# Patient Record
Sex: FEMALE | Race: WHITE | NOT HISPANIC OR LATINO | Employment: FULL TIME | URBAN - METROPOLITAN AREA
[De-identification: names, ages, dates, MRNs, and addresses within clinical notes are randomized per-mention and may not be internally consistent; named-entity substitution may affect disease eponyms.]

---

## 2017-01-09 ENCOUNTER — GENERIC CONVERSION - ENCOUNTER (OUTPATIENT)
Dept: OTHER | Facility: OTHER | Age: 67
End: 2017-01-09

## 2017-01-09 ENCOUNTER — ALLSCRIPTS OFFICE VISIT (OUTPATIENT)
Dept: WOUND CARE | Facility: HOSPITAL | Age: 67
End: 2017-01-09
Payer: COMMERCIAL

## 2017-01-09 PROCEDURE — 11042 DBRDMT SUBQ TIS 1ST 20SQCM/<: CPT | Performed by: REGISTERED NURSE

## 2017-01-16 ENCOUNTER — ALLSCRIPTS OFFICE VISIT (OUTPATIENT)
Dept: WOUND CARE | Facility: HOSPITAL | Age: 67
End: 2017-01-16
Payer: COMMERCIAL

## 2017-01-16 PROCEDURE — 11042 DBRDMT SUBQ TIS 1ST 20SQCM/<: CPT | Performed by: REGISTERED NURSE

## 2017-01-17 ENCOUNTER — HOSPITAL ENCOUNTER (EMERGENCY)
Facility: HOSPITAL | Age: 67
End: 2017-01-17
Attending: EMERGENCY MEDICINE | Admitting: EMERGENCY MEDICINE
Payer: COMMERCIAL

## 2017-01-17 ENCOUNTER — OFFICE VISIT (OUTPATIENT)
Dept: URGENT CARE | Facility: CLINIC | Age: 67
End: 2017-01-17
Payer: COMMERCIAL

## 2017-01-17 ENCOUNTER — HOSPITAL ENCOUNTER (OUTPATIENT)
Dept: RADIOLOGY | Facility: CLINIC | Age: 67
Discharge: HOME | End: 2017-01-17
Admitting: PHYSICIAN ASSISTANT
Payer: COMMERCIAL

## 2017-01-17 ENCOUNTER — APPOINTMENT (EMERGENCY)
Dept: CT IMAGING | Facility: HOSPITAL | Age: 67
End: 2017-01-17
Payer: COMMERCIAL

## 2017-01-17 ENCOUNTER — HOSPITAL ENCOUNTER (INPATIENT)
Facility: HOSPITAL | Age: 67
LOS: 7 days | Discharge: HOME | DRG: 175 | End: 2017-01-24
Attending: EMERGENCY MEDICINE | Admitting: INTERNAL MEDICINE
Payer: COMMERCIAL

## 2017-01-17 VITALS
RESPIRATION RATE: 18 BRPM | HEIGHT: 70 IN | SYSTOLIC BLOOD PRESSURE: 113 MMHG | HEART RATE: 111 BPM | OXYGEN SATURATION: 94 % | TEMPERATURE: 99.2 F | WEIGHT: 207.8 LBS | DIASTOLIC BLOOD PRESSURE: 76 MMHG | BODY MASS INDEX: 29.75 KG/M2

## 2017-01-17 DIAGNOSIS — Z12.12 ENCOUNTER FOR SCREENING FOR MALIGNANT NEOPLASM OF RECTUM: ICD-10-CM

## 2017-01-17 DIAGNOSIS — Z12.11 ENCOUNTER FOR SCREENING FOR MALIGNANT NEOPLASM OF COLON: ICD-10-CM

## 2017-01-17 DIAGNOSIS — I87.8 VENOUS STASIS OF BOTH LOWER EXTREMITIES: Primary | ICD-10-CM

## 2017-01-17 DIAGNOSIS — R06.02 SHORTNESS OF BREATH: ICD-10-CM

## 2017-01-17 DIAGNOSIS — I26.99 PULMONARY EMBOLISM (HCC): Primary | ICD-10-CM

## 2017-01-17 LAB
ALBUMIN SERPL BCP-MCNC: 2.9 G/DL (ref 3.5–5)
ALP SERPL-CCNC: 43 U/L (ref 46–116)
ALT SERPL W P-5'-P-CCNC: 26 U/L (ref 12–78)
ANION GAP SERPL CALCULATED.3IONS-SCNC: 11 MMOL/L (ref 4–13)
APTT PPP: 29 SECONDS (ref 24–36)
AST SERPL W P-5'-P-CCNC: 22 U/L (ref 5–45)
BASOPHILS # BLD AUTO: 0.06 THOUSANDS/ΜL (ref 0–0.1)
BASOPHILS NFR BLD AUTO: 1 % (ref 0–1)
BILIRUB SERPL-MCNC: 0.5 MG/DL (ref 0.2–1)
BUN SERPL-MCNC: 25 MG/DL (ref 5–25)
CALCIUM SERPL-MCNC: 8.6 MG/DL (ref 8.3–10.1)
CHLORIDE SERPL-SCNC: 103 MMOL/L (ref 100–108)
CO2 SERPL-SCNC: 24 MMOL/L (ref 21–32)
CREAT SERPL-MCNC: 0.86 MG/DL (ref 0.6–1.3)
EOSINOPHIL # BLD AUTO: 0.13 THOUSAND/ΜL (ref 0–0.61)
EOSINOPHIL NFR BLD AUTO: 1 % (ref 0–6)
ERYTHROCYTE [DISTWIDTH] IN BLOOD BY AUTOMATED COUNT: 13.1 % (ref 11.6–15.1)
GFR SERPL CREATININE-BSD FRML MDRD: >60 ML/MIN/1.73SQ M
GLUCOSE SERPL-MCNC: 116 MG/DL (ref 65–140)
HCT VFR BLD AUTO: 37.6 % (ref 34.8–46.1)
HGB BLD-MCNC: 12.2 G/DL (ref 11.5–15.4)
INR PPP: 1.26 (ref 0.86–1.16)
LIPASE SERPL-CCNC: 73 U/L (ref 73–393)
LYMPHOCYTES # BLD AUTO: 2.38 THOUSANDS/ΜL (ref 0.6–4.47)
LYMPHOCYTES NFR BLD AUTO: 23 % (ref 14–44)
MAGNESIUM SERPL-MCNC: 1.9 MG/DL (ref 1.6–2.6)
MCH RBC QN AUTO: 28.8 PG (ref 26.8–34.3)
MCHC RBC AUTO-ENTMCNC: 32.4 G/DL (ref 31.4–37.4)
MCV RBC AUTO: 89 FL (ref 82–98)
MONOCYTES # BLD AUTO: 1.33 THOUSAND/ΜL (ref 0.17–1.22)
MONOCYTES NFR BLD AUTO: 13 % (ref 4–12)
NEUTROPHILS # BLD AUTO: 6.42 THOUSANDS/ΜL (ref 1.85–7.62)
NEUTS SEG NFR BLD AUTO: 62 % (ref 43–75)
NT-PROBNP SERPL-MCNC: 4082 PG/ML
PLATELET # BLD AUTO: 204 THOUSANDS/UL (ref 149–390)
PMV BLD AUTO: 11.2 FL (ref 8.9–12.7)
POTASSIUM SERPL-SCNC: 4.1 MMOL/L (ref 3.5–5.3)
PROT SERPL-MCNC: 6.4 G/DL (ref 6.4–8.2)
PROTHROMBIN TIME: 15.4 SECONDS (ref 12–14.3)
RBC # BLD AUTO: 4.24 MILLION/UL (ref 3.81–5.12)
SODIUM SERPL-SCNC: 138 MMOL/L (ref 136–145)
TROPONIN I SERPL-MCNC: 0.57 NG/ML
TSH SERPL DL<=0.05 MIU/L-ACNC: 2.46 UIU/ML (ref 0.36–3.74)
WBC # BLD AUTO: 10.32 THOUSAND/UL (ref 4.31–10.16)

## 2017-01-17 PROCEDURE — 80053 COMPREHEN METABOLIC PANEL: CPT | Performed by: EMERGENCY MEDICINE

## 2017-01-17 PROCEDURE — 85730 THROMBOPLASTIN TIME PARTIAL: CPT | Performed by: EMERGENCY MEDICINE

## 2017-01-17 PROCEDURE — 96365 THER/PROPH/DIAG IV INF INIT: CPT

## 2017-01-17 PROCEDURE — 71275 CT ANGIOGRAPHY CHEST: CPT

## 2017-01-17 PROCEDURE — 99285 EMERGENCY DEPT VISIT HI MDM: CPT

## 2017-01-17 PROCEDURE — 71020 HB CHEST X-RAY 2VW FRONTAL&LATL: CPT

## 2017-01-17 PROCEDURE — 84484 ASSAY OF TROPONIN QUANT: CPT | Performed by: EMERGENCY MEDICINE

## 2017-01-17 PROCEDURE — 96366 THER/PROPH/DIAG IV INF ADDON: CPT

## 2017-01-17 PROCEDURE — 83880 ASSAY OF NATRIURETIC PEPTIDE: CPT | Performed by: EMERGENCY MEDICINE

## 2017-01-17 PROCEDURE — 99203 OFFICE O/P NEW LOW 30 MIN: CPT

## 2017-01-17 PROCEDURE — 93005 ELECTROCARDIOGRAM TRACING: CPT | Performed by: EMERGENCY MEDICINE

## 2017-01-17 PROCEDURE — 96361 HYDRATE IV INFUSION ADD-ON: CPT

## 2017-01-17 PROCEDURE — 83735 ASSAY OF MAGNESIUM: CPT | Performed by: EMERGENCY MEDICINE

## 2017-01-17 PROCEDURE — 36415 COLL VENOUS BLD VENIPUNCTURE: CPT | Performed by: EMERGENCY MEDICINE

## 2017-01-17 PROCEDURE — 84443 ASSAY THYROID STIM HORMONE: CPT | Performed by: EMERGENCY MEDICINE

## 2017-01-17 PROCEDURE — 96375 TX/PRO/DX INJ NEW DRUG ADDON: CPT

## 2017-01-17 PROCEDURE — 85025 COMPLETE CBC W/AUTO DIFF WBC: CPT | Performed by: EMERGENCY MEDICINE

## 2017-01-17 PROCEDURE — 85610 PROTHROMBIN TIME: CPT | Performed by: EMERGENCY MEDICINE

## 2017-01-17 PROCEDURE — 83690 ASSAY OF LIPASE: CPT | Performed by: EMERGENCY MEDICINE

## 2017-01-17 RX ORDER — HEPARIN SODIUM 1000 [USP'U]/ML
7200 INJECTION, SOLUTION INTRAVENOUS; SUBCUTANEOUS AS NEEDED
Status: DISCONTINUED | OUTPATIENT
Start: 2017-01-17 | End: 2017-01-17 | Stop reason: HOSPADM

## 2017-01-17 RX ORDER — PHENOL 1.4 %
600 AEROSOL, SPRAY (ML) MUCOUS MEMBRANE DAILY
COMMUNITY

## 2017-01-17 RX ORDER — HEPARIN SODIUM 10000 [USP'U]/100ML
3-30 INJECTION, SOLUTION INTRAVENOUS
Status: DISCONTINUED | OUTPATIENT
Start: 2017-01-17 | End: 2017-01-17 | Stop reason: HOSPADM

## 2017-01-17 RX ORDER — MULTIVIT-MIN/IRON FUM/FOLIC AC 7.5 MG-4
1 TABLET ORAL DAILY
COMMUNITY

## 2017-01-17 RX ORDER — SODIUM CHLORIDE 9 MG/ML
1000 INJECTION, SOLUTION INTRAVENOUS ONCE
Status: COMPLETED | OUTPATIENT
Start: 2017-01-17 | End: 2017-01-17

## 2017-01-17 RX ORDER — HEPARIN SODIUM 1000 [USP'U]/ML
7200 INJECTION, SOLUTION INTRAVENOUS; SUBCUTANEOUS ONCE
Status: COMPLETED | OUTPATIENT
Start: 2017-01-17 | End: 2017-01-17

## 2017-01-17 RX ORDER — HEPARIN SODIUM 1000 [USP'U]/ML
3600 INJECTION, SOLUTION INTRAVENOUS; SUBCUTANEOUS AS NEEDED
Status: DISCONTINUED | OUTPATIENT
Start: 2017-01-17 | End: 2017-01-17 | Stop reason: HOSPADM

## 2017-01-17 RX ADMIN — IOHEXOL 100 ML: 350 INJECTION, SOLUTION INTRAVENOUS at 20:55

## 2017-01-17 RX ADMIN — HEPARIN SODIUM AND DEXTROSE 18 UNITS/KG/HR: 10000; 5 INJECTION INTRAVENOUS at 20:54

## 2017-01-17 RX ADMIN — HEPARIN SODIUM 7200 UNITS: 1000 INJECTION, SOLUTION INTRAVENOUS; SUBCUTANEOUS at 20:54

## 2017-01-17 RX ADMIN — SODIUM CHLORIDE 1000 ML/HR: 0.9 INJECTION, SOLUTION INTRAVENOUS at 19:37

## 2017-01-18 ENCOUNTER — APPOINTMENT (INPATIENT)
Dept: RADIOLOGY | Facility: HOSPITAL | Age: 67
DRG: 175 | End: 2017-01-18
Payer: COMMERCIAL

## 2017-01-18 ENCOUNTER — APPOINTMENT (INPATIENT)
Dept: NON INVASIVE DIAGNOSTICS | Facility: HOSPITAL | Age: 67
DRG: 175 | End: 2017-01-18
Payer: COMMERCIAL

## 2017-01-18 ENCOUNTER — GENERIC CONVERSION - ENCOUNTER (OUTPATIENT)
Dept: OTHER | Facility: OTHER | Age: 67
End: 2017-01-18

## 2017-01-18 PROBLEM — R79.89 ELEVATED TROPONIN: Status: ACTIVE | Noted: 2017-01-18

## 2017-01-18 PROBLEM — I26.99 ACUTE MASSIVE PULMONARY EMBOLISM (HCC): Status: ACTIVE | Noted: 2017-01-18

## 2017-01-18 PROBLEM — R77.8 ELEVATED TROPONIN: Status: ACTIVE | Noted: 2017-01-18

## 2017-01-18 PROBLEM — I87.8 VENOUS STASIS OF BOTH LOWER EXTREMITIES: Status: ACTIVE | Noted: 2017-01-18

## 2017-01-18 PROBLEM — R06.09 DYSPNEA ON EXERTION: Status: ACTIVE | Noted: 2017-01-18

## 2017-01-18 PROBLEM — R00.0 TACHYCARDIA: Status: ACTIVE | Noted: 2017-01-18

## 2017-01-18 PROBLEM — R06.00 DYSPNEA ON EXERTION: Status: ACTIVE | Noted: 2017-01-18

## 2017-01-18 LAB
ANION GAP SERPL CALCULATED.3IONS-SCNC: 9 MMOL/L (ref 4–13)
APTT PPP: 103 SECONDS (ref 24–36)
APTT PPP: 39 SECONDS (ref 24–36)
APTT PPP: 58 SECONDS (ref 24–36)
APTT PPP: 71 SECONDS (ref 24–36)
ATRIAL RATE: 102 BPM
ATRIAL RATE: 121 BPM
BASOPHILS # BLD AUTO: 0.03 THOUSANDS/ΜL (ref 0–0.1)
BASOPHILS NFR BLD AUTO: 0 % (ref 0–1)
BUN SERPL-MCNC: 18 MG/DL (ref 5–25)
CALCIUM SERPL-MCNC: 8.2 MG/DL (ref 8.3–10.1)
CHLORIDE SERPL-SCNC: 110 MMOL/L (ref 100–108)
CO2 SERPL-SCNC: 23 MMOL/L (ref 21–32)
CREAT SERPL-MCNC: 0.62 MG/DL (ref 0.6–1.3)
EOSINOPHIL # BLD AUTO: 0.14 THOUSAND/ΜL (ref 0–0.61)
EOSINOPHIL NFR BLD AUTO: 2 % (ref 0–6)
ERYTHROCYTE [DISTWIDTH] IN BLOOD BY AUTOMATED COUNT: 13.2 % (ref 11.6–15.1)
ERYTHROCYTE [DISTWIDTH] IN BLOOD BY AUTOMATED COUNT: 13.3 % (ref 11.6–15.1)
ERYTHROCYTE [DISTWIDTH] IN BLOOD BY AUTOMATED COUNT: 13.4 % (ref 11.6–15.1)
FIBRINOGEN PPP-MCNC: 375 MG/DL (ref 227–495)
FIBRINOGEN PPP-MCNC: 417 MG/DL (ref 227–495)
GFR SERPL CREATININE-BSD FRML MDRD: >60 ML/MIN/1.73SQ M
GLUCOSE SERPL-MCNC: 109 MG/DL (ref 65–140)
HCT VFR BLD AUTO: 34.6 % (ref 34.8–46.1)
HCT VFR BLD AUTO: 35.1 % (ref 34.8–46.1)
HCT VFR BLD AUTO: 35.6 % (ref 34.8–46.1)
HGB BLD-MCNC: 11.2 G/DL (ref 11.5–15.4)
HGB BLD-MCNC: 11.3 G/DL (ref 11.5–15.4)
HGB BLD-MCNC: 11.5 G/DL (ref 11.5–15.4)
INR PPP: 1.28 (ref 0.86–1.16)
LYMPHOCYTES # BLD AUTO: 2.47 THOUSANDS/ΜL (ref 0.6–4.47)
LYMPHOCYTES NFR BLD AUTO: 29 % (ref 14–44)
MCH RBC QN AUTO: 28.2 PG (ref 26.8–34.3)
MCH RBC QN AUTO: 28.3 PG (ref 26.8–34.3)
MCH RBC QN AUTO: 28.4 PG (ref 26.8–34.3)
MCHC RBC AUTO-ENTMCNC: 32.2 G/DL (ref 31.4–37.4)
MCHC RBC AUTO-ENTMCNC: 32.3 G/DL (ref 31.4–37.4)
MCHC RBC AUTO-ENTMCNC: 32.4 G/DL (ref 31.4–37.4)
MCV RBC AUTO: 87 FL (ref 82–98)
MCV RBC AUTO: 88 FL (ref 82–98)
MCV RBC AUTO: 88 FL (ref 82–98)
MONOCYTES # BLD AUTO: 1.15 THOUSAND/ΜL (ref 0.17–1.22)
MONOCYTES NFR BLD AUTO: 13 % (ref 4–12)
NEUTROPHILS # BLD AUTO: 4.82 THOUSANDS/ΜL (ref 1.85–7.62)
NEUTS SEG NFR BLD AUTO: 56 % (ref 43–75)
NRBC BLD AUTO-RTO: 0 /100 WBCS
P AXIS: 32 DEGREES
P AXIS: 49 DEGREES
PLATELET # BLD AUTO: 176 THOUSANDS/UL (ref 149–390)
PLATELET # BLD AUTO: 178 THOUSANDS/UL (ref 149–390)
PLATELET # BLD AUTO: 187 THOUSANDS/UL (ref 149–390)
PMV BLD AUTO: 10.2 FL (ref 8.9–12.7)
PMV BLD AUTO: 10.7 FL (ref 8.9–12.7)
PMV BLD AUTO: 11 FL (ref 8.9–12.7)
POTASSIUM SERPL-SCNC: 4.1 MMOL/L (ref 3.5–5.3)
PR INTERVAL: 122 MS
PR INTERVAL: 138 MS
PROTHROMBIN TIME: 16 SECONDS (ref 12–14.3)
QRS AXIS: 15 DEGREES
QRS AXIS: 50 DEGREES
QRSD INTERVAL: 88 MS
QRSD INTERVAL: 90 MS
QT INTERVAL: 318 MS
QT INTERVAL: 350 MS
QTC INTERVAL: 451 MS
QTC INTERVAL: 456 MS
RBC # BLD AUTO: 3.95 MILLION/UL (ref 3.81–5.12)
RBC # BLD AUTO: 4 MILLION/UL (ref 3.81–5.12)
RBC # BLD AUTO: 4.08 MILLION/UL (ref 3.81–5.12)
SODIUM SERPL-SCNC: 142 MMOL/L (ref 136–145)
T WAVE AXIS: 23 DEGREES
T WAVE AXIS: 26 DEGREES
TROPONIN I SERPL-MCNC: 0.41 NG/ML
VENTRICULAR RATE: 102 BPM
VENTRICULAR RATE: 121 BPM
WBC # BLD AUTO: 11.59 THOUSAND/UL (ref 4.31–10.16)
WBC # BLD AUTO: 8.64 THOUSAND/UL (ref 4.31–10.16)
WBC # BLD AUTO: 9.32 THOUSAND/UL (ref 4.31–10.16)

## 2017-01-18 PROCEDURE — 85610 PROTHROMBIN TIME: CPT | Performed by: EMERGENCY MEDICINE

## 2017-01-18 PROCEDURE — 37211 THROMBOLYTIC ART THERAPY: CPT

## 2017-01-18 PROCEDURE — 77001 FLUOROGUIDE FOR VEIN DEVICE: CPT

## 2017-01-18 PROCEDURE — 85025 COMPLETE CBC W/AUTO DIFF WBC: CPT | Performed by: EMERGENCY MEDICINE

## 2017-01-18 PROCEDURE — 93970 EXTREMITY STUDY: CPT

## 2017-01-18 PROCEDURE — 84484 ASSAY OF TROPONIN QUANT: CPT | Performed by: EMERGENCY MEDICINE

## 2017-01-18 PROCEDURE — 93308 TTE F-UP OR LMTD: CPT

## 2017-01-18 PROCEDURE — C1894 INTRO/SHEATH, NON-LASER: HCPCS

## 2017-01-18 PROCEDURE — 85730 THROMBOPLASTIN TIME PARTIAL: CPT | Performed by: RADIOLOGY

## 2017-01-18 PROCEDURE — 02HV33Z INSERTION OF INFUSION DEVICE INTO SUPERIOR VENA CAVA, PERCUTANEOUS APPROACH: ICD-10-PCS | Performed by: RADIOLOGY

## 2017-01-18 PROCEDURE — C1751 CATH, INF, PER/CENT/MIDLINE: HCPCS

## 2017-01-18 PROCEDURE — C1769 GUIDE WIRE: HCPCS

## 2017-01-18 PROCEDURE — B31TYZZ FLUOROSCOPY OF LEFT PULMONARY ARTERY USING OTHER CONTRAST: ICD-10-PCS | Performed by: RADIOLOGY

## 2017-01-18 PROCEDURE — 85730 THROMBOPLASTIN TIME PARTIAL: CPT | Performed by: EMERGENCY MEDICINE

## 2017-01-18 PROCEDURE — 93005 ELECTROCARDIOGRAM TRACING: CPT | Performed by: EMERGENCY MEDICINE

## 2017-01-18 PROCEDURE — 80048 BASIC METABOLIC PNL TOTAL CA: CPT | Performed by: EMERGENCY MEDICINE

## 2017-01-18 PROCEDURE — 85027 COMPLETE CBC AUTOMATED: CPT | Performed by: EMERGENCY MEDICINE

## 2017-01-18 PROCEDURE — 75743 ARTERY X-RAYS LUNGS: CPT

## 2017-01-18 PROCEDURE — 99152 MOD SED SAME PHYS/QHP 5/>YRS: CPT

## 2017-01-18 PROCEDURE — B31SYZZ FLUOROSCOPY OF RIGHT PULMONARY ARTERY USING OTHER CONTRAST: ICD-10-PCS | Performed by: RADIOLOGY

## 2017-01-18 PROCEDURE — 36015 PLACE CATHETER IN ARTERY: CPT

## 2017-01-18 PROCEDURE — 87493 C DIFF AMPLIFIED PROBE: CPT | Performed by: INTERNAL MEDICINE

## 2017-01-18 PROCEDURE — 85384 FIBRINOGEN ACTIVITY: CPT | Performed by: RADIOLOGY

## 2017-01-18 PROCEDURE — 3E03317 INTRODUCTION OF OTHER THROMBOLYTIC INTO PERIPHERAL VEIN, PERCUTANEOUS APPROACH: ICD-10-PCS | Performed by: RADIOLOGY

## 2017-01-18 PROCEDURE — 93306 TTE W/DOPPLER COMPLETE: CPT

## 2017-01-18 PROCEDURE — 36569 INSJ PICC 5 YR+ W/O IMAGING: CPT

## 2017-01-18 PROCEDURE — 76937 US GUIDE VASCULAR ACCESS: CPT

## 2017-01-18 PROCEDURE — 99153 MOD SED SAME PHYS/QHP EA: CPT

## 2017-01-18 PROCEDURE — 85384 FIBRINOGEN ACTIVITY: CPT | Performed by: INTERNAL MEDICINE

## 2017-01-18 PROCEDURE — 85027 COMPLETE CBC AUTOMATED: CPT | Performed by: RADIOLOGY

## 2017-01-18 PROCEDURE — 70450 CT HEAD/BRAIN W/O DYE: CPT

## 2017-01-18 RX ORDER — HEPARIN SODIUM 1000 [USP'U]/ML
3800 INJECTION, SOLUTION INTRAVENOUS; SUBCUTANEOUS AS NEEDED
Status: DISCONTINUED | OUTPATIENT
Start: 2017-01-18 | End: 2017-01-18

## 2017-01-18 RX ORDER — ONDANSETRON 2 MG/ML
4 INJECTION INTRAMUSCULAR; INTRAVENOUS EVERY 6 HOURS PRN
Status: DISCONTINUED | OUTPATIENT
Start: 2017-01-18 | End: 2017-01-24 | Stop reason: HOSPADM

## 2017-01-18 RX ORDER — FENTANYL CITRATE 50 UG/ML
INJECTION, SOLUTION INTRAMUSCULAR; INTRAVENOUS CODE/TRAUMA/SEDATION MEDICATION
Status: COMPLETED | OUTPATIENT
Start: 2017-01-18 | End: 2017-01-18

## 2017-01-18 RX ORDER — HEPARIN SODIUM 1000 [USP'U]/ML
7200 INJECTION, SOLUTION INTRAVENOUS; SUBCUTANEOUS ONCE
Status: DISCONTINUED | OUTPATIENT
Start: 2017-01-18 | End: 2017-01-18 | Stop reason: DRUGHIGH

## 2017-01-18 RX ORDER — HEPARIN SODIUM 10000 [USP'U]/100ML
400 INJECTION, SOLUTION INTRAVENOUS CONTINUOUS
Status: DISCONTINUED | OUTPATIENT
Start: 2017-01-18 | End: 2017-01-18

## 2017-01-18 RX ORDER — MIDAZOLAM HYDROCHLORIDE 1 MG/ML
INJECTION INTRAMUSCULAR; INTRAVENOUS CODE/TRAUMA/SEDATION MEDICATION
Status: COMPLETED | OUTPATIENT
Start: 2017-01-18 | End: 2017-01-18

## 2017-01-18 RX ORDER — HEPARIN SODIUM 1000 [USP'U]/ML
7200 INJECTION, SOLUTION INTRAVENOUS; SUBCUTANEOUS AS NEEDED
Status: DISCONTINUED | OUTPATIENT
Start: 2017-01-18 | End: 2017-01-18 | Stop reason: DRUGHIGH

## 2017-01-18 RX ORDER — HEPARIN SODIUM 10000 [USP'U]/100ML
400 INJECTION, SOLUTION INTRAVENOUS CONTINUOUS
Status: DISCONTINUED | OUTPATIENT
Start: 2017-01-18 | End: 2017-01-21

## 2017-01-18 RX ORDER — HEPARIN SODIUM 10000 [USP'U]/100ML
3-30 INJECTION, SOLUTION INTRAVENOUS
Status: DISCONTINUED | OUTPATIENT
Start: 2017-01-18 | End: 2017-01-18 | Stop reason: DRUGHIGH

## 2017-01-18 RX ORDER — HEPARIN SODIUM 10000 [USP'U]/100ML
3-30 INJECTION, SOLUTION INTRAVENOUS
Status: DISCONTINUED | OUTPATIENT
Start: 2017-01-18 | End: 2017-01-18

## 2017-01-18 RX ORDER — HEPARIN SODIUM 1000 [USP'U]/ML
3600 INJECTION, SOLUTION INTRAVENOUS; SUBCUTANEOUS AS NEEDED
Status: DISCONTINUED | OUTPATIENT
Start: 2017-01-18 | End: 2017-01-18 | Stop reason: DRUGHIGH

## 2017-01-18 RX ORDER — SODIUM CHLORIDE, SODIUM LACTATE, POTASSIUM CHLORIDE, CALCIUM CHLORIDE 600; 310; 30; 20 MG/100ML; MG/100ML; MG/100ML; MG/100ML
75 INJECTION, SOLUTION INTRAVENOUS CONTINUOUS
Status: DISCONTINUED | OUTPATIENT
Start: 2017-01-18 | End: 2017-01-20

## 2017-01-18 RX ORDER — HEPARIN SODIUM 1000 [USP'U]/ML
7600 INJECTION, SOLUTION INTRAVENOUS; SUBCUTANEOUS AS NEEDED
Status: DISCONTINUED | OUTPATIENT
Start: 2017-01-18 | End: 2017-01-18

## 2017-01-18 RX ADMIN — FENTANYL CITRATE 50 MCG: 50 INJECTION, SOLUTION INTRAMUSCULAR; INTRAVENOUS at 18:17

## 2017-01-18 RX ADMIN — HEPARIN SODIUM 400 UNITS/HR: 10000 INJECTION, SOLUTION INTRAVENOUS at 18:30

## 2017-01-18 RX ADMIN — MIDAZOLAM HYDROCHLORIDE 1 MG: 1 INJECTION, SOLUTION INTRAMUSCULAR; INTRAVENOUS at 18:17

## 2017-01-18 RX ADMIN — HEPARIN SODIUM 20 UNITS/HR: 200 INJECTION, SOLUTION INTRAVENOUS at 20:40

## 2017-01-18 RX ADMIN — HEPARIN SODIUM AND DEXTROSE 18 UNITS/KG/HR: 10000; 5 INJECTION INTRAVENOUS at 00:37

## 2017-01-18 RX ADMIN — SODIUM CHLORIDE, SODIUM LACTATE, POTASSIUM CHLORIDE, AND CALCIUM CHLORIDE 75 ML/HR: .6; .31; .03; .02 INJECTION, SOLUTION INTRAVENOUS at 00:56

## 2017-01-18 RX ADMIN — SODIUM CHLORIDE, SODIUM LACTATE, POTASSIUM CHLORIDE, AND CALCIUM CHLORIDE 75 ML/HR: .6; .31; .03; .02 INJECTION, SOLUTION INTRAVENOUS at 14:00

## 2017-01-18 RX ADMIN — HEPARIN SODIUM 3800 UNITS: 1000 INJECTION, SOLUTION INTRAVENOUS; SUBCUTANEOUS at 15:19

## 2017-01-18 RX ADMIN — MIDAZOLAM HYDROCHLORIDE 1 MG: 1 INJECTION, SOLUTION INTRAMUSCULAR; INTRAVENOUS at 18:55

## 2017-01-18 RX ADMIN — HEPARIN SODIUM AND DEXTROSE 16 UNITS/KG/HR: 10000; 5 INJECTION INTRAVENOUS at 14:00

## 2017-01-18 RX ADMIN — IODIXANOL 15 ML: 320 INJECTION, SOLUTION INTRAVASCULAR at 19:31

## 2017-01-18 RX ADMIN — ALTEPLASE 0.5 MG/HR: 2.2 INJECTION, POWDER, LYOPHILIZED, FOR SOLUTION INTRAVENOUS at 19:22

## 2017-01-18 RX ADMIN — FENTANYL CITRATE 50 MCG: 50 INJECTION, SOLUTION INTRAMUSCULAR; INTRAVENOUS at 18:55

## 2017-01-18 RX ADMIN — ALTEPLASE 0.5 MG/HR: 2.2 INJECTION, POWDER, LYOPHILIZED, FOR SOLUTION INTRAVENOUS at 19:20

## 2017-01-19 ENCOUNTER — APPOINTMENT (INPATIENT)
Dept: NON INVASIVE DIAGNOSTICS | Facility: HOSPITAL | Age: 67
DRG: 175 | End: 2017-01-19
Payer: COMMERCIAL

## 2017-01-19 ENCOUNTER — GENERIC CONVERSION - ENCOUNTER (OUTPATIENT)
Dept: OTHER | Facility: OTHER | Age: 67
End: 2017-01-19

## 2017-01-19 PROBLEM — A49.8 CLOSTRIDIUM DIFFICILE INFECTION: Status: ACTIVE | Noted: 2017-01-19

## 2017-01-19 LAB
ANION GAP SERPL CALCULATED.3IONS-SCNC: 8 MMOL/L (ref 4–13)
APTT PPP: 36 SECONDS (ref 24–36)
APTT PPP: 38 SECONDS (ref 24–36)
APTT PPP: 38 SECONDS (ref 24–36)
APTT PPP: 39 SECONDS (ref 24–36)
APTT PPP: 39 SECONDS (ref 24–36)
BASOPHILS # BLD AUTO: 0.04 THOUSANDS/ΜL (ref 0–0.1)
BASOPHILS NFR BLD AUTO: 0 % (ref 0–1)
BUN SERPL-MCNC: 14 MG/DL (ref 5–25)
C DIFF TOX GENS STL QL NAA+PROBE: ABNORMAL
CA-I BLD-SCNC: 1.1 MMOL/L (ref 1.12–1.32)
CALCIUM SERPL-MCNC: 7.8 MG/DL (ref 8.3–10.1)
CHLORIDE SERPL-SCNC: 108 MMOL/L (ref 100–108)
CO2 SERPL-SCNC: 25 MMOL/L (ref 21–32)
CREAT SERPL-MCNC: 0.62 MG/DL (ref 0.6–1.3)
EOSINOPHIL # BLD AUTO: 0.03 THOUSAND/ΜL (ref 0–0.61)
EOSINOPHIL NFR BLD AUTO: 0 % (ref 0–6)
ERYTHROCYTE [DISTWIDTH] IN BLOOD BY AUTOMATED COUNT: 13 % (ref 11.6–15.1)
ERYTHROCYTE [DISTWIDTH] IN BLOOD BY AUTOMATED COUNT: 13.2 % (ref 11.6–15.1)
FIBRINOGEN PPP-MCNC: 177 MG/DL (ref 227–495)
FIBRINOGEN PPP-MCNC: 186 MG/DL (ref 227–495)
FIBRINOGEN PPP-MCNC: 240 MG/DL (ref 227–495)
FIBRINOGEN PPP-MCNC: 305 MG/DL (ref 227–495)
GFR SERPL CREATININE-BSD FRML MDRD: >60 ML/MIN/1.73SQ M
GLUCOSE SERPL-MCNC: 115 MG/DL (ref 65–140)
HCT VFR BLD AUTO: 35.7 % (ref 34.8–46.1)
HCT VFR BLD AUTO: 36 % (ref 34.8–46.1)
HCT VFR BLD AUTO: 36.3 % (ref 34.8–46.1)
HCT VFR BLD AUTO: 37.6 % (ref 34.8–46.1)
HCT VFR BLD AUTO: 37.9 % (ref 34.8–46.1)
HGB BLD-MCNC: 11.3 G/DL (ref 11.5–15.4)
HGB BLD-MCNC: 11.8 G/DL (ref 11.5–15.4)
HGB BLD-MCNC: 11.8 G/DL (ref 11.5–15.4)
HGB BLD-MCNC: 12.2 G/DL (ref 11.5–15.4)
HGB BLD-MCNC: 12.4 G/DL (ref 11.5–15.4)
LYMPHOCYTES # BLD AUTO: 1.32 THOUSANDS/ΜL (ref 0.6–4.47)
LYMPHOCYTES NFR BLD AUTO: 11 % (ref 14–44)
MCH RBC QN AUTO: 27.7 PG (ref 26.8–34.3)
MCH RBC QN AUTO: 28.2 PG (ref 26.8–34.3)
MCH RBC QN AUTO: 28.4 PG (ref 26.8–34.3)
MCH RBC QN AUTO: 28.5 PG (ref 26.8–34.3)
MCH RBC QN AUTO: 28.7 PG (ref 26.8–34.3)
MCHC RBC AUTO-ENTMCNC: 31.7 G/DL (ref 31.4–37.4)
MCHC RBC AUTO-ENTMCNC: 32.4 G/DL (ref 31.4–37.4)
MCHC RBC AUTO-ENTMCNC: 32.5 G/DL (ref 31.4–37.4)
MCHC RBC AUTO-ENTMCNC: 32.7 G/DL (ref 31.4–37.4)
MCHC RBC AUTO-ENTMCNC: 32.8 G/DL (ref 31.4–37.4)
MCV RBC AUTO: 87 FL (ref 82–98)
MCV RBC AUTO: 88 FL (ref 82–98)
MCV RBC AUTO: 88 FL (ref 82–98)
MONOCYTES # BLD AUTO: 1.51 THOUSAND/ΜL (ref 0.17–1.22)
MONOCYTES NFR BLD AUTO: 13 % (ref 4–12)
NEUTROPHILS # BLD AUTO: 8.97 THOUSANDS/ΜL (ref 1.85–7.62)
NEUTS SEG NFR BLD AUTO: 76 % (ref 43–75)
NRBC BLD AUTO-RTO: 0 /100 WBCS
PLATELET # BLD AUTO: 118 THOUSANDS/UL (ref 149–390)
PLATELET # BLD AUTO: 138 THOUSANDS/UL (ref 149–390)
PLATELET # BLD AUTO: 155 THOUSANDS/UL (ref 149–390)
PLATELET # BLD AUTO: 157 THOUSANDS/UL (ref 149–390)
PLATELET # BLD AUTO: 166 THOUSANDS/UL (ref 149–390)
PMV BLD AUTO: 10.4 FL (ref 8.9–12.7)
PMV BLD AUTO: 10.5 FL (ref 8.9–12.7)
PMV BLD AUTO: 10.8 FL (ref 8.9–12.7)
PMV BLD AUTO: 10.9 FL (ref 8.9–12.7)
PMV BLD AUTO: 11.2 FL (ref 8.9–12.7)
POTASSIUM SERPL-SCNC: 3.7 MMOL/L (ref 3.5–5.3)
RBC # BLD AUTO: 4.08 MILLION/UL (ref 3.81–5.12)
RBC # BLD AUTO: 4.11 MILLION/UL (ref 3.81–5.12)
RBC # BLD AUTO: 4.16 MILLION/UL (ref 3.81–5.12)
RBC # BLD AUTO: 4.33 MILLION/UL (ref 3.81–5.12)
RBC # BLD AUTO: 4.35 MILLION/UL (ref 3.81–5.12)
SODIUM SERPL-SCNC: 141 MMOL/L (ref 136–145)
WBC # BLD AUTO: 11.77 THOUSAND/UL (ref 4.31–10.16)
WBC # BLD AUTO: 11.92 THOUSAND/UL (ref 4.31–10.16)
WBC # BLD AUTO: 12.18 THOUSAND/UL (ref 4.31–10.16)
WBC # BLD AUTO: 14.15 THOUSAND/UL (ref 4.31–10.16)
WBC # BLD AUTO: 17.68 THOUSAND/UL (ref 4.31–10.16)

## 2017-01-19 PROCEDURE — 93308 TTE F-UP OR LMTD: CPT

## 2017-01-19 PROCEDURE — 85025 COMPLETE CBC W/AUTO DIFF WBC: CPT | Performed by: NURSE PRACTITIONER

## 2017-01-19 PROCEDURE — 85730 THROMBOPLASTIN TIME PARTIAL: CPT | Performed by: EMERGENCY MEDICINE

## 2017-01-19 PROCEDURE — 85730 THROMBOPLASTIN TIME PARTIAL: CPT | Performed by: RADIOLOGY

## 2017-01-19 PROCEDURE — 85027 COMPLETE CBC AUTOMATED: CPT | Performed by: RADIOLOGY

## 2017-01-19 PROCEDURE — 82330 ASSAY OF CALCIUM: CPT | Performed by: PHYSICIAN ASSISTANT

## 2017-01-19 PROCEDURE — 80048 BASIC METABOLIC PNL TOTAL CA: CPT | Performed by: NURSE PRACTITIONER

## 2017-01-19 PROCEDURE — 85384 FIBRINOGEN ACTIVITY: CPT | Performed by: RADIOLOGY

## 2017-01-19 RX ORDER — POTASSIUM CHLORIDE 14.9 MG/ML
20 INJECTION INTRAVENOUS ONCE
Status: COMPLETED | OUTPATIENT
Start: 2017-01-19 | End: 2017-01-19

## 2017-01-19 RX ORDER — FENTANYL CITRATE 50 UG/ML
25 INJECTION, SOLUTION INTRAMUSCULAR; INTRAVENOUS EVERY 2 HOUR PRN
Status: DISCONTINUED | OUTPATIENT
Start: 2017-01-19 | End: 2017-01-19

## 2017-01-19 RX ORDER — HYDRALAZINE HYDROCHLORIDE 20 MG/ML
10 INJECTION INTRAMUSCULAR; INTRAVENOUS EVERY 6 HOURS PRN
Status: DISCONTINUED | OUTPATIENT
Start: 2017-01-19 | End: 2017-01-24 | Stop reason: HOSPADM

## 2017-01-19 RX ORDER — METRONIDAZOLE 500 MG/1
500 TABLET ORAL EVERY 8 HOURS
Status: DISCONTINUED | OUTPATIENT
Start: 2017-01-19 | End: 2017-01-19

## 2017-01-19 RX ORDER — ACETAMINOPHEN 325 MG/1
975 TABLET ORAL EVERY 6 HOURS PRN
Status: DISCONTINUED | OUTPATIENT
Start: 2017-01-19 | End: 2017-01-24 | Stop reason: HOSPADM

## 2017-01-19 RX ADMIN — ALTEPLASE 0.5 MG/HR: 2.2 INJECTION, POWDER, LYOPHILIZED, FOR SOLUTION INTRAVENOUS at 15:12

## 2017-01-19 RX ADMIN — VANCOMYCIN HYDROCHLORIDE 125 MG: 500 INJECTION, POWDER, LYOPHILIZED, FOR SOLUTION INTRAVENOUS at 18:09

## 2017-01-19 RX ADMIN — SODIUM CHLORIDE, SODIUM LACTATE, POTASSIUM CHLORIDE, AND CALCIUM CHLORIDE 75 ML/HR: .6; .31; .03; .02 INJECTION, SOLUTION INTRAVENOUS at 15:42

## 2017-01-19 RX ADMIN — CALCIUM GLUCONATE 1 G: 94 INJECTION, SOLUTION INTRAVENOUS at 15:10

## 2017-01-19 RX ADMIN — HEPARIN SODIUM AND DEXTROSE 400 UNITS/HR: 10000; 5 INJECTION INTRAVENOUS at 15:40

## 2017-01-19 RX ADMIN — HYDROMORPHONE HYDROCHLORIDE 0.5 MG: 1 INJECTION, SOLUTION INTRAMUSCULAR; INTRAVENOUS; SUBCUTANEOUS at 15:09

## 2017-01-19 RX ADMIN — HEPARIN SODIUM 20 UNITS/HR: 200 INJECTION, SOLUTION INTRAVENOUS at 18:09

## 2017-01-19 RX ADMIN — ACETAMINOPHEN 975 MG: 325 TABLET, FILM COATED ORAL at 20:15

## 2017-01-19 RX ADMIN — POTASSIUM CHLORIDE 20 MEQ: 200 INJECTION, SOLUTION INTRAVENOUS at 09:57

## 2017-01-19 RX ADMIN — SODIUM CHLORIDE, SODIUM LACTATE, POTASSIUM CHLORIDE, AND CALCIUM CHLORIDE 75 ML/HR: .6; .31; .03; .02 INJECTION, SOLUTION INTRAVENOUS at 02:54

## 2017-01-19 RX ADMIN — HYDRALAZINE HYDROCHLORIDE 10 MG: 20 INJECTION INTRAMUSCULAR; INTRAVENOUS at 15:10

## 2017-01-19 RX ADMIN — VANCOMYCIN HYDROCHLORIDE 125 MG: 500 INJECTION, POWDER, LYOPHILIZED, FOR SOLUTION INTRAVENOUS at 23:52

## 2017-01-19 RX ADMIN — METRONIDAZOLE 500 MG: 500 TABLET ORAL at 09:56

## 2017-01-20 ENCOUNTER — GENERIC CONVERSION - ENCOUNTER (OUTPATIENT)
Dept: OTHER | Facility: OTHER | Age: 67
End: 2017-01-20

## 2017-01-20 ENCOUNTER — APPOINTMENT (INPATIENT)
Dept: NON INVASIVE DIAGNOSTICS | Facility: HOSPITAL | Age: 67
DRG: 175 | End: 2017-01-20
Payer: COMMERCIAL

## 2017-01-20 LAB
ANION GAP SERPL CALCULATED.3IONS-SCNC: 5 MMOL/L (ref 4–13)
ANION GAP SERPL CALCULATED.3IONS-SCNC: 7 MMOL/L (ref 4–13)
APTT PPP: 34 SECONDS (ref 24–36)
APTT PPP: 34 SECONDS (ref 24–36)
APTT PPP: 36 SECONDS (ref 24–36)
APTT PPP: 38 SECONDS (ref 24–36)
BASOPHILS # BLD AUTO: 0.03 THOUSANDS/ΜL (ref 0–0.1)
BASOPHILS NFR BLD AUTO: 0 % (ref 0–1)
BUN SERPL-MCNC: 14 MG/DL (ref 5–25)
BUN SERPL-MCNC: 14 MG/DL (ref 5–25)
CA-I BLD-SCNC: 1.13 MMOL/L (ref 1.12–1.32)
CALCIUM SERPL-MCNC: 8 MG/DL (ref 8.3–10.1)
CALCIUM SERPL-MCNC: 8.1 MG/DL (ref 8.3–10.1)
CHLORIDE SERPL-SCNC: 107 MMOL/L (ref 100–108)
CHLORIDE SERPL-SCNC: 107 MMOL/L (ref 100–108)
CO2 SERPL-SCNC: 26 MMOL/L (ref 21–32)
CO2 SERPL-SCNC: 27 MMOL/L (ref 21–32)
CREAT SERPL-MCNC: 0.44 MG/DL (ref 0.6–1.3)
CREAT SERPL-MCNC: 0.48 MG/DL (ref 0.6–1.3)
EOSINOPHIL # BLD AUTO: 0.06 THOUSAND/ΜL (ref 0–0.61)
EOSINOPHIL NFR BLD AUTO: 0 % (ref 0–6)
ERYTHROCYTE [DISTWIDTH] IN BLOOD BY AUTOMATED COUNT: 13.2 % (ref 11.6–15.1)
ERYTHROCYTE [DISTWIDTH] IN BLOOD BY AUTOMATED COUNT: 13.2 % (ref 11.6–15.1)
ERYTHROCYTE [DISTWIDTH] IN BLOOD BY AUTOMATED COUNT: 13.3 % (ref 11.6–15.1)
ERYTHROCYTE [DISTWIDTH] IN BLOOD BY AUTOMATED COUNT: 13.4 % (ref 11.6–15.1)
ERYTHROCYTE [DISTWIDTH] IN BLOOD BY AUTOMATED COUNT: 13.5 % (ref 11.6–15.1)
FIBRINOGEN PPP-MCNC: 174 MG/DL (ref 227–495)
FIBRINOGEN PPP-MCNC: 200 MG/DL (ref 227–495)
FIBRINOGEN PPP-MCNC: 207 MG/DL (ref 227–495)
GFR SERPL CREATININE-BSD FRML MDRD: >60 ML/MIN/1.73SQ M
GFR SERPL CREATININE-BSD FRML MDRD: >60 ML/MIN/1.73SQ M
GLUCOSE SERPL-MCNC: 100 MG/DL (ref 65–140)
GLUCOSE SERPL-MCNC: 112 MG/DL (ref 65–140)
HCT VFR BLD AUTO: 36.2 % (ref 34.8–46.1)
HCT VFR BLD AUTO: 37.1 % (ref 34.8–46.1)
HCT VFR BLD AUTO: 37.6 % (ref 34.8–46.1)
HCT VFR BLD AUTO: 38 % (ref 34.8–46.1)
HCT VFR BLD AUTO: 38.3 % (ref 34.8–46.1)
HGB BLD-MCNC: 11.6 G/DL (ref 11.5–15.4)
HGB BLD-MCNC: 12.1 G/DL (ref 11.5–15.4)
HGB BLD-MCNC: 12.4 G/DL (ref 11.5–15.4)
HGB BLD-MCNC: 12.4 G/DL (ref 11.5–15.4)
HGB BLD-MCNC: 12.6 G/DL (ref 11.5–15.4)
INR PPP: 1.49 (ref 0.86–1.16)
LYMPHOCYTES # BLD AUTO: 1.36 THOUSANDS/ΜL (ref 0.6–4.47)
LYMPHOCYTES NFR BLD AUTO: 8 % (ref 14–44)
MAGNESIUM SERPL-MCNC: 2 MG/DL (ref 1.6–2.6)
MCH RBC QN AUTO: 28 PG (ref 26.8–34.3)
MCH RBC QN AUTO: 28.4 PG (ref 26.8–34.3)
MCH RBC QN AUTO: 28.4 PG (ref 26.8–34.3)
MCH RBC QN AUTO: 28.7 PG (ref 26.8–34.3)
MCH RBC QN AUTO: 28.8 PG (ref 26.8–34.3)
MCHC RBC AUTO-ENTMCNC: 32 G/DL (ref 31.4–37.4)
MCHC RBC AUTO-ENTMCNC: 32.6 G/DL (ref 31.4–37.4)
MCHC RBC AUTO-ENTMCNC: 32.6 G/DL (ref 31.4–37.4)
MCHC RBC AUTO-ENTMCNC: 32.9 G/DL (ref 31.4–37.4)
MCHC RBC AUTO-ENTMCNC: 33 G/DL (ref 31.4–37.4)
MCV RBC AUTO: 87 FL (ref 82–98)
MONOCYTES # BLD AUTO: 1.82 THOUSAND/ΜL (ref 0.17–1.22)
MONOCYTES NFR BLD AUTO: 11 % (ref 4–12)
NEUTROPHILS # BLD AUTO: 12.88 THOUSANDS/ΜL (ref 1.85–7.62)
NEUTS SEG NFR BLD AUTO: 81 % (ref 43–75)
NRBC BLD AUTO-RTO: 0 /100 WBCS
NT-PROBNP SERPL-MCNC: 2787 PG/ML
PLATELET # BLD AUTO: 101 THOUSANDS/UL (ref 149–390)
PLATELET # BLD AUTO: 102 THOUSANDS/UL (ref 149–390)
PLATELET # BLD AUTO: 103 THOUSANDS/UL (ref 149–390)
PLATELET # BLD AUTO: 105 THOUSANDS/UL (ref 149–390)
PLATELET # BLD AUTO: 94 THOUSANDS/UL (ref 149–390)
PMV BLD AUTO: 10.6 FL (ref 8.9–12.7)
PMV BLD AUTO: 10.7 FL (ref 8.9–12.7)
PMV BLD AUTO: 10.8 FL (ref 8.9–12.7)
PMV BLD AUTO: 10.9 FL (ref 8.9–12.7)
PMV BLD AUTO: 11.4 FL (ref 8.9–12.7)
POTASSIUM SERPL-SCNC: 3.7 MMOL/L (ref 3.5–5.3)
POTASSIUM SERPL-SCNC: 3.9 MMOL/L (ref 3.5–5.3)
PROTHROMBIN TIME: 18 SECONDS (ref 12–14.3)
RBC # BLD AUTO: 4.15 MILLION/UL (ref 3.81–5.12)
RBC # BLD AUTO: 4.26 MILLION/UL (ref 3.81–5.12)
RBC # BLD AUTO: 4.31 MILLION/UL (ref 3.81–5.12)
RBC # BLD AUTO: 4.36 MILLION/UL (ref 3.81–5.12)
RBC # BLD AUTO: 4.39 MILLION/UL (ref 3.81–5.12)
SODIUM SERPL-SCNC: 138 MMOL/L (ref 136–145)
SODIUM SERPL-SCNC: 141 MMOL/L (ref 136–145)
TROPONIN I SERPL-MCNC: 0.17 NG/ML
TROPONIN I SERPL-MCNC: 0.18 NG/ML
WBC # BLD AUTO: 16.28 THOUSAND/UL (ref 4.31–10.16)
WBC # BLD AUTO: 16.65 THOUSAND/UL (ref 4.31–10.16)
WBC # BLD AUTO: 17.06 THOUSAND/UL (ref 4.31–10.16)
WBC # BLD AUTO: 17.77 THOUSAND/UL (ref 4.31–10.16)
WBC # BLD AUTO: 18.33 THOUSAND/UL (ref 4.31–10.16)

## 2017-01-20 PROCEDURE — 83735 ASSAY OF MAGNESIUM: CPT | Performed by: NURSE PRACTITIONER

## 2017-01-20 PROCEDURE — 85610 PROTHROMBIN TIME: CPT | Performed by: EMERGENCY MEDICINE

## 2017-01-20 PROCEDURE — 83880 ASSAY OF NATRIURETIC PEPTIDE: CPT | Performed by: RADIOLOGY

## 2017-01-20 PROCEDURE — 85027 COMPLETE CBC AUTOMATED: CPT | Performed by: RADIOLOGY

## 2017-01-20 PROCEDURE — 84484 ASSAY OF TROPONIN QUANT: CPT | Performed by: RADIOLOGY

## 2017-01-20 PROCEDURE — 80048 BASIC METABOLIC PNL TOTAL CA: CPT | Performed by: NURSE PRACTITIONER

## 2017-01-20 PROCEDURE — 93308 TTE F-UP OR LMTD: CPT

## 2017-01-20 PROCEDURE — 85730 THROMBOPLASTIN TIME PARTIAL: CPT | Performed by: EMERGENCY MEDICINE

## 2017-01-20 PROCEDURE — 85730 THROMBOPLASTIN TIME PARTIAL: CPT | Performed by: RADIOLOGY

## 2017-01-20 PROCEDURE — 85025 COMPLETE CBC W/AUTO DIFF WBC: CPT | Performed by: NURSE PRACTITIONER

## 2017-01-20 PROCEDURE — 82330 ASSAY OF CALCIUM: CPT | Performed by: NURSE PRACTITIONER

## 2017-01-20 PROCEDURE — 84484 ASSAY OF TROPONIN QUANT: CPT | Performed by: PHYSICIAN ASSISTANT

## 2017-01-20 PROCEDURE — 85384 FIBRINOGEN ACTIVITY: CPT | Performed by: RADIOLOGY

## 2017-01-20 RX ORDER — POTASSIUM CHLORIDE 14.9 MG/ML
20 INJECTION INTRAVENOUS ONCE
Status: DISCONTINUED | OUTPATIENT
Start: 2017-01-20 | End: 2017-01-20

## 2017-01-20 RX ORDER — HEPARIN SODIUM 1000 [USP'U]/ML
7200 INJECTION, SOLUTION INTRAVENOUS; SUBCUTANEOUS AS NEEDED
Status: DISCONTINUED | OUTPATIENT
Start: 2017-01-20 | End: 2017-01-20

## 2017-01-20 RX ORDER — HEPARIN SODIUM 1000 [USP'U]/ML
3600 INJECTION, SOLUTION INTRAVENOUS; SUBCUTANEOUS AS NEEDED
Status: DISCONTINUED | OUTPATIENT
Start: 2017-01-20 | End: 2017-01-21

## 2017-01-20 RX ORDER — HEPARIN SODIUM 1000 [USP'U]/ML
7200 INJECTION, SOLUTION INTRAVENOUS; SUBCUTANEOUS AS NEEDED
Status: DISCONTINUED | OUTPATIENT
Start: 2017-01-20 | End: 2017-01-21

## 2017-01-20 RX ORDER — HEPARIN SODIUM 10000 [USP'U]/100ML
3-30 INJECTION, SOLUTION INTRAVENOUS
Status: DISCONTINUED | OUTPATIENT
Start: 2017-01-20 | End: 2017-01-21

## 2017-01-20 RX ORDER — POTASSIUM CHLORIDE 14.9 MG/ML
20 INJECTION INTRAVENOUS ONCE
Status: COMPLETED | OUTPATIENT
Start: 2017-01-20 | End: 2017-01-20

## 2017-01-20 RX ORDER — HEPARIN SODIUM 1000 [USP'U]/ML
7200 INJECTION, SOLUTION INTRAVENOUS; SUBCUTANEOUS ONCE
Status: COMPLETED | OUTPATIENT
Start: 2017-01-20 | End: 2017-01-20

## 2017-01-20 RX ADMIN — HEPARIN SODIUM 20 UNITS/HR: 200 INJECTION, SOLUTION INTRAVENOUS at 17:01

## 2017-01-20 RX ADMIN — VANCOMYCIN HYDROCHLORIDE 125 MG: 500 INJECTION, POWDER, LYOPHILIZED, FOR SOLUTION INTRAVENOUS at 17:42

## 2017-01-20 RX ADMIN — ALTEPLASE 0.25 MG/HR: 2.2 INJECTION, POWDER, LYOPHILIZED, FOR SOLUTION INTRAVENOUS at 15:34

## 2017-01-20 RX ADMIN — ACETAMINOPHEN 975 MG: 325 TABLET, FILM COATED ORAL at 19:16

## 2017-01-20 RX ADMIN — HEPARIN SODIUM 7200 UNITS: 1000 INJECTION, SOLUTION INTRAVENOUS; SUBCUTANEOUS at 21:53

## 2017-01-20 RX ADMIN — ACETAMINOPHEN 975 MG: 325 TABLET, FILM COATED ORAL at 09:07

## 2017-01-20 RX ADMIN — VANCOMYCIN HYDROCHLORIDE 125 MG: 500 INJECTION, POWDER, LYOPHILIZED, FOR SOLUTION INTRAVENOUS at 23:16

## 2017-01-20 RX ADMIN — POTASSIUM CHLORIDE 20 MEQ: 200 INJECTION, SOLUTION INTRAVENOUS at 12:28

## 2017-01-20 RX ADMIN — HYDRALAZINE HYDROCHLORIDE 10 MG: 20 INJECTION INTRAMUSCULAR; INTRAVENOUS at 02:27

## 2017-01-20 RX ADMIN — VANCOMYCIN HYDROCHLORIDE 125 MG: 500 INJECTION, POWDER, LYOPHILIZED, FOR SOLUTION INTRAVENOUS at 11:52

## 2017-01-20 RX ADMIN — VANCOMYCIN HYDROCHLORIDE 125 MG: 500 INJECTION, POWDER, LYOPHILIZED, FOR SOLUTION INTRAVENOUS at 05:44

## 2017-01-20 RX ADMIN — POTASSIUM CHLORIDE 20 MEQ: 200 INJECTION, SOLUTION INTRAVENOUS at 17:46

## 2017-01-20 RX ADMIN — HEPARIN SODIUM AND DEXTROSE 400 UNITS/HR: 10000; 5 INJECTION INTRAVENOUS at 15:34

## 2017-01-20 RX ADMIN — HEPARIN SODIUM 18 UNITS/KG/HR: 10000 INJECTION, SOLUTION INTRAVENOUS at 21:57

## 2017-01-20 RX ADMIN — COLLAGENASE SANTYL: 250 OINTMENT TOPICAL at 13:48

## 2017-01-20 RX ADMIN — ACETAMINOPHEN 975 MG: 325 TABLET, FILM COATED ORAL at 02:21

## 2017-01-21 PROBLEM — R68.89 SUSPECTED HEPARIN INDUCED THROMBOCYTOPENIA (HIT) IN HOSPITALIZED PATIENT: Status: ACTIVE | Noted: 2017-01-21

## 2017-01-21 LAB
ALBUMIN SERPL BCP-MCNC: 2.2 G/DL (ref 3.5–5)
ALP SERPL-CCNC: 55 U/L (ref 46–116)
ALT SERPL W P-5'-P-CCNC: 22 U/L (ref 12–78)
ANION GAP SERPL CALCULATED.3IONS-SCNC: 7 MMOL/L (ref 4–13)
APTT PPP: 121 SECONDS (ref 24–36)
APTT PPP: 30 SECONDS (ref 24–36)
APTT PPP: 33 SECONDS (ref 24–36)
APTT PPP: 63 SECONDS (ref 24–36)
APTT PPP: 83 SECONDS (ref 24–36)
AST SERPL W P-5'-P-CCNC: 13 U/L (ref 5–45)
BILIRUB DIRECT SERPL-MCNC: 0.1 MG/DL (ref 0–0.2)
BILIRUB SERPL-MCNC: 0.35 MG/DL (ref 0.2–1)
BUN SERPL-MCNC: 17 MG/DL (ref 5–25)
CALCIUM SERPL-MCNC: 7.9 MG/DL (ref 8.3–10.1)
CHLORIDE SERPL-SCNC: 109 MMOL/L (ref 100–108)
CO2 SERPL-SCNC: 26 MMOL/L (ref 21–32)
CREAT SERPL-MCNC: 0.46 MG/DL (ref 0.6–1.3)
GFR SERPL CREATININE-BSD FRML MDRD: >60 ML/MIN/1.73SQ M
GLUCOSE SERPL-MCNC: 94 MG/DL (ref 65–140)
INR PPP: 1.17 (ref 0.86–1.16)
POTASSIUM SERPL-SCNC: 3.9 MMOL/L (ref 3.5–5.3)
PROT SERPL-MCNC: 5.9 G/DL (ref 6.4–8.2)
PROTHROMBIN TIME: 15 SECONDS (ref 12–14.3)
SODIUM SERPL-SCNC: 142 MMOL/L (ref 136–145)

## 2017-01-21 PROCEDURE — 85610 PROTHROMBIN TIME: CPT | Performed by: NURSE PRACTITIONER

## 2017-01-21 PROCEDURE — 86022 PLATELET ANTIBODIES: CPT | Performed by: NURSE PRACTITIONER

## 2017-01-21 PROCEDURE — 85730 THROMBOPLASTIN TIME PARTIAL: CPT | Performed by: NURSE PRACTITIONER

## 2017-01-21 PROCEDURE — 85730 THROMBOPLASTIN TIME PARTIAL: CPT | Performed by: EMERGENCY MEDICINE

## 2017-01-21 PROCEDURE — C9121 INJECTION, ARGATROBAN: HCPCS | Performed by: NURSE PRACTITIONER

## 2017-01-21 PROCEDURE — 80076 HEPATIC FUNCTION PANEL: CPT | Performed by: NURSE PRACTITIONER

## 2017-01-21 PROCEDURE — 82542 COL CHROMOTOGRAPHY QUAL/QUAN: CPT | Performed by: NURSE PRACTITIONER

## 2017-01-21 PROCEDURE — 80048 BASIC METABOLIC PNL TOTAL CA: CPT | Performed by: INTERNAL MEDICINE

## 2017-01-21 PROCEDURE — 85730 THROMBOPLASTIN TIME PARTIAL: CPT | Performed by: INTERNAL MEDICINE

## 2017-01-21 RX ORDER — POTASSIUM CHLORIDE 20 MEQ/1
20 TABLET, EXTENDED RELEASE ORAL ONCE
Status: COMPLETED | OUTPATIENT
Start: 2017-01-21 | End: 2017-01-21

## 2017-01-21 RX ORDER — ARGATROBAN 1 MG/ML
.05-3 INJECTION, SOLUTION INTRAVENOUS CONTINUOUS
Status: DISCONTINUED | OUTPATIENT
Start: 2017-01-21 | End: 2017-01-21

## 2017-01-21 RX ORDER — ARGATROBAN 1 MG/ML
.05-3 INJECTION, SOLUTION INTRAVENOUS CONTINUOUS
Status: DISCONTINUED | OUTPATIENT
Start: 2017-01-21 | End: 2017-01-23

## 2017-01-21 RX ADMIN — VANCOMYCIN HYDROCHLORIDE 125 MG: 500 INJECTION, POWDER, LYOPHILIZED, FOR SOLUTION INTRAVENOUS at 12:02

## 2017-01-21 RX ADMIN — VANCOMYCIN HYDROCHLORIDE 125 MG: 500 INJECTION, POWDER, LYOPHILIZED, FOR SOLUTION INTRAVENOUS at 05:08

## 2017-01-21 RX ADMIN — COLLAGENASE SANTYL: 250 OINTMENT TOPICAL at 08:21

## 2017-01-21 RX ADMIN — ARGATROBAN 2 MCG/KG/MIN: 1 INJECTION INTRAVENOUS at 13:17

## 2017-01-21 RX ADMIN — VANCOMYCIN HYDROCHLORIDE 125 MG: 500 INJECTION, POWDER, LYOPHILIZED, FOR SOLUTION INTRAVENOUS at 18:04

## 2017-01-21 RX ADMIN — ARGATROBAN 1.99 MCG/KG/MIN: 1 INJECTION INTRAVENOUS at 20:14

## 2017-01-21 RX ADMIN — ARGATROBAN 2 MCG/KG/MIN: 1 INJECTION INTRAVENOUS at 16:12

## 2017-01-21 RX ADMIN — POTASSIUM CHLORIDE 20 MEQ: 1500 TABLET, EXTENDED RELEASE ORAL at 10:35

## 2017-01-22 LAB
ANION GAP SERPL CALCULATED.3IONS-SCNC: 7 MMOL/L (ref 4–13)
APTT PPP: 53 SECONDS (ref 24–36)
APTT PPP: 67 SECONDS (ref 24–36)
BUN SERPL-MCNC: 19 MG/DL (ref 5–25)
CALCIUM SERPL-MCNC: 7.7 MG/DL (ref 8.3–10.1)
CHLORIDE SERPL-SCNC: 108 MMOL/L (ref 100–108)
CO2 SERPL-SCNC: 26 MMOL/L (ref 21–32)
CREAT SERPL-MCNC: 0.6 MG/DL (ref 0.6–1.3)
ERYTHROCYTE [DISTWIDTH] IN BLOOD BY AUTOMATED COUNT: 13.7 % (ref 11.6–15.1)
GFR SERPL CREATININE-BSD FRML MDRD: >60 ML/MIN/1.73SQ M
GLUCOSE SERPL-MCNC: 94 MG/DL (ref 65–140)
HCT VFR BLD AUTO: 33.3 % (ref 34.8–46.1)
HGB BLD-MCNC: 10.6 G/DL (ref 11.5–15.4)
MCH RBC QN AUTO: 27.7 PG (ref 26.8–34.3)
MCHC RBC AUTO-ENTMCNC: 31.8 G/DL (ref 31.4–37.4)
MCV RBC AUTO: 87 FL (ref 82–98)
PF4 HEPARIN CMPLX AB SER QL: NEGATIVE
PLATELET # BLD AUTO: 133 THOUSANDS/UL (ref 149–390)
PMV BLD AUTO: 11 FL (ref 8.9–12.7)
POTASSIUM SERPL-SCNC: 3.8 MMOL/L (ref 3.5–5.3)
RBC # BLD AUTO: 3.82 MILLION/UL (ref 3.81–5.12)
SODIUM SERPL-SCNC: 141 MMOL/L (ref 136–145)
WBC # BLD AUTO: 9.85 THOUSAND/UL (ref 4.31–10.16)

## 2017-01-22 PROCEDURE — 85027 COMPLETE CBC AUTOMATED: CPT | Performed by: NURSE PRACTITIONER

## 2017-01-22 PROCEDURE — 80048 BASIC METABOLIC PNL TOTAL CA: CPT | Performed by: INTERNAL MEDICINE

## 2017-01-22 PROCEDURE — C9121 INJECTION, ARGATROBAN: HCPCS | Performed by: NURSE PRACTITIONER

## 2017-01-22 PROCEDURE — 85730 THROMBOPLASTIN TIME PARTIAL: CPT | Performed by: INTERNAL MEDICINE

## 2017-01-22 RX ADMIN — VANCOMYCIN HYDROCHLORIDE 125 MG: 500 INJECTION, POWDER, LYOPHILIZED, FOR SOLUTION INTRAVENOUS at 17:56

## 2017-01-22 RX ADMIN — ARGATROBAN 1.99 MCG/KG/MIN: 1 INJECTION INTRAVENOUS at 01:15

## 2017-01-22 RX ADMIN — ARGATROBAN 1.99 MCG/KG/MIN: 1 INJECTION INTRAVENOUS at 22:40

## 2017-01-22 RX ADMIN — COLLAGENASE SANTYL: 250 OINTMENT TOPICAL at 10:04

## 2017-01-22 RX ADMIN — ARGATROBAN 1.99 MCG/KG/MIN: 1 INJECTION INTRAVENOUS at 06:20

## 2017-01-22 RX ADMIN — ARGATROBAN 1.99 MCG/KG/MIN: 1 INJECTION INTRAVENOUS at 12:44

## 2017-01-22 RX ADMIN — ARGATROBAN 1.99 MCG/KG/MIN: 1 INJECTION INTRAVENOUS at 17:57

## 2017-01-22 RX ADMIN — VANCOMYCIN HYDROCHLORIDE 125 MG: 500 INJECTION, POWDER, LYOPHILIZED, FOR SOLUTION INTRAVENOUS at 11:20

## 2017-01-22 RX ADMIN — VANCOMYCIN HYDROCHLORIDE 125 MG: 500 INJECTION, POWDER, LYOPHILIZED, FOR SOLUTION INTRAVENOUS at 00:07

## 2017-01-23 LAB
ANION GAP SERPL CALCULATED.3IONS-SCNC: 6 MMOL/L (ref 4–13)
APTT PPP: 33 SECONDS (ref 24–36)
APTT PPP: 40 SECONDS (ref 24–36)
BASOPHILS # BLD MANUAL: 0.07 THOUSAND/UL (ref 0–0.1)
BASOPHILS NFR MAR MANUAL: 1 % (ref 0–1)
BUN SERPL-MCNC: 14 MG/DL (ref 5–25)
CALCIUM SERPL-MCNC: 7.8 MG/DL (ref 8.3–10.1)
CHLORIDE SERPL-SCNC: 108 MMOL/L (ref 100–108)
CO2 SERPL-SCNC: 27 MMOL/L (ref 21–32)
CREAT SERPL-MCNC: 0.55 MG/DL (ref 0.6–1.3)
EOSINOPHIL # BLD MANUAL: 0.29 THOUSAND/UL (ref 0–0.4)
EOSINOPHIL NFR BLD MANUAL: 4 % (ref 0–6)
ERYTHROCYTE [DISTWIDTH] IN BLOOD BY AUTOMATED COUNT: 13.8 % (ref 11.6–15.1)
GFR SERPL CREATININE-BSD FRML MDRD: >60 ML/MIN/1.73SQ M
GLUCOSE SERPL-MCNC: 92 MG/DL (ref 65–140)
HCT VFR BLD AUTO: 30.5 % (ref 34.8–46.1)
HGB BLD-MCNC: 9.9 G/DL (ref 11.5–15.4)
LYMPHOCYTES # BLD AUTO: 1.08 THOUSAND/UL (ref 0.6–4.47)
LYMPHOCYTES # BLD AUTO: 15 % (ref 14–44)
MCH RBC QN AUTO: 28.4 PG (ref 26.8–34.3)
MCHC RBC AUTO-ENTMCNC: 32.5 G/DL (ref 31.4–37.4)
MCV RBC AUTO: 88 FL (ref 82–98)
MONOCYTES # BLD AUTO: 0.14 THOUSAND/UL (ref 0–1.22)
MONOCYTES NFR BLD: 2 % (ref 4–12)
MYELOCYTES NFR BLD MANUAL: 1 % (ref 0–1)
NEUTROPHILS # BLD MANUAL: 5.39 THOUSAND/UL (ref 1.85–7.62)
NEUTS BAND NFR BLD MANUAL: 1 % (ref 0–8)
NEUTS SEG NFR BLD AUTO: 74 % (ref 43–75)
NRBC BLD AUTO-RTO: 0 /100 WBCS
PLATELET # BLD AUTO: 122 THOUSANDS/UL (ref 149–390)
PLATELET BLD QL SMEAR: ABNORMAL
PMV BLD AUTO: 10.6 FL (ref 8.9–12.7)
POTASSIUM SERPL-SCNC: 3.8 MMOL/L (ref 3.5–5.3)
RBC # BLD AUTO: 3.48 MILLION/UL (ref 3.81–5.12)
SODIUM SERPL-SCNC: 141 MMOL/L (ref 136–145)
VARIANT LYMPHS # BLD AUTO: 2 %
WBC # BLD AUTO: 7.19 THOUSAND/UL (ref 4.31–10.16)

## 2017-01-23 PROCEDURE — 85027 COMPLETE CBC AUTOMATED: CPT | Performed by: HOSPITALIST

## 2017-01-23 PROCEDURE — 85730 THROMBOPLASTIN TIME PARTIAL: CPT | Performed by: HOSPITALIST

## 2017-01-23 PROCEDURE — 85730 THROMBOPLASTIN TIME PARTIAL: CPT | Performed by: INTERNAL MEDICINE

## 2017-01-23 PROCEDURE — C9121 INJECTION, ARGATROBAN: HCPCS | Performed by: HOSPITALIST

## 2017-01-23 PROCEDURE — C9121 INJECTION, ARGATROBAN: HCPCS | Performed by: NURSE PRACTITIONER

## 2017-01-23 PROCEDURE — 80048 BASIC METABOLIC PNL TOTAL CA: CPT | Performed by: HOSPITALIST

## 2017-01-23 PROCEDURE — 85007 BL SMEAR W/DIFF WBC COUNT: CPT | Performed by: HOSPITALIST

## 2017-01-23 RX ADMIN — VANCOMYCIN HYDROCHLORIDE 125 MG: 500 INJECTION, POWDER, LYOPHILIZED, FOR SOLUTION INTRAVENOUS at 12:20

## 2017-01-23 RX ADMIN — VANCOMYCIN HYDROCHLORIDE 125 MG: 500 INJECTION, POWDER, LYOPHILIZED, FOR SOLUTION INTRAVENOUS at 00:43

## 2017-01-23 RX ADMIN — ARGATROBAN 3.59 MCG/KG/MIN: 1 INJECTION INTRAVENOUS at 09:38

## 2017-01-23 RX ADMIN — APIXABAN 10 MG: 5 TABLET, FILM COATED ORAL at 12:19

## 2017-01-23 RX ADMIN — ARGATROBAN 3.6 MCG/KG/MIN: 1 INJECTION INTRAVENOUS at 12:22

## 2017-01-23 RX ADMIN — APIXABAN 10 MG: 5 TABLET, FILM COATED ORAL at 18:06

## 2017-01-23 RX ADMIN — VANCOMYCIN HYDROCHLORIDE 125 MG: 500 INJECTION, POWDER, LYOPHILIZED, FOR SOLUTION INTRAVENOUS at 05:49

## 2017-01-23 RX ADMIN — VANCOMYCIN HYDROCHLORIDE 125 MG: 500 INJECTION, POWDER, LYOPHILIZED, FOR SOLUTION INTRAVENOUS at 23:41

## 2017-01-23 RX ADMIN — ARGATROBAN 2.5 MCG/KG/MIN: 1 INJECTION INTRAVENOUS at 02:15

## 2017-01-23 RX ADMIN — VANCOMYCIN HYDROCHLORIDE 125 MG: 500 INJECTION, POWDER, LYOPHILIZED, FOR SOLUTION INTRAVENOUS at 18:06

## 2017-01-23 RX ADMIN — COLLAGENASE SANTYL 2 APPLICATION: 250 OINTMENT TOPICAL at 08:21

## 2017-01-23 RX ADMIN — ARGATROBAN 3 MCG/KG/MIN: 1 INJECTION INTRAVENOUS at 07:00

## 2017-01-24 VITALS
TEMPERATURE: 98.2 F | RESPIRATION RATE: 19 BRPM | BODY MASS INDEX: 27.4 KG/M2 | OXYGEN SATURATION: 97 % | SYSTOLIC BLOOD PRESSURE: 121 MMHG | HEIGHT: 70 IN | DIASTOLIC BLOOD PRESSURE: 61 MMHG | WEIGHT: 191.36 LBS | HEART RATE: 102 BPM

## 2017-01-24 PROBLEM — R06.09 DYSPNEA ON EXERTION: Status: RESOLVED | Noted: 2017-01-18 | Resolved: 2017-01-24

## 2017-01-24 PROBLEM — R77.8 ELEVATED TROPONIN: Status: RESOLVED | Noted: 2017-01-18 | Resolved: 2017-01-24

## 2017-01-24 PROBLEM — R68.89 SUSPECTED HEPARIN INDUCED THROMBOCYTOPENIA (HIT) IN HOSPITALIZED PATIENT: Status: RESOLVED | Noted: 2017-01-21 | Resolved: 2017-01-24

## 2017-01-24 PROBLEM — R79.89 ELEVATED TROPONIN: Status: RESOLVED | Noted: 2017-01-18 | Resolved: 2017-01-24

## 2017-01-24 PROBLEM — R00.0 TACHYCARDIA: Status: RESOLVED | Noted: 2017-01-18 | Resolved: 2017-01-24

## 2017-01-24 PROBLEM — R06.00 DYSPNEA ON EXERTION: Status: RESOLVED | Noted: 2017-01-18 | Resolved: 2017-01-24

## 2017-01-24 LAB
ANION GAP SERPL CALCULATED.3IONS-SCNC: 7 MMOL/L (ref 4–13)
BASOPHILS # BLD MANUAL: 0 THOUSAND/UL (ref 0–0.1)
BASOPHILS NFR MAR MANUAL: 0 % (ref 0–1)
BUN SERPL-MCNC: 9 MG/DL (ref 5–25)
CALCIUM SERPL-MCNC: 8.1 MG/DL (ref 8.3–10.1)
CHLORIDE SERPL-SCNC: 108 MMOL/L (ref 100–108)
CO2 SERPL-SCNC: 27 MMOL/L (ref 21–32)
CREAT SERPL-MCNC: 0.57 MG/DL (ref 0.6–1.3)
EOSINOPHIL # BLD MANUAL: 0.27 THOUSAND/UL (ref 0–0.4)
EOSINOPHIL NFR BLD MANUAL: 4 % (ref 0–6)
ERYTHROCYTE [DISTWIDTH] IN BLOOD BY AUTOMATED COUNT: 13.5 % (ref 11.6–15.1)
GFR SERPL CREATININE-BSD FRML MDRD: >60 ML/MIN/1.73SQ M
GLUCOSE SERPL-MCNC: 91 MG/DL (ref 65–140)
HCT VFR BLD AUTO: 33 % (ref 34.8–46.1)
HGB BLD-MCNC: 10.5 G/DL (ref 11.5–15.4)
LYMPHOCYTES # BLD AUTO: 1.75 THOUSAND/UL (ref 0.6–4.47)
LYMPHOCYTES # BLD AUTO: 26 % (ref 14–44)
MCH RBC QN AUTO: 28 PG (ref 26.8–34.3)
MCHC RBC AUTO-ENTMCNC: 31.8 G/DL (ref 31.4–37.4)
MCV RBC AUTO: 88 FL (ref 82–98)
METAMYELOCYTES NFR BLD MANUAL: 2 % (ref 0–1)
MONOCYTES # BLD AUTO: 0.54 THOUSAND/UL (ref 0–1.22)
MONOCYTES NFR BLD: 8 % (ref 4–12)
NEUTROPHILS # BLD MANUAL: 4.04 THOUSAND/UL (ref 1.85–7.62)
NEUTS SEG NFR BLD AUTO: 60 % (ref 43–75)
NRBC BLD AUTO-RTO: 0 /100 WBCS
PLATELET # BLD AUTO: 144 THOUSANDS/UL (ref 149–390)
PLATELET BLD QL SMEAR: ABNORMAL
PMV BLD AUTO: 10.3 FL (ref 8.9–12.7)
POTASSIUM SERPL-SCNC: 4 MMOL/L (ref 3.5–5.3)
RBC # BLD AUTO: 3.75 MILLION/UL (ref 3.81–5.12)
RBC MORPH BLD: NORMAL
SODIUM SERPL-SCNC: 142 MMOL/L (ref 136–145)
SRA .2 IU/ML UFH SER-ACNC: <1 % (ref 0–20)
SRA 100IU/ML UFH SER-ACNC: <1 % (ref 0–20)
SRA UFH SER-IMP: NORMAL
WBC # BLD AUTO: 6.74 THOUSAND/UL (ref 4.31–10.16)

## 2017-01-24 PROCEDURE — 85007 BL SMEAR W/DIFF WBC COUNT: CPT | Performed by: HOSPITALIST

## 2017-01-24 PROCEDURE — 80048 BASIC METABOLIC PNL TOTAL CA: CPT | Performed by: HOSPITALIST

## 2017-01-24 PROCEDURE — 85027 COMPLETE CBC AUTOMATED: CPT | Performed by: HOSPITALIST

## 2017-01-24 RX ORDER — VANCOMYCIN HYDROCHLORIDE 125 MG/1
125 CAPSULE ORAL 4 TIMES DAILY
Qty: 28 CAPSULE | Refills: 0 | Status: SHIPPED | OUTPATIENT
Start: 2017-01-24 | End: 2017-01-31

## 2017-01-24 RX ADMIN — VANCOMYCIN HYDROCHLORIDE 125 MG: 500 INJECTION, POWDER, LYOPHILIZED, FOR SOLUTION INTRAVENOUS at 12:55

## 2017-01-24 RX ADMIN — APIXABAN 10 MG: 5 TABLET, FILM COATED ORAL at 09:50

## 2017-01-24 RX ADMIN — COLLAGENASE SANTYL: 250 OINTMENT TOPICAL at 09:50

## 2017-01-24 RX ADMIN — VANCOMYCIN HYDROCHLORIDE 125 MG: 500 INJECTION, POWDER, LYOPHILIZED, FOR SOLUTION INTRAVENOUS at 06:16

## 2017-01-30 ENCOUNTER — ALLSCRIPTS OFFICE VISIT (OUTPATIENT)
Dept: WOUND CARE | Facility: HOSPITAL | Age: 67
End: 2017-01-30
Payer: COMMERCIAL

## 2017-01-30 ENCOUNTER — ALLSCRIPTS OFFICE VISIT (OUTPATIENT)
Dept: OTHER | Facility: OTHER | Age: 67
End: 2017-01-30

## 2017-01-30 PROCEDURE — 11042 DBRDMT SUBQ TIS 1ST 20SQCM/<: CPT | Performed by: REGISTERED NURSE

## 2017-02-06 ENCOUNTER — ALLSCRIPTS OFFICE VISIT (OUTPATIENT)
Dept: WOUND CARE | Facility: HOSPITAL | Age: 67
End: 2017-02-06
Payer: COMMERCIAL

## 2017-02-06 PROCEDURE — 11042 DBRDMT SUBQ TIS 1ST 20SQCM/<: CPT | Performed by: REGISTERED NURSE

## 2017-02-07 ENCOUNTER — GENERIC CONVERSION - ENCOUNTER (OUTPATIENT)
Dept: OTHER | Facility: OTHER | Age: 67
End: 2017-02-07

## 2017-02-07 ENCOUNTER — HOSPITAL ENCOUNTER (OUTPATIENT)
Dept: NON INVASIVE DIAGNOSTICS | Facility: HOSPITAL | Age: 67
Discharge: HOME | End: 2017-02-07
Attending: FAMILY MEDICINE
Payer: COMMERCIAL

## 2017-02-07 DIAGNOSIS — I82.432 EMBOLISM AND THROMBOSIS OF LEFT POPLITEAL VEIN (HCC): ICD-10-CM

## 2017-02-07 PROCEDURE — 93306 TTE W/DOPPLER COMPLETE: CPT

## 2017-02-13 ENCOUNTER — ALLSCRIPTS OFFICE VISIT (OUTPATIENT)
Dept: WOUND CARE | Facility: HOSPITAL | Age: 67
End: 2017-02-13
Payer: COMMERCIAL

## 2017-02-13 PROCEDURE — 11042 DBRDMT SUBQ TIS 1ST 20SQCM/<: CPT | Performed by: REGISTERED NURSE

## 2017-02-20 ENCOUNTER — ALLSCRIPTS OFFICE VISIT (OUTPATIENT)
Dept: WOUND CARE | Facility: HOSPITAL | Age: 67
End: 2017-02-20
Payer: COMMERCIAL

## 2017-02-20 PROCEDURE — 11042 DBRDMT SUBQ TIS 1ST 20SQCM/<: CPT | Performed by: REGISTERED NURSE

## 2017-02-27 ENCOUNTER — ALLSCRIPTS OFFICE VISIT (OUTPATIENT)
Dept: WOUND CARE | Facility: HOSPITAL | Age: 67
End: 2017-02-27
Payer: COMMERCIAL

## 2017-02-27 PROCEDURE — 99212 OFFICE O/P EST SF 10 MIN: CPT | Performed by: REGISTERED NURSE

## 2017-03-28 ENCOUNTER — TRANSCRIBE ORDERS (OUTPATIENT)
Dept: ADMINISTRATIVE | Facility: HOSPITAL | Age: 67
End: 2017-03-28

## 2017-03-28 ENCOUNTER — ALLSCRIPTS OFFICE VISIT (OUTPATIENT)
Dept: OTHER | Facility: OTHER | Age: 67
End: 2017-03-28

## 2017-03-28 DIAGNOSIS — I51.7 CARDIOMEGALY: Primary | ICD-10-CM

## 2017-03-28 DIAGNOSIS — I82.432 EMBOLISM AND THROMBOSIS OF LEFT POPLITEAL VEIN (HCC): ICD-10-CM

## 2017-03-28 DIAGNOSIS — I26.99 PULMONARY INFARCTION (HCC): ICD-10-CM

## 2017-03-30 DIAGNOSIS — I82.432 EMBOLISM AND THROMBOSIS OF LEFT POPLITEAL VEIN (HCC): ICD-10-CM

## 2017-06-08 ENCOUNTER — ALLSCRIPTS OFFICE VISIT (OUTPATIENT)
Dept: OTHER | Facility: OTHER | Age: 67
End: 2017-06-08

## 2017-07-26 ENCOUNTER — ALLSCRIPTS OFFICE VISIT (OUTPATIENT)
Dept: OTHER | Facility: OTHER | Age: 67
End: 2017-07-26

## 2017-10-02 ENCOUNTER — HOSPITAL ENCOUNTER (OUTPATIENT)
Dept: NON INVASIVE DIAGNOSTICS | Facility: HOSPITAL | Age: 67
Discharge: HOME | End: 2017-10-02
Attending: INTERNAL MEDICINE
Payer: COMMERCIAL

## 2017-10-02 DIAGNOSIS — I82.432 EMBOLISM AND THROMBOSIS OF LEFT POPLITEAL VEIN (HCC): ICD-10-CM

## 2017-10-02 DIAGNOSIS — I51.7 CARDIOMEGALY: ICD-10-CM

## 2017-10-02 DIAGNOSIS — I26.99 PULMONARY INFARCTION (HCC): ICD-10-CM

## 2017-10-02 PROCEDURE — 93306 TTE W/DOPPLER COMPLETE: CPT

## 2017-10-05 ENCOUNTER — GENERIC CONVERSION - ENCOUNTER (OUTPATIENT)
Dept: OTHER | Facility: OTHER | Age: 67
End: 2017-10-05

## 2017-10-13 ENCOUNTER — GENERIC CONVERSION - ENCOUNTER (OUTPATIENT)
Dept: OTHER | Facility: OTHER | Age: 67
End: 2017-10-13

## 2017-11-17 ENCOUNTER — ALLSCRIPTS OFFICE VISIT (OUTPATIENT)
Dept: OTHER | Facility: OTHER | Age: 67
End: 2017-11-17

## 2017-11-18 ENCOUNTER — GENERIC CONVERSION - ENCOUNTER (OUTPATIENT)
Dept: CARDIOLOGY CLINIC | Facility: CLINIC | Age: 67
End: 2017-11-18

## 2017-11-19 NOTE — PROGRESS NOTES
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)   3  Chronic venous insufficiency (459 81) (I87 2)   4  Borderline hypertension (796 2) (R03 0)    Plan  Borderline hypertension, Chronic venous insufficiency, Pulmonary embolism, Rightventricular dilation, secondary    · Follow-up visit in 1 year Evaluation and Treatment  Follow-up  Status: Hold For -Scheduling  Requested for: 22PAT1094   Ordered;Borderline hypertension, Chronic venous insufficiency, Pulmonary embolism, Right ventricular dilation, secondary; Ordered By: Alivia Stiles Performed:  Due: 59MNS6241    Discussion/Summary  Cardiology Discussion Summary Free Text Note Form [facility]:   Right ventricular size and function is normal after pulmonary embolism  Agree with lifelong anticoagulation  Blood pressure was mildly elevated in the office today she will check with her home machine for the next 3 weeks and drop off readings  I've counseled her on reducing sodium intake increasing aerobic exercise and trying conservative measures to lower her blood pressure  I've asked her to drop off her recent lipid profile so I can calculate an estimated 10 year cardiovascular risk for her  History of Present Illness  Cardiology HPI Free Text Note Form [facility]: Mrs Priscilla Sandoval Is a very pleasant 19-year-old female with a past history of remote pulmonary embolism and venous stasis ulcer who presented for follow-up due to RV dysfunction following a recent pulmonary embolism  Patient tells me that in the 1980s she had an episode of thrombophlebitis and has had difficulty with her veins and swelling in her lower extremities ever since  She had a pulmonary embolism in 1989  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 she noticed some increased swelling in her legs in January and sudden onset of shortness of breath   She was brought into the hospital and found to have submassive pulmonary embolism with saddle PE  She had catheter directed lysis and original echocardiogram showed RV dysfunction and dilation  She had a follow-up echocardiogram in February that showed normalization of RV size and function  She's been feeling well and offers no complaints  She denies any chest pain, shortness of breath, palpitations, lightheadedness, dizziness, or syncope  She's been taking blood thinners as directed  She has no other history of any cardiac problems  presents today for routine scheduled follow-up visit  Her right ventricular size and function has completely normalized on the echocardiogram  She's doing well with no limitations  She is good functional capacity for age  She is to remain on anticoagulation lifelong  She denies any chest pain, shortness of breath, palpitations, light headedness, dizziness, or syncope  She's using compression stockings for her venous insufficiency  Through her office she has a wellness exam that check lipids she will drop off a panel at her next appointment  Review of Systems  Cardiology Female ROS:    Cardiac: No complaints of chest pain, no palpitations, no fainting  Skin: No complaints of nonhealing sores or skin rash  Genitourinary: No complaints of recurrent urinary tract infections, frequent urination at night, difficult urination, blood in urine, kidney stones, loss of bladder control, kidney problems, denies any birth control or hormone replacement, is not post menopausal, not currently pregnant  Psychological: No complaints of feeling depressed, anxiety, panic attacks, or difficulty concentrating  General: No complaints of trouble sleeping, lack of energy, fatigue, appetite changes, weight changes, fever, frequent infections, or night sweats  Respiratory: No complaints of shortness of breath, cough with sputum, or wheezing  HEENT: No complaints of serious problems, hearing problems, nose problems, throat problems, or snoring    Gastrointestinal: No complaints of liver problems, nausea, vomiting, heartburn, constipation, bloody stools, diarrhea, problems swallowing, adbominal pain, or rectal bleeding  Hematologic: No complaints of bleeding disorders, anemia, blood clots, or excessive brusing  Neurological: No complaints of numbness, tingling, dizziness, weakness, seizures, headaches, syncope or fainting, AM fatigue, daytime sleepiness, no witnessed apnea episodes  Musculoskeletal: No complaints of arthritis, back pain, or painfull swelling  Active Problems  Problems    1  Acute deep vein thrombosis (DVT) of popliteal vein of left lower extremity (453 41) (I82 432)   2  Chronic venous insufficiency (459 81) (I87 2)   3  Encounter for colorectal cancer screening (V76 51) (Z12 11,Z12 12)   4  Pulmonary embolism (415 19) (I26 99)   5  Right ventricular dilation, 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)   6  Screening for colorectal cancer (V76 51) (Z12 11,Z12 12)   7  Secondary right ventricular dilation (429 3) (I51 7)   8  Venous ulcers of both lower extremities (459 81,707 10) (I87 2,L97 919,L97 929)    Past Medical History  Problems    1  History of Advanced directives, counseling/discussion (V65 49) (Z71 89)   2  History of DVT (deep vein thrombosis) in pregnancy (671 30) (O22 30,I82 409)   3  History of Exercise counseling (V65 41) (Z71 82)   4  History of Flu vaccine need (V04 81) (Z23)   5  History of pulmonary embolism (V12 55) (Z86 711)   6  History of shortness of breath (V13 89) (Z87 898)   7  History of Need for pneumococcal vaccination (V03 82) (Z23)   8  History of Pulmonary embolism, blood-clot, obstetric (673 20) (O88 219)   9  History of Screening for depression (V79 0) (Z13 89)   10  History of Screening for genitourinary condition (V81 6) (Z13 89)   11  History of Screening for neurological condition (V80 09) (Z13 89)   12  History of Screening for osteoporosis (V82 81) (Z13 820)   13  History of Screening for osteoporosis (V82 81) (Z13 820)   14  History of Wound of ankle, right, initial encounter (891 0) (S91 001A)   15  History of Wound of left ankle, initial encounter (891 0) (Q13 373E)  Active Problems And Past Medical History Reviewed: The active problems and past medical history were reviewed and updated today  Surgical History  Problems    1  History of  Section  Surgical History Reviewed: The surgical history was reviewed and updated today  Family History  Mother    1  Family history of cerebrovascular accident (CVA) (V17 1) (Z82 3)   2  Family history of myocardial infarction (V17 3) (Z82 49)  Father    3  Family history of malignant neoplasm (V16 9) (Z80 9)  Family History    4  Family history of Bone cancer (170 9) (C41 9)   5  Maternal history of Hypertension (401 9) (I10)   6  Paternal history of Lung cancer (162 9) (C34 90)  Family History Reviewed: The family history was reviewed and updated today  Social History  Problems    · Current nonsmoker (V49 89) (Z78 9)   · Never a smoker   · No advance directives (V49 89) (Z78 9)   · Social alcohol use (Z78 9)  Social History Reviewed: The social history was reviewed and updated today  Current Meds   1  Calcium 600+D 600-200 MG-UNIT Oral Tablet; Take 1 tablet daily Recorded   2  Eliquis 5 MG Oral Tablet; TAKE 1 TABLET Every twelve hours  Requested for: 2017; Last Rx:82Zti8143 Ordered   3  Multivitamins Oral Capsule Recorded    Allergies  Medication    1  No Known Drug Allergies    Vitals  Vital Signs    Recorded: 19EXK2579 03:59PM Recorded: 56HPQ8626 03:39PM   Heart Rate  96   Systolic 405 245   Diastolic 86 76   Height  5 ft 10 in   Weight  200 lb 2 oz   BMI Calculated  28 72   BSA Calculated  2 09       Physical Exam   Constitutional  General appearance: No acute distress, well appearing and well nourished  Eyes  Conjunctiva and Sclera examination: Conjunctiva pink, sclera anicteric     Ears, Nose, Mouth, and Throat - Oropharynx: Clear, nares are clear, mucous membranes are moist   Neck  Neck and thyroid: Normal, supple, trachea midline, no thyromegaly  Pulmonary  Respiratory effort: No increased work of breathing or signs of respiratory distress  Auscultation of lungs: Clear to auscultation, no rales, no rhonchi, no wheezing, good air movement  Cardiovascular  Auscultation of heart: Normal rate and rhythm, normal S1 and S2, no murmurs  Carotid pulses: Normal, 2+ bilaterally  Peripheral vascular exam: Normal pulses throughout, no tenderness, erythema or swelling  Pedal pulses: Normal, 2+ bilaterally  Examination of extremities for edema and/or varicosities: Normal    Abdomen  Abdomen: Non-tender and no distention  Liver and spleen: No hepatomegaly or splenomegaly  Musculoskeletal Gait and station: Normal gait  -- Digits and nails: Normal without clubbing or cyanosis  -- Inspection/palpation of joints, bones, and muscles: Normal, ROM normal    Skin - Skin and subcutaneous tissue: Normal without rashes or lesions  Skin is warm and well perfused, normal turgor  Neurologic - Cranial nerves: II - XII intact  -- Speech: Normal    Psychiatric - Orientation to person, place, and time: Normal -- Mood and affect: Normal       Health Management  Screening for colorectal cancer   (1) OCCULT BLOOD, FECAL IMMUNOCHEMICAL TEST; every 1 year; Next Due: 03UTE9964; Overdue  Health Maintenance   * MAMMO SCREENING BILATERAL W CAD; every 1 year; Last 08QJL5086; Next Due: 02WTD4230; Overdue    Signatures   Electronically signed by : Oumou Dorsey DO; Nov 17 2017  4:01PM EST                       (Author)

## 2017-12-10 ENCOUNTER — GENERIC CONVERSION - ENCOUNTER (OUTPATIENT)
Dept: CARDIOLOGY CLINIC | Facility: CLINIC | Age: 67
End: 2017-12-10

## 2018-01-10 NOTE — MISCELLANEOUS
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)   5  Secondary right ventricular dilation (429 3) (I51 7)    Plan  Acute deep vein thrombosis (DVT) of popliteal vein of left lower extremity    · ECHO COMPLETE WITH CONTRAST IF INDICATED; Status:Need Information - Financial  Authorization; Requested for:30Mar2017;    Perform:[facility]; Due:30Mar2018; Ordered; For:Acute deep vein thrombosis (DVT) of popliteal vein of left lower extremity; Ordered By:Juan Carlos Perry;  Acute deep vein thrombosis (DVT) of popliteal vein of left lower extremity, Right ventricular  dilation, secondary, Secondary right ventricular dilation    · 1 - Bill Cedillo DO  (Cardiology) Physician Referral  Consult Only: the  expectation is that the referring provider will communicate back to the patient on  treatment options  Evaluation and Treatment: the expectation is that the referred to  provider will communicate back to the patient on treatment options  Status: Active   Requested for: 38EEE1478   Ordered; For: Acute deep vein thrombosis (DVT) of popliteal vein of left lower extremity, Right ventricular dilation, secondary, Secondary right ventricular dilation; Ordered By: Joseph Anderson Performed:  Due: 83CWH1447  Care Summary provided  : Yes  Advanced directives, counseling/discussion    · We recommend that you create an advance directive ; Status:Complete - Retrospective  Authorization;   Done: 06CAX2740   Last Updated By:Dilcia Heredia; 1/30/2017 11:41:14 AM;Ordered; For:Advanced directives, counseling/discussion; Ordered By:Laura Perry;   Exercise counseling    · Benefits of Exercise/Physical Activity; Status:Complete - Retrospective Authorization;    Done: 56REF1874   Last Updated By:Dilcia Heredia; 1/30/2017 11:41:14 AM;Ordered; For:Exercise counseling; Ordered By:Basilio Perry;  Pulmonary embolism    · Eliquis 5 MG Oral Tablet; TAKE 1 TABLET Every twelve hours   Rx By: Iwona Purcell; Dispense: 30 Days ; #:60 Tablet; Refill: 5; For: Pulmonary embolism; AMTHIEU = N; Verified [facility]; Msg to Pharmacy: Pt will call for refill when needed; Last Updated By: System, [employer]; 1/30/2017 12:19:39 PM  Screening for colorectal cancer    · (1) OCCULT BLOOD, FECAL IMMUNOCHEMICAL TEST; Status:Temporary Deferral -  Patient reports item recently done;    7/30/2017   Perform:[facility]; FNC:[id number]; Last Updated By:Deonna Heredia; 1/30/2017 11:41:14 AM;Ordered; For:Screening for colorectal cancer; Ordered By:Basilio Perry;  Screening for osteoporosis    · * DXA BONE DENSITY SPINE HIP AND PELVIS; Status:Temporary Deferral - Patient  reports item recently done,at age 61;    1/30/2021   Perform:[facility]; RTB:[id number]; Last Updated By:Deonna Heredia; 1/30/2017 11:41:14 AM;Ordered; For:Screening for osteoporosis; Ordered By:Basilio Perry; History of Present Illness  TCM Communication [facility]: The patient is being contacted for follow-up after hospitalization and [place] 1-30-17  She was [facility]  The dates of hospitalization:, date of admission: 1-17-17, date of discharge: 1-24-17  Diagnosis: SOB WITH PULMONARY EMBOLISM  She was discharged to home  Medications were not reviewed today  Follow-up appointments with other specialists: WOUND CARE 1-30-17  The patient is currently asymptomatic  Communication performed and completed by Leonor Real      Review of Systems  Complete-Female:   Constitutional: No fever, no chills, feels well, no tiredness, no recent weight gain or weight loss  Eyes: No complaints of eye pain, no red eyes, no eyesight problems, no discharge, no dry eyes, no itching of eyes     ENT: no complaints of earache, no loss of hearing, no nose bleeds, no nasal discharge, no sore throat, no hoarseness  Cardiovascular: No complaints of slow heart rate, no fast heart rate, no chest pain, no palpitations, no leg claudication, no lower extremity edema  Respiratory: shortness of breath and History of recent saddle embolism and bilateral DVTs both lower extremities  Gastrointestinal: No complaints of abdominal pain, no constipation, no nausea or vomiting, no diarrhea, no bloody stools  Genitourinary: No complaints of dysuria, no incontinence, no pelvic pain, no dysmenorrhea, no vaginal discharge or bleeding  Musculoskeletal: Bilateral DVTs both lower extremities  Subacute  Integumentary: No complaints of skin rash or lesions, no itching, no skin wounds, no breast pain or lump  Neurological: No complaints of headache, no confusion, no convulsions, no numbness, no dizziness or fainting, no tingling, no limb weakness, no difficulty walking  Psychiatric: Not suicidal, no sleep disturbance, no anxiety or depression, no change in personality, no emotional problems  Endocrine: No complaints of proptosis, no hot flashes, no muscle weakness, no deepening of the voice, no feelings of weakness  Hematologic/Lymphatic: No complaints of swollen glands, no swollen glands in the neck, does not bleed easily, does not bruise easily  Preventive Quality 65 Older:   Preventive Quality 65 and Older: Falls Risk: The patient fell 0 times in the past 12 months  The patient currently has no urinary incontinence symptoms  ROS Reviewed:   ROS reviewed  Active Problems     1  Flu vaccine need (V04 81) (Z23)   2  Need for pneumococcal vaccination (V03 82) (Z23)   3  Screening for colorectal cancer (V76 51) (Z12 11,Z12 12)   4  Screening for depression (V79 0) (Z13 89)   5  Screening for genitourinary condition (V81 6) (Z13 89)   6  Screening for neurological condition (V80 09) (Z13 89)   7  Screening for osteoporosis (V82 81) (Z13 820)   8  Shortness of breath (786 05) (R06 02)   9  Wound of ankle, right, initial encounter (891 0) (S91 001A)   10  Wound of left ankle, initial encounter (891 0) (S91 002A)    Venous ulcers of both lower extremities (459 81) (I87 2)          Past Medical History    1  History of DVT (deep vein thrombosis) in pregnancy (671 30) (O22 30,I82 409)   2  History of Pulmonary embolism, blood-clot, obstetric (673 20) (T31 538)    Surgical History    1  History of  Section  Surgical History Reviewed: The surgical history was reviewed and updated today  Family History  Mother    1  Family history of cerebrovascular accident (CVA) (V17 1) (Z82 3)   2  Family history of myocardial infarction (V17 3) (Z82 49)  Father    3  Family history of malignant neoplasm (V16 9) (Z80 9)  Family History    4  Family history of Bone cancer (170 9) (C41 9)   5  Maternal history of Hypertension (401 9) (I10)   6  Paternal history of Lung cancer (162 9) (C34 90)  Family History Reviewed: The family history was reviewed and updated today  Social History    · Current nonsmoker (V49 89) (Z78 9)   · Never a smoker   · No advance directives (V49 89) (Z78 9)   · Social alcohol use (Z78 9)  Social History Reviewed: The social history was reviewed and updated today  The social history was reviewed and is unchanged  Current Meds   1  Betamethasone Dipropionate Aug 0 05 % External Gel; Applied to RLE prior to [facility] application; To Be Done: 32IVX2729; Status: HOLD FOR - Administration Ordered   2  Betamethasone Dipropionate Aug 0 05 % External Gel; APPLY 0 5  GM External Apply to   BLE prior to multilayer application; To Be Done: 29PCD0936; Status: HOLD FOR -   Administration Ordered   3  Betamethasone Dipropionate Aug 0 05 % External Gel; APPLY 1  [facility] External Apply to   periwound skin; To Be Done: 84DPV1652; Status: HOLD FOR - Administration Ordered   4  Calcium 600+D 600-200 MG-UNIT Oral Tablet;  Take 1 tablet daily Recorded   5  Eliquis 5 MG Oral Tablet; TAKE 1 TABLET Every twelve hours Recorded   6  MetroNIDAZOLE 500 MG Oral Tablet; TAKE 1 TABLET 3 TIMES DAILY UNTIL GONE;   Therapy: 50KBQ8149 to (Evaluate:27Jan2017)  Requested for: 43EZY3769; Last   Rx:17Jan2017 Ordered   7  Multivitamins Oral Capsule Recorded  Medication List Reviewed: The medication list was reviewed and updated today  Allergies    1  No Known Drug Allergies    Vitals  Signs   Recorded: 56VHE5066 23:47PK   Systolic: 600, LUE, Sitting  Diastolic: 78, LUE, Sitting  Height: 5 ft 10 in  Weight: 198 lb   BMI Calculated: 28 41  BSA Calculated: 2 08    Physical Exam    Constitutional   General appearance: No acute distress, well appearing and well nourished  Eyes   Conjunctiva and lids: No swelling, erythema or discharge  Pupils and irises: Equal, round and reactive to light  Ears, Nose, Mouth, and Throat   External inspection of ears and nose: Normal     Otoscopic examination: Tympanic membranes translucent with normal light reflex  Canals patent without erythema  Nasal mucosa, septum, and turbinates: Normal without edema or erythema  Oropharynx: Normal with no erythema, edema, exudate or lesions  Pulmonary   Respiratory effort: No increased work of breathing or signs of respiratory distress  Auscultation of lungs: Clear to auscultation  Cardiovascular   Palpation of heart: Normal PMI, no thrills  Auscultation of heart: Normal rate and rhythm, normal S1 and S2, without murmurs  Examination of extremities for edema and/or varicosities: Normal     Carotid pulses: Normal     Abdomen   Abdomen: Non-tender, no masses  Liver and spleen: No hepatomegaly or splenomegaly  Lymphatic   Palpation of lymph nodes in neck: No lymphadenopathy  Musculoskeletal   Gait and station: Normal     Digits and nails: Normal without clubbing or cyanosis      Inspection/palpation of joints, bones, and muscles: Normal     Skin   Skin and subcutaneous tissue: Normal without rashes or lesions  Neurologic   Cranial nerves: Cranial nerves 2-12 intact  Reflexes: 2+ and symmetric  Sensation: No sensory loss  Psychiatric   Orientation to person, place, and time: Normal     Mood and affect: Normal          Health Management  Health Maintenance   * MAMMO SCREENING BILATERAL W CAD; every 1 year; Last 98LBH4673; Next Due: 21ZIT3456;   Active    Future Appointments    Date/Time Provider Specialty Site   02/06/2017 08:00 AM Esvin Nugent, [address] [place] [address]     Signatures   Electronically signed by : Phoenix Alvarado, ; Jan 25 2017 10:19AM EST                       (Author)    Electronically signed by : Keyanna Natarajan DO; Jan 30 2017  6:16PM EST                       (Author)

## 2018-01-10 NOTE — MISCELLANEOUS
Message  Return to work or school:   Mike Chang is under my professional care  She was seen in my office on 1/30/17   She is able to return to work on  2/13/17      Illness  Zoë Handy        Signatures   Electronically signed by : Renny Kussmaul, DO; Jan 31 2017  6:53AM EST                       (Author)

## 2018-01-12 VITALS
HEIGHT: 70 IN | RESPIRATION RATE: 20 BRPM | HEART RATE: 92 BPM | TEMPERATURE: 98.5 F | DIASTOLIC BLOOD PRESSURE: 86 MMHG | SYSTOLIC BLOOD PRESSURE: 142 MMHG | BODY MASS INDEX: 27.92 KG/M2 | WEIGHT: 195 LBS

## 2018-01-12 VITALS
RESPIRATION RATE: 18 BRPM | SYSTOLIC BLOOD PRESSURE: 150 MMHG | WEIGHT: 195 LBS | BODY MASS INDEX: 27.92 KG/M2 | HEART RATE: 76 BPM | HEIGHT: 70 IN | TEMPERATURE: 98 F | DIASTOLIC BLOOD PRESSURE: 98 MMHG

## 2018-01-12 VITALS
HEIGHT: 70 IN | WEIGHT: 198 LBS | BODY MASS INDEX: 28.35 KG/M2 | DIASTOLIC BLOOD PRESSURE: 78 MMHG | SYSTOLIC BLOOD PRESSURE: 132 MMHG

## 2018-01-13 VITALS
HEART RATE: 88 BPM | HEIGHT: 70 IN | RESPIRATION RATE: 20 BRPM | BODY MASS INDEX: 28.06 KG/M2 | TEMPERATURE: 98.6 F | DIASTOLIC BLOOD PRESSURE: 90 MMHG | SYSTOLIC BLOOD PRESSURE: 148 MMHG | WEIGHT: 196 LBS

## 2018-01-13 VITALS
WEIGHT: 194.5 LBS | BODY MASS INDEX: 27.84 KG/M2 | SYSTOLIC BLOOD PRESSURE: 128 MMHG | DIASTOLIC BLOOD PRESSURE: 88 MMHG | HEART RATE: 82 BPM | HEIGHT: 70 IN

## 2018-01-13 VITALS
DIASTOLIC BLOOD PRESSURE: 84 MMHG | HEART RATE: 86 BPM | BODY MASS INDEX: 28.83 KG/M2 | HEIGHT: 70 IN | WEIGHT: 201.38 LBS | SYSTOLIC BLOOD PRESSURE: 151 MMHG | RESPIRATION RATE: 18 BRPM

## 2018-01-13 VITALS
DIASTOLIC BLOOD PRESSURE: 86 MMHG | WEIGHT: 200.13 LBS | HEIGHT: 70 IN | SYSTOLIC BLOOD PRESSURE: 152 MMHG | BODY MASS INDEX: 28.65 KG/M2 | HEART RATE: 96 BPM

## 2018-01-14 VITALS
WEIGHT: 201 LBS | BODY MASS INDEX: 28.77 KG/M2 | HEART RATE: 96 BPM | SYSTOLIC BLOOD PRESSURE: 118 MMHG | TEMPERATURE: 98.3 F | RESPIRATION RATE: 20 BRPM | DIASTOLIC BLOOD PRESSURE: 86 MMHG | HEIGHT: 70 IN

## 2018-01-14 VITALS
TEMPERATURE: 96.8 F | WEIGHT: 197 LBS | BODY MASS INDEX: 28.2 KG/M2 | HEIGHT: 70 IN | RESPIRATION RATE: 20 BRPM | HEART RATE: 68 BPM | SYSTOLIC BLOOD PRESSURE: 156 MMHG | DIASTOLIC BLOOD PRESSURE: 102 MMHG

## 2018-01-14 VITALS
DIASTOLIC BLOOD PRESSURE: 110 MMHG | WEIGHT: 205 LBS | TEMPERATURE: 98.4 F | RESPIRATION RATE: 20 BRPM | BODY MASS INDEX: 29.35 KG/M2 | SYSTOLIC BLOOD PRESSURE: 190 MMHG | HEIGHT: 70 IN | HEART RATE: 88 BPM

## 2018-01-14 VITALS
DIASTOLIC BLOOD PRESSURE: 72 MMHG | WEIGHT: 197 LBS | BODY MASS INDEX: 28.2 KG/M2 | HEIGHT: 70 IN | TEMPERATURE: 98.2 F | RESPIRATION RATE: 18 BRPM | SYSTOLIC BLOOD PRESSURE: 124 MMHG | HEART RATE: 96 BPM

## 2018-01-14 VITALS — SYSTOLIC BLOOD PRESSURE: 132 MMHG | DIASTOLIC BLOOD PRESSURE: 92 MMHG

## 2018-01-14 NOTE — RESULT NOTES
Verified Results  VAS MICHAEL & WAVEFORM ANALYSIS, MULTIPLE LEVELS [id number] 01:49PM Osvaldo Analia     Test Name Result Flag Reference   VAS MICHAEL & WAVEFORM ANALYSIS, MULTIPLE LEVELS (Report)     [facility] REPORT   CLINICAL:   Indications: PVD, Unspecified [I73 9]  Patient presents with venous stasis ulcers  She is to proceed with her debridement  Evaluate arterial flow  Right Brachial Pressure: 155/ mmHg, Left Brachial Pressure: 147/ mmHg  FINDINGS:      Segment    Rig Left                P  P    Ant  Tibial  177 169    Post  Tibial 188 168    Ankle     188       Metatarsal  202 191    Great Toe   120 141             CONCLUSION:   Impression      RIGHT LOWER LIMB:   Ankle/Brachial Index: 1 21  which is within normal limits  Metatarsal pressure of 202 mmHg   Great toe pressure of 120 mmHg, within the healing range  LEFT LOWER LIMB:   Ankle/Brachial Index: 1 09 which is within normal limits  Metatarsal pressure of 191 mmHg   Great toe pressure of 141 mmHg, within the healing range  SIGNATURE:   Electronically Signed by: Tomi Olguin MD, [address] on 2016-12-23 04:40:12 PM       Discussion/Summary  Results will be discussed with patient at appointment today       Signatures   Electronically signed by : Sathish Quiroga; Dec 28 2016  9:03AM EST                       (Author)

## 2018-01-15 VITALS
DIASTOLIC BLOOD PRESSURE: 82 MMHG | BODY MASS INDEX: 28.56 KG/M2 | HEIGHT: 70 IN | WEIGHT: 199.5 LBS | SYSTOLIC BLOOD PRESSURE: 138 MMHG

## 2018-01-15 NOTE — PROCEDURES
Procedures by Ruben Christianson RN at 1/18/2017  6:04 PM      Author:  Ruben Christianson RN Service:  Interventional Radiology  Author Type:  Registered Nurse    Filed:  1/18/2017  6:09 PM Date of Service:  1/18/2017  6:04 PM Status:  Attested    :  Ruben Christianson RN (Registered Nurse)  Cosigner:  Elsi Grigsby MD at 1/18/2017  7:31 PM      Procedure Orders:       1  Insert PICC line [[id number]] ordered by Avery Shen RN at 01/18/17 3649               Attestation signed by Elsi Grigsby MD at 1/18/2017  7:31 PM           I have reviewed the notes, assessments, and/or procedures performed by Afshan Medina RN, I concur with her/his documentation of Alanis Tate  PICC Line Insertion  Performed by: Rhett Parks  Authorized by: Eric TRUONG     Procedure date/time:  1/18/2017 6:04 PM  Patient location:  Other (comment) (IR)  Consent:     Consent obtained:  Verbal and written    Consent given by:  Patient    Risks discussed:  Bleeding and infection    Alternatives discussed:  No treatment  Universal protocol:     Procedure explained and questions answered to patient or proxy's satisfaction: yes      Relevant documents present and verified: yes      Test results available and properly labeled: yes       Imaging studies available: yes      Required blood products, implants, devices, and special equipment available: yes      Site/side marked: yes      Immediately prior to procedure, a time out was called: yes      Patient identity confirmed:  Verbally with patient, arm band and hospital-assigned identification number  Pre-procedure details:     Hand hygiene: Hand hygiene performed prior to insertion      Sterile barrier technique: All elements of maximal sterile technique followed      Skin preparation:  ChloraPrep  Indications:     PICC line indications: vascular access    Anesthesia (see MAR for exact dosages):      Anesthesia method:  Local infiltration    Local anesthetic:  Lidocaine 1% w/o epi  Procedure details:     Location:  Basilic    Vessel type: vein      Laterality:  Right    Approach: open technique used      Catheter size:  5 5 Fr    Landmarks identified: yes      Ultrasound guidance: yes      Sterile ultrasound techniques: Sterile gel and sterile probe covers were used      Number of attempts:  1    Successful placement: yes      Vessel of catheter tip end:  SVC and PICC OK to USE    Total catheter length (cm):  43    Catheter out on skin (cm):  0    Max flow rate:  999ml/hr    Arm circumference:  32  Post-procedure details:     Post-procedure:  Dressing applied    Assessment:  Blood return through all ports    Post-procedure complications: none      Patient tolerance of procedure:   Tolerated well, no immediate complications  Comments:      Right PICC line and PICC OK to use                     Received for:Provider  EPIC   Jan 18 2017  7:32PPRISCILLA Fair Standard Time

## 2018-01-15 NOTE — RESULT NOTES
Verified Results  (1) WOUND CULTURE [id number] 02:46PM [facility] Order Number: [id number]     Test Name Result Flag Reference   CLINICAL REPORT (Report)     Test:        Wound culture and Gram stain  Specimen Source:  Wound  Specimen Type:    Wound  Specimen Date:   12/15/2016 2:46 PM  Result Date:    12/18/2016 9:29 AM  Result Status:   Final result  Resulting Lab:   [facility] [address] [place] [address]            Tel: [phone]      CULTURE                                       ------------------                                   3+ Growth of Staphylococcus aureus    2+ Growth of Mixed Skin Radha      STAIN                                        ------------------                                   1+ Polys    3+ Gram positive cocci in clusters      SUSCEPTIBILITY                                   ------------------                                                       Staphylococcus aureus  METHOD                 NELLY  -------------------------------------  ----------------------------  AMOXICILLIN + CLAVULANATE        <=4/2 ug/ml   Susceptible  AMPICILLIN ($$)             <=2 00 ug/ml   Resistant  AMPICILLIN + SULBACTAM ($)       <=8/4 ug/ml   Susceptible  CEFAZOLIN ($)              <=8 00 ug/ml   Susceptible  CLINDAMYCIN ($)             <=0 50 ug/ml   Susceptible  ERYTHROMYCIN ($$$$)           <=0 50 ug/ml   Susceptible  GENTAMICIN ($$)             <=4 ug/ml    Susceptible  OXACILLIN                <=0 25 ug/ml   Susceptible  TETRACYCLINE              <=4 ug/ml    Susceptible  TRIMETHOPRIM + SULFAMETHOXAZOLE ($$$)  <=0 5/9 5 ug/ml Susceptible  VANCOMYCIN ($)             1 00 ug/ml    Susceptible     (1) WOUND CULTURE [id number] 02:46PM [facility] Order Number: [id number]     Test Name Result Flag Reference   CLINICAL REPORT (Report)     Test:        Wound culture and Gram stain  Specimen Source:  Wound  Specimen Type: Wound  Specimen Date:   12/15/2016 2:46 PM  Result Date:    12/18/2016 9:23 AM  Result Status:   Final result  Resulting Lab:   [facility] [address] [address]            Tel: [phone]      CULTURE                                       ------------------                                   3+ Growth of Staphylococcus aureus    2+ Growth of Mixed Skin Radha      STAIN                                        ------------------                                   No polys seen    2+ Gram positive cocci in clusters      SUSCEPTIBILITY                                   ------------------                                                       Staphylococcus aureus  METHOD                 NELLY  -------------------------------------  ----------------------------  AMOXICILLIN + CLAVULANATE        <=4/2 ug/ml   Susceptible  AMPICILLIN ($$)             <=2 00 ug/ml   Resistant  AMPICILLIN + SULBACTAM ($)       <=8/4 ug/ml   Susceptible  CEFAZOLIN ($)              <=8 00 ug/ml   Susceptible  CLINDAMYCIN ($)             <=0 50 ug/ml   Susceptible  ERYTHROMYCIN ($$$$)           <=0 50 ug/ml   Susceptible  GENTAMICIN ($$)             <=4 ug/ml    Susceptible  OXACILLIN                <=0 25 ug/ml   Susceptible  TETRACYCLINE              <=4 ug/ml    Susceptible  TRIMETHOPRIM + SULFAMETHOXAZOLE ($$$)  <=0 5/9 5 ug/ml Susceptible  VANCOMYCIN ($)             1 00 ug/ml    Susceptible

## 2018-01-17 NOTE — PROGRESS NOTES
Chief Complaint  Chief Complaint Free Text Note Form: Follow up visit to the wound management center for B/L lower extremity wounds  History of Present Illness  Wound Identification HPI:   Wound Identification HPI   Wound #1: left lower leg    Wound #2: right lateral ankle        Visit Information:   The patient came to Wound Care and was ambulatory  The patient is being seen for follow-up with RN at the [address] [place] [address]  The patient's identification was verified  A secondary verification process was completed  Orientation: oriented to person, oriented to place and oriented to time  Blood Glucose:  Not applicable  12/19/2016: arrived with modified unna wraps from Dr Deepthi Clarke office  Wounds started about 3 weeks ago  Patient was applying Neosporin to wounds with dry dressing- no healing occurred  Currently taking Clindamycin- ordered by Dr Yenifer iDaz  12/29/2016: Doppler complete  Here today for RN dressing change  Dressings intact (Dermagran gauze, plain gauze, silvia, and Tubigrips)  Wounds are smaller  Kellogg Fall Scale:     History of Falling: No = 0   Secondary Diagnosis: Yes = 15   Ambulatory Aid None, Bedrest, Nurse Assist = 0   No = 0   Gait: Normal = 0   Mental Status: Total Score: 15   < 25 = Low Risk         Fall, Nutrition, Mobility, Neuropsychological Assessment: The most recent fall occurred Denies any history of falls  Number of falls in the last year were 0  Nutrition Assessment Screening: Food intake over the last 3 months due to the loss of appetite, digestive problems, chewing or swallowing difficulties is graded as: 2 = no decrease in food intake   Weight loss during the last 3 months: 3 = no weight loss   Mobility scored as: 2 = goes out  Psychological Stress and Acute Disease Scored as: 2 = The patient has not experienced psychological stress or acute disease in the last 3 months  Neuropsychological problems scored as: 2 = no psychological problems     Body Mass Index (BMI) scored as: 3 = BMI 23 or greater  Nutritional Assessment Screening Score: 12 - 14 points - Normal nutritional status  Hospital Based Practices Required Assessment:   Pain Assessment   the patient states they have pain  The pain is located in the wounds  The pain radiates to the none  The patient describes the pain as burning  (on a scale of 0 to 10, the patient rates the pain at 0, 0, at times ranging as high as 8 )   Abuse And Domestic Violence Screen    Yes, the patient is safe at home  The patient states no one is hurting them  Depression And Suicide Screen  No, the patient has not had thoughts of hurting themself  No, the patient has not felt depressed in the past 7 days  Prefered Language is  [place]  Primary Language is  English  Readiness To Learn: Receptive  Barriers To Learning: none  Preferred Learning: demonstration and verbal   Education Completed: disease/condition, further treatment/follow-up, treatment/procedure and equipment/supplies   Teaching Method: verbal and demonstration   Person Taught: patient   Evaluation Of Learning: verbalized/demonstrated understanding      Active Problems    1  Flu vaccine need (V04 81) (Z23)   2  Need for pneumococcal vaccination (V03 82) (Z23)   3  Screening for colorectal cancer (V76 51) (Z12 11,Z12 12)   4  Screening for depression (V79 0) (Z13 89)   5  Screening for genitourinary condition (V81 6) (Z13 89)   6  Screening for neurological condition (V80 09) (Z13 89)   7  Screening for osteoporosis (V82 81) (Z13 820)   8  Venous ulcers of both lower extremities (459 81,707 10) (I87 2)   9  Wound of ankle, right, initial encounter (891 0) (S91 001A)   10  Wound of left ankle, initial encounter (891 0) (C62 177W)    Past Medical History    1  History of DVT (deep vein thrombosis) in pregnancy (671 30) (O22 30,I82 409)   2  History of Pulmonary embolism, blood-clot, obstetric (673 20) (F31 331)    Surgical History    1   History of  Section    Family History    1  Family history of cerebrovascular accident (CVA) (V17 1) (Z82 3)   2  Family history of myocardial infarction (V17 3) (Z82 49)    3  Family history of malignant neoplasm (V16 9) (Z80 9)    4  Family history of Bone cancer (170 9) (C41 9)   5  Maternal history of Hypertension (401 9) (I10)   6  Paternal history of Lung cancer (162 9) (C34 90)    Social History    · Current nonsmoker (V49 89) (Z78 9)   · Never a smoker   · No advance directives (V49 89) (Z78 9)   · No living will   · Non-smoker (V49 89) (Z78 9)   · Social alcohol use (Z78 9)    Current Meds   1  Aspirin 81 MG Oral Tablet Delayed Release; TAKE 1 TABLET BY MOUTH ONCE DAILY   Recorded   2  Betamethasone Dipropionate Aug 0 05 % External Gel; APPLY 1  INCH External Apply to   periwound skin; To Be Done: 26UBS0089; Status: HOLD FOR - Administration Ordered   3  Calcium 600+D 600-200 MG-UNIT Oral Tablet; Take 1 tablet daily Recorded   4  Clindamycin HCl - 300 MG Oral Capsule; Therapy: (Recorded:07Kil1273) to Recorded   5  Multivitamins Oral Capsule Recorded    Allergies    1  No Known Drug Allergies    Vitals  Signs   Recorded: 20Gol0956 01:32PM   Temperature: 98 5 F, Tympanic  Heart Rate: 72  Respiration: 20  Systolic: 772  Diastolic: 378  Height: 5 ft 10 in  Weight: 205 lb   BMI Calculated: 29 41  BSA Calculated: 2 11  Pain Scale: 0    Physical Exam    Wound #1 Assessment wound #1 Location:, left lower leg, started on 11/29/2016, Care for this wound started on 12/19/2016  Wound Status: not healed  Venous Ulcer: Full Thickness     Length: 5 3cm x Width: 1 8cm x Depth: 0 2cm   Total: 9 54sq cm   Wound Volume: 1 908cm3           Tissue type: Subcutaneous and Slough   Color of Wound: Red - 30% and Yellow - 70%   Exudate Amount: Minimal   Exudate Type: Serosangiunous   Odor: None   Exudate Color: Yellow and Tan   Wound Edges: Intact   Periwound Skin Condition: Intact, Hemosiderin pigmentation, Scaly, lower leg edema Comments: pruritic  Wound #2 Assessment wound #2 Location:, right lateral ankle, started on 12/29/2016, Care for this wound started on 12/19/2016  Wound Status: not healed  Venous Ulcer: Full Thickness  Length: 1 8cm x Width: 2cm x Depth: 0 2cm   Total: 3 6sq cm   Wound Volume: 0 72cm3           Tissue type: Subcutaneous, Granulation and Slough   Color of Wound: Red - 40% and Yellow - 60%   Exudate Amount: Minimal   Exudate Type: Serosangiunous   Odor: None   Exudate Color: Yellow and Tan   Wound Edges: Intact   Periwound Skin Condition: Hemosiderin pigmentation, Scaly, lower leg edema   Comments: pruritic  Procedure      Wound #1: left lower leg     Nurse Dressing Change:   Wound #1 The wound located on the left lower leg  Wound care rendered as per Physician/Advanced Practitioner order/plan  Return to [address] [place] [address] 1/9/2017  Comments:   Wound #2: right lateral ankle     Nurse Dressing Change:   Wound #2 The wound located on the right lateral ankle  Wound care rendered as per Physician/Advanced Practitioner order/plan  Return to [address] [place] [address] 1/9/2017  Comments:      Plan    1  Dermagran instructions given; Status:Complete - Retrospective By Protocol   Authorization;   Done: [id number] 01:46PM   2  Other Wound Care Instructions; Status:Complete - Retrospective Authorization;   Done:   00GMJ9997 01:46PM  Instruction : Double layer Tubigrip size E to bilateral lower extremities   3  Follow-up visit in 10 days Evaluation and Treatment  Follow-up  Status: Hold For -   Scheduling,Retrospective By Protocol Authorization  Requested for: 40JSL0927    Wound Drsg  Orders/Instructions  Wound Identification Dressing Orders--Instructions:   Wound Identification and Instructions   Wound #1: left lower leg    Wound #2: right lateral ankle      Wound Care Instructions  Discussed with Patient/Caregiver     Dressing Type: Brodie Bravo with mild soap and water, normal saline, wound cleanser  Apply 4% Topical Lidocaine anesthetic solution PRN to wound/ulcer prior to debridement for pain control  Apply specified dressing to wound base/bed  To periwound apply: Bethamethasone cream  Secondary dressing apply: Gauze  Secure with: Rupinder  Comments/Other:   One time dose of betamethasone today  No betamethasone at home  Verbal order to increase compression with double layer tubigrip E   Apply compression using: Double Tubigrip E  Wound Goals  Wound Goals:   Wound will be free of infection   Patient will achieve full wound closure and return to full ADLs       Impression  Wound [address]: Wound Nursing Care Plan   Impaired Tissue Integrity related to: wounds B/L lower extremities   Risk for Infection related to open wound:   Pain related to disease process and/or wound treatment:   Fluid Volume Excess related to venous insufficiency, ineffective offloading, improper positioning, and/or disease process:   Goals   Patient will achieve 100% epithelialization:  Patient will verbalize signs and symptoms of infection and when to notify physician or [facility]:    Patient or caregiver will demonstrate ability to perform wound dressing changes:  Patient will demonstrate offloading and body positioning to effectively decrease edema:  Wound Nursing Care Interventions:   Provide moist wound healing:  Assess patient's nutrition on each wound care visit (including protein and fluid intake): Mirza Strong Teach patient and caregiver how to change wound dressing:  Complete Fall Risk Assessment upon admission to wound program and with change in condition/implement identified interventions:  Teach patient on edema reducing measures:     Evaluate effectiveness of all above measures every 4 weeks with Patient Specific CQI:    Other:  POC initiated 12/19/2016      Future Appointments    Date/Time Provider Specialty Site   01/09/2017 08:00 AM Tamara Sterling, [address] [place] [address] CARE     Signatures   Electronically signed by : Nicole Lee RN; Dec 29 2016  1:47PM EST                       (Author)    Electronically signed by : Nicole Lee RN; Dec 29 2016  1:47PM EST                       (Author)

## 2018-02-10 DIAGNOSIS — I26.99 OTHER PULMONARY EMBOLISM WITHOUT ACUTE COR PULMONALE, UNSPECIFIED CHRONICITY (HCC): Primary | ICD-10-CM

## 2018-02-12 RX ORDER — APIXABAN 5 MG/1
TABLET, FILM COATED ORAL
Qty: 60 TABLET | Refills: 5 | Status: SHIPPED | OUTPATIENT
Start: 2018-02-12 | End: 2018-08-07 | Stop reason: SDUPTHER

## 2018-08-07 DIAGNOSIS — I26.99 OTHER PULMONARY EMBOLISM WITHOUT ACUTE COR PULMONALE, UNSPECIFIED CHRONICITY (HCC): ICD-10-CM

## 2018-08-07 RX ORDER — APIXABAN 5 MG/1
TABLET, FILM COATED ORAL
Qty: 60 TABLET | Refills: 5 | Status: SHIPPED | OUTPATIENT
Start: 2018-08-07 | End: 2019-02-06 | Stop reason: SDUPTHER

## 2018-10-18 ENCOUNTER — OFFICE VISIT (OUTPATIENT)
Dept: CARDIOLOGY CLINIC | Facility: HOSPITAL | Age: 68
End: 2018-10-18
Payer: COMMERCIAL

## 2018-10-18 VITALS
SYSTOLIC BLOOD PRESSURE: 148 MMHG | BODY MASS INDEX: 28.75 KG/M2 | WEIGHT: 200.8 LBS | HEART RATE: 87 BPM | DIASTOLIC BLOOD PRESSURE: 82 MMHG | HEIGHT: 70 IN

## 2018-10-18 DIAGNOSIS — I51.89 RIGHT VENTRICULAR SYSTOLIC DYSFUNCTION WITHOUT HEART FAILURE: Primary | ICD-10-CM

## 2018-10-18 DIAGNOSIS — I87.8 VENOUS STASIS OF BOTH LOWER EXTREMITIES: ICD-10-CM

## 2018-10-18 PROCEDURE — 99214 OFFICE O/P EST MOD 30 MIN: CPT | Performed by: INTERNAL MEDICINE

## 2018-10-18 NOTE — PROGRESS NOTES
Cardiology Follow Up    Maki Malcolm  [birth year]  [id number]  [address] [facility] alexandra [facility] [address] [facility] [address] [phone]    1  Right ventricular systolic dysfunction without heart failure  Echo complete with contrast if indicated   2  Venous stasis of both lower extremities         Discussion/Summary:  Overall she has been doing well since her last visit a year ago  From a cardiac standpoint she has been quite stable  RV function has improved  I would plan on a 2 year follow-up echo if no significant abnormality she can come back on a p r n  Basis  Blood pressures been well controlled  She is going to drop off a copy of her lipid profile to help assess her cardiovascular risk  Interval History:  27-year-old female with a history of pulmonary embolism and right ventricular strain presents for a follow-up visit  Overall she has been doing quite well for the past year  She remains physically active with a good functional capacity for her age  She denies any chest pain or discomfort there has been no palpitations, lightheadedness, dizziness, or syncope  She denies lower extremity edema, PND, orthopnea  She has been placed on lifelong anticoagulation with Eliquis  Problem List     Acute massive pulmonary embolism (HCC)    Venous stasis of both lower extremities    Clostridium difficile infection    Right ventricular systolic dysfunction without heart failure        Past Medical History:   Diagnosis Date    Pulmonary emboli ([facility] )     1989 post C section    Venous stasis ulcer ([facility] )      Social History     Social History    Marital status: /Civil Union     Spouse name: N/A    Number of children: N/A    Years of education: N/A     Occupational History    Not on file  Social History Main Topics    Smoking status: Never Smoker    Smokeless tobacco: Never Used    Alcohol use No    Drug use:  No  Sexual activity: Not on file     Other Topics Concern    Not on file     Social History Narrative    No narrative on file      No family history on file  Past Surgical History:   Procedure Laterality Date     SECTION         Current Outpatient Prescriptions:     calcium carbonate (OS-VIRGIE) 600 MG tablet, Take 600 mg by mouth daily  , Disp: , Rfl:     ELIQUIS 5 MG, take 1 tablet every 12 hours, Disp: 60 tablet, Rfl: 5    Multiple Vitamins-Minerals (MULTIVITAMIN WITH MINERALS) tablet, Take 1 tablet by mouth daily, Disp: , Rfl:     collagenase (SANTYL) ointment, Apply 1 application topically daily for 30 days, Disp: 10 g, Rfl: 0  No Known Allergies    Labs:     Chemistry        Component Value Date/Time     2017 0617    K 4 0 2017 0617     2017 0617    CO2 27 2017 0617    BUN 9 2017 0617    CREATININE 0 57 (L) 2017 0617        Component Value Date/Time    CALCIUM 8 1 (L) 2017 0617    ALKPHOS 55 2017 1322    AST 13 2017 1322    ALT 22 2017 1322            No results found for: CHOL  No results found for: HDL  No results found for: LDLCALC  No results found for: TRIG  No components found for: CHOLHDL    Imaging: No results found  ECG:        ROS    Vitals:    10/18/18 1446   BP: 148/82   Pulse: 87     Vitals:    10/18/18 1446   Weight: 91 1 kg (200 lb 12 8 oz)     Height: 5' 10" (177 8 cm)   Body mass index is 28 81 kg/m²      Physical Exam:   General appearance:  Appears stated age, alert, well appearing and in no distress  HEENT:  PERRLA, EOMI, no scleral icterus, no conjunctival pallor  NECK:  Supple, No elevated JVP, no thyromegaly, no carotid bruits  HEART:  Regular rate and rhythm, normal S1/S2, no S3/S4, no murmur or rub  LUNGS:  Clear to auscultation bilaterally, no wheezes rales or rhonchi  ABDOMEN:  Soft, non-tender, positive bowel sounds, no rebound or guarding, no organomegaly   EXTREMITIES:  No edema, normal range of motion  VASCULAR:  Normal pedal pulses, good pulse volume   SKIN: No lesions or rashes on exposed skin  NEURO:  CN II-XII intact, no focal deficits

## 2019-02-06 DIAGNOSIS — I26.99 OTHER PULMONARY EMBOLISM WITHOUT ACUTE COR PULMONALE, UNSPECIFIED CHRONICITY (HCC): ICD-10-CM

## 2019-02-06 RX ORDER — APIXABAN 5 MG/1
TABLET, FILM COATED ORAL
Qty: 60 TABLET | Refills: 5 | Status: SHIPPED | OUTPATIENT
Start: 2019-02-06 | End: 2019-08-16 | Stop reason: SDUPTHER

## 2019-08-16 DIAGNOSIS — I26.99 OTHER PULMONARY EMBOLISM WITHOUT ACUTE COR PULMONALE, UNSPECIFIED CHRONICITY (HCC): ICD-10-CM

## 2019-08-16 RX ORDER — APIXABAN 5 MG/1
TABLET, FILM COATED ORAL
Qty: 60 TABLET | Refills: 5 | Status: SHIPPED | OUTPATIENT
Start: 2019-08-16 | End: 2020-02-18

## 2019-09-05 ENCOUNTER — HOSPITAL ENCOUNTER (OUTPATIENT)
Dept: NON INVASIVE DIAGNOSTICS | Facility: HOSPITAL | Age: 69
Discharge: HOME | End: 2019-09-05
Attending: INTERNAL MEDICINE
Payer: COMMERCIAL

## 2019-09-05 DIAGNOSIS — I51.89 RIGHT VENTRICULAR SYSTOLIC DYSFUNCTION WITHOUT HEART FAILURE: ICD-10-CM

## 2019-09-05 PROCEDURE — 93306 TTE W/DOPPLER COMPLETE: CPT | Performed by: INTERNAL MEDICINE

## 2019-09-05 PROCEDURE — 93306 TTE W/DOPPLER COMPLETE: CPT

## 2019-10-28 ENCOUNTER — OFFICE VISIT (OUTPATIENT)
Dept: CARDIOLOGY CLINIC | Facility: HOSPITAL | Age: 69
End: 2019-10-28
Payer: COMMERCIAL

## 2019-10-28 VITALS
HEIGHT: 70 IN | SYSTOLIC BLOOD PRESSURE: 152 MMHG | DIASTOLIC BLOOD PRESSURE: 100 MMHG | BODY MASS INDEX: 29.04 KG/M2 | HEART RATE: 72 BPM | WEIGHT: 202.82 LBS

## 2019-10-28 DIAGNOSIS — I51.89 RIGHT VENTRICULAR SYSTOLIC DYSFUNCTION WITHOUT HEART FAILURE: ICD-10-CM

## 2019-10-28 DIAGNOSIS — I26.99 ACUTE MASSIVE PULMONARY EMBOLISM (HCC): Primary | ICD-10-CM

## 2019-10-28 PROCEDURE — 93000 ELECTROCARDIOGRAM COMPLETE: CPT | Performed by: INTERNAL MEDICINE

## 2019-10-28 PROCEDURE — 99214 OFFICE O/P EST MOD 30 MIN: CPT | Performed by: INTERNAL MEDICINE

## 2019-10-28 NOTE — PROGRESS NOTES
Cardiology Follow Up    Bety Adame  [birth year]  [id number]  [address] [facility] alexandra [facility] [address] [facility] [address] [phone]    1  Acute massive pulmonary embolism (HCC)  POCT ECG   2  Right ventricular systolic dysfunction without heart failure  POCT ECG       Discussion/Summary:  Overall she has been doing well since her last visit a year ago  From a cardiac standpoint she has been quite stable  RV function has improved  She has no other complaints  She can follow with her primary physician terms of blood pressure surveillance  No further cardiac testing she can come back and see me on an as-needed basis    Interval History:  69-year-old female with a history of pulmonary embolism and right ventricular strain presents for a follow-up visit  Overall she has been doing quite well for the past year  Unfortunately she had a fall and developed a subarachnoid bleed  She went underwent conservative management no surgical intervention  She is back on Eliquis at this point time  She is able to walk and ambulate well denies any chest pain, shortness of breath, palpitations, lightheadedness, dizziness, or syncope  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 )     1989 post C section    Venous stasis ulcer ([facility] )      Social History     Socioeconomic History    Marital status: /Civil Union     Spouse name: Not on file    Number of children: Not on file    Years of education: Not on file    Highest education level: Not on file   Occupational History    Not on file   Social Needs    Financial resource strain: Not on file    Food insecurity:     Worry: Not on file     Inability: Not on file    Transportation needs:     Medical: Not on file     Non-medical: Not on file   Tobacco Use    Smoking status: Never Smoker    Smokeless tobacco: Never Used   Substance and Sexual Activity    Alcohol use: No    Drug use: No    Sexual activity: Not on file   Lifestyle    Physical activity:     Days per week: Not on file     Minutes per session: Not on file    Stress: Not on file   Relationships    Social connections:     Talks on phone: Not on file     Gets together: Not on file     Attends Religion service: Not on file     Active member of club or organization: Not on file     Attends meetings of clubs or organizations: Not on file     Relationship status: Not on file    Intimate partner violence:     Fear of current or ex partner: Not on file     Emotionally abused: Not on file     Physically abused: Not on file     Forced sexual activity: Not on file   Other Topics Concern    Not on file   Social History Narrative    Not on file      No family history on file    Past Surgical History:   Procedure Laterality Date     SECTION         Current Outpatient Medications:     calcium carbonate (OS-VIRGIE) 600 MG tablet, Take 600 mg by mouth daily  , Disp: , Rfl:     ELIQUIS 5 MG, take 1 tablet by mouth every 12 hours, Disp: 60 tablet, Rfl: 5    Multiple Vitamins-Minerals (MULTIVITAMIN WITH MINERALS) tablet, Take 1 tablet by mouth daily, Disp: , Rfl:     collagenase (SANTYL) ointment, Apply 1 application topically daily for 30 days, Disp: 10 g, Rfl: 0  No Known Allergies    Labs:     Chemistry        Component Value Date/Time    K 4 0 2017 0617     2017 0617    CO2 27 2017 0617    BUN 9 2017 0617    CREATININE 0 57 (L) 2017 0617        Component Value Date/Time    CALCIUM 8 1 (L) 2017 0617    ALKPHOS 55 2017 1322    AST 13 2017 1322    ALT 22 2017 1322 No results found for: CHOL  No results found for: HDL  No results found for: LDLCALC  No results found for: TRIG  No results found for: CHOLHDL    Imaging: No results found  ECG:  Normal sinus rhythm unremarkable tracing       ROS    Vitals:    10/28/19 1532   BP: 152/100   Pulse: 72     Vitals:    10/28/19 1532   Weight: 92 kg (202 lb 13 2 oz)     Height: 5' 10" (177 8 cm)   Body mass index is 29 1 kg/m²      Physical Exam:  General:  Alert and cooperative, appears stated age  HEENT:  PERRLA, EOMI, no scleral icterus, no conjunctival pallor  Neck:  No lymphadenopathy, no thyromegaly, no carotid bruits, no elevated JVP  Heart:  Regular rate and rhythm, normal S1/S2, no S3/S4, no murmur  Lungs:  Clear to auscultation bilaterally   Abdomen:  Soft, non-tender, positive bowel sounds, no rebound or guarding,   no organomegaly   Extremities:  No clubbing, cyanosis or edema   Vascular:  2+ pedal pulses  Skin:  No rashes or lesions on exposed skin  Neurologic:  Cranial nerves II-XII grossly intact without focal deficits

## 2020-02-15 DIAGNOSIS — I26.99 OTHER PULMONARY EMBOLISM WITHOUT ACUTE COR PULMONALE, UNSPECIFIED CHRONICITY (HCC): ICD-10-CM

## 2020-02-18 RX ORDER — APIXABAN 5 MG/1
TABLET, FILM COATED ORAL
Qty: 60 TABLET | Refills: 0 | Status: SHIPPED | OUTPATIENT
Start: 2020-02-18 | End: 2020-03-17

## 2020-02-18 NOTE — TELEPHONE ENCOUNTER
Patient has not been seen in our office literally in 2 years it is not good medicine for us to keep refilling her Eliquis especially in light of the fact that she has been in a now the hospital with subdural hematoma so she needs to be seen I gave her a 1 month supply but she needs to get in for a visit

## 2020-03-17 ENCOUNTER — OFFICE VISIT (OUTPATIENT)
Dept: FAMILY MEDICINE CLINIC | Facility: CLINIC | Age: 70
End: 2020-03-17
Payer: COMMERCIAL

## 2020-03-17 VITALS
DIASTOLIC BLOOD PRESSURE: 92 MMHG | SYSTOLIC BLOOD PRESSURE: 152 MMHG | HEIGHT: 70 IN | WEIGHT: 208 LBS | BODY MASS INDEX: 29.78 KG/M2

## 2020-03-17 DIAGNOSIS — I10 ESSENTIAL HYPERTENSION: ICD-10-CM

## 2020-03-17 DIAGNOSIS — Z12.11 SCREENING FOR COLORECTAL CANCER: Primary | ICD-10-CM

## 2020-03-17 DIAGNOSIS — Z00.00 ANNUAL PHYSICAL EXAM: ICD-10-CM

## 2020-03-17 DIAGNOSIS — I26.99 OTHER PULMONARY EMBOLISM WITHOUT ACUTE COR PULMONALE, UNSPECIFIED CHRONICITY (HCC): ICD-10-CM

## 2020-03-17 DIAGNOSIS — Z13.220 SCREENING FOR HYPERLIPIDEMIA: ICD-10-CM

## 2020-03-17 DIAGNOSIS — Z12.12 SCREENING FOR COLORECTAL CANCER: Primary | ICD-10-CM

## 2020-03-17 PROCEDURE — 3288F FALL RISK ASSESSMENT DOCD: CPT | Performed by: FAMILY MEDICINE

## 2020-03-17 PROCEDURE — 99397 PER PM REEVAL EST PAT 65+ YR: CPT | Performed by: FAMILY MEDICINE

## 2020-03-17 PROCEDURE — 1100F PTFALLS ASSESS-DOCD GE2>/YR: CPT | Performed by: FAMILY MEDICINE

## 2020-03-17 RX ORDER — APIXABAN 5 MG/1
TABLET, FILM COATED ORAL
Qty: 60 TABLET | Refills: 0 | Status: SHIPPED | OUTPATIENT
Start: 2020-03-17 | End: 2020-03-17 | Stop reason: SDUPTHER

## 2020-03-17 RX ORDER — METOPROLOL SUCCINATE 25 MG/1
25 TABLET, EXTENDED RELEASE ORAL DAILY
Qty: 30 TABLET | Refills: 5 | Status: SHIPPED | OUTPATIENT
Start: 2020-03-17 | End: 2020-09-08

## 2020-03-17 NOTE — PATIENT INSTRUCTIONS

## 2020-03-17 NOTE — PROGRESS NOTES
[address] [facility]    NAME: Deana Carrillo  AGE: 71 y o  SEX: female  : 1950     DATE: 3/17/2020     Assessment and Plan:     Problem List Items Addressed This Visit     None      Visit Diagnoses     Screening for colorectal cancer    -  Primary    Relevant Orders    Cologuard          Immunizations and preventive care screenings were discussed with patient today  Appropriate education was printed on patient's after visit summary  Counseling:  Alcohol/drug use: discussed moderation in alcohol intake, the recommendations for healthy alcohol use, and avoidance of illicit drug use  Dental Health: discussed importance of regular tooth brushing, flossing, and dental visits  Injury prevention: discussed safety/seat belts, safety helmets, smoke detectors, carbon dioxide detectors, and smoking near bedding or upholstery  Sexual health: discussed sexually transmitted diseases, partner selection, use of condoms, avoidance of unintended pregnancy, and contraceptive alternatives  · Exercise: the importance of regular exercise/physical activity was discussed  Recommend exercise 3-5 times per week for at least 30 minutes  BMI Counseling: Body mass index is 29 84 kg/m²  The BMI is above normal  Nutrition recommendations include decreasing portion sizes, encouraging healthy choices of fruits and vegetables, decreasing fast food intake, consuming healthier snacks, moderation in carbohydrate intake and increasing intake of lean protein  Exercise recommendations include exercising 3-5 times per week  No pharmacotherapy was ordered  Patient referred to PCP due to patient being overweight  No follow-ups on file  Chief Complaint:     Chief Complaint   Patient presents with    Physical Exam      History of Present Illness:     Adult Annual Physical   Patient here for a comprehensive physical exam  The patient reports no problems      Diet and Physical Activity  · Diet/Nutrition: well balanced diet  · Exercise: walking  Depression Screening  PHQ-9 Depression Screening    PHQ-9:    Frequency of the following problems over the past two weeks:       Little interest or pleasure in doing things:  0 - not at all  Feeling down, depressed, or hopeless:  0 - not at all  PHQ-2 Score:  0       General Health  · Sleep: gets 7-8 hours of sleep on average  · Hearing: normal - bilateral   · Vision: most recent eye exam <1 year ago  · Dental: regular dental visits  /GYN Health  · Patient is: postmenopausal  · Last menstrual period:2000  · Contraceptive method: Not applicable  Review of Systems:     Review of Systems   Constitutional: Positive for activity change  Negative for appetite change, diaphoresis, fatigue and fever  HENT: Negative  Negative for dental problem and hearing loss  Eyes: Positive for visual disturbance  Pending cataracts  no glaucoma and no macular degeneration   Respiratory: Negative for apnea, cough, chest tightness, shortness of breath and wheezing  Cardiovascular: Positive for leg swelling  Negative for chest pain and palpitations  Gastrointestinal: Negative for abdominal distention, abdominal pain, anal bleeding, constipation, diarrhea, nausea and vomiting  Endocrine: Negative for cold intolerance, heat intolerance, polydipsia, polyphagia and polyuria  Genitourinary: Negative for difficulty urinating, dysuria, flank pain, hematuria and urgency  Musculoskeletal: Negative for arthralgias, back pain, gait problem, joint swelling and myalgias  Skin: Negative for color change, rash and wound  Allergic/Immunologic: Negative for environmental allergies, food allergies and immunocompromised state  Neurological: Negative for dizziness, seizures, syncope, speech difficulty, numbness and headaches  Hematological: Negative for adenopathy  Does not bruise/bleed easily     Psychiatric/Behavioral: Negative for agitation, behavioral problems, hallucinations, sleep disturbance and suicidal ideas  Past Medical History:     Past Medical History:   Diagnosis Date    Pulmonary emboli ([facility] )      post C section    Venous stasis ulcer ([facility] )       Past Surgical History:     Past Surgical History:   Procedure Laterality Date     SECTION        Social History:        Social History     Socioeconomic History    Marital status: /Civil Union     Spouse name: None    Number of children: None    Years of education: None    Highest education level: None   Occupational History    None   Social Needs    Financial resource strain: None    Food insecurity:     Worry: None     Inability: None    Transportation needs:     Medical: None     Non-medical: None   Tobacco Use    Smoking status: Never Smoker    Smokeless tobacco: Never Used   Substance and Sexual Activity    Alcohol use: No    Drug use: No    Sexual activity: None   Lifestyle    Physical activity:     Days per week: None     Minutes per session: None    Stress: None   Relationships    Social connections:     Talks on phone: None     Gets together: None     Attends Voodoo service: None     Active member of club or organization: None     Attends meetings of clubs or organizations: None     Relationship status: None    Intimate partner violence:     Fear of current or ex partner: None     Emotionally abused: None     Physically abused: None     Forced sexual activity: None   Other Topics Concern    None   Social History Narrative    None      Family History:     No family history on file     Current Medications:     Current Outpatient Medications   Medication Sig Dispense Refill    calcium carbonate (OS-VIRGIE) 600 MG tablet Take 600 mg by mouth daily        ELIQUIS 5 MG take 1 tablet by mouth every 12 hours 60 tablet 0    Multiple Vitamins-Minerals (MULTIVITAMIN WITH MINERALS) tablet Take 1 tablet by mouth daily      collagenase (SANTYL) ointment Apply 1 application topically daily for 30 days 10 g 0     No current facility-administered medications for this visit  Allergies:     No Known Allergies   Physical Exam:     /92 (BP Location: Left arm, Patient Position: Sitting, Cuff Size: Standard)   Ht 5' 10" (1 778 m)   Wt 94 3 kg (208 lb)   BMI 29 84 kg/m²     Physical Exam   Constitutional: She is oriented to person, place, and time  She appears well-developed and well-nourished  No distress  HENT:   Head: Normocephalic  Right Ear: External ear normal    Left Ear: External ear normal    Nose: Nose normal    Mouth/Throat: Oropharynx is clear and moist    Eyes: Pupils are equal, round, and reactive to light  Conjunctivae and EOM are normal  Right eye exhibits no discharge  Left eye exhibits no discharge  No scleral icterus  Neck: Normal range of motion  No tracheal deviation present  No thyromegaly present  Cardiovascular: Normal rate, regular rhythm and normal heart sounds  Exam reveals no gallop and no friction rub  No murmur heard  Pulmonary/Chest: Effort normal and breath sounds normal  No respiratory distress  She has no wheezes  Abdominal: Soft  Bowel sounds are normal  She exhibits no mass  There is no tenderness  There is no guarding  Musculoskeletal: She exhibits no edema or deformity  Lymphadenopathy:     She has no cervical adenopathy  Neurological: She is alert and oriented to person, place, and time  No cranial nerve deficit  Skin: Skin is warm and dry  No rash noted  She is not diaphoretic  No erythema  Psychiatric: She has a normal mood and affect   Thought content normal        Faye Santillan DO  [address] [facility]

## 2020-03-18 ENCOUNTER — LAB (OUTPATIENT)
Dept: LAB | Facility: MEDICAL CENTER | Age: 70
End: 2020-03-18
Payer: COMMERCIAL

## 2020-03-18 DIAGNOSIS — Z13.220 SCREENING FOR HYPERLIPIDEMIA: ICD-10-CM

## 2020-03-18 DIAGNOSIS — I10 ESSENTIAL HYPERTENSION: ICD-10-CM

## 2020-03-18 LAB
ALBUMIN SERPL BCP-MCNC: 3.7 G/DL (ref 3.5–5)
ALP SERPL-CCNC: 50 U/L (ref 46–116)
ALT SERPL W P-5'-P-CCNC: 27 U/L (ref 12–78)
ANION GAP SERPL CALCULATED.3IONS-SCNC: 3 MMOL/L (ref 4–13)
AST SERPL W P-5'-P-CCNC: 17 U/L (ref 5–45)
BILIRUB SERPL-MCNC: 0.56 MG/DL (ref 0.2–1)
BUN SERPL-MCNC: 19 MG/DL (ref 5–25)
CALCIUM SERPL-MCNC: 8.9 MG/DL (ref 8.3–10.1)
CHLORIDE SERPL-SCNC: 109 MMOL/L (ref 100–108)
CHOLEST SERPL-MCNC: 212 MG/DL (ref 50–200)
CO2 SERPL-SCNC: 31 MMOL/L (ref 21–32)
CREAT SERPL-MCNC: 0.84 MG/DL (ref 0.6–1.3)
GFR SERPL CREATININE-BSD FRML MDRD: 71 ML/MIN/1.73SQ M
GLUCOSE P FAST SERPL-MCNC: 97 MG/DL (ref 65–99)
HDLC SERPL-MCNC: 71 MG/DL
LDLC SERPL CALC-MCNC: 125 MG/DL (ref 0–100)
NONHDLC SERPL-MCNC: 141 MG/DL
POTASSIUM SERPL-SCNC: 4.5 MMOL/L (ref 3.5–5.3)
PROT SERPL-MCNC: 7.7 G/DL (ref 6.4–8.2)
SODIUM SERPL-SCNC: 143 MMOL/L (ref 136–145)
TRIGL SERPL-MCNC: 82 MG/DL

## 2020-03-18 PROCEDURE — 80061 LIPID PANEL: CPT

## 2020-03-18 PROCEDURE — 80053 COMPREHEN METABOLIC PANEL: CPT

## 2020-03-18 PROCEDURE — 36415 COLL VENOUS BLD VENIPUNCTURE: CPT

## 2020-03-19 NOTE — RESULT ENCOUNTER NOTE
Please call the patient regarding her abnormal result    Cholesterol is 212 the goal is 200 triglycerides and HDL cholesterol are good LDL cholesterol is 125 the goal is 100 so I think if she just weeks her diet things will improve I would recheck lipids in 1 year

## 2020-04-14 ENCOUNTER — TELEPHONE (OUTPATIENT)
Dept: FAMILY MEDICINE CLINIC | Facility: CLINIC | Age: 70
End: 2020-04-14

## 2020-04-14 VITALS — DIASTOLIC BLOOD PRESSURE: 84 MMHG | SYSTOLIC BLOOD PRESSURE: 150 MMHG

## 2020-07-22 ENCOUNTER — HOSPITAL ENCOUNTER (OUTPATIENT)
Dept: NON INVASIVE DIAGNOSTICS | Facility: HOSPITAL | Age: 70
Discharge: HOME | End: 2020-07-22
Payer: COMMERCIAL

## 2020-07-22 ENCOUNTER — TRANSCRIBE ORDERS (OUTPATIENT)
Dept: ADMINISTRATIVE | Facility: HOSPITAL | Age: 70
End: 2020-07-22

## 2020-07-22 DIAGNOSIS — Z01.818 PRE-OP TESTING: Primary | ICD-10-CM

## 2020-07-22 DIAGNOSIS — Z01.818 PRE-OP TESTING: ICD-10-CM

## 2020-07-22 PROCEDURE — 93005 ELECTROCARDIOGRAM TRACING: CPT

## 2020-07-23 LAB
ATRIAL RATE: 83 BPM
P AXIS: 45 DEGREES
PR INTERVAL: 156 MS
QRS AXIS: 20 DEGREES
QRSD INTERVAL: 90 MS
QT INTERVAL: 362 MS
QTC INTERVAL: 425 MS
T WAVE AXIS: 57 DEGREES
VENTRICULAR RATE: 83 BPM

## 2020-07-23 PROCEDURE — 93010 ELECTROCARDIOGRAM REPORT: CPT | Performed by: INTERNAL MEDICINE

## 2020-07-31 ENCOUNTER — CONSULT (OUTPATIENT)
Dept: FAMILY MEDICINE CLINIC | Facility: CLINIC | Age: 70
End: 2020-07-31
Payer: COMMERCIAL

## 2020-07-31 VITALS
DIASTOLIC BLOOD PRESSURE: 98 MMHG | TEMPERATURE: 96.4 F | BODY MASS INDEX: 30.35 KG/M2 | SYSTOLIC BLOOD PRESSURE: 160 MMHG | HEIGHT: 70 IN | WEIGHT: 212 LBS

## 2020-07-31 DIAGNOSIS — Z01.818 PREOP EXAMINATION: Primary | ICD-10-CM

## 2020-07-31 PROCEDURE — 99242 OFF/OP CONSLTJ NEW/EST SF 20: CPT | Performed by: FAMILY MEDICINE

## 2020-07-31 PROCEDURE — 1160F RVW MEDS BY RX/DR IN RCRD: CPT | Performed by: FAMILY MEDICINE

## 2020-07-31 NOTE — PROGRESS NOTES
Subjective:     Jeni Ribera is a 71 y o  female who presents to the office today for a preoperative consultation at the request of surgeon Dr Aida Fuentes who plans on performing bilateral cataract extractions with intra-ocular lens implants on  on right eye and left eye on   This consultation is requested for the specific conditions prompting preoperative evaluation (i e  because of potential affect on operative risk): History of pulmonary emboli and essential hypertension  Planned anesthesia: local  The patient has the following known anesthesia issues:  no prior problems with endotracheal intubation or anesthesia  Patients bleeding risk: Patient is currently taking Eliquis she will hold her dose the night before and the morning of the procedure and then resume immediately that night  The following portions of the patient's history were reviewed and updated as appropriate: She  has a past medical history of Acute deep vein thrombosis (DVT) of popliteal vein of left lower extremity ([address] ), Ankle wound, left, initial encounter, Ankle wound, right, initial encounter, DVT (deep vein thrombosis) in pregnancy, Pulmonary emboli ([address] ), Pulmonary embolism ([address] ), Pulmonary embolism, blood-clot, obstetric, Venous stasis ulcer ([address] ), and Venous ulcers of both lower extremities ([address] )  She   Patient Active Problem List    Diagnosis Date Noted    Right ventricular systolic dysfunction without heart failure 10/18/2018    Clostridium difficile infection 2017    Acute massive pulmonary embolism ([address] ) 2017    Venous stasis of both lower extremities 2017     She  has a past surgical history that includes  section  Her family history includes Bone cancer in her family; Cancer in her father; Heart attack in her mother; Hypertension in her family; Lung cancer in her family; Stroke in her mother  She  reports that she has never smoked   She has never used smokeless tobacco  She reports that she does not drink alcohol or use drugs  Current Outpatient Medications   Medication Sig Dispense Refill    apixaban (Eliquis) 5 mg Take 1 tablet (5 mg total) by mouth every 12 (twelve) hours 60 tablet 5    calcium carbonate (OS-VIRGIE) 600 MG tablet Take 600 mg by mouth daily        metoprolol succinate (Toprol XL) 25 mg 24 hr tablet Take 1 tablet (25 mg total) by mouth daily (Patient taking differently: Take 12 5 mg by mouth daily ) 30 tablet 5    Multiple Vitamins-Minerals (MULTIVITAMIN WITH MINERALS) tablet Take 1 tablet by mouth daily       No current facility-administered medications for this visit  Current Outpatient Medications on File Prior to Visit   Medication Sig    apixaban (Eliquis) 5 mg Take 1 tablet (5 mg total) by mouth every 12 (twelve) hours    calcium carbonate (OS-VIRGIE) 600 MG tablet Take 600 mg by mouth daily      metoprolol succinate (Toprol XL) 25 mg 24 hr tablet Take 1 tablet (25 mg total) by mouth daily (Patient taking differently: Take 12 5 mg by mouth daily )    Multiple Vitamins-Minerals (MULTIVITAMIN WITH MINERALS) tablet Take 1 tablet by mouth daily    [DISCONTINUED] collagenase (SANTYL) ointment Apply 1 application topically daily for 30 days     No current facility-administered medications on file prior to visit  She has No Known Allergies     Past Medical History:   Diagnosis Date    Acute deep vein thrombosis (DVT) of popliteal vein of left lower extremity (HCC)     Ankle wound, left, initial encounter     Resolved 2017    Ankle wound, right, initial encounter     resolved 2017    DVT (deep vein thrombosis) in pregnancy     Pulmonary emboli ([address] )      post C section    Pulmonary embolism ([address] )     2017    Pulmonary embolism, blood-clot, obstetric     Venous stasis ulcer ([address] )     Venous ulcers of both lower extremities ([address] )        Past Surgical History:   Procedure Laterality Date     SECTION      X2       Review of Systems  A comprehensive review of systems was negative except for: Cardiovascular: positive for hx of pulmonary embolism     Objective:  Physical Exam    Cardiographics  ECG: no prior ECG  Echocardiogram: not done    Imaging  Chest x-ray:  no recent chest x-ray     Lab Review   Hospital Outpatient Visit on 07/22/2020   Component Date Value    Ventricular Rate 07/22/2020 83     Atrial Rate 07/22/2020 83     CT Interval 07/22/2020 156     QRSD Interval 07/22/2020 90     QT Interval 07/22/2020 362     QTC Interval 07/22/2020 425     P Axis 07/22/2020 45     QRS Axis 07/22/2020 20     T Wave Axis 07/22/2020 57         Assessment:     71 y o  female with planned surgery as above  Known risk factors for perioperative complications: None    Difficulty with intubation is not anticipated  Cardiac Risk Estimation:  Minimal    Current medications which may produce withdrawal symptoms if withheld perioperatively:  None      Plan:  Patient is CLEARED for surgery without any additional cardiac testing  1  Preoperative workup as follows none  2  Change in medication regimen before surgery: None  3  Prophylaxis for cardiac events with perioperative beta-blockers: not indicated  4  Invasive hemodynamic monitoring perioperatively: not indicated  5  Deep vein thrombosis prophylaxis postoperatively:Not applicable    6  Other measures: not applicable

## 2020-08-03 ENCOUNTER — APPOINTMENT (EMERGENCY)
Dept: RADIOLOGY | Facility: HOSPITAL | Age: 70
End: 2020-08-03
Payer: COMMERCIAL

## 2020-08-03 ENCOUNTER — OFFICE VISIT (OUTPATIENT)
Dept: URGENT CARE | Facility: CLINIC | Age: 70
End: 2020-08-03
Payer: COMMERCIAL

## 2020-08-03 ENCOUNTER — APPOINTMENT (EMERGENCY)
Dept: CT IMAGING | Facility: HOSPITAL | Age: 70
End: 2020-08-03
Payer: COMMERCIAL

## 2020-08-03 ENCOUNTER — HOSPITAL ENCOUNTER (EMERGENCY)
Facility: HOSPITAL | Age: 70
Discharge: HOME | End: 2020-08-03
Attending: EMERGENCY MEDICINE | Admitting: EMERGENCY MEDICINE
Payer: COMMERCIAL

## 2020-08-03 VITALS
HEIGHT: 70 IN | SYSTOLIC BLOOD PRESSURE: 197 MMHG | RESPIRATION RATE: 18 BRPM | DIASTOLIC BLOOD PRESSURE: 94 MMHG | WEIGHT: 211 LBS | OXYGEN SATURATION: 97 % | BODY MASS INDEX: 30.21 KG/M2 | TEMPERATURE: 98.1 F | HEART RATE: 88 BPM

## 2020-08-03 VITALS
DIASTOLIC BLOOD PRESSURE: 90 MMHG | RESPIRATION RATE: 16 BRPM | SYSTOLIC BLOOD PRESSURE: 166 MMHG | HEART RATE: 86 BPM | BODY MASS INDEX: 30.28 KG/M2 | OXYGEN SATURATION: 96 % | WEIGHT: 211 LBS | TEMPERATURE: 98.9 F

## 2020-08-03 DIAGNOSIS — S09.90XA INJURY OF HEAD, INITIAL ENCOUNTER: Primary | ICD-10-CM

## 2020-08-03 DIAGNOSIS — S00.83XA FACIAL BRUISING, INITIAL ENCOUNTER: ICD-10-CM

## 2020-08-03 DIAGNOSIS — S40.211A ABRASION OF RIGHT SHOULDER AREA, INITIAL ENCOUNTER: ICD-10-CM
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
## 2020-08-03 DIAGNOSIS — Z79.01 CHRONIC ANTICOAGULATION: ICD-10-CM

## 2020-08-03 DIAGNOSIS — S01.411A CHEEK LACERATION, RIGHT, INITIAL ENCOUNTER: ICD-10-CM
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
## 2020-08-03 DIAGNOSIS — S00.83XA TRAUMATIC HEMATOMA OF CHEEK, INITIAL ENCOUNTER: ICD-10-CM

## 2020-08-03 LAB
ANION GAP SERPL CALCULATED.3IONS-SCNC: 9 MMOL/L (ref 4–13)
APTT PPP: 31 SECONDS (ref 23–37)
BASOPHILS # BLD AUTO: 0.1 THOUSANDS/ΜL (ref 0–0.1)
BASOPHILS NFR BLD AUTO: 1 % (ref 0–2)
BUN SERPL-MCNC: 23 MG/DL (ref 7–25)
CALCIUM SERPL-MCNC: 9.5 MG/DL (ref 8.6–10.5)
CHLORIDE SERPL-SCNC: 101 MMOL/L (ref 98–107)
CO2 SERPL-SCNC: 26 MMOL/L (ref 21–31)
CREAT SERPL-MCNC: 0.94 MG/DL (ref 0.6–1.2)
EOSINOPHIL # BLD AUTO: 0.1 THOUSAND/ΜL (ref 0–0.61)
EOSINOPHIL NFR BLD AUTO: 1 % (ref 0–5)
ERYTHROCYTE [DISTWIDTH] IN BLOOD BY AUTOMATED COUNT: 13.2 % (ref 11.5–14.5)
GFR SERPL CREATININE-BSD FRML MDRD: 62 ML/MIN/1.73SQ M
GLUCOSE SERPL-MCNC: 112 MG/DL (ref 65–99)
HCT VFR BLD AUTO: 46.4 % (ref 42–47)
HGB BLD-MCNC: 15.1 G/DL (ref 12–16)
INR PPP: 1.01 (ref 0.84–1.19)
LYMPHOCYTES # BLD AUTO: 1.9 THOUSANDS/ΜL (ref 0.6–4.47)
LYMPHOCYTES NFR BLD AUTO: 24 % (ref 21–51)
MCH RBC QN AUTO: 29.4 PG (ref 26–34)
MCHC RBC AUTO-ENTMCNC: 32.6 G/DL (ref 31–37)
MCV RBC AUTO: 90 FL (ref 81–99)
MONOCYTES # BLD AUTO: 0.7 THOUSAND/ΜL (ref 0.17–1.22)
MONOCYTES NFR BLD AUTO: 9 % (ref 2–12)
NEUTROPHILS # BLD AUTO: 5.3 THOUSANDS/ΜL (ref 1.4–6.5)
NEUTS SEG NFR BLD AUTO: 66 % (ref 42–75)
PLATELET # BLD AUTO: 175 THOUSANDS/UL (ref 149–390)
PMV BLD AUTO: 8.8 FL (ref 8.6–11.7)
POTASSIUM SERPL-SCNC: 3.6 MMOL/L (ref 3.5–5.5)
PROTHROMBIN TIME: 13.2 SECONDS (ref 11.6–14.5)
RBC # BLD AUTO: 5.14 MILLION/UL (ref 3.9–5.2)
SODIUM SERPL-SCNC: 136 MMOL/L (ref 134–143)
WBC # BLD AUTO: 8.1 THOUSAND/UL (ref 4.8–10.8)

## 2020-08-03 PROCEDURE — 80048 BASIC METABOLIC PNL TOTAL CA: CPT | Performed by: EMERGENCY MEDICINE

## 2020-08-03 PROCEDURE — 99284 EMERGENCY DEPT VISIT MOD MDM: CPT | Performed by: EMERGENCY MEDICINE

## 2020-08-03 PROCEDURE — 99285 EMERGENCY DEPT VISIT HI MDM: CPT

## 2020-08-03 PROCEDURE — G1004 CDSM NDSC: HCPCS

## 2020-08-03 PROCEDURE — 36415 COLL VENOUS BLD VENIPUNCTURE: CPT | Performed by: EMERGENCY MEDICINE

## 2020-08-03 PROCEDURE — 85025 COMPLETE CBC W/AUTO DIFF WBC: CPT | Performed by: EMERGENCY MEDICINE

## 2020-08-03 PROCEDURE — 70450 CT HEAD/BRAIN W/O DYE: CPT

## 2020-08-03 PROCEDURE — 85610 PROTHROMBIN TIME: CPT | Performed by: EMERGENCY MEDICINE

## 2020-08-03 PROCEDURE — 85730 THROMBOPLASTIN TIME PARTIAL: CPT | Performed by: EMERGENCY MEDICINE

## 2020-08-03 PROCEDURE — 71045 X-RAY EXAM CHEST 1 VIEW: CPT

## 2020-08-03 PROCEDURE — 73030 X-RAY EXAM OF SHOULDER: CPT

## 2020-08-03 PROCEDURE — 72125 CT NECK SPINE W/O DYE: CPT

## 2020-08-03 PROCEDURE — 99213 OFFICE O/P EST LOW 20 MIN: CPT | Performed by: NURSE PRACTITIONER

## 2020-08-03 PROCEDURE — 70486 CT MAXILLOFACIAL W/O DYE: CPT

## 2020-08-03 RX ORDER — OXYCODONE HYDROCHLORIDE AND ACETAMINOPHEN 5; 325 MG/1; MG/1
1 TABLET ORAL ONCE
Status: COMPLETED | OUTPATIENT
Start: 2020-08-03 | End: 2020-08-03

## 2020-08-03 RX ORDER — GINSENG 100 MG
1 CAPSULE ORAL ONCE
Status: COMPLETED | OUTPATIENT
Start: 2020-08-03 | End: 2020-08-03

## 2020-08-03 RX ADMIN — BACITRACIN ZINC 1 SMALL APPLICATION: 500 OINTMENT TOPICAL at 20:36

## 2020-08-03 RX ADMIN — OXYCODONE HYDROCHLORIDE AND ACETAMINOPHEN 1 TABLET: 5; 325 TABLET ORAL at 20:31

## 2020-08-03 NOTE — PROGRESS NOTES
[facility]        NAME: Yenny Murphy is a 71 y o  female  : 1950    MRN: [id number]  DATE: August 3, 2020  TIME: 6:00 PM    Assessment and Plan   Injury of head, initial encounter [S09 90XA]  1  Injury of head, initial encounter  Transfer to other facility   2  Chronic anticoagulation  Transfer to other facility   3  Fall, initial encounter  Transfer to other facility   4  Cheek laceration, right, initial encounter  Transfer to other facility   5  Traumatic hematoma of cheek, initial encounter  Transfer to other facility   6  Abrasion of right shoulder area, initial encounter  Transfer to other facility     Report called to [facility] in ER  Patient Instructions     Patient Instructions   Proceed to ER for further evaluation and treatment  Follow up with PCP in 3-5 days  Proceed to  ER if symptoms worsen  Chief Complaint     Chief Complaint   Patient presents with    Laceration     Pt states she fell today after getting foot caught while holding boxes  Denies LOC and confusion  Pt is on 5 mg of Eliquis    Bruising to right cheek, 2 inch long cut to cheek , right shoulder bruising, bilateral knee and hand scrapes  History of Present Illness       Patient presents to be seen reporting that approximately [id number], she tripped on some boxes at the [employer] [facility] while she was holding something and fell, sustaining an abrasion on her right shoulder and several superficial lacerations on the right side of her face--eyebrow, cheek, nose, chin  She also hit her knees but states they are just sore  She has a hugely swollen right cheek (hematoma) within losing laceration that will likely need repair    She reports that she has been on chronic Eliquis x4 years due to saddle PEs in the past   She did have a head injury about a year ago that landed her in the hospital May 2019 for couple days for a subdural hematoma that she does not remember anything of even though she was initially conversational     She denies any loss of consciousness, no headache, no confusion, no nausea or vomiting  Review of Systems   Review of Systems   HENT: Positive for facial swelling (right cheek (hematoma))  Skin: Positive for wound  Neurological: Negative  All other systems reviewed and are negative          Current Medications       Current Outpatient Medications:     apixaban (Eliquis) 5 mg, Take 1 tablet (5 mg total) by mouth every 12 (twelve) hours, Disp: 60 tablet, Rfl: 5    calcium carbonate (OS-VIRGIE) 600 MG tablet, Take 600 mg by mouth daily  , Disp: , Rfl:     metoprolol succinate (Toprol XL) 25 mg 24 hr tablet, Take 1 tablet (25 mg total) by mouth daily (Patient taking differently: Take 12 5 mg by mouth daily ), Disp: 30 tablet, Rfl: 5    Multiple Vitamins-Minerals (MULTIVITAMIN WITH MINERALS) tablet, Take 1 tablet by mouth daily, Disp: , Rfl:     patient supplied medication, Pt takes eye drops [facility] - not listed in database, Disp: , Rfl:     Current Allergies     Allergies as of 2020    (No Known Allergies)            The following portions of the patient's history were reviewed and updated as appropriate: allergies, current medications, past family history, past medical history, past social history, past surgical history and problem list      Past Medical History:   Diagnosis Date    Acute deep vein thrombosis (DVT) of popliteal vein of left lower extremity ([address] )     Ankle wound, left, initial encounter     Resolved 2017    Ankle wound, right, initial encounter     resolved 2017    DVT (deep vein thrombosis) in pregnancy     Pulmonary emboli ([address] )      post C section    Pulmonary embolism ([address] )     2017    Pulmonary embolism, blood-clot, obstetric     Venous stasis ulcer ([address] )     Venous ulcers of both lower extremities ([address] )        Past Surgical History:   Procedure Laterality Date     SECTION      X2       Family History   Problem Relation Age of Onset    Heart attack Mother     Stroke Mother     Cancer Father     Bone cancer Family     Hypertension Family     Lung cancer Family          Medications have been verified  Objective   BP (!) 197/94   Pulse 88   Temp 98 1 °F (36 7 °C) (Temporal)   Resp 18   Ht 5' 10"   Wt 95 7 kg (211 lb)   SpO2 97%   BMI 30 28 kg/m²        Physical Exam     Physical Exam   Constitutional: She is oriented to person, place, and time  She appears well-developed  No distress  HENT:   Head: Normocephalic  Head is with right periorbital erythema  Eyes: Pupils are equal, round, and reactive to light  Conjunctivae are normal    Neck: Normal range of motion  Neck supple  Cardiovascular: Normal rate, regular rhythm, normal heart sounds and normal pulses  Pulmonary/Chest: Effort normal and breath sounds normal  No respiratory distress  Abdominal: Soft  Bowel sounds are normal  She exhibits no distension  Musculoskeletal: Normal range of motion  Neurological: She is alert and oriented to person, place, and time  She has normal motor skills  Gait and coordination normal  GCS eye subscore is 4  GCS verbal subscore is 5  GCS motor subscore is 6  Skin: Skin is warm and dry  Capillary refill takes less than 2 seconds  She is not diaphoretic  Psychiatric: Her speech is normal and behavior is normal  Mood, judgment and thought content normal    Nursing note and vitals reviewed

## 2020-08-03 NOTE — ED PROVIDER NOTES
History  Chief Complaint   Patient presents with    Fall - Major     trauma eval     This is a 22-year-old woman who reports she had a mechanical fall immediately prior to arrival   She is adamant that she did not lose consciousness  She states she fell because she caught her foot on an object when she was walking [place]  Reports she landed hard on her face immediate pain and swelling  She went to the urgent care recommended that she come here  Patient is on Eliquis  Patient states she feels otherwise well  She does note she has some discomfort in her right shoulder over the area of abrasion  She reports in general her pain is extremely mild  A, airway intact, no obstructions, no difficulty speaking  B, equal chest rise bilaterally, no airway deviation, good breath sounds  C, cap refill less than 2 seconds, good peripheral pulses, regular rate and rhythm, no murmur  D, significant hematoma over the right cheek, no other obvious disability or deformity  E, patient fully exposed, temperature appropriate                      Prior to Admission Medications   Prescriptions Last Dose Informant Patient Reported? Taking?    Multiple Vitamins-Minerals (MULTIVITAMIN WITH MINERALS) tablet   Yes No   Sig: Take 1 tablet by mouth daily   apixaban (Eliquis) 5 mg   No No   Sig: Take 1 tablet (5 mg total) by mouth every 12 (twelve) hours   calcium carbonate (OS-VIRGIE) 600 MG tablet  Self Yes No   Sig: Take 600 mg by mouth daily     metoprolol succinate (Toprol XL) 25 mg 24 hr tablet   No No   Sig: Take 1 tablet (25 mg total) by mouth daily   Patient taking differently: Take 12 5 mg by mouth daily    patient supplied medication  Self Yes No   Sig: Pt takes eye drops Mura 128 - not listed in database      Facility-Administered Medications: None       Past Medical History:   Diagnosis Date    Acute deep vein thrombosis (DVT) of popliteal vein of left lower extremity (HCC)     Ankle wound, left, initial encounter Resolved 2017    Ankle wound, right, initial encounter     resolved 2017    DVT (deep vein thrombosis) in pregnancy     Pulmonary emboli ([facility] [address] )      post C section    [facility]     2017    Pulmonary embolism, blood-clot, obstetric     [facility] ulcer [facility]     Venous ulcers of both lower extremities (HCC)        Past Surgical History:   Procedure Laterality Date     SECTION      X2       Family History   Problem Relation Age of Onset    Heart attack Mother     Stroke Mother     Cancer Father     Bone cancer Family     Hypertension Family     Lung cancer Family      I have reviewed and agree with the history as documented  E-Cigarette/Vaping    E-Cigarette Use Never User      E-Cigarette/Vaping Substances     Social History     Tobacco Use    Smoking status: Never Smoker    Smokeless tobacco: Never Used   Substance Use Topics    Alcohol use: No     Comment: hx of social drinker    Drug use: No       Review of Systems   Constitutional: Negative for activity change, chills, fatigue and fever  HENT: Negative for congestion  Eyes: Negative for visual disturbance  Respiratory: Negative for cough, chest tightness and shortness of breath  Cardiovascular: Negative for chest pain  Gastrointestinal: Negative for abdominal pain, diarrhea and vomiting  Genitourinary: Negative for dysuria  Neurological: Negative for dizziness, weakness and numbness  Physical Exam  Physical Exam  Constitutional:       Appearance: She is well-developed  HENT:      Head: Normocephalic  Comments: Abrasion and 2 laceration to the right maxilla  Massive hematoma  Eyes:      Conjunctiva/sclera: Conjunctivae normal       Pupils: Pupils are equal, round, and reactive to light  Neck:      Musculoskeletal: Normal range of motion and neck supple  Cardiovascular:      Rate and Rhythm: Normal rate and regular rhythm  Heart sounds: Normal heart sounds     Pulmonary: Effort: Pulmonary effort is normal  No respiratory distress  Breath sounds: Normal breath sounds  No stridor  No wheezing, rhonchi or rales  Abdominal:      General: Bowel sounds are normal  There is no distension  Palpations: Abdomen is soft  There is no mass  Tenderness: There is no abdominal tenderness  There is no right CVA tenderness, left CVA tenderness, guarding or rebound  Hernia: No hernia is present  Musculoskeletal: Normal range of motion  General: Signs of injury (abrasions to bl knees, right shoulder) present  No swelling or deformity  Right lower leg: No edema  Left lower leg: No edema  Skin:     General: Skin is warm  Capillary Refill: Capillary refill takes less than 2 seconds  Neurological:      General: No focal deficit present  Mental Status: She is alert and oriented to person, place, and time  Psychiatric:         Mood and Affect: Mood normal          Behavior: Behavior normal          Thought Content:  Thought content normal          Judgment: Judgment normal          Vital Signs  ED Triage Vitals   Temperature Pulse Respirations Blood Pressure SpO2   08/03/20 1802 08/03/20 1802 08/03/20 1802 08/03/20 1802 08/03/20 1759   99 °F (37 2 °C) 94 20 (!) 195/104 100 %      Temp Source Heart Rate Source Patient Position - Orthostatic VS BP Location FiO2 (%)   08/03/20 1802 08/03/20 1802 08/03/20 1802 -- --   Temporal Monitor Lying        Pain Score       08/03/20 1807       3           Vitals:    08/03/20 1813 08/03/20 1814 08/03/20 1845 08/03/20 2000   BP:   (!) 173/99 166/90   Pulse: 85 86     Patient Position - Orthostatic VS:             Visual Acuity  Visual Acuity      Most Recent Value   L Pupil Size (mm)  4   R Pupil Size (mm)  4          ED Medications  Medications   oxyCODONE-acetaminophen (PERCOCET) 5-325 mg per tablet 1 tablet (1 tablet Oral Given 8/3/20 2031)   bacitracin topical ointment 1 small application (1 small application Topical Given 8/3/20 2036)       Diagnostic Studies  Results Reviewed     Procedure Component Value Units Date/Time    Basic metabolic panel [[id number]]  (Abnormal) Collected:  08/03/20 1810    Lab Status:  Final result Specimen:  Blood from Arm, Right Updated:  08/03/20 1836     Sodium 136 mmol/L      Potassium 3 6 mmol/L      Chloride 101 mmol/L      CO2 26 mmol/L      ANION GAP 9 mmol/L      BUN 23 mg/dL      Creatinine 0 94 mg/dL      Glucose 112 mg/dL      Calcium 9 5 mg/dL      eGFR 62 ml/min/1 73sq m     Narrative:       National Kidney Disease Foundation guidelines for Chronic Kidney Disease (CKD):     Stage 1 with normal or high GFR (GFR > 90 mL/min/1 73 square meters)    Stage 2 Mild CKD (GFR = 60-89 mL/min/1 73 square meters)    Stage 3A Moderate CKD (GFR = 45-59 mL/min/1 73 square meters)    Stage 3B Moderate CKD (GFR = 30-44 mL/min/1 73 square meters)    Stage 4 Severe CKD (GFR = 15-29 mL/min/1 73 square meters)    Stage 5 End Stage CKD (GFR <15 mL/min/1 73 square meters)  Note: GFR calculation is accurate only with a steady state creatinine    APTT [[id number]]  (Normal) Collected:  08/03/20 1810    Lab Status:  Final result Specimen:  Blood from Arm, Right Updated:  08/03/20 1829     PTT 31 seconds     Protime-INR [[id number]]  (Normal) Collected:  08/03/20 1810    Lab Status:  Final result Specimen:  Blood from Arm, Right Updated:  08/03/20 1829     Protime 13 2 seconds      INR 1 01    CBC and differential [[id number]]  (Normal) Collected:  08/03/20 1811    Lab Status:  Final result Specimen:  Blood from Arm, Right Updated:  08/03/20 1819     WBC 8 10 Thousand/uL      RBC 5 14 Million/uL      Hemoglobin 15 1 g/dL      Hematocrit 46 4 %      MCV 90 fL      MCH 29 4 pg      MCHC 32 6 g/dL      RDW 13 2 %      MPV 8 8 fL      Platelets 253 Thousands/uL      Neutrophils Relative 66 %      Lymphocytes Relative 24 %      Monocytes Relative 9 %      Eosinophils Relative 1 %      Basophils Relative 1 % Neutrophils Absolute 5 30 Thousands/µL      Lymphocytes Absolute 1 90 Thousands/µL      Monocytes Absolute 0 70 Thousand/µL      Eosinophils Absolute 0 10 Thousand/µL      Basophils Absolute 0 10 Thousands/µL                  TRAUMA - CT spine cervical wo contrast   Final Result by Mateo Moser MD (08/03 1918)      No acute fracture or traumatic subluxation  Workstation performed: [id number]         CT facial bones without contrast   Final Result by Mateo Moser MD (08/03 1922)      1  No acute fracture  2   Right preseptal swelling/hematoma and large focal hematoma overlying right maxilla               Workstation performed: [id number]         TRAUMA - CT head wo contrast   Final Result by Mateo Moser MD (08/03 1921)      1  No acute intracranial CT abnormality  Right preseptal swelling/hematoma and large facial hematoma overlying right maxilla      2  New encephalomalacia in the inferior right frontal lobe likely sequela of remote ischemic or traumatic injury  Workstation performed: [id number]         XR chest 1 view portable   ED Interpretation by Monty Johns MD (08/03 1838)   Severe rotation  NAF      XR shoulder 2+ views RIGHT    (Results Pending)              Procedures  Procedures         ED Course       US AUDIT      Most Recent Value   Initial Alcohol Screen: US AUDIT-C    1  How often do you have a drink containing alcohol?  0 Filed at: 08/03/2020 1822   2  How many drinks containing alcohol do you have on a typical day you are drinking? 0 Filed at: 08/03/2020 1822   3b  FEMALE Any Age, or MALE 65+: How often do you have 4 or more drinks on one occassion? 0 Filed at: 08/03/2020 1822   Audit-C Score  0 Filed at: 08/03/2020 1822                  NICANOR/DAST-10      Most Recent Value   How many times in the past year have you    Used an illegal drug or used a prescription medication for non-medical reasons?   Never Filed at: 08/03/2020 5371 MDM  Number of Diagnoses or Management Options  Facial bruising, initial encounter: new and requires workup  Fall, initial encounter: new and requires workup  Diagnosis management comments: This is a 60-year-old female who had a significant fall while on Eliquis  Trauma alert activated  No active intracranial hemorrhage, no fractures identified  Discussed risks and benefits of attempted repair of very small laceration on right cheek  She elected to not suture  Patient appears clinically well  Single dose of Percocet given for discomfort  Discussed warning signs and symptoms with the patient as well as when to return to the emergency department versus follow up with PC P  Patient states understanding and agreement with the plan  Amount and/or Complexity of Data Reviewed  Clinical lab tests: ordered and reviewed  Tests in the radiology section of CPT®: ordered and reviewed  Independent visualization of images, tracings, or specimens: yes          Disposition  Final diagnoses:   Fall, initial encounter   Facial bruising, initial encounter     Time reflects when diagnosis was documented in both MDM as applicable and the Disposition within this note     Time User Action Codes Description Comment    8/3/2020  8:03 PM Evangelista Lama Add [Z63  PTIK] Fall, initial encounter     8/3/2020  8:03 PM Evangelista Dobbinsle Add [S00 [id number]] Facial bruising, initial encounter       ED Disposition     ED Disposition Condition Date/Time Comment    Discharge Stable Mon Aug 3, 6354  6:41 PM Darlin Carbajal discharge to home/self care              Follow-up Information     Follow up With Specialties Details Why [facility], DO Family Medicine In 1 day  [address] [place] [address],[address]  [address]  [phone]            Discharge Medication List as of 8/3/2020  8:04 PM      CONTINUE these medications which have NOT CHANGED    Details   apixaban (Eliquis) 5 mg Take 1 tablet (5 mg total) by mouth every 12 (twelve) hours, Starting Tue 3/17/2020, Normal      calcium carbonate (OS-VIRGIE) 600 MG tablet Take 600 mg by mouth daily  , Historical Med      metoprolol succinate (Toprol XL) 25 mg 24 hr tablet Take 1 tablet (25 mg total) by mouth daily, Starting Tue 3/17/2020, Normal      Multiple Vitamins-Minerals (MULTIVITAMIN WITH MINERALS) tablet Take 1 tablet by mouth daily, Until Discontinued, Historical Med      patient supplied medication Pt takes eye drops [address] - not listed in database, Historical Med           No discharge procedures on file      PDMP Review     None          ED Provider  Electronically Signed by           Monty Johns MD  08/03/20 5109

## 2020-08-04 ENCOUNTER — OFFICE VISIT (OUTPATIENT)
Dept: FAMILY MEDICINE CLINIC | Facility: CLINIC | Age: 70
End: 2020-08-04
Payer: COMMERCIAL

## 2020-08-04 VITALS
BODY MASS INDEX: 30.18 KG/M2 | WEIGHT: 210.8 LBS | TEMPERATURE: 97.8 F | HEIGHT: 70 IN | DIASTOLIC BLOOD PRESSURE: 98 MMHG | SYSTOLIC BLOOD PRESSURE: 168 MMHG

## 2020-08-04 DIAGNOSIS — S00.83XA CONTUSION OF FACE, INITIAL ENCOUNTER: Primary | ICD-10-CM

## 2020-08-04 PROCEDURE — 1036F TOBACCO NON-USER: CPT | Performed by: FAMILY MEDICINE

## 2020-08-04 PROCEDURE — 3080F DIAST BP >= 90 MM HG: CPT | Performed by: FAMILY MEDICINE

## 2020-08-04 PROCEDURE — 3008F BODY MASS INDEX DOCD: CPT | Performed by: FAMILY MEDICINE

## 2020-08-04 PROCEDURE — 3077F SYST BP >= 140 MM HG: CPT | Performed by: FAMILY MEDICINE

## 2020-08-04 PROCEDURE — 1160F RVW MEDS BY RX/DR IN RCRD: CPT | Performed by: FAMILY MEDICINE

## 2020-08-04 PROCEDURE — 99213 OFFICE O/P EST LOW 20 MIN: CPT | Performed by: FAMILY MEDICINE

## 2020-08-04 NOTE — PROGRESS NOTES
Assessment/Plan:  I gave the patient's instructions about the possible development of a delayed subdural hematoma she knows that if she starts to have any neurologic symptoms a week or 2 out that she needs to notify us immediately or go to the emergency room patient is going to hold her wore her Eliquis for her eye surgery and then resume half dose until the end of the week because of the facial bruising and sub cutaneous bleeding    Problem List Items Addressed This Visit     None           There are no diagnoses linked to this encounter  No problem-specific Assessment & Plan notes found for this encounter  Subjective:      Patient ID: Maki Malcolm is a 71 y o  female  Mrs Zakia Joe is here she had a facial trauma and also to her knees and her right shoulder she fell in a parking lot a recycling center striking her face she was treated and released from both urgent care and the emergency room she has a lot of hematoma development and abrasions to the right side of her face and also to her right shoulder but amazingly she did not break anything she was CT scan no sign of any intracranial hemorrhage even though she is on blood thinners is scheduled tomorrow for cataract surgery on the same side as her facial trauma she has already communicated with the eye surgeon      The following portions of the patient's history were reviewed and updated as appropriate:   She has a past medical history of Acute deep vein thrombosis (DVT) of popliteal vein of left lower extremity ([address] ), Ankle wound, left, initial encounter, Ankle wound, right, initial encounter, DVT (deep vein thrombosis) in pregnancy, Pulmonary emboli ([address] ), Pulmonary embolism ([address] ), Pulmonary embolism, blood-clot, obstetric, Venous stasis ulcer ([address] ), and Venous ulcers of both lower extremities ([address] )  ,  does not have any pertinent problems on file  ,   has a past surgical history that includes  section  ,  family history includes Bone cancer in her family; Cancer in her father; Heart attack in her mother; Hypertension in her family; Lung cancer in her family; Stroke in her mother  ,   reports that she has never smoked  She has never used smokeless tobacco  She reports that she does not drink alcohol or use drugs  ,  has No Known Allergies     Current Outpatient Medications   Medication Sig Dispense Refill    apixaban (Eliquis) 5 mg Take 1 tablet (5 mg total) by mouth every 12 (twelve) hours 60 tablet 5    calcium carbonate (OS-VIRGIE) 600 MG tablet Take 600 mg by mouth daily        metoprolol succinate (Toprol XL) 25 mg 24 hr tablet Take 1 tablet (25 mg total) by mouth daily 30 tablet 5    Multiple Vitamins-Minerals (MULTIVITAMIN WITH MINERALS) tablet Take 1 tablet by mouth daily      patient supplied medication Pt takes eye drops Mura 128 - not listed in database       No current facility-administered medications for this visit  Review of Systems   Constitutional: Negative for activity change, appetite change, diaphoresis, fatigue and fever  HENT: Negative  Eyes: Negative  Respiratory: Negative for apnea, cough, chest tightness, shortness of breath and wheezing  Cardiovascular: Negative for chest pain, palpitations and leg swelling  Gastrointestinal: Negative for abdominal distention, abdominal pain, anal bleeding, constipation, diarrhea, nausea and vomiting  Endocrine: Negative for cold intolerance, heat intolerance, polydipsia, polyphagia and polyuria  Genitourinary: Negative for difficulty urinating, dysuria, flank pain, hematuria and urgency  Musculoskeletal: Negative for arthralgias, back pain, gait problem, joint swelling and myalgias  Skin: Negative for color change, rash and wound  Facial trauma secondary to fall   Allergic/Immunologic: Negative for environmental allergies, food allergies and immunocompromised state     Neurological: Negative for dizziness, seizures, syncope, speech difficulty, numbness and headaches  Hematological: Negative for adenopathy  Does not bruise/bleed easily  Psychiatric/Behavioral: Negative for agitation, behavioral problems, hallucinations, sleep disturbance and suicidal ideas  Objective:  Vitals:    08/04/20 1234   BP: 168/98   BP Location: Left arm   Patient Position: Sitting   Cuff Size: Standard   Temp: 97 8 °F (36 6 °C)   TempSrc: Temporal   Weight: 95 6 kg (210 lb 12 8 oz)   Height: 5' 10"     Body mass index is 30 25 kg/m²  Physical Exam   Constitutional: She is oriented to person, place, and time  She appears well-developed  No distress  HENT:   Head: Normocephalic  Right Ear: External ear normal    Left Ear: External ear normal    Nose: Nose normal    Eyes: Pupils are equal, round, and reactive to light  Conjunctivae are normal  Right eye exhibits no discharge  Left eye exhibits no discharge  No scleral icterus  Neck: Normal range of motion  No tracheal deviation present  No thyromegaly present  Cardiovascular: Normal rate, regular rhythm and normal heart sounds  Exam reveals no gallop and no friction rub  No murmur heard  Pulmonary/Chest: Effort normal and breath sounds normal  No respiratory distress  She has no wheezes  Abdominal: Soft  Bowel sounds are normal  She exhibits no mass  There is no abdominal tenderness  There is no guarding  Musculoskeletal:         General: No deformity  Lymphadenopathy:     She has no cervical adenopathy  Neurological: She is alert and oriented to person, place, and time  No cranial nerve deficit  Skin: Skin is warm and dry  No rash noted  She is not diaphoretic  No erythema     Ecchymosis right side of face and right shoulder   Psychiatric: Thought content normal

## 2020-08-11 ENCOUNTER — OFFICE VISIT (OUTPATIENT)
Dept: FAMILY MEDICINE CLINIC | Facility: CLINIC | Age: 70
End: 2020-08-11
Payer: COMMERCIAL

## 2020-08-11 VITALS
SYSTOLIC BLOOD PRESSURE: 164 MMHG | HEIGHT: 70 IN | DIASTOLIC BLOOD PRESSURE: 90 MMHG | TEMPERATURE: 97.9 F | BODY MASS INDEX: 30.61 KG/M2 | WEIGHT: 213.8 LBS

## 2020-08-11 DIAGNOSIS — A46 ERYSIPELAS: Primary | ICD-10-CM

## 2020-08-11 PROCEDURE — 1036F TOBACCO NON-USER: CPT | Performed by: FAMILY MEDICINE

## 2020-08-11 PROCEDURE — 3080F DIAST BP >= 90 MM HG: CPT | Performed by: FAMILY MEDICINE

## 2020-08-11 PROCEDURE — 1160F RVW MEDS BY RX/DR IN RCRD: CPT | Performed by: FAMILY MEDICINE

## 2020-08-11 PROCEDURE — 99213 OFFICE O/P EST LOW 20 MIN: CPT | Performed by: FAMILY MEDICINE

## 2020-08-11 PROCEDURE — 3077F SYST BP >= 140 MM HG: CPT | Performed by: FAMILY MEDICINE

## 2020-08-11 PROCEDURE — 3008F BODY MASS INDEX DOCD: CPT | Performed by: FAMILY MEDICINE

## 2020-08-11 RX ORDER — PREDNISOLONE ACETATE 10 MG/ML
SUSPENSION/ DROPS OPHTHALMIC
COMMUNITY
Start: 2020-07-17 | End: 2022-08-02

## 2020-08-11 RX ORDER — OFLOXACIN 3 MG/ML
SOLUTION/ DROPS OPHTHALMIC
COMMUNITY
Start: 2020-07-17 | End: 2022-08-02

## 2020-08-11 RX ORDER — KETOROLAC TROMETHAMINE 4 MG/ML
SOLUTION/ DROPS OPHTHALMIC
COMMUNITY
Start: 2020-07-17 | End: 2022-08-02

## 2020-08-11 RX ORDER — CEPHALEXIN 500 MG/1
500 CAPSULE ORAL EVERY 8 HOURS SCHEDULED
Qty: 40 CAPSULE | Refills: 0 | Status: SHIPPED | OUTPATIENT
Start: 2020-08-11 | End: 2020-08-21

## 2020-08-11 NOTE — PROGRESS NOTES
Assessment/Plan:  Patient has developed erysipelas right-sided facial be treated with cephalexin and with mupirocin    Problem List Items Addressed This Visit     None      Visit Diagnoses     Erysipelas    -  Primary           Diagnoses and all orders for this visit:    Erysipelas    Other orders  -     ketorolac (ACULAR) 0 4 % SOLN; *3 DAYS PREOP: instill 1 drop into right eye four times a day the   (REFER TO PRESCRIPTION NOTES)  -     ofloxacin (OCUFLOX) 0 3 % ophthalmic solution; *STARTING 3 DAYS PREOP: instill 1 drop into right eye four times      (REFER TO PRESCRIPTION NOTES)  -     prednisoLONE acetate (PRED FORTE) 1 % ophthalmic suspension; *POSTOP: instill 1 drop into right eye four times a day for 2 weeks then twice a day as directed        No problem-specific Assessment & Plan notes found for this encounter  Subjective:      Patient ID: Torrey Palomino is a 79 y o  female  Mrs Adenike Oro is here today for a follow-up visit her facial contusion is  slightly smaller it is fluctuant and not organized and really would lend itself to being aspirated she has induration and redness of the face on the right side where she had her abrasions and I think she has developed a facial cellulitis or erysipelas      The following portions of the patient's history were reviewed and updated as appropriate:   She has a past medical history of Acute deep vein thrombosis (DVT) of popliteal vein of left lower extremity ([address] ), Ankle wound, left, initial encounter, Ankle wound, right, initial encounter, DVT (deep vein thrombosis) in pregnancy, Pulmonary emboli ([address] ), Pulmonary embolism ([address] ), Pulmonary embolism, blood-clot, obstetric, Venous stasis ulcer ([address] ), and Venous ulcers of both lower extremities ([address] )  ,  does not have any pertinent problems on file  ,   has a past surgical history that includes  section  ,  family history includes Bone cancer in her family;  Cancer in her father; Heart attack in her mother; Hypertension in her family; Lung cancer in her family; Stroke in her mother  ,   reports that she has never smoked  She has never used smokeless tobacco  She reports that she does not drink alcohol or use drugs  ,  has No Known Allergies     Current Outpatient Medications   Medication Sig Dispense Refill    apixaban (Eliquis) 5 mg Take 1 tablet (5 mg total) by mouth every 12 (twelve) hours 60 tablet 5    calcium carbonate (OS-VIRGIE) 600 MG tablet Take 600 mg by mouth daily        metoprolol succinate (Toprol XL) 25 mg 24 hr tablet Take 1 tablet (25 mg total) by mouth daily 30 tablet 5    Multiple Vitamins-Minerals (MULTIVITAMIN WITH MINERALS) tablet Take 1 tablet by mouth daily      patient supplied medication Pt takes eye drops Mura [address] - not listed in database      ketorolac (ACULAR) 0 4 % SOLN *3 DAYS PREOP: instill 1 drop into right eye four times a day the   (REFER TO PRESCRIPTION NOTES)   ofloxacin (OCUFLOX) 0 3 % ophthalmic solution *STARTING 3 DAYS PREOP: instill 1 drop into right eye four times      (REFER TO PRESCRIPTION NOTES)   prednisoLONE acetate (PRED FORTE) 1 % ophthalmic suspension *POSTOP: instill 1 drop into right eye four times a day for 2 weeks then twice a day as directed       No current facility-administered medications for this visit  Review of Systems   Constitutional: Negative for activity change, appetite change, diaphoresis, fatigue and fever  HENT: Positive for facial swelling  Eyes: Negative  Respiratory: Negative for apnea, cough, chest tightness, shortness of breath and wheezing  Cardiovascular: Negative for chest pain, palpitations and leg swelling  Gastrointestinal: Negative for abdominal distention, abdominal pain, anal bleeding, constipation, diarrhea, nausea and vomiting  Endocrine: Negative for cold intolerance, heat intolerance, polydipsia, polyphagia and polyuria     Genitourinary: Negative for difficulty urinating, dysuria, flank pain, hematuria and urgency  Musculoskeletal: Negative for arthralgias, back pain, gait problem, joint swelling and myalgias  Skin: Negative for color change, rash and wound  Allergic/Immunologic: Negative for environmental allergies, food allergies and immunocompromised state  Neurological: Negative for dizziness, seizures, syncope, speech difficulty, numbness and headaches  Hematological: Negative for adenopathy  Does not bruise/bleed easily  Psychiatric/Behavioral: Negative for agitation, behavioral problems, hallucinations, sleep disturbance and suicidal ideas  Objective:  Vitals:    08/11/20 1605   BP: 164/90   BP Location: Left arm   Patient Position: Sitting   Cuff Size: Standard   Temp: 97 9 °F (36 6 °C)   TempSrc: Temporal   Weight: 97 kg (213 lb 12 8 oz)   Height: 5' 10" (1 778 m)     Body mass index is 30 68 kg/m²  Physical Exam  Constitutional:       General: She is not in acute distress  Appearance: She is well-developed  She is not diaphoretic  HENT:      Head: Normocephalic  Right Ear: External ear normal       Left Ear: External ear normal       Nose: Nose normal    Eyes:      General: No scleral icterus  Right eye: No discharge  Left eye: No discharge  Conjunctiva/sclera: Conjunctivae normal       Pupils: Pupils are equal, round, and reactive to light  Neck:      Musculoskeletal: Normal range of motion  Thyroid: No thyromegaly  Trachea: No tracheal deviation  Cardiovascular:      Rate and Rhythm: Normal rate and regular rhythm  Heart sounds: Normal heart sounds  No murmur  No friction rub  No gallop  Pulmonary:      Effort: Pulmonary effort is normal  No respiratory distress  Breath sounds: Normal breath sounds  No wheezing  Abdominal:      General: Bowel sounds are normal       Palpations: Abdomen is soft  There is no mass  Tenderness: There is no abdominal tenderness  There is no guarding     Musculoskeletal: General: No deformity  Lymphadenopathy:      Cervical: No cervical adenopathy  Skin:     General: Skin is warm and dry  Findings: Erythema and rash present  Comments: Right-sided face has erythema and redness   Neurological:      Mental Status: She is alert and oriented to person, place, and time  Cranial Nerves: No cranial nerve deficit  Psychiatric:         Thought Content:  Thought content normal

## 2020-09-07 DIAGNOSIS — I10 ESSENTIAL HYPERTENSION: ICD-10-CM

## 2020-09-08 RX ORDER — METOPROLOL SUCCINATE 25 MG/1
TABLET, EXTENDED RELEASE ORAL
Qty: 30 TABLET | Refills: 5 | Status: SHIPPED | OUTPATIENT
Start: 2020-09-08 | End: 2021-03-01

## 2020-10-04 DIAGNOSIS — I26.99 OTHER PULMONARY EMBOLISM WITHOUT ACUTE COR PULMONALE, UNSPECIFIED CHRONICITY (HCC): ICD-10-CM

## 2020-10-05 RX ORDER — APIXABAN 5 MG/1
TABLET, FILM COATED ORAL
Qty: 60 TABLET | Refills: 5 | Status: SHIPPED | OUTPATIENT
Start: 2020-10-05 | End: 2021-03-29

## 2021-02-22 DIAGNOSIS — Z23 ENCOUNTER FOR IMMUNIZATION: ICD-10-CM

## 2021-03-01 DIAGNOSIS — I10 ESSENTIAL HYPERTENSION: ICD-10-CM

## 2021-03-01 RX ORDER — METOPROLOL SUCCINATE 25 MG/1
TABLET, EXTENDED RELEASE ORAL
Qty: 30 TABLET | Refills: 5 | Status: SHIPPED | OUTPATIENT
Start: 2021-03-01 | End: 2021-08-28

## 2021-03-29 DIAGNOSIS — I26.99 OTHER PULMONARY EMBOLISM WITHOUT ACUTE COR PULMONALE, UNSPECIFIED CHRONICITY (HCC): ICD-10-CM

## 2021-03-29 RX ORDER — APIXABAN 5 MG/1
TABLET, FILM COATED ORAL
Qty: 60 TABLET | Refills: 5 | Status: SHIPPED | OUTPATIENT
Start: 2021-03-29 | End: 2021-09-24 | Stop reason: SDUPTHER

## 2021-08-27 DIAGNOSIS — I10 ESSENTIAL HYPERTENSION: ICD-10-CM

## 2021-08-28 RX ORDER — METOPROLOL SUCCINATE 25 MG/1
TABLET, EXTENDED RELEASE ORAL
Qty: 30 TABLET | Refills: 5 | Status: SHIPPED | OUTPATIENT
Start: 2021-08-28 | End: 2022-02-28 | Stop reason: SDUPTHER

## 2021-09-24 DIAGNOSIS — I26.99 OTHER PULMONARY EMBOLISM WITHOUT ACUTE COR PULMONALE, UNSPECIFIED CHRONICITY (HCC): ICD-10-CM

## 2022-02-28 DIAGNOSIS — I10 ESSENTIAL HYPERTENSION: ICD-10-CM

## 2022-02-28 RX ORDER — METOPROLOL SUCCINATE 25 MG/1
25 TABLET, EXTENDED RELEASE ORAL DAILY
Qty: 30 TABLET | Refills: 5 | Status: SHIPPED | OUTPATIENT
Start: 2022-02-28

## 2022-03-28 DIAGNOSIS — I26.99 OTHER PULMONARY EMBOLISM WITHOUT ACUTE COR PULMONALE, UNSPECIFIED CHRONICITY (HCC): ICD-10-CM

## 2022-08-02 ENCOUNTER — OFFICE VISIT (OUTPATIENT)
Dept: FAMILY MEDICINE CLINIC | Facility: CLINIC | Age: 72
End: 2022-08-02
Payer: COMMERCIAL

## 2022-08-02 VITALS
BODY MASS INDEX: 30.06 KG/M2 | OXYGEN SATURATION: 96 % | HEART RATE: 89 BPM | DIASTOLIC BLOOD PRESSURE: 108 MMHG | WEIGHT: 210 LBS | HEIGHT: 70 IN | TEMPERATURE: 97.8 F | SYSTOLIC BLOOD PRESSURE: 194 MMHG

## 2022-08-02 DIAGNOSIS — Z78.0 MENOPAUSE: Primary | ICD-10-CM

## 2022-08-02 DIAGNOSIS — L20.9 ATOPIC DERMATITIS, UNSPECIFIED TYPE: ICD-10-CM

## 2022-08-02 PROCEDURE — 3725F SCREEN DEPRESSION PERFORMED: CPT | Performed by: FAMILY MEDICINE

## 2022-08-02 PROCEDURE — 3288F FALL RISK ASSESSMENT DOCD: CPT | Performed by: FAMILY MEDICINE

## 2022-08-02 PROCEDURE — 1101F PT FALLS ASSESS-DOCD LE1/YR: CPT | Performed by: FAMILY MEDICINE

## 2022-08-02 PROCEDURE — 99213 OFFICE O/P EST LOW 20 MIN: CPT | Performed by: FAMILY MEDICINE

## 2022-08-02 RX ORDER — CETIRIZINE HYDROCHLORIDE 10 MG/1
10 TABLET ORAL DAILY
Qty: 15 TABLET | Refills: 0 | Status: SHIPPED | OUTPATIENT
Start: 2022-08-02 | End: 2022-08-16 | Stop reason: ALTCHOICE

## 2022-08-02 RX ORDER — METHYLPREDNISOLONE 4 MG/1
TABLET ORAL
Qty: 21 EACH | Refills: 0 | Status: SHIPPED | OUTPATIENT
Start: 2022-08-02 | End: 2022-08-16 | Stop reason: ALTCHOICE

## 2022-08-02 RX ORDER — MOMETASONE FUROATE 1 MG/G
CREAM TOPICAL DAILY
Qty: 45 G | Refills: 0 | Status: SHIPPED | OUTPATIENT
Start: 2022-08-02 | End: 2022-08-16 | Stop reason: ALTCHOICE

## 2022-08-02 NOTE — PROGRESS NOTES
BMI Counseling: Body mass index is 30 13 kg/m²  The BMI is above normal  Nutrition recommendations include decreasing portion sizes, encouraging healthy choices of fruits and vegetables and moderation in carbohydrate intake  Exercise recommendations include moderate physical activity 150 minutes/week  Rationale for BMI follow-up plan is due to patient being overweight or obese  Depression Screening and Follow-up Plan: Patient was screened for depression during today's encounter  They screened negative with a PHQ-2 score of 0  Assessment/Plan:  I doubled the patient's dose of metoprolol she will return 2 weeks for a blood pressure checkup also she will be treated with a Medrol Dosepak but I told her only take half the daily allotment of steroid and she is going to use mometasone on all of her rashes except for the face she will use hydrocortisone cream on that    Problem List Items Addressed This Visit    None     Visit Diagnoses     Menopause    -  Primary    Relevant Orders    DXA bone density spine hip and pelvis    Atopic dermatitis, unspecified type               Diagnoses and all orders for this visit:    Menopause  -     DXA bone density spine hip and pelvis; Future    Atopic dermatitis, unspecified type        No problem-specific Assessment & Plan notes found for this encounter  Subjective:      Patient ID: Carline Baumann is a 70 y o  female  Eczema  Possibly from plantpollen or poison ivy      The following portions of the patient's history were reviewed and updated as appropriate:   She has a past medical history of Acute deep vein thrombosis (DVT) of popliteal vein of left lower extremity ([address] ), Ankle wound, left, initial encounter, Ankle wound, right, initial encounter, DVT (deep vein thrombosis) in pregnancy, Pulmonary emboli ([address] ), Pulmonary embolism ([address] ), Pulmonary embolism, blood-clot, obstetric, Venous stasis ulcer ([address] ), and Venous ulcers of both lower extremities ([address] )  ,  does not have any pertinent problems on file  ,   has a past surgical history that includes  section  ,  family history includes Bone cancer in her family; Cancer in her father; Heart attack in her mother; Hypertension in her family; Lung cancer in her family; Stroke in her mother  ,   reports that she has never smoked  She has never used smokeless tobacco  She reports that she does not drink alcohol and does not use drugs  ,  has No Known Allergies     Current Outpatient Medications   Medication Sig Dispense Refill    apixaban (Eliquis) 5 mg Take 1 tablet (5 mg total) by mouth every 12 (twelve) hours 60 tablet 5    calcium carbonate (OS-VIRGIE) 600 MG tablet Take 600 mg by mouth daily        metoprolol succinate (TOPROL-XL) 25 mg 24 hr tablet Take 1 tablet (25 mg total) by mouth daily 30 tablet 5    Multiple Vitamins-Minerals (MULTIVITAMIN WITH MINERALS) tablet Take 1 tablet by mouth daily      patient supplied medication Pt takes eye drops [address] - not listed in database       No current facility-administered medications for this visit  Review of Systems   Constitutional: Negative for activity change, appetite change, diaphoresis, fatigue and fever  HENT: Negative  Eyes: Negative  Respiratory: Negative for apnea, cough, chest tightness, shortness of breath and wheezing  Cardiovascular: Negative for chest pain, palpitations and leg swelling  Gastrointestinal: Negative for abdominal distention, abdominal pain, anal bleeding, constipation, diarrhea, nausea and vomiting  Endocrine: Negative for cold intolerance, heat intolerance, polydipsia, polyphagia and polyuria  Genitourinary: Negative for difficulty urinating, dysuria, flank pain, hematuria and urgency  Musculoskeletal: Negative for arthralgias, back pain, gait problem, joint swelling and myalgias  Skin: Positive for rash  Negative for color change and wound     Allergic/Immunologic: Negative for environmental allergies, food allergies and immunocompromised state  Neurological: Negative for dizziness, seizures, syncope, speech difficulty, numbness and headaches  Hematological: Negative for adenopathy  Does not bruise/bleed easily  Psychiatric/Behavioral: Negative for agitation, behavioral problems, hallucinations, sleep disturbance and suicidal ideas  Objective:  Vitals:    08/02/22 1029   BP: (!) 194/108   BP Location: Left arm   Patient Position: Sitting   Cuff Size: Standard   Pulse: 89   Temp: 97 8 °F (36 6 °C)   TempSrc: Temporal   SpO2: 96%   Weight: 95 3 kg (210 lb)   Height: 5' 10" (1 778 m)     Body mass index is 30 13 kg/m²  Physical Exam  Constitutional:       General: She is not in acute distress  Appearance: She is well-developed  She is not diaphoretic  HENT:      Head: Normocephalic  Right Ear: External ear normal       Left Ear: External ear normal       Nose: Nose normal    Eyes:      General: No scleral icterus  Right eye: No discharge  Left eye: No discharge  Conjunctiva/sclera: Conjunctivae normal       Pupils: Pupils are equal, round, and reactive to light  Neck:      Thyroid: No thyromegaly  Trachea: No tracheal deviation  Cardiovascular:      Rate and Rhythm: Normal rate and regular rhythm  Heart sounds: Normal heart sounds  No murmur heard  No friction rub  No gallop  Pulmonary:      Effort: Pulmonary effort is normal  No respiratory distress  Breath sounds: Normal breath sounds  No wheezing  Abdominal:      General: Bowel sounds are normal       Palpations: Abdomen is soft  There is no mass  Tenderness: There is no abdominal tenderness  There is no guarding  Musculoskeletal:         General: No deformity  Cervical back: Normal range of motion  Lymphadenopathy:      Cervical: No cervical adenopathy  Skin:     General: Skin is warm and dry  Findings: Rash present  No erythema     Neurological:      Mental Status: She is alert and oriented to person, place, and time  Cranial Nerves: No cranial nerve deficit  Psychiatric:         Thought Content:  Thought content normal

## 2022-08-16 ENCOUNTER — OFFICE VISIT (OUTPATIENT)
Dept: FAMILY MEDICINE CLINIC | Facility: CLINIC | Age: 72
End: 2022-08-16
Payer: COMMERCIAL

## 2022-08-16 VITALS
HEIGHT: 70 IN | SYSTOLIC BLOOD PRESSURE: 172 MMHG | OXYGEN SATURATION: 95 % | HEART RATE: 83 BPM | DIASTOLIC BLOOD PRESSURE: 98 MMHG | BODY MASS INDEX: 29.92 KG/M2 | TEMPERATURE: 97.5 F | WEIGHT: 209 LBS

## 2022-08-16 DIAGNOSIS — I10 ESSENTIAL HYPERTENSION: Primary | ICD-10-CM

## 2022-08-16 DIAGNOSIS — L83 ACANTHOSIS NIGRICANS, ACQUIRED: ICD-10-CM

## 2022-08-16 PROCEDURE — 99214 OFFICE O/P EST MOD 30 MIN: CPT | Performed by: FAMILY MEDICINE

## 2022-08-16 PROCEDURE — 1160F RVW MEDS BY RX/DR IN RCRD: CPT | Performed by: FAMILY MEDICINE

## 2022-08-16 RX ORDER — LOSARTAN POTASSIUM 50 MG/1
50 TABLET ORAL DAILY
Qty: 30 TABLET | Refills: 3 | Status: SHIPPED | OUTPATIENT
Start: 2022-08-16 | End: 2022-08-23 | Stop reason: SDUPTHER

## 2022-08-16 NOTE — PROGRESS NOTES
Assessment/Plan:    Problem List Items Addressed This Visit        Cardiovascular and Mediastinum    Essential hypertension - Primary           Diagnoses and all orders for this visit:    Essential hypertension        No problem-specific Assessment & Plan notes found for this encounter  Subjective:      Patient ID: Rahel Marino is a 67 y o  female  Mrs Decker Sushant is here for a blood pressure checkup today her systolic blood pressure came down around 25 points but she still is above the recommended guidelines she is totally asymptomatic no headache no chest pain she just has elevated blood pressure she does have a machine at home to check her own blood pressures      The following portions of the patient's history were reviewed and updated as appropriate:   She has a past medical history of Acute deep vein thrombosis (DVT) of popliteal vein of left lower extremity ([address] ), Ankle wound, left, initial encounter, Ankle wound, right, initial encounter, DVT (deep vein thrombosis) in pregnancy, Pulmonary emboli ([address] ), Pulmonary embolism ([address] ), Pulmonary embolism, blood-clot, obstetric, Venous stasis ulcer ([address] ), and Venous ulcers of both lower extremities ([address] )  ,  does not have any pertinent problems on file  ,   has a past surgical history that includes  section  ,  family history includes Bone cancer in her family; Cancer in her father; Heart attack in her mother; Hypertension in her family; Lung cancer in her family; Stroke in her mother  ,   reports that she has never smoked  She has never used smokeless tobacco  She reports that she does not drink alcohol and does not use drugs  ,  has No Known Allergies     Current Outpatient Medications   Medication Sig Dispense Refill    apixaban (Eliquis) 5 mg Take 1 tablet (5 mg total) by mouth every 12 (twelve) hours 60 tablet 5    calcium carbonate (OS-VIRGIE) 600 MG tablet Take 600 mg by mouth daily        metoprolol succinate (TOPROL-XL) 25 mg 24 hr tablet Take 1 tablet (25 mg total) by mouth daily (Patient taking differently: Take 25 mg by mouth 2 (two) times a day) 30 tablet 5    Multiple Vitamins-Minerals (MULTIVITAMIN WITH MINERALS) tablet Take 1 tablet by mouth daily      patient supplied medication Pt takes eye drops [address] - not listed in database       No current facility-administered medications for this visit  Review of Systems   Constitutional: Negative for activity change, appetite change, diaphoresis, fatigue and fever  HENT: Negative  Negative for dental problem  Eyes: Negative  Negative for visual disturbance  Respiratory: Negative for apnea, cough, chest tightness, shortness of breath and wheezing  Cardiovascular: Negative for chest pain, palpitations and leg swelling  Gastrointestinal: Negative for abdominal distention, abdominal pain, anal bleeding, constipation, diarrhea, nausea and vomiting  Endocrine: Negative for cold intolerance, heat intolerance, polydipsia, polyphagia and polyuria  Genitourinary: Negative for difficulty urinating, dysuria, flank pain, hematuria and urgency  Musculoskeletal: Negative for arthralgias, back pain, gait problem, joint swelling and myalgias  Skin: Negative for color change, rash and wound  Allergic/Immunologic: Negative for environmental allergies, food allergies and immunocompromised state  Neurological: Negative for dizziness, seizures, syncope, speech difficulty, numbness and headaches  Hematological: Negative for adenopathy  Does not bruise/bleed easily  Psychiatric/Behavioral: Negative for agitation, behavioral problems, hallucinations, sleep disturbance and suicidal ideas  Objective:  Vitals:    08/16/22 1229   BP: (!) 172/98   BP Location: Left arm   Patient Position: Sitting   Cuff Size: Standard   Pulse: 83   Temp: 97 5 °F (36 4 °C)   TempSrc: Temporal   SpO2: 95%   Weight: 94 8 kg (209 lb)   Height: 5' 10" (1 778 m)     Body mass index is 29 99 kg/m²       Physical Exam  Constitutional:       General: She is not in acute distress  Appearance: She is well-developed  She is not diaphoretic  HENT:      Head: Normocephalic  Right Ear: External ear normal       Left Ear: External ear normal       Nose: Nose normal    Eyes:      General: No scleral icterus  Right eye: No discharge  Left eye: No discharge  Conjunctiva/sclera: Conjunctivae normal       Pupils: Pupils are equal, round, and reactive to light  Neck:      Thyroid: No thyromegaly  Trachea: No tracheal deviation  Cardiovascular:      Rate and Rhythm: Normal rate and regular rhythm  Heart sounds: Normal heart sounds  No murmur heard  No friction rub  No gallop  Pulmonary:      Effort: Pulmonary effort is normal  No respiratory distress  Breath sounds: Normal breath sounds  No wheezing  Abdominal:      General: Bowel sounds are normal       Palpations: Abdomen is soft  There is no mass  Tenderness: There is no abdominal tenderness  There is no guarding  Musculoskeletal:         General: No deformity  Cervical back: Normal range of motion  Lymphadenopathy:      Cervical: No cervical adenopathy  Skin:     General: Skin is warm and dry  Findings: No erythema or rash  Neurological:      Mental Status: She is alert and oriented to person, place, and time  Cranial Nerves: No cranial nerve deficit  Psychiatric:         Thought Content:  Thought content normal

## 2022-08-17 ENCOUNTER — LAB (OUTPATIENT)
Dept: LAB | Facility: HOSPITAL | Age: 72
End: 2022-08-17
Attending: FAMILY MEDICINE
Payer: COMMERCIAL

## 2022-08-17 ENCOUNTER — HOSPITAL ENCOUNTER (OUTPATIENT)
Dept: BONE DENSITY | Facility: HOSPITAL | Age: 72
Discharge: HOME | End: 2022-08-17
Attending: FAMILY MEDICINE
Payer: COMMERCIAL

## 2022-08-17 ENCOUNTER — CLINICAL SUPPORT (OUTPATIENT)
Dept: FAMILY MEDICINE CLINIC | Facility: CLINIC | Age: 72
End: 2022-08-17
Payer: COMMERCIAL

## 2022-08-17 DIAGNOSIS — Z78.0 MENOPAUSE: ICD-10-CM

## 2022-08-17 DIAGNOSIS — I10 ESSENTIAL HYPERTENSION: ICD-10-CM

## 2022-08-17 DIAGNOSIS — L83 ACANTHOSIS NIGRICANS, ACQUIRED: ICD-10-CM

## 2022-08-17 DIAGNOSIS — Z11.1 SCREENING FOR TUBERCULOSIS: Primary | ICD-10-CM

## 2022-08-17 LAB
ALBUMIN SERPL BCP-MCNC: 3.6 G/DL (ref 3.5–5)
ALP SERPL-CCNC: 49 U/L (ref 46–116)
ALT SERPL W P-5'-P-CCNC: 24 U/L (ref 12–78)
ANION GAP SERPL CALCULATED.3IONS-SCNC: 7 MMOL/L (ref 4–13)
AST SERPL W P-5'-P-CCNC: 17 U/L (ref 5–45)
BASOPHILS # BLD AUTO: 0.04 THOUSANDS/ΜL (ref 0–0.1)
BASOPHILS NFR BLD AUTO: 1 % (ref 0–1)
BILIRUB SERPL-MCNC: 0.56 MG/DL (ref 0.2–1)
BUN SERPL-MCNC: 16 MG/DL (ref 5–25)
CALCIUM SERPL-MCNC: 8.8 MG/DL (ref 8.3–10.1)
CEA SERPL-MCNC: 0.7 NG/ML (ref 0–3)
CHLORIDE SERPL-SCNC: 104 MMOL/L (ref 96–108)
CO2 SERPL-SCNC: 30 MMOL/L (ref 21–32)
CREAT SERPL-MCNC: 0.9 MG/DL (ref 0.6–1.3)
EOSINOPHIL # BLD AUTO: 0.17 THOUSAND/ΜL (ref 0–0.61)
EOSINOPHIL NFR BLD AUTO: 3 % (ref 0–6)
ERYTHROCYTE [DISTWIDTH] IN BLOOD BY AUTOMATED COUNT: 13.2 % (ref 11.6–15.1)
EST. AVERAGE GLUCOSE BLD GHB EST-MCNC: 128 MG/DL
GFR SERPL CREATININE-BSD FRML MDRD: 64 ML/MIN/1.73SQ M
GLUCOSE P FAST SERPL-MCNC: 99 MG/DL (ref 65–99)
HBA1C MFR BLD: 6.1 %
HCT VFR BLD AUTO: 43 % (ref 34.8–46.1)
HGB BLD-MCNC: 13.3 G/DL (ref 11.5–15.4)
IMM GRANULOCYTES # BLD AUTO: 0.01 THOUSAND/UL (ref 0–0.2)
IMM GRANULOCYTES NFR BLD AUTO: 0 % (ref 0–2)
LYMPHOCYTES # BLD AUTO: 1.97 THOUSANDS/ΜL (ref 0.6–4.47)
LYMPHOCYTES NFR BLD AUTO: 37 % (ref 14–44)
MCH RBC QN AUTO: 28.5 PG (ref 26.8–34.3)
MCHC RBC AUTO-ENTMCNC: 30.9 G/DL (ref 31.4–37.4)
MCV RBC AUTO: 92 FL (ref 82–98)
MONOCYTES # BLD AUTO: 0.39 THOUSAND/ΜL (ref 0.17–1.22)
MONOCYTES NFR BLD AUTO: 7 % (ref 4–12)
NEUTROPHILS # BLD AUTO: 2.7 THOUSANDS/ΜL (ref 1.85–7.62)
NEUTS SEG NFR BLD AUTO: 52 % (ref 43–75)
NRBC BLD AUTO-RTO: 0 /100 WBCS
PLATELET # BLD AUTO: 178 THOUSANDS/UL (ref 149–390)
PMV BLD AUTO: 9.8 FL (ref 8.9–12.7)
POTASSIUM SERPL-SCNC: 4.2 MMOL/L (ref 3.5–5.3)
PROT SERPL-MCNC: 7.3 G/DL (ref 6.4–8.4)
RBC # BLD AUTO: 4.66 MILLION/UL (ref 3.81–5.12)
SODIUM SERPL-SCNC: 141 MMOL/L (ref 135–147)
WBC # BLD AUTO: 5.28 THOUSAND/UL (ref 4.31–10.16)

## 2022-08-17 PROCEDURE — 85025 COMPLETE CBC W/AUTO DIFF WBC: CPT

## 2022-08-17 PROCEDURE — 80053 COMPREHEN METABOLIC PANEL: CPT

## 2022-08-17 PROCEDURE — 82378 CARCINOEMBRYONIC ANTIGEN: CPT

## 2022-08-17 PROCEDURE — 36415 COLL VENOUS BLD VENIPUNCTURE: CPT

## 2022-08-17 PROCEDURE — 77080 DXA BONE DENSITY AXIAL: CPT

## 2022-08-17 PROCEDURE — 86580 TB INTRADERMAL TEST: CPT | Performed by: FAMILY MEDICINE

## 2022-08-17 PROCEDURE — 83036 HEMOGLOBIN GLYCOSYLATED A1C: CPT

## 2022-08-18 NOTE — RESULT ENCOUNTER NOTE
Call patient to notify normal results DEXA scan shows osteopenia which is low bone mass but it is not severe enough to qualify as osteoporosis so the treatment is 1500 mg daily of elemental calcium that would be the combined content of heard diet and a calcium supplement I recommend Citracal slow release because it does not cause gas her GI upset and the other recommendation is 1000 units of vitamin D3 every day so I think that would be the best thing and we should repeat her DEXA scan in 2 years

## 2022-08-19 LAB
INDURATION: 0 MM
TB SKIN TEST: NEGATIVE

## 2022-08-23 ENCOUNTER — TELEPHONE (OUTPATIENT)
Dept: FAMILY MEDICINE CLINIC | Facility: CLINIC | Age: 72
End: 2022-08-23

## 2022-08-23 VITALS — DIASTOLIC BLOOD PRESSURE: 88 MMHG | SYSTOLIC BLOOD PRESSURE: 152 MMHG

## 2022-08-23 DIAGNOSIS — I10 ESSENTIAL HYPERTENSION: ICD-10-CM

## 2022-08-23 RX ORDER — LOSARTAN POTASSIUM 50 MG/1
100 TABLET ORAL DAILY
Qty: 30 TABLET | Refills: 3
Start: 2022-08-23 | End: 2022-08-31 | Stop reason: DRUGHIGH

## 2022-08-24 ENCOUNTER — TELEPHONE (OUTPATIENT)
Dept: FAMILY MEDICINE CLINIC | Facility: CLINIC | Age: 72
End: 2022-08-24

## 2022-08-24 NOTE — TELEPHONE ENCOUNTER
Dr increased Losartan 50 mg to 100 mg daily    Questions: Should she take the increased dose of Losartan with the Metoprolol 25 mg BID in the morning or evening OR double the Metoprolol to 50 mg & take it at a separate time from the Losartan    Per Verbal orders from Dr Yenifer Diaz - Pt should take the Losartan in the AM with first dose of Metoprolol    Pt was called with information

## 2022-08-25 ENCOUNTER — CONSULT (OUTPATIENT)
Dept: DERMATOLOGY | Facility: CLINIC | Age: 72
End: 2022-08-25
Payer: COMMERCIAL

## 2022-08-25 VITALS — WEIGHT: 211 LBS | HEIGHT: 69 IN | TEMPERATURE: 97.2 F | BODY MASS INDEX: 31.25 KG/M2

## 2022-08-25 DIAGNOSIS — R21 RASH AND NONSPECIFIC SKIN ERUPTION: ICD-10-CM

## 2022-08-25 DIAGNOSIS — D22.9 MELANOCYTIC NEVUS, UNSPECIFIED LOCATION: ICD-10-CM

## 2022-08-25 DIAGNOSIS — R21 RASH: Primary | ICD-10-CM

## 2022-08-25 DIAGNOSIS — L82.1 SEBORRHEIC KERATOSES: ICD-10-CM

## 2022-08-25 DIAGNOSIS — D18.01 CHERRY ANGIOMA: ICD-10-CM

## 2022-08-25 PROCEDURE — 88305 TISSUE EXAM BY PATHOLOGIST: CPT | Performed by: PATHOLOGY

## 2022-08-25 PROCEDURE — 88341 IMHCHEM/IMCYTCHM EA ADD ANTB: CPT | Performed by: PATHOLOGY

## 2022-08-25 PROCEDURE — 99204 OFFICE O/P NEW MOD 45 MIN: CPT | Performed by: STUDENT IN AN ORGANIZED HEALTH CARE EDUCATION/TRAINING PROGRAM

## 2022-08-25 PROCEDURE — 88342 IMHCHEM/IMCYTCHM 1ST ANTB: CPT | Performed by: PATHOLOGY

## 2022-08-25 PROCEDURE — 11104 PUNCH BX SKIN SINGLE LESION: CPT | Performed by: STUDENT IN AN ORGANIZED HEALTH CARE EDUCATION/TRAINING PROGRAM

## 2022-08-25 NOTE — PROGRESS NOTES
[facility] Note     Patient Name: Francheska Saul  Encounter Date: 8/25/2022     Have you been cared for by a [facility] in the last 3 years and, if so, which one? No    · Have you traveled outside of the [address] [place] [address] in the past 3 months or outside of the [facility] area in the last 2 weeks? No     May we call your Preferred Phone number to discuss your specific medical information? Yes     May we leave a detailed message that includes your specific medical information? Yes      Today's Chief Concerns:   Concern #1: Black areas on ankles    Past Medical History:  Have you personally ever had or currently have any of the following? · Skin cancer (such as Melanoma, Basal Cell Carcinoma, Squamous Cell Carcinoma? (If Yes, please provide more detail)- No  · Eczema: No  · Psoriasis: No  · HIV/AIDS: No  · Hepatitis B or C: No  · Tuberculosis: No  · Systemic Immunosuppression such as Diabetes, Biologic or Immunotherapy, Chemotherapy, Organ Transplantation, Bone Marrow Transplantation (If YES, please provide more detail): No  · Radiation Treatment (If YES, please provide more detail): No  · Any other major medical conditions/concerns? (If Yes, which types)- No    Social History:     What is/was your primary occupation? Teacher     What are your hobbies/past-times? Reading, gardening    Family History:  Have any of your "first degree relatives" (parent, brother, sister, or child) had any of the following       · Skin cancer such as Melanoma or Merkel Cell Carcinoma or Pancreatic Cancer? No  · Eczema, Asthma, Hay Fever or Seasonal Allergies: YES, Seasonal allergies: Mother  · Psoriasis or Psoriatic Arthritis: No  · Do any other medical conditions seem to run in your family? If Yes, what condition and which relatives?   No    Current Medications:       Current Outpatient Medications:     apixaban (Eliquis) 5 mg, Take 1 tablet (5 mg total) by mouth every 12 (twelve) hours, Disp: 60 tablet, Rfl: 5    calcium carbonate (OS-VIRGIE) 600 MG tablet, Take 600 mg by mouth daily  , Disp: , Rfl:     losartan (COZAAR) 50 mg tablet, Take 2 tablets (100 mg total) by mouth daily, Disp: 30 tablet, Rfl: 3    metoprolol succinate (TOPROL-XL) 25 mg 24 hr tablet, Take 1 tablet (25 mg total) by mouth daily (Patient taking differently: Take 25 mg by mouth 2 (two) times a day), Disp: 30 tablet, Rfl: 5    Multiple Vitamins-Minerals (MULTIVITAMIN WITH MINERALS) tablet, Take 1 tablet by mouth daily, Disp: , Rfl:     patient supplied medication, Pt takes eye drops Mura 128 - not listed in database, Disp: , Rfl:       Review of Systems:  Have you recently had or currently have any of the following? If YES, what are you doing for the problem? · Fever, chills or unintended weight loss: No  · Sudden loss or change in your vision: No  · Nausea, vomiting or blood in your stool: No  · Painful or swollen joints: YES, arthritis   · Wheezing or cough: No  · Changing mole or non-healing wound: No  · Nosebleeds: No  · Excessive sweating: No  · Easy or prolonged bleeding? YES, Eliquis (Pulomanary embolism)  · Over the last 2 weeks, how often have you been bothered by the following problems? · Taking little interest or pleasure in doing things: 1 - Not at All  · Feeling down, depressed, or hopeless: 1 - Not at All  · Rapid heartbeat with epinephrine:  No    · FEMALES ONLY:    · Are you pregnant or planning to become pregnant? N/A  · Are you currently or planning to be nursing or breast feeding? N/A    · Any known allergies? No Known Allergies      Physical Exam:     Was a chaperone (Derm Clinical Assistant) present throughout the entire Physical Exam? Yes     Did the Dermatology Team specifically  the patient on the importance of a Full Skin Exam to be sure that nothing is missed clinically?  Yes}  o Did the patient ultimately request or accept a Full Skin Exam?  Yes  o Did the patient specifically refuse to have the areas "under-the-bra" examined by the Dermatologist? No  o Did the patient specifically refuse to have the areas "under-the-underwear" examined by the Dermatologist? No    CONSTITUTIONAL:   Vitals:    08/25/22 0921   Temp: (!) 97 2 °F (36 2 °C)   Weight: 95 7 kg (211 lb)   Height: 5' 9" (1 753 m)         PSYCH: Normal mood and affect  EYES: Normal conjunctiva  ENT: Normal lips and oral mucosa  CARDIOVASCULAR: No edema  RESPIRATORY: Normal respirations  HEME/LYMPH/IMMUNO:  No ostensible subQ swelling except as noted below in "ASSESSMENT AND PLAN BY DIAGNOSIS"    SKIN:  FULL ORGAN SYSTEM EXAM  Hair, Scalp, Ears, Face Normal except as noted below in Assessment   Neck, Cervical Chain Nodes Normal except as noted below in Assessment   Right Arm/Hand/Fingers Normal except as noted below in Assessment   Left Arm/Hand/Fingers Normal except as noted below in Assessment   Chest/Breasts/Axillae Viewed areas Normal except as noted below in Assessment   Abdomen, Umbilicus Normal except as noted below in Assessment   Back/Spine Normal except as noted below in Assessment   Groin/Genitalia/Buttocks NOT EXAMINED   Right Leg, Foot, Toes Normal except as noted below in Assessment   Left Leg, Foot, Toes Normal except as noted below in Assessment        Assessment and Plan by Diagnosis:    History of Present Condition:     Duration:  How long has this been an issue for you?    o  2 months   Location Affected:  Where on the body is this affecting you?    o  Right lower leg   Quality:  Is there any bleeding, pain, itch, burning/irritation, or redness associated with the skin lesion?    o  N/A   Severity:  Describe any bleeding, pain, itch, burning/irritation, or redness on a scale of 1 to 10 (with 10 being the worst)    o  N/A   Timing:  Does this condition seem to be there pretty constantly or do you notice it more at specific times throughout the day?    o  Constant   Context:  Have you ever noticed that this condition seems to be associated with specific activities you do?    o  N/A   Modifying Factors:    o Anything that seems to make the condition worse?    -  Denies  o What have you tried to do to make the condition better? -  Compression stockings    Associated Signs and Symptoms:  Does this skin lesion seem to be associated with any of the following:    RASH: HYPERPIGMENTATION VS MELANOCYTIC LESION    Physical Exam:   (Anatomic Location); (Size and Morphological Description); (Differential Diagnosis):  o Right lower extremity; few black grey patches; medication induced hyperpigmentation, melanocytic nevus    Pertinent Positives:   Pertinent Negatives: Additional History of Present Condition:  Present for about 2 months  Doesn't bother the patient  Not on any new medications  Currently taking losartan, eliquis, metoprolol for months  Assessment and Plan:  Based on a thorough discussion of this condition and the management approach to it (including a comprehensive discussion of the known risks, side effects and potential benefits of treatment), the patient (family) agrees to implement the following specific plan:   Punch biopsy will be performed in the office today    Will contact patient with results           PROCEDURE NOTE:  PUNCH BIOPSY      Performing Physician: Dr Ang    Anatomic Location; Clinical Description with size (cm); Pre-Op Diagnosis:     Right lower extremity; few black grey patches; medication induced hyperpigmentation vs melanocytic        Anesthesia: 1% xylocaine with epi       Topical anesthesia: None       Indications: To indicate diagnosis and management plan  Procedure Details     Patient informed of the risks (including bleeding,scaring and infection) and benefits of the procedure explained  Verbal and written informed consent obtained  The area was prepped and draped in the usual fashion  Anesthesia was obtained with 1% lidocaine with epinephrine   The skin was then stretched perpendicular to the skin tension lines and a punch biopsy to an appropriate sampling depth was obtained with a 4 mm punch with a forceps and iris scissors  Hemostasis was obtained with 4-0 Ethilon x 2 sutures  Complications:  None      Specimen has been sent for review by Dermatopathology  Plan:  1  Instructed to keep the wound dry and covered for 24-48h and clean thereafter  2  Warning signs of infection were reviewed  3  Recommended that the patient use acetaminophen as needed for pain  4  Sutures if any should be removed in 14 days      Standard post-procedure care has been explained and has been included in written form within the patient's copy of Informed Consent  MELANOCYTIC NEVI ("Moles")    Physical Exam:   Anatomic Location Affected:   Mostly on sun-exposed areas of the upper extremities and trunk   Morphological Description:  Scattered, 1-4mm round to ovoid, symmetrical-appearing, even bordered, skin colored to dark brown macules/papules, mostly in sun-exposed areas   Pertinent Positives:   Pertinent Negatives: Additional History of Present Condition:  Present on exam     Assessment and Plan:  Based on a thorough discussion of this condition and the management approach to it (including a comprehensive discussion of the known risks, side effects and potential benefits of treatment), the patient (family) agrees to implement the following specific plan:   Use a moisturizer + sunscreen "combo" product such as Neutrogena Daily Defense SPF 50+ or CeraVe AM at least three times a day   Will continue to monitor  Melanocytic Nevi  Melanocytic nevi ("moles") are tan or brown, raised or flat areas of the skin which have an increased number of melanocytes  Melanocytes are the cells in our body which make pigment and account for skin color      Some moles are present at birth (I e , "congenital nevi"), while others come up later in life (i e , "acquired nevi")  The sun can stimulate the body to make more moles  Sunburns are not the only thing that triggers more moles  Chronic sun exposure can do it too  Clinically distinguishing a healthy mole from melanoma may be difficult, even for experienced dermatologists  The "ABCDE's" of moles have been suggested as a means of helping to alert a person to a suspicious mole and the possible increased risk of melanoma  The suggestions for raising alert are as follows:    Asymmetry: Healthy moles tend to be symmetric, while melanomas are often asymmetric  Asymmetry means if you draw a line through the mole, the two halves do not match in color, size, shape, or surface texture  Asymmetry can be a result of rapid enlargement of a mole, the development of a raised area on a previously flat lesion, scaling, ulceration, bleeding or scabbing within the mole  Any mole that starts to demonstrate "asymmetry" should be examined promptly by a board certified dermatologist      Border: Healthy moles tend to have discrete, even borders  The border of a melanoma often blends into the normal skin and does not sharply delineate the mole from normal skin  Any mole that starts to demonstrate "uneven borders" should be examined promptly by a board certified dermatologist      Color: Healthy moles tend to be one color throughout  Melanomas tend to be made up of different colors ranging from dark black, blue, white, or red  Any mole that demonstrates a color change should be examined promptly by a board certified dermatologist      Diameter: Healthy moles tend to be smaller than 0 6 cm in size; an exception are "congenital nevi" that can be larger  Melanomas tend to grow and can often be greater than 0 6 cm (1/4 of an inch, or the size of a pencil eraser)  This is only a guideline, and many normal moles may be larger than 0 6 cm without being unhealthy    Any mole that starts to change in size (small to bigger or bigger to smaller) should be examined promptly by a board certified dermatologist      Evolving: Healthy moles tend to "stay the same "  Melanomas may often show signs of change or evolution such as a change in size, shape, color, or elevation  Any mole that starts to itch, bleed, crust, burn, hurt, or ulcerate or demonstrate a change or evolution should be examined promptly by a board certified dermatologist       Dysplastic Nevi  Dysplastic moles are moles that fit the ABCDE rules of melanoma but are not identified as melanomas when examined under the microscope  They may indicate an increased risk of melanoma in that person  If there is a family history of melanoma, most experts agree that the person may be at an increased risk for developing a melanoma  Experts still do not agree on what dysplastic moles mean in patients without a personal or family history of melanoma  Dysplastic moles are usually larger than common moles and have different colors within it with irregular borders  The appearance can be very similar to a melanoma  Biopsies of dysplastic moles may show abnormalities which are different from a regular mole  Melanoma  Malignant melanoma is a type of skin cancer that can be deadly if it spreads throughout the body  The incidence of melanoma in the [place] is growing faster than any other cancer  Melanoma usually grows near the surface of the skin for a period of time, and then begins to grow deeper into the skin  Once it grows deeper into the skin, the risk of spread to other organs greatly increases  Therefore, early detection and removal of a malignant melanoma may result in a better chance at a complete cure; removal after the tumor has spread may not be as effective, leading to worse clinical outcomes such as death  The true rate of nevus transformation into a melanoma is unknown   It has been estimated that the lifetime risk for any acquired melanocytic nevus on any 21year-old individual transforming into melanoma by age [de-identified] is 0 03% (1 in 3,164) for men and 0 009% (1 in 10,800) for women  The appearance of a "new mole" remains one of the most reliable methods for identifying a malignant melanoma  Occasionally, melanomas appear as rapidly growing, blue-black, dome-shaped bumps within a previous mole or previous area of normal skin  Other times, melanomas are suspected when a mole suddenly appears or changes  Itching, burning, or pain in a pigmented lesion should increase suspicion, but most patients with early melanoma have no skin discomfort whatsoever  Melanoma can occur anywhere on the skin, including areas that are difficult for self-examination  Many melanomas are first noticed by other family members  Suspicious-looking moles may be removed for microscopic examination  You may be able to prevent death from melanoma by doing two simple things:    1  Try to avoid unnecessary sun exposure and protect your skin when it is exposed to the sun  People who live near the equator, people who have intermittent exposures to large amounts of sun, and people who have had sunburns in childhood or adolescence have an increased risk for melanoma  Sun sense and vigilant sun protection may be keys to helping to prevent melanoma  We recommend wearing UPF-rated sun protective clothing and sunglasses whenever possible and applying a moisturizer-sunscreen combination product (SPF 50+) such as Neutrogena Daily Defense to sun exposed areas of skin at least three times a day  2  Have your moles regularly examined by a board certified dermatologist AND by yourself or a family member/friend at home  We recommend that you have your moles examined at least once a year by a board certified dermatologist   Use your birthday as an annual reminder to have your "Birthday Suit" (I e , your skin) examined; it is a nice birthday gift to yourself to know that your skin is healthy appearing!   Additionally, at-home self examinations may be helpful for detecting a possible melanoma  Use the ABCDEs we discussed and check your moles once a month at home  CHERRY ANGIOMAS    Physical Exam:   Anatomic Location Affected:  Chest, abdomen    Morphological Description:  Scattered cherry red, 1-4 mm papules   Pertinent Positives:   Pertinent Negatives: Additional History of Present Condition:  Present on exam      Assessment and Plan:  Based on a thorough discussion of this condition and the management approach to it (including a comprehensive discussion of the known risks, side effects and potential benefits of treatment), the patient (family) agrees to implement the following specific plan:   Reassured benign nature of cherry angiomas  Assessment and Plan:    Cherry angioma, also known as Miguel Puff spots, are benign vascular skin lesions  A "cherry angioma" is a firm red, blue or purple papule, 0 1-1 cm in diameter  When thrombosed, they can appear black in colour until evaluated with a dermatoscope when the red or purple colour is more easily seen  Cherry angioma may develop on any part of the body but most often appear on the scalp, face, lips and trunk  An angioma is due to proliferating endothelial cells; these are the cells that line the inside of a blood vessel  Angiomas can arise in early life or later in life; the most common type of angioma is a cherry angioma  Cherry angiomas are very common in males and females of any age or race  They are more noticeable in white skin than in skin of colour  They markedly increase in number from about the age of 36  There may be a family history of similar lesions  Eruptive cherry angiomas have been rarely reported to be associated with internal malignancy  The cause of angiomas is unknown   Genetic analysis of cherry angiomas has shown that they frequently carry specific somatic missense mutations in the GNAQ and GNA11 (Q209H) genes, which are involved in other vascular and melanocytic proliferations  Cherry angioma is usually diagnosed clinically and no investigations are necessary for the majority of lesions  It has a characteristic red-clod or lobular pattern on dermatoscopy (called lacunar pattern using conventional pattern analysis)  When there is uncertainty about the diagnosis, a biopsy may be performed  The angioma is composed of venules in a thickened papillary dermis  Collagen bundles may be prominent between the lobules  Cherry angiomas are harmless, so they do not usually have to be treated  Occasionally, they are removed to exclude a malignant skin lesion such as a nodular melanoma or because they are irritated or bleeding (and a subsequent risk for infection)  To decrease friction over the lesions, we recommend Neutrogena Daily Defense SPF 50+ at least 3 times a day  SEBORRHEIC KERATOSIS; NON-INFLAMED    Physical Exam:   Anatomic Location Affected:  Chest and back    Morphological Description:  Flat and raised, waxy, smooth to warty textured, yellow to brownish-grey to dark brown to blackish, discrete, "stuck-on" appearing papules   Pertinent Positives:   Pertinent Negatives: Additional History of Present Condition:  Patient reports new bumps on the skin  Denies itch, burn, pain, bleeding or ulceration  Present constantly; nothing seems to make it worse or better  No prior treatment  Assessment and Plan:  Based on a thorough discussion of this condition and the management approach to it (including a comprehensive discussion of the known risks, side effects and potential benefits of treatment), the patient (family) agrees to implement the following specific plan:   Reassured benign nature of seborrheic keratoses  Seborrheic Keratosis  A seborrheic keratosis is a harmless warty spot that appears during adult life as a common sign of skin aging    Seborrheic keratoses can arise on any area of skin, covered or uncovered, with the usual exception of the palms and soles  They do not arise from mucous membranes  Seborrheic keratoses can have highly variable appearance  Seborrheic keratoses are extremely common  It has been estimated that over 90% of adults over the age of 61 years have one or more of them  They occur in males and females of all races, typically beginning to erupt in the 35s or 45s  They are uncommon under the age of 21 years  The precise cause of seborrhoeic keratoses is not known  Seborrhoeic keratoses are considered degenerative in nature  As time goes by, seborrheic keratoses tend to become more numerous  Some people inherit a tendency to develop a very large number of them; some people may have hundreds of them  The name "seborrheic keratosis" is misleading, because these lesions are not limited to a seborrhoeic distribution (scalp, mid-face, chest, upper back), nor are they formed from sebaceous glands, nor are they associated with sebum -- which is greasy  Seborrheic keratosis may also be called "SK," "Seb K," "basal cell papilloma," "senile wart," or "barnacle "      Researchers have noted:   Eruptive seborrhoeic keratoses can follow sunburn or dermatitis   Skin friction may be the reason they appear in body folds   Viral cause (e g , human papillomavirus) seems unlikely   Stable and clonal mutations or activation of FRFR3, PIK3CA, ABIODUN, AKT1 and EGFR genes are found in seborrhoeic keratoses   Seborrhoeic keratosis can arise from solar lentigo   FRFR3 mutations also arise in solar lentigines  These mutations are associated with increased age and location on the head and neck, suggesting a role of ultraviolet radiation in these lesions   Seborrheic keratoses do not harbour tumour suppressor gene mutations   Epidermal growth factor receptor inhibitors, which are used to treat some cancers, often result in an increase in verrucal (warty) keratoses      There is no easy way to remove multiple lesions on a single occasion  Unless a specific lesion is "inflamed" and is causing pain or stinging/burning or is bleeding, most insurance companies do not authorize treatment  DERMATOFIBROMA    Physical Exam:   Anatomic Location Affected:  Right thigh    Morphological Description:  1 0cm light tan papule with positive pinch sign    Pertinent Positives: central scar on dermoscopy    Pertinent Negatives: Additional History of Present Condition:  Present on exam     Assessment and Plan:  Based on a thorough discussion of this condition and the management approach to it (including a comprehensive discussion of the known risks, side effects and potential benefits of treatment), the patient (family) agrees to implement the following specific plan:   Reassured benign nature of dermatofibromas  Assessment and Plan:  A dermatofibroma is a common benign fibrous nodule that most often arises on the skin of the lower legs  A dermatofibroma is also called a "cutaneous fibrous histiocytoma "  Dermatofibromas occur at all ages and in people of every ethnicity  They are more common in women than in men  It is not clear if dermatofibroma is a reactive process or if it is a neoplasm  The lesions are made up of proliferating fibroblasts  Histiocytes may also be involved  They are sometimes attributed to an insect bite or ingrownhair or local trauma, but not consistently  They may be more numerous in patients with altered immunity  Dermatofibromas most often occur on the legs and arms, but may also arise on the trunk or any site of the body  Typical clinical features include the following:   People may have 1 or up to 15 lesions   Size varies from 0 5-1 5 cm diameter; most lesions are 7-10 mm diameter   They are firm nodules tethered to the skin surface and mobile over subcutaneous tissue   The skin "dimples" on pinching the lesion     Color may be pink to light brown in white skin, and dark brown to black in dark skin; some appear paler in the center   They do not usually cause symptoms, but they are sometimes painful or itchy   Because they are often raised lesions, they may be traumatized, for example by a razor   Occasionally dozens may erupt within a few months, usually in the setting of immunosuppression (for example autoimmune disease, cancer or certain medications)   Dermatofibroma does not give rise to cancer  However, occasionally, it may be mistaken for dermatofibrosarcoma or desmoplastic melanoma  A dermatofibroma is harmless and seldom causes any symptoms  Usually, only reassurance is needed  If it is nuisance or causing concern, the lesion can be removed surgically, resulting in a scar that is, by definition, usually longer in diameter than the widest portion of the dermatofibroma  Cryotherapy, shave biopsy and laser surgery are rarely completely successful  Skin punch biopsy or incisional biopsy may be undertaken if there is an atypical feature such as recent enlargement, ulceration, or asymmetrical structures and colours on dermatoscopy                Scribe Attestation    I,:  Skyler Concepcion am acting as a scribe while in the presence of the attending physician :       I,:  Carmen Handy MD personally performed the services described in this documentation    as scribed in my presence :         Bert Goldmann, DO

## 2022-08-31 ENCOUNTER — TELEPHONE (OUTPATIENT)
Dept: FAMILY MEDICINE CLINIC | Facility: CLINIC | Age: 72
End: 2022-08-31

## 2022-08-31 VITALS — SYSTOLIC BLOOD PRESSURE: 142 MMHG | DIASTOLIC BLOOD PRESSURE: 92 MMHG

## 2022-08-31 DIAGNOSIS — I10 ESSENTIAL HYPERTENSION: Primary | ICD-10-CM

## 2022-08-31 RX ORDER — LOSARTAN POTASSIUM 100 MG/1
100 TABLET ORAL DAILY
Qty: 30 TABLET | Refills: 5 | Status: SHIPPED | OUTPATIENT
Start: 2022-08-31

## 2022-08-31 NOTE — TELEPHONE ENCOUNTER
Prescription for the 100 mg tablets has been sent to her pharmacy make sure she does not mistaken only take 2 of them it will only be 1 a day because it is the bigger strength pill

## 2022-08-31 NOTE — TELEPHONE ENCOUNTER
Came in for BP check, was 142/92  You had her increase her 50 mg of losartan to 100 mg  If you want her to continue with the 100 mg she will need you to order more since she will be running out sooner  She will come back next week for another BP check

## 2022-09-07 ENCOUNTER — TELEPHONE (OUTPATIENT)
Dept: FAMILY MEDICINE CLINIC | Facility: CLINIC | Age: 72
End: 2022-09-07

## 2022-09-07 VITALS — DIASTOLIC BLOOD PRESSURE: 88 MMHG | SYSTOLIC BLOOD PRESSURE: 140 MMHG

## 2022-09-09 ENCOUNTER — OFFICE VISIT (OUTPATIENT)
Dept: DERMATOLOGY | Facility: CLINIC | Age: 72
End: 2022-09-09
Payer: COMMERCIAL

## 2022-09-09 DIAGNOSIS — I10 ESSENTIAL HYPERTENSION: ICD-10-CM

## 2022-09-09 DIAGNOSIS — Z51.89 VISIT FOR WOUND CHECK: Primary | ICD-10-CM

## 2022-09-09 PROCEDURE — 99212 OFFICE O/P EST SF 10 MIN: CPT | Performed by: DERMATOLOGY

## 2022-09-09 RX ORDER — METOPROLOL SUCCINATE 25 MG/1
25 TABLET, EXTENDED RELEASE ORAL 2 TIMES DAILY
Qty: 60 TABLET | Refills: 5 | Status: SHIPPED | OUTPATIENT
Start: 2022-09-09

## 2022-09-09 NOTE — PROGRESS NOTES
WOUND CHECK    Physical Exam:   Anatomic Location Affected:  Right lower extremity    Description of wound: Healing erosion    Closure Type: Intermediate     Additional History of Present Condition:  S/P punch biopsy on 8/25 on pt's right lower extremity  The area was closed with 2 Vicryl dissolving sutures  Pt called the office today, 9/9 stating her daughter who is a nurse tried taking out the sutures and was only successful with one  Pt presents today for a wound check  Assessment and Plan:  Based on a thorough discussion of this condition and the management approach to it (including a comprehensive discussion of the known risks, side effects and potential benefits of treatment), the patient (family) agrees to implement the following specific plan:   Upon examination, the wound is healing well with no signs of infection   Pt was informed the sutures that were placed do not need to be removed  Instead, these sutures will dissolve in about 3 months   Pt instructed to continue washing the area daily, applying plain Vaseline, and keeping it covered until fully healed   Pt informed Dr David Shafer will call pt when the results of the biopsy come back         Scribe Attestation    I,:  Aury Reyes RN am acting as a scribe while in the presence of the attending physician :       I,:  Ivan Valdez DO personally performed the services described in this documentation    as scribed in my presence :

## 2022-09-19 DIAGNOSIS — I26.99 OTHER PULMONARY EMBOLISM WITHOUT ACUTE COR PULMONALE, UNSPECIFIED CHRONICITY (HCC): ICD-10-CM

## 2022-09-29 ENCOUNTER — TELEPHONE (OUTPATIENT)
Dept: DERMATOLOGY | Facility: CLINIC | Age: 72
End: 2022-09-29

## 2022-09-29 NOTE — TELEPHONE ENCOUNTER
Pt was called to schedule an appt for 3 biopsies  Called but no answer and lvm to call us back  Please schedule in [facility] as a procedure long  Message from Dr Adonis Cook: This patient has concerning biopsy reports that need additional biopsies, per Dr Olga Doe  He asked me to reach out to attempt to schedule the patient into our [facility] clinic as soon as possible for "3 additional punch biopsies"

## 2022-10-03 NOTE — TELEPHONE ENCOUNTER
Pt returned call and LVM  I called her back but no answer  Mobile number was unable to connect and called home which was able to leave a vm

## 2022-10-13 ENCOUNTER — PROCEDURE VISIT (OUTPATIENT)
Dept: DERMATOLOGY | Facility: CLINIC | Age: 72
End: 2022-10-13

## 2022-10-13 VITALS — HEIGHT: 69 IN | BODY MASS INDEX: 30.87 KG/M2 | TEMPERATURE: 98.3 F | WEIGHT: 208.4 LBS

## 2022-10-13 DIAGNOSIS — R21 RASH: Primary | ICD-10-CM

## 2022-10-13 NOTE — PATIENT INSTRUCTIONS
Plan:  1  Instructed to keep the wound dry and covered for 24-48h and clean thereafter  2  Warning signs of infection were reviewed  3  Recommended that the patient use acetaminophen as needed for pain  4  Sutures if any should be removed in 14 days      Standard post-procedure care has been explained and has been included in written form within the patient's copy of Informed Consent

## 2022-10-13 NOTE — PROGRESS NOTES
[facility] Follow Up Note    Patient Name: Betsy Gonzalez  Encounter Date: 10 13 2022    Today's Chief Concerns:  • Concern #1:  Punch biopsy       Current Medications:    Current Outpatient Medications:   •  apixaban (Eliquis) 5 mg, Take 1 tablet (5 mg total) by mouth every 12 (twelve) hours, Disp: 60 tablet, Rfl: 5  •  calcium carbonate (OS-VIRGIE) 600 MG tablet, Take 600 mg by mouth daily  , Disp: , Rfl:   •  losartan (Cozaar) 100 MG tablet, Take 1 tablet (100 mg total) by mouth daily, Disp: 30 tablet, Rfl: 5  •  metoprolol succinate (TOPROL-XL) 25 mg 24 hr tablet, Take 1 tablet (25 mg total) by mouth 2 (two) times a day, Disp: 60 tablet, Rfl: 5  •  Multiple Vitamins-Minerals (MULTIVITAMIN WITH MINERALS) tablet, Take 1 tablet by mouth daily, Disp: , Rfl:   •  patient supplied medication, Pt takes eye drops Mura 128 - not listed in database, Disp: , Rfl:     CONSTITUTIONAL:   Vitals:    10/13/22 1510   Temp: 98 3 °F (36 8 °C)   TempSrc: Temporal   Weight: 94 5 kg (208 lb 6 4 oz)   Height: 5' 9" (1 753 m)           Specific Alerts:    Have you been seen by a [facility] Dermatologist in the last 3 years? YES    Are you pregnant or planning to become pregnant? N/A    Are you currently or planning to be nursing or breast feeding? N/A    No Known Allergies    May we call your Preferred Phone number to discuss your specific medical information? YES    May we leave a detailed message that includes your specific medical information? YES    Have you traveled outside of the [facility] in the past 3 months? No    Do you currently have a pacemaker or defibrillator? No    Do you have any artificial heart valves, joints, plates, screws, rods, stents, pins, etc? No   - If Yes, were any placed within the last 2 years? Do you require any medications prior to a surgical procedure? No   - If Yes, for which procedure? - If Yes, what medications to you require?      Are you taking any medications that cause you to bleed more easily ("blood thinners") YES    Have you ever experienced a rapid heartbeat with epinephrine? No    Have you ever been treated with "gold" (gold sodium thiomalate) therapy? No    [facility] help with wrinkles, "laugh lines," facial volume loss, "double chin," "love handles," age spots, and more  Are you interested in learning today about some of the skin enhancement procedures that we offer? (If Yes, please provide more detail) No    Review of Systems:  Have you recently had or currently have any of the following? · Fever or chills: No  · Night Sweats: No  · Headaches: No  · Weight Gain: No  · Weight Loss: No  · Blurry Vision: No  · Nausea: No  · Vomiting: No  · Diarrhea: No  · Blood in Stool: No  · Abdominal Pain: No  · Itchy Skin: No  · Painful Joints: No  · Swollen Joints: No  · Muscle Pain: No  · Irregular Mole: No  · Sun Burn: No  · Dry Skin: No  · Skin Color Changes: No  · Scar or Keloid: No  · Cold Sores/Fever Blisters: No  · Bacterial Infections/MRSA: No  · Anxiety: No  · Depression: No  · Suicidal or Homicidal Thoughts: No      PSYCH: Normal mood and affect  EYES: Normal conjunctiva  ENT: Normal lips and oral mucosa  CARDIOVASCULAR: No edema  RESPIRATORY: Normal respirations  HEME/LYMPH/IMMUNO:  No ostnesible subQ swelling except as noted below in ASSESSMENT AND PLAN BY DIAGNOSIS    FULL ORGAN SYSTEM SKIN EXAM (SKIN)   Hair, , Face Normal except as noted below in Assessment                               Right Leg Normal except as noted below in Assessment           PROCEDURE NOTE:  PUNCH BIOPSY      Performing Physician: Dr Ang    Anatomic Location; Clinical Description with size (cm); Pre-Op Diagnosis:   · Specimen A:Right inferior medial leg  ; hyperpigmented patch DDx: melanocytic lesion       Anesthesia: 1% xylocaine with epi       Topical anesthesia: None       Indications: To indicate diagnosis and management plan      Procedure Details     Patient informed of the risks (including bleeding,scaring and infection) and benefits of the procedure explained  Verbal and written informed consent obtained  The area was prepped and draped in the usual fashion  Anesthesia was obtained with 1% lidocaine with epinephrine  The skin was then stretched perpendicular to the skin tension lines and a punch biopsy to an appropriate sampling depth was obtained with a 4 mm punch with a forceps and iris scissors  Hemostasis was obtained with  4-0 Vicryl  x 1 sutures  Complications:  None      Specimen has been sent for review by Dermatopathology  PROCEDURE NOTE:  PUNCH BIOPSY      Performing Physician: Dr Ang    Anatomic Location; Clinical Description with size (cm); Pre-Op Diagnosis:   • Specimen B: right lateral medial leg hyperpigmented patch DDx: melanocytic lesion        Anesthesia: 1% xylocaine with epi       Topical anesthesia: None       Indications: To indicate diagnosis and management plan  Procedure Details     Patient informed of the risks (including bleeding,scaring and infection) and benefits of the procedure explained  Verbal and written informed consent obtained  The area was prepped and draped in the usual fashion  Anesthesia was obtained with 1% lidocaine with epinephrine  The skin was then stretched perpendicular to the skin tension lines and a punch biopsy to an appropriate sampling depth was obtained with a 4 mm punch with a forceps and iris scissors  Hemostasis was obtained with  4-0 Vicryl  x 1 sutures  Complications:  None      Specimen has been sent for review by Dermatopathology  PROCEDURE NOTE:  PUNCH BIOPSY      Performing Physician: Dr Ang    Anatomic Location; Clinical Description with size (cm); Pre-Op Diagnosis:   •  Specimen C: right medial leg hyperpigmented patch DDx: melanocytic lesion       Anesthesia: 1% xylocaine with epi       Topical anesthesia: None       Indications:  To indicate diagnosis and management plan     Procedure Details     Patient informed of the risks (including bleeding,scaring and infection) and benefits of the procedure explained  Verbal and written informed consent obtained  The area was prepped and draped in the usual fashion  Anesthesia was obtained with 1% lidocaine with epinephrine  The skin was then stretched perpendicular to the skin tension lines and a punch biopsy to an appropriate sampling depth was obtained with a 4 mm punch with a forceps and iris scissors  Hemostasis was obtained with 4-0 Vicryl  x 1 sutures  Complications:  None      Specimen has been sent for review by Dermatopathology  Plan:  1  Instructed to keep the wound dry and covered for 24-48h and clean thereafter  2  Warning signs of infection were reviewed  3  Recommended that the patient use acetaminophen as needed for pain  4  Sutures if any should be removed in 14 days      Standard post-procedure care has been explained and has been included in written form within the patient's copy of Informed Consent        Scribe Attestation    I,:  Tej Porter am acting as a scribe while in the presence of the attending physician :       I,:  Nelson Demarco MD personally performed the services described in this documentation    as scribed in my presence :         Claudia Hill

## 2022-11-08 DIAGNOSIS — C43.9 METASTATIC MELANOMA (HCC): Primary | ICD-10-CM

## 2022-11-09 ENCOUNTER — DOCUMENTATION (OUTPATIENT)
Dept: HEMATOLOGY ONCOLOGY | Facility: CLINIC | Age: 72
End: 2022-11-09

## 2022-11-09 ENCOUNTER — TELEPHONE (OUTPATIENT)
Dept: HEMATOLOGY ONCOLOGY | Facility: CLINIC | Age: 72
End: 2022-11-09

## 2022-11-09 NOTE — PROGRESS NOTES
Intake received/ Chart reviewed: 11/9/22    Pathology completed: 10/13/22    Imaging completed: n/a    All records needed are in patients chart  No records retrieval needed at this time

## 2022-11-11 ENCOUNTER — DOCUMENTATION (OUTPATIENT)
Dept: HEMATOLOGY ONCOLOGY | Facility: CLINIC | Age: 72
End: 2022-11-11

## 2022-11-17 ENCOUNTER — CONSULT (OUTPATIENT)
Dept: HEMATOLOGY ONCOLOGY | Facility: CLINIC | Age: 72
End: 2022-11-17

## 2022-11-17 ENCOUNTER — APPOINTMENT (OUTPATIENT)
Dept: LAB | Facility: CLINIC | Age: 72
End: 2022-11-17

## 2022-11-17 VITALS
TEMPERATURE: 98 F | WEIGHT: 203 LBS | DIASTOLIC BLOOD PRESSURE: 80 MMHG | OXYGEN SATURATION: 98 % | BODY MASS INDEX: 30.07 KG/M2 | RESPIRATION RATE: 14 BRPM | SYSTOLIC BLOOD PRESSURE: 136 MMHG | HEIGHT: 69 IN | HEART RATE: 78 BPM

## 2022-11-17 DIAGNOSIS — C43.9 METASTATIC MELANOMA (HCC): ICD-10-CM

## 2022-11-17 LAB
ALBUMIN SERPL BCP-MCNC: 4.1 G/DL (ref 3.5–5)
ALP SERPL-CCNC: 45 U/L (ref 34–104)
ALT SERPL W P-5'-P-CCNC: 16 U/L (ref 7–52)
ANION GAP SERPL CALCULATED.3IONS-SCNC: 8 MMOL/L (ref 4–13)
AST SERPL W P-5'-P-CCNC: 20 U/L (ref 13–39)
BASOPHILS # BLD AUTO: 0.07 THOUSANDS/ÂΜL (ref 0–0.1)
BASOPHILS NFR BLD AUTO: 1 % (ref 0–1)
BILIRUB SERPL-MCNC: 0.51 MG/DL (ref 0.2–1)
BUN SERPL-MCNC: 19 MG/DL (ref 5–25)
CALCIUM SERPL-MCNC: 9.3 MG/DL (ref 8.4–10.2)
CHLORIDE SERPL-SCNC: 104 MMOL/L (ref 96–108)
CO2 SERPL-SCNC: 27 MMOL/L (ref 21–32)
CREAT SERPL-MCNC: 0.87 MG/DL (ref 0.6–1.3)
EOSINOPHIL # BLD AUTO: 0.11 THOUSAND/ÂΜL (ref 0–0.61)
EOSINOPHIL NFR BLD AUTO: 2 % (ref 0–6)
ERYTHROCYTE [DISTWIDTH] IN BLOOD BY AUTOMATED COUNT: 13.2 % (ref 11.6–15.1)
GFR SERPL CREATININE-BSD FRML MDRD: 66 ML/MIN/1.73SQ M
GLUCOSE SERPL-MCNC: 85 MG/DL (ref 65–140)
HCT VFR BLD AUTO: 42 % (ref 34.8–46.1)
HGB BLD-MCNC: 13.2 G/DL (ref 11.5–15.4)
IMM GRANULOCYTES # BLD AUTO: 0.02 THOUSAND/UL (ref 0–0.2)
IMM GRANULOCYTES NFR BLD AUTO: 0 % (ref 0–2)
LDH SERPL-CCNC: 153 U/L (ref 140–271)
LYMPHOCYTES # BLD AUTO: 2.24 THOUSANDS/ÂΜL (ref 0.6–4.47)
LYMPHOCYTES NFR BLD AUTO: 32 % (ref 14–44)
MCH RBC QN AUTO: 29.1 PG (ref 26.8–34.3)
MCHC RBC AUTO-ENTMCNC: 31.4 G/DL (ref 31.4–37.4)
MCV RBC AUTO: 93 FL (ref 82–98)
MONOCYTES # BLD AUTO: 0.69 THOUSAND/ÂΜL (ref 0.17–1.22)
MONOCYTES NFR BLD AUTO: 10 % (ref 4–12)
NEUTROPHILS # BLD AUTO: 3.95 THOUSANDS/ÂΜL (ref 1.85–7.62)
NEUTS SEG NFR BLD AUTO: 55 % (ref 43–75)
NRBC BLD AUTO-RTO: 0 /100 WBCS
PLATELET # BLD AUTO: 209 THOUSANDS/UL (ref 149–390)
PMV BLD AUTO: 10.7 FL (ref 8.9–12.7)
POTASSIUM SERPL-SCNC: 4.3 MMOL/L (ref 3.5–5.3)
PROT SERPL-MCNC: 7.1 G/DL (ref 6.4–8.4)
RBC # BLD AUTO: 4.53 MILLION/UL (ref 3.81–5.12)
SODIUM SERPL-SCNC: 139 MMOL/L (ref 135–147)
WBC # BLD AUTO: 7.08 THOUSAND/UL (ref 4.31–10.16)

## 2022-11-17 NOTE — LETTER
November 22, 2022     DO [address]    Patient: Don Cruz   YOB: 1950   Date of Visit: 11/17/2022       Dear Dr Rose Gómez: Thank you for referring Don Cruz to me for evaluation  Below are my notes for this consultation  If you have questions, please do not hesitate to call me  I look forward to following your patient along with you  Sincerely,        Daisy Radford MD        CC: MD Shiela Chicas, Mayte Elder MD  11/22/2022 10:42 AM  Sign when Signing Visit  [address]  [phone]     Date of Visit: 78/70/0372  Name: Don Cruz   YOB: 1950        Subjective    VISIT DIAGNOSIS:  Diagnoses and all orders for this visit:    Metastatic melanoma [facility])  -     Ambulatory Referral to Hematology / Oncology  -     CBC and differential; Future  -     Comprehensive metabolic panel; Future  -     LD,Blood; Future  -     NM pet ct tumor imaging whole body; Future        Oncology History   Metastatic melanoma ([facility] [address] )   10/13/2022 -  Cancer Staged    Staging form: Melanoma of the Skin, AJCC 8th Edition  - Clinical stage from 10/13/2022: Stage IV (cTX, cNX, cM1a) - Signed by Daisy Radford MD on 11/22/2022 11/22/2022 Initial Diagnosis    Metastatic melanoma ([facility] [address] )        Cancer Staging   Metastatic melanoma [facility])  Staging form: Melanoma of the Skin, AJCC 8th Edition  - Clinical stage from 10/13/2022: Stage IV (cTX, cNX, cM1a) - Signed by Daisy Radford MD on 11/22/2022         HISTORY OF PRESENT ILLNESS: Don Cruz is a 67 y o  female who is seen in consultation for a new diagnosis of melanoma at the request of Dr Jacobs Dash  History is obtained form the patient, her daughter, review of records, and discussion with Dr Yandel Wallsasio Kaylah states that she as not had any previous skin issues or skin cancer diagnoses  She states that she wears compression stockings and one day, in June 2022, she noticeda black sohan on the lateral side of her right lower leg that appeared all of a sudden  There was no pain, itching, or bleeding associated with the lesion  Nothing improved the lesion and nothing made it worse  She saw her PCP in August whom she showed the lesion to and was referred to Dermatology  She saw Dr Ruma Vincent and initial pathology from biopsy on 8/25/2022 demonstrated a portion of dermal melanocytic proliferation with cytologic atypia and blue nevus like architecture  The differential diagnosis for the finding on a small portion of the lesion was concerning for primary cutaneous melanoma or metastatic melanoma with blue nevus like architecture  It could also be a portion of a blue nevus, eruptive type, with atypical cells and seen in atypical forms a blue nevus  It was recommended that she undergo additional biopsies  She underwent additional biopsies on 10/13/2022 of her right leg masseter both lateral leg and medial leg which demonstrates severely atypical melanocytic dermal proliferation with prominent melanosis consistent with melanoma  For all lesion sampled histopathological findings were consistent with melanoma, with features most consistent with metastatic melanoma with blue nevus like architectural prominent melanosis  She was then referred to myself and surgical oncology  She is doing well today and feels good  No issues or complaints  She denies any additional concerning skin lesions  Of note, she was diangosed with DVT and PE in 2019 and is on lifelong anticoagulation with Eliquis  She denies having normal sun exposure when she was younger  She describes having few sunburns and no blistering sunburns  She denies tanning bed use  She has a family history of melanoma in her brother  Her father had lung cancer - he was a smoker and had TB in 26    She denies a family history of breast, ovarian, or pnacreatic cancer  She has two daughters  She has brown hair, oliver eyes, and is [place] in descent  She is up to date with cologuard and mammograms  Has not had PAP recently  REVIEW OF SYSTEMS:  Review of Systems   Constitutional: Negative for appetite change, fatigue, fever and unexpected weight change  HENT:   Negative for lump/mass  Eyes: Negative for icterus  Respiratory: Negative for cough, shortness of breath and wheezing  Cardiovascular: Positive for leg swelling (miniaml, uses compression stockings)  Gastrointestinal: Negative for abdominal pain, constipation, diarrhea, nausea and vomiting  Genitourinary: Negative for difficulty urinating and hematuria  Musculoskeletal: Negative for arthralgias, gait problem and myalgias  Skin: Negative for itching and rash  No new, changing, or concerning lesions  Neurological: Negative for extremity weakness, gait problem, headaches, light-headedness and numbness  Hematological: Negative for adenopathy          MEDICATIONS:    Current Outpatient Medications:   •  apixaban (Eliquis) 5 mg, Take 1 tablet (5 mg total) by mouth every 12 (twelve) hours, Disp: 60 tablet, Rfl: 5  •  calcium carbonate (OS-VIRGIE) 600 MG tablet, Take 600 mg by mouth 2 (two) times a day, Disp: , Rfl:   •  losartan (Cozaar) 100 MG tablet, Take 1 tablet (100 mg total) by mouth daily, Disp: 30 tablet, Rfl: 5  •  metoprolol succinate (TOPROL-XL) 25 mg 24 hr tablet, Take 1 tablet (25 mg total) by mouth 2 (two) times a day, Disp: 60 tablet, Rfl: 5  •  Multiple Vitamins-Minerals (MULTIVITAMIN WITH MINERALS) tablet, Take 1 tablet by mouth daily, Disp: , Rfl:   •  patient supplied medication, Pt takes eye drops Mura 128 - not listed in database, Disp: , Rfl:      ALLERGIES:  No Known Allergies     ACTIVE PROBLEMS:  Patient Active Problem List   Diagnosis   • Acute massive pulmonary embolism ([address] )   • Venous stasis of both lower extremities   • Clostridium difficile infection   • Right ventricular systolic dysfunction without heart failure   • Essential hypertension   • Metastatic melanoma ([facility] [address] )          PAST MEDICAL HISTORY:   Past Medical History:   Diagnosis Date   • Acute deep vein thrombosis (DVT) of popliteal vein of left lower extremity (HCC)    • Ankle wound, left, initial encounter     Resolved 2017   • Ankle wound, right, initial encounter     resolved 2017   • DVT (deep vein thrombosis) in pregnancy    • [facility]      post C section   • Pulmonary embolism ([facility] )     2017   • Pulmonary embolism, blood-clot, obstetric    • Venous stasis ulcer ([facility] )    • Venous ulcers of both lower extremities ([facility] )         PAST SURGICAL HISTORY:  Past Surgical History:   Procedure Laterality Date   •  SECTION      X2        SOCIAL HISTORY:  Social History     Socioeconomic History   • Marital status:       Spouse name: None   • Number of children: None   • Years of education: None   • Highest education level: None   Occupational History   • None   Tobacco Use   • Smoking status: Never   • Smokeless tobacco: Never   Vaping Use   • Vaping Use: Never used   Substance and Sexual Activity   • Alcohol use: No     Comment: hx of social drinker   • Drug use: No   • Sexual activity: None   Other Topics Concern   • None   Social History Narrative   • None     Social Determinants of Health     Financial Resource Strain: Not on file   Food Insecurity: Not on file   Transportation Needs: Not on file   Physical Activity: Not on file   Stress: Not on file   Social Connections: Not on file   Intimate Partner Violence: Not on file   Housing Stability: Not on file        FAMILY HISTORY:  Family History   Problem Relation Age of Onset   • Heart attack Mother    • Stroke Mother    • Cancer Father    • Bone cancer Family    • Hypertension Family    • Lung cancer Family            Objective    PHYSICAL EXAMINATION:   Blood pressure 136/80, pulse 78, temperature 98 °F (36 7 °C), temperature source Temporal, resp  rate 14, height 5' 9" (1 753 m), weight 92 1 kg (203 lb), SpO2 98 %  Pain Score: 0-No pain     ECOG Performance Status    Flowsheet Row Most Recent Value   ECOG Performance Status 0 - Fully active, able to carry on all pre-disease performance without restriction             Physical Exam  Constitutional:       General: She is not in acute distress  Appearance: Normal appearance  She is not toxic-appearing  HENT:      Mouth/Throat:      Mouth: Mucous membranes are moist       Pharynx: Oropharynx is clear  Eyes:      General: No scleral icterus  Cardiovascular:      Rate and Rhythm: Normal rate and regular rhythm  Pulses: Normal pulses  Heart sounds: No murmur heard  No friction rub  No gallop  Pulmonary:      Effort: Pulmonary effort is normal  No respiratory distress  Breath sounds: Normal breath sounds  No wheezing or rales  Abdominal:      General: There is no distension  Palpations: There is no mass  Tenderness: There is no abdominal tenderness  There is no rebound  Musculoskeletal:         General: No swelling or tenderness  Right lower leg: No edema  Left lower leg: No edema  Lymphadenopathy:      Head:      Right side of head: No submandibular, preauricular or posterior auricular adenopathy  Left side of head: No submandibular, preauricular or posterior auricular adenopathy  Cervical: No cervical adenopathy  Right cervical: No superficial or posterior cervical adenopathy  Left cervical: No superficial or posterior cervical adenopathy  Upper Body:      Right upper body: No supraclavicular or axillary adenopathy  Left upper body: No supraclavicular or axillary adenopathy  Lower Body: No right inguinal adenopathy  No left inguinal adenopathy  Skin:     Findings: No rash        Comments: Patches of pigmentation on her right lower leg - both laterally and medially  Neurological:      General: No focal deficit present  Mental Status: She is alert and oriented to person, place, and time  Psychiatric:         Mood and Affect: Mood normal          Behavior: Behavior normal          Thought Content: Thought content normal          Judgment: Judgment normal          I reviewed lab data in the chart      WBC   Date Value Ref Range Status   11/17/2022 7 08 4 31 - 10 16 Thousand/uL Final   08/17/2022 5 28 4 31 - 10 16 Thousand/uL Final   08/03/2020 8 10 4 80 - 10 80 Thousand/uL Final     Hemoglobin   Date Value Ref Range Status   11/17/2022 13 2 11 5 - 15 4 g/dL Final   08/17/2022 13 3 11 5 - 15 4 g/dL Final   08/03/2020 15 1 12 0 - 16 0 g/dL Final     Platelets   Date Value Ref Range Status   11/17/2022 209 149 - 390 Thousands/uL Final   08/17/2022 178 149 - 390 Thousands/uL Final   08/03/2020 175 149 - 390 Thousands/uL Final     MCV   Date Value Ref Range Status   11/17/2022 93 82 - 98 fL Final   08/17/2022 92 82 - 98 fL Final   08/03/2020 90 81 - 99 fL Final      Potassium   Date Value Ref Range Status   11/17/2022 4 3 3 5 - 5 3 mmol/L Final   08/17/2022 4 2 3 5 - 5 3 mmol/L Final   08/03/2020 3 6 3 5 - 5 5 mmol/L Final     Chloride   Date Value Ref Range Status   11/17/2022 104 96 - 108 mmol/L Final   08/17/2022 104 96 - 108 mmol/L Final   08/03/2020 101 98 - 107 mmol/L Final     CO2   Date Value Ref Range Status   11/17/2022 27 21 - 32 mmol/L Final   08/17/2022 30 21 - 32 mmol/L Final   08/03/2020 26 21 - 31 mmol/L Final     BUN   Date Value Ref Range Status   11/17/2022 19 5 - 25 mg/dL Final   08/17/2022 16 5 - 25 mg/dL Final   08/03/2020 23 7 - 25 mg/dL Final     Creatinine   Date Value Ref Range Status   11/17/2022 0 87 0 60 - 1 30 mg/dL Final     Comment:     Standardized to IDMS reference method   08/17/2022 0 90 0 60 - 1 30 mg/dL Final     Comment:     Standardized to IDMS reference method   08/03/2020 0 94 0 60 - 1 20 mg/dL Final Comment:     Standardized to IDMS reference method     Glucose   Date Value Ref Range Status   11/17/2022 85 65 - 140 mg/dL Final     Comment:     If the patient is fasting, the ADA then defines impaired fasting glucose as > 100 mg/dL and diabetes as > or equal to 123 mg/dL  Specimen collection should occur prior to Sulfasalazine administration due to the potential for falsely depressed results  Specimen collection should occur prior to Sulfapyridine administration due to the potential for falsely elevated results  08/03/2020 112 (H) 65 - 99 mg/dL Final     Comment:     If the patient is fasting, the ADA then defines impaired fasting glucose as > 100 mg/dL and diabetes as > or equal to 123 mg/dL  Specimen collection should occur prior to Sulfasalazine administration due to the potential for falsely depressed results  Specimen collection should occur prior to Sulfapyridine administration due to the potential for falsely elevated results  01/24/2017 91 65 - 140 mg/dL Final     Comment:     If the patient is fasting, the ADA then defines impaired fasting glucose as > 100 mg/dL and diabetes as > or equal to 123 mg/dL  Calcium   Date Value Ref Range Status   11/17/2022 9 3 8 4 - 10 2 mg/dL Final   08/17/2022 8 8 8 3 - 10 1 mg/dL Final   08/03/2020 9 5 8 6 - 10 5 mg/dL Final     Albumin   Date Value Ref Range Status   11/17/2022 4 1 3 5 - 5 0 g/dL Final   08/17/2022 3 6 3 5 - 5 0 g/dL Final   03/18/2020 3 7 3 5 - 5 0 g/dL Final     Total Bilirubin   Date Value Ref Range Status   11/17/2022 0 51 0 20 - 1 00 mg/dL Final   08/17/2022 0 56 0 20 - 1 00 mg/dL Final     Comment:     Use of this assay is not recommended for patients undergoing treatment with eltrombopag due to the potential for falsely elevated results     03/18/2020 0 56 0 20 - 1 00 mg/dL Final     Alkaline Phosphatase   Date Value Ref Range Status   11/17/2022 45 34 - 104 U/L Final   08/17/2022 49 46 - 116 U/L Final   03/18/2020 50 46 - 116 U/L Final     AST   Date Value Ref Range Status   11/17/2022 20 13 - 39 U/L Final     Comment:     Specimen collection should occur prior to Sulfasalazine administration due to the potential for falsely depressed results  08/17/2022 17 5 - 45 U/L Final     Comment:     Specimen collection should occur prior to Sulfasalazine administration due to the potential for falsely depressed results  03/18/2020 17 5 - 45 U/L Final     Comment:       Specimen collection should occur prior to Sulfasalazine administration due to the potential for falsely depressed results  ALT   Date Value Ref Range Status   11/17/2022 16 7 - 52 U/L Final     Comment:     Specimen collection should occur prior to Sulfasalazine administration due to the potential for falsely depressed results  08/17/2022 24 12 - 78 U/L Final     Comment:     Specimen collection should occur prior to Sulfasalazine administration due to the potential for falsely depressed results  03/18/2020 27 12 - 78 U/L Final     Comment:       Specimen collection should occur prior to Sulfasalazine and/or Sulfapyridine administration due to the potential for falsely depressed results  LD   Date Value Ref Range Status   11/17/2022 153 140 - 271 U/L Final     No results found for: TSH  No results found for: [id number]   No results found for: FREET4      RECENT IMAGING:  No results found  Assessment/Plan  Ms Angelika Bergeron is a 67 yr female with new diagnosis of melanoma concerning for metastatic disease here for disease management discussion    1  Metastatic melanoma ([facility] [address] )  I had an extensive discussion with the patient and her daughter  regarding her  diagnosis, prognosis, and recommendations for further management  We reviewed her melanoma history, current findings, pathology and imaging tests  We discussed that pathology is concerning for metastatic melanoma and melanosis and need to evaluate for disease site origin as well as extent of disease    Explained that we will get PET scan and brain MRI for official staging  She has an appointment set up with surgical oncology as well for discussion of potential resection  Given extent of pigmentation and lesions on her right lower leg, I feel that she will not be a surgical candidate  We discussed that once we have imaging, we can determine next steps and potential treatment  We discussed that treatment mainstay for metastatic melanoma is based on two principles - targeted therapy and immunotherapy  We discussed that she will require ongoing monitoring and surveillance, both for disease recurrence as well as a new primary melanoma as well as other nonmelanoma skin cancers  This includes physical exams and labs, including a comprehensive metabolic panel, CBC with differential and LDH; periodic imaging studies with either CT chest, abdomen and pelvis or PET/CT scans every 6 months  We discussed the importance of regular cutaneous self examinations and reviewed the features of lesions that could be concerning for skin cancer  I did explain that she  is at risk for developing another primary melanoma as well  We also discussed avoidance of unnecessary sun exposure and use of sun protective clothing and sunscreen  I also recommended routine follow up and skin checks with dermatology  Family Screening: her  first-degree relatives, namely his siblings, parents, and children, have an increased risk of developing a melanoma over the general population given her  diagnosis of melanoma, and should have annual dermatologic screening           - Ambulatory Referral to Hematology / Oncology  - CBC and differential; Future  - Comprehensive metabolic panel; Future  - LD,Blood; Future  - NM pet ct tumor imaging whole body; Future      She will follow up after imaging  Ms Alexandra Forbes had all her questions and concerns addressed and she knows to call with any additional issues    Thank you for allowing me to participate in Ms  Damon's care        Ebony Perez MD, PhD

## 2022-11-22 ENCOUNTER — TELEPHONE (OUTPATIENT)
Dept: SURGICAL ONCOLOGY | Facility: CLINIC | Age: 72
End: 2022-11-22

## 2022-11-22 PROBLEM — C43.9 METASTATIC MELANOMA (HCC): Status: ACTIVE | Noted: 2022-11-22

## 2022-11-22 NOTE — TELEPHONE ENCOUNTER
Good OnofreChristina [birth year] is due for a PET scan on 11/29/2022 and unfortunately this request is currently pending denial      Reason: The following notes were requested but have not been sent: recent doctor's notes telling us about this problem such as how long you have had it and how it is getting worse  We need to know what your exam by your doctor shows  If you had other tests done (such as labs, prior imaging, or other tests), the reports should be sent  All available clinical was uploaded to the case however the office note from 11/17/2022 has not yet been completed  Please let me know as soon as this note is completed so it may be uploaded to case  [facility] # [phone]    Tracking # [id number]    Per Hospital policy an authorization will need to be in place by 2pm Friday 11/25 in order for the appointment to remain on the schedule  Thank you

## 2022-11-22 NOTE — PROGRESS NOTES
[facility] HEMATOLOGY ONCOLOGY SPECIALISTS [address]  [facility] [address]  [phone]     Date of Visit: 57/33/3680  Name: Savanah Valdovinos   YOB: 1950        Subjective    VISIT DIAGNOSIS:  Diagnoses and all orders for this visit:    Metastatic melanoma [facility])  -     Ambulatory Referral to Hematology / Oncology  -     CBC and differential; Future  -     Comprehensive metabolic panel; Future  -     LD,Blood; Future  -     NM pet ct tumor imaging whole body; Future        Oncology History   Metastatic melanoma ([facility] [address] )   10/13/2022 -  Cancer Staged    Staging form: Melanoma of the Skin, AJCC 8th Edition  - Clinical stage from 10/13/2022: Stage IV (cTX, cNX, cM1a) - Signed by Letha Pardo MD on 11/22/2022 11/22/2022 Initial Diagnosis    [facility]        Cancer Staging   Metastatic melanoma [facility])  Staging form: Melanoma of the Skin, AJCC 8th Edition  - Clinical stage from 10/13/2022: Stage IV (cTX, cNX, cM1a) - Signed by Letha Pardo MD on 11/22/2022         HISTORY OF PRESENT ILLNESS: Savanah Valdovinos is a 67 y o  female who is seen in consultation for a new diagnosis of melanoma at the request of Dr Dylon Du  History is obtained form the patient, her daughter, review of records, and discussion with Dr Ismael Gates  Ms Kat Leiva states that she as not had any previous skin issues or skin cancer diagnoses  She states that she wears compression stockings and one day, in June 2022, she noticeda black sohan on the lateral side of her right lower leg that appeared all of a sudden  There was no pain, itching, or bleeding associated with the lesion  Nothing improved the lesion and nothing made it worse  She saw her PCP in August whom she showed the lesion to and was referred to Dermatology    She saw Dr Ros Diaz and initial pathology from biopsy on 8/25/2022 demonstrated a portion of dermal melanocytic proliferation with cytologic atypia and blue nevus like architecture  The differential diagnosis for the finding on a small portion of the lesion was concerning for primary cutaneous melanoma or metastatic melanoma with blue nevus like architecture  It could also be a portion of a blue nevus, eruptive type, with atypical cells and seen in atypical forms a blue nevus  It was recommended that she undergo additional biopsies  She underwent additional biopsies on 10/13/2022 of her right leg masseter both lateral leg and medial leg which demonstrates severely atypical melanocytic dermal proliferation with prominent melanosis consistent with melanoma  For all lesion sampled histopathological findings were consistent with melanoma, with features most consistent with metastatic melanoma with blue nevus like architectural prominent melanosis  She was then referred to myself and surgical oncology  She is doing well today and feels good  No issues or complaints  She denies any additional concerning skin lesions  Of note, she was diangosed with DVT and PE in 2019 and is on lifelong anticoagulation with Eliquis  She denies having normal sun exposure when she was younger  She describes having few sunburns and no blistering sunburns  She denies tanning bed use  She has a family history of melanoma in her brother  Her father had lung cancer - he was a smoker and had TB in 26  She denies a family history of breast, ovarian, or pnacreatic cancer  She has two daughters  She has brown hair, oliver eyes, and is [place] in descent  She is up to date with cologuard and mammograms  Has not had PAP recently  REVIEW OF SYSTEMS:  Review of Systems   Constitutional: Negative for appetite change, fatigue, fever and unexpected weight change  HENT:   Negative for lump/mass  Eyes: Negative for icterus  Respiratory: Negative for cough, shortness of breath and wheezing      Cardiovascular: Positive for leg swelling (miniaml, uses compression stockings)  Gastrointestinal: Negative for abdominal pain, constipation, diarrhea, nausea and vomiting  Genitourinary: Negative for difficulty urinating and hematuria  Musculoskeletal: Negative for arthralgias, gait problem and myalgias  Skin: Negative for itching and rash  No new, changing, or concerning lesions  Neurological: Negative for extremity weakness, gait problem, headaches, light-headedness and numbness  Hematological: Negative for adenopathy          MEDICATIONS:    Current Outpatient Medications:   •  apixaban (Eliquis) 5 mg, Take 1 tablet (5 mg total) by mouth every 12 (twelve) hours, Disp: 60 tablet, Rfl: 5  •  calcium carbonate (OS-VIRGIE) 600 MG tablet, Take 600 mg by mouth 2 (two) times a day, Disp: , Rfl:   •  losartan (Cozaar) 100 MG tablet, Take 1 tablet (100 mg total) by mouth daily, Disp: 30 tablet, Rfl: 5  •  metoprolol succinate (TOPROL-XL) 25 mg 24 hr tablet, Take 1 tablet (25 mg total) by mouth 2 (two) times a day, Disp: 60 tablet, Rfl: 5  •  Multiple Vitamins-Minerals (MULTIVITAMIN WITH MINERALS) tablet, Take 1 tablet by mouth daily, Disp: , Rfl:   •  patient supplied medication, Pt takes eye drops Mura 128 - not listed in database, Disp: , Rfl:      ALLERGIES:  No Known Allergies     ACTIVE PROBLEMS:  Patient Active Problem List   Diagnosis   • Acute massive pulmonary embolism ([address] )   • Venous stasis of both lower extremities   • Clostridium difficile infection   • Right ventricular systolic dysfunction without heart failure   • Essential hypertension   • Metastatic melanoma ([address] )          PAST MEDICAL HISTORY:   Past Medical History:   Diagnosis Date   • Acute deep vein thrombosis (DVT) of popliteal vein of left lower extremity ([address] )    • Ankle wound, left, initial encounter     Resolved 6/2017   • Ankle wound, right, initial encounter     resolved 6/2017   • DVT (deep vein thrombosis) in pregnancy    • Pulmonary emboli ([address] )     1989 post C section   • Pulmonary embolism ([facility] )     2017   • Pulmonary embolism, blood-clot, obstetric    • Venous stasis ulcer ([facility] )    • Venous ulcers of both lower extremities (HCC)         PAST SURGICAL HISTORY:  Past Surgical History:   Procedure Laterality Date   •  SECTION      X2        SOCIAL HISTORY:  Social History     Socioeconomic History   • Marital status:      Spouse name: None   • Number of children: None   • Years of education: None   • Highest education level: None   Occupational History   • None   Tobacco Use   • Smoking status: Never   • Smokeless tobacco: Never   Vaping Use   • Vaping Use: Never used   Substance and Sexual Activity   • Alcohol use: No     Comment: hx of social drinker   • Drug use: No   • Sexual activity: None   Other Topics Concern   • None   Social History Narrative   • None     Social Determinants of Health     Financial Resource Strain: Not on file   Food Insecurity: Not on file   Transportation Needs: Not on file   Physical Activity: Not on file   Stress: Not on file   Social Connections: Not on file   Intimate Partner Violence: Not on file   Housing Stability: Not on file        FAMILY HISTORY:  Family History   Problem Relation Age of Onset   • Heart attack Mother    • Stroke Mother    • Cancer Father    • Bone cancer Family    • Hypertension Family    • Lung cancer Family            Objective    PHYSICAL EXAMINATION:   Blood pressure 136/80, pulse 78, temperature 98 °F (36 7 °C), temperature source Temporal, resp  rate 14, height 5' 9" (1 753 m), weight 92 1 kg (203 lb), SpO2 98 %  Pain Score: 0-No pain     ECOG Performance Status    Flowsheet Row Most Recent Value   ECOG Performance Status 0 - Fully active, able to carry on all pre-disease performance without restriction             Physical Exam  Constitutional:       General: She is not in acute distress  Appearance: Normal appearance  She is not toxic-appearing     HENT:      Mouth/Throat:      Mouth: Mucous membranes are moist       Pharynx: Oropharynx is clear  Eyes:      General: No scleral icterus  Cardiovascular:      Rate and Rhythm: Normal rate and regular rhythm  Pulses: Normal pulses  Heart sounds: No murmur heard  No friction rub  No gallop  Pulmonary:      Effort: Pulmonary effort is normal  No respiratory distress  Breath sounds: Normal breath sounds  No wheezing or rales  Abdominal:      General: There is no distension  Palpations: There is no mass  Tenderness: There is no abdominal tenderness  There is no rebound  Musculoskeletal:         General: No swelling or tenderness  Right lower leg: No edema  Left lower leg: No edema  Lymphadenopathy:      Head:      Right side of head: No submandibular, preauricular or posterior auricular adenopathy  Left side of head: No submandibular, preauricular or posterior auricular adenopathy  Cervical: No cervical adenopathy  Right cervical: No superficial or posterior cervical adenopathy  Left cervical: No superficial or posterior cervical adenopathy  Upper Body:      Right upper body: No supraclavicular or axillary adenopathy  Left upper body: No supraclavicular or axillary adenopathy  Lower Body: No right inguinal adenopathy  No left inguinal adenopathy  Skin:     Findings: No rash  Comments: Patches of pigmentation on her right lower leg - both laterally and medially  Neurological:      General: No focal deficit present  Mental Status: She is alert and oriented to person, place, and time  Psychiatric:         Mood and Affect: Mood normal          Behavior: Behavior normal          Thought Content: Thought content normal          Judgment: Judgment normal          I reviewed lab data in the chart      WBC   Date Value Ref Range Status   11/17/2022 7 08 4 31 - 10 16 Thousand/uL Final   08/17/2022 5 28 4 31 - 10 16 Thousand/uL Final   08/03/2020 8 10 4 80 - 10 80 Thousand/uL Final Hemoglobin   Date Value Ref Range Status   11/17/2022 13 2 11 5 - 15 4 g/dL Final   08/17/2022 13 3 11 5 - 15 4 g/dL Final   08/03/2020 15 1 12 0 - 16 0 g/dL Final     Platelets   Date Value Ref Range Status   11/17/2022 209 149 - 390 Thousands/uL Final   08/17/2022 178 149 - 390 Thousands/uL Final   08/03/2020 175 149 - 390 Thousands/uL Final     MCV   Date Value Ref Range Status   11/17/2022 93 82 - 98 fL Final   08/17/2022 92 82 - 98 fL Final   08/03/2020 90 81 - 99 fL Final      Potassium   Date Value Ref Range Status   11/17/2022 4 3 3 5 - 5 3 mmol/L Final   08/17/2022 4 2 3 5 - 5 3 mmol/L Final   08/03/2020 3 6 3 5 - 5 5 mmol/L Final     Chloride   Date Value Ref Range Status   11/17/2022 104 96 - 108 mmol/L Final   08/17/2022 104 96 - 108 mmol/L Final   08/03/2020 101 98 - 107 mmol/L Final     CO2   Date Value Ref Range Status   11/17/2022 27 21 - 32 mmol/L Final   08/17/2022 30 21 - 32 mmol/L Final   08/03/2020 26 21 - 31 mmol/L Final     BUN   Date Value Ref Range Status   11/17/2022 19 5 - 25 mg/dL Final   08/17/2022 16 5 - 25 mg/dL Final   08/03/2020 23 7 - 25 mg/dL Final     Creatinine   Date Value Ref Range Status   11/17/2022 0 87 0 60 - 1 30 mg/dL Final     Comment:     Standardized to IDMS reference method   08/17/2022 0 90 0 60 - 1 30 mg/dL Final     Comment:     Standardized to IDMS reference method   08/03/2020 0 94 0 60 - 1 20 mg/dL Final     Comment:     Standardized to IDMS reference method     Glucose   Date Value Ref Range Status   11/17/2022 85 65 - 140 mg/dL Final     Comment:     If the patient is fasting, the ADA then defines impaired fasting glucose as > 100 mg/dL and diabetes as > or equal to 123 mg/dL  Specimen collection should occur prior to Sulfasalazine administration due to the potential for falsely depressed results  Specimen collection should occur prior to Sulfapyridine administration due to the potential for falsely elevated results     08/03/2020 112 (H) 65 - 99 mg/dL Final     Comment:     If the patient is fasting, the ADA then defines impaired fasting glucose as > 100 mg/dL and diabetes as > or equal to 123 mg/dL  Specimen collection should occur prior to Sulfasalazine administration due to the potential for falsely depressed results  Specimen collection should occur prior to Sulfapyridine administration due to the potential for falsely elevated results  01/24/2017 91 65 - 140 mg/dL Final     Comment:     If the patient is fasting, the ADA then defines impaired fasting glucose as > 100 mg/dL and diabetes as > or equal to 123 mg/dL  Calcium   Date Value Ref Range Status   11/17/2022 9 3 8 4 - 10 2 mg/dL Final   08/17/2022 8 8 8 3 - 10 1 mg/dL Final   08/03/2020 9 5 8 6 - 10 5 mg/dL Final     Albumin   Date Value Ref Range Status   11/17/2022 4 1 3 5 - 5 0 g/dL Final   08/17/2022 3 6 3 5 - 5 0 g/dL Final   03/18/2020 3 7 3 5 - 5 0 g/dL Final     Total Bilirubin   Date Value Ref Range Status   11/17/2022 0 51 0 20 - 1 00 mg/dL Final   08/17/2022 0 56 0 20 - 1 00 mg/dL Final     Comment:     Use of this assay is not recommended for patients undergoing treatment with eltrombopag due to the potential for falsely elevated results  03/18/2020 0 56 0 20 - 1 00 mg/dL Final     Alkaline Phosphatase   Date Value Ref Range Status   11/17/2022 45 34 - 104 U/L Final   08/17/2022 49 46 - 116 U/L Final   03/18/2020 50 46 - 116 U/L Final     AST   Date Value Ref Range Status   11/17/2022 20 13 - 39 U/L Final     Comment:     Specimen collection should occur prior to Sulfasalazine administration due to the potential for falsely depressed results  08/17/2022 17 5 - 45 U/L Final     Comment:     Specimen collection should occur prior to Sulfasalazine administration due to the potential for falsely depressed results      03/18/2020 17 5 - 45 U/L Final     Comment:       Specimen collection should occur prior to Sulfasalazine administration due to the potential for falsely depressed results  ALT   Date Value Ref Range Status   11/17/2022 16 7 - 52 U/L Final     Comment:     Specimen collection should occur prior to Sulfasalazine administration due to the potential for falsely depressed results  08/17/2022 24 12 - 78 U/L Final     Comment:     Specimen collection should occur prior to Sulfasalazine administration due to the potential for falsely depressed results  03/18/2020 27 12 - 78 U/L Final     Comment:       Specimen collection should occur prior to Sulfasalazine and/or Sulfapyridine administration due to the potential for falsely depressed results  LD   Date Value Ref Range Status   11/17/2022 153 140 - 271 U/L Final     No results found for: TSH  No results found for: [id number]   No results found for: FREET4      RECENT IMAGING:  No results found  Assessment/Plan  Ms Skinny Barnett is a 67 yr female with new diagnosis of melanoma concerning for metastatic disease here for disease management discussion    1  Metastatic melanoma ([facility] [address] )  I had an extensive discussion with the patient and her daughter  regarding her  diagnosis, prognosis, and recommendations for further management  We reviewed her melanoma history, current findings, pathology and imaging tests  We discussed that pathology is concerning for metastatic melanoma and melanosis and need to evaluate for disease site origin as well as extent of disease  Explained that we will get PET scan and brain MRI for official staging  She has an appointment set up with surgical oncology as well for discussion of potential resection  Given extent of pigmentation and lesions on her right lower leg, I feel that she will not be a surgical candidate  We discussed that once we have imaging, we can determine next steps and potential treatment  We discussed that treatment mainstay for metastatic melanoma is based on two principles - targeted therapy and immunotherapy        We discussed that she will require ongoing monitoring and surveillance, both for disease recurrence as well as a new primary melanoma as well as other nonmelanoma skin cancers  This includes physical exams and labs, including a comprehensive metabolic panel, CBC with differential and LDH; periodic imaging studies with either CT chest, abdomen and pelvis or PET/CT scans every 6 months  We discussed the importance of regular cutaneous self examinations and reviewed the features of lesions that could be concerning for skin cancer  I did explain that she  is at risk for developing another primary melanoma as well  We also discussed avoidance of unnecessary sun exposure and use of sun protective clothing and sunscreen  I also recommended routine follow up and skin checks with dermatology  Family Screening: her  first-degree relatives, namely his siblings, parents, and children, have an increased risk of developing a melanoma over the general population given her  diagnosis of melanoma, and should have annual dermatologic screening           - Ambulatory Referral to Hematology / Oncology  - CBC and differential; Future  - Comprehensive metabolic panel; Future  - LD,Blood; Future  - NM pet ct tumor imaging whole body; Future      She will follow up after imaging  Ms Claudia Espino had all her questions and concerns addressed and she knows to call with any additional issues  Thank you for allowing me to participate in Ms Damon's care        Skylar Johns MD, PhD

## 2022-11-29 ENCOUNTER — HOSPITAL ENCOUNTER (OUTPATIENT)
Dept: RADIOLOGY | Age: 72
Discharge: HOME | End: 2022-11-29

## 2022-11-29 DIAGNOSIS — C43.9 METASTATIC MELANOMA (HCC): ICD-10-CM

## 2022-11-29 LAB — GLUCOSE SERPL-MCNC: 85 MG/DL (ref 65–140)

## 2022-12-01 ENCOUNTER — CONSULT (OUTPATIENT)
Dept: SURGICAL ONCOLOGY | Facility: CLINIC | Age: 72
End: 2022-12-01

## 2022-12-01 VITALS
OXYGEN SATURATION: 98 % | SYSTOLIC BLOOD PRESSURE: 144 MMHG | RESPIRATION RATE: 16 BRPM | HEIGHT: 69 IN | WEIGHT: 206.4 LBS | DIASTOLIC BLOOD PRESSURE: 86 MMHG | TEMPERATURE: 98.3 F | HEART RATE: 80 BPM | BODY MASS INDEX: 30.57 KG/M2

## 2022-12-01 DIAGNOSIS — C43.9 METASTATIC MELANOMA (HCC): Primary | ICD-10-CM

## 2022-12-01 NOTE — LETTER
December 1, 2022     Cheryle Lah, DO  [address] [facility]    Patient: Cecil Osborne   YOB: 1950   Date of Visit: 12/1/2022       Dear Dr Lalitha Byrne: Thank you for referring Cecil Osborne to me for evaluation  Below are my notes for this consultation  If you have questions, please do not hesitate to call me  I look forward to following your patient along with you  Sincerely,        Mallory Stern MD        CC: MD Mallory Marx MD  12/1/2022  4:07 PM  Incomplete  Surgical Oncology Consultation    [address] [facility] [address] [facility]  [phone]    Patient:  Cecil Osborne  [birth year]  [id number]    Primary Care provider:  Rosemarie Chang DO  [address] [address]    Referring provider:  Cheryle Lah, DO  [address],  [address] [facility]    Diagnoses and all orders for this visit:    Metastatic melanoma [facility])  -     Ambulatory Referral to Surgical Oncology        Chief Complaint   Patient presents with   • Advice Only       No follow-ups on file  Oncology History   Metastatic melanoma ([facility] )   10/13/2022 -  Cancer Staged    Staging form: Melanoma of the Skin, AJCC 8th Edition  - Clinical stage from 10/13/2022: Stage IV (cTX, cNX, cM1a) - Signed by Roxanna Dominguez MD on 11/22/2022       10/13/2022 Biopsy    A  Skin, right lateral medial leg, punch biopsy:  Portion of severely atypical melanocytic dermal proliferation with prominent melanosis consistent with melanoma  See note  B  Skin, right medial leg  punch biopsy:  Portion of severely atypical melanocytic dermal proliferation with prominent melanosis consistent with melanoma  See note  C  Skin, right medial leg, punch biopsy:   Portion of severely atypical melanocytic dermal proliferation with prominent melanosis consistent with melanoma  See note       11/22/2022 Initial Diagnosis    Metastatic melanoma ([facility] )         History of Present Illness  : This is a 66-year-old woman who comes in today with a new diagnosis of melanoma metastatic to the right lower extremity  Briefly, the patient states that she has never had a diagnosis of melanoma, but several months ago noticed a black patch after removing her compression socks  This developed to other subcutaneous black patches, and biopsies have been consistent with melanoma  There is no primary identified  PET scan showed significant activity in the right lower extremity distribution, as well as faint avidity in the right inguinal region  The patient denies any pain to the right lower extremity, no wounds of concern  She denies any lumps or bumps of the groin  She denies any headache, bone pain, other systemic symptoms  Review of Systems  Complete ROS Surg Onc:   Constitutional: The patient denies new or recent history of general fatigue, no recent weight loss, no change in appetite  Eyes: No complaints of visual problems, no scleral icterus  ENT: No complaints of ear pain, no hoarseness, no difficulty swallowing,  no tinnitus and no new masses in head, oral cavity, or neck  Cardiovascular: No complaints of chest pain, no palpitations, no ankle edema  Respiratory: No complaints of shortness of breath, no cough  Gastrointestinal: No complaints of jaundice, no bloody stools, no pale stools  Genitourinary: No complaints of dysuria, no hematuria, no nocturia, no frequent urination, no urethral discharge  Musculoskeletal: No complaints of weakness, paralysis, joint stiffness or arthralgias  Integumentary: No complaints of rash, no new lesions  Neurological: No complaints of convulsions, no seizures, no dizziness  Hematologic/Lymphatic: No complaints of easy bruising  Endocrine:  No hot or cold intolerance  No polydipsia, polyphagia, or polyuria  Allergy/immunology:  No environmental allergies    No food allergies  Not immunocompromised  Patient Active Problem List   Diagnosis   • Acute massive pulmonary embolism (HCC)   • Venous stasis of both lower extremities   • Clostridium difficile infection   • Right ventricular systolic dysfunction without heart failure   • Essential hypertension   • Metastatic melanoma ([facility] )     Past Medical History:   Diagnosis Date   • Acute deep vein thrombosis (DVT) of popliteal vein of left lower extremity (HCC)    • Ankle wound, left, initial encounter     Resolved 2017   • Ankle wound, right, initial encounter     resolved 2017   • DVT (deep vein thrombosis) in pregnancy    • Pulmonary emboli ([facility] )      post C section   • Pulmonary embolism ([facility] )     2017   • Pulmonary embolism, blood-clot, obstetric    • Venous stasis ulcer ([facility] )    • Venous ulcers of both lower extremities (HCC)      Past Surgical History:   Procedure Laterality Date   •  SECTION      X2     Family History   Problem Relation Age of Onset   • Heart attack Mother    • Stroke Mother    • Cancer Father    • Bone cancer Family    • Hypertension Family    • Lung cancer Family      Social History     Socioeconomic History   • Marital status:       Spouse name: Not on file   • Number of children: Not on file   • Years of education: Not on file   • Highest education level: Not on file   Occupational History   • Not on file   Tobacco Use   • Smoking status: Never   • Smokeless tobacco: Never   Vaping Use   • Vaping Use: Never used   Substance and Sexual Activity   • Alcohol use: No     Comment: hx of social drinker   • Drug use: No   • Sexual activity: Not on file   Other Topics Concern   • Not on file   Social History Narrative   • Not on file     Social Determinants of Health     Financial Resource Strain: Not on file   Food Insecurity: Not on file   Transportation Needs: Not on file   Physical Activity: Not on file   Stress: Not on file   Social Connections: Not on file   Intimate Partner Violence: Not on file   Housing Stability: Not on file       Current Outpatient Medications:   •  apixaban (Eliquis) 5 mg, Take 1 tablet (5 mg total) by mouth every 12 (twelve) hours, Disp: 60 tablet, Rfl: 5  •  calcium carbonate (OS-VIRGIE) 600 MG tablet, Take 600 mg by mouth 2 (two) times a day, Disp: , Rfl:   •  losartan (Cozaar) 100 MG tablet, Take 1 tablet (100 mg total) by mouth daily, Disp: 30 tablet, Rfl: 5  •  metoprolol succinate (TOPROL-XL) 25 mg 24 hr tablet, Take 1 tablet (25 mg total) by mouth 2 (two) times a day, Disp: 60 tablet, Rfl: 5  •  Multiple Vitamins-Minerals (MULTIVITAMIN WITH MINERALS) tablet, Take 1 tablet by mouth daily, Disp: , Rfl:   •  patient supplied medication, Pt takes eye drops Mura 128 - not listed in database, Disp: , Rfl:   No Known Allergies    Vitals:    12/01/22 1529   BP: 144/86   Pulse: 80   Resp: 16   Temp: 98 3 °F (36 8 °C)   SpO2: 98%       Physical Exam   General: Appears well, appears stated age  Skin: Warm, anicteric; scattered black subq patches in several regions of the RLL  HEENT: Normocephalic, atraumatic; sclera aniceteric, mucous membranes moist; cervical nodes without adenopathy  Cardiopulmonary: RRR, Easy WOB, no BLE edema  Abd: Flat and soft, nontender, no masses appreciated, no hepatosplenomegaly  MSK: Symmetric, no cyanosis, no overt weakness  Lymphatic: No cervical, axillary or inguinal lymphadenopathy appreciated  Neuro: Affect appropriate, no gross motor abnormalities      Pathology:  As above    Labs: Reviewed in EPIC    Imaging  NM pet ct tumor imaging whole body    Result Date: 11/30/2022  Narrative: WHOLE-BODY PET/CT SCAN INDICATION: C43 9: Malignant melanoma of skin, unspecified   , newly diagnosed melanoma of right leg, initial staging     MODIFIER: PI COMPARISON: None CELL TYPE:  Melanoma, biopsy right medial and lateral leg 10/13/2022 TECHNIQUE:   10 7 mCi F-18-FD administered IV   Multiplanar attenuation corrected and non attenuation corrected PET images were acquired 60 minutes post injection  Contiguous, low dose, axial CT sections were obtained from the skull vertex through the feet  Intravenous contrast material was not utilized  This examination, like all CT scans performed in the [facility], was performed utilizing techniques to minimize radiation dose exposure, including the use of iterative reconstruction  and automated exposure control  Fasting serum glucose: 85 mg/dl FINDINGS: VISUALIZED BRAIN:   No acute abnormalities are seen  HEAD/NECK:   Bilateral supraclavicular activity is most compatible with brown fat activity  No FDG avid cervical adenopathy is seen  CT images: Unremarkable CHEST:   No FDG avid soft tissue lesions are seen  CT images: Unremarkable  ABDOMEN:   No FDG avid soft tissue lesions are seen  CT images: Unremarkable  PELVIS: There are several mildly hypermetabolic right inguinal nodes measuring up to 1 cm short axis diameter, SUV 3 1  An asymmetric prominent right external iliac node is also visualized, image 2/242, measuring 1 1 cm, SUV 2 3  Metastases are not excluded given the history of right lower extremity melanoma  Nodular FDG activity is visualized in the sigmoid colon, SUV 5 8  There is equivocal wall thickening versus underdistention here on corresponding CT images  Somewhat prominent endometrial hypodensity is visualized, though without significant FDG uptake  No additional FDG avid soft tissue lesions are seen  CT images: Otherwise unremarkable OSSEOUS STRUCTURES/EXTREMITIES: Scattered somewhat serpiginous subcutaneous FDG activity in the bilateral lower extremities, particularly at the level of the tibias and ankles, corresponds mostly with vessels on CT images  Otherwise no dominant hypermetabolic soft tissue lesion is visualized in the lower extremities  No FDG avid osseous lesions are seen  CT images: No significant findings  Impression: 1   Several mildly hypermetabolic right inguinal and external iliac nodes are visualized  Metastases are not excluded given the history of right lower extremity melanoma  Clinical correlation recommended  2  Nodular FDG activity in the sigmoid colon with equivocal wall thickening versus underdistention  Attention on follow-up recommended to exclude occult primary colonic lesion  3  No dominant hypermetabolic lesion visualized in the lower extremities  4  No hypermetabolic metastases in the neck, chest or upper abdomen  5  Somewhat prominent endometrial hypodensity is visualized, though without significant FDG uptake  Pelvic ultrasound evaluation suggested  Workstation performed: [id number]       I independently reviewed and interpreted the above laboratory and imaging data  Discussion/Summary: This is a 43-year-old female with what appears to be locally metastatic melanoma to the right lower extremity  At this juncture, the patches of involvement are 2 widespread for surgical intervention to be helpful to the patient  We discussed that at times after a period of systemic therapy, there is 1 or 2 clonal populations that is appropriate for surgical therapy, however, at this time I would not be able to resect all of the appreciable disease  The patient is to see Dr Mireya Sequeira on Monday for initiation of systemic therapy  Should surgical intervention be helpful in the future, I will keep in touch with Dr Mireya Sequeira regarding this possibility  All questions were answered today the patient and her daughter

## 2022-12-01 NOTE — PROGRESS NOTES
Surgical Oncology Consultation    [address] [facility] SURGICAL [facility]COLOGY Shaye Morning  [facility] [address] [facility]  [phone]    Patient:  Jj Esquivel  [birth year]  [id number]    Primary Care provider:  Stefanie George DO  [address] [id number] [place] [id number]    Referring provider:  DO AIDEN Brownlee [address],  [address] [facility]    Diagnoses and all orders for this visit:    Metastatic melanoma [facility])  -     Ambulatory Referral to Surgical Oncology        Chief Complaint   Patient presents with   • Advice Only       No follow-ups on file  Oncology History   Metastatic melanoma ([facility] )   10/13/2022 -  Cancer Staged    Staging form: Melanoma of the Skin, AJCC 8th Edition  - Clinical stage from 10/13/2022: Stage IV (cTX, cNX, cM1a) - Signed by Megan Osuna MD on 11/22/2022       10/13/2022 Biopsy    A  Skin, right lateral medial leg, punch biopsy:  Portion of severely atypical melanocytic dermal proliferation with prominent melanosis consistent with melanoma  See note  B  Skin, right medial leg  punch biopsy:  Portion of severely atypical melanocytic dermal proliferation with prominent melanosis consistent with melanoma  See note  C  Skin, right medial leg, punch biopsy:   Portion of severely atypical melanocytic dermal proliferation with prominent melanosis consistent with melanoma  See note  11/22/2022 Initial Diagnosis    Metastatic melanoma ([facility] )         History of Present Illness  : This is a 80-year-old woman who comes in today with a new diagnosis of melanoma metastatic to the right lower extremity  Briefly, the patient states that she has never had a diagnosis of melanoma, but several months ago noticed a black patch after removing her compression socks  This developed to other subcutaneous black patches, and biopsies have been consistent with melanoma  There is no primary identified    PET scan showed significant activity in the right lower extremity distribution, as well as faint avidity in the right inguinal region  The patient denies any pain to the right lower extremity, no wounds of concern  She denies any lumps or bumps of the groin  She denies any headache, bone pain, other systemic symptoms  Review of Systems  Complete ROS Surg Onc:   Constitutional: The patient denies new or recent history of general fatigue, no recent weight loss, no change in appetite  Eyes: No complaints of visual problems, no scleral icterus  ENT: No complaints of ear pain, no hoarseness, no difficulty swallowing,  no tinnitus and no new masses in head, oral cavity, or neck  Cardiovascular: No complaints of chest pain, no palpitations, no ankle edema  Respiratory: No complaints of shortness of breath, no cough  Gastrointestinal: No complaints of jaundice, no bloody stools, no pale stools  Genitourinary: No complaints of dysuria, no hematuria, no nocturia, no frequent urination, no urethral discharge  Musculoskeletal: No complaints of weakness, paralysis, joint stiffness or arthralgias  Integumentary: No complaints of rash, no new lesions  Neurological: No complaints of convulsions, no seizures, no dizziness  Hematologic/Lymphatic: No complaints of easy bruising  Endocrine:  No hot or cold intolerance  No polydipsia, polyphagia, or polyuria  Allergy/immunology:  No environmental allergies  No food allergies  Not immunocompromised        Patient Active Problem List   Diagnosis   • Acute massive pulmonary embolism (HCC)   • Venous stasis of both lower extremities   • Clostridium difficile infection   • Right ventricular systolic dysfunction without heart failure   • Essential hypertension   • Metastatic melanoma ([address] )     Past Medical History:   Diagnosis Date   • Acute deep vein thrombosis (DVT) of popliteal vein of left lower extremity (HCC)    • Ankle wound, left, initial encounter     Resolved 6/2017   • Ankle wound, right, initial encounter     resolved 2017   • DVT (deep vein thrombosis) in pregnancy    • Pulmonary emboli ([facility] )      post C section   • Pulmonary embolism ([facility] )     2017   • Pulmonary embolism, blood-clot, obstetric    • Venous stasis ulcer (HCC)    • Venous ulcers of both lower extremities (HCC)      Past Surgical History:   Procedure Laterality Date   •  SECTION      X2     Family History   Problem Relation Age of Onset   • Heart attack Mother    • Stroke Mother    • Cancer Father    • Bone cancer Family    • Hypertension Family    • Lung cancer Family      Social History     Socioeconomic History   • Marital status:       Spouse name: Not on file   • Number of children: Not on file   • Years of education: Not on file   • Highest education level: Not on file   Occupational History   • Not on file   Tobacco Use   • Smoking status: Never   • Smokeless tobacco: Never   Vaping Use   • Vaping Use: Never used   Substance and Sexual Activity   • Alcohol use: No     Comment: hx of social drinker   • Drug use: No   • Sexual activity: Not on file   Other Topics Concern   • Not on file   Social History Narrative   • Not on file     Social Determinants of Health     Financial Resource Strain: Not on file   Food Insecurity: Not on file   Transportation Needs: Not on file   Physical Activity: Not on file   Stress: Not on file   Social Connections: Not on file   Intimate Partner Violence: Not on file   Housing Stability: Not on file       Current Outpatient Medications:   •  apixaban (Eliquis) 5 mg, Take 1 tablet (5 mg total) by mouth every 12 (twelve) hours, Disp: 60 tablet, Rfl: 5  •  calcium carbonate (OS-VIRGIE) 600 MG tablet, Take 600 mg by mouth 2 (two) times a day, Disp: , Rfl:   •  losartan (Cozaar) 100 MG tablet, Take 1 tablet (100 mg total) by mouth daily, Disp: 30 tablet, Rfl: 5  •  metoprolol succinate (TOPROL-XL) 25 mg 24 hr tablet, Take 1 tablet (25 mg total) by mouth 2 (two) times a day, Disp: 60 tablet, Rfl: 5  •  Multiple Vitamins-Minerals (MULTIVITAMIN WITH MINERALS) tablet, Take 1 tablet by mouth daily, Disp: , Rfl:   •  patient supplied medication, Pt takes eye drops Laila 128 - not listed in database, Disp: , Rfl:   No Known Allergies    Vitals:    12/01/22 1529   BP: 144/86   Pulse: 80   Resp: 16   Temp: 98 3 °F (36 8 °C)   SpO2: 98%       Physical Exam   General: Appears well, appears stated age  Skin: Warm, anicteric; scattered black subq patches in several regions of the RLL  HEENT: Normocephalic, atraumatic; sclera aniceteric, mucous membranes moist; cervical nodes without adenopathy  Cardiopulmonary: RRR, Easy WOB, no BLE edema  Abd: Flat and soft, nontender, no masses appreciated, no hepatosplenomegaly  MSK: Symmetric, no cyanosis, no overt weakness  Lymphatic: No cervical, axillary or inguinal lymphadenopathy appreciated  Neuro: Affect appropriate, no gross motor abnormalities      Pathology:  As above    Labs: Reviewed in EPIC    Imaging  NM pet ct tumor imaging whole body    Result Date: 11/30/2022  Narrative: WHOLE-BODY PET/CT SCAN INDICATION: C43 9: Malignant melanoma of skin, unspecified   , newly diagnosed melanoma of right leg, initial staging     MODIFIER: PI COMPARISON: None CELL TYPE:  Melanoma, biopsy right medial and lateral leg 10/13/2022 TECHNIQUE:   10 7 mCi F-18-FD administered IV  Multiplanar attenuation corrected and non attenuation corrected PET images were acquired 60 minutes post injection  Contiguous, low dose, axial CT sections were obtained from the skull vertex through the feet  Intravenous contrast material was not utilized  This examination, like all CT scans performed in the [facility], was performed utilizing techniques to minimize radiation dose exposure, including the use of iterative reconstruction  and automated exposure control  Fasting serum glucose: 85 mg/dl FINDINGS: VISUALIZED BRAIN:   No acute abnormalities are seen   HEAD/NECK:   Bilateral supraclavicular activity is most compatible with brown fat activity  No FDG avid cervical adenopathy is seen  CT images: Unremarkable CHEST:   No FDG avid soft tissue lesions are seen  CT images: Unremarkable  ABDOMEN:   No FDG avid soft tissue lesions are seen  CT images: Unremarkable  PELVIS: There are several mildly hypermetabolic right inguinal nodes measuring up to 1 cm short axis diameter, SUV 3 1  An asymmetric prominent right external iliac node is also visualized, image 2/242, measuring 1 1 cm, SUV 2 3  Metastases are not excluded given the history of right lower extremity melanoma  Nodular FDG activity is visualized in the sigmoid colon, SUV 5 8  There is equivocal wall thickening versus underdistention here on corresponding CT images  Somewhat prominent endometrial hypodensity is visualized, though without significant FDG uptake  No additional FDG avid soft tissue lesions are seen  CT images: Otherwise unremarkable OSSEOUS STRUCTURES/EXTREMITIES: Scattered somewhat serpiginous subcutaneous FDG activity in the bilateral lower extremities, particularly at the level of the tibias and ankles, corresponds mostly with vessels on CT images  Otherwise no dominant hypermetabolic soft tissue lesion is visualized in the lower extremities  No FDG avid osseous lesions are seen  CT images: No significant findings  Impression: 1  Several mildly hypermetabolic right inguinal and external iliac nodes are visualized  Metastases are not excluded given the history of right lower extremity melanoma  Clinical correlation recommended  2  Nodular FDG activity in the sigmoid colon with equivocal wall thickening versus underdistention  Attention on follow-up recommended to exclude occult primary colonic lesion  3  No dominant hypermetabolic lesion visualized in the lower extremities  4  No hypermetabolic metastases in the neck, chest or upper abdomen   5  Somewhat prominent endometrial hypodensity is visualized, though without significant FDG uptake  Pelvic ultrasound evaluation suggested  Workstation performed: [id number]       I independently reviewed and interpreted the above laboratory and imaging data  Discussion/Summary: This is a 51-year-old female with what appears to be locally metastatic melanoma to the right lower extremity  At this juncture, the patches of involvement are 2 widespread for surgical intervention to be helpful to the patient  We discussed that at times after a period of systemic therapy, there is 1 or 2 clonal populations that is appropriate for surgical therapy, however, at this time I would not be able to resect all of the appreciable disease  The patient is to see Dr Abdulaziz Bolaños on Monday for initiation of systemic therapy  Should surgical intervention be helpful in the future, I will keep in touch with Dr Abdulaziz Bolaños regarding this possibility  All questions were answered today the patient and her daughter

## 2022-12-05 ENCOUNTER — TELEPHONE (OUTPATIENT)
Dept: SURGICAL ONCOLOGY | Facility: CLINIC | Age: 72
End: 2022-12-05

## 2022-12-05 ENCOUNTER — OFFICE VISIT (OUTPATIENT)
Dept: HEMATOLOGY ONCOLOGY | Facility: CLINIC | Age: 72
End: 2022-12-05

## 2022-12-05 VITALS
BODY MASS INDEX: 30.96 KG/M2 | SYSTOLIC BLOOD PRESSURE: 142 MMHG | WEIGHT: 209 LBS | DIASTOLIC BLOOD PRESSURE: 80 MMHG | HEIGHT: 69 IN | TEMPERATURE: 97.8 F | OXYGEN SATURATION: 98 % | HEART RATE: 74 BPM | RESPIRATION RATE: 16 BRPM

## 2022-12-05 DIAGNOSIS — C43.9 METASTATIC MELANOMA (HCC): Primary | ICD-10-CM

## 2022-12-05 DIAGNOSIS — Z79.899 HIGH RISK MEDICATION USE: ICD-10-CM

## 2022-12-05 NOTE — TELEPHONE ENCOUNTER
Return for infusion and follow up, for Office Visit, Infusion - See Treatment Plan, labs  Please schedule the MRI at [facility]

## 2022-12-05 NOTE — TELEPHONE ENCOUNTER
While we try to accommodate patient requests, our priority is to schedule treatment according to Doctor's orders and site availability  Does the Provider use the intake sheet or checkout note? What would be a preferred day of the week that would work best for your infusion appointment? ANY  Do you prefer mornings or afternoons for your appointments? LATEST POSSIBLE APPT  Are there any days or dates that do not work for your schedule, including any upcoming vacations? NO  We are going to try our best to schedule you at the infusion center closest to your home  In the event that we are unable to what would be your next preferred infusion site or sites? 1  [facility]  2    3      Do you have transportation to take you to all of your appointments?  YES  Would you like the infusion center to draw labs from your port? (disregard if patient doesn't have a port or need labs for infusion appointment) NO

## 2022-12-05 NOTE — TELEPHONE ENCOUNTER
Message left for patient to call back to go over scheduling preferences  My TEAMS number was left for call back

## 2022-12-05 NOTE — LETTER
December 6, 2022     DO [address]    Patient: Francheska Saul   YOB: 1950   Date of Visit: 12/5/2022       Dear Dr Herlinda Reina: Thank you for referring Francheska Saul to me for evaluation  Below are my notes for this consultation  If you have questions, please do not hesitate to call me  I look forward to following your patient along with you  Sincerely,        Falguni Mcleod MD        CC: Gerhardt Hoyles, MD Eulice Savant, CRNP Leni Chamber, MD Faye Killings, MD  12/6/2022  4:56 PM  Sign when Signing Visit  [address] [place] [address] 58  [phone]  [phone]     Date of Visit: 99/5/7835  Name: Francheska Saul   YOB: 1950        Subjective    VISIT DIAGNOSIS:  Diagnoses and all orders for this visit:    Metastatic melanoma ([facility] [address] )  -     MRI brain w wo contrast; Future  -     Infusion Calculated Appointment Request; Future  -     Infusion Calculated Appointment Request; Future  -     Infusion Calculated Appointment Request; Future  -     Infusion Calculated Appointment Request; Future  -     Infusion Calculated Appointment Request; Future  -     Infusion Calculated Appointment Request; Future  -     CBC and differential; Standing  -     Comprehensive metabolic panel; Standing  -     LD,Blood; Standing  -     T3, free; Standing  -     T4, free; Standing  -     TSH, 3rd generation; Standing  -     ACTH; Future  -     Cortisol Level, AM Specimen; Future  -     CBC and differential  -     Comprehensive metabolic panel  -     LD,Blood  -     T3, free  -     T4, free  -     TSH, 3rd generation    High risk medication use  -     CBC and differential; Standing  -     Comprehensive metabolic panel; Standing  -     LD,Blood; Standing  -     T3, free; Standing  -     T4, free; Standing  -     TSH, 3rd generation; Standing  -     ACTH;  Future  -     Cortisol Level, AM Specimen; Future  -     CBC and differential  -     Comprehensive metabolic panel  -     LD,Blood  -     T3, free  -     T4, free  -     TSH, 3rd generation        Oncology History   Metastatic melanoma ([facility] )   10/13/2022 -  Cancer Staged    Staging form: Melanoma of the Skin, AJCC 8th Edition  - Clinical stage from 10/13/2022: Stage IV (cTX, cNX, cM1a) - Signed by Ashwin Wu MD on 11/22/2022       10/13/2022 Biopsy    A  Skin, right lateral medial leg, punch biopsy:  Portion of severely atypical melanocytic dermal proliferation with prominent melanosis consistent with melanoma  See note  B  Skin, right medial leg  punch biopsy:  Portion of severely atypical melanocytic dermal proliferation with prominent melanosis consistent with melanoma  See note  C  Skin, right medial leg, punch biopsy:   Portion of severely atypical melanocytic dermal proliferation with prominent melanosis consistent with melanoma  See note  11/22/2022 Initial Diagnosis    Metastatic melanoma ([facility] )     12/12/2022 -  Chemotherapy    NIVOLUMAB-RELATLIMAB-RMBW (OPDUALAG) IVPB, 480 mg of nivolumab, Intravenous, Once, 0 of 6 cycles        Cancer Staging   Metastatic melanoma ([facility] [address] )  Staging form: Melanoma of the Skin, AJCC 8th Edition  - Clinical stage from 10/13/2022: Stage IV (cTX, cNX, cM1a) - Signed by Ashwin Wu MD on 11/22/2022     Treatment Details   Treatment goal Curative   Plan Name OP Nivolumab and Relatlimab-rmbw Q 28 Days   Status Active   Start Date 12/12/2022 (Planned)   End Date 5/1/2023 (Planned)   Provider Ashwin Wu MD   Chemotherapy NIVOLUMAB-RELATLIMAB-RMBW (OPDUALAG) IVPB, 480 mg of nivolumab, Intravenous, Once, 0 of 6 cycles          HISTORY OF PRESENT ILLNESS: Gem Russell is a 67 y o  female  who has stage IV melanoma which is unresectable here for continued disease management discussion  She underwent PET scan on 11/29/2022    I independently reviewed the imaging and we discussed that the results demonstrate concerning right inguinal/groin lymphadenopathy with FDG activity concerning for metastatic disease given disease in her right leg  She also saw Dr Eula Oswald on 12/1/2022 who discussed that surgical intervention was not feasible at this time given the multifocal dispersement of the melanoma on her leg  She otherwise feels good and has no issues or concerns  She denies any changes since her last visit  No new, changing, or concerning skin lesions  No new lymphadenopathy  REVIEW OF SYSTEMS:  Review of Systems   Constitutional: Negative for appetite change, fatigue, fever and unexpected weight change  HENT:   Negative for lump/mass  Eyes: Negative for icterus  Respiratory: Negative for cough, shortness of breath and wheezing  Cardiovascular: Negative for leg swelling  Gastrointestinal: Negative for abdominal pain, constipation, diarrhea, nausea and vomiting  Genitourinary: Negative for difficulty urinating and hematuria  Musculoskeletal: Negative for arthralgias, gait problem and myalgias  Skin: Negative for itching and rash  No new, changing, or concerning lesions  Neurological: Negative for extremity weakness, gait problem, headaches, light-headedness and numbness  Hematological: Negative for adenopathy          MEDICATIONS:    Current Outpatient Medications:   •  apixaban (Eliquis) 5 mg, Take 1 tablet (5 mg total) by mouth every 12 (twelve) hours, Disp: 60 tablet, Rfl: 5  •  calcium carbonate (OS-VIRGIE) 600 MG tablet, Take 600 mg by mouth 2 (two) times a day, Disp: , Rfl:   •  losartan (Cozaar) 100 MG tablet, Take 1 tablet (100 mg total) by mouth daily, Disp: 30 tablet, Rfl: 5  •  metoprolol succinate (TOPROL-XL) 25 mg 24 hr tablet, Take 1 tablet (25 mg total) by mouth 2 (two) times a day, Disp: 60 tablet, Rfl: 5  •  Multiple Vitamins-Minerals (MULTIVITAMIN WITH MINERALS) tablet, Take 1 tablet by mouth daily, Disp: , Rfl:   •  patient supplied medication, Pt takes eye drops Laila 128 - not listed in database, Disp: , Rfl:      ALLERGIES:  No Known Allergies     ACTIVE PROBLEMS:  Patient Active Problem List   Diagnosis   • Acute massive pulmonary embolism (HCC)   • Venous stasis of both lower extremities   • Clostridium difficile infection   • Right ventricular systolic dysfunction without heart failure   • Essential hypertension   • Metastatic melanoma ([facility] [address] )          PAST MEDICAL HISTORY:   Past Medical History:   Diagnosis Date   • Acute deep vein thrombosis (DVT) of popliteal vein of left lower extremity (HCC)    • Ankle wound, left, initial encounter     Resolved 2017   • Ankle wound, right, initial encounter     resolved 2017   • DVT (deep vein thrombosis) in pregnancy    • Pulmonary emboli ([facility] [address] )      post C section   • [facility] )     2017   • Pulmonary embolism, blood-clot, obstetric    • Venous stasis ulcer ([facility] [address] )    • Venous ulcers of both lower extremities ([facility] [address] )         PAST SURGICAL HISTORY:  Past Surgical History:   Procedure Laterality Date   •  SECTION      X2        SOCIAL HISTORY:  Social History     Socioeconomic History   • Marital status:       Spouse name: Not on file   • Number of children: Not on file   • Years of education: Not on file   • Highest education level: Not on file   Occupational History   • Not on file   Tobacco Use   • Smoking status: Never   • Smokeless tobacco: Never   Vaping Use   • Vaping Use: Never used   Substance and Sexual Activity   • Alcohol use: No     Comment: hx of social drinker   • Drug use: No   • Sexual activity: Not on file   Other Topics Concern   • Not on file   Social History Narrative   • Not on file     Social Determinants of Health     Financial Resource Strain: Not on file   Food Insecurity: Not on file   Transportation Needs: Not on file   Physical Activity: Not on file   Stress: Not on file   Social Connections: Not on file   Intimate Partner Violence: Not on file   Housing Stability: Not on file        FAMILY HISTORY:  Family History   Problem Relation Age of Onset   • Heart attack Mother    • Stroke Mother    • Cancer Father    • Bone cancer Family    • Hypertension Family    • Lung cancer Family            Objective    PHYSICAL EXAMINATION:   Blood pressure 142/80, pulse 74, temperature 97 8 °F (36 6 °C), resp  rate 16, height 5' 9" (1 753 m), weight 94 8 kg (209 lb), SpO2 98 %  Physical Exam  Constitutional:       General: She is not in acute distress  Appearance: Normal appearance  She is not toxic-appearing  HENT:      Mouth/Throat:      Mouth: Mucous membranes are moist       Pharynx: Oropharynx is clear  Eyes:      General: No scleral icterus  Pulmonary:      Effort: Pulmonary effort is normal  No respiratory distress  Breath sounds: Normal breath sounds  No wheezing or rales  Musculoskeletal:         General: No swelling  Cervical back: Normal range of motion  Right lower leg: No edema  Left lower leg: No edema  Skin:     Findings: No rash  Comments: No additional concerning skin lesions aside from known melanoma on RLE   Neurological:      General: No focal deficit present  Mental Status: She is alert and oriented to person, place, and time  Psychiatric:         Mood and Affect: Mood normal          Behavior: Behavior normal          Thought Content: Thought content normal          Judgment: Judgment normal          I reviewed lab data in the chart      WBC   Date Value Ref Range Status   11/17/2022 7 08 4 31 - 10 16 Thousand/uL Final   08/17/2022 5 28 4 31 - 10 16 Thousand/uL Final   08/03/2020 8 10 4 80 - 10 80 Thousand/uL Final     Hemoglobin   Date Value Ref Range Status   11/17/2022 13 2 11 5 - 15 4 g/dL Final   08/17/2022 13 3 11 5 - 15 4 g/dL Final   08/03/2020 15 1 12 0 - 16 0 g/dL Final     Platelets   Date Value Ref Range Status   11/17/2022 209 149 - 390 Thousands/uL Final   08/17/2022 178 149 - 390 Thousands/uL Final   08/03/2020 175 149 - 390 Thousands/uL Final     MCV   Date Value Ref Range Status   11/17/2022 93 82 - 98 fL Final   08/17/2022 92 82 - 98 fL Final   08/03/2020 90 81 - 99 fL Final      Potassium   Date Value Ref Range Status   11/17/2022 4 3 3 5 - 5 3 mmol/L Final   08/17/2022 4 2 3 5 - 5 3 mmol/L Final   08/03/2020 3 6 3 5 - 5 5 mmol/L Final     Chloride   Date Value Ref Range Status   11/17/2022 104 96 - 108 mmol/L Final   08/17/2022 104 96 - 108 mmol/L Final   08/03/2020 101 98 - 107 mmol/L Final     CO2   Date Value Ref Range Status   11/17/2022 27 21 - 32 mmol/L Final   08/17/2022 30 21 - 32 mmol/L Final   08/03/2020 26 21 - 31 mmol/L Final     BUN   Date Value Ref Range Status   11/17/2022 19 5 - 25 mg/dL Final   08/17/2022 16 5 - 25 mg/dL Final   08/03/2020 23 7 - 25 mg/dL Final     Creatinine   Date Value Ref Range Status   11/17/2022 0 87 0 60 - 1 30 mg/dL Final     Comment:     Standardized to IDMS reference method   08/17/2022 0 90 0 60 - 1 30 mg/dL Final     Comment:     Standardized to IDMS reference method   08/03/2020 0 94 0 60 - 1 20 mg/dL Final     Comment:     Standardized to IDMS reference method     Glucose   Date Value Ref Range Status   11/17/2022 85 65 - 140 mg/dL Final     Comment:     If the patient is fasting, the ADA then defines impaired fasting glucose as > 100 mg/dL and diabetes as > or equal to 123 mg/dL  Specimen collection should occur prior to Sulfasalazine administration due to the potential for falsely depressed results  Specimen collection should occur prior to Sulfapyridine administration due to the potential for falsely elevated results  08/03/2020 112 (H) 65 - 99 mg/dL Final     Comment:     If the patient is fasting, the ADA then defines impaired fasting glucose as > 100 mg/dL and diabetes as > or equal to 123 mg/dL  Specimen collection should occur prior to Sulfasalazine administration due to the potential for falsely depressed results   Specimen collection should occur prior to Sulfapyridine administration due to the potential for falsely elevated results  01/24/2017 91 65 - 140 mg/dL Final     Comment:     If the patient is fasting, the ADA then defines impaired fasting glucose as > 100 mg/dL and diabetes as > or equal to 123 mg/dL  Calcium   Date Value Ref Range Status   11/17/2022 9 3 8 4 - 10 2 mg/dL Final   08/17/2022 8 8 8 3 - 10 1 mg/dL Final   08/03/2020 9 5 8 6 - 10 5 mg/dL Final     Albumin   Date Value Ref Range Status   11/17/2022 4 1 3 5 - 5 0 g/dL Final   08/17/2022 3 6 3 5 - 5 0 g/dL Final   03/18/2020 3 7 3 5 - 5 0 g/dL Final     Total Bilirubin   Date Value Ref Range Status   11/17/2022 0 51 0 20 - 1 00 mg/dL Final   08/17/2022 0 56 0 20 - 1 00 mg/dL Final     Comment:     Use of this assay is not recommended for patients undergoing treatment with eltrombopag due to the potential for falsely elevated results  03/18/2020 0 56 0 20 - 1 00 mg/dL Final     Alkaline Phosphatase   Date Value Ref Range Status   11/17/2022 45 34 - 104 U/L Final   08/17/2022 49 46 - 116 U/L Final   03/18/2020 50 46 - 116 U/L Final     AST   Date Value Ref Range Status   11/17/2022 20 13 - 39 U/L Final     Comment:     Specimen collection should occur prior to Sulfasalazine administration due to the potential for falsely depressed results  08/17/2022 17 5 - 45 U/L Final     Comment:     Specimen collection should occur prior to Sulfasalazine administration due to the potential for falsely depressed results  03/18/2020 17 5 - 45 U/L Final     Comment:       Specimen collection should occur prior to Sulfasalazine administration due to the potential for falsely depressed results  ALT   Date Value Ref Range Status   11/17/2022 16 7 - 52 U/L Final     Comment:     Specimen collection should occur prior to Sulfasalazine administration due to the potential for falsely depressed results      08/17/2022 24 12 - 78 U/L Final     Comment:     Specimen collection should occur prior to Sulfasalazine administration due to the potential for falsely depressed results  2020 27 12 - 78 U/L Final     Comment:       Specimen collection should occur prior to Sulfasalazine and/or Sulfapyridine administration due to the potential for falsely depressed results  LD   Date Value Ref Range Status   2022 153 140 - 271 U/L Final     No results found for: TSH  No results found for: [id number]   No results found for: FREET4      RECENT IMAGIN2022 PET Scan    1  Several mildly hypermetabolic right inguinal and external iliac nodes are visualized  Metastases are not excluded given the history of right lower extremity melanoma  Clinical correlation recommended  2  Nodular FDG activity in the sigmoid colon with equivocal wall thickening versus underdistention  Attention on follow-up recommended to exclude occult primary colonic lesion  3  No dominant hypermetabolic lesion visualized in the lower extremities  4  No hypermetabolic metastases in the neck, chest or upper abdomen  5  Somewhat prominent endometrial hypodensity is visualized, though without significant FDG uptake  Pelvic ultrasound evaluation suggested  Assessment/Plan  Ms Paulino Ortiz is a 67 yr female with stage IV melanoma which is unresectable here for disease management discussion  1  Metastatic melanoma ([facility] [address] )  I discussed her melanoma history and treatment options with her and her 2 daughters  One of her daughters was present via [employer]  We discussed that she has stage IV disease that is unresectable, as she has an unknown primary and it is multifocal within the skin of her right lower extremity  We discussed that surgery is not feasible at this time, and she would require systemic treatment  We discussed that treatment mainstay for metastatic melanoma is based on two principles - targeted therapy and immunotherapy    We discussed in great deal the reasoning and rationale behind both treatments  She would require a BRAF V600 mutation to be eligible for targeted therapy with BRAF/MEK inhibitor  We did discuss the concern for development of treatment resistance  We will send her tumor for mutation testing with next generation sequencing  We did discuss immunotherapy as well - single agent PD1 inhibitor and combination CTLA4 + PD1 inhibitors  Additiaonlly, the new combination PD1 + LAG3 inhibitors was recently approved as well  PFS is improved compared to single agent PD1 inhibitor, but does have increased, although minimal, side effect profile  It was not compared to CTLA4 + PD1 inhibitors  We discussed the benefits, side effects, and risks of single agent PD1 inhibitor which include but are not limited to rash and autoimmune related adverse events including colitis; dermatitis; endocrinopathy including diabetes, hypophysitis, and thyroid function abnormalities; hepatitis; myocarditis; neuropathy; nephritis; pneumonitis; uveitis; and fatigue  We also discussed that while the combination of CTLA4 and PD1 inhibitors have an increase in response they also have an increased risk of and exacerbation of side effects  PD1 + LAG3 have small increase in toxicity  We discussed the recent data from the DREAM-Seq study which demonstrated improved survival with the use of immunotherapy frontline and targeted therapy as second-line therapy in patients head B Eric mutated melanoma  As such, we discussed that although we are sending his tumor for molecular testing, we do not need to wait for these results, as I recommend starting treatment with immunotherapy  Given that her disease is limited to the right lower extremity and potentially right groin/inguinal lymph nodes, I do not favor treating with combination ipilimumab and nivolumab at this time given the potential toxicity  We then discussed treatment with PD1 inhibitor alone versus PD-1 plus LAG3    Given her overall health and wellbeing and medical history, I do suggest treatment with combination PD-1 + LAG3 (opdulag)  This gives her increased efficacy over a PD-1 inhibitor alone, but does minimize the toxicity  Ms Emi Cadet and her daughters understand and are in agreement with the plan for treatment with PD1 + LAG3 (opdulag)  I provided her with information on [employer] leg and she signed consent today for treatment  We will submit for insurance approval for treatment as well as infusion scheduling  She does require a brain MRI to complete her metastatic imaging work-up  Orders placed and prescription provided  She will get baseline labs prior to starting treatment with immunotherapy, and then repeating labs while undergoing treatment  All orders placed and prescription provided  She and her daughters know to call with any issues or concerns       - MRI brain w wo contrast; Future  - Infusion Calculated Appointment Request; Future  - Infusion Calculated Appointment Request; Future  - Infusion Calculated Appointment Request; Future  - Infusion Calculated Appointment Request; Future  - Infusion Calculated Appointment Request; Future  - Infusion Calculated Appointment Request; Future  - CBC and differential; Standing  - Comprehensive metabolic panel; Standing  - LD,Blood; Standing  - T3, free; Standing  - T4, free; Standing  - TSH, 3rd generation; Standing  - ACTH; Future  - Cortisol Level, AM Specimen; Future  - CBC and differential  - Comprehensive metabolic panel  - LD,Blood  - T3, free  - T4, free  - TSH, 3rd generation    2  High risk medication use  She will begin on treatment with immunotherapy and we will continue to monitor for side effects and lab abnormalities  We will monitor labs with each treatment to ensure safety of continuing on treatment    She knows to watch for signs and symptoms for immune mediated side effects       - CBC and differential; Standing  - Comprehensive metabolic panel; Standing  - LD,Blood; Standing  - T3, free; Standing  - T4, free; Standing  - TSH, 3rd generation; Standing  - ACTH; Future  - Cortisol Level, AM Specimen; Future  - CBC and differential  - Comprehensive metabolic panel  - LD,Blood  - T3, free  - T4, free  - TSH, 3rd generation        Return for infusion and follow up, for Office Visit, Infusion - See Treatment Plan, labs       Corby Tim MD, PhD

## 2022-12-06 NOTE — PROGRESS NOTES
[facility] HEMATOLOGY ONCOLOGY SPECIALISTS [address] [facility] [address]  [phone]     Date of Visit: 84/4/5582  Name: Amanda Perez   YOB: 1950        Subjective    VISIT DIAGNOSIS:  Diagnoses and all orders for this visit:    Metastatic melanoma ([facility] [address] )  -     MRI brain w wo contrast; Future  -     Infusion Calculated Appointment Request; Future  -     Infusion Calculated Appointment Request; Future  -     Infusion Calculated Appointment Request; Future  -     Infusion Calculated Appointment Request; Future  -     Infusion Calculated Appointment Request; Future  -     Infusion Calculated Appointment Request; Future  -     CBC and differential; Standing  -     Comprehensive metabolic panel; Standing  -     LD,Blood; Standing  -     T3, free; Standing  -     T4, free; Standing  -     TSH, 3rd generation; Standing  -     ACTH; Future  -     Cortisol Level, AM Specimen; Future  -     CBC and differential  -     Comprehensive metabolic panel  -     LD,Blood  -     T3, free  -     T4, free  -     TSH, 3rd generation    High risk medication use  -     CBC and differential; Standing  -     Comprehensive metabolic panel; Standing  -     LD,Blood; Standing  -     T3, free; Standing  -     T4, free; Standing  -     TSH, 3rd generation; Standing  -     ACTH; Future  -     Cortisol Level, AM Specimen; Future  -     CBC and differential  -     Comprehensive metabolic panel  -     LD,Blood  -     T3, free  -     T4, free  -     TSH, 3rd generation        Oncology History   Metastatic melanoma ([facility] [address] )   10/13/2022 -  Cancer Staged    Staging form: Melanoma of the Skin, AJCC 8th Edition  - Clinical stage from 10/13/2022: Stage IV (cTX, cNX, cM1a) - Signed by Kirk Ventura MD on 11/22/2022       10/13/2022 Biopsy    A  Skin, right lateral medial leg, punch biopsy:  Portion of severely atypical melanocytic dermal proliferation with prominent melanosis consistent with melanoma   See note         B  Skin, right medial leg  punch biopsy:  Portion of severely atypical melanocytic dermal proliferation with prominent melanosis consistent with melanoma  See note  C  Skin, right medial leg, punch biopsy:   Portion of severely atypical melanocytic dermal proliferation with prominent melanosis consistent with melanoma  See note  11/22/2022 Initial Diagnosis    Metastatic melanoma ([facility] [address] )     12/12/2022 -  Chemotherapy    NIVOLUMAB-RELATLIMAB-RMBW (OPDUALAG) IVPB, 480 mg of nivolumab, Intravenous, Once, 0 of 6 cycles        Cancer Staging   Metastatic melanoma ([facility] [address] )  Staging form: Melanoma of the Skin, AJCC 8th Edition  - Clinical stage from 10/13/2022: Stage IV (cTX, cNX, cM1a) - Signed by Marielena Nicolas MD on 11/22/2022     Treatment Details   Treatment goal Curative   Plan Name OP Nivolumab and Relatlimab-rmbw Q 28 Days   Status Active   Start Date 12/12/2022 (Planned)   End Date 5/1/2023 (Planned)   Provider Marielena Nicolas MD   Chemotherapy NIVOLUMAB-RELATLIMAB-RMBW (OPDUALAG) IVPB, 480 mg of nivolumab, Intravenous, Once, 0 of 6 cycles          HISTORY OF PRESENT ILLNESS: Carter Jacques is a 67 y o  female  who has stage IV melanoma which is unresectable here for continued disease management discussion  She underwent PET scan on 11/29/2022  I independently reviewed the imaging and we discussed that the results demonstrate concerning right inguinal/groin lymphadenopathy with FDG activity concerning for metastatic disease given disease in her right leg  She also saw Dr Cynthia James on 12/1/2022 who discussed that surgical intervention was not feasible at this time given the multifocal dispersement of the melanoma on her leg  She otherwise feels good and has no issues or concerns  She denies any changes since her last visit  No new, changing, or concerning skin lesions  No new lymphadenopathy          REVIEW OF SYSTEMS:  Review of Systems   Constitutional: Negative for appetite change, fatigue, fever and unexpected weight change  HENT:   Negative for lump/mass  Eyes: Negative for icterus  Respiratory: Negative for cough, shortness of breath and wheezing  Cardiovascular: Negative for leg swelling  Gastrointestinal: Negative for abdominal pain, constipation, diarrhea, nausea and vomiting  Genitourinary: Negative for difficulty urinating and hematuria  Musculoskeletal: Negative for arthralgias, gait problem and myalgias  Skin: Negative for itching and rash  No new, changing, or concerning lesions  Neurological: Negative for extremity weakness, gait problem, headaches, light-headedness and numbness  Hematological: Negative for adenopathy          MEDICATIONS:    Current Outpatient Medications:   •  apixaban (Eliquis) 5 mg, Take 1 tablet (5 mg total) by mouth every 12 (twelve) hours, Disp: 60 tablet, Rfl: 5  •  calcium carbonate (OS-VIRGIE) 600 MG tablet, Take 600 mg by mouth 2 (two) times a day, Disp: , Rfl:   •  losartan (Cozaar) 100 MG tablet, Take 1 tablet (100 mg total) by mouth daily, Disp: 30 tablet, Rfl: 5  •  metoprolol succinate (TOPROL-XL) 25 mg 24 hr tablet, Take 1 tablet (25 mg total) by mouth 2 (two) times a day, Disp: 60 tablet, Rfl: 5  •  Multiple Vitamins-Minerals (MULTIVITAMIN WITH MINERALS) tablet, Take 1 tablet by mouth daily, Disp: , Rfl:   •  patient supplied medication, Pt takes eye drops [address] - not listed in database, Disp: , Rfl:      ALLERGIES:  No Known Allergies     ACTIVE PROBLEMS:  Patient Active Problem List   Diagnosis   • Acute [facility]   • Venous stasis of both lower extremities   • Clostridium difficile infection   • Right ventricular systolic dysfunction without heart failure   • Essential hypertension   • Metastatic melanoma ([facility] [address] )          PAST MEDICAL HISTORY:   Past Medical History:   Diagnosis Date   • Acute deep vein thrombosis (DVT) of popliteal vein of left lower extremity (HCC)    • Ankle wound, left, initial encounter     Resolved 2017   • Ankle wound, right, initial encounter     resolved 2017   • DVT (deep vein thrombosis) in pregnancy    • Pulmonary emboli ([facility] [address] )      post C section   • Pulmonary embolism ([facility] [address] )     2017   • Pulmonary embolism, blood-clot, obstetric    • Venous stasis ulcer ([facility] [address] )    • Venous ulcers of both lower extremities ([facility] [address] )         PAST SURGICAL HISTORY:  Past Surgical History:   Procedure Laterality Date   •  SECTION      X2        SOCIAL HISTORY:  Social History     Socioeconomic History   • Marital status:      Spouse name: Not on file   • Number of children: Not on file   • Years of education: Not on file   • Highest education level: Not on file   Occupational History   • Not on file   Tobacco Use   • Smoking status: Never   • Smokeless tobacco: Never   Vaping Use   • Vaping Use: Never used   Substance and Sexual Activity   • Alcohol use: No     Comment: hx of social drinker   • Drug use: No   • Sexual activity: Not on file   Other Topics Concern   • Not on file   Social History Narrative   • Not on file     Social Determinants of Health     Financial Resource Strain: Not on file   Food Insecurity: Not on file   Transportation Needs: Not on file   Physical Activity: Not on file   Stress: Not on file   Social Connections: Not on file   Intimate Partner Violence: Not on file   Housing Stability: Not on file        FAMILY HISTORY:  Family History   Problem Relation Age of Onset   • Heart attack Mother    • Stroke Mother    • Cancer Father    • Bone cancer Family    • Hypertension Family    • Lung cancer Family            Objective    PHYSICAL EXAMINATION:   Blood pressure 142/80, pulse 74, temperature 97 8 °F (36 6 °C), resp  rate 16, height 5' 9" (1 753 m), weight 94 8 kg (209 lb), SpO2 98 %  Physical Exam  Constitutional:       General: She is not in acute distress  Appearance: Normal appearance  She is not toxic-appearing     HENT: Mouth/Throat:      Mouth: Mucous membranes are moist       Pharynx: Oropharynx is clear  Eyes:      General: No scleral icterus  Pulmonary:      Effort: Pulmonary effort is normal  No respiratory distress  Breath sounds: Normal breath sounds  No wheezing or rales  Musculoskeletal:         General: No swelling  Cervical back: Normal range of motion  Right lower leg: No edema  Left lower leg: No edema  Skin:     Findings: No rash  Comments: No additional concerning skin lesions aside from known melanoma on RLE   Neurological:      General: No focal deficit present  Mental Status: She is alert and oriented to person, place, and time  Psychiatric:         Mood and Affect: Mood normal          Behavior: Behavior normal          Thought Content: Thought content normal          Judgment: Judgment normal          I reviewed lab data in the chart      WBC   Date Value Ref Range Status   11/17/2022 7 08 4 31 - 10 16 Thousand/uL Final   08/17/2022 5 28 4 31 - 10 16 Thousand/uL Final   08/03/2020 8 10 4 80 - 10 80 Thousand/uL Final     Hemoglobin   Date Value Ref Range Status   11/17/2022 13 2 11 5 - 15 4 g/dL Final   08/17/2022 13 3 11 5 - 15 4 g/dL Final   08/03/2020 15 1 12 0 - 16 0 g/dL Final     Platelets   Date Value Ref Range Status   11/17/2022 209 149 - 390 Thousands/uL Final   08/17/2022 178 149 - 390 Thousands/uL Final   08/03/2020 175 149 - 390 Thousands/uL Final     MCV   Date Value Ref Range Status   11/17/2022 93 82 - 98 fL Final   08/17/2022 92 82 - 98 fL Final   08/03/2020 90 81 - 99 fL Final      Potassium   Date Value Ref Range Status   11/17/2022 4 3 3 5 - 5 3 mmol/L Final   08/17/2022 4 2 3 5 - 5 3 mmol/L Final   08/03/2020 3 6 3 5 - 5 5 mmol/L Final     Chloride   Date Value Ref Range Status   11/17/2022 104 96 - 108 mmol/L Final   08/17/2022 104 96 - 108 mmol/L Final   08/03/2020 101 98 - 107 mmol/L Final     CO2   Date Value Ref Range Status   11/17/2022 27 21 - 32 mmol/L Final   08/17/2022 30 21 - 32 mmol/L Final   08/03/2020 26 21 - 31 mmol/L Final     BUN   Date Value Ref Range Status   11/17/2022 19 5 - 25 mg/dL Final   08/17/2022 16 5 - 25 mg/dL Final   08/03/2020 23 7 - 25 mg/dL Final     Creatinine   Date Value Ref Range Status   11/17/2022 0 87 0 60 - 1 30 mg/dL Final     Comment:     Standardized to IDMS reference method   08/17/2022 0 90 0 60 - 1 30 mg/dL Final     Comment:     Standardized to IDMS reference method   08/03/2020 0 94 0 60 - 1 20 mg/dL Final     Comment:     Standardized to IDMS reference method     Glucose   Date Value Ref Range Status   11/17/2022 85 65 - 140 mg/dL Final     Comment:     If the patient is fasting, the ADA then defines impaired fasting glucose as > 100 mg/dL and diabetes as > or equal to 123 mg/dL  Specimen collection should occur prior to Sulfasalazine administration due to the potential for falsely depressed results  Specimen collection should occur prior to Sulfapyridine administration due to the potential for falsely elevated results  08/03/2020 112 (H) 65 - 99 mg/dL Final     Comment:     If the patient is fasting, the ADA then defines impaired fasting glucose as > 100 mg/dL and diabetes as > or equal to 123 mg/dL  Specimen collection should occur prior to Sulfasalazine administration due to the potential for falsely depressed results  Specimen collection should occur prior to Sulfapyridine administration due to the potential for falsely elevated results  01/24/2017 91 65 - 140 mg/dL Final     Comment:     If the patient is fasting, the ADA then defines impaired fasting glucose as > 100 mg/dL and diabetes as > or equal to 123 mg/dL       Calcium   Date Value Ref Range Status   11/17/2022 9 3 8 4 - 10 2 mg/dL Final   08/17/2022 8 8 8 3 - 10 1 mg/dL Final   08/03/2020 9 5 8 6 - 10 5 mg/dL Final     Albumin   Date Value Ref Range Status   11/17/2022 4 1 3 5 - 5 0 g/dL Final   08/17/2022 3 6 3 5 - 5 0 g/dL Final   03/18/2020 3 7 3 5 - 5 0 g/dL Final     Total Bilirubin   Date Value Ref Range Status   2022 0 51 0 20 - 1 00 mg/dL Final   2022 0 56 0 20 - 1 00 mg/dL Final     Comment:     Use of this assay is not recommended for patients undergoing treatment with eltrombopag due to the potential for falsely elevated results  2020 0 56 0 20 - 1 00 mg/dL Final     Alkaline Phosphatase   Date Value Ref Range Status   2022 45 34 - 104 U/L Final   2022 49 46 - 116 U/L Final   2020 50 46 - 116 U/L Final     AST   Date Value Ref Range Status   2022 20 13 - 39 U/L Final     Comment:     Specimen collection should occur prior to Sulfasalazine administration due to the potential for falsely depressed results  2022 17 5 - 45 U/L Final     Comment:     Specimen collection should occur prior to Sulfasalazine administration due to the potential for falsely depressed results  2020 17 5 - 45 U/L Final     Comment:       Specimen collection should occur prior to Sulfasalazine administration due to the potential for falsely depressed results  ALT   Date Value Ref Range Status   2022 16 7 - 52 U/L Final     Comment:     Specimen collection should occur prior to Sulfasalazine administration due to the potential for falsely depressed results  2022 24 12 - 78 U/L Final     Comment:     Specimen collection should occur prior to Sulfasalazine administration due to the potential for falsely depressed results  2020 27 12 - 78 U/L Final     Comment:       Specimen collection should occur prior to Sulfasalazine and/or Sulfapyridine administration due to the potential for falsely depressed results  LD   Date Value Ref Range Status   2022 153 140 - 271 U/L Final     No results found for: TSH  No results found for: O4HYAUC   No results found for: FREET4      RECENT IMAGIN2022 PET Scan    1   Several mildly hypermetabolic right inguinal and external iliac nodes are visualized  Metastases are not excluded given the history of right lower extremity melanoma  Clinical correlation recommended  2  Nodular FDG activity in the sigmoid colon with equivocal wall thickening versus underdistention  Attention on follow-up recommended to exclude occult primary colonic lesion  3  No dominant hypermetabolic lesion visualized in the lower extremities  4  No hypermetabolic metastases in the neck, chest or upper abdomen  5  Somewhat prominent endometrial hypodensity is visualized, though without significant FDG uptake  Pelvic ultrasound evaluation suggested  Assessment/Plan  Ms Sera Mccarthy is a 67 yr female with stage IV melanoma which is unresectable here for disease management discussion  1  Metastatic melanoma ([address] )  I discussed her melanoma history and treatment options with her and her 2 daughters  One of her daughters was present via [email or web address]  We discussed that she has stage IV disease that is unresectable, as she has an unknown primary and it is multifocal within the skin of her right lower extremity  We discussed that surgery is not feasible at this time, and she would require systemic treatment  We discussed that treatment mainstay for metastatic melanoma is based on two principles - targeted therapy and immunotherapy  We discussed in great deal the reasoning and rationale behind both treatments  She would require a BRAF V600 mutation to be eligible for targeted therapy with BRAF/MEK inhibitor  We did discuss the concern for development of treatment resistance  We will send her tumor for mutation testing with next generation sequencing  We did discuss immunotherapy as well - single agent PD1 inhibitor and combination CTLA4 + PD1 inhibitors  Additiaonlly, the new combination PD1 + LAG3 inhibitors was recently approved as well  PFS is improved compared to single agent PD1 inhibitor, but does have increased, although minimal, side effect profile    It was not compared to CTLA4 + PD1 inhibitors  We discussed the benefits, side effects, and risks of single agent PD1 inhibitor which include but are not limited to rash and autoimmune related adverse events including colitis; dermatitis; endocrinopathy including diabetes, hypophysitis, and thyroid function abnormalities; hepatitis; myocarditis; neuropathy; nephritis; pneumonitis; uveitis; and fatigue  We also discussed that while the combination of CTLA4 and PD1 inhibitors have an increase in response they also have an increased risk of and exacerbation of side effects  PD1 + LAG3 have small increase in toxicity  We discussed the recent data from the DREAM-Seq study which demonstrated improved survival with the use of immunotherapy frontline and targeted therapy as second-line therapy in patients head B Eric mutated melanoma  As such, we discussed that although we are sending his tumor for molecular testing, we do not need to wait for these results, as I recommend starting treatment with immunotherapy  Given that her disease is limited to the right lower extremity and potentially right groin/inguinal lymph nodes, I do not favor treating with combination ipilimumab and nivolumab at this time given the potential toxicity  We then discussed treatment with PD1 inhibitor alone versus PD-1 plus LAG3  Given her overall health and wellbeing and medical history, I do suggest treatment with combination PD-1 + LAG3 (opdulag)  This gives her increased efficacy over a PD-1 inhibitor alone, but does minimize the toxicity  Ms Monica Lemos and her daughters understand and are in agreement with the plan for treatment with PD1 + LAG3 (opdulag)  I provided her with information on Optune leg and she signed consent today for treatment  We will submit for insurance approval for treatment as well as infusion scheduling  She does require a brain MRI to complete her metastatic imaging work-up    Orders placed and prescription provided  She will get baseline labs prior to starting treatment with immunotherapy, and then repeating labs while undergoing treatment  All orders placed and prescription provided  She and her daughters know to call with any issues or concerns       - MRI brain w wo contrast; Future  - Infusion Calculated Appointment Request; Future  - Infusion Calculated Appointment Request; Future  - Infusion Calculated Appointment Request; Future  - Infusion Calculated Appointment Request; Future  - Infusion Calculated Appointment Request; Future  - Infusion Calculated Appointment Request; Future  - CBC and differential; Standing  - Comprehensive metabolic panel; Standing  - LD,Blood; Standing  - T3, free; Standing  - T4, free; Standing  - TSH, 3rd generation; Standing  - ACTH; Future  - Cortisol Level, AM Specimen; Future  - CBC and differential  - Comprehensive metabolic panel  - LD,Blood  - T3, free  - T4, free  - TSH, 3rd generation    2  High risk medication use  She will begin on treatment with immunotherapy and we will continue to monitor for side effects and lab abnormalities  We will monitor labs with each treatment to ensure safety of continuing on treatment  She knows to watch for signs and symptoms for immune mediated side effects       - CBC and differential; Standing  - Comprehensive metabolic panel; Standing  - LD,Blood; Standing  - T3, free; Standing  - T4, free; Standing  - TSH, 3rd generation; Standing  - ACTH; Future  - Cortisol Level, AM Specimen; Future  - CBC and differential  - Comprehensive metabolic panel  - LD,Blood  - T3, free  - T4, free  - TSH, 3rd generation        Return for infusion and follow up, for Office Visit, Infusion - See Treatment Plan, labs       Nuno Barber MD, PhD

## 2022-12-07 DIAGNOSIS — C43.9 METASTATIC MELANOMA (HCC): Primary | ICD-10-CM

## 2022-12-07 RX ORDER — SODIUM CHLORIDE 9 MG/ML
20 INJECTION, SOLUTION INTRAVENOUS ONCE
Status: CANCELLED | OUTPATIENT
Start: 2022-12-14

## 2022-12-07 NOTE — TELEPHONE ENCOUNTER
Spoke with patient  She is asking for her appt on 12/14 be moved to 1pm  She is also asking for her appt on 1/11 be moved to 2  Are you able to accommodate her request?  Thank you!

## 2022-12-07 NOTE — TELEPHONE ENCOUNTER
I'm trying to scheduling out a few more txs  I'm being told there are no other appts to be scheduled

## 2022-12-10 ENCOUNTER — APPOINTMENT (OUTPATIENT)
Dept: LAB | Facility: CLINIC | Age: 72
End: 2022-12-10

## 2022-12-10 DIAGNOSIS — Z79.899 HIGH RISK MEDICATION USE: ICD-10-CM

## 2022-12-10 DIAGNOSIS — C43.9 METASTATIC MELANOMA (HCC): ICD-10-CM

## 2022-12-10 LAB
ALBUMIN SERPL BCP-MCNC: 3.3 G/DL (ref 3.5–5)
ALP SERPL-CCNC: 45 U/L (ref 46–116)
ALT SERPL W P-5'-P-CCNC: 22 U/L (ref 12–78)
ANION GAP SERPL CALCULATED.3IONS-SCNC: 9 MMOL/L (ref 4–13)
AST SERPL W P-5'-P-CCNC: 17 U/L (ref 5–45)
BASOPHILS # BLD AUTO: 0.06 THOUSANDS/ÂΜL (ref 0–0.1)
BASOPHILS NFR BLD AUTO: 1 % (ref 0–1)
BILIRUB SERPL-MCNC: 0.61 MG/DL (ref 0.2–1)
BUN SERPL-MCNC: 19 MG/DL (ref 5–25)
CALCIUM ALBUM COR SERPL-MCNC: 9.6 MG/DL (ref 8.3–10.1)
CALCIUM SERPL-MCNC: 9 MG/DL (ref 8.3–10.1)
CHLORIDE SERPL-SCNC: 108 MMOL/L (ref 96–108)
CO2 SERPL-SCNC: 24 MMOL/L (ref 21–32)
CORTIS AM PEAK SERPL-MCNC: 17.7 UG/DL (ref 4.2–22.4)
CREAT SERPL-MCNC: 0.93 MG/DL (ref 0.6–1.3)
EOSINOPHIL # BLD AUTO: 0.19 THOUSAND/ÂΜL (ref 0–0.61)
EOSINOPHIL NFR BLD AUTO: 4 % (ref 0–6)
ERYTHROCYTE [DISTWIDTH] IN BLOOD BY AUTOMATED COUNT: 13.2 % (ref 11.6–15.1)
GFR SERPL CREATININE-BSD FRML MDRD: 61 ML/MIN/1.73SQ M
GLUCOSE P FAST SERPL-MCNC: 98 MG/DL (ref 65–99)
HCT VFR BLD AUTO: 42.6 % (ref 34.8–46.1)
HGB BLD-MCNC: 13.2 G/DL (ref 11.5–15.4)
IMM GRANULOCYTES # BLD AUTO: 0.01 THOUSAND/UL (ref 0–0.2)
IMM GRANULOCYTES NFR BLD AUTO: 0 % (ref 0–2)
LDH SERPL-CCNC: 198 U/L (ref 81–234)
LYMPHOCYTES # BLD AUTO: 1.46 THOUSANDS/ÂΜL (ref 0.6–4.47)
LYMPHOCYTES NFR BLD AUTO: 28 % (ref 14–44)
MCH RBC QN AUTO: 28.9 PG (ref 26.8–34.3)
MCHC RBC AUTO-ENTMCNC: 31 G/DL (ref 31.4–37.4)
MCV RBC AUTO: 93 FL (ref 82–98)
MONOCYTES # BLD AUTO: 0.46 THOUSAND/ÂΜL (ref 0.17–1.22)
MONOCYTES NFR BLD AUTO: 9 % (ref 4–12)
NEUTROPHILS # BLD AUTO: 3.01 THOUSANDS/ÂΜL (ref 1.85–7.62)
NEUTS SEG NFR BLD AUTO: 58 % (ref 43–75)
NRBC BLD AUTO-RTO: 0 /100 WBCS
PLATELET # BLD AUTO: 186 THOUSANDS/UL (ref 149–390)
PMV BLD AUTO: 11.4 FL (ref 8.9–12.7)
POTASSIUM SERPL-SCNC: 4 MMOL/L (ref 3.5–5.3)
PROT SERPL-MCNC: 7.3 G/DL (ref 6.4–8.4)
RBC # BLD AUTO: 4.56 MILLION/UL (ref 3.81–5.12)
SODIUM SERPL-SCNC: 141 MMOL/L (ref 135–147)
T3FREE SERPL-MCNC: 2.41 PG/ML (ref 2.3–4.2)
T4 FREE SERPL-MCNC: 1.15 NG/DL (ref 0.76–1.46)
TSH SERPL DL<=0.05 MIU/L-ACNC: 2.1 UIU/ML (ref 0.45–4.5)
WBC # BLD AUTO: 5.19 THOUSAND/UL (ref 4.31–10.16)

## 2022-12-13 LAB — ACTH PLAS-MCNC: 14.4 PG/ML (ref 7.2–63.3)

## 2022-12-14 ENCOUNTER — HOSPITAL ENCOUNTER (OUTPATIENT)
Dept: MRI IMAGING | Facility: HOSPITAL | Age: 72
Discharge: HOME | End: 2022-12-14
Attending: INTERNAL MEDICINE

## 2022-12-14 ENCOUNTER — HOSPITAL ENCOUNTER (OUTPATIENT)
Dept: INFUSION CENTER | Facility: HOSPITAL | Age: 72
Discharge: HOME | End: 2022-12-14
Attending: INTERNAL MEDICINE

## 2022-12-14 VITALS
OXYGEN SATURATION: 95 % | RESPIRATION RATE: 18 BRPM | TEMPERATURE: 98.5 F | HEART RATE: 87 BPM | DIASTOLIC BLOOD PRESSURE: 74 MMHG | SYSTOLIC BLOOD PRESSURE: 157 MMHG

## 2022-12-14 DIAGNOSIS — C43.9 METASTATIC MELANOMA (HCC): ICD-10-CM

## 2022-12-14 DIAGNOSIS — C43.9 METASTATIC MELANOMA (HCC): Primary | ICD-10-CM

## 2022-12-14 RX ORDER — SODIUM CHLORIDE 9 MG/ML
20 INJECTION, SOLUTION INTRAVENOUS ONCE
Status: COMPLETED | OUTPATIENT
Start: 2022-12-14 | End: 2022-12-14

## 2022-12-14 RX ADMIN — GADOBUTROL 9 ML: 604.72 INJECTION INTRAVENOUS at 16:27

## 2022-12-14 RX ADMIN — SODIUM CHLORIDE 20 ML/HR: 0.9 INJECTION, SOLUTION INTRAVENOUS at 08:23

## 2022-12-14 RX ADMIN — SODIUM CHLORIDE 480 MG OF NIVOLUMAB: 9 INJECTION, SOLUTION INTRAVENOUS at 09:28

## 2022-12-14 NOTE — PROGRESS NOTES
Patient reported head congestion that started this past weekend  Denied fever, chills, runny nose, cough  VSS, afebrile  Dr Janell Abdalla office made aware, and received ok to tx  Patient educated on contacting office if symptoms worsen  She and daughter stated understanding  Patient tolerated immunotherapy infusion well, no complications  No distress noted

## 2022-12-14 NOTE — PROGRESS NOTES
Received notification that patient has been experiencing some congestion, patient declined any further s/s at this time per Infusion RN  Per Dr Clare Salinas, patient is okay to be treated as scheduled  Patient encouraged to monitor s/s and report to our office  Infusion RN notified

## 2022-12-27 ENCOUNTER — TELEPHONE (OUTPATIENT)
Dept: HEMATOLOGY ONCOLOGY | Facility: CLINIC | Age: 72
End: 2022-12-27

## 2022-12-27 NOTE — TELEPHONE ENCOUNTER
Discussed with Rodney Aguilar  Returned call to pt and advised her she should start Claritin and Pepcid daily she can take Benadryl as needed if it is waking her up at night  Pt voiced understanding

## 2022-12-27 NOTE — TELEPHONE ENCOUNTER
Recd call from pt who states she had her first tx on 12/14 and since then has been dealing with very itchy skin  No rash noted, just intense itching  Advised her I would discuss with Dr Addie Roberts and get back to her with recommendations

## 2022-12-29 ENCOUNTER — APPOINTMENT (OUTPATIENT)
Dept: LAB | Facility: CLINIC | Age: 72
End: 2022-12-29

## 2022-12-29 ENCOUNTER — TELEPHONE (OUTPATIENT)
Dept: HEMATOLOGY ONCOLOGY | Facility: CLINIC | Age: 72
End: 2022-12-29

## 2022-12-29 DIAGNOSIS — C43.9 METASTATIC MELANOMA (HCC): ICD-10-CM

## 2022-12-29 DIAGNOSIS — Z79.899 HIGH RISK MEDICATION USE: ICD-10-CM

## 2022-12-29 LAB
ALBUMIN SERPL BCP-MCNC: 3.4 G/DL (ref 3.5–5)
ALP SERPL-CCNC: 49 U/L (ref 46–116)
ALT SERPL W P-5'-P-CCNC: 22 U/L (ref 12–78)
ANION GAP SERPL CALCULATED.3IONS-SCNC: 4 MMOL/L (ref 4–13)
AST SERPL W P-5'-P-CCNC: 14 U/L (ref 5–45)
BASOPHILS # BLD AUTO: 0.08 THOUSANDS/ÂΜL (ref 0–0.1)
BASOPHILS NFR BLD AUTO: 1 % (ref 0–1)
BILIRUB SERPL-MCNC: 0.65 MG/DL (ref 0.2–1)
BUN SERPL-MCNC: 23 MG/DL (ref 5–25)
CALCIUM ALBUM COR SERPL-MCNC: 9.8 MG/DL (ref 8.3–10.1)
CALCIUM SERPL-MCNC: 9.3 MG/DL (ref 8.3–10.1)
CHLORIDE SERPL-SCNC: 106 MMOL/L (ref 96–108)
CO2 SERPL-SCNC: 28 MMOL/L (ref 21–32)
CREAT SERPL-MCNC: 0.83 MG/DL (ref 0.6–1.3)
EOSINOPHIL # BLD AUTO: 0.22 THOUSAND/ÂΜL (ref 0–0.61)
EOSINOPHIL NFR BLD AUTO: 3 % (ref 0–6)
ERYTHROCYTE [DISTWIDTH] IN BLOOD BY AUTOMATED COUNT: 13.4 % (ref 11.6–15.1)
GFR SERPL CREATININE-BSD FRML MDRD: 70 ML/MIN/1.73SQ M
GLUCOSE P FAST SERPL-MCNC: 98 MG/DL (ref 65–99)
HCT VFR BLD AUTO: 41.7 % (ref 34.8–46.1)
HGB BLD-MCNC: 13 G/DL (ref 11.5–15.4)
IMM GRANULOCYTES # BLD AUTO: 0.02 THOUSAND/UL (ref 0–0.2)
IMM GRANULOCYTES NFR BLD AUTO: 0 % (ref 0–2)
LDH SERPL-CCNC: 221 U/L (ref 81–234)
LYMPHOCYTES # BLD AUTO: 1.83 THOUSANDS/ÂΜL (ref 0.6–4.47)
LYMPHOCYTES NFR BLD AUTO: 27 % (ref 14–44)
MCH RBC QN AUTO: 28.9 PG (ref 26.8–34.3)
MCHC RBC AUTO-ENTMCNC: 31.2 G/DL (ref 31.4–37.4)
MCV RBC AUTO: 93 FL (ref 82–98)
MONOCYTES # BLD AUTO: 0.66 THOUSAND/ÂΜL (ref 0.17–1.22)
MONOCYTES NFR BLD AUTO: 10 % (ref 4–12)
NEUTROPHILS # BLD AUTO: 4.05 THOUSANDS/ÂΜL (ref 1.85–7.62)
NEUTS SEG NFR BLD AUTO: 59 % (ref 43–75)
NRBC BLD AUTO-RTO: 0 /100 WBCS
PLATELET # BLD AUTO: 248 THOUSANDS/UL (ref 149–390)
PMV BLD AUTO: 10.9 FL (ref 8.9–12.7)
POTASSIUM SERPL-SCNC: 4.3 MMOL/L (ref 3.5–5.3)
PROT SERPL-MCNC: 7.3 G/DL (ref 6.4–8.4)
RBC # BLD AUTO: 4.5 MILLION/UL (ref 3.81–5.12)
SODIUM SERPL-SCNC: 138 MMOL/L (ref 135–147)
T3FREE SERPL-MCNC: 2.49 PG/ML (ref 2.3–4.2)
T4 FREE SERPL-MCNC: 1.13 NG/DL (ref 0.76–1.46)
TSH SERPL DL<=0.05 MIU/L-ACNC: 2.35 UIU/ML (ref 0.45–4.5)
WBC # BLD AUTO: 6.86 THOUSAND/UL (ref 4.31–10.16)

## 2022-12-29 NOTE — TELEPHONE ENCOUNTER
Spoke with patient to see how her itchy skin was since yesterday  Patient states she is doing much better since taking the claritin and pepcid daily  I told the patient to call us if it gets worse or if she has any questions or concerns  Patient verbalized understanding

## 2023-01-04 RX ORDER — SODIUM CHLORIDE 9 MG/ML
20 INJECTION, SOLUTION INTRAVENOUS ONCE
Status: CANCELLED | OUTPATIENT
Start: 2023-01-11

## 2023-01-05 ENCOUNTER — APPOINTMENT (OUTPATIENT)
Dept: LAB | Facility: CLINIC | Age: 73
End: 2023-01-05

## 2023-01-05 ENCOUNTER — OFFICE VISIT (OUTPATIENT)
Dept: HEMATOLOGY ONCOLOGY | Facility: CLINIC | Age: 73
End: 2023-01-05

## 2023-01-05 VITALS
WEIGHT: 204 LBS | TEMPERATURE: 97.4 F | OXYGEN SATURATION: 98 % | HEIGHT: 69 IN | RESPIRATION RATE: 18 BRPM | DIASTOLIC BLOOD PRESSURE: 84 MMHG | SYSTOLIC BLOOD PRESSURE: 136 MMHG | HEART RATE: 86 BPM | BODY MASS INDEX: 30.21 KG/M2

## 2023-01-05 DIAGNOSIS — Z79.899 HIGH RISK MEDICATION USE: ICD-10-CM

## 2023-01-05 DIAGNOSIS — C43.9 MALIGNANT MELANOMA, UNSPECIFIED SITE (HCC): ICD-10-CM

## 2023-01-05 DIAGNOSIS — C43.9 METASTATIC MELANOMA (HCC): Primary | ICD-10-CM

## 2023-01-05 RX ORDER — LORATADINE 10 MG/1
10 TABLET ORAL DAILY
COMMUNITY

## 2023-01-05 RX ORDER — FAMOTIDINE 10 MG
10 TABLET ORAL 2 TIMES DAILY
COMMUNITY

## 2023-01-05 NOTE — LETTER
January 15, 2023     DO Katherine [address]las [address]    Patient: Bo Villalobos   YOB: 1950   Date of Visit: 1/5/2023       Dear Dr Sophia Staton: Thank you for referring Bo Villalobos to me for evaluation  Below are my notes for this consultation  If you have questions, please do not hesitate to call me  I look forward to following your patient along with you  Sincerely,        Shantanu Gilbert MD        CC: MD Vicente Hector MD Jefrey Bailiff, MD  1/15/2023  1:29 PM  Sign when Signing Visit  [address] [place] [address]  [phone]     Date of Visit: 8/5/1990  Name: Bo Villalobos   YOB: 1950        Subjective    VISIT DIAGNOSIS:  Diagnoses and all orders for this visit:    Metastatic melanoma ([facility] [address] )    High risk medication use    Other orders  -     loratadine (CLARITIN) 10 mg tablet; Take 10 mg by mouth daily  -     famotidine (PEPCID) 10 mg tablet; Take 10 mg by mouth 2 (two) times a day        Oncology History   Metastatic melanoma ([facility] [address] )   10/13/2022 -  Cancer Staged    Staging form: Melanoma of the Skin, AJCC 8th Edition  - Clinical stage from 10/13/2022: Stage IV (cTX, cNX, cM1a) - Signed by Shantanu Gilbert MD on 11/22/2022       10/13/2022 Biopsy    A  Skin, right lateral medial leg, punch biopsy:  Portion of severely atypical melanocytic dermal proliferation with prominent melanosis consistent with melanoma  See note  B  Skin, right medial leg  punch biopsy:  Portion of severely atypical melanocytic dermal proliferation with prominent melanosis consistent with melanoma  See note  C  Skin, right medial leg, punch biopsy:   Portion of severely atypical melanocytic dermal proliferation with prominent melanosis consistent with melanoma  See note       11/22/2022 Initial Diagnosis    Metastatic melanoma ([facility] [address] ) 12/14/2022 -  Chemotherapy    NIVOLUMAB-RELATLIMAB-RMBW (OPDUALAG) IVPB, 480 mg of nivolumab, Intravenous, Once, 2 of 8 cycles  Administration: 480 mg of nivolumab (12/14/2022), 480 mg of nivolumab (1/11/2023)        Cancer Staging   Metastatic melanoma ([facility] [address] )  Staging form: Melanoma of the Skin, AJCC 8th Edition  - Clinical stage from 10/13/2022: Stage IV (cTX, cNX, cM1a) - Signed by Tree Henderson MD on 11/22/2022     Treatment Details   Treatment goal Curative   Plan Name OP Nivolumab and Relatlimab-rmbw Q 28 Days   Status Active   Start Date 12/14/2022   End Date 6/28/2023 (Planned)   Provider Tree Henderson MD   Chemotherapy NIVOLUMAB-RELATLIMAB-RMBW (OPDUALAG) IVPB, 480 mg of nivolumab, Intravenous, Once, 2 of 8 cycles  Administration: 480 mg of nivolumab (12/14/2022), 480 mg of nivolumab (1/11/2023)          HISTORY OF PRESENT ILLNESS: Shakir Morales is a 67 y o  female  who has metastatic melanoma on her right leg with unknown primary and melanosis on multiple biopsies on treatment with pembrolizumab here for follow-up and surveillance  Tolerated treatment well and initiated treatment approximately 6 weeks ago  She did call in with rash and itching  She was instructed to take Claritin and Pepcid and has had relief with these medications  No longer having itching or rash  Denies any other problems  Feels well  Had nice holidays  Denies any new, changing, or concerning skin lesions  Denies any lymphadenopathy  Denies immune mediated side effects at this time  Denies headache, double vision, rash, itching, chest pain, shortness of breath, and diarrhea  REVIEW OF SYSTEMS:  Review of Systems   Constitutional: Negative for appetite change, fatigue, fever and unexpected weight change  HENT:   Negative for lump/mass  Eyes: Negative for icterus  Respiratory: Negative for cough, shortness of breath and wheezing  Cardiovascular: Negative for leg swelling     Gastrointestinal: Negative for abdominal pain, constipation, diarrhea, nausea and vomiting  Genitourinary: Negative for difficulty urinating and hematuria  Musculoskeletal: Negative for arthralgias, gait problem and myalgias  Skin: Negative for itching (resolved) and rash (resolved)  No new, changing, or concerning lesions  Neurological: Negative for extremity weakness, gait problem, headaches, light-headedness and numbness  Hematological: Negative for adenopathy          MEDICATIONS:    Current Outpatient Medications:   •  apixaban (Eliquis) 5 mg, Take 1 tablet (5 mg total) by mouth every 12 (twelve) hours, Disp: 60 tablet, Rfl: 5  •  calcium carbonate (OS-VIRGIE) 600 MG tablet, Take 600 mg by mouth 2 (two) times a day, Disp: , Rfl:   •  famotidine (PEPCID) 10 mg tablet, Take 10 mg by mouth 2 (two) times a day, Disp: , Rfl:   •  loratadine (CLARITIN) 10 mg tablet, Take 10 mg by mouth daily, Disp: , Rfl:   •  losartan (Cozaar) 100 MG tablet, Take 1 tablet (100 mg total) by mouth daily, Disp: 30 tablet, Rfl: 5  •  metoprolol succinate (TOPROL-XL) 25 mg 24 hr tablet, Take 1 tablet (25 mg total) by mouth 2 (two) times a day, Disp: 60 tablet, Rfl: 5  •  Multiple Vitamins-Minerals (MULTIVITAMIN WITH MINERALS) tablet, Take 1 tablet by mouth daily, Disp: , Rfl:   •  patient supplied medication, Pt takes eye drops Mura 128 - not listed in database, Disp: , Rfl:      ALLERGIES:  No Known Allergies     ACTIVE PROBLEMS:  Patient Active Problem List   Diagnosis   • Acute massive pulmonary embolism (HCC)   • Venous stasis of both lower extremities   • Clostridium difficile infection   • Right ventricular systolic dysfunction without heart failure   • Essential hypertension   • Metastatic melanoma ([facility] [address] )   • High risk medication use          PAST MEDICAL HISTORY:   Past Medical History:   Diagnosis Date   • Acute deep vein thrombosis (DVT) of popliteal vein of left lower extremity (HCC)    • Ankle wound, left, initial encounter     Resolved 2017   • Ankle wound, right, initial encounter     resolved 2017   • DVT (deep vein thrombosis) in pregnancy    • Pulmonary emboli ([facility] [address] )      post C section   • Pulmonary embolism ([facility] [address] )     2017   • Pulmonary embolism, blood-clot, obstetric    • Skin cancer    • Venous stasis ulcer (HCC)    • Venous ulcers of both lower extremities ([facility] [address] )         PAST SURGICAL HISTORY:  Past Surgical History:   Procedure Laterality Date   •  SECTION      X2        SOCIAL HISTORY:  Social History     Socioeconomic History   • Marital status:      Spouse name: None   • Number of children: None   • Years of education: None   • Highest education level: None   Occupational History   • None   Tobacco Use   • Smoking status: Never   • Smokeless tobacco: Never   Vaping Use   • Vaping Use: Never used   Substance and Sexual Activity   • Alcohol use: No     Comment: hx of social drinker   • Drug use: No   • Sexual activity: None   Other Topics Concern   • None   Social History Narrative   • None     Social Determinants of Health     Financial Resource Strain: Not on file   Food Insecurity: Not on file   Transportation Needs: Not on file   Physical Activity: Not on file   Stress: Not on file   Social Connections: Not on file   Intimate Partner Violence: Not on file   Housing Stability: Not on file        FAMILY HISTORY:  Family History   Problem Relation Age of Onset   • Heart attack Mother    • Stroke Mother    • Cancer Father    • Bone cancer Family    • Hypertension Family    • Lung cancer Family            Objective    PHYSICAL EXAMINATION:   Blood pressure 136/84, pulse 86, temperature (!) 97 4 °F (36 3 °C), resp  rate 18, height 5' 9" (1 753 m), weight 92 5 kg (204 lb), SpO2 98 %       ECOG Performance Status    Flowsheet Row Most Recent Value   ECOG Performance Status 0 - Fully active, able to carry on all pre-disease performance without restriction             Physical Exam  Constitutional:       General: She is not in acute distress  Appearance: Normal appearance  She is not toxic-appearing  HENT:      Mouth/Throat:      Mouth: Mucous membranes are moist       Pharynx: Oropharynx is clear  Eyes:      General: No scleral icterus  Cardiovascular:      Rate and Rhythm: Normal rate and regular rhythm  Pulses: Normal pulses  Heart sounds: No murmur heard  No friction rub  No gallop  Pulmonary:      Effort: Pulmonary effort is normal  No respiratory distress  Breath sounds: Normal breath sounds  No wheezing or rales  Abdominal:      General: There is no distension  Palpations: There is no mass  Tenderness: There is no abdominal tenderness  There is no rebound  Musculoskeletal:         General: No swelling or tenderness  Right lower leg: No edema  Left lower leg: No edema  Lymphadenopathy:      Head:      Right side of head: No submandibular, preauricular or posterior auricular adenopathy  Left side of head: No submandibular, preauricular or posterior auricular adenopathy  Cervical: No cervical adenopathy  Right cervical: No superficial or posterior cervical adenopathy  Left cervical: No superficial or posterior cervical adenopathy  Upper Body:      Right upper body: No supraclavicular or axillary adenopathy  Left upper body: No supraclavicular or axillary adenopathy  Skin:     Findings: No rash  Comments: No concerning skin lesions  Decreased pigmentation on her right lower leg  Neurological:      General: No focal deficit present  Mental Status: She is alert and oriented to person, place, and time  Psychiatric:         Mood and Affect: Mood normal          Behavior: Behavior normal          Thought Content: Thought content normal          Judgment: Judgment normal          I reviewed lab data in the chart      WBC   Date Value Ref Range Status   01/10/2023 6 16 4 31 - 10 16 Thousand/uL Final   12/29/2022 6 86 4 31 - 10 16 Thousand/uL Final   12/10/2022 5 19 4 31 - 10 16 Thousand/uL Final     Hemoglobin   Date Value Ref Range Status   01/10/2023 13 0 11 5 - 15 4 g/dL Final   12/29/2022 13 0 11 5 - 15 4 g/dL Final   12/10/2022 13 2 11 5 - 15 4 g/dL Final     Platelets   Date Value Ref Range Status   01/10/2023 183 149 - 390 Thousands/uL Final   12/29/2022 248 149 - 390 Thousands/uL Final   12/10/2022 186 149 - 390 Thousands/uL Final     MCV   Date Value Ref Range Status   01/10/2023 92 82 - 98 fL Final   12/29/2022 93 82 - 98 fL Final   12/10/2022 93 82 - 98 fL Final      Potassium   Date Value Ref Range Status   01/10/2023 3 8 3 5 - 5 3 mmol/L Final   12/29/2022 4 3 3 5 - 5 3 mmol/L Final   12/10/2022 4 0 3 5 - 5 3 mmol/L Final     Chloride   Date Value Ref Range Status   01/10/2023 104 96 - 108 mmol/L Final   12/29/2022 106 96 - 108 mmol/L Final   12/10/2022 108 96 - 108 mmol/L Final     CO2   Date Value Ref Range Status   01/10/2023 28 21 - 32 mmol/L Final   12/29/2022 28 21 - 32 mmol/L Final   12/10/2022 24 21 - 32 mmol/L Final     BUN   Date Value Ref Range Status   01/10/2023 21 5 - 25 mg/dL Final   12/29/2022 23 5 - 25 mg/dL Final   12/10/2022 19 5 - 25 mg/dL Final     Creatinine   Date Value Ref Range Status   01/10/2023 1 01 0 60 - 1 30 mg/dL Final     Comment:     Standardized to IDMS reference method   12/29/2022 0 83 0 60 - 1 30 mg/dL Final     Comment:     Standardized to IDMS reference method   12/10/2022 0 93 0 60 - 1 30 mg/dL Final     Comment:     Standardized to IDMS reference method     Glucose   Date Value Ref Range Status   11/17/2022 85 65 - 140 mg/dL Final     Comment:     If the patient is fasting, the ADA then defines impaired fasting glucose as > 100 mg/dL and diabetes as > or equal to 123 mg/dL  Specimen collection should occur prior to Sulfasalazine administration due to the potential for falsely depressed results   Specimen collection should occur prior to Sulfapyridine administration due to the potential for falsely elevated results  08/03/2020 112 (H) 65 - 99 mg/dL Final     Comment:     If the patient is fasting, the ADA then defines impaired fasting glucose as > 100 mg/dL and diabetes as > or equal to 123 mg/dL  Specimen collection should occur prior to Sulfasalazine administration due to the potential for falsely depressed results  Specimen collection should occur prior to Sulfapyridine administration due to the potential for falsely elevated results  01/24/2017 91 65 - 140 mg/dL Final     Comment:     If the patient is fasting, the ADA then defines impaired fasting glucose as > 100 mg/dL and diabetes as > or equal to 123 mg/dL  Calcium   Date Value Ref Range Status   01/10/2023 8 8 8 3 - 10 1 mg/dL Final   12/29/2022 9 3 8 3 - 10 1 mg/dL Final   12/10/2022 9 0 8 3 - 10 1 mg/dL Final     Albumin   Date Value Ref Range Status   01/10/2023 3 6 3 5 - 5 0 g/dL Final   12/29/2022 3 4 (L) 3 5 - 5 0 g/dL Final   12/10/2022 3 3 (L) 3 5 - 5 0 g/dL Final     Total Bilirubin   Date Value Ref Range Status   01/10/2023 0 42 0 20 - 1 00 mg/dL Final     Comment:     Use of this assay is not recommended for patients undergoing treatment with eltrombopag due to the potential for falsely elevated results  12/29/2022 0 65 0 20 - 1 00 mg/dL Final     Comment:     Use of this assay is not recommended for patients undergoing treatment with eltrombopag due to the potential for falsely elevated results  12/10/2022 0 61 0 20 - 1 00 mg/dL Final     Comment:     Use of this assay is not recommended for patients undergoing treatment with eltrombopag due to the potential for falsely elevated results       Alkaline Phosphatase   Date Value Ref Range Status   01/10/2023 59 46 - 116 U/L Final   12/29/2022 49 46 - 116 U/L Final   12/10/2022 45 (L) 46 - 116 U/L Final     AST   Date Value Ref Range Status   01/10/2023 24 5 - 45 U/L Final     Comment:     Specimen collection should occur prior to Sulfasalazine administration due to the potential for falsely depressed results  12/29/2022 14 5 - 45 U/L Final     Comment:     Specimen collection should occur prior to Sulfasalazine administration due to the potential for falsely depressed results  12/10/2022 17 5 - 45 U/L Final     Comment:     Specimen collection should occur prior to Sulfasalazine administration due to the potential for falsely depressed results  ALT   Date Value Ref Range Status   01/10/2023 24 12 - 78 U/L Final     Comment:     Specimen collection should occur prior to Sulfasalazine administration due to the potential for falsely depressed results  12/29/2022 22 12 - 78 U/L Final     Comment:     Specimen collection should occur prior to Sulfasalazine and/or Sulfapyridine administration due to the potential for falsely depressed results  12/10/2022 22 12 - 78 U/L Final     Comment:     Specimen collection should occur prior to Sulfasalazine and/or Sulfapyridine administration due to the potential for falsely depressed results  LD   Date Value Ref Range Status   01/10/2023 205 81 - 234 U/L Final   12/29/2022 221 81 - 234 U/L Final   12/10/2022 198 81 - 234 U/L Final     No results found for: TSH  No results found for: [id number]   Free T4   Date Value Ref Range Status   01/10/2023 1 18 0 76 - 1 46 ng/dL Final     Comment:     Specimen collection should occur prior to Sulfasalazine administration due to the potential for falsely elevated results  12/29/2022 1 13 0 76 - 1 46 ng/dL Final     Comment:     Specimen collection should occur prior to Sulfasalazine administration due to the potential for falsely elevated results  12/10/2022 1 15 0 76 - 1 46 ng/dL Final     Comment:     Specimen collection should occur prior to Sulfasalazine administration due to the potential for falsely elevated results  RECENT IMAGING:  No results found  Assessment/Plan  Ms Amadou Shah is a 67 yr female with metastatic melanoma with diffuse pigmentation on her right leg on treatment with pembrolizumab here for follow-up and and treatment  1  Metastatic melanoma ([facility] [address] )  Started treatment with pembrolizumab approximately 6 weeks ago and tolerated treatment well  Did develop rash and itching which have resolved with Claritin and Pepcid  She did not get blood work prior to scheduled visit, but did get them in the interim which are reflected in this note  They were reviewed and all look okay  Is okay for her to continue with treatment with pembrolizumab  She knows to monitor for any side effects  She will call with any issues or concerns prior to her next visit  She will get blood work prior to her next treatment  She has standing orders for blood work  2  High risk medication use  She is on treatment with immunotherapy and we will continue to monitor for side effects and lab abnormalities  We will monitor labs with each treatment to ensure safety of continuing on treatment  She knows to watch for signs and symptoms for immune mediated side effects  Denies any immune mediated side effects at this time -her rash and itching have been controlled on Claritin and Pepcid  She has standing orders for blood work to monitor for toxicity  Return in about 6 weeks (around 2/16/2023) for Office Visit, Infusion - See Treatment Plan, labs       Anat Fisher MD, PhD

## 2023-01-10 ENCOUNTER — APPOINTMENT (OUTPATIENT)
Dept: LAB | Facility: HOSPITAL | Age: 73
End: 2023-01-10
Attending: INTERNAL MEDICINE

## 2023-01-10 DIAGNOSIS — C43.9 METASTATIC MELANOMA (HCC): ICD-10-CM

## 2023-01-10 DIAGNOSIS — Z79.899 HIGH RISK MEDICATION USE: ICD-10-CM

## 2023-01-10 DIAGNOSIS — C43.9 METASTATIC MELANOMA (HCC): Primary | ICD-10-CM

## 2023-01-10 LAB
ALBUMIN SERPL BCP-MCNC: 3.6 G/DL (ref 3.5–5)
ALP SERPL-CCNC: 59 U/L (ref 46–116)
ALT SERPL W P-5'-P-CCNC: 24 U/L (ref 12–78)
ANION GAP SERPL CALCULATED.3IONS-SCNC: 8 MMOL/L (ref 4–13)
AST SERPL W P-5'-P-CCNC: 24 U/L (ref 5–45)
BASOPHILS # BLD AUTO: 0.08 THOUSANDS/ÂΜL (ref 0–0.1)
BASOPHILS NFR BLD AUTO: 1 % (ref 0–1)
BILIRUB SERPL-MCNC: 0.42 MG/DL (ref 0.2–1)
BUN SERPL-MCNC: 21 MG/DL (ref 5–25)
CALCIUM SERPL-MCNC: 8.8 MG/DL (ref 8.3–10.1)
CHLORIDE SERPL-SCNC: 104 MMOL/L (ref 96–108)
CO2 SERPL-SCNC: 28 MMOL/L (ref 21–32)
CREAT SERPL-MCNC: 1.01 MG/DL (ref 0.6–1.3)
EOSINOPHIL # BLD AUTO: 0.29 THOUSAND/ÂΜL (ref 0–0.61)
EOSINOPHIL NFR BLD AUTO: 5 % (ref 0–6)
ERYTHROCYTE [DISTWIDTH] IN BLOOD BY AUTOMATED COUNT: 13.3 % (ref 11.6–15.1)
GFR SERPL CREATININE-BSD FRML MDRD: 55 ML/MIN/1.73SQ M
GLUCOSE P FAST SERPL-MCNC: 113 MG/DL (ref 65–99)
HCT VFR BLD AUTO: 41.5 % (ref 34.8–46.1)
HGB BLD-MCNC: 13 G/DL (ref 11.5–15.4)
IMM GRANULOCYTES # BLD AUTO: 0.02 THOUSAND/UL (ref 0–0.2)
IMM GRANULOCYTES NFR BLD AUTO: 0 % (ref 0–2)
LDH SERPL-CCNC: 205 U/L (ref 81–234)
LYMPHOCYTES # BLD AUTO: 2.36 THOUSANDS/ÂΜL (ref 0.6–4.47)
LYMPHOCYTES NFR BLD AUTO: 38 % (ref 14–44)
MCH RBC QN AUTO: 28.9 PG (ref 26.8–34.3)
MCHC RBC AUTO-ENTMCNC: 31.3 G/DL (ref 31.4–37.4)
MCV RBC AUTO: 92 FL (ref 82–98)
MONOCYTES # BLD AUTO: 0.55 THOUSAND/ÂΜL (ref 0.17–1.22)
MONOCYTES NFR BLD AUTO: 9 % (ref 4–12)
NEUTROPHILS # BLD AUTO: 2.86 THOUSANDS/ÂΜL (ref 1.85–7.62)
NEUTS SEG NFR BLD AUTO: 47 % (ref 43–75)
NRBC BLD AUTO-RTO: 0 /100 WBCS
PLATELET # BLD AUTO: 183 THOUSANDS/UL (ref 149–390)
PMV BLD AUTO: 10.1 FL (ref 8.9–12.7)
POTASSIUM SERPL-SCNC: 3.8 MMOL/L (ref 3.5–5.3)
PROT SERPL-MCNC: 7.5 G/DL (ref 6.4–8.4)
RBC # BLD AUTO: 4.5 MILLION/UL (ref 3.81–5.12)
SODIUM SERPL-SCNC: 140 MMOL/L (ref 135–147)
T3FREE SERPL-MCNC: 2.62 PG/ML (ref 2.3–4.2)
T4 FREE SERPL-MCNC: 1.18 NG/DL (ref 0.76–1.46)
TSH SERPL DL<=0.05 MIU/L-ACNC: 1.91 UIU/ML (ref 0.45–4.5)
WBC # BLD AUTO: 6.16 THOUSAND/UL (ref 4.31–10.16)

## 2023-01-11 ENCOUNTER — HOSPITAL ENCOUNTER (OUTPATIENT)
Dept: INFUSION CENTER | Facility: HOSPITAL | Age: 73
Discharge: HOME | End: 2023-01-11
Attending: INTERNAL MEDICINE

## 2023-01-11 VITALS
BODY MASS INDEX: 30.11 KG/M2 | TEMPERATURE: 96.3 F | HEART RATE: 95 BPM | SYSTOLIC BLOOD PRESSURE: 163 MMHG | RESPIRATION RATE: 18 BRPM | DIASTOLIC BLOOD PRESSURE: 73 MMHG | HEIGHT: 69 IN | OXYGEN SATURATION: 97 %

## 2023-01-11 DIAGNOSIS — C43.9 METASTATIC MELANOMA (HCC): Primary | ICD-10-CM

## 2023-01-11 RX ORDER — SODIUM CHLORIDE 9 MG/ML
20 INJECTION, SOLUTION INTRAVENOUS ONCE
Status: COMPLETED | OUTPATIENT
Start: 2023-01-11 | End: 2023-01-11

## 2023-01-11 RX ADMIN — SODIUM CHLORIDE 480 MG OF NIVOLUMAB: 9 INJECTION, SOLUTION INTRAVENOUS at 14:53

## 2023-01-11 RX ADMIN — SODIUM CHLORIDE 20 ML/HR: 0.9 INJECTION, SOLUTION INTRAVENOUS at 14:35

## 2023-01-11 NOTE — PLAN OF CARE
Problem: INFECTION - ADULT  Goal: Absence or prevention of progression during hospitalization  Description: INTERVENTIONS:  - Assess and monitor for signs and symptoms of infection  - Monitor lab/diagnostic results  - Monitor all insertion sites, i e  indwelling lines, tubes, and drains  - Monitor endotracheal if appropriate and nasal secretions for changes in amount and color  - [place] appropriate cooling/warming therapies per order  - Administer medications as ordered  - Instruct and encourage patient and family to use good hand hygiene technique  - Identify and instruct in appropriate isolation precautions for identified infection/condition  Outcome: Progressing     Problem: DISCHARGE PLANNING  Goal: Discharge to home or other facility with appropriate resources  Description: INTERVENTIONS:  - Identify barriers to discharge w/patient and caregiver  - Arrange for needed discharge resources and transportation as appropriate  - Identify discharge learning needs (meds, wound care, etc )  - Arrange for interpretive services to assist at discharge as needed  - Refer to Case Management Department for coordinating discharge planning if the patient needs post-hospital services based on physician/advanced practitioner order or complex needs related to functional status, cognitive ability, or social support system  Outcome: Progressing     Problem: Knowledge Deficit  Goal: Patient/family/caregiver demonstrates understanding of disease process, treatment plan, medications, and discharge instructions  Description: Complete learning assessment and assess knowledge base    Interventions:  - Provide teaching at level of understanding  - Provide teaching via preferred learning methods  Outcome: Progressing

## 2023-01-12 LAB — MISCELLANEOUS LAB TEST RESULT: NORMAL

## 2023-01-15 PROBLEM — Z79.899 HIGH RISK MEDICATION USE: Status: ACTIVE | Noted: 2023-01-15

## 2023-01-15 NOTE — PROGRESS NOTES
[facility] HEMATOLOGY ONCOLOGY SPECIALISTS [address]  [facility] [address]  [phone]     Date of Visit: 4/3/6149  Name: Karely Musa   YOB: 1950        Subjective    VISIT DIAGNOSIS:  Diagnoses and all orders for this visit:    Metastatic melanoma ([facility] [address] )    High risk medication use    Other orders  -     loratadine (CLARITIN) 10 mg tablet; Take 10 mg by mouth daily  -     famotidine (PEPCID) 10 mg tablet; Take 10 mg by mouth 2 (two) times a day        Oncology History   [facility]ast[facility]   10/13/2022 -  Cancer Staged    Staging form: Melanoma of the Skin, AJCC 8th Edition  - Clinical stage from 10/13/2022: Stage IV (cTX, cNX, cM1a) - Signed by Ariadna Lora MD on 11/22/2022       10/13/2022 Biopsy    A  Skin, right lateral medial leg, punch biopsy:  Portion of severely atypical melanocytic dermal proliferation with prominent melanosis consistent with melanoma  See note  B  Skin, right medial leg  punch biopsy:  Portion of severely atypical melanocytic dermal proliferation with prominent melanosis consistent with melanoma  See note  C  Skin, right medial leg, punch biopsy:   Portion of severely atypical melanocytic dermal proliferation with prominent melanosis consistent with melanoma  See note       11/22/2022 Initial Diagnosis    Metastatic melanoma ([facility] )     12/14/2022 -  Chemotherapy    NIVOLUMAB-RELATLIMAB-RMBW (OPDUALAG) IVPB, 480 mg of nivolumab, Intravenous, Once, 2 of 8 cycles  Administration: 480 mg of nivolumab (12/14/2022), 480 mg of nivolumab (1/11/2023)        Cancer Staging   Metastatic melanoma [facility])  Staging form: Melanoma of the Skin, AJCC 8th Edition  - Clinical stage from 10/13/2022: Stage IV (cTX, cNX, cM1a) - Signed by Ariadna Lora MD on 11/22/2022     Treatment Details   Treatment goal Curative   Plan Name OP Nivolumab and Relatlimab-rmbw Q 28 Days   Status Active   Start Date 12/14/2022   End Date 6/28/2023 (Planned) Provider Clare Fritz MD   Chemotherapy NIVOLUMAB-RELATLIMAB-RMBW (OPDUALAG) IVPB, 480 mg of nivolumab, Intravenous, Once, 2 of 8 cycles  Administration: 480 mg of nivolumab (12/14/2022), 480 mg of nivolumab (1/11/2023)          HISTORY OF PRESENT ILLNESS: Julienne Ling is a 67 y o  female  who has metastatic melanoma on her right leg with unknown primary and melanosis on multiple biopsies on treatment with pembrolizumab here for follow-up and surveillance  Tolerated treatment well and initiated treatment approximately 6 weeks ago  She did call in with rash and itching  She was instructed to take Claritin and Pepcid and has had relief with these medications  No longer having itching or rash  Denies any other problems  Feels well  Had nice holidays  Denies any new, changing, or concerning skin lesions  Denies any lymphadenopathy  Denies immune mediated side effects at this time  Denies headache, double vision, rash, itching, chest pain, shortness of breath, and diarrhea  REVIEW OF SYSTEMS:  Review of Systems   Constitutional: Negative for appetite change, fatigue, fever and unexpected weight change  HENT:   Negative for lump/mass  Eyes: Negative for icterus  Respiratory: Negative for cough, shortness of breath and wheezing  Cardiovascular: Negative for leg swelling  Gastrointestinal: Negative for abdominal pain, constipation, diarrhea, nausea and vomiting  Genitourinary: Negative for difficulty urinating and hematuria  Musculoskeletal: Negative for arthralgias, gait problem and myalgias  Skin: Negative for itching (resolved) and rash (resolved)  No new, changing, or concerning lesions  Neurological: Negative for extremity weakness, gait problem, headaches, light-headedness and numbness  Hematological: Negative for adenopathy          MEDICATIONS:    Current Outpatient Medications:   •  apixaban (Eliquis) 5 mg, Take 1 tablet (5 mg total) by mouth every 12 (twelve) hours, Disp: 60 tablet, Rfl: 5  •  calcium carbonate (OS-VIRGIE) 600 MG tablet, Take 600 mg by mouth 2 (two) times a day, Disp: , Rfl:   •  famotidine (PEPCID) 10 mg tablet, Take 10 mg by mouth 2 (two) times a day, Disp: , Rfl:   •  loratadine (CLARITIN) 10 mg tablet, Take 10 mg by mouth daily, Disp: , Rfl:   •  losartan (Cozaar) 100 MG tablet, Take 1 tablet (100 mg total) by mouth daily, Disp: 30 tablet, Rfl: 5  •  metoprolol succinate (TOPROL-XL) 25 mg 24 hr tablet, Take 1 tablet (25 mg total) by mouth 2 (two) times a day, Disp: 60 tablet, Rfl: 5  •  Multiple Vitamins-Minerals (MULTIVITAMIN WITH MINERALS) tablet, Take 1 tablet by mouth daily, Disp: , Rfl:   •  patient supplied medication, Pt takes eye drops Mura 128 - not listed in database, Disp: , Rfl:      ALLERGIES:  No Known Allergies     ACTIVE PROBLEMS:  Patient Active Problem List   Diagnosis   • Acute massive pulmonary embolism (HCC)   • Venous stasis of both lower extremities   • Clostridium difficile infection   • Right ventricular systolic dysfunction without heart failure   • Essential hypertension   • Metastatic melanoma ([address] )   • High risk medication use          PAST MEDICAL HISTORY:   Past Medical History:   Diagnosis Date   • Acute deep vein thrombosis (DVT) of popliteal vein of left lower extremity (HCC)    • Ankle wound, left, initial encounter     Resolved 2017   • Ankle wound, right, initial encounter     resolved 2017   • DVT (deep vein thrombosis) in pregnancy    • Pulmonary emboli ([address] )      post C section   • Pulmonary embolism ([address] )     2017   • Pulmonary embolism, blood-clot, obstetric    • Skin cancer    • Venous stasis ulcer ([address] )    • Venous ulcers of both lower extremities ([address] )         PAST SURGICAL HISTORY:  Past Surgical History:   Procedure Laterality Date   •  SECTION      X2        SOCIAL HISTORY:  Social History     Socioeconomic History   • Marital status:       Spouse name: None   • Number of children: None   • Years of education: None   • Highest education level: None   Occupational History   • None   Tobacco Use   • Smoking status: Never   • Smokeless tobacco: Never   Vaping Use   • Vaping Use: Never used   Substance and Sexual Activity   • Alcohol use: No     Comment: hx of social drinker   • Drug use: No   • Sexual activity: None   Other Topics Concern   • None   Social History Narrative   • None     Social Determinants of Health     Financial Resource Strain: Not on file   Food Insecurity: Not on file   Transportation Needs: Not on file   Physical Activity: Not on file   Stress: Not on file   Social Connections: Not on file   Intimate Partner Violence: Not on file   Housing Stability: Not on file        FAMILY HISTORY:  Family History   Problem Relation Age of Onset   • Heart attack Mother    • Stroke Mother    • Cancer Father    • Bone cancer Family    • Hypertension Family    • Lung cancer Family            Objective    PHYSICAL EXAMINATION:   Blood pressure 136/84, pulse 86, temperature (!) 97 4 °F (36 3 °C), resp  rate 18, height 5' 9" (1 753 m), weight 92 5 kg (204 lb), SpO2 98 %  ECOG Performance Status    Flowsheet Row Most Recent Value   ECOG Performance Status 0 - Fully active, able to carry on all pre-disease performance without restriction             Physical Exam  Constitutional:       General: She is not in acute distress  Appearance: Normal appearance  She is not toxic-appearing  HENT:      Mouth/Throat:      Mouth: Mucous membranes are moist       Pharynx: Oropharynx is clear  Eyes:      General: No scleral icterus  Cardiovascular:      Rate and Rhythm: Normal rate and regular rhythm  Pulses: Normal pulses  Heart sounds: No murmur heard  No friction rub  No gallop  Pulmonary:      Effort: Pulmonary effort is normal  No respiratory distress  Breath sounds: Normal breath sounds  No wheezing or rales  Abdominal:      General: There is no distension  Palpations: There is no mass  Tenderness: There is no abdominal tenderness  There is no rebound  Musculoskeletal:         General: No swelling or tenderness  Right lower leg: No edema  Left lower leg: No edema  Lymphadenopathy:      Head:      Right side of head: No submandibular, preauricular or posterior auricular adenopathy  Left side of head: No submandibular, preauricular or posterior auricular adenopathy  Cervical: No cervical adenopathy  Right cervical: No superficial or posterior cervical adenopathy  Left cervical: No superficial or posterior cervical adenopathy  Upper Body:      Right upper body: No supraclavicular or axillary adenopathy  Left upper body: No supraclavicular or axillary adenopathy  Skin:     Findings: No rash  Comments: No concerning skin lesions  Decreased pigmentation on her right lower leg  Neurological:      General: No focal deficit present  Mental Status: She is alert and oriented to person, place, and time  Psychiatric:         Mood and Affect: Mood normal          Behavior: Behavior normal          Thought Content: Thought content normal          Judgment: Judgment normal          I reviewed lab data in the chart      WBC   Date Value Ref Range Status   01/10/2023 6 16 4 31 - 10 16 Thousand/uL Final   12/29/2022 6 86 4 31 - 10 16 Thousand/uL Final   12/10/2022 5 19 4 31 - 10 16 Thousand/uL Final     Hemoglobin   Date Value Ref Range Status   01/10/2023 13 0 11 5 - 15 4 g/dL Final   12/29/2022 13 0 11 5 - 15 4 g/dL Final   12/10/2022 13 2 11 5 - 15 4 g/dL Final     Platelets   Date Value Ref Range Status   01/10/2023 183 149 - 390 Thousands/uL Final   12/29/2022 248 149 - 390 Thousands/uL Final   12/10/2022 186 149 - 390 Thousands/uL Final     MCV   Date Value Ref Range Status   01/10/2023 92 82 - 98 fL Final   12/29/2022 93 82 - 98 fL Final   12/10/2022 93 82 - 98 fL Final      Potassium   Date Value Ref Range Status 01/10/2023 3 8 3 5 - 5 3 mmol/L Final   12/29/2022 4 3 3 5 - 5 3 mmol/L Final   12/10/2022 4 0 3 5 - 5 3 mmol/L Final     Chloride   Date Value Ref Range Status   01/10/2023 104 96 - 108 mmol/L Final   12/29/2022 106 96 - 108 mmol/L Final   12/10/2022 108 96 - 108 mmol/L Final     CO2   Date Value Ref Range Status   01/10/2023 28 21 - 32 mmol/L Final   12/29/2022 28 21 - 32 mmol/L Final   12/10/2022 24 21 - 32 mmol/L Final     BUN   Date Value Ref Range Status   01/10/2023 21 5 - 25 mg/dL Final   12/29/2022 23 5 - 25 mg/dL Final   12/10/2022 19 5 - 25 mg/dL Final     Creatinine   Date Value Ref Range Status   01/10/2023 1 01 0 60 - 1 30 mg/dL Final     Comment:     Standardized to IDMS reference method   12/29/2022 0 83 0 60 - 1 30 mg/dL Final     Comment:     Standardized to IDMS reference method   12/10/2022 0 93 0 60 - 1 30 mg/dL Final     Comment:     Standardized to IDMS reference method     Glucose   Date Value Ref Range Status   11/17/2022 85 65 - 140 mg/dL Final     Comment:     If the patient is fasting, the ADA then defines impaired fasting glucose as > 100 mg/dL and diabetes as > or equal to 123 mg/dL  Specimen collection should occur prior to Sulfasalazine administration due to the potential for falsely depressed results  Specimen collection should occur prior to Sulfapyridine administration due to the potential for falsely elevated results  08/03/2020 112 (H) 65 - 99 mg/dL Final     Comment:     If the patient is fasting, the ADA then defines impaired fasting glucose as > 100 mg/dL and diabetes as > or equal to 123 mg/dL  Specimen collection should occur prior to Sulfasalazine administration due to the potential for falsely depressed results  Specimen collection should occur prior to Sulfapyridine administration due to the potential for falsely elevated results     01/24/2017 91 65 - 140 mg/dL Final     Comment:     If the patient is fasting, the ADA then defines impaired fasting glucose as > 100 mg/dL and diabetes as > or equal to 123 mg/dL  Calcium   Date Value Ref Range Status   01/10/2023 8 8 8 3 - 10 1 mg/dL Final   12/29/2022 9 3 8 3 - 10 1 mg/dL Final   12/10/2022 9 0 8 3 - 10 1 mg/dL Final     Albumin   Date Value Ref Range Status   01/10/2023 3 6 3 5 - 5 0 g/dL Final   12/29/2022 3 4 (L) 3 5 - 5 0 g/dL Final   12/10/2022 3 3 (L) 3 5 - 5 0 g/dL Final     Total Bilirubin   Date Value Ref Range Status   01/10/2023 0 42 0 20 - 1 00 mg/dL Final     Comment:     Use of this assay is not recommended for patients undergoing treatment with eltrombopag due to the potential for falsely elevated results  12/29/2022 0 65 0 20 - 1 00 mg/dL Final     Comment:     Use of this assay is not recommended for patients undergoing treatment with eltrombopag due to the potential for falsely elevated results  12/10/2022 0 61 0 20 - 1 00 mg/dL Final     Comment:     Use of this assay is not recommended for patients undergoing treatment with eltrombopag due to the potential for falsely elevated results  Alkaline Phosphatase   Date Value Ref Range Status   01/10/2023 59 46 - 116 U/L Final   12/29/2022 49 46 - 116 U/L Final   12/10/2022 45 (L) 46 - 116 U/L Final     AST   Date Value Ref Range Status   01/10/2023 24 5 - 45 U/L Final     Comment:     Specimen collection should occur prior to Sulfasalazine administration due to the potential for falsely depressed results  12/29/2022 14 5 - 45 U/L Final     Comment:     Specimen collection should occur prior to Sulfasalazine administration due to the potential for falsely depressed results  12/10/2022 17 5 - 45 U/L Final     Comment:     Specimen collection should occur prior to Sulfasalazine administration due to the potential for falsely depressed results        ALT   Date Value Ref Range Status   01/10/2023 24 12 - 78 U/L Final     Comment:     Specimen collection should occur prior to Sulfasalazine administration due to the potential for falsely depressed results  12/29/2022 22 12 - 78 U/L Final     Comment:     Specimen collection should occur prior to Sulfasalazine and/or Sulfapyridine administration due to the potential for falsely depressed results  12/10/2022 22 12 - 78 U/L Final     Comment:     Specimen collection should occur prior to Sulfasalazine and/or Sulfapyridine administration due to the potential for falsely depressed results  LD   Date Value Ref Range Status   01/10/2023 205 81 - 234 U/L Final   12/29/2022 221 81 - 234 U/L Final   12/10/2022 198 81 - 234 U/L Final     No results found for: TSH  No results found for: [id number]   Free T4   Date Value Ref Range Status   01/10/2023 1 18 0 76 - 1 46 ng/dL Final     Comment:     Specimen collection should occur prior to Sulfasalazine administration due to the potential for falsely elevated results  12/29/2022 1 13 0 76 - 1 46 ng/dL Final     Comment:     Specimen collection should occur prior to Sulfasalazine administration due to the potential for falsely elevated results  12/10/2022 1 15 0 76 - 1 46 ng/dL Final     Comment:     Specimen collection should occur prior to Sulfasalazine administration due to the potential for falsely elevated results  RECENT IMAGING:  No results found  Assessment/Plan  Ms Joel Huertas is a 67 yr female with metastatic melanoma with diffuse pigmentation on her right leg on treatment with pembrolizumab here for follow-up and and treatment  1  Metastatic melanoma ([facility] [address] )  Started treatment with pembrolizumab approximately 6 weeks ago and tolerated treatment well  Did develop rash and itching which have resolved with Claritin and Pepcid  She did not get blood work prior to scheduled visit, but did get them in the interim which are reflected in this note  They were reviewed and all look okay  Is okay for her to continue with treatment with pembrolizumab  She knows to monitor for any side effects    She will call with any issues or concerns prior to her next visit  She will get blood work prior to her next treatment  She has standing orders for blood work  2  High risk medication use  She is on treatment with immunotherapy and we will continue to monitor for side effects and lab abnormalities  We will monitor labs with each treatment to ensure safety of continuing on treatment  She knows to watch for signs and symptoms for immune mediated side effects  Denies any immune mediated side effects at this time -her rash and itching have been controlled on Claritin and Pepcid  She has standing orders for blood work to monitor for toxicity  Return in about 6 weeks (around 2/16/2023) for Office Visit, Infusion - See Treatment Plan, labs       Janes Ochoa MD, PhD

## 2023-01-20 ENCOUNTER — DOCUMENTATION (OUTPATIENT)
Dept: HEMATOLOGY ONCOLOGY | Facility: CLINIC | Age: 73
End: 2023-01-20

## 2023-01-20 NOTE — PROGRESS NOTES
[facility] notes on [facility]  Will revw for the pts resp for the [facility]   pt outreach if needed

## 2023-02-01 ENCOUNTER — TELEPHONE (OUTPATIENT)
Dept: SURGICAL ONCOLOGY | Facility: CLINIC | Age: 73
End: 2023-02-01

## 2023-02-01 RX ORDER — SODIUM CHLORIDE 9 MG/ML
20 INJECTION, SOLUTION INTRAVENOUS ONCE
Status: CANCELLED | OUTPATIENT
Start: 2023-02-08

## 2023-02-01 NOTE — TELEPHONE ENCOUNTER
Oncology Finance Advocacy Intake and Intervention  Oncology Finance Counselor/Advocate placed call to patient  This writer informed patient that this writer is here to assist patient with billing questions, financial assistance, payment/payment plans, quotes, copayment assistance, insurance optimization, and insurance navigation  This writer conducted a thorough benefit review of copayment, deductible, and out of pocket cost  This information is documented below and has been reviewed with patient  Copayment:N/A  Deductible:$800 00 / Met  Out of Pocket Cost:$$3,200 00 / Met  Insurance optimization (Limited benefit vs self-pay):No  Patient assistance status:Patient Courtesy Outreach  Interventions:  Placed called to patient  I introduced myself and after a review of the patient's insurance the patient would not need our assistance for services at this time  If that should change in anyway I will provide you with my contact information to reach out to me with any questions or concerns  This note will be on the patient's MyChart  Also I will add my contact information to their Care Team           Information above was review thoroughly with patient and patient was advise of possible assistance programs/interventions  If any question arise patient can contact this writer at below information  This information was given to patient at time of contact  Maritza Murphy  Phone:[phone]  Email: [email or web address]

## 2023-02-04 ENCOUNTER — APPOINTMENT (OUTPATIENT)
Dept: LAB | Facility: CLINIC | Age: 73
End: 2023-02-04

## 2023-02-06 ENCOUNTER — OFFICE VISIT (OUTPATIENT)
Dept: HEMATOLOGY ONCOLOGY | Facility: CLINIC | Age: 73
End: 2023-02-06

## 2023-02-06 VITALS
SYSTOLIC BLOOD PRESSURE: 135 MMHG | DIASTOLIC BLOOD PRESSURE: 75 MMHG | OXYGEN SATURATION: 99 % | RESPIRATION RATE: 18 BRPM | HEIGHT: 69 IN | BODY MASS INDEX: 30.81 KG/M2 | WEIGHT: 208 LBS | HEART RATE: 97 BPM

## 2023-02-06 DIAGNOSIS — C43.9 METASTATIC MELANOMA (HCC): Primary | ICD-10-CM

## 2023-02-06 DIAGNOSIS — Z79.899 HIGH RISK MEDICATION USE: ICD-10-CM

## 2023-02-06 NOTE — LETTER
February 8, 2023     DO Katherine Nolen [address]    Patient: Cyndee Diaz   YOB: 1950   Date of Visit: 2/6/2023       Dear Dr Leyda Johnston: Thank you for referring Cyndee Diaz to me for evaluation  Below are my notes for this consultation  If you have questions, please do not hesitate to call me  I look forward to following your patient along with you  Sincerely,        Pérez Salazar MD        CC: MD Pérez Lee MD  2/8/2023 12:34 AM  Sign when Signing Visit  [address] [place] [address]  [phone]     Date of Visit: 4/0/4059  Name: Cyndee Diaz   YOB: 1950        Subjective    VISIT DIAGNOSIS:  Diagnoses and all orders for this visit:    Metastatic melanoma ([facility] [address] )  -     NM pet ct tumor imaging whole body; Future    High risk medication use        Oncology History   Metastatic melanoma ([address] )   10/13/2022 -  Cancer Staged    Staging form: Melanoma of the Skin, AJCC 8th Edition  - Clinical stage from 10/13/2022: Stage IV (cTX, cNX, cM1a) - Signed by Pérez Salazar MD on 11/22/2022       10/13/2022 Biopsy    A  Skin, right lateral medial leg, punch biopsy:  Portion of severely atypical melanocytic dermal proliferation with prominent melanosis consistent with melanoma  See note  B  Skin, right medial leg  punch biopsy:  Portion of severely atypical melanocytic dermal proliferation with prominent melanosis consistent with melanoma  See note  C  Skin, right medial leg, punch biopsy:   Portion of severely atypical melanocytic dermal proliferation with prominent melanosis consistent with melanoma  See note       11/22/2022 Initial Diagnosis    Metastatic melanoma ([address] )     12/14/2022 -  Chemotherapy    NIVOLUMAB-RELATLIMAB-RMBW (OPDUALAG) IVPB, 480 mg of nivolumab, Intravenous, Once, 2 of 8 cycles  Administration: 480 mg of nivolumab (12/14/2022), 480 mg of nivolumab (1/11/2023)     1/5/2023 Genomic Testing    Foundation One liquid testing  TMB 1 Mut/Mb  MSI-high not detected          Cancer Staging   Metastatic melanoma ([facility] [address] )  Staging form: Melanoma of the Skin, AJCC 8th Edition  - Clinical stage from 10/13/2022: Stage IV (cTX, cNX, cM1a) - Signed by Tasneem Concepcion MD on 11/22/2022     Treatment Details   Treatment goal Curative   Plan Name OP Nivolumab and Relatlimab-rmbw Q 28 Days   Status Active   Start Date 12/14/2022   End Date 6/28/2023 (Planned)   Provider Tasneem Concepcion MD   Chemotherapy NIVOLUMAB-RELATLIMAB-RMBW (OPDUALAG) IVPB, 480 mg of nivolumab, Intravenous, Once, 2 of 8 cycles  Administration: 480 mg of nivolumab (12/14/2022), 480 mg of nivolumab (1/11/2023)          HISTORY OF PRESENT ILLNESS: Farshad Barreto is a 67 y o  female  who has unresectable metastatic melanoma on treatment with nivolumab and relatlimab here for follow-up and treatment  She is doing well and tolerating treatment  Denies any issues or concerns or complaints at this time  Energy is good  Appetite is good  She denies any new, changing, or concerning skin lesions  Denies any lymphadenopathy  She thinks the pigmentation in her right leg is improving and lightening  She denies any immune mediated side effects  Denies headaches, double vision, rash, itching, chest pain, shortness of breath, and diarrhea  REVIEW OF SYSTEMS:  Review of Systems   Constitutional: Negative for appetite change, fatigue, fever and unexpected weight change  HENT:   Negative for lump/mass  Eyes: Negative for icterus  Respiratory: Negative for cough, shortness of breath and wheezing  Cardiovascular: Negative for leg swelling  Gastrointestinal: Negative for abdominal pain, constipation, diarrhea, nausea and vomiting  Genitourinary: Negative for difficulty urinating and hematuria  Musculoskeletal: Negative for arthralgias, gait problem and myalgias  Skin: Negative for itching and rash  No new, changing, or concerning lesions  Improving pigmentation in her right lower extremity, site of melanoma  Neurological: Negative for extremity weakness, gait problem, headaches, light-headedness and numbness  Hematological: Negative for adenopathy          MEDICATIONS:    Current Outpatient Medications:   •  apixaban (Eliquis) 5 mg, Take 1 tablet (5 mg total) by mouth every 12 (twelve) hours, Disp: 60 tablet, Rfl: 5  •  calcium carbonate (OS-VIRGIE) 600 MG tablet, Take 600 mg by mouth 2 (two) times a day, Disp: , Rfl:   •  famotidine (PEPCID) 10 mg tablet, Take 10 mg by mouth 2 (two) times a day, Disp: , Rfl:   •  loratadine (CLARITIN) 10 mg tablet, Take 10 mg by mouth daily, Disp: , Rfl:   •  losartan (Cozaar) 100 MG tablet, Take 1 tablet (100 mg total) by mouth daily, Disp: 30 tablet, Rfl: 5  •  metoprolol succinate (TOPROL-XL) 25 mg 24 hr tablet, Take 1 tablet (25 mg total) by mouth 2 (two) times a day, Disp: 60 tablet, Rfl: 5  •  Multiple Vitamins-Minerals (MULTIVITAMIN WITH MINERALS) tablet, Take 1 tablet by mouth daily, Disp: , Rfl:   •  patient supplied medication, Pt takes eye drops Mura [address] - not listed in database, Disp: , Rfl:      ALLERGIES:  No Known Allergies     ACTIVE PROBLEMS:  Patient Active Problem List   Diagnosis   • Acute massive pulmonary embolism ([facility] [address] )   • Venous stasis of both lower extremities   • Clostridium difficile infection   • Right ventricular systolic dysfunction without heart failure   • Essential hypertension   • Metastatic melanoma ([facility] [address] )   • High risk medication use          PAST MEDICAL HISTORY:   Past Medical History:   Diagnosis Date   • Acute deep vein thrombosis (DVT) of popliteal vein of left lower extremity (HCC)    • Ankle wound, left, initial encounter     Resolved 6/2017   • Ankle wound, right, initial encounter     resolved 6/2017   • DVT (deep vein thrombosis) in pregnancy    • Pulmonary emboli ([address] )     1989 post C section   • Pulmonary embolism ([facility] [address] )     2017   • Pulmonary embolism, blood-clot, obstetric    • Skin cancer    • Venous stasis ulcer (HCC)    • Venous ulcers of both lower extremities (HCC)         PAST SURGICAL HISTORY:  Past Surgical History:   Procedure Laterality Date   •  SECTION      X2        SOCIAL HISTORY:  Social History     Socioeconomic History   • Marital status:      Spouse name: None   • Number of children: None   • Years of education: None   • Highest education level: None   Occupational History   • None   Tobacco Use   • Smoking status: Never   • Smokeless tobacco: Never   Vaping Use   • Vaping Use: Never used   Substance and Sexual Activity   • Alcohol use: No     Comment: hx of social drinker   • Drug use: No   • Sexual activity: None   Other Topics Concern   • None   Social History Narrative   • None     Social Determinants of Health     Financial Resource Strain: Not on file   Food Insecurity: Not on file   Transportation Needs: Not on file   Physical Activity: Not on file   Stress: Not on file   Social Connections: Not on file   Intimate Partner Violence: Not on file   Housing Stability: Not on file        FAMILY HISTORY:  Family History   Problem Relation Age of Onset   • Heart attack Mother    • Stroke Mother    • Cancer Father    • Bone cancer Family    • Hypertension Family    • Lung cancer Family            Objective    PHYSICAL EXAMINATION:   Blood pressure 135/75, pulse 97, resp  rate 18, height 5' 9" (1 753 m), weight 94 3 kg (208 lb), SpO2 99 %  Pain Score: 0-No pain     ECOG Performance Status    Flowsheet Row Most Recent Value   ECOG Performance Status 0 - Fully active, able to carry on all pre-disease performance without restriction             Physical Exam  Constitutional:       General: She is not in acute distress  Appearance: Normal appearance  She is not toxic-appearing     HENT:      Mouth/Throat:      Mouth: Mucous membranes are moist       Pharynx: Oropharynx is clear  Eyes:      General: No scleral icterus  Cardiovascular:      Rate and Rhythm: Normal rate and regular rhythm  Pulses: Normal pulses  Heart sounds: No murmur heard  No friction rub  No gallop  Pulmonary:      Effort: Pulmonary effort is normal  No respiratory distress  Breath sounds: Normal breath sounds  No wheezing or rales  Abdominal:      General: There is no distension  Palpations: There is no mass  Tenderness: There is no abdominal tenderness  There is no rebound  Musculoskeletal:         General: No swelling or tenderness  Right lower leg: No edema  Left lower leg: No edema  Lymphadenopathy:      Head:      Right side of head: No submandibular, preauricular or posterior auricular adenopathy  Left side of head: No submandibular, preauricular or posterior auricular adenopathy  Cervical: No cervical adenopathy  Right cervical: No superficial or posterior cervical adenopathy  Left cervical: No superficial or posterior cervical adenopathy  Upper Body:      Right upper body: No supraclavicular or axillary adenopathy  Left upper body: No supraclavicular or axillary adenopathy  Skin:     Findings: No rash  Comments: Improving and lightening pigmentation on the right lower extremity  Neurological:      General: No focal deficit present  Mental Status: She is alert and oriented to person, place, and time  Psychiatric:         Mood and Affect: Mood normal          Behavior: Behavior normal          Thought Content: Thought content normal          Judgment: Judgment normal          I reviewed lab data in the chart      WBC   Date Value Ref Range Status   02/04/2023 5 31 4 31 - 10 16 Thousand/uL Final   01/10/2023 6 16 4 31 - 10 16 Thousand/uL Final   12/29/2022 6 86 4 31 - 10 16 Thousand/uL Final     Hemoglobin   Date Value Ref Range Status   02/04/2023 13 0 11 5 - 15 4 g/dL Final   01/10/2023 13 0 11 5 - 15 4 g/dL Final   12/29/2022 13 0 11 5 - 15 4 g/dL Final     Platelets   Date Value Ref Range Status   02/04/2023 205 149 - 390 Thousands/uL Final   01/10/2023 183 149 - 390 Thousands/uL Final   12/29/2022 248 149 - 390 Thousands/uL Final     MCV   Date Value Ref Range Status   02/04/2023 92 82 - 98 fL Final   01/10/2023 92 82 - 98 fL Final   12/29/2022 93 82 - 98 fL Final      Potassium   Date Value Ref Range Status   02/04/2023 4 0 3 5 - 5 3 mmol/L Final   01/10/2023 3 8 3 5 - 5 3 mmol/L Final   12/29/2022 4 3 3 5 - 5 3 mmol/L Final     Chloride   Date Value Ref Range Status   02/04/2023 109 (H) 96 - 108 mmol/L Final   01/10/2023 104 96 - 108 mmol/L Final   12/29/2022 106 96 - 108 mmol/L Final     CO2   Date Value Ref Range Status   02/04/2023 26 21 - 32 mmol/L Final   01/10/2023 28 21 - 32 mmol/L Final   12/29/2022 28 21 - 32 mmol/L Final     BUN   Date Value Ref Range Status   02/04/2023 18 5 - 25 mg/dL Final   01/10/2023 21 5 - 25 mg/dL Final   12/29/2022 23 5 - 25 mg/dL Final     Creatinine   Date Value Ref Range Status   02/04/2023 0 81 0 60 - 1 30 mg/dL Final     Comment:     Standardized to IDMS reference method   01/10/2023 1 01 0 60 - 1 30 mg/dL Final     Comment:     Standardized to IDMS reference method   12/29/2022 0 83 0 60 - 1 30 mg/dL Final     Comment:     Standardized to IDMS reference method     Glucose   Date Value Ref Range Status   11/17/2022 85 65 - 140 mg/dL Final     Comment:     If the patient is fasting, the ADA then defines impaired fasting glucose as > 100 mg/dL and diabetes as > or equal to 123 mg/dL  Specimen collection should occur prior to Sulfasalazine administration due to the potential for falsely depressed results  Specimen collection should occur prior to Sulfapyridine administration due to the potential for falsely elevated results     08/03/2020 112 (H) 65 - 99 mg/dL Final     Comment:     If the patient is fasting, the ADA then defines impaired fasting glucose as > 100 mg/dL and diabetes as > or equal to 123 mg/dL  Specimen collection should occur prior to Sulfasalazine administration due to the potential for falsely depressed results  Specimen collection should occur prior to Sulfapyridine administration due to the potential for falsely elevated results  01/24/2017 91 65 - 140 mg/dL Final     Comment:     If the patient is fasting, the ADA then defines impaired fasting glucose as > 100 mg/dL and diabetes as > or equal to 123 mg/dL  Calcium   Date Value Ref Range Status   02/04/2023 8 9 8 3 - 10 1 mg/dL Final   01/10/2023 8 8 8 3 - 10 1 mg/dL Final   12/29/2022 9 3 8 3 - 10 1 mg/dL Final     Albumin   Date Value Ref Range Status   02/04/2023 3 4 (L) 3 5 - 5 0 g/dL Final   01/10/2023 3 6 3 5 - 5 0 g/dL Final   12/29/2022 3 4 (L) 3 5 - 5 0 g/dL Final     Total Bilirubin   Date Value Ref Range Status   02/04/2023 0 60 0 20 - 1 00 mg/dL Final     Comment:     Use of this assay is not recommended for patients undergoing treatment with eltrombopag due to the potential for falsely elevated results  01/10/2023 0 42 0 20 - 1 00 mg/dL Final     Comment:     Use of this assay is not recommended for patients undergoing treatment with eltrombopag due to the potential for falsely elevated results  12/29/2022 0 65 0 20 - 1 00 mg/dL Final     Comment:     Use of this assay is not recommended for patients undergoing treatment with eltrombopag due to the potential for falsely elevated results  Alkaline Phosphatase   Date Value Ref Range Status   02/04/2023 43 (L) 46 - 116 U/L Final   01/10/2023 59 46 - 116 U/L Final   12/29/2022 49 46 - 116 U/L Final     AST   Date Value Ref Range Status   02/04/2023 16 5 - 45 U/L Final     Comment:     Specimen collection should occur prior to Sulfasalazine administration due to the potential for falsely depressed results      01/10/2023 24 5 - 45 U/L Final     Comment:     Specimen collection should occur prior to Sulfasalazine administration due to the potential for falsely depressed results  12/29/2022 14 5 - 45 U/L Final     Comment:     Specimen collection should occur prior to Sulfasalazine administration due to the potential for falsely depressed results  ALT   Date Value Ref Range Status   02/04/2023 22 12 - 78 U/L Final     Comment:     Specimen collection should occur prior to Sulfasalazine and/or Sulfapyridine administration due to the potential for falsely depressed results  01/10/2023 24 12 - 78 U/L Final     Comment:     Specimen collection should occur prior to Sulfasalazine administration due to the potential for falsely depressed results  12/29/2022 22 12 - 78 U/L Final     Comment:     Specimen collection should occur prior to Sulfasalazine and/or Sulfapyridine administration due to the potential for falsely depressed results  LD   Date Value Ref Range Status   02/04/2023 201 81 - 234 U/L Final   01/10/2023 205 81 - 234 U/L Final   12/29/2022 221 81 - 234 U/L Final     No results found for: TSH  No results found for: [id number]   Free T4   Date Value Ref Range Status   02/04/2023 1 09 0 76 - 1 46 ng/dL Final     Comment:     Specimen collection should occur prior to Sulfasalazine administration due to the potential for falsely elevated results  01/10/2023 1 18 0 76 - 1 46 ng/dL Final     Comment:     Specimen collection should occur prior to Sulfasalazine administration due to the potential for falsely elevated results  12/29/2022 1 13 0 76 - 1 46 ng/dL Final     Comment:     Specimen collection should occur prior to Sulfasalazine administration due to the potential for falsely elevated results  RECENT IMAGING:  No results found  Assessment/Plan  Ms Paz Ambrose is a 67 yr female with unresectable/metastatic melanoma of the right lower extremity on treatment with nivolumab plus relatlimab here for follow-up and treatment      1  Metastatic melanoma ([address] )  She is doing well and tolerating treatment with nivolumab and relatlimab  No immune mediated side effects  Labs reviewed and okay to continue treatment  She will get her next cycle as scheduled  She will return in 4 weeks for her next treatment  She is due for full body imaging at that time  Since this is in her right lower extremity and soft tissue component, we will try to get another PET scan for evaluation  All orders placed and prescription provided  She has standing orders for blood work prior to each treatment  She knows to continue to watch for immune mediated side effects and to call with any issues or concerns     - NM pet ct tumor imaging whole body; Future    2  High risk medication use  She is on treatment with immunotherapy and we will continue to monitor for side effects and lab abnormalities  We will monitor labs with each treatment to ensure safety of continuing on treatment  She knows to watch for signs and symptoms for immune mediated side effects  Denies any immune mediated side effects at this time  Return in about 4 weeks (around 3/6/2023) for Office Visit, Infusion - See Treatment Plan, labs, scans       Martin Evans MD, PhD

## 2023-02-08 ENCOUNTER — HOSPITAL ENCOUNTER (OUTPATIENT)
Dept: INFUSION CENTER | Facility: HOSPITAL | Age: 73
Discharge: HOME | End: 2023-02-08
Attending: INTERNAL MEDICINE

## 2023-02-08 VITALS
SYSTOLIC BLOOD PRESSURE: 140 MMHG | TEMPERATURE: 97.1 F | RESPIRATION RATE: 16 BRPM | HEART RATE: 81 BPM | OXYGEN SATURATION: 94 % | DIASTOLIC BLOOD PRESSURE: 65 MMHG

## 2023-02-08 DIAGNOSIS — C43.9 METASTATIC MELANOMA (HCC): Primary | ICD-10-CM

## 2023-02-08 RX ORDER — SODIUM CHLORIDE 9 MG/ML
20 INJECTION, SOLUTION INTRAVENOUS ONCE
Status: COMPLETED | OUTPATIENT
Start: 2023-02-08 | End: 2023-02-08

## 2023-02-08 RX ADMIN — SODIUM CHLORIDE 20 ML/HR: 0.9 INJECTION, SOLUTION INTRAVENOUS at 14:10

## 2023-02-08 RX ADMIN — SODIUM CHLORIDE 480 MG OF NIVOLUMAB: 9 INJECTION, SOLUTION INTRAVENOUS at 14:59

## 2023-02-08 NOTE — PROGRESS NOTES
[facility]AT[facility] [address]  [address] [facility] [id number] [phone]     Date of Visit: 0/1/3348  Name: Fabio Espino   YOB: 1950        Subjective    VISIT DIAGNOSIS:  Diagnoses and all orders for this visit:    Metastatic melanoma ([facility] )  -     NM pet ct tumor imaging whole body; Future    High risk medication use        Oncology History   Metastatic melanoma ([facility] )   10/13/2022 -  Cancer Staged    Staging form: Melanoma of the Skin, AJCC 8th Edition  - Clinical stage from 10/13/2022: Stage IV (cTX, cNX, cM1a) - Signed by Jaime Monk MD on 11/22/2022       10/13/2022 Biopsy    A  Skin, right lateral medial leg, punch biopsy:  Portion of severely atypical melanocytic dermal proliferation with prominent melanosis consistent with melanoma  See note  B  Skin, right medial leg  punch biopsy:  Portion of severely atypical melanocytic dermal proliferation with prominent melanosis consistent with melanoma  See note  C  Skin, right medial leg, punch biopsy:   Portion of severely atypical melanocytic dermal proliferation with prominent melanosis consistent with melanoma  See note       11/22/2022 Initial Diagnosis    Metastatic melanoma ([facility] )     12/14/2022 -  Chemotherapy    NIVOLUMAB-RELATLIMAB-RMBW (OPDUALAG) IVPB, 480 mg of nivolumab, Intravenous, Once, 2 of 8 cycles  Administration: 480 mg of nivolumab (12/14/2022), 480 mg of nivolumab (1/11/2023)     1/5/2023 [facility] liquid testing  TMB 1 Mut/Mb  MSI-high not detected          Cancer Staging   Metastatic melanoma ([facility] )  Staging form: Melanoma of the Skin, AJCC 8th Edition  - Clinical stage from 10/13/2022: Stage IV (cTX, cNX, cM1a) - Signed by Jaime Monk MD on 11/22/2022     Treatment Details   Treatment goal Curative   Plan Name OP Nivolumab and Relatlimab-rmbw Q 28 Days   Status Active   Start Date 12/14/2022   End Date 6/28/2023 (Planned)   Provider Jake Mtz MD   Chemotherapy NIVOLUMAB-RELATLIMAB-RMBW (OPDUALAG) IVPB, 480 mg of nivolumab, Intravenous, Once, 2 of 8 cycles  Administration: 480 mg of nivolumab (12/14/2022), 480 mg of nivolumab (1/11/2023)          HISTORY OF PRESENT ILLNESS: Micaela Servin is a 67 y o  female  who has unresectable metastatic melanoma on treatment with nivolumab and relatlimab here for follow-up and treatment  She is doing well and tolerating treatment  Denies any issues or concerns or complaints at this time  Energy is good  Appetite is good  She denies any new, changing, or concerning skin lesions  Denies any lymphadenopathy  She thinks the pigmentation in her right leg is improving and lightening  She denies any immune mediated side effects  Denies headaches, double vision, rash, itching, chest pain, shortness of breath, and diarrhea  REVIEW OF SYSTEMS:  Review of Systems   Constitutional: Negative for appetite change, fatigue, fever and unexpected weight change  HENT:   Negative for lump/mass  Eyes: Negative for icterus  Respiratory: Negative for cough, shortness of breath and wheezing  Cardiovascular: Negative for leg swelling  Gastrointestinal: Negative for abdominal pain, constipation, diarrhea, nausea and vomiting  Genitourinary: Negative for difficulty urinating and hematuria  Musculoskeletal: Negative for arthralgias, gait problem and myalgias  Skin: Negative for itching and rash  No new, changing, or concerning lesions  Improving pigmentation in her right lower extremity, site of melanoma  Neurological: Negative for extremity weakness, gait problem, headaches, light-headedness and numbness  Hematological: Negative for adenopathy          MEDICATIONS:    Current Outpatient Medications:   •  apixaban (Eliquis) 5 mg, Take 1 tablet (5 mg total) by mouth every 12 (twelve) hours, Disp: 60 tablet, Rfl: 5  •  calcium carbonate (OS-VIRGIE) 600 MG tablet, Take 600 mg by mouth 2 (two) times a day, Disp: , Rfl:   •  famotidine (PEPCID) 10 mg tablet, Take 10 mg by mouth 2 (two) times a day, Disp: , Rfl:   •  loratadine (CLARITIN) 10 mg tablet, Take 10 mg by mouth daily, Disp: , Rfl:   •  losartan (Cozaar) 100 MG tablet, Take 1 tablet (100 mg total) by mouth daily, Disp: 30 tablet, Rfl: 5  •  metoprolol succinate (TOPROL-XL) 25 mg 24 hr tablet, Take 1 tablet (25 mg total) by mouth 2 (two) times a day, Disp: 60 tablet, Rfl: 5  •  Multiple Vitamins-Minerals (MULTIVITAMIN WITH MINERALS) tablet, Take 1 tablet by mouth daily, Disp: , Rfl:   •  patient supplied medication, Pt takes eye drops Mura 128 - not listed in database, Disp: , Rfl:      ALLERGIES:  No Known Allergies     ACTIVE PROBLEMS:  Patient Active Problem List   Diagnosis   • Acute massive pulmonary embolism ([facility] )   • Venous stasis of both lower extremities   • Clostridium difficile infection   • Right ventricular systolic dysfunction without heart failure   • Essential hypertension   • Metastatic melanoma ([facility] )   • High risk medication use          PAST MEDICAL HISTORY:   Past Medical History:   Diagnosis Date   • Acute deep vein thrombosis (DVT) of popliteal vein of left lower extremity (HCC)    • Ankle wound, left, initial encounter     Resolved 2017   • Ankle wound, right, initial encounter     resolved 2017   • DVT (deep vein thrombosis) in pregnancy    • Pulmonary emboli ([address] )      post C section   • Pulmonary embolism ([facility] )     2017   • Pulmonary embolism, blood-clot, obstetric    • Skin cancer    • Venous stasis ulcer ([address] )    • Venous ulcers of both lower extremities ([address] )         PAST SURGICAL HISTORY:  Past Surgical History:   Procedure Laterality Date   •  SECTION      X2        SOCIAL HISTORY:  Social History     Socioeconomic History   • Marital status:       Spouse name: None   • Number of children: None   • Years of education: None   • Highest education level: None   Occupational History   • None Tobacco Use   • Smoking status: Never   • Smokeless tobacco: Never   Vaping Use   • Vaping Use: Never used   Substance and Sexual Activity   • Alcohol use: No     Comment: hx of social drinker   • Drug use: No   • Sexual activity: None   Other Topics Concern   • None   Social History Narrative   • None     Social Determinants of Health     Financial Resource Strain: Not on file   Food Insecurity: Not on file   Transportation Needs: Not on file   Physical Activity: Not on file   Stress: Not on file   Social Connections: Not on file   Intimate Partner Violence: Not on file   Housing Stability: Not on file        FAMILY HISTORY:  Family History   Problem Relation Age of Onset   • Heart attack Mother    • Stroke Mother    • Cancer Father    • Bone cancer Family    • Hypertension Family    • Lung cancer Family            Objective    PHYSICAL EXAMINATION:   Blood pressure 135/75, pulse 97, resp  rate 18, height 5' 9" (1 753 m), weight 94 3 kg (208 lb), SpO2 99 %  Pain Score: 0-No pain     ECOG Performance Status    Flowsheet Row Most Recent Value   ECOG Performance Status 0 - Fully active, able to carry on all pre-disease performance without restriction             Physical Exam  Constitutional:       General: She is not in acute distress  Appearance: Normal appearance  She is not toxic-appearing  HENT:      Mouth/Throat:      Mouth: Mucous membranes are moist       Pharynx: Oropharynx is clear  Eyes:      General: No scleral icterus  Cardiovascular:      Rate and Rhythm: Normal rate and regular rhythm  Pulses: Normal pulses  Heart sounds: No murmur heard  No friction rub  No gallop  Pulmonary:      Effort: Pulmonary effort is normal  No respiratory distress  Breath sounds: Normal breath sounds  No wheezing or rales  Abdominal:      General: There is no distension  Palpations: There is no mass  Tenderness: There is no abdominal tenderness  There is no rebound  Musculoskeletal:         General: No swelling or tenderness  Right lower leg: No edema  Left lower leg: No edema  Lymphadenopathy:      Head:      Right side of head: No submandibular, preauricular or posterior auricular adenopathy  Left side of head: No submandibular, preauricular or posterior auricular adenopathy  Cervical: No cervical adenopathy  Right cervical: No superficial or posterior cervical adenopathy  Left cervical: No superficial or posterior cervical adenopathy  Upper Body:      Right upper body: No supraclavicular or axillary adenopathy  Left upper body: No supraclavicular or axillary adenopathy  Skin:     Findings: No rash  Comments: Improving and lightening pigmentation on the right lower extremity  Neurological:      General: No focal deficit present  Mental Status: She is alert and oriented to person, place, and time  Psychiatric:         Mood and Affect: Mood normal          Behavior: Behavior normal          Thought Content: Thought content normal          Judgment: Judgment normal          I reviewed lab data in the chart      WBC   Date Value Ref Range Status   02/04/2023 5 31 4 31 - 10 16 Thousand/uL Final   01/10/2023 6 16 4 31 - 10 16 Thousand/uL Final   12/29/2022 6 86 4 31 - 10 16 Thousand/uL Final     Hemoglobin   Date Value Ref Range Status   02/04/2023 13 0 11 5 - 15 4 g/dL Final   01/10/2023 13 0 11 5 - 15 4 g/dL Final   12/29/2022 13 0 11 5 - 15 4 g/dL Final     Platelets   Date Value Ref Range Status   02/04/2023 205 149 - 390 Thousands/uL Final   01/10/2023 183 149 - 390 Thousands/uL Final   12/29/2022 248 149 - 390 Thousands/uL Final     MCV   Date Value Ref Range Status   02/04/2023 92 82 - 98 fL Final   01/10/2023 92 82 - 98 fL Final   12/29/2022 93 82 - 98 fL Final      Potassium   Date Value Ref Range Status   02/04/2023 4 0 3 5 - 5 3 mmol/L Final   01/10/2023 3 8 3 5 - 5 3 mmol/L Final   12/29/2022 4 3 3 5 - 5 3 mmol/L Final     Chloride   Date Value Ref Range Status   02/04/2023 109 (H) 96 - 108 mmol/L Final   01/10/2023 104 96 - 108 mmol/L Final   12/29/2022 106 96 - 108 mmol/L Final     CO2   Date Value Ref Range Status   02/04/2023 26 21 - 32 mmol/L Final   01/10/2023 28 21 - 32 mmol/L Final   12/29/2022 28 21 - 32 mmol/L Final     BUN   Date Value Ref Range Status   02/04/2023 18 5 - 25 mg/dL Final   01/10/2023 21 5 - 25 mg/dL Final   12/29/2022 23 5 - 25 mg/dL Final     Creatinine   Date Value Ref Range Status   02/04/2023 0 81 0 60 - 1 30 mg/dL Final     Comment:     Standardized to IDMS reference method   01/10/2023 1 01 0 60 - 1 30 mg/dL Final     Comment:     Standardized to IDMS reference method   12/29/2022 0 83 0 60 - 1 30 mg/dL Final     Comment:     Standardized to IDMS reference method     Glucose   Date Value Ref Range Status   11/17/2022 85 65 - 140 mg/dL Final     Comment:     If the patient is fasting, the ADA then defines impaired fasting glucose as > 100 mg/dL and diabetes as > or equal to 123 mg/dL  Specimen collection should occur prior to Sulfasalazine administration due to the potential for falsely depressed results  Specimen collection should occur prior to Sulfapyridine administration due to the potential for falsely elevated results  08/03/2020 112 (H) 65 - 99 mg/dL Final     Comment:     If the patient is fasting, the ADA then defines impaired fasting glucose as > 100 mg/dL and diabetes as > or equal to 123 mg/dL  Specimen collection should occur prior to Sulfasalazine administration due to the potential for falsely depressed results  Specimen collection should occur prior to Sulfapyridine administration due to the potential for falsely elevated results  01/24/2017 91 65 - 140 mg/dL Final     Comment:     If the patient is fasting, the ADA then defines impaired fasting glucose as > 100 mg/dL and diabetes as > or equal to 123 mg/dL       Calcium   Date Value Ref Range Status   02/04/2023 8 9 8 3 - 10 1 mg/dL Final   01/10/2023 8 8 8 3 - 10 1 mg/dL Final   12/29/2022 9 3 8 3 - 10 1 mg/dL Final     Albumin   Date Value Ref Range Status   02/04/2023 3 4 (L) 3 5 - 5 0 g/dL Final   01/10/2023 3 6 3 5 - 5 0 g/dL Final   12/29/2022 3 4 (L) 3 5 - 5 0 g/dL Final     Total Bilirubin   Date Value Ref Range Status   02/04/2023 0 60 0 20 - 1 00 mg/dL Final     Comment:     Use of this assay is not recommended for patients undergoing treatment with eltrombopag due to the potential for falsely elevated results  01/10/2023 0 42 0 20 - 1 00 mg/dL Final     Comment:     Use of this assay is not recommended for patients undergoing treatment with eltrombopag due to the potential for falsely elevated results  12/29/2022 0 65 0 20 - 1 00 mg/dL Final     Comment:     Use of this assay is not recommended for patients undergoing treatment with eltrombopag due to the potential for falsely elevated results  Alkaline Phosphatase   Date Value Ref Range Status   02/04/2023 43 (L) 46 - 116 U/L Final   01/10/2023 59 46 - 116 U/L Final   12/29/2022 49 46 - 116 U/L Final     AST   Date Value Ref Range Status   02/04/2023 16 5 - 45 U/L Final     Comment:     Specimen collection should occur prior to Sulfasalazine administration due to the potential for falsely depressed results  01/10/2023 24 5 - 45 U/L Final     Comment:     Specimen collection should occur prior to Sulfasalazine administration due to the potential for falsely depressed results  12/29/2022 14 5 - 45 U/L Final     Comment:     Specimen collection should occur prior to Sulfasalazine administration due to the potential for falsely depressed results  ALT   Date Value Ref Range Status   02/04/2023 22 12 - 78 U/L Final     Comment:     Specimen collection should occur prior to Sulfasalazine and/or Sulfapyridine administration due to the potential for falsely depressed results      01/10/2023 24 12 - 78 U/L Final     Comment:     Specimen collection should occur prior to Sulfasalazine administration due to the potential for falsely depressed results  12/29/2022 22 12 - 78 U/L Final     Comment:     Specimen collection should occur prior to Sulfasalazine and/or Sulfapyridine administration due to the potential for falsely depressed results  LD   Date Value Ref Range Status   02/04/2023 201 81 - 234 U/L Final   01/10/2023 205 81 - 234 U/L Final   12/29/2022 221 81 - 234 U/L Final     No results found for: TSH  No results found for: [id number]   Free T4   Date Value Ref Range Status   02/04/2023 1 09 0 76 - 1 46 ng/dL Final     Comment:     Specimen collection should occur prior to Sulfasalazine administration due to the potential for falsely elevated results  01/10/2023 1 18 0 76 - 1 46 ng/dL Final     Comment:     Specimen collection should occur prior to Sulfasalazine administration due to the potential for falsely elevated results  12/29/2022 1 13 0 76 - 1 46 ng/dL Final     Comment:     Specimen collection should occur prior to Sulfasalazine administration due to the potential for falsely elevated results  RECENT IMAGING:  No results found  Assessment/Plan  Ms Audra Schneider is a 67 yr female with unresectable/metastatic melanoma of the right lower extremity on treatment with nivolumab plus relatlimab here for follow-up and treatment  1  Metastatic melanoma ([address] )  She is doing well and tolerating treatment with nivolumab and relatlimab  No immune mediated side effects  Labs reviewed and okay to continue treatment  She will get her next cycle as scheduled  She will return in 4 weeks for her next treatment  She is due for full body imaging at that time  Since this is in her right lower extremity and soft tissue component, we will try to get another PET scan for evaluation  All orders placed and prescription provided  She has standing orders for blood work prior to each treatment    She knows to continue to watch for immune mediated side effects and to call with any issues or concerns     - NM pet ct tumor imaging whole body; Future    2  High risk medication use  She is on treatment with immunotherapy and we will continue to monitor for side effects and lab abnormalities  We will monitor labs with each treatment to ensure safety of continuing on treatment  She knows to watch for signs and symptoms for immune mediated side effects  Denies any immune mediated side effects at this time  Return in about 4 weeks (around 3/6/2023) for Office Visit, Infusion - See Treatment Plan, labs, scans       Cecil Swann MD, PhD

## 2023-02-10 ENCOUNTER — TELEPHONE (OUTPATIENT)
Dept: HEMATOLOGY ONCOLOGY | Facility: CLINIC | Age: 73
End: 2023-02-10

## 2023-02-10 NOTE — TELEPHONE ENCOUNTER
Called and spoke with patient  She is aware she is ok to go to her dentist appointment  Discussed office visit and infusion schedule  All questions answered

## 2023-02-10 NOTE — TELEPHONE ENCOUNTER
CALL RETURN FORM   Reason for patient call? Patient states she has a dental appointment and was wondering if she is able to go with her infusion  patient states someone told her that she wouldn't be able to go because she receives infusions  Patient's primary oncologist? Dr Dakota Hill    Name of person the patient was calling for? Dr Mario Jeans team   Any additional information to add, if applicable? Patient states she also has some other questions about her infusions for her appointments  Informed patient that the message will be forwarded to the team and someone will get back to them as soon as possible    Did you relay this information to the patient?   yes

## 2023-02-27 DIAGNOSIS — I10 ESSENTIAL HYPERTENSION: ICD-10-CM

## 2023-02-27 RX ORDER — LOSARTAN POTASSIUM 100 MG/1
100 TABLET ORAL DAILY
Qty: 30 TABLET | Refills: 5 | Status: SHIPPED | OUTPATIENT
Start: 2023-02-27

## 2023-03-01 RX ORDER — SODIUM CHLORIDE 9 MG/ML
20 INJECTION, SOLUTION INTRAVENOUS ONCE
Status: CANCELLED | OUTPATIENT
Start: 2023-03-08

## 2023-03-02 ENCOUNTER — OFFICE VISIT (OUTPATIENT)
Dept: DERMATOLOGY | Facility: CLINIC | Age: 73
End: 2023-03-02

## 2023-03-02 VITALS — WEIGHT: 210 LBS | BODY MASS INDEX: 31.1 KG/M2 | HEIGHT: 69 IN | TEMPERATURE: 98.1 F

## 2023-03-02 DIAGNOSIS — L27.0 DRUG RASH: ICD-10-CM

## 2023-03-02 DIAGNOSIS — D48.9 NEOPLASM OF UNCERTAIN BEHAVIOR: Primary | ICD-10-CM

## 2023-03-02 DIAGNOSIS — D18.01 CHERRY ANGIOMA: ICD-10-CM

## 2023-03-02 NOTE — PROGRESS NOTES
[facility] Note     Patient Name: Shakir Morales  Encounter Date: 03/02/2023     Have you been cared for by a [facility]ologist in the last 3 years and, if so, which description applies to you? Yes  I have been here within the last 3 years, and my medical history has NOT changed since that time  I am FEMALE/of child-bearing potential     REVIEW OF SYSTEMS:  Have you recently had or currently have any of the following? · No changes in my recent health  PAST MEDICAL HISTORY:  Have you personally ever had or currently have any of the following? If "YES," then please provide more detail  · No changes in my medical history  FAMILY HISTORY:  Any "first degree relatives" (parent, brother, sister, or child) with the following? • No changes in my family's known health  PATIENT EXPERIENCE:    • Do you want the Dermatologist to perform a COMPLETE skin exam today including a clinical examination under the "bra and underwear" areas? Yes  • If necessary, do we have your permission to call and leave a detailed message on your Preferred Phone number that includes your specific medical information?   Yes      No Known Allergies   Current Outpatient Medications:   •  apixaban (Eliquis) 5 mg, Take 1 tablet (5 mg total) by mouth every 12 (twelve) hours, Disp: 60 tablet, Rfl: 5  •  calcium carbonate (OS-VIRGIE) 600 MG tablet, Take 600 mg by mouth 2 (two) times a day, Disp: , Rfl:   •  famotidine (PEPCID) 10 mg tablet, Take 10 mg by mouth 2 (two) times a day, Disp: , Rfl:   •  loratadine (CLARITIN) 10 mg tablet, Take 10 mg by mouth daily, Disp: , Rfl:   •  losartan (Cozaar) 100 MG tablet, Take 1 tablet (100 mg total) by mouth daily, Disp: 30 tablet, Rfl: 5  •  metoprolol succinate (TOPROL-XL) 25 mg 24 hr tablet, Take 1 tablet (25 mg total) by mouth 2 (two) times a day, Disp: 60 tablet, Rfl: 5  •  Multiple Vitamins-Minerals (MULTIVITAMIN WITH MINERALS) tablet, Take 1 tablet by mouth daily, Disp: , Rfl: •  patient supplied medication, Pt takes eye drops Laila 128 - not listed in database, Disp: , Rfl:           • Whom besides the patient is providing clinical information about today's encounter?   o NO ADDITIONAL HISTORIAN (patient alone provided history)    Physical Exam and Assessment/Plan by Diagnosis:    METASTATIC MELANOMA    Physical Exam:  • Anatomic Location Affected:  ***  • Morphological Description:  ***  • Pertinent Positives:  • Pertinent Negatives: Additional History of Present Condition:  ***    Assessment and Plan:  Based on a thorough discussion of this condition and the management approach to it (including a comprehensive discussion of the known risks, side effects and potential benefits of treatment), the patient (family) agrees to implement the following specific plan:  • ***    Melanoma is a potentially serious type of skin cancer, in which there is uncontrolled growth of melanocytes (pigment cells)  Melanoma is sometimes called malignant melanoma  Normal melanocytes are found in the basal layer of the epidermis (the outer layer of skin)  Melanocytes produce a protein called melanin, which protects skin cells by absorbing ultraviolet (UV) radiation  Melanocytes are found in equal numbers in black and white skin, but melanocytes in black skin produce much more melanin  People with dark brown or black skin are very much less likely to be damaged by UV radiation than those with white skin  Non-cancerous growth of melanocytes results in moles (properly called benign melanocytic naevi) and freckles (ephelides and lentigines)  The cancerous growth of melanocytes results in melanoma  Melanoma is described as:  • In situ, if a tumor is confined to the epidermis  • Invasive, if a tumor has spread into the dermis  • Metastatic, if a tumor has spread to other tissues  What causes melanoma?   Melanoma is thought to begin as an uncontrolled proliferation of melanocytic stem cells that have undergone a genetic transformation  Superficial forms of melanoma spread out within the epidermis (in situ)  A pathologist may report this as the radial or horizontal growth phase  Further genetic changes promote a tumour to invade through the basement membrane into the surrounding dermis when it becomes an invasive melanoma  Nodular melanoma has a vertical growth phase, which is potentially more dangerous than the horizontal growth phase  It may arise within a previously healthy dermis or within the invasive portion of a pre-existing more superficial kind of melanoma  Once the melanoma cells have reached the dermis, they may spread to other tissues via the lymphatic system to the local lymph nodes or via the blood stream to other organs such as the lungs or brain  This is known as metastatic disease or secondary spread  The chance of this happening mainly depends on how deep the cells have penetrated the skin  Melanoma can arise from otherwise normal-appearing skin (in about 75% of melanomas) or from within a mole or freckle, which starts to grow larger and change in appearance  Precursor lesions include:  • Benign melanocytic naevus (normal mole)  • Atypical or dysplastic naevus (funny-looking mole)  • Atypical lentiginous junctional nevus (flat naevus in heavily sun damaged skin) or atypical solar lentigo  • Large or giant-sized congenital melanocytic naevus (brown birthmark)  Melanomas can occur anywhere on the body, not only in areas that get a lot of sun  Commonly, melanoma in men is found on the back (around 40% of melanomas in men), and the most common site in women is the leg (around 35% of melanomas in women)  Although melanoma usually starts as a skin lesion, it can also rarely grow on mucous membranes such as the lips or genitals  Occasionally it occurs in other parts of the body such as the eye, brain, mouth or vagina      The first sign of a melanoma is usually an unusual looking freckle or mole  Melanoma may be detected at an early stage when it is only a few millimetres in diameter, but it may grow to several centimetres in diameter before it is diagnosed  • A melanoma may have a variety of colours including tan, dark brown, black, blue, red and, occasionally, light grey  • Melanomas that are lacking pigment are called amelanotic melanoma  • There may be areas of regression that are the colour of normal skin, or white and scarred  During its horizontal phase of growth, a melanoma is normally flat  As the vertical phase develops, the melanoma becomes thickened and raised  Some melanomas are itchy or tender  More advanced lesions may bleed easily or crust over  Most melanomas have characteristics described by the ABCDE's of melanoma  Not all lesions with these characteristics are malignant  Not all melanomas show these characteristics  The ABCDEs of Melanoma  A Asymmetry   B Border irregularity   C Colour variation   D Diameter over 6 mm   E Evolving (enlarging, changing)    Subtypes of melanoma  Conventional classification  Melanomas are described according to their appearance and behaviour  Those that start as flat patches (ie, they have a horizontal growth phase) include:  • Superficial spreading melanoma  • Lentigo maligna melanoma and lentiginous melanoma (in sun-damaged sites)  • Acral lentiginous melanoma (on soles of feet, palms of hands or nails)  These superficial forms of melanoma tend to grow slowly, but at any time, they may begin to thicken up or develop a nodule (ie, progress to a vertical growth phase)  Melanomas that quickly involve deeper tissues include:  • Nodular melanoma  • Spitzoid melanoma  • Mucosal melanoma  • Neurotropic and desmoplastic melanoma  • Spindle cell melanoma  • Ocular melanoma      Combinations may arise, for example, nodular melanoma arising within a superficial spreading melanoma or desmoplastic melanoma arising below a lentigo maligna  Classification by age, sun exposure and number of nevi  Melanoma is also classified according to its relationship with sun exposure, age and number of melanocytic naevi  Childhood melanomas (below ten years of age)  • Extremely rare  • Infrequently associated with excessive sun exposure  • Compared to melanoma in adults, they are more often amelanotic (flesh coloured, pink or red), nodular, bleeding and ulcerated  • May arise within giant congenital melanocytic naevi > 40 cm diameter    Early-onset melanomas  • More common in women than in men  • The most common clinical subtype is superficial spreading  • Associated with many melanocytic naevi  • Tend to be seen on lower extremity  • Tends to have [id number] mutation  • Associated with intermittent sun exposure    Late-onset melanomas  • More common in men than in women  • The most common clinical subtype is lentigo maligna  • Often occur on head and neck  • Associated with accumulated, lifelong sun exposure    Melanoma is usually epithelial in origin, i e  starting in the skin, or, less often, mucous membranes  But very rarely, melanoma can start in an internal tissue such as the brain (primary CNS melanoma) or the back of the eye (see ocular melanoma)  Melanoma may be suspected because of a lesion's clinical features or because of a history of change  The dermatoscopic appearance is helpful in the diagnosis of featureless early melanoma  Some melanomas are extremely difficult to recognise clinically  The suspicious lesion is surgically removed with a 2 to 3-mm clinical margin for pathological examination (diagnostic excision)  A partial biopsy is best avoided but may be considered in large lesions  The pathological diagnosis of melanoma can be very difficult  Histological features of superficial spreading melanoma in situ include the presence of buckshot (pagetoid) scatter of atypical melanocytes within the epidermis   These cells may be enlarged with unusual nuclei  What is the treatment for melanoma? Following confirmation of the diagnosis, wide local excision is carried out at the site of the primary melanoma  The extent of surgery depends on the thickness of the melanoma and its site  Current margins recommended in our practice are shown below  • Melanoma in situ: 5 - 10 mm  • Melanoma < 1 mm: 10 mm  • Melanoma 1-2 mm: 10 - 20 mm  • Melanoma >2 mm:  20 mm    Should the lymph nodes be removed? If the local lymph nodes are enlarged due to metastatic melanoma, they should be completely removed  This requires a surgical procedure, usually under general anaesthetic  If they are not enlarged, they may be tested to see if there is any microscopic spread of melanoma  The test is known as a sentinel node biopsy  Many surgeons recommend sentinel node biopsy for melanomas thicker than 0 8 mm, especially in younger persons  However, although the biopsy may help in staging cancer, it does not offer any survival advantage  Lymph nodes containing metastatic melanoma often increase in size quickly  An involved node is usually non-tender and firm to hard in consistency  If the melanoma is widespread, treatment is not always successful in eradicating the cancer  What happens at follow-up? The main purpose of follow-up is to detect recurrences early (metastatic melanoma), but it also offers an opportunity to diagnose a new primary melanoma at the first possible opportunity  A second invasive melanoma occurs in 5-10% of melanoma patients and a new melanoma in situ is diagnosed in more than 20% of melanoma patients  Our practice makes the following recommendations for follow-up for patients with invasive melanoma    • At-least "monthly" self-skin examinations   • Routine skin checks by a board certified dermatologist  • Follow-up intervals are "every 3 months" within 2 years of a new melanoma diagnosis; "every 6 months" between 2-4 years of a new melanoma diagnosis; and "annually" after 4 years of a new melanoma diagnosis  • Individual patient's needs should be considered before an appropriate follow-up is offered  • Provide education and support to help the patient adjust to their illness    Follow-up appointments should include:  • A check of the scar where the primary melanoma was removed  • Checking the regional lymph nodes  • A general skin examination  • A full physical examination at least annually by your primary care physician    In those with more advanced primary disease, follow-up may include:  • Blood tests  • Imaging: ultrasound, X-ray, CT, MRI and PET scan  Most tests are not worthwhile for patients with stage 1 or 2 melanoma unless there are signs or symptoms of disease recurrence or metastasis  No tests are necessary for healthy patients who have remained well for five years or longer after removal of their melanoma  What is the outlook for patients with melanoma? • Melanoma in situ is cured by excision because it has no potential to spread around the body  • The risk of spread and ultimate death from invasive melanoma depends on several factors, but the main one is the Breslow thickness of the melanoma at the time it was surgically removed  • Metastases are rare for melanomas < 0 75 mm and the risk for tumours 0 75-1 mm thick is about 5%  The risk steadily increases with thickness so that melanomas > 4 mm have a risk of metastasis of about 40%    Scribe Attestation    I,:   am acting as a scribe while in the presence of the attending physician :       I,:   personally performed the services described in this documentation    as scribed in my presence :

## 2023-03-02 NOTE — PROGRESS NOTES
[facility] [address] Note     Patient Name: Fabio Espino  Encounter Date: 03/02/2023     Have you been cared for by a [facility] [address] [facility]ologist in the last 3 years and, if so, which description applies to you? Yes  I have been here within the last 3 years, and my medical history has NOT changed since that time  I am FEMALE/of child-bearing potential     REVIEW OF SYSTEMS:  Have you recently had or currently have any of the following? · No changes in my recent health  PAST MEDICAL HISTORY:  Have you personally ever had or currently have any of the following? If "YES," then please provide more detail  · No changes in my medical history  FAMILY HISTORY:  Any "first degree relatives" (parent, brother, sister, or child) with the following? • No changes in my family's known health  PATIENT EXPERIENCE:    • Do you want the Dermatologist to perform a COMPLETE skin exam today including a clinical examination under the "bra and underwear" areas? NO  • If necessary, do we have your permission to call and leave a detailed message on your Preferred Phone number that includes your specific medical information?   Yes      No Known Allergies   Current Outpatient Medications:   •  apixaban (Eliquis) 5 mg, Take 1 tablet (5 mg total) by mouth every 12 (twelve) hours, Disp: 60 tablet, Rfl: 5  •  calcium carbonate (OS-VIRGIE) 600 MG tablet, Take 600 mg by mouth 2 (two) times a day, Disp: , Rfl:   •  famotidine (PEPCID) 10 mg tablet, Take 10 mg by mouth 2 (two) times a day, Disp: , Rfl:   •  loratadine (CLARITIN) 10 mg tablet, Take 10 mg by mouth daily, Disp: , Rfl:   •  losartan (Cozaar) 100 MG tablet, Take 1 tablet (100 mg total) by mouth daily, Disp: 30 tablet, Rfl: 5  •  metoprolol succinate (TOPROL-XL) 25 mg 24 hr tablet, Take 1 tablet (25 mg total) by mouth 2 (two) times a day, Disp: 60 tablet, Rfl: 5  •  Multiple Vitamins-Minerals (MULTIVITAMIN WITH MINERALS) tablet, Take 1 tablet by mouth daily, Disp: , Rfl: •  patient supplied medication, Pt takes eye drops Laila 128 - not listed in database, Disp: , Rfl:           • Whom besides the patient is providing clinical information about today's encounter?   o NO ADDITIONAL HISTORIAN (patient alone provided history)    Physical Exam and Assessment/Plan by Diagnosis:    History of metastatic melanoma - currently undergoing immunotherapy   Physical Exam:  • Anatomic Location Affected:  Right lower extremity  • Morphological Description:  Scattered black macules  Healing biopsy sites  • Pertinent Positives:  • Pertinent Negatives: Additional History of Present Condition:  Patient is present in office for a 3 month follow up  She was last seen 10/13/2022 she was biopsied on the right leg dx was malignant she was advised to follow up with heme/onc  She's had 3 infusions since December  Assessment and Plan:  Based on a thorough discussion of this condition and the management approach to it (including a comprehensive discussion of the known risks, side effects and potential benefits of treatment), the patient (family) agrees to implement the following specific plan:  • Continue with follow up with Oncology and dermatology       SUSPECTED DRUG RASH    Physical Exam:  • (Anatomic Location); (Size and Morphological Description); (Differential Diagnosis):  o RIGHT ARM  • Pertinent Positives:  • Pertinent Negatives: Additional History of Present Condition:  Patient states she gets itchy in the infusion area    Assessment and Plan:  Based on a thorough discussion of this condition and the management approach to it (including a comprehensive discussion of the known risks, side effects and potential benefits of treatment), the patient (family) agrees to implement the following specific plan:  • Start Triamcinolone ointment twice a day  If you find yourself using for 3 weeks in a role please take a one week break before restarting            NEOPLASM OF UNCERTAIN BEHAVIOR OF SKIN    Physical Exam:  • (Anatomic Location); (Size and Morphological Description); (Differential Diagnosis):  o Specimen A; right forehead,skin; shave; 67year old female with 0 6 cm X 0 5 cm pearly skin colored papule with telangectasias  DDX out [address] vs nevus   • Pertinent Positives:  • Pertinent Negatives: Additional History of Present Condition:  Patient states lesion is bothersome and with previous history she would like it removed  Assessment and Plan:  • I have discussed with the patient that a sample of skin via a "skin biopsy” would be potentially helpful to further make a specific diagnosis under the microscope  • Based on a thorough discussion of this condition and the management approach to it (including a comprehensive discussion of the known risks, side effects and potential benefits of treatment), the patient (family) agrees to implement the following specific plan:    o Procedure:  Skin Biopsy  After a thorough discussion of treatment options and risk/benefits/alternatives (including but not limited to local pain, scarring, dyspigmentation, blistering, possible superinfection, and inability to confirm a diagnosis via histopathology), verbal and written consent were obtained and portion of the rash was biopsied for tissue sample  See below for consent that was obtained from patient and subsequent Procedure Note  PROCEDURE TANGENTIAL (SHAVE) BIOPSY NOTE:    • Performing Physician: Dr Sukhjinder Diaz  • Anatomic Location; Clinical Description with size (cm); Pre-Op Diagnosis:   o Specimen A; right forehead,skin; shave; 67year old female with 0 6 cm X 0 5 cm pearly skin colored papule with telangectasias   DDX out [address] vs nevus   • Post-op diagnosis: Same     • Local anesthesia: 3:1 1% xylocaine with epi and 1-100,000 buffered     • Topical anesthesia: None    • Hemostasis: Aluminum chloride       After obtaining informed consent  at which time there was a discussion about the purpose of biopsy  and low risks of infection and bleeding  The area was prepped and draped in the usual fashion  Anesthesia was obtained with 1% lidocaine with epinephrine  A shave biopsy to an appropriate sampling depth was obtained by Shave (Dermablade or 15 blade) The resulting wound was covered with surgical ointment and bandaged appropriately  The patient tolerated the procedure well without complications and was without signs of functional compromise  Specimen has been sent for review by Dermatopathology  Standard post-procedure care has been explained and has been included in written form within the patient's copy of Informed Consent  INFORMED CONSENT DISCUSSION AND POST-OPERATIVE INSTRUCTIONS FOR PATIENT    I   RATIONALE FOR PROCEDURE  I understand that a skin biopsy allows the Dermatologist to test a lesion or rash under the microscope to obtain a diagnosis  It usually involves numbing the area with numbing medication and removing a small piece of skin; sometimes the area will be closed with sutures  In this specific procedure, sutures are not usually needed  If any sutures are placed, then they are usually need to be removed in 2 weeks or less  I understand that my Dermatologist recommends that a skin "shave" biopsy be performed today  A local anesthetic, similar to the kind that a dentist uses when filling a cavity, will be injected with a very small needle into the skin area to be sampled  The injected skin and tissue underneath "will go to sleep” and become numb so no pain should be felt afterwards  An instrument shaped like a tiny "razor blade" (shave biopsy instrument) will be used to cut a small piece of tissue and skin from the area so that a sample of tissue can be taken and examined more closely under the microscope  A slight amount of bleeding will occur, but it will be stopped with direct pressure and a pressure bandage and any other appropriate methods    I understands that a scar will form where the wound was created  Surgical ointment will be applied to help protect the wound  Sutures are not usually needed  II   RISKS AND POTENTIAL COMPLICATIONS   I understand the risks and potential complications of a skin biopsy include but are not limited to the following:  • Bleeding  • Infection  • Pain  • Scar/keloid  • Skin discoloration  • Incomplete Removal  • Recurrence  • Nerve Damage/Numbness/Loss of Function  • Allergic Reaction to Anesthesia  • Biopsies are diagnostic procedures and based on findings additional treatment or evaluation may be required  • Loss or destruction of specimen resulting in no additional findings    My Dermatologist has explained to me the nature of the condition, the nature of the procedure, and the benefits to be reasonably expected compared with alternative approaches  My Dermatologist has discussed the likelihood of major risks or complications of this procedure including the specific risks listed above, such as bleeding, infection, and scarring/keloid  I understand that a scar is expected after this procedure  I understand that my physician cannot predict if the scar will form a "keloid," which extends beyond the borders of the wound that is created  A keloid is a thick, painful, and bumpy scar  A keloid can be difficult to treat, as it does not always respond well to therapy, which includes injecting cortisone directly into the keloid every few weeks  While this usually reduces the pain and size of the scar, it does not eliminate it  I understand that photographs may be taken before and after the procedure  These will be maintained as part of the medical providers confidential records and may not be made available to me  I further authorize the medical provider to use the photographs for teaching purposes or to illustrate scientific papers, books, or lectures if in his/her judgment, medical research, education, or science may benefit from its use      I have had an opportunity to fully inquire about the risks and benefits of this procedure and its alternatives  I have been given ample time and opportunity to ask questions and to seek a second opinion if I wished to do so  I acknowledge that there have specifically been no guarantees as to the cosmetic results from the procedure  I am aware that with any procedure there is always the possibility of an unexpected complication  III  POST-PROCEDURAL CARE (WHAT YOU WILL NEED TO DO "AFTER THE BIOPSY" TO OPTIMIZE HEALING)    • Keep the area clean and dry  Try NOT to remove the bandage or get it wet for the first 24 hours  • Gently clean the area and apply surgical ointment (such as Vaseline petrolatum ointment, which is available "over the counter" and not a prescription) to the biopsy site for up to 2 weeks straight  This acts to protect the wound from the outside world  • Sutures are not usually placed in this procedure  If any sutures were placed, return for suture removal as instructed (generally 1 week for the face, 2 weeks for the body)  • Take Acetaminophen (Tylenol) for discomfort, if no contraindications  Ibuprofen or aspirin could make bleeding worse  • Call our office immediately for signs of infection: fever, chills, increased redness, warmth, tenderness, discomfort/pain, or pus or foul smell coming from the wound  WHAT TO DO IF THERE IS ANY BLEEDING? If a small amount of bleeding is noticed, place a clean cloth over the area and apply firm pressure for ten minutes  Check the wound after 10 minutes of direct pressure  If bleeding persists, try one more time for an additional 10 minutes of direct pressure on the area  If the bleeding becomes heavier or does not stop after the second attempt, or if you have any other questions about this procedure, then please call your [facility] by calling [phone] (SKIN)       I hereby acknowledge that I have reviewed and verified the site with my Dermatologist and have requested and authorized my Dermatologist to proceed with the procedure  CHERRY ANGIOMAS    Physical Exam:  • Anatomic Location Affected:  Scattered across upper trunk and extremities   • Morphological Description:  Scattered cherry red, variably sized papules  • Pertinent Positives:  • Pertinent Negatives: Additional History of Present Condition:      Assessment and Plan:  Based on a thorough discussion of this condition and the management approach to it (including a comprehensive discussion of the known risks, side effects and potential benefits of treatment), the patient (family) agrees to implement the following specific plan:  • Monitor for changes  • Benign, reassured    Assessment and Plan:    Cherry angioma, also known as Beather Corpus spots, are benign vascular skin lesions  A "cherry angioma" is a firm red, blue or purple papule, 0 1-1 cm in diameter  When thrombosed, they can appear black in colour until evaluated with a dermatoscope when the red or purple colour is more easily seen  Cherry angioma may develop on any part of the body but most often appear on the scalp, face, lips and trunk  An angioma is due to proliferating endothelial cells; these are the cells that line the inside of a blood vessel  Angiomas can arise in early life or later in life; the most common type of angioma is a cherry angioma  Cherry angiomas are very common in males and females of any age or race  They are more noticeable in white skin than in skin of colour  They markedly increase in number from about the age of 36  There may be a family history of similar lesions  Eruptive cherry angiomas have been rarely reported to be associated with internal malignancy  The cause of angiomas is unknown   Genetic analysis of cherry angiomas has shown that they frequently carry specific somatic missense mutations in the GNAQ and GNA11 (Q209H) genes, which are involved in other vascular and melanocytic proliferations                Scribe Attestation    I,:  Ayaz Reina MA am acting as a scribe while in the presence of the attending physician :       I,:  Neeta Hall MD personally performed the services described in this documentation    as scribed in my presence :         Chandra Jamison MD  Dermatology, PGY-2

## 2023-03-02 NOTE — PATIENT INSTRUCTIONS
SKIN SHAVE BIOPSY  Rationale for Procedure  A skin shave biopsy allows the dermatologist to further examine a lesion or rash under the microscope to potentially obtain a more specific diagnosis  It usually involves numbing the area with numbing medication and removing a small piece of skin  Usually, the area will be closed with sutures at the end of the procedure  Sutures are not usually needed  Description of Procedure  We would like to perform a skin shave biopsy today  A local anesthetic, similar to the kind that a dentist uses when filling a cavity, will be injected with a very small needle into the skin area to be sampled  The injected skin and tissue underneath should “go to sleep” and become numbed so that no further pain should be felt  An instrument shaped like a tiny "razor blade" (i e , the shave biopsy instrument) will be used to cut a small round piece of skin and tissue from the area so that the sample may be taken and examined more closely under the microscope  A slight amount of bleeding will occur, but it is usually stopped with direct pressure, chemical cautery, and/or a pressure bandage; rarely, electrocautery and other means of intervention may be necessary to help stop the bleeding  Sutures are not usually needed  Surgical “Vaseline-type” ointment will also applied after the procedure to help create a barrier between the wound and the outside world      Risks and Potential Complications  While the advantage of a skin shave biopsy is that it allows us to potentially examine the skin more closely under the microscope, there are some risks and potential complications that include but are not limited to the following:  Bleeding  Infection  Pain  Scar/keloid  Skin discoloration  Incomplete removal of the lesion or rash being sampled (in other words, this procedure is intended as a sampling and is not considered a definitive treatment)  Recurrence of the lesion or rash being sampled  Nerve Damage/Numbness/Loss of Function  Allergic Reaction to Anesthesia  Biopsies are diagnostic procedures and, based on findings, additional treatment or evaluation may be required (in other words, this procedure is intended as a sampling and is not considered a definitive treatment)  Loss or destruction of the sample specimen could result in no additional findings  The person at the microscope may not be able to provide additional information other than what we already know or suspect  What You Will Need to Do After the Procedure  Keep the area clean and dry  Try NOT to remove the bandage for the first 24 hours  Gently clean the area and apply Vaseline ointment (this is over the counter and not a prescription) to the biopsy site for up to 2 weeks  Generally, sutures are not needed  If any sutures were placed, return for suture removal as instructed (generally 1 week for the face, 2 weeks for the body)  Take Acetaminophen (Tylenol) for discomfort, if no contraindications  Do NOT take Ibuprofen or aspirin unless specifically told to do so by your Dermatologist because these medications can make bleeding worse  Call our office immediately for signs of infection: fever, chills, increased redness, warmth, tenderness, discomfort/pain, or pus or foul smell coming from the wound  If a small amount of bleeding is noticed, place a clean cloth over the area and apply firm pressure for ten minutes  Check the wound ONLY after 10 minutes of direct pressure; do not cheat and sneak a peak, as that does not count  If bleeding persists after 10 minutes of legitimate direct pressure, then try one more round of direct pressure for an additional 10 minutes to the area  Should the bleeding become heavier or not stop after the second attempt, call [facility] directly at [phone] (SKIN) or, if after hours, go to your local Emergency Room/Emergency Department      SUSPECTED DRUG RASH  Assessment and Plan:  Based on a thorough discussion of this condition and the management approach to it (including a comprehensive discussion of the known risks, side effects and potential benefits of treatment), the patient (family) agrees to implement the following specific plan:  Start Triamcinolone ointment twice a day  If you find yourself using for 3 weeks in a role please take a one week break before restarting  CHERRY ANGIOMAS  Assessment and Plan:  Based on a thorough discussion of this condition and the management approach to it (including a comprehensive discussion of the known risks, side effects and potential benefits of treatment), the patient (family) agrees to implement the following specific plan:  Monitor for changes  Benign, reassured    Assessment and Plan:    Cherry angioma, also known as Dicky Favors spots, are benign vascular skin lesions  A "cherry angioma" is a firm red, blue or purple papule, 0 1-1 cm in diameter  When thrombosed, they can appear black in colour until evaluated with a dermatoscope when the red or purple colour is more easily seen  Cherry angioma may develop on any part of the body but most often appear on the scalp, face, lips and trunk  An angioma is due to proliferating endothelial cells; these are the cells that line the inside of a blood vessel  Angiomas can arise in early life or later in life; the most common type of angioma is a cherry angioma  Cherry angiomas are very common in males and females of any age or race  They are more noticeable in white skin than in skin of colour  They markedly increase in number from about the age of 36  There may be a family history of similar lesions  Eruptive cherry angiomas have been rarely reported to be associated with internal malignancy  The cause of angiomas is unknown   Genetic analysis of cherry angiomas has shown that they frequently carry specific somatic missense mutations in the GNAQ and GNA11 (Q209H) genes, which are involved in other vascular and melanocytic proliferations

## 2023-03-04 ENCOUNTER — APPOINTMENT (OUTPATIENT)
Dept: LAB | Facility: CLINIC | Age: 73
End: 2023-03-04

## 2023-03-06 ENCOUNTER — HOSPITAL ENCOUNTER (OUTPATIENT)
Dept: RADIOLOGY | Age: 73
Discharge: HOME | End: 2023-03-06

## 2023-03-06 ENCOUNTER — OFFICE VISIT (OUTPATIENT)
Dept: HEMATOLOGY ONCOLOGY | Facility: CLINIC | Age: 73
End: 2023-03-06

## 2023-03-06 VITALS
SYSTOLIC BLOOD PRESSURE: 110 MMHG | HEART RATE: 114 BPM | WEIGHT: 208 LBS | TEMPERATURE: 97.8 F | OXYGEN SATURATION: 98 % | BODY MASS INDEX: 30.81 KG/M2 | HEIGHT: 69 IN | DIASTOLIC BLOOD PRESSURE: 68 MMHG | RESPIRATION RATE: 16 BRPM

## 2023-03-06 DIAGNOSIS — C43.9 METASTATIC MELANOMA (HCC): ICD-10-CM

## 2023-03-06 DIAGNOSIS — R93.89 ABNORMAL FINDING OF DIAGNOSTIC IMAGING: ICD-10-CM

## 2023-03-06 DIAGNOSIS — C43.9 METASTATIC MELANOMA (HCC): Primary | ICD-10-CM

## 2023-03-06 DIAGNOSIS — R93.3 ABNORMAL FINDING ON GI TRACT IMAGING: ICD-10-CM

## 2023-03-06 LAB — GLUCOSE SERPL-MCNC: 83 MG/DL (ref 65–140)

## 2023-03-06 NOTE — LETTER
March 8, 2023     Su DO [address]    Patient: Tamika Martinez   YOB: 1950   Date of Visit: 3/6/2023       Dear Dr Shayna Crabtree: Thank you for referring Tamika Martinez to me for evaluation  Below are my notes for this consultation  If you have questions, please do not hesitate to call me  I look forward to following your patient along with you  Sincerely,        Edmundo Pike MD        CC: MD Carmita Little MD Matt Rust, MD  3/8/2023  5:22 AM  Sign when Signing Visit  [address] [place] [address]  [phone]     Date of Visit: 7/4/1355  Name: Tamika Martinez   YOB: 1950        Subjective    VISIT DIAGNOSIS:  Diagnoses and all orders for this visit:    Metastatic melanoma [facility])  -     Ambulatory Referral to Gastroenterology; Future  -     US pelvis complete w transvaginal; Future    Abnormal finding on GI tract imaging  -     Ambulatory Referral to Gastroenterology; Future  -     US pelvis complete w transvaginal; Future    Abnormal finding of diagnostic imaging  -     US pelvis complete w transvaginal; Future        Oncology History   Metastatic melanoma ([facility] [address] )   10/13/2022 -  Cancer Staged    Staging form: Melanoma of the Skin, AJCC 8th Edition  - Clinical stage from 10/13/2022: Stage IV (cTX, cNX, cM1a) - Signed by Edmundo Pike MD on 11/22/2022       10/13/2022 Biopsy    A  Skin, right lateral medial leg, punch biopsy:  Portion of severely atypical melanocytic dermal proliferation with prominent melanosis consistent with melanoma  See note  B  Skin, right medial leg  punch biopsy:  Portion of severely atypical melanocytic dermal proliferation with prominent melanosis consistent with melanoma  See note          C  Skin, right medial leg, punch biopsy:   Portion of severely atypical melanocytic dermal proliferation with prominent melanosis consistent with melanoma  See note  11/22/2022 Initial Diagnosis    Metastatic melanoma ([facility] [address] )     12/14/2022 -  Chemotherapy    NIVOLUMAB-RELATLIMAB-RMBW (OPDUALAG) IVPB, 480 mg of nivolumab, Intravenous, Once, 3 of 8 cycles  Administration: 480 mg of nivolumab (12/14/2022), 480 mg of nivolumab (1/11/2023), 480 mg of nivolumab (2/8/2023)     1/5/2023 [facility] liquid testing  TMB 1 Mut/Mb  MSI-high not detected          Cancer Staging   Metastatic melanoma ([facility] [address] )  Staging form: Melanoma of the Skin, AJCC 8th Edition  - Clinical stage from 10/13/2022: Stage IV (cTX, cNX, cM1a) - Signed by Victor Hugo Huang MD on 11/22/2022     Treatment Details   Treatment goal Curative   Plan Name OP Nivolumab and Relatlimab-rmbw Q 28 Days   Status Active   Start Date 12/14/2022   End Date 6/28/2023 (Planned)   Provider Victor Hugo Huang MD   Chemotherapy NIVOLUMAB-RELATLIMAB-RMBW (OPDUALAG) IVPB, 480 mg of nivolumab, Intravenous, Once, 3 of 8 cycles  Administration: 480 mg of nivolumab (12/14/2022), 480 mg of nivolumab (1/11/2023), 480 mg of nivolumab (2/8/2023)          HISTORY OF PRESENT ILLNESS: Traci Villela is a 67 y o  female  who who has unresectable/metastatic melanoma on treatment with Opdualag here for follow-up and evaluation prior to treatment  She is doing well and feels good  She is feeling a little tired today, bit more than usual, but otherwise overall well  Continues to work full-time  No issues or complaints  Denies any new, changing, concerning skin lesions  Denies any lymphadenopathy  Has followed with dermatology and recently had some things frozen off as well as a biopsy of the lesion on her right upper forehead  Denies any immune mediated side effects  Denies headaches, double vision, rash, itching, chest pain, shortness of breath, and diarrhea      She underwent imaging this morning with a PET scan and we discussed that results demonstrate response to treatment as well as some other concerning findings  There is some mild hypermetabolic right inguinal/external iliac lymph nodes which are stable in size and have persistent FDG activity but this appears mildly improved  This could represent persistent regis disease with low metabolic activity or a benign inflammatory lymphadenopathy  We will continue to monitor these with treatment and ongoing surveillance  There was noted scattered areas of subcutaneous FDG activity in the bilateral distal lower extremities from the level of the mid tibia shaft to the ankles and feet and they appear more prominent than on prior exam   This could be resulted from varicose veins and inflammatory changes with edema or cellulitis but also could represent disease this is where her disease presented and is predominantly  We will continue to monitor these areas as well  There is persistent short segment FDG activity involving mid to distal sigmoid colon and a colonoscopy is recommended  She has not had a colonoscopy, only Cologuard  Demonstrating nonspecific hypodense prominence in the endometrial cavity with recommendation for pelvic ultrasound  She also has a subtle asymmetric FDG activity associated with asymmetric soft tissue prominence involving the right frontal scalp  REVIEW OF SYSTEMS:  Review of Systems   Constitutional: Positive for fatigue (today)  Negative for appetite change, fever and unexpected weight change  HENT:   Negative for lump/mass  Eyes: Negative for icterus  Respiratory: Negative for cough, shortness of breath and wheezing  Cardiovascular: Negative for leg swelling  Gastrointestinal: Negative for abdominal pain, constipation, diarrhea, nausea and vomiting  Genitourinary: Negative for difficulty urinating and hematuria  Musculoskeletal: Negative for arthralgias, gait problem and myalgias  Skin: Negative for itching and rash          No new, changing, or concerning lesions  Neurological: Negative for extremity weakness, gait problem, headaches, light-headedness and numbness  Hematological: Negative for adenopathy          MEDICATIONS:    Current Outpatient Medications:   •  apixaban (Eliquis) 5 mg, Take 1 tablet (5 mg total) by mouth every 12 (twelve) hours, Disp: 60 tablet, Rfl: 5  •  calcium carbonate (OS-VIRGIE) 600 MG tablet, Take 600 mg by mouth 2 (two) times a day, Disp: , Rfl:   •  famotidine (PEPCID) 10 mg tablet, Take 10 mg by mouth 2 (two) times a day, Disp: , Rfl:   •  loratadine (CLARITIN) 10 mg tablet, Take 10 mg by mouth daily, Disp: , Rfl:   •  losartan (Cozaar) 100 MG tablet, Take 1 tablet (100 mg total) by mouth daily, Disp: 30 tablet, Rfl: 5  •  metoprolol succinate (TOPROL-XL) 25 mg 24 hr tablet, Take 1 tablet (25 mg total) by mouth 2 (two) times a day, Disp: 60 tablet, Rfl: 5  •  Multiple Vitamins-Minerals (MULTIVITAMIN WITH MINERALS) tablet, Take 1 tablet by mouth daily, Disp: , Rfl:   •  patient supplied medication, Pt takes eye drops Mura 128 - not listed in database, Disp: , Rfl:   •  triamcinolone (KENALOG) 0 1 % ointment, Apply topically 2 (two) times a day, Disp: 453 6 g, Rfl: 0     ALLERGIES:  No Known Allergies     ACTIVE PROBLEMS:  Patient Active Problem List   Diagnosis   • Acute massive pulmonary embolism (HCC)   • Venous stasis of both lower extremities   • Clostridium difficile infection   • Right ventricular systolic dysfunction without heart failure   • Essential hypertension   • Metastatic melanoma ([facility] [address] )   • High risk medication use          PAST MEDICAL HISTORY:   Past Medical History:   Diagnosis Date   • Acute deep vein thrombosis (DVT) of popliteal vein of left lower extremity (HCC)    • Ankle wound, left, initial encounter     Resolved 6/2017   • Ankle wound, right, initial encounter     resolved 6/2017   • DVT (deep vein thrombosis) in pregnancy    • Pulmonary emboli ([address] )     1989 post C section   • Pulmonary embolism ([facility] )     2017   • Pulmonary embolism, blood-clot, obstetric    • Skin cancer    • Venous stasis ulcer (HCC)    • Venous ulcers of both lower extremities (HCC)         PAST SURGICAL HISTORY:  Past Surgical History:   Procedure Laterality Date   •  SECTION      X2        SOCIAL HISTORY:  Social History     Socioeconomic History   • Marital status:      Spouse name: None   • Number of children: None   • Years of education: None   • Highest education level: None   Occupational History   • None   Tobacco Use   • Smoking status: Never   • Smokeless tobacco: Never   Vaping Use   • Vaping Use: Never used   Substance and Sexual Activity   • Alcohol use: No     Comment: hx of social drinker   • Drug use: No   • Sexual activity: None   Other Topics Concern   • None   Social History Narrative   • None     Social Determinants of Health     Financial Resource Strain: Not on file   Food Insecurity: Not on file   Transportation Needs: Not on file   Physical Activity: Not on file   Stress: Not on file   Social Connections: Not on file   Intimate Partner Violence: Not on file   Housing Stability: Not on file        FAMILY HISTORY:  Family History   Problem Relation Age of Onset   • Heart attack Mother    • Stroke Mother    • Cancer Father    • Bone cancer Family    • Hypertension Family    • Lung cancer Family            Objective    PHYSICAL EXAMINATION:   Blood pressure 110/68, pulse (!) 114, temperature 97 8 °F (36 6 °C), temperature source Temporal, resp  rate 16, height 5' 9" (1 753 m), weight 94 3 kg (208 lb), SpO2 98 %  Pain Score: 0-No pain     ECOG Performance Status    Flowsheet Row Most Recent Value   ECOG Performance Status 0 - Fully active, able to carry on all pre-disease performance without restriction             Physical Exam  Constitutional:       General: She is not in acute distress  Appearance: Normal appearance  She is not toxic-appearing     HENT:      Head: Normocephalic and atraumatic  Comments: Healing biopsy site on right forehead     Mouth/Throat:      Mouth: Mucous membranes are moist       Pharynx: Oropharynx is clear  Eyes:      General: No scleral icterus  Conjunctiva/sclera: Conjunctivae normal    Cardiovascular:      Rate and Rhythm: Normal rate and regular rhythm  Pulses: Normal pulses  Heart sounds: No murmur heard  No friction rub  No gallop  Pulmonary:      Effort: Pulmonary effort is normal  No respiratory distress  Breath sounds: Normal breath sounds  No wheezing or rales  Abdominal:      General: Bowel sounds are normal  There is no distension  Palpations: Abdomen is soft  There is no mass  Tenderness: There is no abdominal tenderness  There is no rebound  Musculoskeletal:         General: No swelling or tenderness  Right lower leg: No edema  Left lower leg: No edema  Lymphadenopathy:      Head:      Right side of head: No submandibular, preauricular or posterior auricular adenopathy  Left side of head: No submandibular, preauricular or posterior auricular adenopathy  Cervical: No cervical adenopathy  Right cervical: No superficial or posterior cervical adenopathy  Left cervical: No superficial or posterior cervical adenopathy  Upper Body:      Right upper body: No supraclavicular or axillary adenopathy  Left upper body: No supraclavicular or axillary adenopathy  Lower Body: No right inguinal adenopathy  No left inguinal adenopathy  Skin:     Coloration: Skin is not jaundiced  Findings: No rash  Comments: Decrease in pigmentation on her right lower extremity  She does have skin changes associated with vascular insufficiency/venous stasis  Healing sites from scabbing biopsies that were done at the initial diagnosis, slow to heal but healing  No overt evidence of any subcutaneous nodularity or progression of disease    Positive varicose veins in the areas noted on imaging  Neurological:      General: No focal deficit present  Mental Status: She is alert and oriented to person, place, and time  Psychiatric:         Mood and Affect: Mood normal          Behavior: Behavior normal          Thought Content: Thought content normal          Judgment: Judgment normal          I reviewed lab data in the chart      WBC   Date Value Ref Range Status   03/04/2023 4 84 4 31 - 10 16 Thousand/uL Final   02/04/2023 5 31 4 31 - 10 16 Thousand/uL Final   01/10/2023 6 16 4 31 - 10 16 Thousand/uL Final     Hemoglobin   Date Value Ref Range Status   03/04/2023 12 8 11 5 - 15 4 g/dL Final   02/04/2023 13 0 11 5 - 15 4 g/dL Final   01/10/2023 13 0 11 5 - 15 4 g/dL Final     Platelets   Date Value Ref Range Status   03/04/2023 183 149 - 390 Thousands/uL Final   02/04/2023 205 149 - 390 Thousands/uL Final   01/10/2023 183 149 - 390 Thousands/uL Final     MCV   Date Value Ref Range Status   03/04/2023 91 82 - 98 fL Final   02/04/2023 92 82 - 98 fL Final   01/10/2023 92 82 - 98 fL Final      Potassium   Date Value Ref Range Status   03/04/2023 4 0 3 5 - 5 3 mmol/L Final   02/04/2023 4 0 3 5 - 5 3 mmol/L Final   01/10/2023 3 8 3 5 - 5 3 mmol/L Final     Chloride   Date Value Ref Range Status   03/04/2023 109 (H) 96 - 108 mmol/L Final   02/04/2023 109 (H) 96 - 108 mmol/L Final   01/10/2023 104 96 - 108 mmol/L Final     CO2   Date Value Ref Range Status   03/04/2023 26 21 - 32 mmol/L Final   02/04/2023 26 21 - 32 mmol/L Final   01/10/2023 28 21 - 32 mmol/L Final     BUN   Date Value Ref Range Status   03/04/2023 14 5 - 25 mg/dL Final   02/04/2023 18 5 - 25 mg/dL Final   01/10/2023 21 5 - 25 mg/dL Final     Creatinine   Date Value Ref Range Status   03/04/2023 0 83 0 60 - 1 30 mg/dL Final     Comment:     Standardized to IDMS reference method   02/04/2023 0 81 0 60 - 1 30 mg/dL Final     Comment:     Standardized to IDMS reference method   01/10/2023 1 01 0 60 - 1 30 mg/dL Final     Comment: Standardized to IDMS reference method     Glucose   Date Value Ref Range Status   11/17/2022 85 65 - 140 mg/dL Final     Comment:     If the patient is fasting, the ADA then defines impaired fasting glucose as > 100 mg/dL and diabetes as > or equal to 123 mg/dL  Specimen collection should occur prior to Sulfasalazine administration due to the potential for falsely depressed results  Specimen collection should occur prior to Sulfapyridine administration due to the potential for falsely elevated results  08/03/2020 112 (H) 65 - 99 mg/dL Final     Comment:     If the patient is fasting, the ADA then defines impaired fasting glucose as > 100 mg/dL and diabetes as > or equal to 123 mg/dL  Specimen collection should occur prior to Sulfasalazine administration due to the potential for falsely depressed results  Specimen collection should occur prior to Sulfapyridine administration due to the potential for falsely elevated results  01/24/2017 91 65 - 140 mg/dL Final     Comment:     If the patient is fasting, the ADA then defines impaired fasting glucose as > 100 mg/dL and diabetes as > or equal to 123 mg/dL  Calcium   Date Value Ref Range Status   03/04/2023 9 1 8 3 - 10 1 mg/dL Final   02/04/2023 8 9 8 3 - 10 1 mg/dL Final   01/10/2023 8 8 8 3 - 10 1 mg/dL Final     Albumin   Date Value Ref Range Status   03/04/2023 3 3 (L) 3 5 - 5 0 g/dL Final   02/04/2023 3 4 (L) 3 5 - 5 0 g/dL Final   01/10/2023 3 6 3 5 - 5 0 g/dL Final     Total Bilirubin   Date Value Ref Range Status   03/04/2023 0 76 0 20 - 1 00 mg/dL Final     Comment:     Use of this assay is not recommended for patients undergoing treatment with eltrombopag due to the potential for falsely elevated results  02/04/2023 0 60 0 20 - 1 00 mg/dL Final     Comment:     Use of this assay is not recommended for patients undergoing treatment with eltrombopag due to the potential for falsely elevated results     01/10/2023 0 42 0 20 - 1 00 mg/dL Final Comment:     Use of this assay is not recommended for patients undergoing treatment with eltrombopag due to the potential for falsely elevated results  Alkaline Phosphatase   Date Value Ref Range Status   03/04/2023 39 (L) 46 - 116 U/L Final   02/04/2023 43 (L) 46 - 116 U/L Final   01/10/2023 59 46 - 116 U/L Final     AST   Date Value Ref Range Status   03/04/2023 29 5 - 45 U/L Final     Comment:     Specimen collection should occur prior to Sulfasalazine administration due to the potential for falsely depressed results  02/04/2023 16 5 - 45 U/L Final     Comment:     Specimen collection should occur prior to Sulfasalazine administration due to the potential for falsely depressed results  01/10/2023 24 5 - 45 U/L Final     Comment:     Specimen collection should occur prior to Sulfasalazine administration due to the potential for falsely depressed results  ALT   Date Value Ref Range Status   03/04/2023 30 12 - 78 U/L Final     Comment:     Specimen collection should occur prior to Sulfasalazine and/or Sulfapyridine administration due to the potential for falsely depressed results  02/04/2023 22 12 - 78 U/L Final     Comment:     Specimen collection should occur prior to Sulfasalazine and/or Sulfapyridine administration due to the potential for falsely depressed results  01/10/2023 24 12 - 78 U/L Final     Comment:     Specimen collection should occur prior to Sulfasalazine administration due to the potential for falsely depressed results  LD   Date Value Ref Range Status   03/04/2023 206 81 - 234 U/L Final   02/04/2023 201 81 - 234 U/L Final   01/10/2023 205 81 - 234 U/L Final     No results found for: TSH  No results found for: [id number]   Free T4   Date Value Ref Range Status   03/04/2023 1 03 0 76 - 1 46 ng/dL Final     Comment:     Specimen collection should occur prior to Sulfasalazine administration due to the potential for falsely elevated results     02/04/2023 1 09 0 76 - 1 46 ng/dL Final     Comment:     Specimen collection should occur prior to Sulfasalazine administration due to the potential for falsely elevated results  01/10/2023 1 18 0 76 - 1 46 ng/dL Final     Comment:     Specimen collection should occur prior to Sulfasalazine administration due to the potential for falsely elevated results  RECENT IMAGING:   3/6/2023 PET  1  Previously noted mildly hypermetabolic right inguinal/external iliac lymph nodes are essentially stable in size and exhibit persistent but mildly improved FDG activity  Findings are nonspecific and could reflect persistent regis metastatic disease   of low metabolic activity versus benign/inflammatory lymphadenopathy      2   Scattered areas of subcutaneous FDG activity involving the bilateral distal lower extremities extending from the level of the mid tibial shaft to the ankles/feet appear more prominent than the prior exam   While findings could be related to varicose   veins and inflammatory changes such as edema/cellulitis, given history of lower extremity melanoma, correlation with direct visualization and possibly MRI is recommended for further characterization and exclusion of developing malignancy at these sites  Attention to these regions on short interval follow-up imaging is recommended      3   Persistent short segment of focal FDG activity involving the mid to distal sigmoid colon    Given persistence and focality of this finding, further evaluation with colonoscopy is recommended to exclude colonic malignancy      4   Persistent nonspecific hypodense prominence to the endometrial cavity towards correlation with pelvic ultrasound is recommended for further characterization      5   Subtle asymmetric FDG activity associated with subtle asymmetric soft tissue prominence involving the right frontal scalp, possibly related to recent biopsy procedure although correlation with direct visualization and biopsy pathology results is   recommended to exclude a malignant process in this location  Assessment/Plan  Ms Roxie Bishop is a 67 yr female with dissectible/metastatic melanoma on her right lower extremity on treatment with Opdualag here for follow-up and monitoring prior to treatment  1  Metastatic melanoma ([address] )  We discussed her pet imaging results from this morning  There is evidence of response to treatment with in the inguinal/iliac lymph nodes with stable to decreased FDG activity in this area  We will continue to monitor this on future imaging  There is some changes on her lower extremities with persistent or mildly increased FDG activity which appears to be nonspecific on physical examination  Areas continue with decreased pigmentation of sites of previously documented/identified melanoma  No new areas of pigmentation  No new nodularities or masses  We will continue to pay attention to these on future physical exams, as well as imaging  We will work-up all additional concerning findings  Overall, from a melanoma perspective, she is doing well and is tolerating treatment without any issues  Labs reviewed  No immune mediated side effects  She is okay to continue with treatment  She is due for her next treatment on 3/8/2023  She has standing orders for blood work prior to each treatment  She will return in 4 weeks for her next treatment  She knows to continue to monitor for immune mediated side effects  She will call with any issues or concerns  We will work-up her abnormal findings on imaging       - Ambulatory Referral to Gastroenterology; Future  - US pelvis complete w transvaginal; Future    2  Abnormal finding on GI tract imaging  She has significant findings in a short segment of the colon which are persistent that should be evaluated with colonoscopy  Especially in light of her never having a colonoscopy and only undergoing colorectal cancer screening with Cologuard    We have placed a referral to GI for colonoscopy to evaluate this specific finding  Referral placed  - Ambulatory Referral to Gastroenterology; Future  - US pelvis complete w transvaginal; Future    3  Abnormal finding of diagnostic imaging  Continued persistence of hypodense prominence in the endometrial cavity  Will evaluate further with pelvic ultrasound  Orders placed and prescription provided  I will discuss results of this imaging with her when they are available  - US pelvis complete w transvaginal; Future    Return in about 4 weeks (around 4/3/2023) for Office Visit, Infusion - See Treatment Plan, labs, scans  She knows to call with any issues or concerns prior to her next visit      Elyse Adair MD, PhD

## 2023-03-06 NOTE — LETTER
[address] [place] [address]  [address] [facility] [address]      March 9, 2023    MRN: [id number]     Phone: [phone]     Dear Ms Florinda Page recently had a(n) Nuclear Medicine performed on 3/6/2023 at  [address] [place] [address] that was requested by Tree Henderson MD  The study was reviewed by a radiologist, which is a physician who specializes in medical imaging  The radiologist issued a report describing his or her findings  In that report there was a finding that the radiologist felt warranted further discussion with your health care provider and that discussion would be beneficial to you  The results were sent to Tree Henderson MD on 03/06/2023  1:43 PM  We recommend that you contact Tree Henderson MD at [phone] or set up an appointment to discuss the results of the imaging test  If you have already heard from Tree Henderson MD regarding the results of your study, you can disregard this letter  This letter is not meant to alarm you, but intended to encourage you to follow-up on your results with the provider that sent you for the imaging study  In addition, we have enclosed answers to frequently asked questions by other patients who have also received a letter to review results with their health care provider (see page two)  Thank you for choosing [address] [place] [address] for your medical imaging needs  FREQUENTLY ASKED QUESTIONS    1  Why am I receiving this letter? [facility] requires us to notify patients who have findings on imaging exams that may require more testing or follow-up with a health professional within the next 3 months          2  How serious is the finding on the imaging test?  This letter is sent to all patients who may need follow-up or more testing within the next 3 months  Receiving this letter does not necessarily mean you have a life-threatening imaging finding or disease  Recommendations in the radiologist’s imaging report are general in nature and it is up to your healthcare provider to say whether those recommendations make sense for your situation  You are strongly encouraged to talk to your health care provider about the results and ask whether additional steps need to be taken  3  Where can I get a copy of the final report for my recent radiology exam?  To get a full copy of the report you can access your records online at [email or web address] or please contact [facility] Medical Records Department at [phone] Monday through Friday between 8 am and 6 pm          4  What do I need to do now? Please contact your health care provider who requested the imaging study to discuss what further actions (if any) are needed  You may have already heard from (your ordering provider) in regard to this test in which case you can disregard this letter  NOTICE IN ACCORDANCE WITH THE [place] STATE “PATIENT TEST RESULT INFORMATION ACT OF 2018”    You are receiving this notice as a result of a determination by your diagnostic imaging service that further discussions of your test results are warranted and would be beneficial to you  The complete results of your test or tests have been or will be sent to the health care practitioner that ordered the test or tests  It is recommended that you contact your health care practitioner to discuss your results as soon as possible

## 2023-03-08 ENCOUNTER — HOSPITAL ENCOUNTER (OUTPATIENT)
Dept: INFUSION CENTER | Facility: HOSPITAL | Age: 73
Discharge: HOME | End: 2023-03-08
Attending: INTERNAL MEDICINE

## 2023-03-08 VITALS
OXYGEN SATURATION: 98 % | TEMPERATURE: 98.3 F | SYSTOLIC BLOOD PRESSURE: 111 MMHG | DIASTOLIC BLOOD PRESSURE: 56 MMHG | RESPIRATION RATE: 18 BRPM | HEART RATE: 81 BPM

## 2023-03-08 DIAGNOSIS — C43.9 METASTATIC MELANOMA (HCC): Primary | ICD-10-CM

## 2023-03-08 RX ORDER — SODIUM CHLORIDE 9 MG/ML
20 INJECTION, SOLUTION INTRAVENOUS ONCE
Status: COMPLETED | OUTPATIENT
Start: 2023-03-08 | End: 2023-03-08

## 2023-03-08 RX ADMIN — SODIUM CHLORIDE 20 ML/HR: 0.9 INJECTION, SOLUTION INTRAVENOUS at 14:39

## 2023-03-08 RX ADMIN — SODIUM CHLORIDE 480 MG OF NIVOLUMAB: 9 INJECTION, SOLUTION INTRAVENOUS at 15:10

## 2023-03-08 NOTE — PROGRESS NOTES
[facility] HEMATOLOGY ONCOLOGY SPECIALISTS [address]  [place] [address]  [phone]  [phone]     Date of Visit: 7/7/1254  Name: Remi Guillermo   YOB: 1950        Subjective    VISIT DIAGNOSIS:  Diagnoses and all orders for this visit:    Metastatic melanoma [facility])  -     Ambulatory Referral to Gastroenterology; Future  -     US pelvis complete w transvaginal; Future    Abnormal finding on GI tract imaging  -     Ambulatory Referral to Gastroenterology; Future  -     US pelvis complete w transvaginal; Future    Abnormal finding of diagnostic imaging  -     US pelvis complete w transvaginal; Future        Oncology History   Metastatic melanoma ([facility] )   10/13/2022 -  Cancer Staged    Staging form: Melanoma of the Skin, AJCC 8th Edition  - Clinical stage from 10/13/2022: Stage IV (cTX, cNX, cM1a) - Signed by Diogenes Smyth MD on 11/22/2022       10/13/2022 Biopsy    A  Skin, right lateral medial leg, punch biopsy:  Portion of severely atypical melanocytic dermal proliferation with prominent melanosis consistent with melanoma  See note  B  Skin, right medial leg  punch biopsy:  Portion of severely atypical melanocytic dermal proliferation with prominent melanosis consistent with melanoma  See note  C  Skin, right medial leg, punch biopsy:   Portion of severely atypical melanocytic dermal proliferation with prominent melanosis consistent with melanoma  See note       11/22/2022 Initial Diagnosis    Metastatic melanoma ([facility] )     12/14/2022 -  Chemotherapy    NIVOLUMAB-RELATLIMAB-RMBW (OPDUALAG) IVPB, 480 mg of nivolumab, Intravenous, Once, 3 of 8 cycles  Administration: 480 mg of nivolumab (12/14/2022), 480 mg of nivolumab (1/11/2023), 480 mg of nivolumab (2/8/2023)     1/5/2023 [facility] liquid testing  TMB 1 Mut/Mb  MSI-high not detected          Cancer Staging   Metastatic melanoma ([facility] )  Staging form: Melanoma of the Skin, AJCC 8th Edition  - Clinical stage from 10/13/2022: Stage IV (cTX, cNX, IV3C) - Signed by Dayton Bamberger, MD on 11/22/2022     Treatment Details   Treatment goal Curative   Plan Name OP Nivolumab and Relatlimab-rmbw Q 28 Days   Status Active   Start Date 12/14/2022   End Date 6/28/2023 (Planned)   Provider Dayton Bamberger, MD   Chemotherapy NIVOLUMAB-RELATLIMAB-RMBW (OPDUALAG) IVPB, 480 mg of nivolumab, Intravenous, Once, 3 of 8 cycles  Administration: 480 mg of nivolumab (12/14/2022), 480 mg of nivolumab (1/11/2023), 480 mg of nivolumab (2/8/2023)          HISTORY OF PRESENT ILLNESS: Katrina Houser is a 67 y o  female  who who has unresectable/metastatic melanoma on treatment with Opdualag here for follow-up and evaluation prior to treatment  She is doing well and feels good  She is feeling a little tired today, bit more than usual, but otherwise overall well  Continues to work full-time  No issues or complaints  Denies any new, changing, concerning skin lesions  Denies any lymphadenopathy  Has followed with dermatology and recently had some things frozen off as well as a biopsy of the lesion on her right upper forehead  Denies any immune mediated side effects  Denies headaches, double vision, rash, itching, chest pain, shortness of breath, and diarrhea  She underwent imaging this morning with a PET scan and we discussed that results demonstrate response to treatment as well as some other concerning findings  There is some mild hypermetabolic right inguinal/external iliac lymph nodes which are stable in size and have persistent FDG activity but this appears mildly improved  This could represent persistent reigs disease with low metabolic activity or a benign inflammatory lymphadenopathy  We will continue to monitor these with treatment and ongoing surveillance    There was noted scattered areas of subcutaneous FDG activity in the bilateral distal lower extremities from the level of the mid tibia shaft to the ankles and feet and they appear more prominent than on prior exam   This could be resulted from varicose veins and inflammatory changes with edema or cellulitis but also could represent disease this is where her disease presented and is predominantly  We will continue to monitor these areas as well  There is persistent short segment FDG activity involving mid to distal sigmoid colon and a colonoscopy is recommended  She has not had a colonoscopy, only Cologuard  Demonstrating nonspecific hypodense prominence in the endometrial cavity with recommendation for pelvic ultrasound  She also has a subtle asymmetric FDG activity associated with asymmetric soft tissue prominence involving the right frontal scalp  REVIEW OF SYSTEMS:  Review of Systems   Constitutional: Positive for fatigue (today)  Negative for appetite change, fever and unexpected weight change  HENT:   Negative for lump/mass  Eyes: Negative for icterus  Respiratory: Negative for cough, shortness of breath and wheezing  Cardiovascular: Negative for leg swelling  Gastrointestinal: Negative for abdominal pain, constipation, diarrhea, nausea and vomiting  Genitourinary: Negative for difficulty urinating and hematuria  Musculoskeletal: Negative for arthralgias, gait problem and myalgias  Skin: Negative for itching and rash  No new, changing, or concerning lesions  Neurological: Negative for extremity weakness, gait problem, headaches, light-headedness and numbness  Hematological: Negative for adenopathy          MEDICATIONS:    Current Outpatient Medications:   •  apixaban (Eliquis) 5 mg, Take 1 tablet (5 mg total) by mouth every 12 (twelve) hours, Disp: 60 tablet, Rfl: 5  •  calcium carbonate (OS-VIRGIE) 600 MG tablet, Take 600 mg by mouth 2 (two) times a day, Disp: , Rfl:   •  famotidine (PEPCID) 10 mg tablet, Take 10 mg by mouth 2 (two) times a day, Disp: , Rfl:   •  loratadine (CLARITIN) 10 mg tablet, Take 10 mg by mouth daily, Disp: , Rfl:   •  losartan (Cozaar) 100 MG tablet, Take 1 tablet (100 mg total) by mouth daily, Disp: 30 tablet, Rfl: 5  •  metoprolol succinate (TOPROL-XL) 25 mg 24 hr tablet, Take 1 tablet (25 mg total) by mouth 2 (two) times a day, Disp: 60 tablet, Rfl: 5  •  Multiple Vitamins-Minerals (MULTIVITAMIN WITH MINERALS) tablet, Take 1 tablet by mouth daily, Disp: , Rfl:   •  patient supplied medication, Pt takes eye drops Mura 128 - not listed in database, Disp: , Rfl:   •  triamcinolone (KENALOG) 0 1 % ointment, Apply topically 2 (two) times a day, Disp: 453 6 g, Rfl: 0     ALLERGIES:  No Known Allergies     ACTIVE PROBLEMS:  Patient Active Problem List   Diagnosis   • Acute massive pulmonary embolism (HCC)   • Venous stasis of both lower extremities   • Clostridium difficile infection   • Right ventricular systolic dysfunction without heart failure   • Essential hypertension   • Metastatic melanoma ([facility] )   • High risk medication use          PAST MEDICAL HISTORY:   Past Medical History:   Diagnosis Date   • Acute deep vein thrombosis (DVT) of popliteal vein of left lower extremity (HCC)    • Ankle wound, left, initial encounter     Resolved 2017   • Ankle wound, right, initial encounter     resolved 2017   • DVT (deep vein thrombosis) in pregnancy    • Pulmonary emboli ([address] )      post C section   • Pulmonary embolism ([address] )     2017   • Pulmonary embolism, blood-clot, obstetric    • Skin cancer    • Venous stasis ulcer ([address] )    • Venous ulcers of both lower extremities ([facility] )         PAST SURGICAL HISTORY:  Past Surgical History:   Procedure Laterality Date   •  SECTION      X2        SOCIAL HISTORY:  Social History     Socioeconomic History   • Marital status:       Spouse name: None   • Number of children: None   • Years of education: None   • Highest education level: None   Occupational History   • None   Tobacco Use   • Smoking status: Never   • Smokeless tobacco: Never   Vaping Use • Vaping Use: Never used   Substance and Sexual Activity   • Alcohol use: No     Comment: hx of social drinker   • Drug use: No   • Sexual activity: None   Other Topics Concern   • None   Social History Narrative   • None     Social Determinants of Health     Financial Resource Strain: Not on file   Food Insecurity: Not on file   Transportation Needs: Not on file   Physical Activity: Not on file   Stress: Not on file   Social Connections: Not on file   Intimate Partner Violence: Not on file   Housing Stability: Not on file        FAMILY HISTORY:  Family History   Problem Relation Age of Onset   • Heart attack Mother    • Stroke Mother    • Cancer Father    • Bone cancer Family    • Hypertension Family    • Lung cancer Family            Objective    PHYSICAL EXAMINATION:   Blood pressure 110/68, pulse (!) 114, temperature 97 8 °F (36 6 °C), temperature source Temporal, resp  rate 16, height 5' 9" (1 753 m), weight 94 3 kg (208 lb), SpO2 98 %  Pain Score: 0-No pain     ECOG Performance Status    Flowsheet Row Most Recent Value   ECOG Performance Status 0 - Fully active, able to carry on all pre-disease performance without restriction             Physical Exam  Constitutional:       General: She is not in acute distress  Appearance: Normal appearance  She is not toxic-appearing  HENT:      Head: Normocephalic and atraumatic  Comments: Healing biopsy site on right forehead     Mouth/Throat:      Mouth: Mucous membranes are moist       Pharynx: Oropharynx is clear  Eyes:      General: No scleral icterus  Conjunctiva/sclera: Conjunctivae normal    Cardiovascular:      Rate and Rhythm: Normal rate and regular rhythm  Pulses: Normal pulses  Heart sounds: No murmur heard  No friction rub  No gallop  Pulmonary:      Effort: Pulmonary effort is normal  No respiratory distress  Breath sounds: Normal breath sounds  No wheezing or rales     Abdominal:      General: Bowel sounds are normal  There is no distension  Palpations: Abdomen is soft  There is no mass  Tenderness: There is no abdominal tenderness  There is no rebound  Musculoskeletal:         General: No swelling or tenderness  Right lower leg: No edema  Left lower leg: No edema  Lymphadenopathy:      Head:      Right side of head: No submandibular, preauricular or posterior auricular adenopathy  Left side of head: No submandibular, preauricular or posterior auricular adenopathy  Cervical: No cervical adenopathy  Right cervical: No superficial or posterior cervical adenopathy  Left cervical: No superficial or posterior cervical adenopathy  Upper Body:      Right upper body: No supraclavicular or axillary adenopathy  Left upper body: No supraclavicular or axillary adenopathy  Lower Body: No right inguinal adenopathy  No left inguinal adenopathy  Skin:     Coloration: Skin is not jaundiced  Findings: No rash  Comments: Decrease in pigmentation on her right lower extremity  She does have skin changes associated with vascular insufficiency/venous stasis  Healing sites from scabbing biopsies that were done at the initial diagnosis, slow to heal but healing  No overt evidence of any subcutaneous nodularity or progression of disease  Positive varicose veins in the areas noted on imaging  Neurological:      General: No focal deficit present  Mental Status: She is alert and oriented to person, place, and time  Psychiatric:         Mood and Affect: Mood normal          Behavior: Behavior normal          Thought Content: Thought content normal          Judgment: Judgment normal          I reviewed lab data in the chart      WBC   Date Value Ref Range Status   03/04/2023 4 84 4 31 - 10 16 Thousand/uL Final   02/04/2023 5 31 4 31 - 10 16 Thousand/uL Final   01/10/2023 6 16 4 31 - 10 16 Thousand/uL Final     Hemoglobin   Date Value Ref Range Status   03/04/2023 12 8 11 5 - 15 4 g/dL Final   02/04/2023 13 0 11 5 - 15 4 g/dL Final   01/10/2023 13 0 11 5 - 15 4 g/dL Final     Platelets   Date Value Ref Range Status   03/04/2023 183 149 - 390 Thousands/uL Final   02/04/2023 205 149 - 390 Thousands/uL Final   01/10/2023 183 149 - 390 Thousands/uL Final     MCV   Date Value Ref Range Status   03/04/2023 91 82 - 98 fL Final   02/04/2023 92 82 - 98 fL Final   01/10/2023 92 82 - 98 fL Final      Potassium   Date Value Ref Range Status   03/04/2023 4 0 3 5 - 5 3 mmol/L Final   02/04/2023 4 0 3 5 - 5 3 mmol/L Final   01/10/2023 3 8 3 5 - 5 3 mmol/L Final     Chloride   Date Value Ref Range Status   03/04/2023 109 (H) 96 - 108 mmol/L Final   02/04/2023 109 (H) 96 - 108 mmol/L Final   01/10/2023 104 96 - 108 mmol/L Final     CO2   Date Value Ref Range Status   03/04/2023 26 21 - 32 mmol/L Final   02/04/2023 26 21 - 32 mmol/L Final   01/10/2023 28 21 - 32 mmol/L Final     BUN   Date Value Ref Range Status   03/04/2023 14 5 - 25 mg/dL Final   02/04/2023 18 5 - 25 mg/dL Final   01/10/2023 21 5 - 25 mg/dL Final     Creatinine   Date Value Ref Range Status   03/04/2023 0 83 0 60 - 1 30 mg/dL Final     Comment:     Standardized to IDMS reference method   02/04/2023 0 81 0 60 - 1 30 mg/dL Final     Comment:     Standardized to IDMS reference method   01/10/2023 1 01 0 60 - 1 30 mg/dL Final     Comment:     Standardized to IDMS reference method     Glucose   Date Value Ref Range Status   11/17/2022 85 65 - 140 mg/dL Final     Comment:     If the patient is fasting, the ADA then defines impaired fasting glucose as > 100 mg/dL and diabetes as > or equal to 123 mg/dL  Specimen collection should occur prior to Sulfasalazine administration due to the potential for falsely depressed results  Specimen collection should occur prior to Sulfapyridine administration due to the potential for falsely elevated results     08/03/2020 112 (H) 65 - 99 mg/dL Final     Comment:     If the patient is fasting, the ADA then defines impaired fasting glucose as > 100 mg/dL and diabetes as > or equal to 123 mg/dL  Specimen collection should occur prior to Sulfasalazine administration due to the potential for falsely depressed results  Specimen collection should occur prior to Sulfapyridine administration due to the potential for falsely elevated results  01/24/2017 91 65 - 140 mg/dL Final     Comment:     If the patient is fasting, the ADA then defines impaired fasting glucose as > 100 mg/dL and diabetes as > or equal to 123 mg/dL  Calcium   Date Value Ref Range Status   03/04/2023 9 1 8 3 - 10 1 mg/dL Final   02/04/2023 8 9 8 3 - 10 1 mg/dL Final   01/10/2023 8 8 8 3 - 10 1 mg/dL Final     Albumin   Date Value Ref Range Status   03/04/2023 3 3 (L) 3 5 - 5 0 g/dL Final   02/04/2023 3 4 (L) 3 5 - 5 0 g/dL Final   01/10/2023 3 6 3 5 - 5 0 g/dL Final     Total Bilirubin   Date Value Ref Range Status   03/04/2023 0 76 0 20 - 1 00 mg/dL Final     Comment:     Use of this assay is not recommended for patients undergoing treatment with eltrombopag due to the potential for falsely elevated results  02/04/2023 0 60 0 20 - 1 00 mg/dL Final     Comment:     Use of this assay is not recommended for patients undergoing treatment with eltrombopag due to the potential for falsely elevated results  01/10/2023 0 42 0 20 - 1 00 mg/dL Final     Comment:     Use of this assay is not recommended for patients undergoing treatment with eltrombopag due to the potential for falsely elevated results  Alkaline Phosphatase   Date Value Ref Range Status   03/04/2023 39 (L) 46 - 116 U/L Final   02/04/2023 43 (L) 46 - 116 U/L Final   01/10/2023 59 46 - 116 U/L Final     AST   Date Value Ref Range Status   03/04/2023 29 5 - 45 U/L Final     Comment:     Specimen collection should occur prior to Sulfasalazine administration due to the potential for falsely depressed results      02/04/2023 16 5 - 45 U/L Final     Comment:     Specimen collection should occur prior to Sulfasalazine administration due to the potential for falsely depressed results  01/10/2023 24 5 - 45 U/L Final     Comment:     Specimen collection should occur prior to Sulfasalazine administration due to the potential for falsely depressed results  ALT   Date Value Ref Range Status   03/04/2023 30 12 - 78 U/L Final     Comment:     Specimen collection should occur prior to Sulfasalazine and/or Sulfapyridine administration due to the potential for falsely depressed results  02/04/2023 22 12 - 78 U/L Final     Comment:     Specimen collection should occur prior to Sulfasalazine and/or Sulfapyridine administration due to the potential for falsely depressed results  01/10/2023 24 12 - 78 U/L Final     Comment:     Specimen collection should occur prior to Sulfasalazine administration due to the potential for falsely depressed results  LD   Date Value Ref Range Status   03/04/2023 206 81 - 234 U/L Final   02/04/2023 201 81 - 234 U/L Final   01/10/2023 205 81 - 234 U/L Final     No results found for: TSH  No results found for: [id number]   Free T4   Date Value Ref Range Status   03/04/2023 1 03 0 76 - 1 46 ng/dL Final     Comment:     Specimen collection should occur prior to Sulfasalazine administration due to the potential for falsely elevated results  02/04/2023 1 09 0 76 - 1 46 ng/dL Final     Comment:     Specimen collection should occur prior to Sulfasalazine administration due to the potential for falsely elevated results  01/10/2023 1 18 0 76 - 1 46 ng/dL Final     Comment:     Specimen collection should occur prior to Sulfasalazine administration due to the potential for falsely elevated results  RECENT IMAGING:   3/6/2023 PET  1  Previously noted mildly hypermetabolic right inguinal/external iliac lymph nodes are essentially stable in size and exhibit persistent but mildly improved FDG activity    Findings are nonspecific and could reflect persistent regis metastatic disease   of low metabolic activity versus benign/inflammatory lymphadenopathy      2   Scattered areas of subcutaneous FDG activity involving the bilateral distal lower extremities extending from the level of the mid tibial shaft to the ankles/feet appear more prominent than the prior exam   While findings could be related to varicose   veins and inflammatory changes such as edema/cellulitis, given history of lower extremity melanoma, correlation with direct visualization and possibly MRI is recommended for further characterization and exclusion of developing malignancy at these sites  Attention to these regions on short interval follow-up imaging is recommended      3   Persistent short segment of focal FDG activity involving the mid to distal sigmoid colon  Given persistence and focality of this finding, further evaluation with colonoscopy is recommended to exclude colonic malignancy      4   Persistent nonspecific hypodense prominence to the endometrial cavity towards correlation with pelvic ultrasound is recommended for further characterization      5   Subtle asymmetric FDG activity associated with subtle asymmetric soft tissue prominence involving the right frontal scalp, possibly related to recent biopsy procedure although correlation with direct visualization and biopsy pathology results is   recommended to exclude a malignant process in this location  Assessment/Plan  Ms Saintclair Maxim is a 67 yr female with dissectible/metastatic melanoma on her right lower extremity on treatment with Opdualag here for follow-up and monitoring prior to treatment  1  Metastatic melanoma ([address] )  We discussed her pet imaging results from this morning  There is evidence of response to treatment with in the inguinal/iliac lymph nodes with stable to decreased FDG activity in this area  We will continue to monitor this on future imaging    There is some changes on her lower extremities with persistent or mildly increased FDG activity which appears to be nonspecific on physical examination  Areas continue with decreased pigmentation of sites of previously documented/identified melanoma  No new areas of pigmentation  No new nodularities or masses  We will continue to pay attention to these on future physical exams, as well as imaging  We will work-up all additional concerning findings  Overall, from a melanoma perspective, she is doing well and is tolerating treatment without any issues  Labs reviewed  No immune mediated side effects  She is okay to continue with treatment  She is due for her next treatment on 3/8/2023  She has standing orders for blood work prior to each treatment  She will return in 4 weeks for her next treatment  She knows to continue to monitor for immune mediated side effects  She will call with any issues or concerns  We will work-up her abnormal findings on imaging       - Ambulatory Referral to Gastroenterology; Future  - US pelvis complete w transvaginal; Future    2  Abnormal finding on GI tract imaging  She has significant findings in a short segment of the colon which are persistent that should be evaluated with colonoscopy  Especially in light of her never having a colonoscopy and only undergoing colorectal cancer screening with Cologuard  We have placed a referral to GI for colonoscopy to evaluate this specific finding  Referral placed  - Ambulatory Referral to Gastroenterology; Future  - US pelvis complete w transvaginal; Future    3  Abnormal finding of diagnostic imaging  Continued persistence of hypodense prominence in the endometrial cavity  Will evaluate further with pelvic ultrasound  Orders placed and prescription provided  I will discuss results of this imaging with her when they are available  - US pelvis complete w transvaginal; Future    Return in about 4 weeks (around 4/3/2023) for Office Visit, Infusion - See Treatment Plan, labs, scans  She knows to call with any issues or concerns prior to her next visit      Dre Guerrier MD, PhD

## 2023-03-10 ENCOUNTER — HOSPITAL ENCOUNTER (OUTPATIENT)
Dept: ULTRASOUND IMAGING | Facility: HOSPITAL | Age: 73
End: 2023-03-10
Attending: INTERNAL MEDICINE

## 2023-03-10 DIAGNOSIS — R93.89 ABNORMAL FINDING OF DIAGNOSTIC IMAGING: ICD-10-CM

## 2023-03-10 DIAGNOSIS — R93.3 ABNORMAL FINDING ON GI TRACT IMAGING: ICD-10-CM

## 2023-03-10 DIAGNOSIS — C43.9 METASTATIC MELANOMA (HCC): ICD-10-CM

## 2023-03-10 NOTE — RESULT ENCOUNTER NOTE
DERMATOPATHOLOGY RESULT NOTE    Results reviewed by ordering physician  Called patient to personally discuss results  Sent patient a MyChart message explaining benign results  Instructions for Clinical Derm Team:   (remember to route Result Note to appropriate staff):    None    Result & Plan by Specimen:    Specimen A: benign  Plan: reassured, benign    Tissue Exam: [id number]  Order: [id number]  Collected 3/2/2023  3:30 PM     Status: Final result     Visible to patient: Yes (not seen)     Dx: Neoplasm of uncertain behavior     0 Result Notes    1 Patient Communication  Component   Case Report  Surgical Pathology Report                         Case: [id number]                                   Authorizing Provider: Rita Rios MD           Collected:           03/02/2023 1530              Ordering Location:     [facility]      Received:            03/02/2023 [facility]                                     [place]                                                                       Pathologist:           Mercedes Vargas MD                                                           Specimen:    Skin, Other, Specimen A; right forehead,skin; shave;                                     Final Diagnosis  A  Skin,  right forehead, shave biopsy:     EPIDERMAL INCLUSION CYST         Electronically signed by Mercedes Vargas MD on 3/9/2023 at  4:46 PM  Additional Information   All reported additional testing was performed with appropriately reactive controls   These tests were developed and their performance characteristics determined by [address] [place] [address] [facility] or appropriate performing facility, though some tests may be performed on tissues which have not been validated for performance characteristics (such as staining performed on alcohol exposed cell blocks and decalcified tissues)   Results should be interpreted with caution and in the context of the patients' clinical condition   These tests may not be cleared or approved by the [place]  Food and Drug Administration, though the FDA has determined that such clearance or approval is not necessary  These tests are used for clinical purposes and they should not be regarded as investigational or for research  This laboratory has been approved by [place] [employer], designated as a high-complexity laboratory and is qualified to perform these tests  Joaquin Tamez Description   A  The specimen is received in formalin, labeled with the patient's name and hospital number, and is designated " right forehead skin shave"  The specimen consists of 2 shave biopsies of tan skin measuring 0 9 x 0 6 x 0 1 cm and 0 5 x 0 4 x 0 1 cm  The larger skin shave contains a 0 8 x 0 6 cm tan-pink nodule that involves the peripheral margin  The smaller skin shave contains a 0 3 x 0 2 cm tan papule that is less than 0 1 cm away from the peripheral margin  The apparent margins of resection are inked green  The larger skin shave is trisected along the short axis, and the smaller skin shave is bisected along the short axis  The specimen is entirely submitted between sponges with the larger skin shave in cassette A1 and the smaller skin shave in cassette A2      Note: The estimated total formalin fixation time based upon information provided by the submitting clinician and the standard processing schedule is under 72 hours  RRavotti  Clinical Information   Specimen A; right forehead,skin; shave; 67year old female with a history of metastatic melanoma with a 0 6 cm X 0 5 cm pearly skin colored papule with telangectasias   DDX BCC vs nevus     ATTN [facility]  Resulting Agency [facility]      Specimen Collected: 03/02/23  3:30 PM Last Resulted: 03/09/23  4:46 PM

## 2023-03-13 DIAGNOSIS — I26.99 OTHER PULMONARY EMBOLISM WITHOUT ACUTE COR PULMONALE, UNSPECIFIED CHRONICITY (HCC): ICD-10-CM

## 2023-03-13 DIAGNOSIS — I10 ESSENTIAL HYPERTENSION: ICD-10-CM

## 2023-03-13 RX ORDER — METOPROLOL SUCCINATE 25 MG/1
25 TABLET, EXTENDED RELEASE ORAL 2 TIMES DAILY
Qty: 60 TABLET | Refills: 5 | Status: SHIPPED | OUTPATIENT
Start: 2023-03-13

## 2023-03-16 DIAGNOSIS — R11.2 NAUSEA AND VOMITING, UNSPECIFIED VOMITING TYPE: ICD-10-CM

## 2023-03-16 DIAGNOSIS — Z79.899 HIGH RISK MEDICATION USE: Primary | ICD-10-CM

## 2023-03-16 DIAGNOSIS — C43.9 METASTATIC MELANOMA (HCC): ICD-10-CM

## 2023-03-16 DIAGNOSIS — R53.83 OTHER FATIGUE: ICD-10-CM

## 2023-03-16 RX ORDER — ONDANSETRON 4 MG/1
4 TABLET, FILM COATED ORAL EVERY 8 HOURS PRN
Qty: 20 TABLET | Refills: 0 | Status: SHIPPED | OUTPATIENT
Start: 2023-03-16

## 2023-03-16 NOTE — TELEPHONE ENCOUNTER
Received voicemail from patient:     "Good afternoon  This is Sheakrishelly Cronins calling [phone]  I was calling with regards to and my phone number is [phone]  I had been feeling really tired earlier this week and loss of appetite and yesterday and today I was vomiting a few times so I really am not hungry and have not eaten a whole lot  Just calling to let you know and see if there's anything I can do about it  Thank you   Bye "

## 2023-03-16 NOTE — TELEPHONE ENCOUNTER
Returned call to patient  She states she has been having intermittent nausea, vomiting and fatigue  She denies fever, chills, lightheadedness, dizziness, diarrhea  She states she has been staying hydrated  Discussed with Dr Tyler Prasad  ACTH and cortisol should be completed tomorrow prior to 9am, patient can also use zofran prn  Patient verbalized understanding  Zofran sent to pharmacy  Patient is aware I will call tomorrow to discuss blood work results  She will call back in the meantime with any new or worsening symptoms

## 2023-03-17 ENCOUNTER — TELEPHONE (OUTPATIENT)
Dept: HEMATOLOGY ONCOLOGY | Facility: CLINIC | Age: 73
End: 2023-03-17

## 2023-03-17 ENCOUNTER — APPOINTMENT (OUTPATIENT)
Dept: LAB | Facility: CLINIC | Age: 73
End: 2023-03-17

## 2023-03-17 ENCOUNTER — PATIENT MESSAGE (OUTPATIENT)
Dept: HEMATOLOGY ONCOLOGY | Facility: CLINIC | Age: 73
End: 2023-03-17

## 2023-03-17 DIAGNOSIS — Z79.899 HIGH RISK MEDICATION USE: ICD-10-CM

## 2023-03-17 DIAGNOSIS — R53.83 OTHER FATIGUE: ICD-10-CM

## 2023-03-17 DIAGNOSIS — E27.40 ADRENAL INSUFFICIENCY (HCC): ICD-10-CM

## 2023-03-17 DIAGNOSIS — E27.3 ADRENAL INSUFFICIENCY DUE TO CANCER THERAPY (HCC): ICD-10-CM

## 2023-03-17 DIAGNOSIS — C43.9 METASTATIC MELANOMA (HCC): ICD-10-CM

## 2023-03-17 DIAGNOSIS — C43.9 METASTATIC MELANOMA (HCC): Primary | ICD-10-CM

## 2023-03-17 LAB — CORTIS AM PEAK SERPL-MCNC: 1 UG/DL (ref 4.2–22.4)

## 2023-03-17 RX ORDER — HYDROCORTISONE 10 MG/1
10 TABLET ORAL 2 TIMES DAILY
Qty: 60 TABLET | Refills: 11 | Status: SHIPPED | OUTPATIENT
Start: 2023-03-17

## 2023-03-17 NOTE — TELEPHONE ENCOUNTER
Called patient and explained there we received here cortisol levels which are low, indicating that she has adrenal insufficiency  I have prescribed her hydrocortisone 10 mg PO BID and she should start taking this medication tonight once she picks them up  I suggested she get a medical alert bracelet denoting adrenal insufficiency      [place]tripp Arenas MD, PhD

## 2023-03-17 NOTE — TELEPHONE ENCOUNTER
Received a call from the lab regarding significant cortisol of 1 0 drawn this morning at 8:16 am      Dr Gregory Mendez made aware and will be calling patient to discuss

## 2023-03-18 LAB — ACTH PLAS-MCNC: 2.8 PG/ML (ref 7.2–63.3)

## 2023-03-27 ENCOUNTER — HOSPITAL ENCOUNTER (OUTPATIENT)
Dept: NON INVASIVE DIAGNOSTICS | Facility: HOSPITAL | Age: 73
Discharge: HOME | End: 2023-03-27
Attending: INTERNAL MEDICINE

## 2023-03-27 ENCOUNTER — TELEPHONE (OUTPATIENT)
Dept: HEMATOLOGY ONCOLOGY | Facility: CLINIC | Age: 73
End: 2023-03-27

## 2023-03-27 DIAGNOSIS — R52 PAIN: Primary | ICD-10-CM

## 2023-03-27 DIAGNOSIS — R52 PAIN: ICD-10-CM

## 2023-03-28 ENCOUNTER — TELEPHONE (OUTPATIENT)
Dept: HEMATOLOGY ONCOLOGY | Facility: CLINIC | Age: 73
End: 2023-03-28

## 2023-03-28 NOTE — TELEPHONE ENCOUNTER
Called Debbie and advised her of below  Pt aware to call if pain gets worse     ----- Message from Rex Nielsen MD sent at 3/28/2023  9:31 AM EDT -----  Can you let her know that she does not have an acute clot  Myalgias can be side effect of medication  Let us know if it gets worse  She can take tylenol and motrin (if she is allowed) to help if bothers her too much      Thanks  Hao Flores  ----- Message -----  From: Interface, Radiology Results In  Sent: 3/28/2023  12:47 AM EDT  To: Rex Nielsen MD

## 2023-03-29 RX ORDER — SODIUM CHLORIDE 9 MG/ML
20 INJECTION, SOLUTION INTRAVENOUS ONCE
Status: CANCELLED | OUTPATIENT
Start: 2023-04-05

## 2023-04-01 ENCOUNTER — APPOINTMENT (OUTPATIENT)
Dept: LAB | Facility: CLINIC | Age: 73
End: 2023-04-01

## 2023-04-01 DIAGNOSIS — C43.9 METASTATIC MELANOMA (HCC): ICD-10-CM

## 2023-04-01 DIAGNOSIS — Z79.899 HIGH RISK MEDICATION USE: ICD-10-CM

## 2023-04-01 LAB
ALBUMIN SERPL BCP-MCNC: 3.1 G/DL (ref 3.5–5)
ALP SERPL-CCNC: 36 U/L (ref 46–116)
ALT SERPL W P-5'-P-CCNC: 32 U/L (ref 12–78)
ANION GAP SERPL CALCULATED.3IONS-SCNC: 0 MMOL/L (ref 4–13)
AST SERPL W P-5'-P-CCNC: 19 U/L (ref 5–45)
BASOPHILS # BLD AUTO: 0.1 THOUSANDS/ÂΜL (ref 0–0.1)
BASOPHILS NFR BLD AUTO: 2 % (ref 0–1)
BILIRUB SERPL-MCNC: 0.52 MG/DL (ref 0.2–1)
BUN SERPL-MCNC: 14 MG/DL (ref 5–25)
CALCIUM ALBUM COR SERPL-MCNC: 10 MG/DL (ref 8.3–10.1)
CALCIUM SERPL-MCNC: 9.3 MG/DL (ref 8.3–10.1)
CHLORIDE SERPL-SCNC: 106 MMOL/L (ref 96–108)
CO2 SERPL-SCNC: 30 MMOL/L (ref 21–32)
CREAT SERPL-MCNC: 0.9 MG/DL (ref 0.6–1.3)
EOSINOPHIL # BLD AUTO: 0.34 THOUSAND/ÂΜL (ref 0–0.61)
EOSINOPHIL NFR BLD AUTO: 7 % (ref 0–6)
ERYTHROCYTE [DISTWIDTH] IN BLOOD BY AUTOMATED COUNT: 12.9 % (ref 11.6–15.1)
GFR SERPL CREATININE-BSD FRML MDRD: 64 ML/MIN/1.73SQ M
GLUCOSE P FAST SERPL-MCNC: 89 MG/DL (ref 65–99)
HCT VFR BLD AUTO: 35.3 % (ref 34.8–46.1)
HGB BLD-MCNC: 11.1 G/DL (ref 11.5–15.4)
IMM GRANULOCYTES # BLD AUTO: 0.01 THOUSAND/UL (ref 0–0.2)
IMM GRANULOCYTES NFR BLD AUTO: 0 % (ref 0–2)
LDH SERPL-CCNC: 211 U/L (ref 81–234)
LYMPHOCYTES # BLD AUTO: 2.39 THOUSANDS/ÂΜL (ref 0.6–4.47)
LYMPHOCYTES NFR BLD AUTO: 46 % (ref 14–44)
MCH RBC QN AUTO: 28.4 PG (ref 26.8–34.3)
MCHC RBC AUTO-ENTMCNC: 31.4 G/DL (ref 31.4–37.4)
MCV RBC AUTO: 90 FL (ref 82–98)
MONOCYTES # BLD AUTO: 0.51 THOUSAND/ÂΜL (ref 0.17–1.22)
MONOCYTES NFR BLD AUTO: 10 % (ref 4–12)
NEUTROPHILS # BLD AUTO: 1.8 THOUSANDS/ÂΜL (ref 1.85–7.62)
NEUTS SEG NFR BLD AUTO: 35 % (ref 43–75)
NRBC BLD AUTO-RTO: 0 /100 WBCS
PLATELET # BLD AUTO: 214 THOUSANDS/UL (ref 149–390)
PMV BLD AUTO: 11.6 FL (ref 8.9–12.7)
POTASSIUM SERPL-SCNC: 3.7 MMOL/L (ref 3.5–5.3)
PROT SERPL-MCNC: 6.6 G/DL (ref 6.4–8.4)
RBC # BLD AUTO: 3.91 MILLION/UL (ref 3.81–5.12)
SODIUM SERPL-SCNC: 136 MMOL/L (ref 135–147)
T3FREE SERPL-MCNC: 2.71 PG/ML (ref 2.3–4.2)
T4 FREE SERPL-MCNC: 1.12 NG/DL (ref 0.76–1.46)
TSH SERPL DL<=0.05 MIU/L-ACNC: 2.47 UIU/ML (ref 0.45–4.5)
WBC # BLD AUTO: 5.15 THOUSAND/UL (ref 4.31–10.16)

## 2023-04-03 ENCOUNTER — OFFICE VISIT (OUTPATIENT)
Dept: HEMATOLOGY ONCOLOGY | Facility: CLINIC | Age: 73
End: 2023-04-03

## 2023-04-03 VITALS
SYSTOLIC BLOOD PRESSURE: 142 MMHG | HEART RATE: 96 BPM | DIASTOLIC BLOOD PRESSURE: 90 MMHG | RESPIRATION RATE: 16 BRPM | WEIGHT: 196 LBS | TEMPERATURE: 97.7 F | OXYGEN SATURATION: 96 % | HEIGHT: 69 IN | BODY MASS INDEX: 29.03 KG/M2

## 2023-04-03 DIAGNOSIS — I82.542 CHRONIC DEEP VEIN THROMBOSIS (DVT) OF TIBIAL VEIN OF LEFT LOWER EXTREMITY (HCC): Primary | ICD-10-CM

## 2023-04-03 DIAGNOSIS — C43.9 METASTATIC MELANOMA (HCC): Primary | ICD-10-CM

## 2023-04-03 DIAGNOSIS — Z79.899 HIGH RISK MEDICATION USE: ICD-10-CM

## 2023-04-03 DIAGNOSIS — T14.8XXD DELAYED WOUND HEALING: ICD-10-CM

## 2023-04-03 DIAGNOSIS — I87.8 VENOUS STASIS OF BOTH LOWER EXTREMITIES: ICD-10-CM

## 2023-04-03 DIAGNOSIS — E27.40 ADRENAL INSUFFICIENCY (HCC): ICD-10-CM

## 2023-04-03 NOTE — PROGRESS NOTES
[facility] HEMATOLOGY ONCOLOGY SPECIALISTS [address]  [place] [address]  [phone]     Date of Visit: 9/8/3804  Name: Candelaria Burkitt   YOB: 1950        Subjective    VISIT DIAGNOSIS:  Diagnoses and all orders for this visit:    Metastatic melanoma ([facility] )    High risk medication use    Adrenal insufficiency ([facility] )    Venous stasis of both lower extremities        Oncology History   Metastatic melanoma ([facility] )   10/13/2022 -  Cancer Staged    Staging form: Melanoma of the Skin, AJCC 8th Edition  - Clinical stage from 10/13/2022: Stage IV (cTX, cNX, cM1a) - Signed by Shaneka House MD on 11/22/2022       10/13/2022 Biopsy    A  Skin, right lateral medial leg, punch biopsy:  Portion of severely atypical melanocytic dermal proliferation with prominent melanosis consistent with melanoma  See note  B  Skin, right medial leg  punch biopsy:  Portion of severely atypical melanocytic dermal proliferation with prominent melanosis consistent with melanoma  See note  C  Skin, right medial leg, punch biopsy:   Portion of severely atypical melanocytic dermal proliferation with prominent melanosis consistent with melanoma  See note       11/22/2022 Initial Diagnosis    Metastatic melanoma ([facility] )     12/14/2022 -  Chemotherapy    NIVOLUMAB-RELATLIMAB-RMBW (OPDUALAG) IVPB, 480 mg of nivolumab, Intravenous, Once, 4 of 8 cycles  Administration: 480 mg of nivolumab (12/14/2022), 480 mg of nivolumab (1/11/2023), 480 mg of nivolumab (2/8/2023), 480 mg of nivolumab (3/8/2023)     1/5/2023 [facility] liquid testing  TMB 1 Mut/Mb  MSI-high not detected          Cancer Staging   Metastatic melanoma ([facility] )  Staging form: Melanoma of the Skin, AJCC 8th Edition  - Clinical stage from 10/13/2022: Stage IV (cTX, cNX, cM1a) - Signed by Shaneka House MD on 11/22/2022     Treatment Details   Treatment goal Curative   Plan Name OP Nivolumab and Relatlimab-rmbw Q 28 Days Status Active   Start Date 12/14/2022   End Date 6/28/2023 (Planned)   Provider Leslie Merchant MD   Chemotherapy NIVOLUMAB-RELATLIMAB-RMBW (OPDUALAG) IVPB, 480 mg of nivolumab, Intravenous, Once, 4 of 8 cycles  Administration: 480 mg of nivolumab (12/14/2022), 480 mg of nivolumab (1/11/2023), 480 mg of nivolumab (2/8/2023), 480 mg of nivolumab (3/8/2023)          HISTORY OF PRESENT ILLNESS: Kiera Quintero is a 67 y o  female  who has unresectable metastatic melanoma on her right lower extremity/leg here for continued follow-up, monitoring, and surveillance  Her last imaging demonstrated persistent FDG activity in the mid to distal sigmoid colon for which colonoscopy was recommended  She has an appointment with GI on May  She also underwent endometrial ultrasound which demonstrates a nonthickened endometrium measuring 3 mm  She also has an anechoic structure in the right adnexa without internal blood flow measuring 12 x 10 x 11 mm which appears to be ovarian or paraovarian cyst without any ovarian tissue  Left ovary is not visualized  Also noted is a 6 mm intramural leiomyoma in the anterior uterine body  She was having leg pain for which we got Doppler ultrasounds which were negative for blood clots  She does state that during the exam, the pressure evaluating her veins was painful, more on the right leg than the left -the active pressing down onto the limb/skin  She states the pain has improved since when she called in  She said initially it was hurting more in the bilateral shins but that has improved  She states it is not exacerbated by walking, and does not sound like claudication  She describes it less of the pain is more of an ache  In fact, she states it actually is improved with activity and walking  She is feeling much better after starting 10 mg hydrocortisone twice daily  She does have a medical alert bracelet stating adrenal insufficiency and on blood thinners    She is eating well     She denies headaches, double vision, rash, itching, chest pain, shortness of breath, and diarrhea  REVIEW OF SYSTEMS:  Review of Systems   Constitutional: Negative for appetite change, fatigue, fever and unexpected weight change  HENT:   Negative for lump/mass  Eyes: Negative for icterus  Respiratory: Negative for cough, shortness of breath and wheezing  Cardiovascular: Negative for leg swelling  Gastrointestinal: Negative for abdominal pain, constipation, diarrhea, nausea and vomiting  Genitourinary: Negative for difficulty urinating and hematuria  Musculoskeletal: Negative for arthralgias, gait problem and myalgias  Skin: Negative for itching and rash  No new, changing, or concerning lesions  Neurological: Negative for extremity weakness, gait problem, headaches, light-headedness and numbness  Hematological: Negative for adenopathy          MEDICATIONS:    Current Outpatient Medications:   •  apixaban (Eliquis) 5 mg, Take 1 tablet (5 mg total) by mouth every 12 (twelve) hours, Disp: 60 tablet, Rfl: 5  •  calcium carbonate (OS-VIRGIE) 600 MG tablet, Take 600 mg by mouth 2 (two) times a day, Disp: , Rfl:   •  famotidine (PEPCID) 10 mg tablet, Take 10 mg by mouth 2 (two) times a day, Disp: , Rfl:   •  hydrocortisone (CORTEF) 10 mg tablet, Take 1 tablet (10 mg total) by mouth 2 (two) times a day, Disp: 60 tablet, Rfl: 11  •  loratadine (CLARITIN) 10 mg tablet, Take 10 mg by mouth daily, Disp: , Rfl:   •  losartan (Cozaar) 100 MG tablet, Take 1 tablet (100 mg total) by mouth daily, Disp: 30 tablet, Rfl: 5  •  metoprolol succinate (TOPROL-XL) 25 mg 24 hr tablet, Take 1 tablet (25 mg total) by mouth 2 (two) times a day, Disp: 60 tablet, Rfl: 5  •  Multiple Vitamins-Minerals (MULTIVITAMIN WITH MINERALS) tablet, Take 1 tablet by mouth daily, Disp: , Rfl:   •  ondansetron (ZOFRAN) 4 mg tablet, Take 1 tablet (4 mg total) by mouth every 8 (eight) hours as needed for nausea or vomiting, Disp: 20 tablet, Rfl: 0  •  patient supplied medication, Pt takes eye drops Mura 128 - not listed in database, Disp: , Rfl:   •  triamcinolone (KENALOG) 0 1 % ointment, Apply topically 2 (two) times a day, Disp: 453 6 g, Rfl: 0     ALLERGIES:  No Known Allergies     ACTIVE PROBLEMS:  Patient Active Problem List   Diagnosis   • Acute massive pulmonary embolism (HCC)   • Venous stasis of both lower extremities   • Clostridium difficile infection   • Right ventricular systolic dysfunction without heart failure   • Essential hypertension   • Metastatic melanoma ([facility] )   • High risk medication use   • Adrenal insufficiency (HCC)          PAST MEDICAL HISTORY:   Past Medical History:   Diagnosis Date   • Acute deep vein thrombosis (DVT) of popliteal vein of left lower extremity (HCC)    • Ankle wound, left, initial encounter     Resolved 2017   • Ankle wound, right, initial encounter     resolved 2017   • DVT (deep vein thrombosis) in pregnancy    • Pulmonary emboli ([facility] )      post C section   • Pulmonary embolism ([facility] )     2017   • Pulmonary embolism, blood-clot, obstetric    • Skin cancer    • Venous stasis ulcer (HCC)    • Venous ulcers of both lower extremities ([facility] )         PAST SURGICAL HISTORY:  Past Surgical History:   Procedure Laterality Date   •  SECTION      X2        SOCIAL HISTORY:  Social History     Socioeconomic History   • Marital status:       Spouse name: None   • Number of children: None   • Years of education: None   • Highest education level: None   Occupational History   • None   Tobacco Use   • Smoking status: Never   • Smokeless tobacco: Never   Vaping Use   • Vaping Use: Never used   Substance and Sexual Activity   • Alcohol use: No     Comment: hx of social drinker   • Drug use: No   • Sexual activity: None   Other Topics Concern   • None   Social History Narrative   • None     Social Determinants of Health     Financial Resource Strain: Not on file   Food "Insecurity: Not on file   Transportation Needs: Not on file   Physical Activity: Not on file   Stress: Not on file   Social Connections: Not on file   Intimate Partner Violence: Not on file   Housing Stability: Not on file        FAMILY HISTORY:  Family History   Problem Relation Age of Onset   • Heart attack Mother    • Stroke Mother    • Cancer Father    • Bone cancer Family    • Hypertension Family    • Lung cancer Family            Objective    PHYSICAL EXAMINATION:   Blood pressure 142/90, pulse 96, temperature 97 7 °F (36 5 °C), temperature source Temporal, resp  rate 16, height 5' 9\" (1 753 m), weight 88 9 kg (196 lb), SpO2 96 %  Pain Score:   3     ECOG Performance Status    Flowsheet Row Most Recent Value   ECOG Performance Status 0 - Fully active, able to carry on all pre-disease performance without restriction             Physical Exam  Constitutional:       General: She is not in acute distress  Appearance: Normal appearance  She is not ill-appearing or toxic-appearing  Comments: More energetic in appearance at today's visit than previous   HENT:      Mouth/Throat:      Mouth: Mucous membranes are moist       Pharynx: Oropharynx is clear  Eyes:      General: No scleral icterus  Cardiovascular:      Rate and Rhythm: Normal rate and regular rhythm  Pulses: Normal pulses  Heart sounds: No murmur heard  No friction rub  No gallop  Pulmonary:      Effort: Pulmonary effort is normal  No respiratory distress  Breath sounds: Normal breath sounds  No wheezing or rales  Abdominal:      General: There is no distension  Palpations: There is no mass  Tenderness: There is no abdominal tenderness  There is no rebound  Musculoskeletal:         General: Tenderness (in right leg/ankle with palpation ) present  No swelling  Normal range of motion  Right lower leg: No edema  Left lower leg: No edema     Lymphadenopathy:      Head:      Right side of head: No " submandibular, preauricular or posterior auricular adenopathy  Left side of head: No submandibular, preauricular or posterior auricular adenopathy  Cervical: No cervical adenopathy  Right cervical: No superficial or posterior cervical adenopathy  Left cervical: No superficial or posterior cervical adenopathy  Upper Body:      Right upper body: No supraclavicular or axillary adenopathy  Left upper body: No supraclavicular or axillary adenopathy  Skin:     Findings: Bruising (venous stasis changes L>R) present  No rash  Comments: Scabbed areas from biopsy  Venous stasis changes bilaterally L>R  Areas of improved pigmentation on right lower leg  Neurological:      General: No focal deficit present  Mental Status: She is alert and oriented to person, place, and time  Psychiatric:         Mood and Affect: Mood normal          Behavior: Behavior normal          Thought Content: Thought content normal          Judgment: Judgment normal          I reviewed lab data in the chart      WBC   Date Value Ref Range Status   04/01/2023 5 15 4 31 - 10 16 Thousand/uL Final   03/04/2023 4 84 4 31 - 10 16 Thousand/uL Final   02/04/2023 5 31 4 31 - 10 16 Thousand/uL Final     Hemoglobin   Date Value Ref Range Status   04/01/2023 11 1 (L) 11 5 - 15 4 g/dL Final   03/04/2023 12 8 11 5 - 15 4 g/dL Final   02/04/2023 13 0 11 5 - 15 4 g/dL Final     Platelets   Date Value Ref Range Status   04/01/2023 214 149 - 390 Thousands/uL Final   03/04/2023 183 149 - 390 Thousands/uL Final   02/04/2023 205 149 - 390 Thousands/uL Final     MCV   Date Value Ref Range Status   04/01/2023 90 82 - 98 fL Final   03/04/2023 91 82 - 98 fL Final   02/04/2023 92 82 - 98 fL Final      Potassium   Date Value Ref Range Status   04/01/2023 3 7 3 5 - 5 3 mmol/L Final   03/04/2023 4 0 3 5 - 5 3 mmol/L Final   02/04/2023 4 0 3 5 - 5 3 mmol/L Final     Chloride   Date Value Ref Range Status   04/01/2023 106 96 - 108 mmol/L Final   03/04/2023 109 (H) 96 - 108 mmol/L Final   02/04/2023 109 (H) 96 - 108 mmol/L Final     CO2   Date Value Ref Range Status   04/01/2023 30 21 - 32 mmol/L Final   03/04/2023 26 21 - 32 mmol/L Final   02/04/2023 26 21 - 32 mmol/L Final     BUN   Date Value Ref Range Status   04/01/2023 14 5 - 25 mg/dL Final   03/04/2023 14 5 - 25 mg/dL Final   02/04/2023 18 5 - 25 mg/dL Final     Creatinine   Date Value Ref Range Status   04/01/2023 0 90 0 60 - 1 30 mg/dL Final     Comment:     Standardized to IDMS reference method   03/04/2023 0 83 0 60 - 1 30 mg/dL Final     Comment:     Standardized to IDMS reference method   02/04/2023 0 81 0 60 - 1 30 mg/dL Final     Comment:     Standardized to IDMS reference method     Glucose   Date Value Ref Range Status   11/17/2022 85 65 - 140 mg/dL Final     Comment:     If the patient is fasting, the ADA then defines impaired fasting glucose as > 100 mg/dL and diabetes as > or equal to 123 mg/dL  Specimen collection should occur prior to Sulfasalazine administration due to the potential for falsely depressed results  Specimen collection should occur prior to Sulfapyridine administration due to the potential for falsely elevated results  08/03/2020 112 (H) 65 - 99 mg/dL Final     Comment:     If the patient is fasting, the ADA then defines impaired fasting glucose as > 100 mg/dL and diabetes as > or equal to 123 mg/dL  Specimen collection should occur prior to Sulfasalazine administration due to the potential for falsely depressed results  Specimen collection should occur prior to Sulfapyridine administration due to the potential for falsely elevated results  01/24/2017 91 65 - 140 mg/dL Final     Comment:     If the patient is fasting, the ADA then defines impaired fasting glucose as > 100 mg/dL and diabetes as > or equal to 123 mg/dL       Calcium   Date Value Ref Range Status   04/01/2023 9 3 8 3 - 10 1 mg/dL Final   03/04/2023 9 1 8 3 - 10 1 mg/dL Final   02/04/2023 8 9 8 3 - 10 1 mg/dL Final     Albumin   Date Value Ref Range Status   04/01/2023 3 1 (L) 3 5 - 5 0 g/dL Final   03/04/2023 3 3 (L) 3 5 - 5 0 g/dL Final   02/04/2023 3 4 (L) 3 5 - 5 0 g/dL Final     Total Bilirubin   Date Value Ref Range Status   04/01/2023 0 52 0 20 - 1 00 mg/dL Final     Comment:     Use of this assay is not recommended for patients undergoing treatment with eltrombopag due to the potential for falsely elevated results  03/04/2023 0 76 0 20 - 1 00 mg/dL Final     Comment:     Use of this assay is not recommended for patients undergoing treatment with eltrombopag due to the potential for falsely elevated results  02/04/2023 0 60 0 20 - 1 00 mg/dL Final     Comment:     Use of this assay is not recommended for patients undergoing treatment with eltrombopag due to the potential for falsely elevated results  Alkaline Phosphatase   Date Value Ref Range Status   04/01/2023 36 (L) 46 - 116 U/L Final   03/04/2023 39 (L) 46 - 116 U/L Final   02/04/2023 43 (L) 46 - 116 U/L Final     AST   Date Value Ref Range Status   04/01/2023 19 5 - 45 U/L Final     Comment:     Specimen collection should occur prior to Sulfasalazine administration due to the potential for falsely depressed results  03/04/2023 29 5 - 45 U/L Final     Comment:     Specimen collection should occur prior to Sulfasalazine administration due to the potential for falsely depressed results  02/04/2023 16 5 - 45 U/L Final     Comment:     Specimen collection should occur prior to Sulfasalazine administration due to the potential for falsely depressed results  ALT   Date Value Ref Range Status   04/01/2023 32 12 - 78 U/L Final     Comment:     Specimen collection should occur prior to Sulfasalazine and/or Sulfapyridine administration due to the potential for falsely depressed results      03/04/2023 30 12 - 78 U/L Final     Comment:     Specimen collection should occur prior to Sulfasalazine and/or Sulfapyridine administration due to the potential for falsely depressed results  02/04/2023 22 12 - 78 U/L Final     Comment:     Specimen collection should occur prior to Sulfasalazine and/or Sulfapyridine administration due to the potential for falsely depressed results  LD   Date Value Ref Range Status   04/01/2023 211 81 - 234 U/L Final   03/04/2023 206 81 - 234 U/L Final   02/04/2023 201 81 - 234 U/L Final     No results found for: TSH  No results found for: [id number]   Free T4   Date Value Ref Range Status   04/01/2023 1 12 0 76 - 1 46 ng/dL Final     Comment:     Specimen collection should occur prior to Sulfasalazine administration due to the potential for falsely elevated results  03/04/2023 1 03 0 76 - 1 46 ng/dL Final     Comment:     Specimen collection should occur prior to Sulfasalazine administration due to the potential for falsely elevated results  02/04/2023 1 09 0 76 - 1 46 ng/dL Final     Comment:     Specimen collection should occur prior to Sulfasalazine administration due to the potential for falsely elevated results  RECENT IMAGING:  Procedure: NM pet ct tumor imaging whole body    Result Date: 3/6/2023  Narrative: WHOLE-BODY PET/CT SCAN INDICATION: C43 9: Malignant melanoma of skin, unspecified   , restaging The following clinical information was obtained directly from EPIC:     h/o unresectable malignant melanoma of Rt leg  Restaging - pt continues on Nivolumab and Relatlimab  s/p Rt forehead skin bx 3/2/23 (path pending) MODIFIER: PS COMPARISON: PET/CT dated 11/29/2022 and MRI of the brain dated 12/14/2022 CELL TYPE:  portion of severely atypical melanocytic dermal proliferation w/ prominent melanosis c/w melanoma (bx 10/13/22 Rt medial / Rt lat med  leg +) TECHNIQUE:   10 6 mCi F-18-FD administered IV  Multiplanar attenuation corrected and non attenuation corrected PET images were acquired 60 minutes post injection   Contiguous, low dose, axial CT sections were obtained from the skull vertex through the feet  Intravenous contrast material was not utilized  This examination, like all CT scans performed in the [facility], was performed utilizing techniques to minimize radiation dose exposure, including the use of iterative reconstruction  and automated exposure control  Fasting serum glucose: 83 mg/dl FINDINGS: VISUALIZED BRAIN:   Stable photopenic region involving the inferior right frontal lobe  HEAD/NECK:   There is subtle asymmetric cutaneous/subcutaneous FDG activity involving the right frontal scalp with subtle asymmetric soft tissue prominence with max SUV of 2 6 on image 56 of uncertain clinical significance and possibly related to recent biopsy with underlying hypermetabolic malignancy not excluded  Correlation with direct visualization and biopsy pathology results is recommended  CT images: Unremarkable CHEST:   No FDG avid soft tissue lesions are seen  CT images: Atherosclerotic vascular calcifications are noted  ABDOMEN:   No FDG avid soft tissue lesions are seen  CT images: Atherosclerotic vascular calcifications are noted  Diverticulosis coli  PELVIS: Persistent short segment of focal FDG activity involving the mid to distal sigmoid colon with possible underlying mural thickening with max SUV of 7 8 (for example image 229 of series 2)  Given the persistence and focal nature of this finding further evaluation with colonoscopy is recommended to exclude underlying sigmoid malignancy  There are multiple essentially stable in size prominent right external iliac/inguinal lymph nodes with improving minimal to mild FDG activity of uncertain clinical significance  For example, most FDG avid lymph node is a right inguinal lymph node measuring 1 2 cm in short axis on image 244 of series 2 with max SUV of 2 6, previously 1 1 cm with Max SUV of 3 1  No definite new or increasing and FDG avidity right pelvic lymph nodes are noted    CT images: Persistent hypodense prominence to the endometrial cavity  OSSEOUS STRUCTURES/EXTREMITIES: There are scattered multifocal areas of mild FDG activity involving the subcutaneous bilateral lower extremities localizing to serpiginous tubular vascular structures most consistent with varicose veins  Towards the more distal lower extremities extending from the regions of the mid tibia to the ankles/feet is more prominent cutaneous/subcutaneous FDG activity involving the bilateral lower extremities where there is nonspecific subcutaneous soft tissue density /subcutaneous fat stranding/edema of uncertain clinical significance  While findings could reflect inflammatory FDG activity or be related to underlying varicose veins, correlation with direct visualization and possibly MRI is recommended for further characterization and exclusion of subtle areas of hypermetabolic subcutaneous/cutaneous soft tissue malignancy  For example, on image 501 at the level of the mid to distal right tibial shaft there is prominent focal subcutaneous FDG activity about the anteromedial subcutaneous right lower extremity which is difficult to characterize on low-dose unenhanced CT but has a max SUV of 4 1 and measures approximately 0 7 cm on PET imaging and is associated with subtle soft tissue density in this location  Slightly more inferiorly on image 444 of series 2, involving the anteromedial distal right lower extremity at the level of the mid to distal right tibial shaft is a subcutaneous nodular density measuring approximately 0 7 cm with Max SUV of 3 8  Most prominent representative subcutaneous FDG activity involving the distal left lower extremity is noted about the anterolateral aspect at the level of the left mid to hindfoot measuring approximately 2 2 cm on image 44 of series 2 with max SUV of 5 2  This finding could reflect benign muscular activity with underlying hypermetabolic process not excluded   CT images: Stable non-FDG avid sclerotic lesion involving the thoracic spine on image 122 of series 2  Possible osteopenia  Degenerative changes involving the spine  Impression: 1  Previously noted mildly hypermetabolic right inguinal/external iliac lymph nodes are essentially stable in size and exhibit persistent but mildly improved FDG activity  Findings are nonspecific and could reflect persistent regis metastatic disease of low metabolic activity versus benign/inflammatory lymphadenopathy  2   Scattered areas of subcutaneous FDG activity involving the bilateral distal lower extremities extending from the level of the mid tibial shaft to the ankles/feet appear more prominent than the prior exam   While findings could be related to varicose veins and inflammatory changes such as edema/cellulitis, given history of lower extremity melanoma, correlation with direct visualization and possibly MRI is recommended for further characterization and exclusion of developing malignancy at these sites  Attention to these regions on short interval follow-up imaging is recommended  3   Persistent short segment of focal FDG activity involving the mid to distal sigmoid colon  Given persistence and focality of this finding, further evaluation with colonoscopy is recommended to exclude colonic malignancy  4   Persistent nonspecific hypodense prominence to the endometrial cavity towards correlation with pelvic ultrasound is recommended for further characterization  5   Subtle asymmetric FDG activity associated with subtle asymmetric soft tissue prominence involving the right frontal scalp, possibly related to recent biopsy procedure although correlation with direct visualization and biopsy pathology results is recommended to exclude a malignant process in this location  Please see above for details and additional findings  The study was marked in EPIC for significant notification  The study was marked in Epic for follow-up   Workstation performed: [id number]     Procedure: US pelvis complete w transvaginal    Result Date: 3/18/2023  Narrative: PELVIC ULTRASOUND, COMPLETE INDICATION:  The patient is 67years old  R93 3: Abnormal findings on diagnostic imaging of other parts of digestive tract R93 89: Abnormal findings on diagnostic imaging of other specified body structures C43 9: Malignant melanoma of skin, unspecified  COMPARISON: None TECHNIQUE:   Transabdominal pelvic ultrasound was performed in sagittal and transverse planes with a curvilinear transducer  Additional transvaginal imaging was performed to better evaluate the endometrium and ovaries  Imaging included volumetric sweeps as well as traditional still imaging technique  FINDINGS: UTERUS: The uterus is anteverted in position, measuring 7 2 x 3 6 x 5 4 cm  The uterus is heterogeneous in echotexture    6 x 3 x 3 mm hypoechoic intramural mass within the anterior uterine body  The cervix appears within normal limits  ENDOMETRIUM:  The endometrial echo complex has an AP caliber of 3 mm  There is a moderate amount of simple fluid within the endometrial canal  Its appearance is within normal limits for age and cycle and shows no filling defects    OVARIES/ADNEXA: There is an 11 x 10 x 12 mm cystic lesion in the right adnexa without internal vascularity  The left ovary is not visualized  OTHER: No free fluid or loculated fluid collections  Impression: 1  There is a moderate amount of simple appearing fluid within the endometrial canal   The endometrium is not thickened measuring 3 mm  2   12 x 10 x 11 mm anechoic structure in the right adnexa without internal blood flow  Appearance is suspicious for ovarian or paraovarian cyst   Ovarian tissue is not visualized  3   Left ovary is not visualized  4   6 mm intramural leiomyoma in the anterior uterine body  Workstation performed: [id number]             Assessment/Plan  Ms Danielle Mancia is a 67 yr female with unresectable, metastatic melanoma on her right lower extremity on treatment with [address] here for continued  follow-up and monitoring prior to treatment  1  Metastatic melanoma ([facility] [address] )  She is doing okay and tolerating treatment with [address]  Endometrial ultrasound demonstrates no thickening of the endometrium and potential and right ovarian space  We will continue to monitor this area on further surveillance imaging for melanoma monitoring  She does have some concerning areas in the sigmoid colon and we have referred her to GI for colonoscopy  Her appointment is not until June and we will attempt to see if we can get this appointment moved up sooner  Overall she is doing well and tolerating treatment  Her energy has improved along with her eating with treatment for her underlying adrenal insufficiency, secondary to immunotherapy, with hydrocortisone 10 mg p o  twice daily  We will continue to monitor for additional immune mediated side effects and just her overall wellbeing  Labs reviewed and okay  She is okay to continue with treatment with [address]  She we will continue to monitor for immune mediated side effects and know to call with any issues or concerns prior to her next visit  She will return in 1 month  She has standing orders for blood work prior to each treatment/visit  2  High risk medication use  3  Adrenal insufficiency ([facility] [address] )  She is on treatment with immunotherapy and we will continue to monitor for side effects and lab abnormalities  We will monitor labs with each treatment to ensure safety of continuing on treatment  She knows to watch for signs and symptoms for immune mediated side effects  She recently developed adrenal insufficiency and is doing well and feels good on hydrocortisone 10 mg p o  twice daily  She does have a medical alert bracelet  She was informed that if she should develop any illness or infection she would need to increase her hydrocortisone and I recommended increase to 50 mg daily during her acute illness      We will continue to monitor for any additional immune mediated side effects as well as how she is doing on hydrocortisone  She has standing orders for blood work prior to each treatment to continue to monitor for additional immune mediated side effects  4  Venous stasis of both lower extremities  Doppler was negative for acute DVT in bilateral lower extremities  There is evidence of chronic nonocclusive DVT which is consistent with her known history of previous DVT and PE  She is on lifelong anticoagulation  Given concerns for her pain/aching with minimal improvement, her known history of venous stasis, and her daughter's concern,  we will refer her to vascular for evaluation  Return for Office Visit, Infusion - See Treatment Plan, labs       Irwin Horowitz MD, PhD

## 2023-04-03 NOTE — LETTER
April 3, 2023     Caitlin Martinez DO  [facility] [address]    Patient: Concepcion John   YOB: 1950   Date of Visit: 4/3/2023       Dear Dr Vu : Thank you for referring Concepcion John to me for evaluation  Below are my notes for this consultation  If you have questions, please do not hesitate to call me  I look forward to following your patient along with you  Sincerely,        Salvatore Mcbride MD        CC: MD Ramila Barnett [place], MD Lajean Banning, MD  4/3/2023  6:27 PM  Sign when Signing Visit  [address] [place] [facility]  [phone]  [phone]     Date of Visit: 6/5/7393  Name: Concepcion John   YOB: 1950        Subjective    VISIT DIAGNOSIS:  Diagnoses and all orders for this visit:    Metastatic melanoma ([address] )    High risk medication use    Adrenal insufficiency ([address] )    Venous stasis of both lower extremities        Oncology History   Metastatic melanoma ([address] )   10/13/2022 -  Cancer Staged    Staging form: Melanoma of the Skin, AJCC 8th Edition  - Clinical stage from 10/13/2022: Stage IV (cTX, cNX, cM1a) - Signed by Salvatore Mcbride MD on 11/22/2022       10/13/2022 Biopsy    A  Skin, right lateral medial leg, punch biopsy:  Portion of severely atypical melanocytic dermal proliferation with prominent melanosis consistent with melanoma  See note  B  Skin, right medial leg  punch biopsy:  Portion of severely atypical melanocytic dermal proliferation with prominent melanosis consistent with melanoma  See note  C  Skin, right medial leg, punch biopsy:   Portion of severely atypical melanocytic dermal proliferation with prominent melanosis consistent with melanoma  See note       11/22/2022 Initial Diagnosis    Metastatic melanoma ([address] )     12/14/2022 -  Chemotherapy    NIVOLUMAB-RELATLIMAB-RMBW (OPDUALAG) IVPB, 480 mg of nivolumab, Intravenous, Once, 4 of 8 cycles  Administration: 480 mg of nivolumab (12/14/2022), 480 mg of nivolumab (1/11/2023), 480 mg of nivolumab (2/8/2023), 480 mg of nivolumab (3/8/2023)     1/5/2023 [facility] liquid testing  TMB 1 Mut/Mb  MSI-high not detected          Cancer Staging   Metastatic melanoma ([facility] [address] )  Staging form: Melanoma of the Skin, AJCC 8th Edition  - Clinical stage from 10/13/2022: Stage IV (cTX, cNX, cM1a) - Signed by Mallika Melgar MD on 11/22/2022     Treatment Details   Treatment goal Curative   Plan Name OP Nivolumab and Relatlimab-rmbw Q 28 Days   Status Active   Start Date 12/14/2022   End Date 6/28/2023 (Planned)   Provider Mallika Melgar MD   Chemotherapy NIVOLUMAB-RELATLIMAB-RMBW (OPDUALAG) IVPB, 480 mg of nivolumab, Intravenous, Once, 4 of 8 cycles  Administration: 480 mg of nivolumab (12/14/2022), 480 mg of nivolumab (1/11/2023), 480 mg of nivolumab (2/8/2023), 480 mg of nivolumab (3/8/2023)          HISTORY OF PRESENT ILLNESS: Deric Saeed is a 67 y o  female  who has unresectable metastatic melanoma on her right lower extremity/leg here for continued follow-up, monitoring, and surveillance  Her last imaging demonstrated persistent FDG activity in the mid to distal sigmoid colon for which colonoscopy was recommended  She has an appointment with GI on May  She also underwent endometrial ultrasound which demonstrates a nonthickened endometrium measuring 3 mm  She also has an anechoic structure in the right adnexa without internal blood flow measuring 12 x 10 x 11 mm which appears to be ovarian or paraovarian cyst without any ovarian tissue  Left ovary is not visualized  Also noted is a 6 mm intramural leiomyoma in the anterior uterine body  She was having leg pain for which we got Doppler ultrasounds which were negative for blood clots     She does state that during the exam, the pressure evaluating her veins was painful, more on the right leg than the left -the active pressing down onto the limb/skin  She states the pain has improved since when she called in  She said initially it was hurting more in the bilateral shins but that has improved  She states it is not exacerbated by walking, and does not sound like claudication  She describes it less of the pain is more of an ache  In fact, she states it actually is improved with activity and walking  She is feeling much better after starting 10 mg hydrocortisone twice daily  She does have a medical alert bracelet stating adrenal insufficiency and on blood thinners  She is eating well  She denies headaches, double vision, rash, itching, chest pain, shortness of breath, and diarrhea  REVIEW OF SYSTEMS:  Review of Systems   Constitutional: Negative for appetite change, fatigue, fever and unexpected weight change  HENT:   Negative for lump/mass  Eyes: Negative for icterus  Respiratory: Negative for cough, shortness of breath and wheezing  Cardiovascular: Negative for leg swelling  Gastrointestinal: Negative for abdominal pain, constipation, diarrhea, nausea and vomiting  Genitourinary: Negative for difficulty urinating and hematuria  Musculoskeletal: Negative for arthralgias, gait problem and myalgias  Skin: Negative for itching and rash  No new, changing, or concerning lesions  Neurological: Negative for extremity weakness, gait problem, headaches, light-headedness and numbness  Hematological: Negative for adenopathy          MEDICATIONS:    Current Outpatient Medications:   •  apixaban (Eliquis) 5 mg, Take 1 tablet (5 mg total) by mouth every 12 (twelve) hours, Disp: 60 tablet, Rfl: 5  •  calcium carbonate (OS-VIRGIE) 600 MG tablet, Take 600 mg by mouth 2 (two) times a day, Disp: , Rfl:   •  famotidine (PEPCID) 10 mg tablet, Take 10 mg by mouth 2 (two) times a day, Disp: , Rfl:   •  hydrocortisone (CORTEF) 10 mg tablet, Take 1 tablet (10 mg total) by mouth 2 (two) times a day, Disp: 60 tablet, Rfl: 11  •  loratadine (CLARITIN) 10 mg tablet, Take 10 mg by mouth daily, Disp: , Rfl:   •  losartan (Cozaar) 100 MG tablet, Take 1 tablet (100 mg total) by mouth daily, Disp: 30 tablet, Rfl: 5  •  metoprolol succinate (TOPROL-XL) 25 mg 24 hr tablet, Take 1 tablet (25 mg total) by mouth 2 (two) times a day, Disp: 60 tablet, Rfl: 5  •  Multiple Vitamins-Minerals (MULTIVITAMIN WITH MINERALS) tablet, Take 1 tablet by mouth daily, Disp: , Rfl:   •  ondansetron (ZOFRAN) 4 mg tablet, Take 1 tablet (4 mg total) by mouth every 8 (eight) hours as needed for nausea or vomiting, Disp: 20 tablet, Rfl: 0  •  patient supplied medication, Pt takes eye drops Mura 128 - not listed in database, Disp: , Rfl:   •  triamcinolone (KENALOG) 0 1 % ointment, Apply topically 2 (two) times a day, Disp: 453 6 g, Rfl: 0     ALLERGIES:  No Known Allergies     ACTIVE PROBLEMS:  Patient Active Problem List   Diagnosis   • Acute massive pulmonary embolism (HCC)   • Venous stasis of both lower extremities   • Clostridium difficile infection   • Right ventricular systolic dysfunction without heart failure   • Essential hypertension   • Metastatic melanoma ([facility] [address] )   • High risk medication use   • Adrenal insufficiency (HCC)          PAST MEDICAL HISTORY:   Past Medical History:   Diagnosis Date   • Acute deep vein thrombosis (DVT) of popliteal vein of left lower extremity (HCC)    • Ankle wound, left, initial encounter     Resolved 2017   • Ankle wound, right, initial encounter     resolved 2017   • DVT (deep vein thrombosis) in pregnancy    • Pulmonary emboli (HCC)      post C section   • Pulmonary embolism ([facility] [address] )     2017   • Pulmonary embolism, blood-clot, obstetric    • Skin cancer    • Venous stasis ulcer (HCC)    • Venous ulcers of both lower extremities (HCC)         PAST SURGICAL HISTORY:  Past Surgical History:   Procedure Laterality Date   •  SECTION      X2        SOCIAL HISTORY:  Social History "    Socioeconomic History   • Marital status:      Spouse name: None   • Number of children: None   • Years of education: None   • Highest education level: None   Occupational History   • None   Tobacco Use   • Smoking status: Never   • Smokeless tobacco: Never   Vaping Use   • Vaping Use: Never used   Substance and Sexual Activity   • Alcohol use: No     Comment: hx of social drinker   • Drug use: No   • Sexual activity: None   Other Topics Concern   • None   Social History Narrative   • None     Social Determinants of Health     Financial Resource Strain: Not on file   Food Insecurity: Not on file   Transportation Needs: Not on file   Physical Activity: Not on file   Stress: Not on file   Social Connections: Not on file   Intimate Partner Violence: Not on file   Housing Stability: Not on file        FAMILY HISTORY:  Family History   Problem Relation Age of Onset   • Heart attack Mother    • Stroke Mother    • Cancer Father    • Bone cancer Family    • Hypertension Family    • Lung cancer Family            Objective    PHYSICAL EXAMINATION:   Blood pressure 142/90, pulse 96, temperature 97 7 °F (36 5 °C), temperature source Temporal, resp  rate 16, height 5' 9\" (1 753 m), weight 88 9 kg (196 lb), SpO2 96 %  Pain Score:   3     ECOG Performance Status    Flowsheet Row Most Recent Value   ECOG Performance Status 0 - Fully active, able to carry on all pre-disease performance without restriction             Physical Exam  Constitutional:       General: She is not in acute distress  Appearance: Normal appearance  She is not ill-appearing or toxic-appearing  Comments: More energetic in appearance at today's visit than previous   HENT:      Mouth/Throat:      Mouth: Mucous membranes are moist       Pharynx: Oropharynx is clear  Eyes:      General: No scleral icterus  Cardiovascular:      Rate and Rhythm: Normal rate and regular rhythm  Pulses: Normal pulses        Heart sounds: No murmur " heard     No friction rub  No gallop  Pulmonary:      Effort: Pulmonary effort is normal  No respiratory distress  Breath sounds: Normal breath sounds  No wheezing or rales  Abdominal:      General: There is no distension  Palpations: There is no mass  Tenderness: There is no abdominal tenderness  There is no rebound  Musculoskeletal:         General: Tenderness (in right leg/ankle with palpation ) present  No swelling  Normal range of motion  Right lower leg: No edema  Left lower leg: No edema  Lymphadenopathy:      Head:      Right side of head: No submandibular, preauricular or posterior auricular adenopathy  Left side of head: No submandibular, preauricular or posterior auricular adenopathy  Cervical: No cervical adenopathy  Right cervical: No superficial or posterior cervical adenopathy  Left cervical: No superficial or posterior cervical adenopathy  Upper Body:      Right upper body: No supraclavicular or axillary adenopathy  Left upper body: No supraclavicular or axillary adenopathy  Skin:     Findings: Bruising (venous stasis changes L>R) present  No rash  Comments: Scabbed areas from biopsy  Venous stasis changes bilaterally L>R  Areas of improved pigmentation on right lower leg  Neurological:      General: No focal deficit present  Mental Status: She is alert and oriented to person, place, and time  Psychiatric:         Mood and Affect: Mood normal          Behavior: Behavior normal          Thought Content: Thought content normal          Judgment: Judgment normal          I reviewed lab data in the chart      WBC   Date Value Ref Range Status   04/01/2023 5 15 4 31 - 10 16 Thousand/uL Final   03/04/2023 4 84 4 31 - 10 16 Thousand/uL Final   02/04/2023 5 31 4 31 - 10 16 Thousand/uL Final     Hemoglobin   Date Value Ref Range Status   04/01/2023 11 1 (L) 11 5 - 15 4 g/dL Final   03/04/2023 12 8 11 5 - 15 4 g/dL Final 02/04/2023 13 0 11 5 - 15 4 g/dL Final     Platelets   Date Value Ref Range Status   04/01/2023 214 149 - 390 Thousands/uL Final   03/04/2023 183 149 - 390 Thousands/uL Final   02/04/2023 205 149 - 390 Thousands/uL Final     MCV   Date Value Ref Range Status   04/01/2023 90 82 - 98 fL Final   03/04/2023 91 82 - 98 fL Final   02/04/2023 92 82 - 98 fL Final      Potassium   Date Value Ref Range Status   04/01/2023 3 7 3 5 - 5 3 mmol/L Final   03/04/2023 4 0 3 5 - 5 3 mmol/L Final   02/04/2023 4 0 3 5 - 5 3 mmol/L Final     Chloride   Date Value Ref Range Status   04/01/2023 106 96 - 108 mmol/L Final   03/04/2023 109 (H) 96 - 108 mmol/L Final   02/04/2023 109 (H) 96 - 108 mmol/L Final     CO2   Date Value Ref Range Status   04/01/2023 30 21 - 32 mmol/L Final   03/04/2023 26 21 - 32 mmol/L Final   02/04/2023 26 21 - 32 mmol/L Final     BUN   Date Value Ref Range Status   04/01/2023 14 5 - 25 mg/dL Final   03/04/2023 14 5 - 25 mg/dL Final   02/04/2023 18 5 - 25 mg/dL Final     Creatinine   Date Value Ref Range Status   04/01/2023 0 90 0 60 - 1 30 mg/dL Final     Comment:     Standardized to IDMS reference method   03/04/2023 0 83 0 60 - 1 30 mg/dL Final     Comment:     Standardized to IDMS reference method   02/04/2023 0 81 0 60 - 1 30 mg/dL Final     Comment:     Standardized to IDMS reference method     Glucose   Date Value Ref Range Status   11/17/2022 85 65 - 140 mg/dL Final     Comment:     If the patient is fasting, the ADA then defines impaired fasting glucose as > 100 mg/dL and diabetes as > or equal to 123 mg/dL  Specimen collection should occur prior to Sulfasalazine administration due to the potential for falsely depressed results  Specimen collection should occur prior to Sulfapyridine administration due to the potential for falsely elevated results     08/03/2020 112 (H) 65 - 99 mg/dL Final     Comment:     If the patient is fasting, the ADA then defines impaired fasting glucose as > 100 mg/dL and diabetes as > or equal to 123 mg/dL  Specimen collection should occur prior to Sulfasalazine administration due to the potential for falsely depressed results  Specimen collection should occur prior to Sulfapyridine administration due to the potential for falsely elevated results  01/24/2017 91 65 - 140 mg/dL Final     Comment:     If the patient is fasting, the ADA then defines impaired fasting glucose as > 100 mg/dL and diabetes as > or equal to 123 mg/dL  Calcium   Date Value Ref Range Status   04/01/2023 9 3 8 3 - 10 1 mg/dL Final   03/04/2023 9 1 8 3 - 10 1 mg/dL Final   02/04/2023 8 9 8 3 - 10 1 mg/dL Final     Albumin   Date Value Ref Range Status   04/01/2023 3 1 (L) 3 5 - 5 0 g/dL Final   03/04/2023 3 3 (L) 3 5 - 5 0 g/dL Final   02/04/2023 3 4 (L) 3 5 - 5 0 g/dL Final     Total Bilirubin   Date Value Ref Range Status   04/01/2023 0 52 0 20 - 1 00 mg/dL Final     Comment:     Use of this assay is not recommended for patients undergoing treatment with eltrombopag due to the potential for falsely elevated results  03/04/2023 0 76 0 20 - 1 00 mg/dL Final     Comment:     Use of this assay is not recommended for patients undergoing treatment with eltrombopag due to the potential for falsely elevated results  02/04/2023 0 60 0 20 - 1 00 mg/dL Final     Comment:     Use of this assay is not recommended for patients undergoing treatment with eltrombopag due to the potential for falsely elevated results  Alkaline Phosphatase   Date Value Ref Range Status   04/01/2023 36 (L) 46 - 116 U/L Final   03/04/2023 39 (L) 46 - 116 U/L Final   02/04/2023 43 (L) 46 - 116 U/L Final     AST   Date Value Ref Range Status   04/01/2023 19 5 - 45 U/L Final     Comment:     Specimen collection should occur prior to Sulfasalazine administration due to the potential for falsely depressed results      03/04/2023 29 5 - 45 U/L Final     Comment:     Specimen collection should occur prior to Sulfasalazine administration due to the potential for falsely depressed results  02/04/2023 16 5 - 45 U/L Final     Comment:     Specimen collection should occur prior to Sulfasalazine administration due to the potential for falsely depressed results  ALT   Date Value Ref Range Status   04/01/2023 32 12 - 78 U/L Final     Comment:     Specimen collection should occur prior to Sulfasalazine and/or Sulfapyridine administration due to the potential for falsely depressed results  03/04/2023 30 12 - 78 U/L Final     Comment:     Specimen collection should occur prior to Sulfasalazine and/or Sulfapyridine administration due to the potential for falsely depressed results  02/04/2023 22 12 - 78 U/L Final     Comment:     Specimen collection should occur prior to Sulfasalazine and/or Sulfapyridine administration due to the potential for falsely depressed results  LD   Date Value Ref Range Status   04/01/2023 211 81 - 234 U/L Final   03/04/2023 206 81 - 234 U/L Final   02/04/2023 201 81 - 234 U/L Final     No results found for: TSH  No results found for: [id number]   Free T4   Date Value Ref Range Status   04/01/2023 1 12 0 76 - 1 46 ng/dL Final     Comment:     Specimen collection should occur prior to Sulfasalazine administration due to the potential for falsely elevated results  03/04/2023 1 03 0 76 - 1 46 ng/dL Final     Comment:     Specimen collection should occur prior to Sulfasalazine administration due to the potential for falsely elevated results  02/04/2023 1 09 0 76 - 1 46 ng/dL Final     Comment:     Specimen collection should occur prior to Sulfasalazine administration due to the potential for falsely elevated results           RECENT IMAGING:  Procedure: NM pet ct tumor imaging whole body    Result Date: 3/6/2023  Narrative: WHOLE-BODY PET/CT SCAN INDICATION: C43 9: Malignant melanoma of skin, unspecified   , restaging The following clinical information was obtained directly from EPIC:     h/o unresectable malignant melanoma of Rt leg  Restaging - pt continues on Nivolumab and Relatlimab  s/p Rt forehead skin bx 3/2/23 (path pending) MODIFIER: PS COMPARISON: PET/CT dated 11/29/2022 and MRI of the brain dated 12/14/2022 CELL TYPE:  portion of severely atypical melanocytic dermal proliferation w/ prominent melanosis c/w melanoma (bx 10/13/22 Rt medial / Rt lat med  leg +) TECHNIQUE:   10 6 mCi F-18-FD administered IV  Multiplanar attenuation corrected and non attenuation corrected PET images were acquired 60 minutes post injection  Contiguous, low dose, axial CT sections were obtained from the skull vertex through the feet  Intravenous contrast material was not utilized  This examination, like all CT scans performed in the [facility], was performed utilizing techniques to minimize radiation dose exposure, including the use of iterative reconstruction  and automated exposure control  Fasting serum glucose: 83 mg/dl FINDINGS: VISUALIZED BRAIN:   Stable photopenic region involving the inferior right frontal lobe  HEAD/NECK:   There is subtle asymmetric cutaneous/subcutaneous FDG activity involving the right frontal scalp with subtle asymmetric soft tissue prominence with max SUV of 2 6 on image 56 of uncertain clinical significance and possibly related to recent biopsy with underlying hypermetabolic malignancy not excluded  Correlation with direct visualization and biopsy pathology results is recommended  CT images: Unremarkable CHEST:   No FDG avid soft tissue lesions are seen  CT images: Atherosclerotic vascular calcifications are noted  ABDOMEN:   No FDG avid soft tissue lesions are seen  CT images: Atherosclerotic vascular calcifications are noted  Diverticulosis coli  PELVIS: Persistent short segment of focal FDG activity involving the mid to distal sigmoid colon with possible underlying mural thickening with max SUV of 7 8 (for example image 229 of series 2)    Given the persistence and focal nature of this finding further evaluation with colonoscopy is recommended to exclude underlying sigmoid malignancy  There are multiple essentially stable in size prominent right external iliac/inguinal lymph nodes with improving minimal to mild FDG activity of uncertain clinical significance  For example, most FDG avid lymph node is a right inguinal lymph node measuring 1 2 cm in short axis on image 244 of series 2 with max SUV of 2 6, previously 1 1 cm with Max SUV of 3 1  No definite new or increasing and FDG avidity right pelvic lymph nodes are noted  CT images: Persistent hypodense prominence to the endometrial cavity  OSSEOUS STRUCTURES/EXTREMITIES: There are scattered multifocal areas of mild FDG activity involving the subcutaneous bilateral lower extremities localizing to serpiginous tubular vascular structures most consistent with varicose veins  Towards the more distal lower extremities extending from the regions of the mid tibia to the ankles/feet is more prominent cutaneous/subcutaneous FDG activity involving the bilateral lower extremities where there is nonspecific subcutaneous soft tissue density /subcutaneous fat stranding/edema of uncertain clinical significance  While findings could reflect inflammatory FDG activity or be related to underlying varicose veins, correlation with direct visualization and possibly MRI is recommended for further characterization and exclusion of subtle areas of hypermetabolic subcutaneous/cutaneous soft tissue malignancy  For example, on image 501 at the level of the mid to distal right tibial shaft there is prominent focal subcutaneous FDG activity about the anteromedial subcutaneous right lower extremity which is difficult to characterize on low-dose unenhanced CT but has a max SUV of 4 1 and measures approximately 0 7 cm on PET imaging and is associated with subtle soft tissue density in this location    Slightly more inferiorly on image 444 of series 2, involving the anteromedial distal right lower extremity at the level of the mid to distal right tibial shaft is a subcutaneous nodular density measuring approximately 0 7 cm with Max SUV of 3 8  Most prominent representative subcutaneous FDG activity involving the distal left lower extremity is noted about the anterolateral aspect at the level of the left mid to hindfoot measuring approximately 2 2 cm on image 44 of series 2 with max SUV of 5 2  This finding could reflect benign muscular activity with underlying hypermetabolic process not excluded  CT images: Stable non-FDG avid sclerotic lesion involving the thoracic spine on image 122 of series 2  Possible osteopenia  Degenerative changes involving the spine  Impression: 1  Previously noted mildly hypermetabolic right inguinal/external iliac lymph nodes are essentially stable in size and exhibit persistent but mildly improved FDG activity  Findings are nonspecific and could reflect persistent regis metastatic disease of low metabolic activity versus benign/inflammatory lymphadenopathy  2   Scattered areas of subcutaneous FDG activity involving the bilateral distal lower extremities extending from the level of the mid tibial shaft to the ankles/feet appear more prominent than the prior exam   While findings could be related to varicose veins and inflammatory changes such as edema/cellulitis, given history of lower extremity melanoma, correlation with direct visualization and possibly MRI is recommended for further characterization and exclusion of developing malignancy at these sites  Attention to these regions on short interval follow-up imaging is recommended  3   Persistent short segment of focal FDG activity involving the mid to distal sigmoid colon  Given persistence and focality of this finding, further evaluation with colonoscopy is recommended to exclude colonic malignancy   4   Persistent nonspecific hypodense prominence to the endometrial cavity towards correlation with pelvic ultrasound is recommended for further characterization  5   Subtle asymmetric FDG activity associated with subtle asymmetric soft tissue prominence involving the right frontal scalp, possibly related to recent biopsy procedure although correlation with direct visualization and biopsy pathology results is recommended to exclude a malignant process in this location  Please see above for details and additional findings  The study was marked in EPIC for significant notification  The study was marked in Epic for follow-up  Workstation performed: [id number]     Procedure: US pelvis complete w transvaginal    Result Date: 3/18/2023  Narrative: PELVIC ULTRASOUND, COMPLETE INDICATION:  The patient is 67years old  R93 3: Abnormal findings on diagnostic imaging of other parts of digestive tract R93 89: Abnormal findings on diagnostic imaging of other specified body structures C43 9: Malignant melanoma of skin, unspecified  COMPARISON: None TECHNIQUE:   Transabdominal pelvic ultrasound was performed in sagittal and transverse planes with a curvilinear transducer  Additional transvaginal imaging was performed to better evaluate the endometrium and ovaries  Imaging included volumetric sweeps as well as traditional still imaging technique  FINDINGS: UTERUS: The uterus is anteverted in position, measuring 7 2 x 3 6 x 5 4 cm  The uterus is heterogeneous in echotexture    6 x 3 x 3 mm hypoechoic intramural mass within the anterior uterine body  The cervix appears within normal limits  ENDOMETRIUM:  The endometrial echo complex has an AP caliber of 3 mm  There is a moderate amount of simple fluid within the endometrial canal  Its appearance is within normal limits for age and cycle and shows no filling defects    OVARIES/ADNEXA: There is an 11 x 10 x 12 mm cystic lesion in the right adnexa without internal vascularity  The left ovary is not visualized    OTHER: No free fluid or loculated fluid collections  Impression: 1  There is a moderate amount of simple appearing fluid within the endometrial canal   The endometrium is not thickened measuring 3 mm  2   12 x 10 x 11 mm anechoic structure in the right adnexa without internal blood flow  Appearance is suspicious for ovarian or paraovarian cyst   Ovarian tissue is not visualized  3   Left ovary is not visualized  4   6 mm intramural leiomyoma in the anterior uterine body  Workstation performed: [id number]             Assessment/Plan  Ms Cesar Hopkins is a 67 yr female with unresectable, metastatic melanoma on her right lower extremity on treatment with Opdualag here for continued  follow-up and monitoring prior to treatment  1  Metastatic melanoma ([facility] [address] )  She is doing okay and tolerating treatment with [address]  Endometrial ultrasound demonstrates no thickening of the endometrium and potential and right ovarian space  We will continue to monitor this area on further surveillance imaging for melanoma monitoring  She does have some concerning areas in the sigmoid colon and we have referred her to GI for colonoscopy  Her appointment is not until June and we will attempt to see if we can get this appointment moved up sooner  Overall she is doing well and tolerating treatment  Her energy has improved along with her eating with treatment for her underlying adrenal insufficiency, secondary to immunotherapy, with hydrocortisone 10 mg p o  twice daily  We will continue to monitor for additional immune mediated side effects and just her overall wellbeing  Labs reviewed and okay  She is okay to continue with treatment with [address]  She we will continue to monitor for immune mediated side effects and know to call with any issues or concerns prior to her next visit  She will return in 1 month  She has standing orders for blood work prior to each treatment/visit  2  High risk medication use  3   Adrenal insufficiency ([address] )  She is on treatment with immunotherapy and we will continue to monitor for side effects and lab abnormalities  We will monitor labs with each treatment to ensure safety of continuing on treatment  She knows to watch for signs and symptoms for immune mediated side effects  She recently developed adrenal insufficiency and is doing well and feels good on hydrocortisone 10 mg p o  twice daily  She does have a medical alert bracelet  She was informed that if she should develop any illness or infection she would need to increase her hydrocortisone and I recommended increase to 50 mg daily during her acute illness  We will continue to monitor for any additional immune mediated side effects as well as how she is doing on hydrocortisone  She has standing orders for blood work prior to each treatment to continue to monitor for additional immune mediated side effects  4  Venous stasis of both lower extremities  Doppler was negative for acute DVT in bilateral lower extremities  There is evidence of chronic nonocclusive DVT which is consistent with her known history of previous DVT and PE  She is on lifelong anticoagulation  Given concerns for her pain/aching with minimal improvement, her known history of venous stasis, and her daughter's concern,  we will refer her to vascular for evaluation  Return for Office Visit, Infusion - See Treatment Plan, labs       Fouzia Tan MD, PhD

## 2023-04-05 ENCOUNTER — HOSPITAL ENCOUNTER (OUTPATIENT)
Dept: INFUSION CENTER | Facility: HOSPITAL | Age: 73
Discharge: HOME | End: 2023-04-05
Attending: INTERNAL MEDICINE

## 2023-04-05 VITALS
OXYGEN SATURATION: 92 % | RESPIRATION RATE: 18 BRPM | DIASTOLIC BLOOD PRESSURE: 63 MMHG | BODY MASS INDEX: 28.93 KG/M2 | HEART RATE: 87 BPM | HEIGHT: 69 IN | SYSTOLIC BLOOD PRESSURE: 147 MMHG | TEMPERATURE: 96.7 F

## 2023-04-05 DIAGNOSIS — C43.9 METASTATIC MELANOMA (HCC): Primary | ICD-10-CM

## 2023-04-05 RX ORDER — SODIUM CHLORIDE 9 MG/ML
20 INJECTION, SOLUTION INTRAVENOUS ONCE
Status: COMPLETED | OUTPATIENT
Start: 2023-04-05 | End: 2023-04-05

## 2023-04-05 RX ADMIN — SODIUM CHLORIDE 480 MG OF NIVOLUMAB: 9 INJECTION, SOLUTION INTRAVENOUS at 14:51

## 2023-04-05 RX ADMIN — SODIUM CHLORIDE 20 ML/HR: 0.9 INJECTION, SOLUTION INTRAVENOUS at 14:32

## 2023-04-05 NOTE — PLAN OF CARE
Problem: INFECTION - ADULT  Goal: Absence or prevention of progression during hospitalization  Description: INTERVENTIONS:  - Assess and monitor for signs and symptoms of infection  - Monitor lab/diagnostic results  - Monitor all insertion sites, i e  indwelling lines, tubes, and drains  - Monitor endotracheal if appropriate and nasal secretions for changes in amount and color  - [place] appropriate cooling/warming therapies per order  - Administer medications as ordered  - Instruct and encourage patient and family to use good hand hygiene technique  - Identify and instruct in appropriate isolation precautions for identified infection/condition  Outcome: Progressing     Problem: DISCHARGE PLANNING  Goal: Discharge to home or other facility with appropriate resources  Description: INTERVENTIONS:  - Identify barriers to discharge w/patient and caregiver  - Arrange for needed discharge resources and transportation as appropriate  - Identify discharge learning needs (meds, wound care, etc )  - Arrange for interpretive services to assist at discharge as needed  - Refer to Case Management Department for coordinating discharge planning if the patient needs post-hospital services based on physician/advanced practitioner order or complex needs related to functional status, cognitive ability, or social support system  Outcome: Progressing     Problem: Knowledge Deficit  Goal: Patient/family/caregiver demonstrates understanding of disease process, treatment plan, medications, and discharge instructions  Description: Complete learning assessment and assess knowledge base    Interventions:  - Provide teaching at level of understanding  - Provide teaching via preferred learning methods  Outcome: Progressing

## 2023-04-06 ENCOUNTER — CONSULT (OUTPATIENT)
Dept: GASTROENTEROLOGY | Facility: CLINIC | Age: 73
End: 2023-04-06

## 2023-04-06 VITALS
WEIGHT: 197 LBS | BODY MASS INDEX: 29.18 KG/M2 | OXYGEN SATURATION: 93 % | RESPIRATION RATE: 18 BRPM | DIASTOLIC BLOOD PRESSURE: 74 MMHG | HEART RATE: 88 BPM | HEIGHT: 69 IN | SYSTOLIC BLOOD PRESSURE: 142 MMHG

## 2023-04-06 DIAGNOSIS — A49.8 CLOSTRIDIUM DIFFICILE INFECTION: ICD-10-CM

## 2023-04-06 DIAGNOSIS — R94.8 ABNORMAL PET SCAN OF COLON: Primary | ICD-10-CM

## 2023-04-06 DIAGNOSIS — D64.9 MILD ANEMIA: ICD-10-CM

## 2023-04-06 DIAGNOSIS — Z79.01 ANTICOAGULATED BY ANTICOAGULATION TREATMENT: ICD-10-CM

## 2023-04-06 NOTE — ASSESSMENT & PLAN NOTE
Patient is fully anticoagulated on Eliquis  She will need to hold her Eliquis for 2 full days prior to her colonoscopy  We will get clearance from either Dr Tracey Mccabe or Dr Luan Hernandez to hold her anticoagulation

## 2023-04-06 NOTE — ASSESSMENT & PLAN NOTE
Patient was noted to have an abnormality on a PET scan done March 6, 2023  This involves the mid to distal sigmoid colon  Given persistence and focality of this finding further evaluation colonoscopy is recommended  Based upon this finding she will be scheduled for colonoscopy  Of note she has had a Cologuard around 2017 and again in March 2020  She will be scheduled for colonoscopy  We will need clearance to hold her Eliquis for 2 days prior to her colonoscopy either from hematology or her primary care provider  She is on this due to a massive saddle embolus in 2017  She received a Colyte preparation and split dose with the second dose completed 3 hours prior to arrival   2 bisacodyl tablets  Adult scope  I explained to the patient the procedure of colonoscopy as well as its potential risks which are approximately 3 in 1000 chance of bleeding, infection, and perforation  Perforation may require a surgeon to repair it  I also explained the small but significant chance of missing lesions with colonoscopy which has been reported to be about 5-10%

## 2023-04-06 NOTE — LETTER
April 6, 2023     Lorine Curling, MD  [address] [facility]    Patient: Rachael Valdes   YOB: 1950   Date of Visit: 4/6/2023       Dear Dr Jose M Dhillon: Thank you for referring Rachael Valdes to me for evaluation  Below are my notes for this consultation  If you have questions, please do not hesitate to call me  I look forward to following your patient along with you  Sincerely,        Lidia Bravo DO        CC: No Recipients  Lidia Bravo DO  4/6/2023  4:33 PM  Incomplete  [facility]  Gastroenterology Outpatient consultation  Patient Rachael Valdes   Age 67 y o  Gender female   MRN: [id number]  [facility] [id number]     ASSESSMENT AND PLAN:   Problem List Items Addressed This Visit        Other    Clostridium difficile infection     Patient does have a history of C  difficile in 2017  Abnormal PET scan of colon - Primary     Patient was noted to have an abnormality on a PET scan done March 6, 2023  This involves the mid to distal sigmoid colon  Given persistence and focality of this finding further evaluation colonoscopy is recommended  Based upon this finding she will be scheduled for colonoscopy  Of note she has had a Cologuard around 2017 and again in March 2020  She will be scheduled for colonoscopy  We will need clearance to hold her Eliquis for 2 days prior to her colonoscopy either from hematology or her primary care provider  She is on this due to a massive saddle embolus in 2017  She received a Colyte preparation and split dose with the second dose completed 3 hours prior to arrival   2 bisacodyl tablets  Adult scope  I explained to the patient the procedure of colonoscopy as well as its potential risks which are approximately 3 in 1000 chance of bleeding, infection, and perforation  Perforation may require a surgeon to repair it    I also explained the small but significant chance of missing lesions with colonoscopy which has been reported to be about 5-10%  Relevant Medications    polyethylene glycol (COLYTE) 4000 mL solution    Other Relevant Orders    Colonoscopy    Mild anemia     Debbie was noted to have a mild anemia with a hemoglobin of 11 1 hematocrit 35 3 MCV of 90 on April 1  I would recommend repeating a CBC at some point in the near future  Check iron studies  Check vitamin B12  Check folate         Relevant Orders    CBC    Vitamin B12    Iron Panel (Includes Ferritin, Iron Sat%, Iron, and TIBC)    Folate    Anticoagulated by anticoagulation treatment     Patient is fully anticoagulated on Eliquis  She will need to hold her Eliquis for 2 full days prior to her colonoscopy  We will get clearance from either Dr Dalton Lopez or Dr Jennifer Parra to hold her anticoagulation  _____________________________________________________________    HPI:   Larry Glaser is a delightful 68-year-old woman home seen in the office in consultation at request of Dr Jennifer Parra of hematology oncology  She has a history of unresectable metastatic melanoma on her right lower extremity/leg  She had a recent PET scan on March 6, 2023 which is outlined below, that showed activity in the mid to distal sigmoid colon  She has been on chemotherapy (immunotherapy) with [facility] ) since December  This treatment has been complicated by adrenal insufficiency  She is feeling better since going on hydrocortisone  She presents for an abnormal PET scan as noted above  She denies any diarrhea constipation bleeding or black stools  There is been no change in her bowel pattern  From an upper GI standpoint she denies any heartburn indigestion dysphagia nausea vomiting or early satiety  There is been no unintentional weight loss  She has kept up-to-date with colorectal cancer screening having had 2 Cologuard's  The last Cologuard was March 26, 2020  There is no family history colon cancer colon polyps that she is aware      She does not smoke tobacco and does not drink alcohol  4/1/2023 laboratory data  Sodium 136  Potassium 3 7  Chloride 106  CO2 30  BUN 14  Creatinine 0 9  Glucose 89  AST 19  ALT 32  Alk phos 36  Albumin 3 1  W B C 5 15 Hgb 11 1 down from 12 8 HCT 35 3 MCV 90 platelet count 764,848  Cortisol 1 0  ACTH 2 8    3/10/2023 pelvic ultrasound  Mild amount of simple appearing fluid within the endometrial canal   The endometrium is not thickened  12 x 10 x 11 mm anechoic structure right adnexa without internal blood flow  Suspicious for ovarian or paraovarian cyst   Left ovary not visualized  6 mm intramural leiomyoma in the anterior uterine body    3/6/2023 PET scan  1  Previously noted mildly hypermetabolic right inguinal/external iliac lymph nodes are essentially stable in size and exhibit persistent but mildly improved FDG activity  Findings are nonspecific and could reflect persistent regis metastatic disease   of low metabolic activity versus benign/inflammatory lymphadenopathy      2   Scattered areas of subcutaneous FDG activity involving the bilateral distal lower extremities extending from the level of the mid tibial shaft to the ankles/feet appear more prominent than the prior exam   While findings could be related to varicose   veins and inflammatory changes such as edema/cellulitis, given history of lower extremity melanoma, correlation with direct visualization and possibly MRI is recommended for further characterization and exclusion of developing malignancy at these sites  Attention to these regions on short interval follow-up imaging is recommended      3   Persistent short segment of focal FDG activity involving the mid to distal sigmoid colon    Given persistence and focality of this finding, further evaluation with colonoscopy is recommended to exclude colonic malignancy      4   Persistent nonspecific hypodense prominence to the endometrial cavity towards correlation with pelvic ultrasound is recommended for further characterization      5   Subtle asymmetric FDG activity associated with subtle asymmetric soft tissue prominence involving the right frontal scalp, possibly related to recent biopsy procedure although correlation with direct visualization and biopsy pathology results is   recommended to exclude a malignant process in this location      3/26/2020-Cologuard negative    No Known Allergies  Current Outpatient Medications   Medication Sig Dispense Refill   • apixaban (Eliquis) 5 mg Take 1 tablet (5 mg total) by mouth every 12 (twelve) hours 60 tablet 5   • calcium carbonate (OS-VIRGIE) 600 MG tablet Take 600 mg by mouth 2 (two) times a day     • famotidine (PEPCID) 10 mg tablet Take 10 mg by mouth 2 (two) times a day     • hydrocortisone (CORTEF) 10 mg tablet Take 1 tablet (10 mg total) by mouth 2 (two) times a day 60 tablet 11   • loratadine (CLARITIN) 10 mg tablet Take 10 mg by mouth daily     • losartan (Cozaar) 100 MG tablet Take 1 tablet (100 mg total) by mouth daily 30 tablet 5   • metoprolol succinate (TOPROL-XL) 25 mg 24 hr tablet Take 1 tablet (25 mg total) by mouth 2 (two) times a day 60 tablet 5   • Multiple Vitamins-Minerals (MULTIVITAMIN WITH MINERALS) tablet Take 1 tablet by mouth daily     • ondansetron (ZOFRAN) 4 mg tablet Take 1 tablet (4 mg total) by mouth every 8 (eight) hours as needed for nausea or vomiting 20 tablet 0   • patient supplied medication Pt takes eye drops Mura 128 - not listed in database     • polyethylene glycol (COLYTE) 4000 mL solution Take 4,000 mL by mouth once for 1 dose Take 4000 mL by mouth once for 1 dose  Use as directed 4000 mL 0   • triamcinolone (KENALOG) 0 1 % ointment Apply topically 2 (two) times a day 453 6 g 0     No current facility-administered medications for this visit       Facility-Administered Medications Ordered in Other Visits   Medication Dose Route Frequency Provider Last Rate Last Admin   • alteplase (CATHFLO) injection 2 mg  2 mg Intracatheter Q2H JODY Alston MD         MEDICAL HISTORY:  Past Medical History:   Diagnosis Date   • Acute deep vein thrombosis (DVT) of popliteal vein of left lower extremity (HCC)    • Ankle wound, left, initial encounter     Resolved 2017   • Ankle wound, right, initial encounter     resolved 2017   • DVT (deep vein thrombosis) in pregnancy    • Pulmonary emboli [address] [address] )      post C section   • Pulmonary embolism ([facility] [address] )     2017   • Pulmonary embolism, blood-clot, obstetric    • Skin cancer    • Venous stasis ulcer (HCC)    • Venous ulcers of both lower extremities (HCC)      Past Surgical History:   Procedure Laterality Date   •  SECTION      X2     Social History     Substance and Sexual Activity   Alcohol Use No    Comment: hx of social drinker     Social History     Substance and Sexual Activity   Drug Use No     Social History     Tobacco Use   Smoking Status Never   Smokeless Tobacco Never     Family History   Problem Relation Age of Onset   • Heart attack Mother    • Stroke Mother    • Cancer Father    • Bone cancer Family    • Hypertension Family    • Lung cancer Family        REVIEW OF SYSTEMS:  CONSTITUTIONAL: Denies any fever, chills, rigors, and weight loss  HEENT: No earache or tinnitus  Denies hearing loss or visual disturbances  CARDIOVASCULAR: No chest pain or palpitations  RESPIRATORY: Denies any cough, hemoptysis, shortness of breath or dyspnea on exertion  GASTROINTESTINAL: As noted in the History of Present Illness  GENITOURINARY: No problems with urination  Denies any hematuria or dysuria  NEUROLOGIC: No dizziness or vertigo, denies headaches  MUSCULOSKELETAL: Denies any muscle or joint pain  She does report sore ankles  SKIN: Denies skin rashes or itching  ENDOCRINE: Denies excessive thirst  Denies intolerance to heat or cold  PSYCHOSOCIAL: Denies depression or anxiety  Denies any recent memory loss  Objective    Blood pressure 142/74, pulse 88, resp   rate 18, "height 5' 9\" (1 753 m), weight 89 4 kg (197 lb), SpO2 93 %  Body mass index is 29 09 kg/m²  PHYSICAL EXAM:   General Appearance: Alert, cooperative, no distress  HEENT: Normocephalic, atraumatic, anicteric  Neck: Supple, symmetrical, trachea midline  Lungs: Clear to auscultation bilaterally; no rales, rhonchi or wheezing; respirations unlabored   Heart: Regular rate and rhythm; no murmur, rub, or gallop  Abdomen: Soft, bowel sounds normal, non-tender, non-distended; no masses, there is no hepatosplenomegaly  No spider angiomas  Genitalia: Deferred   Rectal: Deferred   Extremities: No cyanosis, clubbing or edema   Skin: No jaundice, rashes  Skin lesion RLE anterior /known melanoma    Lymph nodes: No palpable cervical lymphadenopathy   Lab Results:   Appointment on 04/01/2023   Component Date Value   • WBC 04/01/2023 5 15    • RBC 04/01/2023 3 91    • Hemoglobin 04/01/2023 11 1 (L)    • Hematocrit 04/01/2023 35 3    • MCV 04/01/2023 90    • MCH 04/01/2023 28 4    • MCHC 04/01/2023 31 4    • RDW 04/01/2023 12 9    • MPV 04/01/2023 11 6    • Platelets 45/08/2909 214    • nRBC 04/01/2023 0    • Neutrophils Relative 04/01/2023 35 (L)    • Immat GRANS % 04/01/2023 0    • Lymphocytes Relative 04/01/2023 46 (H)    • Monocytes Relative 04/01/2023 10    • Eosinophils Relative 04/01/2023 7 (H)    • Basophils Relative 04/01/2023 2 (H)    • Neutrophils Absolute 04/01/2023 1 80 (L)    • Immature Grans Absolute 04/01/2023 0 01    • Lymphocytes Absolute 04/01/2023 2 39    • Monocytes Absolute 04/01/2023 0 51    • Eosinophils Absolute 04/01/2023 0 34    • Basophils Absolute 04/01/2023 0 10    • Sodium 04/01/2023 136    • Potassium 04/01/2023 3 7    • Chloride 04/01/2023 106    • CO2 04/01/2023 30    • ANION GAP 04/01/2023 0 (L)    • BUN 04/01/2023 14    • Creatinine 04/01/2023 0 90    • Glucose, Fasting 04/01/2023 89    • Calcium 04/01/2023 9 3    • Corrected Calcium 04/01/2023 10 0    • AST 04/01/2023 19    • ALT 04/01/2023 32  " • Alkaline Phosphatase 04/01/2023 36 (L)    • Total Protein 04/01/2023 6 6    • Albumin 04/01/2023 3 1 (L)    • Total Bilirubin 04/01/2023 0 52    • eGFR 04/01/2023 64    • LD 04/01/2023 211    • T3, Free 04/01/2023 2 71    • Free T4 04/01/2023 1 12    • TSH 3RD GENERATON 04/01/2023 2 470    Appointment on 03/17/2023   Component Date Value   • ACTH 03/17/2023 2 8 (L)    • Cortisol - AM 03/17/2023 1 0 (LL)    Hospital Outpatient Visit on 03/06/2023   Component Date Value   • POC Glucose 03/06/2023 83    Office Visit on 03/02/2023   Component Date Value   • Case Report 03/02/2023                      Value:Surgical Pathology Report                         Case: [id number]                                   Authorizing Provider:  Deisy Henson MD           Collected:           03/02/2023 1530              Ordering Location:     [facility]      Received:            03/02/2023 [address] [facility], [id number]                                                                       Pathologist:           Evelyne Pacheco MD                                                           Specimen:    Skin, Other, Specimen A; right forehead,skin; shave;                                      • Final Diagnosis 03/02/2023                      Value: This result contains rich text formatting which cannot be displayed here  • Additional Information 03/02/2023                      Value: This result contains rich text formatting which cannot be displayed here  • Gross Description 03/02/2023                      Value: This result contains rich text formatting which cannot be displayed here  • Clinical Information 03/02/2023                      Value:Specimen A; right forehead,skin; shave; 67year old female with a history of metastatic melanoma with a 0 6 cm X 0 5 cm pearly skin colored papule with telangectasias   DDX BCC vs nevus    ATTN [facility]   Ancillary Orders on 02/24/2023   Component Date Value   • WBC 03/04/2023 4 84    • RBC 03/04/2023 4 48    • Hemoglobin 03/04/2023 12 8    • Hematocrit 03/04/2023 40 8    • MCV 03/04/2023 91    • MCH 03/04/2023 28 6    • MCHC 03/04/2023 31 4    • RDW 03/04/2023 13 2    • MPV 03/04/2023 11 7    • Platelets 28/03/9514 183    • nRBC 03/04/2023 0    • Neutrophils Relative 03/04/2023 34 (L)    • Immat GRANS % 03/04/2023 0    • Lymphocytes Relative 03/04/2023 45 (H)    • Monocytes Relative 03/04/2023 14 (H)    • Eosinophils Relative 03/04/2023 5    • Basophils Relative 03/04/2023 2 (H)    • Neutrophils Absolute 03/04/2023 1 65 (L)    • Immature Grans Absolute 03/04/2023 0 00    • Lymphocytes Absolute 03/04/2023 2 21    • Monocytes Absolute 03/04/2023 0 66    • Eosinophils Absolute 03/04/2023 0 24    • Basophils Absolute 03/04/2023 0 08    • Sodium 03/04/2023 139    • Potassium 03/04/2023 4 0    • Chloride 03/04/2023 109 (H)    • CO2 03/04/2023 26    • ANION GAP 03/04/2023 4    • BUN 03/04/2023 14    • Creatinine 03/04/2023 0 83    • Glucose, Fasting 03/04/2023 108 (H)    • Calcium 03/04/2023 9 1    • Corrected Calcium 03/04/2023 9 7    • AST 03/04/2023 29    • ALT 03/04/2023 30    • Alkaline Phosphatase 03/04/2023 39 (L)    • Total Protein 03/04/2023 6 7    • Albumin 03/04/2023 3 3 (L)    • Total Bilirubin 03/04/2023 0 76    • eGFR 03/04/2023 70    • LD 03/04/2023 206    • T3, Free 03/04/2023 3 56    • Free T4 03/04/2023 1 03    • TSH 3RD GENERATON 03/04/2023 2 650      Radiology Results:     Mitchel Sullivan DO   04/06/23   Cc:    Mitchel Sullivan DO  4/6/2023 10:51 AM  Sign when Signing Visit  [facility]  Gastroenterology Outpatient consultation  Patient Ted Fields   Age 67 y o     Gender female   MRN: [id number]  [facility] [id number]     ASSESSMENT AND PLAN:   Problem List Items Addressed This Visit    None     _____________________________________________________________    HPI: Please see complete note in epic  Niurka Segovia is a delightful 55-year-old woman home seen in the office in consultation at request of Dr Lazara Rosales of hematology oncology  She has a history of unresectable metastatic melanoma on her right lower extremity/leg  She had a recent PET scan on March 6, 2023 which is outlined below, that showed activity in the mid to distal sigmoid colon  4/1/2023 laboratory data  Sodium 136  Potassium 3 7  Chloride 106  CO2 30  BUN 14  Creatinine 0 9  Glucose 89  AST 19  ALT 32  Alk phos 36  Albumin 3 1  W B C 5 15 Hgb 11 1 down from 12 8 HCT 35 3 MCV 90 platelet count 513,565  Cortisol 1 0  ACTH 2 8    3/10/2023 pelvic ultrasound  Mild amount of simple appearing fluid within the endometrial canal   The endometrium is not thickened  12 x 10 x 11 mm anechoic structure right adnexa without internal blood flow  Suspicious for ovarian or paraovarian cyst   Left ovary not visualized  6 mm intramural leiomyoma in the anterior uterine body    3/6/2023 PET scan  1  Previously noted mildly hypermetabolic right inguinal/external iliac lymph nodes are essentially stable in size and exhibit persistent but mildly improved FDG activity  Findings are nonspecific and could reflect persistent regis metastatic disease   of low metabolic activity versus benign/inflammatory lymphadenopathy      2   Scattered areas of subcutaneous FDG activity involving the bilateral distal lower extremities extending from the level of the mid tibial shaft to the ankles/feet appear more prominent than the prior exam   While findings could be related to varicose   veins and inflammatory changes such as edema/cellulitis, given history of lower extremity melanoma, correlation with direct visualization and possibly MRI is recommended for further characterization and exclusion of developing malignancy at these sites  Attention to these regions on short interval follow-up imaging is recommended      3   Persistent short segment of focal FDG activity involving the mid to distal sigmoid colon    Given persistence and focality of this finding, further evaluation with colonoscopy is recommended to exclude colonic malignancy      4   Persistent nonspecific hypodense prominence to the endometrial cavity towards correlation with pelvic ultrasound is recommended for further characterization      5   Subtle asymmetric FDG activity associated with subtle asymmetric soft tissue prominence involving the right frontal scalp, possibly related to recent biopsy procedure although correlation with direct visualization and biopsy pathology results is   recommended to exclude a malignant process in this location      3/26/2020-Cologuard negative    No Known Allergies  Current Outpatient Medications   Medication Sig Dispense Refill   • apixaban (Eliquis) 5 mg Take 1 tablet (5 mg total) by mouth every 12 (twelve) hours 60 tablet 5   • calcium carbonate (OS-VIRGIE) 600 MG tablet Take 600 mg by mouth 2 (two) times a day     • famotidine (PEPCID) 10 mg tablet Take 10 mg by mouth 2 (two) times a day     • hydrocortisone (CORTEF) 10 mg tablet Take 1 tablet (10 mg total) by mouth 2 (two) times a day 60 tablet 11   • loratadine (CLARITIN) 10 mg tablet Take 10 mg by mouth daily     • losartan (Cozaar) 100 MG tablet Take 1 tablet (100 mg total) by mouth daily 30 tablet 5   • metoprolol succinate (TOPROL-XL) 25 mg 24 hr tablet Take 1 tablet (25 mg total) by mouth 2 (two) times a day 60 tablet 5   • Multiple Vitamins-Minerals (MULTIVITAMIN WITH MINERALS) tablet Take 1 tablet by mouth daily     • ondansetron (ZOFRAN) 4 mg tablet Take 1 tablet (4 mg total) by mouth every 8 (eight) hours as needed for nausea or vomiting 20 tablet 0   • patient supplied medication Pt takes eye drops Mura 128 - not listed in database     • triamcinolone (KENALOG) 0 1 % ointment Apply topically 2 (two) times a day 453 6 g 0     No current facility-administered medications for this visit       Facility-Administered Medications Ordered in Other Visits   Medication Dose Route Frequency Provider Last Rate Last Admin   • alteplase (CATHFLO) injection 2 mg  2 mg Intracatheter Q2H PRN Adrienne Bonds MD         MEDICAL HISTORY:  Past Medical History:   Diagnosis Date   • Acute deep vein thrombosis (DVT) of popliteal vein of left lower extremity (HCC)    • Ankle wound, left, initial encounter     Resolved 2017   • Ankle wound, right, initial encounter     resolved 2017   • DVT (deep vein thrombosis) in pregnancy    • Pulmonary emboli ([facility] [address] )      post C section   • [facility] )     2017   • Pulmonary embolism, blood-clot, obstetric    • Skin cancer    • Venous stasis ulcer ([facility] )    • Venous ulcers of both lower extremities (HCC)      Past Surgical History:   Procedure Laterality Date   •  SECTION      X2     Social History     Substance and Sexual Activity   Alcohol Use No    Comment: hx of social drinker     Social History     Substance and Sexual Activity   Drug Use No     Social History     Tobacco Use   Smoking Status Never   Smokeless Tobacco Never     Family History   Problem Relation Age of Onset   • Heart attack Mother    • Stroke Mother    • Cancer Father    • Bone cancer Family    • Hypertension Family    • Lung cancer Family        REVIEW OF SYSTEMS:  CONSTITUTIONAL: Denies any fever, chills, rigors, and weight loss  HEENT: No earache or tinnitus  Denies hearing loss or visual disturbances  CARDIOVASCULAR: No chest pain or palpitations  RESPIRATORY: Denies any cough, hemoptysis, shortness of breath or dyspnea on exertion  GASTROINTESTINAL: As noted in the History of Present Illness  GENITOURINARY: No problems with urination  Denies any hematuria or dysuria  NEUROLOGIC: No dizziness or vertigo, denies headaches  MUSCULOSKELETAL: Denies any muscle or joint pain  SKIN: Denies skin rashes or itching  ENDOCRINE: Denies excessive thirst  Denies intolerance to heat or cold  PSYCHOSOCIAL: Denies depression or anxiety   Denies any recent memory loss  Objective    There were no vitals taken for this visit  There is no height or weight on file to calculate BMI  PHYSICAL EXAM:   General Appearance: Alert, cooperative, no distress  HEENT: Normocephalic, atraumatic, anicteric  Neck: Supple, symmetrical, trachea midline  Lungs: Clear to auscultation bilaterally; no rales, rhonchi or wheezing; respirations unlabored   Heart: Regular rate and rhythm; no murmur, rub, or gallop  Abdomen: Soft, bowel sounds normal, non-tender, non-distended; no masses, there is no hepatosplenomegaly   No spider angiomas  Genitalia: Deferred   Rectal: Deferred   Extremities: No cyanosis, clubbing or edema   Skin: No jaundice, rashes, or lesions   Lymph nodes: No palpable cervical lymphadenopathy   Lab Results:   Appointment on 04/01/2023   Component Date Value   • WBC 04/01/2023 5 15    • RBC 04/01/2023 3 91    • Hemoglobin 04/01/2023 11 1 (L)    • Hematocrit 04/01/2023 35 3    • MCV 04/01/2023 90    • MCH 04/01/2023 28 4    • MCHC 04/01/2023 31 4    • RDW 04/01/2023 12 9    • MPV 04/01/2023 11 6    • Platelets 88/46/6305 214    • nRBC 04/01/2023 0    • Neutrophils Relative 04/01/2023 35 (L)    • Immat GRANS % 04/01/2023 0    • Lymphocytes Relative 04/01/2023 46 (H)    • Monocytes Relative 04/01/2023 10    • Eosinophils Relative 04/01/2023 7 (H)    • Basophils Relative 04/01/2023 2 (H)    • Neutrophils Absolute 04/01/2023 1 80 (L)    • Immature Grans Absolute 04/01/2023 0 01    • Lymphocytes Absolute 04/01/2023 2 39    • Monocytes Absolute 04/01/2023 0 51    • Eosinophils Absolute 04/01/2023 0 34    • Basophils Absolute 04/01/2023 0 10    • Sodium 04/01/2023 136    • Potassium 04/01/2023 3 7    • Chloride 04/01/2023 106    • CO2 04/01/2023 30    • ANION GAP 04/01/2023 0 (L)    • BUN 04/01/2023 14    • Creatinine 04/01/2023 0 90    • Glucose, Fasting 04/01/2023 89    • Calcium 04/01/2023 9 3    • Corrected Calcium 04/01/2023 10 0    • AST 04/01/2023 19    • ALT 04/01/2023 32    • Alkaline Phosphatase 04/01/2023 36 (L)    • Total Protein 04/01/2023 6 6    • Albumin 04/01/2023 3 1 (L)    • Total Bilirubin 04/01/2023 0 52    • eGFR 04/01/2023 64    • LD 04/01/2023 211    • T3, Free 04/01/2023 2 71    • Free T4 04/01/2023 1 12    • TSH 3RD GENERATON 04/01/2023 2 470    Appointment on 03/17/2023   Component Date Value   • ACTH 03/17/2023 2 8 (L)    • Cortisol - AM 03/17/2023 1 0 (LL)    Hospital Outpatient Visit on 03/06/2023   Component Date Value   • POC Glucose 03/06/2023 83    Office Visit on 03/02/2023   Component Date Value   • Case Report 03/02/2023                      Value:Surgical Pathology Report                         Case: [id number]                                   Authorizing Provider:  Sandhya Ramos MD           Collected:           03/02/2023 1530              Ordering Location:     [facility]      Received:            03/02/2023 [address] [facility], [id number]                                                                       Pathologist:           Sarah Garvin MD                                                           Specimen:    Skin, Other, Specimen A; right forehead,skin; shave;                                      • Final Diagnosis 03/02/2023                      Value: This result contains rich text formatting which cannot be displayed here  • Additional Information 03/02/2023                      Value: This result contains rich text formatting which cannot be displayed here  • Gross Description 03/02/2023                      Value: This result contains rich text formatting which cannot be displayed here  • Clinical Information 03/02/2023                      Value:Specimen A; right forehead,skin; shave; 67year old female with a history of metastatic melanoma with a 0 6 cm X 0 5 cm pearly skin colored papule with telangectasias   DDX BCC vs nevus    ATTN [facility]   Ancillary Orders on 02/24/2023   Component Date Value   • WBC 03/04/2023 4 84    • RBC 03/04/2023 4 48    • Hemoglobin 03/04/2023 12 8    • Hematocrit 03/04/2023 40 8    • MCV 03/04/2023 91    • MCH 03/04/2023 28 6    • MCHC 03/04/2023 31 4    • RDW 03/04/2023 13 2    • MPV 03/04/2023 11 7    • Platelets 13/92/4055 183    • nRBC 03/04/2023 0    • Neutrophils Relative 03/04/2023 34 (L)    • Immat GRANS % 03/04/2023 0    • Lymphocytes Relative 03/04/2023 45 (H)    • Monocytes Relative 03/04/2023 14 (H)    • Eosinophils Relative 03/04/2023 5    • Basophils Relative 03/04/2023 2 (H)    • Neutrophils Absolute 03/04/2023 1 65 (L)    • Immature Grans Absolute 03/04/2023 0 00    • Lymphocytes Absolute 03/04/2023 2 21    • Monocytes Absolute 03/04/2023 0 66    • Eosinophils Absolute 03/04/2023 0 24    • Basophils Absolute 03/04/2023 0 08    • Sodium 03/04/2023 139    • Potassium 03/04/2023 4 0    • Chloride 03/04/2023 109 (H)    • CO2 03/04/2023 26    • ANION GAP 03/04/2023 4    • BUN 03/04/2023 14    • Creatinine 03/04/2023 0 83    • Glucose, Fasting 03/04/2023 108 (H)    • Calcium 03/04/2023 9 1    • Corrected Calcium 03/04/2023 9 7    • AST 03/04/2023 29    • ALT 03/04/2023 30    • Alkaline Phosphatase 03/04/2023 39 (L)    • Total Protein 03/04/2023 6 7    • Albumin 03/04/2023 3 3 (L)    • Total Bilirubin 03/04/2023 0 76    • eGFR 03/04/2023 70    • LD 03/04/2023 206    • T3, Free 03/04/2023 3 56    • Free T4 03/04/2023 1 03    • TSH 3RD GENERATON 03/04/2023 2 650      Radiology Results:   US pelvis complete w transvaginal    Result Date: 3/18/2023  Narrative: PELVIC ULTRASOUND, COMPLETE INDICATION:  The patient is 67years old  R93 3: Abnormal findings on diagnostic imaging of other parts of digestive tract R93 89: Abnormal findings on diagnostic imaging of other specified body structures C43 9: Malignant melanoma of skin, unspecified   COMPARISON: None TECHNIQUE:   Transabdominal pelvic ultrasound was performed in sagittal and transverse planes with a curvilinear transducer  Additional transvaginal imaging was performed to better evaluate the endometrium and ovaries  Imaging included volumetric sweeps as well as traditional still imaging technique  FINDINGS: UTERUS: The uterus is anteverted in position, measuring 7 2 x 3 6 x 5 4 cm  The uterus is heterogeneous in echotexture    6 x 3 x 3 mm hypoechoic intramural mass within the anterior uterine body  The cervix appears within normal limits  ENDOMETRIUM:  The endometrial echo complex has an AP caliber of 3 mm  There is a moderate amount of simple fluid within the endometrial canal  Its appearance is within normal limits for age and cycle and shows no filling defects    OVARIES/ADNEXA: There is an 11 x 10 x 12 mm cystic lesion in the right adnexa without internal vascularity  The left ovary is not visualized  OTHER: No free fluid or loculated fluid collections  Impression: 1  There is a moderate amount of simple appearing fluid within the endometrial canal   The endometrium is not thickened measuring 3 mm  2   12 x 10 x 11 mm anechoic structure in the right adnexa without internal blood flow  Appearance is suspicious for ovarian or paraovarian cyst   Ovarian tissue is not visualized  3   Left ovary is not visualized  4   6 mm intramural leiomyoma in the anterior uterine body  Workstation performed: [id number]     VAS lower limb venous duplex study, complete bilateral    Result Date: 3/28/2023  Narrative:  [facility] REPORT [facility]INICAL: Indications: Patient presents with bilateral lower extremity shin and posterior calf pain x approximately 1 week  Patient denies swelling  Operative History: No cardiovascular surgeries noted  Risk Factors The patient has history of HTN and DVT  FINDINGS:  Left        Impression                           PostTibial  E1  Non Occlusive Thrombus (Chronic)     CONCLUSION: Impression: RIGHT LOWER LIMB: No evidence of acute or chronic deep vein thrombosis   No evidence of superficial thrombophlebitis noted  No evidence of valvular incompetence noted in the deep veins  Popliteal, posterior tibial and anterior tibial arterial Doppler waveforms are triphasic  LEFT LOWER LIMB: No evidence of acute deep vein thrombosis  Evidence of non-occlusive chronic deep vein thrombosis noted in one of the paired posterior tibial veins  No evidence of superficial thrombophlebitis noted  No evidence of valvular incompetence noted in the deep veins  Popliteal, posterior tibial and anterior tibial arterial Doppler waveforms are triphasic    SIGNATURE: Electronically Signed by: Denece Ahumada, MD on 2023-03-28 12:33:59 AM    [address] [place] [address]   04/06/23   Cc:

## 2023-04-06 NOTE — ASSESSMENT & PLAN NOTE
Jose Way was noted to have a mild anemia with a hemoglobin of 11 1 hematocrit 35 3 MCV of 90 on April 1  I would recommend repeating a CBC at some point in the near future    Check iron studies  Check vitamin B12  Check folate

## 2023-04-06 NOTE — PATIENT INSTRUCTIONS
Abnormal PET scan of colon  Patient was noted to have an abnormality on a PET scan done March 6, 2023  This involves the mid to distal sigmoid colon  Given persistence and focality of this finding further evaluation colonoscopy is recommended  Based upon this finding she will be scheduled for colonoscopy  Of note she has had a Cologuard around 2017 and again in March 2020  She will be scheduled for colonoscopy  We will need clearance to hold her Eliquis for 2 days prior to her colonoscopy either from hematology or her primary care provider  She is on this due to a massive saddle embolus in 2017  She received a Colyte preparation and split dose with the second dose completed 3 hours prior to arrival   2 bisacodyl tablets  Adult scope  I explained to the patient the procedure of colonoscopy as well as its potential risks which are approximately 3 in 1000 chance of bleeding, infection, and perforation  Perforation may require a surgeon to repair it  I also explained the small but significant chance of missing lesions with colonoscopy which has been reported to be about 5-10%  Mild anemia  Debbie was noted to have a mild anemia with a hemoglobin of 11 1 hematocrit 35 3 MCV of 90 on April 1  I would recommend repeating a CBC at some point in the near future  Check iron studies  Check vitamin B12  Check folate    Clostridium difficile infection  Patient does have a history of C  difficile in 2017  Anticoagulated by anticoagulation treatment  Patient is fully anticoagulated on Eliquis  She will need to hold her Eliquis for 2 full days prior to her colonoscopy  We will get clearance from either Dr Mirella Weaver or Dr Maria L Tanner to hold her anticoagulation      Scheduled date of colonoscopy (as of today):04/18/2023  Physician performing colonoscopy: [place]  Location of colonoscopy: [place]  Bowel prep reviewed with patient:Colyte preparation and split dose with the second dose completed 3 hours prior to arrival   2 bisacodyl tablets    Instructions reviewed with patient by:Twyla  Clearances:  Eliquis 2 day hold

## 2023-04-17 RX ORDER — SODIUM CHLORIDE, SODIUM LACTATE, POTASSIUM CHLORIDE, CALCIUM CHLORIDE 600; 310; 30; 20 MG/100ML; MG/100ML; MG/100ML; MG/100ML
125 INJECTION, SOLUTION INTRAVENOUS CONTINUOUS
Status: CANCELLED | OUTPATIENT
Start: 2023-04-17

## 2023-04-17 RX ORDER — LIDOCAINE HYDROCHLORIDE 10 MG/ML
0.5 INJECTION, SOLUTION EPIDURAL; INFILTRATION; INTRACAUDAL; PERINEURAL ONCE AS NEEDED
Status: CANCELLED | OUTPATIENT
Start: 2023-04-17

## 2023-04-25 ENCOUNTER — TELEPHONE (OUTPATIENT)
Dept: HEMATOLOGY ONCOLOGY | Facility: CLINIC | Age: 73
End: 2023-04-25

## 2023-04-26 RX ORDER — SODIUM CHLORIDE 9 MG/ML
20 INJECTION, SOLUTION INTRAVENOUS ONCE
Status: CANCELLED | OUTPATIENT
Start: 2023-05-03

## 2023-04-28 ENCOUNTER — RA CDI HCC (OUTPATIENT)
Dept: OTHER | Facility: HOSPITAL | Age: 73
End: 2023-04-28

## 2023-04-28 ENCOUNTER — TELEPHONE (OUTPATIENT)
Dept: HEMATOLOGY ONCOLOGY | Facility: CLINIC | Age: 73
End: 2023-04-28

## 2023-04-28 NOTE — PROGRESS NOTES
[facility]  coding opportunities       Chart reviewed, no opportunity found: CHART REVIEWED, NO OPPORTUNITY FOUND        Patients Insurance        Commercial Insurance: [address] [place] [address]

## 2023-04-29 ENCOUNTER — LAB (OUTPATIENT)
Dept: LAB | Facility: CLINIC | Age: 73
End: 2023-04-29

## 2023-04-29 DIAGNOSIS — Z79.899 HIGH RISK MEDICATION USE: ICD-10-CM

## 2023-04-29 DIAGNOSIS — D64.9 MILD ANEMIA: ICD-10-CM

## 2023-04-29 DIAGNOSIS — C43.9 METASTATIC MELANOMA (HCC): ICD-10-CM

## 2023-04-29 LAB
ALBUMIN SERPL BCP-MCNC: 3.4 G/DL (ref 3.5–5)
ALP SERPL-CCNC: 39 U/L (ref 46–116)
ALT SERPL W P-5'-P-CCNC: 19 U/L (ref 12–78)
ANION GAP SERPL CALCULATED.3IONS-SCNC: 3 MMOL/L (ref 4–13)
AST SERPL W P-5'-P-CCNC: 15 U/L (ref 5–45)
BASOPHILS # BLD AUTO: 0.06 THOUSANDS/ΜL (ref 0–0.1)
BASOPHILS NFR BLD AUTO: 1 % (ref 0–1)
BILIRUB SERPL-MCNC: 0.39 MG/DL (ref 0.2–1)
BUN SERPL-MCNC: 25 MG/DL (ref 5–25)
CALCIUM ALBUM COR SERPL-MCNC: 9.6 MG/DL (ref 8.3–10.1)
CALCIUM SERPL-MCNC: 9.1 MG/DL (ref 8.3–10.1)
CHLORIDE SERPL-SCNC: 107 MMOL/L (ref 96–108)
CO2 SERPL-SCNC: 29 MMOL/L (ref 21–32)
CREAT SERPL-MCNC: 0.96 MG/DL (ref 0.6–1.3)
EOSINOPHIL # BLD AUTO: 0.18 THOUSAND/ΜL (ref 0–0.61)
EOSINOPHIL NFR BLD AUTO: 4 % (ref 0–6)
ERYTHROCYTE [DISTWIDTH] IN BLOOD BY AUTOMATED COUNT: 13.6 % (ref 11.6–15.1)
FERRITIN SERPL-MCNC: 215 NG/ML (ref 8–388)
FOLATE SERPL-MCNC: >20 NG/ML (ref 3.1–17.5)
GFR SERPL CREATININE-BSD FRML MDRD: 59 ML/MIN/1.73SQ M
GLUCOSE P FAST SERPL-MCNC: 91 MG/DL (ref 65–99)
HCT VFR BLD AUTO: 36.8 % (ref 34.8–46.1)
HGB BLD-MCNC: 11.6 G/DL (ref 11.5–15.4)
IMM GRANULOCYTES # BLD AUTO: 0.01 THOUSAND/UL (ref 0–0.2)
IMM GRANULOCYTES NFR BLD AUTO: 0 % (ref 0–2)
IRON SATN MFR SERPL: 32 % (ref 15–50)
IRON SERPL-MCNC: 63 UG/DL (ref 50–170)
LDH SERPL-CCNC: 176 U/L (ref 81–234)
LYMPHOCYTES # BLD AUTO: 2.34 THOUSANDS/ΜL (ref 0.6–4.47)
LYMPHOCYTES NFR BLD AUTO: 50 % (ref 14–44)
MCH RBC QN AUTO: 28.5 PG (ref 26.8–34.3)
MCHC RBC AUTO-ENTMCNC: 31.5 G/DL (ref 31.4–37.4)
MCV RBC AUTO: 90 FL (ref 82–98)
MONOCYTES # BLD AUTO: 0.45 THOUSAND/ΜL (ref 0.17–1.22)
MONOCYTES NFR BLD AUTO: 10 % (ref 4–12)
NEUTROPHILS # BLD AUTO: 1.6 THOUSANDS/ΜL (ref 1.85–7.62)
NEUTS SEG NFR BLD AUTO: 35 % (ref 43–75)
NRBC BLD AUTO-RTO: 0 /100 WBCS
PLATELET # BLD AUTO: 177 THOUSANDS/UL (ref 149–390)
PMV BLD AUTO: 11.3 FL (ref 8.9–12.7)
POTASSIUM SERPL-SCNC: 4 MMOL/L (ref 3.5–5.3)
PROT SERPL-MCNC: 6.8 G/DL (ref 6.4–8.4)
RBC # BLD AUTO: 4.07 MILLION/UL (ref 3.81–5.12)
SODIUM SERPL-SCNC: 139 MMOL/L (ref 135–147)
T3FREE SERPL-MCNC: 2.75 PG/ML (ref 2.3–4.2)
T4 FREE SERPL-MCNC: 1.04 NG/DL (ref 0.76–1.46)
TIBC SERPL-MCNC: 195 UG/DL (ref 250–450)
TSH SERPL DL<=0.05 MIU/L-ACNC: 4.32 UIU/ML (ref 0.45–4.5)
VIT B12 SERPL-MCNC: 414 PG/ML (ref 100–900)
WBC # BLD AUTO: 4.64 THOUSAND/UL (ref 4.31–10.16)

## 2023-05-01 ENCOUNTER — OFFICE VISIT (OUTPATIENT)
Dept: HEMATOLOGY ONCOLOGY | Facility: CLINIC | Age: 73
End: 2023-05-01

## 2023-05-01 VITALS
WEIGHT: 194 LBS | SYSTOLIC BLOOD PRESSURE: 140 MMHG | RESPIRATION RATE: 16 BRPM | TEMPERATURE: 97.6 F | OXYGEN SATURATION: 97 % | DIASTOLIC BLOOD PRESSURE: 80 MMHG | HEIGHT: 69 IN | BODY MASS INDEX: 28.73 KG/M2 | HEART RATE: 92 BPM

## 2023-05-01 DIAGNOSIS — Z79.899 HIGH RISK MEDICATION USE: ICD-10-CM

## 2023-05-01 DIAGNOSIS — C43.9 METASTATIC MELANOMA (HCC): Primary | ICD-10-CM

## 2023-05-01 DIAGNOSIS — R94.8 ABNORMAL PET SCAN OF COLON: ICD-10-CM

## 2023-05-01 DIAGNOSIS — E27.40 ADRENAL INSUFFICIENCY (HCC): ICD-10-CM

## 2023-05-01 NOTE — LETTER
May 7, 2023     DO Katherine [address]    Patient: Kiara Marlow   YOB: 1950   Date of Visit: 5/1/2023       Dear Dr Elvia Fay: Thank you for referring Kiara Marlow to me for evaluation  Below are my notes for this consultation  If you have questions, please do not hesitate to call me  I look forward to following your patient along with you  Sincerely,        Yisroel Schaumann, MD        CC: Cathy Lurie, MD Vallorie Sails, [place], MD Yisroel Schaumann, MD  5/7/2023  6:45 PM  Sign when Signing Visit  [address] [place] [address]  [phone]     Date of Visit: 6/3/6853  Name: Kiara Marlow   YOB: 1950        Subjective    VISIT DIAGNOSIS:  Diagnoses and all orders for this visit:    Metastatic melanoma ([facility] [address] )    High risk medication use    Adrenal insufficiency (HCC)    Abnormal PET scan of colon        Oncology History   Metastatic melanoma ([address] )   10/13/2022 -  Cancer Staged    Staging form: Melanoma of the Skin, AJCC 8th Edition  - Clinical stage from 10/13/2022: Stage IV (cTX, cNX, cM1a) - Signed by Yisroel Schaumann, MD on 11/22/2022       10/13/2022 Biopsy    A  Skin, right lateral medial leg, punch biopsy:  Portion of severely atypical melanocytic dermal proliferation with prominent melanosis consistent with melanoma  See note  B  Skin, right medial leg  punch biopsy:  Portion of severely atypical melanocytic dermal proliferation with prominent melanosis consistent with melanoma  See note  C  Skin, right medial leg, punch biopsy:   Portion of severely atypical melanocytic dermal proliferation with prominent melanosis consistent with melanoma  See note       11/22/2022 Initial Diagnosis    Metastatic melanoma ([address] )     12/14/2022 -  Chemotherapy    NIVOLUMAB-RELATLIMAB-RMBW (OPDUALAG) IVPB, 480 mg of nivolumab, Intravenous, Once, 6 of 8 cycles  Administration: 480 mg of nivolumab (12/14/2022), 480 mg of nivolumab (1/11/2023), 480 mg of nivolumab (2/8/2023), 480 mg of nivolumab (3/8/2023), 480 mg of nivolumab (4/5/2023), 480 mg of nivolumab (5/3/2023)     1/5/2023 [facility] One liquid testing  TMB 1 Mut/Mb  MSI-high not detected          Cancer Staging   Metastatic melanoma ([facility] [address] )  Staging form: Melanoma of the Skin, AJCC 8th Edition  - Clinical stage from 10/13/2022: Stage IV (cTX, cNX, cM1a) - Signed by Megan Gaytan MD on 11/22/2022     Treatment Details   Treatment goal Curative   Plan Name OP Nivolumab and Relatlimab-rmbw Q 28 Days   Status Active   Start Date 12/14/2022   End Date 6/28/2023 (Planned)   Provider Megan Gaytan MD   Chemotherapy NIVOLUMAB-RELATLIMAB-RMBW (OPDUALAG) IVPB, 480 mg of nivolumab, Intravenous, Once, 6 of 8 cycles  Administration: 480 mg of nivolumab (12/14/2022), 480 mg of nivolumab (1/11/2023), 480 mg of nivolumab (2/8/2023), 480 mg of nivolumab (3/8/2023), 480 mg of nivolumab (4/5/2023), 480 mg of nivolumab (5/3/2023)          HISTORY OF PRESENT ILLNESS: Caro Pfeiffer is a 67 y o  female  who has unresectable melanoma/metastatic melanoma on her leg on treatment with nivolumab plus relatlimab (opdualag) for continued monitoring, surveillance, and follow-up while on treatment  Doing well and tolerating treatment  She has more energy and feels better now that she is on hydrocortisone  She has some concerning findings on her PET scan in March that she had follow-up on  Endometrial ultrasound demonstrated no thickened endometrium, a 12 x 10 x 11 mm anechoic structure consistent with ovarian or paraovarian cyst   Also a 6 mm intramural fibroid  She was having leg pain at the end of March as well for which we got a Doppler ultrasound    There were no concerning findings in the right lower leg however in the left lower leg there was evidence of nonocclusive chronic DVT in one of the paired posterior tibial veins  She is already on anticoagulation for DVTs  She also had abnormal FDG activity in the colon and she has never had a colonoscopy before  She underwent a colonoscopy on 4/18/2023 colon polyps were identified and biopsied all were benign  She will repeat colonoscopy in 3 years  Leg pain has improved and has not experienced it in some time  Denies any immune mediated side effects that are symptomatic at this time  Denies headaches, double vision, rash, itching, chest pain, shortness of breath, and diarrhea  REVIEW OF SYSTEMS:  Review of Systems   Constitutional: Negative for appetite change, fatigue, fever and unexpected weight change  HENT:   Negative for lump/mass  Eyes: Negative for icterus  Respiratory: Negative for cough, shortness of breath and wheezing  Cardiovascular: Negative for leg swelling  Gastrointestinal: Negative for abdominal pain, constipation, diarrhea, nausea and vomiting  Genitourinary: Negative for difficulty urinating and hematuria  Musculoskeletal: Negative for arthralgias, gait problem and myalgias  Skin: Negative for itching and rash  No new, changing, or concerning lesions  Neurological: Negative for extremity weakness, gait problem, headaches, light-headedness and numbness  Hematological: Negative for adenopathy          MEDICATIONS:    Current Outpatient Medications:   •  apixaban (Eliquis) 5 mg, Take 1 tablet (5 mg total) by mouth every 12 (twelve) hours, Disp: 60 tablet, Rfl: 5  •  calcium carbonate (OS-VIRGIE) 600 MG tablet, Take 600 mg by mouth 2 (two) times a day, Disp: , Rfl:   •  famotidine (PEPCID) 10 mg tablet, Take 10 mg by mouth 2 (two) times a day, Disp: , Rfl:   •  hydrocortisone (CORTEF) 10 mg tablet, Take 1 tablet (10 mg total) by mouth 2 (two) times a day, Disp: 60 tablet, Rfl: 11  •  loratadine (CLARITIN) 10 mg tablet, Take 10 mg by mouth daily, Disp: , Rfl:   •  losartan (Cozaar) 100 MG tablet, Take 1 tablet (100 mg total) by mouth daily, Disp: 30 tablet, Rfl: 5  •  metoprolol succinate (TOPROL-XL) 25 mg 24 hr tablet, Take 1 tablet (25 mg total) by mouth 2 (two) times a day, Disp: 60 tablet, Rfl: 5  •  Multiple Vitamins-Minerals (MULTIVITAMIN WITH MINERALS) tablet, Take 1 tablet by mouth daily, Disp: , Rfl:   •  patient supplied medication, Pt takes eye drops Mura 128 - not listed in database, Disp: , Rfl:   •  ondansetron (ZOFRAN) 4 mg tablet, Take 1 tablet (4 mg total) by mouth every 8 (eight) hours as needed for nausea or vomiting (Patient not taking: Reported on 5/1/2023), Disp: 20 tablet, Rfl: 0  •  triamcinolone (KENALOG) 0 1 % ointment, Apply topically 2 (two) times a day (Patient not taking: Reported on 5/1/2023), Disp: 453 6 g, Rfl: 0  •  Wheat Dextrin (BENEFIBER DRINK MIX PO), Take by mouth, Disp: , Rfl:      ALLERGIES:  No Known Allergies     ACTIVE PROBLEMS:  Patient Active Problem List   Diagnosis   • Acute massive pulmonary embolism ([address] )   • Venous stasis of both lower extremities   • Clostridium difficile infection   • Right ventricular systolic dysfunction without heart failure   • Essential hypertension   • Metastatic melanoma ([address] )   • High risk medication use   • Adrenal insufficiency ([address] )   • Abnormal PET scan of colon   • Mild anemia   • Anticoagulated by anticoagulation treatment          PAST MEDICAL HISTORY:   Past Medical History:   Diagnosis Date   • Acute deep vein thrombosis (DVT) of popliteal vein of left lower extremity ([address] )    • Ankle wound, left, initial encounter     Resolved 6/2017   • Ankle wound, right, initial encounter     resolved 6/2017   • DVT (deep vein thrombosis) in pregnancy    • Hypertension    • Pulmonary emboli ([address] )     1989 post C section   • Pulmonary embolism ([address] )     7/2017   • Pulmonary embolism, blood-clot, obstetric    • Skin cancer    • Venous stasis ulcer ([address] )    • Venous ulcers of both lower extremities ([address] )         PAST SURGICAL HISTORY:  Past Surgical "History:   Procedure Laterality Date   •  SECTION      X2        SOCIAL HISTORY:  Social History     Socioeconomic History   • Marital status:      Spouse name: None   • Number of children: None   • Years of education: None   • Highest education level: None   Occupational History   • None   Tobacco Use   • Smoking status: Never   • Smokeless tobacco: Never   Vaping Use   • Vaping Use: Never used   Substance and Sexual Activity   • Alcohol use: No     Comment: hx of social drinker   • Drug use: No   • Sexual activity: None   Other Topics Concern   • None   Social History Narrative   • None     Social Determinants of Health     Financial Resource Strain: Not on file   Food Insecurity: Not on file   Transportation Needs: Not on file   Physical Activity: Not on file   Stress: Not on file   Social Connections: Not on file   Intimate Partner Violence: Not on file   Housing Stability: Not on file        FAMILY HISTORY:  Family History   Problem Relation Age of Onset   • Heart attack Mother    • Stroke Mother    • Cancer Father    • Bone cancer Family    • Hypertension Family    • Lung cancer Family            Objective    PHYSICAL EXAMINATION:   Blood pressure 140/80, pulse 92, temperature 97 6 °F (36 4 °C), temperature source Temporal, resp  rate 16, height 5' 9\" (1 753 m), weight 88 kg (194 lb), SpO2 97 %  Pain Score: 0-No pain     ECOG Performance Status    Flowsheet Row Most Recent Value   ECOG Performance Status 0 - Fully active, able to carry on all pre-disease performance without restriction             Physical Exam  Constitutional:       General: She is not in acute distress  Appearance: Normal appearance  She is not toxic-appearing  HENT:      Mouth/Throat:      Mouth: Mucous membranes are moist       Pharynx: Oropharynx is clear  Eyes:      General: No scleral icterus  Cardiovascular:      Rate and Rhythm: Normal rate and regular rhythm  Pulses: Normal pulses        Heart sounds: " No murmur heard  No friction rub  No gallop  Pulmonary:      Effort: Pulmonary effort is normal  No respiratory distress  Breath sounds: Normal breath sounds  No wheezing or rales  Abdominal:      General: There is no distension  Palpations: There is no mass  Tenderness: There is no abdominal tenderness  There is no rebound  Musculoskeletal:         General: No swelling or tenderness  Right lower leg: No edema (uses compression stockings)  Left lower leg: No edema (uses compression stockings)  Lymphadenopathy:      Head:      Right side of head: No submandibular, preauricular or posterior auricular adenopathy  Left side of head: No submandibular, preauricular or posterior auricular adenopathy  Cervical: No cervical adenopathy  Right cervical: No superficial or posterior cervical adenopathy  Left cervical: No superficial or posterior cervical adenopathy  Upper Body:      Right upper body: No supraclavicular or axillary adenopathy  Left upper body: No supraclavicular or axillary adenopathy  Skin:     Findings: No rash  Comments: Well healed surgical scar  No evidence of recurrence at primary site  Neurological:      General: No focal deficit present  Mental Status: She is alert and oriented to person, place, and time  Psychiatric:         Mood and Affect: Mood normal          Behavior: Behavior normal          Thought Content: Thought content normal          Judgment: Judgment normal          I reviewed lab data in the chart      WBC   Date Value Ref Range Status   04/29/2023 4 64 4 31 - 10 16 Thousand/uL Final   04/01/2023 5 15 4 31 - 10 16 Thousand/uL Final   03/04/2023 4 84 4 31 - 10 16 Thousand/uL Final     Hemoglobin   Date Value Ref Range Status   04/29/2023 11 6 11 5 - 15 4 g/dL Final   04/01/2023 11 1 (L) 11 5 - 15 4 g/dL Final   03/04/2023 12 8 11 5 - 15 4 g/dL Final     Platelets   Date Value Ref Range Status   04/29/2023 177 149 - 390 Thousands/uL Final   04/01/2023 214 149 - 390 Thousands/uL Final   03/04/2023 183 149 - 390 Thousands/uL Final     MCV   Date Value Ref Range Status   04/29/2023 90 82 - 98 fL Final   04/01/2023 90 82 - 98 fL Final   03/04/2023 91 82 - 98 fL Final      Potassium   Date Value Ref Range Status   04/29/2023 4 0 3 5 - 5 3 mmol/L Final   04/01/2023 3 7 3 5 - 5 3 mmol/L Final   03/04/2023 4 0 3 5 - 5 3 mmol/L Final     Chloride   Date Value Ref Range Status   04/29/2023 107 96 - 108 mmol/L Final   04/01/2023 106 96 - 108 mmol/L Final   03/04/2023 109 (H) 96 - 108 mmol/L Final     CO2   Date Value Ref Range Status   04/29/2023 29 21 - 32 mmol/L Final   04/01/2023 30 21 - 32 mmol/L Final   03/04/2023 26 21 - 32 mmol/L Final     BUN   Date Value Ref Range Status   04/29/2023 25 5 - 25 mg/dL Final   04/01/2023 14 5 - 25 mg/dL Final   03/04/2023 14 5 - 25 mg/dL Final     Creatinine   Date Value Ref Range Status   04/29/2023 0 96 0 60 - 1 30 mg/dL Final     Comment:     Standardized to IDMS reference method   04/01/2023 0 90 0 60 - 1 30 mg/dL Final     Comment:     Standardized to IDMS reference method   03/04/2023 0 83 0 60 - 1 30 mg/dL Final     Comment:     Standardized to IDMS reference method     Glucose   Date Value Ref Range Status   11/17/2022 85 65 - 140 mg/dL Final     Comment:     If the patient is fasting, the ADA then defines impaired fasting glucose as > 100 mg/dL and diabetes as > or equal to 123 mg/dL  Specimen collection should occur prior to Sulfasalazine administration due to the potential for falsely depressed results  Specimen collection should occur prior to Sulfapyridine administration due to the potential for falsely elevated results  08/03/2020 112 (H) 65 - 99 mg/dL Final     Comment:     If the patient is fasting, the ADA then defines impaired fasting glucose as > 100 mg/dL and diabetes as > or equal to 123 mg/dL    Specimen collection should occur prior to Sulfasalazine administration due to the potential for falsely depressed results  Specimen collection should occur prior to Sulfapyridine administration due to the potential for falsely elevated results  01/24/2017 91 65 - 140 mg/dL Final     Comment:     If the patient is fasting, the ADA then defines impaired fasting glucose as > 100 mg/dL and diabetes as > or equal to 123 mg/dL  Calcium   Date Value Ref Range Status   04/29/2023 9 1 8 3 - 10 1 mg/dL Final   04/01/2023 9 3 8 3 - 10 1 mg/dL Final   03/04/2023 9 1 8 3 - 10 1 mg/dL Final     Albumin   Date Value Ref Range Status   04/29/2023 3 4 (L) 3 5 - 5 0 g/dL Final   04/01/2023 3 1 (L) 3 5 - 5 0 g/dL Final   03/04/2023 3 3 (L) 3 5 - 5 0 g/dL Final     Total Bilirubin   Date Value Ref Range Status   04/29/2023 0 39 0 20 - 1 00 mg/dL Final     Comment:     Use of this assay is not recommended for patients undergoing treatment with eltrombopag due to the potential for falsely elevated results  04/01/2023 0 52 0 20 - 1 00 mg/dL Final     Comment:     Use of this assay is not recommended for patients undergoing treatment with eltrombopag due to the potential for falsely elevated results  03/04/2023 0 76 0 20 - 1 00 mg/dL Final     Comment:     Use of this assay is not recommended for patients undergoing treatment with eltrombopag due to the potential for falsely elevated results  Alkaline Phosphatase   Date Value Ref Range Status   04/29/2023 39 (L) 46 - 116 U/L Final   04/01/2023 36 (L) 46 - 116 U/L Final   03/04/2023 39 (L) 46 - 116 U/L Final     AST   Date Value Ref Range Status   04/29/2023 15 5 - 45 U/L Final     Comment:     Specimen collection should occur prior to Sulfasalazine administration due to the potential for falsely depressed results  04/01/2023 19 5 - 45 U/L Final     Comment:     Specimen collection should occur prior to Sulfasalazine administration due to the potential for falsely depressed results      03/04/2023 29 5 - 45 U/L Final     Comment:     Specimen collection should occur prior to Sulfasalazine administration due to the potential for falsely depressed results  ALT   Date Value Ref Range Status   04/29/2023 19 12 - 78 U/L Final     Comment:     Specimen collection should occur prior to Sulfasalazine and/or Sulfapyridine administration due to the potential for falsely depressed results  04/01/2023 32 12 - 78 U/L Final     Comment:     Specimen collection should occur prior to Sulfasalazine and/or Sulfapyridine administration due to the potential for falsely depressed results  03/04/2023 30 12 - 78 U/L Final     Comment:     Specimen collection should occur prior to Sulfasalazine and/or Sulfapyridine administration due to the potential for falsely depressed results  LD   Date Value Ref Range Status   04/29/2023 176 81 - 234 U/L Final   04/01/2023 211 81 - 234 U/L Final   03/04/2023 206 81 - 234 U/L Final     No results found for: TSH  No results found for: Q3DNJWF   Free T4   Date Value Ref Range Status   04/29/2023 1 04 0 76 - 1 46 ng/dL Final     Comment:     Specimen collection should occur prior to Sulfasalazine administration due to the potential for falsely elevated results  04/01/2023 1 12 0 76 - 1 46 ng/dL Final     Comment:     Specimen collection should occur prior to Sulfasalazine administration due to the potential for falsely elevated results  03/04/2023 1 03 0 76 - 1 46 ng/dL Final     Comment:     Specimen collection should occur prior to Sulfasalazine administration due to the potential for falsely elevated results  RECENT IMAGING:  Procedure: Colonoscopy    Result Date: 4/18/2023  Narrative: Table formatting from the original result was not included   [facility] [address] [place] [facility] [id number] [phone] DATE OF SERVICE: 4/18/23 PHYSICIAN(S): Attending: aPtel Cosby DO Fellow: No Staff Documented INDICATION: Abnormal PET scan of colon POST-OP DIAGNOSIS: See the impression below  HISTORY: Prior colonoscopy: No prior colonoscopy  BOWEL PREPARATION: Golytely/Colyte/Trilyte PREPROCEDURE: Informed consent was obtained for the procedure, including sedation  Risks including but not limited to bleeding, infection, perforation, adverse drug reaction and aspiration were explained in detail  Also explained about less than 100% sensitivity with the exam and other alternatives  The patient was placed in the left lateral decubitus position  Procedure: Colonoscopy DETAILS OF PROCEDURE: Patient was taken to the procedure room where a time out was performed to confirm correct patient and correct procedure  The patient underwent monitored anesthesia care, which was administered by an anesthesia professional  The patient's blood pressure, heart rate, level of consciousness, oxygen and respirations were monitored throughout the procedure  A digital rectal exam was performed  The scope was introduced through the anus and advanced to the terminal ileum  Retroflexion was performed in the rectum  The quality of bowel preparation was evaluated using the [facility] Bowel Preparation Scale with scores of: right colon = 2, transverse colon = 2, left colon = 2  The total BBPS score was 6  Bowel prep was adequate  The patient experienced no blood loss  The procedure was moderately difficult due to Severe diverticulosis  In response to procedure difficulty, the instrument was changed to a pediatric endoscope  The patient tolerated the procedure well  There were no apparent complications  Switched from a delta pediatric scope  ANESTHESIA INFORMATION: ASA: II Anesthesia Type: Anesthesia type not filed in the log  MEDICATIONS: No administrations occurring from 0930 to 1035 on 04/18/23 FINDINGS: Terminal ileum was normal  Procedure was started with an adult scope  However due to significant narrowing and multiple diverticula in the distal sigmoid colon, I could not advance the scope beyond this region    Scope was changed to pediatric scope and we were able to complete the procedure to the terminal ileum  Four sessile polyps measuring from 4 mm up to 6 mm in the cecum; completely removed en bloc by cold snare and retrieved specimen One 5 mm sessile polyp in the proximal transverse colon; completely removed en bloc by cold snare and retrieved specimen One 12 mm pedunculated polyp 15 cm from the anal verge; completely removed en bloc by hot snare and retrieved specimen; placed 1 clip successfully (clip is MRI conditional)  Single clip placed to prevent bleeding given patient's need for Eliquis  Small, external Multiple severe diverticula causing moderate luminal narrowing (traversable) in the sigmoid colon; no bleeding was identified  There was severe diverticulosis in the sigmoid colon  There was a narrowing at 18 cm with prominent erythematous folds  Suspect these erythematous folds are more related to severe diverticulosis  Biopsies obtained  EVENTS: Procedure Events Event Event Time ENDO CECUM REACHED 4/18/2023 10:09 AM ENDO SCOPE OUT TIME 4/18/2023 10:33 AM SPECIMENS: ID Type Source Tests Collected by Time Destination 1 : POLYP(S)X4 Tissue Large Intestine, Cecum TISSUE EXAM Avel Payment [place], DO 4/18/2023 10:09 AM  2 : PROXIMAL, POLYP Tissue Large Intestine, Transverse Colon TISSUE EXAM Avel Payment [place], DO 4/18/2023 10:15 AM  3 : ERYTHEMA FOLD BX, AT 18 CM Tissue Colon TISSUE EXAM Avel Payment [place], DO 4/18/2023 10:23 AM  4 : POLYP Tissue Colon TISSUE EXAM Avel Payment [place], DO 4/18/2023 10:33 AM  EQUIPMENT: Colonoscope - Colonoscope -[id number]     Impression: Severe diverticulosis sigmoid colon with luminal narrowing and 18 cm   Erythematous prominent folds at 18 cm suspect related to severe diverticulosis status post biopsy 4 polyps in the cecum measuring 4 to 6 mm status post cold snare polypectomy 5 mm sessile polyp proximal transverse colon status post cold snare polypectomy 12 mm pedunculated polyp at 15 cm status post hot snare polypectomy  Single clip placed to prevent bleeding RECOMMENDATION:  Repeat colonoscopy in 3 years  Personal history of colon polyps  You may resume Eliquis tomorrow evening at your usual dose  Hold on Eliquis if there is any sign of bleeding  Please watch for bleeding as you resume your Eliquis  Suggest trying to obtain 25 g of fiber in diet daily  Suggest fiber supplementation such as Benefiber 2 teaspoonfuls mixed in 6 to 8 ounce of noncarbonated liquid daily  -Please call the Gastroenterology office at [phone] for biopsy results if you do  not hear from our office in 14 days  -Follow-up with primary care doctor as previously scheduled -Please call gastroenterology physician on call or go to the emergency department if you develop abdominal pain, rectal bleeding greater than 1 tbsp, black stools, fever greater than 100 5, persistent nausea or vomiting  Gastroenterology office phone number is [phone]  Please see clip instructions  Fransisco Mitchell, DO             Assessment/Plan  Ms Esther Goldsmith is a 67 yr female who has unresectable melanoma/metastatic melanoma on treatment with nivolumab plus relatlimab ([facility]) here for continued follow-up, monitoring, and surveillance while on treatment  1  Metastatic melanoma ([facility] [address] )  She is doing well and tolerating treatment  Labs reviewed and okay to continue treatment  She will continue treatment with nivolumab plus relatlimab and return for follow-up and next visit and treatment in 4 weeks  She has standing orders for blood work prior to each treatment  She knows to continue to monitor for immune mediated side effects and call with any issues or concerns prior to her next visit  She is due for imaging and June and we will order imaging at her next visit  2  High risk medication use  3  Adrenal insufficiency ([facility] [address] )  She is on treatment with immunotherapy and we will continue to monitor for side effects and lab abnormalities  We will monitor labs with each treatment to ensure safety of continuing on treatment  She knows to watch for signs and symptoms for immune mediated side effects  Denies any symptomatic immune mediated side effects at this time  She knows to continue to monitor for immune mediated side effects and call with any issues or concerns  4  Abnormal PET scan of colon  Concerning in the setting of never had a colonoscopy  Underwent colonoscopy recently with 6 benign polyps identified  She will continue to follow with GI as planned and is scheduled for repeat colonoscopy in 3 years  Return in about 4 weeks (around 5/29/2023) for Office Visit, Infusion - See Treatment Plan, labs       Bonnie Mckeon MD, PhD

## 2023-05-02 NOTE — RESULT ENCOUNTER NOTE
Please inform patient that folate, thyroid function, chemistry panel are all essentially okay  Iron studies are okay as well  Vitamin B12 level was okay    Thank you

## 2023-05-03 ENCOUNTER — HOSPITAL ENCOUNTER (OUTPATIENT)
Dept: INFUSION CENTER | Facility: HOSPITAL | Age: 73
Discharge: HOME | End: 2023-05-03
Attending: INTERNAL MEDICINE

## 2023-05-03 VITALS
HEIGHT: 69 IN | RESPIRATION RATE: 18 BRPM | OXYGEN SATURATION: 98 % | SYSTOLIC BLOOD PRESSURE: 160 MMHG | HEART RATE: 72 BPM | WEIGHT: 195.33 LBS | TEMPERATURE: 97.4 F | BODY MASS INDEX: 28.93 KG/M2 | DIASTOLIC BLOOD PRESSURE: 75 MMHG

## 2023-05-03 DIAGNOSIS — C43.9 METASTATIC MELANOMA (HCC): Primary | ICD-10-CM

## 2023-05-03 RX ORDER — SODIUM CHLORIDE 9 MG/ML
20 INJECTION, SOLUTION INTRAVENOUS ONCE
Status: COMPLETED | OUTPATIENT
Start: 2023-05-03 | End: 2023-05-03

## 2023-05-03 RX ADMIN — SODIUM CHLORIDE 480 MG OF NIVOLUMAB: 9 INJECTION, SOLUTION INTRAVENOUS at 15:17

## 2023-05-03 RX ADMIN — SODIUM CHLORIDE 20 ML/HR: 0.9 INJECTION, SOLUTION INTRAVENOUS at 15:00

## 2023-05-03 NOTE — PLAN OF CARE
Problem: Potential for Falls  Goal: Patient will remain free of falls  Description: INTERVENTIONS:  - Educate patient/family on patient safety including physical limitations  - Instruct patient to call for assistance with activity   - Consult OT/PT to assist with strengthening/mobility   - Keep Call bell within reach  - Keep bed low and locked with side rails adjusted as appropriate      - Consider moving patient to room near nurses station  Outcome: Progressing     Problem: PAIN - ADULT  Goal: Verbalizes/displays adequate comfort level or baseline comfort level  Description: Interventions:  - Encourage patient to monitor pain and request assistance  - Assess pain using appropriate pain scale  - Administer analgesics based on type and severity of pain and evaluate response  - Implement non-pharmacological measures as appropriate and evaluate response  - Consider cultural and social influences on pain and pain management  - Notify physician/advanced practitioner if interventions unsuccessful or patient reports new pain  Outcome: Progressing     Problem: Knowledge Deficit  Goal: Patient/family/caregiver demonstrates understanding of disease process, treatment plan, medications, and discharge instructions  Description: Complete learning assessment and assess knowledge base    Interventions:  - Provide teaching at level of understanding  - Provide teaching via preferred learning methods  Outcome: Progressing

## 2023-05-07 NOTE — PROGRESS NOTES
[facility] HEMATOLOGY ONCOLOGY SPECIALISTS [address]  [place] [address]  [phone]     Date of Visit: 3/2/3831  Name: Kiara Mullins   YOB: 1950        Subjective    VISIT DIAGNOSIS:  Diagnoses and all orders for this visit:    Metastatic melanoma ([facility] )    High risk medication use    Adrenal insufficiency (HCC)    Abnormal PET scan of colon        Oncology History   Metastatic melanoma ([facility] )   10/13/2022 -  Cancer Staged    Staging form: Melanoma of the Skin, AJCC 8th Edition  - Clinical stage from 10/13/2022: Stage IV (cTX, cNX, cM1a) - Signed by Pepe Parmar MD on 11/22/2022       10/13/2022 Biopsy    A  Skin, right lateral medial leg, punch biopsy:  Portion of severely atypical melanocytic dermal proliferation with prominent melanosis consistent with melanoma  See note  B  Skin, right medial leg  punch biopsy:  Portion of severely atypical melanocytic dermal proliferation with prominent melanosis consistent with melanoma  See note  C  Skin, right medial leg, punch biopsy:   Portion of severely atypical melanocytic dermal proliferation with prominent melanosis consistent with melanoma  See note       11/22/2022 Initial Diagnosis    Metastatic melanoma ([facility] )     12/14/2022 -  Chemotherapy    NIVOLUMAB-RELATLIMAB-RMBW (OPDUALAG) IVPB, 480 mg of nivolumab, Intravenous, Once, 6 of 8 cycles  Administration: 480 mg of nivolumab (12/14/2022), 480 mg of nivolumab (1/11/2023), 480 mg of nivolumab (2/8/2023), 480 mg of nivolumab (3/8/2023), 480 mg of nivolumab (4/5/2023), 480 mg of nivolumab (5/3/2023)     1/5/2023 [facility] liquid testing  TMB 1 Mut/Mb  MSI-high not detected          Cancer Staging   Metastatic melanoma ([facility] )  Staging form: Melanoma of the Skin, AJCC 8th Edition  - Clinical stage from 10/13/2022: Stage IV (cTX, cNX, cM1a) - Signed by Pepe Parmar MD on 11/22/2022     Treatment Details   Treatment goal Curative Plan Name OP Nivolumab and Relatlimab-rmbw Q 28 Days   Status Active   Start Date 12/14/2022   End Date 6/28/2023 (Planned)   Provider Ximena Holland MD   Chemotherapy NIVOLUMAB-RELATLIMAB-RMBW (OPDUALAG) IVPB, 480 mg of nivolumab, Intravenous, Once, 6 of 8 cycles  Administration: 480 mg of nivolumab (12/14/2022), 480 mg of nivolumab (1/11/2023), 480 mg of nivolumab (2/8/2023), 480 mg of nivolumab (3/8/2023), 480 mg of nivolumab (4/5/2023), 480 mg of nivolumab (5/3/2023)          HISTORY OF PRESENT ILLNESS: Delphine Nixon is a 67 y o  female  who has unresectable melanoma/metastatic melanoma on her leg on treatment with nivolumab plus relatlimab (opdualag) for continued monitoring, surveillance, and follow-up while on treatment  Doing well and tolerating treatment  She has more energy and feels better now that she is on hydrocortisone  She has some concerning findings on her PET scan in March that she had follow-up on  Endometrial ultrasound demonstrated no thickened endometrium, a 12 x 10 x 11 mm anechoic structure consistent with ovarian or paraovarian cyst   Also a 6 mm intramural fibroid  She was having leg pain at the end of March as well for which we got a Doppler ultrasound  There were no concerning findings in the right lower leg however in the left lower leg there was evidence of nonocclusive chronic DVT in one of the paired posterior tibial veins  She is already on anticoagulation for DVTs  She also had abnormal FDG activity in the colon and she has never had a colonoscopy before  She underwent a colonoscopy on 4/18/2023 colon polyps were identified and biopsied all were benign  She will repeat colonoscopy in 3 years  Leg pain has improved and has not experienced it in some time  Denies any immune mediated side effects that are symptomatic at this time  Denies headaches, double vision, rash, itching, chest pain, shortness of breath, and diarrhea        REVIEW OF SYSTEMS:  Review of Systems   Constitutional: Negative for appetite change, fatigue, fever and unexpected weight change  HENT:   Negative for lump/mass  Eyes: Negative for icterus  Respiratory: Negative for cough, shortness of breath and wheezing  Cardiovascular: Negative for leg swelling  Gastrointestinal: Negative for abdominal pain, constipation, diarrhea, nausea and vomiting  Genitourinary: Negative for difficulty urinating and hematuria  Musculoskeletal: Negative for arthralgias, gait problem and myalgias  Skin: Negative for itching and rash  No new, changing, or concerning lesions  Neurological: Negative for extremity weakness, gait problem, headaches, light-headedness and numbness  Hematological: Negative for adenopathy          MEDICATIONS:    Current Outpatient Medications:   •  apixaban (Eliquis) 5 mg, Take 1 tablet (5 mg total) by mouth every 12 (twelve) hours, Disp: 60 tablet, Rfl: 5  •  calcium carbonate (OS-VIRGIE) 600 MG tablet, Take 600 mg by mouth 2 (two) times a day, Disp: , Rfl:   •  famotidine (PEPCID) 10 mg tablet, Take 10 mg by mouth 2 (two) times a day, Disp: , Rfl:   •  hydrocortisone (CORTEF) 10 mg tablet, Take 1 tablet (10 mg total) by mouth 2 (two) times a day, Disp: 60 tablet, Rfl: 11  •  loratadine (CLARITIN) 10 mg tablet, Take 10 mg by mouth daily, Disp: , Rfl:   •  losartan (Cozaar) 100 MG tablet, Take 1 tablet (100 mg total) by mouth daily, Disp: 30 tablet, Rfl: 5  •  metoprolol succinate (TOPROL-XL) 25 mg 24 hr tablet, Take 1 tablet (25 mg total) by mouth 2 (two) times a day, Disp: 60 tablet, Rfl: 5  •  Multiple Vitamins-Minerals (MULTIVITAMIN WITH MINERALS) tablet, Take 1 tablet by mouth daily, Disp: , Rfl:   •  patient supplied medication, Pt takes eye drops Mura 128 - not listed in database, Disp: , Rfl:   •  ondansetron (ZOFRAN) 4 mg tablet, Take 1 tablet (4 mg total) by mouth every 8 (eight) hours as needed for nausea or vomiting (Patient not taking: Reported on 5/1/2023), Disp: 20 tablet, Rfl: 0  •  triamcinolone (KENALOG) 0 1 % ointment, Apply topically 2 (two) times a day (Patient not taking: Reported on 2023), Disp: 453 6 g, Rfl: 0  •  Wheat Dextrin (BENEFIBER DRINK MIX PO), Take by mouth, Disp: , Rfl:      ALLERGIES:  No Known Allergies     ACTIVE PROBLEMS:  Patient Active Problem List   Diagnosis   • Acute massive pulmonary embolism (HCC)   • Venous stasis of both lower extremities   • Clostridium difficile infection   • Right ventricular systolic dysfunction without heart failure   • Essential hypertension   • Metastatic melanoma ([address] )   • High risk medication use   • Adrenal insufficiency (HCC)   • Abnormal PET scan of colon   • Mild anemia   • Anticoagulated by anticoagulation treatment          PAST MEDICAL HISTORY:   Past Medical History:   Diagnosis Date   • Acute deep vein thrombosis (DVT) of popliteal vein of left lower extremity (HCC)    • Ankle wound, left, initial encounter     Resolved 2017   • Ankle wound, right, initial encounter     resolved 2017   • DVT (deep vein thrombosis) in pregnancy    • Hypertension    • Pulmonary emboli ([facility] )      post C section   • Pulmonary embolism ([facility] )     2017   • Pulmonary embolism, blood-clot, obstetric    • Skin cancer    • Venous stasis ulcer ([address] )    • Venous ulcers of both lower extremities ([facility] )         PAST SURGICAL HISTORY:  Past Surgical History:   Procedure Laterality Date   •  SECTION      X2        SOCIAL HISTORY:  Social History     Socioeconomic History   • Marital status:       Spouse name: None   • Number of children: None   • Years of education: None   • Highest education level: None   Occupational History   • None   Tobacco Use   • Smoking status: Never   • Smokeless tobacco: Never   Vaping Use   • Vaping Use: Never used   Substance and Sexual Activity   • Alcohol use: No     Comment: hx of social drinker   • Drug use: No   • Sexual activity: None   Other Topics Concern   • None "  Social History Narrative   • None     Social Determinants of Health     Financial Resource Strain: Not on file   Food Insecurity: Not on file   Transportation Needs: Not on file   Physical Activity: Not on file   Stress: Not on file   Social Connections: Not on file   Intimate Partner Violence: Not on file   Housing Stability: Not on file        FAMILY HISTORY:  Family History   Problem Relation Age of Onset   • Heart attack Mother    • Stroke Mother    • Cancer Father    • Bone cancer Family    • Hypertension Family    • Lung cancer Family            Objective    PHYSICAL EXAMINATION:   Blood pressure 140/80, pulse 92, temperature 97 6 °F (36 4 °C), temperature source Temporal, resp  rate 16, height 5' 9\" (1 753 m), weight 88 kg (194 lb), SpO2 97 %  Pain Score: 0-No pain     ECOG Performance Status    Flowsheet Row Most Recent Value   ECOG Performance Status 0 - Fully active, able to carry on all pre-disease performance without restriction             Physical Exam  Constitutional:       General: She is not in acute distress  Appearance: Normal appearance  She is not toxic-appearing  HENT:      Mouth/Throat:      Mouth: Mucous membranes are moist       Pharynx: Oropharynx is clear  Eyes:      General: No scleral icterus  Cardiovascular:      Rate and Rhythm: Normal rate and regular rhythm  Pulses: Normal pulses  Heart sounds: No murmur heard  No friction rub  No gallop  Pulmonary:      Effort: Pulmonary effort is normal  No respiratory distress  Breath sounds: Normal breath sounds  No wheezing or rales  Abdominal:      General: There is no distension  Palpations: There is no mass  Tenderness: There is no abdominal tenderness  There is no rebound  Musculoskeletal:         General: No swelling or tenderness  Right lower leg: No edema (uses compression stockings)  Left lower leg: No edema (uses compression stockings)     Lymphadenopathy:      Head:      Right " side of head: No submandibular, preauricular or posterior auricular adenopathy  Left side of head: No submandibular, preauricular or posterior auricular adenopathy  Cervical: No cervical adenopathy  Right cervical: No superficial or posterior cervical adenopathy  Left cervical: No superficial or posterior cervical adenopathy  Upper Body:      Right upper body: No supraclavicular or axillary adenopathy  Left upper body: No supraclavicular or axillary adenopathy  Skin:     Findings: No rash  Comments: Well healed surgical scar  No evidence of recurrence at primary site  Neurological:      General: No focal deficit present  Mental Status: She is alert and oriented to person, place, and time  Psychiatric:         Mood and Affect: Mood normal          Behavior: Behavior normal          Thought Content: Thought content normal          Judgment: Judgment normal          I reviewed lab data in the chart      WBC   Date Value Ref Range Status   04/29/2023 4 64 4 31 - 10 16 Thousand/uL Final   04/01/2023 5 15 4 31 - 10 16 Thousand/uL Final   03/04/2023 4 84 4 31 - 10 16 Thousand/uL Final     Hemoglobin   Date Value Ref Range Status   04/29/2023 11 6 11 5 - 15 4 g/dL Final   04/01/2023 11 1 (L) 11 5 - 15 4 g/dL Final   03/04/2023 12 8 11 5 - 15 4 g/dL Final     Platelets   Date Value Ref Range Status   04/29/2023 177 149 - 390 Thousands/uL Final   04/01/2023 214 149 - 390 Thousands/uL Final   03/04/2023 183 149 - 390 Thousands/uL Final     MCV   Date Value Ref Range Status   04/29/2023 90 82 - 98 fL Final   04/01/2023 90 82 - 98 fL Final   03/04/2023 91 82 - 98 fL Final      Potassium   Date Value Ref Range Status   04/29/2023 4 0 3 5 - 5 3 mmol/L Final   04/01/2023 3 7 3 5 - 5 3 mmol/L Final   03/04/2023 4 0 3 5 - 5 3 mmol/L Final     Chloride   Date Value Ref Range Status   04/29/2023 107 96 - 108 mmol/L Final   04/01/2023 106 96 - 108 mmol/L Final   03/04/2023 109 (H) 96 - 108 mmol/L Final     CO2   Date Value Ref Range Status   04/29/2023 29 21 - 32 mmol/L Final   04/01/2023 30 21 - 32 mmol/L Final   03/04/2023 26 21 - 32 mmol/L Final     BUN   Date Value Ref Range Status   04/29/2023 25 5 - 25 mg/dL Final   04/01/2023 14 5 - 25 mg/dL Final   03/04/2023 14 5 - 25 mg/dL Final     Creatinine   Date Value Ref Range Status   04/29/2023 0 96 0 60 - 1 30 mg/dL Final     Comment:     Standardized to IDMS reference method   04/01/2023 0 90 0 60 - 1 30 mg/dL Final     Comment:     Standardized to IDMS reference method   03/04/2023 0 83 0 60 - 1 30 mg/dL Final     Comment:     Standardized to IDMS reference method     Glucose   Date Value Ref Range Status   11/17/2022 85 65 - 140 mg/dL Final     Comment:     If the patient is fasting, the ADA then defines impaired fasting glucose as > 100 mg/dL and diabetes as > or equal to 123 mg/dL  Specimen collection should occur prior to Sulfasalazine administration due to the potential for falsely depressed results  Specimen collection should occur prior to Sulfapyridine administration due to the potential for falsely elevated results  08/03/2020 112 (H) 65 - 99 mg/dL Final     Comment:     If the patient is fasting, the ADA then defines impaired fasting glucose as > 100 mg/dL and diabetes as > or equal to 123 mg/dL  Specimen collection should occur prior to Sulfasalazine administration due to the potential for falsely depressed results  Specimen collection should occur prior to Sulfapyridine administration due to the potential for falsely elevated results  01/24/2017 91 65 - 140 mg/dL Final     Comment:     If the patient is fasting, the ADA then defines impaired fasting glucose as > 100 mg/dL and diabetes as > or equal to 123 mg/dL       Calcium   Date Value Ref Range Status   04/29/2023 9 1 8 3 - 10 1 mg/dL Final   04/01/2023 9 3 8 3 - 10 1 mg/dL Final   03/04/2023 9 1 8 3 - 10 1 mg/dL Final     Albumin   Date Value Ref Range Status   04/29/2023 3 4 (L) 3 5 - 5 0 g/dL Final   04/01/2023 3 1 (L) 3 5 - 5 0 g/dL Final   03/04/2023 3 3 (L) 3 5 - 5 0 g/dL Final     Total Bilirubin   Date Value Ref Range Status   04/29/2023 0 39 0 20 - 1 00 mg/dL Final     Comment:     Use of this assay is not recommended for patients undergoing treatment with eltrombopag due to the potential for falsely elevated results  04/01/2023 0 52 0 20 - 1 00 mg/dL Final     Comment:     Use of this assay is not recommended for patients undergoing treatment with eltrombopag due to the potential for falsely elevated results  03/04/2023 0 76 0 20 - 1 00 mg/dL Final     Comment:     Use of this assay is not recommended for patients undergoing treatment with eltrombopag due to the potential for falsely elevated results  Alkaline Phosphatase   Date Value Ref Range Status   04/29/2023 39 (L) 46 - 116 U/L Final   04/01/2023 36 (L) 46 - 116 U/L Final   03/04/2023 39 (L) 46 - 116 U/L Final     AST   Date Value Ref Range Status   04/29/2023 15 5 - 45 U/L Final     Comment:     Specimen collection should occur prior to Sulfasalazine administration due to the potential for falsely depressed results  04/01/2023 19 5 - 45 U/L Final     Comment:     Specimen collection should occur prior to Sulfasalazine administration due to the potential for falsely depressed results  03/04/2023 29 5 - 45 U/L Final     Comment:     Specimen collection should occur prior to Sulfasalazine administration due to the potential for falsely depressed results  ALT   Date Value Ref Range Status   04/29/2023 19 12 - 78 U/L Final     Comment:     Specimen collection should occur prior to Sulfasalazine and/or Sulfapyridine administration due to the potential for falsely depressed results  04/01/2023 32 12 - 78 U/L Final     Comment:     Specimen collection should occur prior to Sulfasalazine and/or Sulfapyridine administration due to the potential for falsely depressed results      03/04/2023 30 12 - 78 U/L Final Comment:     Specimen collection should occur prior to Sulfasalazine and/or Sulfapyridine administration due to the potential for falsely depressed results  LD   Date Value Ref Range Status   04/29/2023 176 81 - 234 U/L Final   04/01/2023 211 81 - 234 U/L Final   03/04/2023 206 81 - 234 U/L Final     No results found for: TSH  No results found for: [id number]   Free T4   Date Value Ref Range Status   04/29/2023 1 04 0 76 - 1 46 ng/dL Final     Comment:     Specimen collection should occur prior to Sulfasalazine administration due to the potential for falsely elevated results  04/01/2023 1 12 0 76 - 1 46 ng/dL Final     Comment:     Specimen collection should occur prior to Sulfasalazine administration due to the potential for falsely elevated results  03/04/2023 1 03 0 76 - 1 46 ng/dL Final     Comment:     Specimen collection should occur prior to Sulfasalazine administration due to the potential for falsely elevated results  RECENT IMAGING:  Procedure: Colonoscopy    Result Date: 4/18/2023  Narrative: Table formatting from the original result was not included  [facility] Endoscopy [address] [facility] [address] [phone] DATE OF SERVICE: 4/18/23 PHYSICIAN(S): Attending: Fransisco Mitchell DO Fellow: No Staff Documented INDICATION: Abnormal PET scan of colon POST-OP DIAGNOSIS: See the impression below  HISTORY: Prior colonoscopy: No prior colonoscopy  BOWEL PREPARATION: Golytely/Colyte/Trilyte PREPROCEDURE: Informed consent was obtained for the procedure, including sedation  Risks including but not limited to bleeding, infection, perforation, adverse drug reaction and aspiration were explained in detail  Also explained about less than 100% sensitivity with the exam and other alternatives  The patient was placed in the left lateral decubitus position   Procedure: Colonoscopy DETAILS OF PROCEDURE: Patient was taken to the procedure room where a time out was performed to confirm correct patient and correct procedure  The patient underwent monitored anesthesia care, which was administered by an anesthesia professional  The patient's blood pressure, heart rate, level of consciousness, oxygen and respirations were monitored throughout the procedure  A digital rectal exam was performed  The scope was introduced through the anus and advanced to the terminal ileum  Retroflexion was performed in the rectum  The quality of bowel preparation was evaluated using the [facility] Bowel Preparation Scale with scores of: right colon = 2, transverse colon = 2, left colon = 2  The total BBPS score was 6  Bowel prep was adequate  The patient experienced no blood loss  The procedure was moderately difficult due to Severe diverticulosis  In response to procedure difficulty, the instrument was changed to a pediatric endoscope  The patient tolerated the procedure well  There were no apparent complications  Switched from a delta pediatric scope  ANESTHESIA INFORMATION: ASA: II Anesthesia Type: Anesthesia type not filed in the log  MEDICATIONS: No administrations occurring from 0930 to 1035 on 04/18/23 FINDINGS: Terminal ileum was normal  Procedure was started with an adult scope  However due to significant narrowing and multiple diverticula in the distal sigmoid colon, I could not advance the scope beyond this region  Scope was changed to pediatric scope and we were able to complete the procedure to the terminal ileum  Four sessile polyps measuring from 4 mm up to 6 mm in the cecum; completely removed en bloc by cold snare and retrieved specimen One 5 mm sessile polyp in the proximal transverse colon; completely removed en bloc by cold snare and retrieved specimen One 12 mm pedunculated polyp 15 cm from the anal verge; completely removed en bloc by hot snare and retrieved specimen; placed 1 clip successfully (clip is MRI conditional)   Single clip placed to prevent bleeding given patient's need for Eliquis  Small, external Multiple severe diverticula causing moderate luminal narrowing (traversable) in the sigmoid colon; no bleeding was identified  There was severe diverticulosis in the sigmoid colon  There was a narrowing at 18 cm with prominent erythematous folds  Suspect these erythematous folds are more related to severe diverticulosis  Biopsies obtained  EVENTS: Procedure Events Event Event Time ENDO CECUM REACHED 4/18/2023 10:09 AM ENDO SCOPE OUT TIME 4/18/2023 10:33 AM SPECIMENS: ID Type Source Tests Collected by Time Destination 1 : POLYP(S)X4 Tissue Large Intestine, Cecum TISSUE EXAM Rose Pickerel [place], DO 4/18/2023 10:09 AM  2 : PROXIMAL, POLYP Tissue Large Intestine, Transverse Colon TISSUE EXAM Rose Pickerel [place], DO 4/18/2023 10:15 AM  3 : ERYTHEMA FOLD BX, AT 18 CM Tissue Colon TISSUE EXAM Rose Pickerel [place], DO 4/18/2023 10:23 AM  4 : POLYP Tissue Colon TISSUE EXAM Rose Pickerel [place], DO 4/18/2023 10:33 AM  EQUIPMENT: Colonoscope - Colonoscope -[id number]     Impression: Severe diverticulosis sigmoid colon with luminal narrowing and 18 cm  Erythematous prominent folds at 18 cm suspect related to severe diverticulosis status post biopsy 4 polyps in the cecum measuring 4 to 6 mm status post cold snare polypectomy 5 mm sessile polyp proximal transverse colon status post cold snare polypectomy 12 mm pedunculated polyp at 15 cm status post hot snare polypectomy  Single clip placed to prevent bleeding RECOMMENDATION:  Repeat colonoscopy in 3 years  Personal history of colon polyps  You may resume Eliquis tomorrow evening at your usual dose  Hold on Eliquis if there is any sign of bleeding  Please watch for bleeding as you resume your Eliquis  Suggest trying to obtain 25 g of fiber in diet daily   Suggest fiber supplementation such as Benefiber 2 teaspoonfuls mixed in 6 to 8 ounce of noncarbonated liquid daily  -Please call the Gastroenterology office at [phone] for biopsy results if you do  not hear from our office in 14 days  -Follow-up with primary care doctor as previously scheduled -Please call gastroenterology physician on call or go to the emergency department if you develop abdominal pain, rectal bleeding greater than 1 tbsp, black stools, fever greater than 100 5, persistent nausea or vomiting  Gastroenterology office phone number is [phone]  Please see clip instructions  Bert Benitez, DO             Assessment/Plan  Ms Na Kaur is a 67 yr female who has unresectable melanoma/metastatic melanoma on treatment with nivolumab plus relatlimab ([facility]) here for continued follow-up, monitoring, and surveillance while on treatment  1  Metastatic melanoma ([facility] )  She is doing well and tolerating treatment  Labs reviewed and okay to continue treatment  She will continue treatment with nivolumab plus relatlimab and return for follow-up and next visit and treatment in 4 weeks  She has standing orders for blood work prior to each treatment  She knows to continue to monitor for immune mediated side effects and call with any issues or concerns prior to her next visit  She is due for imaging and June and we will order imaging at her next visit  2  High risk medication use  3  Adrenal insufficiency ([facility] )  She is on treatment with immunotherapy and we will continue to monitor for side effects and lab abnormalities  We will monitor labs with each treatment to ensure safety of continuing on treatment  She knows to watch for signs and symptoms for immune mediated side effects  Denies any symptomatic immune mediated side effects at this time  She knows to continue to monitor for immune mediated side effects and call with any issues or concerns  4  Abnormal PET scan of colon  Concerning in the setting of never had a colonoscopy  Underwent colonoscopy recently with 6 benign polyps identified    She will continue to follow with GI as planned and is scheduled for repeat colonoscopy in 3 years  Return in about 4 weeks (around 5/29/2023) for Office Visit, Infusion - See Treatment Plan, labs       Norvell Bamberger, MD, PhD

## 2023-05-10 ENCOUNTER — CONSULT (OUTPATIENT)
Dept: VASCULAR SURGERY | Facility: HOSPITAL | Age: 73
End: 2023-05-10

## 2023-05-10 VITALS
HEIGHT: 69 IN | BODY MASS INDEX: 28.58 KG/M2 | WEIGHT: 193 LBS | DIASTOLIC BLOOD PRESSURE: 74 MMHG | HEART RATE: 88 BPM | SYSTOLIC BLOOD PRESSURE: 138 MMHG

## 2023-05-10 DIAGNOSIS — I87.8 VENOUS STASIS OF BOTH LOWER EXTREMITIES: Primary | ICD-10-CM

## 2023-05-10 DIAGNOSIS — I82.542 CHRONIC DEEP VEIN THROMBOSIS (DVT) OF TIBIAL VEIN OF LEFT LOWER EXTREMITY (HCC): ICD-10-CM

## 2023-05-10 DIAGNOSIS — T14.8XXD DELAYED WOUND HEALING: ICD-10-CM

## 2023-05-10 NOTE — ASSESSMENT & PLAN NOTE
65yo female with PMH of BLE DVT, PEx2 on eliqius, HTN, metastatic melanoma on immunotherapy, adrenal insufficiency, presents today for consultation regarding BLE leg pain and venous stasis changes  -LEV 3/27/23 with evidence of a chronic DVT in left PT vein  -Currently undergoing immunotherapy for melanoma  She reports she has been having ongoing bilateral calf pain, recently diagnosed with adrenal insufficiency and started on hydrocortisone  Since then, she her symptoms have resolved  -BLE with chronic venous stasis changes in lower legs with multiple scattered spider and varicose veins  No open ulcerations, erythema, or warmth  Trace edema in BLE  2+ DP pulses  Plan:   -Bilateral leg pain, resolved since started hydrocortisone  Reports intermittent leg swelling improved with compression/leg elevation  No open ulcerations    -Recommend conservative measures with use of daily compression hose, lower extremity elevation, regular exercise, and diligent skin care  -Follow up as needed  - Instructed to call the office in the interim with any questions, concerns, or new symptoms

## 2023-05-10 NOTE — PATIENT INSTRUCTIONS
-Recommend conservative measures with use of daily compression hose (20-30 mmHg), lower extremity elevation, regular exercise, and diligent skin care  - Instructed to call the office in the interim with any questions, concerns, or new symptoms  Venous Insufficiency   AMBULATORY CARE:   Venous insufficiency  is a condition that prevents blood from flowing out of your legs and back to your heart  Veins contain valves that help blood flow in one direction  Venous insufficiency means the valves do not close correctly or fully  Blood flows back and pools in your leg  This can cause problems such as varicose veins  Venous insufficiency may also be called chronic venous insufficiency or venous stasis  Common signs and symptoms:   Visible veins on your legs that may be small and red or large, thick, and blue         Swelling in your ankles or calves         Changes in skin color, such as dark or purple skin    An ulcer (open sore) on your leg    Leg pain that is worse when you are menstruating (women) or when you stand, and better when you elevate your legs    Burning or itching    Cramps that happen at night    Thick, hard skin on your legs and ankles    Feeling of heaviness in your legs    Seek care immediately if:   You have a wound that does not heal or is infected  You have an injury that has broken your skin and caused your varicose veins to bleed  Your leg is swollen and hard  You have pain in your leg that does not go away or gets worse  Your legs or feet are turning blue or black  Your leg feels warm, tender, and painful  It may look swollen and red  Call your doctor if:   You have a fever  You have varicose veins and they are painful  You have new or worsening leg pain, swelling, or redness  You have new or worsening ulcers or other sores on your leg  You have questions or concerns about your condition or care      Treatment  may include any of the following:  Medicine  may be given to improve blood flow  The medicines may thin your blood or reduce swelling to help blood flow  You may also need medicine to treat a bacterial infection  Ablation  is a procedure used to close varicose veins  A catheter is guided until it is near the vein  A device will then be guided to the area  The device may produce energy through radiofrequency or a laser  The energy creates heat that will close the blood vessel  Sclerotherapy  is a procedure used to fade visible veins  Your healthcare provider will inject a liquid into a spider vein or varicose vein  The liquid causes irritation in the vein  The vein swells and sticks together  Your body will then absorb the vein  Surgery  may be needed if other treatments do not work  Surgery may be used to repair a leg vein valve or to clip or tie off a vein so blood cannot flow through it  You may need to have a veins removed during surgery called stripping  Surgery may be used to bypass (go around) the damaged vein  Blood will flow through a vein transplanted from another part of your body  Manage your symptoms:   Wear pressure stockings as directed  Pressure stockings help keep blood from pooling in your leg veins  Your healthcare provider can prescribe stockings that are right for you  Do not buy over-the-counter pressure stockings unless your healthcare provider says it is okay  They may not fit correctly or may have elastic that cuts off your circulation  Ask your healthcare provider when to start wearing pressure stockings and how long to wear them each day  Do not sit or stand for long periods of time  If you have to sit for a long time, flex and extend your legs, feet, and ankles  Do this about 10 times every 30 minutes to help keep blood flowing  If you have to stand for a long time, take breaks and sit with your legs elevated  Elevate your legs  Elevate your legs above the level of your heart to reduce swelling   Your healthcare provider may recommend that you keep your legs elevated for 30 minutes at a time  You may need to do this 3 to 4 times per day, or more if your healthcare provider recommends  Do not smoke  Nicotine and other chemicals in cigarettes and cigars can cause blood vessel damage  Ask your healthcare provider for information if you currently smoke and need help to quit  E-cigarettes or smokeless tobacco still contain nicotine  Talk to your healthcare provider before you use these products  Reach or maintain a healthy weight  Extra weight can make venous insufficiency worse  Ask your healthcare provider what a healthy weight is for you  He or she can help you create a weight loss plan if you need to lose weight  Exercise as directed  Walking can help increase blood flow in your calves  Ask your healthcare provider how much exercise you need each day and which exercises are best for you  Care for your skin  Keep your skin clean  Do not use any soaps or lotions that may dry your skin  For example, do not use products that contain fragrance or alcohol  If you have a skin ulcer, your healthcare provider may recommend a wet-to-dry bandage  To do this, apply a wet bandage to your wound and allow it to dry  This will help remove drainage from your wound each time you change the bandage  Your healthcare provider will tell you how often to change your bandage and which kind of bandage to use  Check your wound for signs of infection, such as swelling or pus  Go to physical therapy (PT) as directed  A physical therapist can help you increase movement and range of motion in your legs  Follow up with your doctor as directed:  Write down your questions so you remember to ask them during your visits  © Copyright [employer] 2022 Information is for End User's use only and may not be sold, redistributed or otherwise used for commercial purposes  The above information is an  only   It is not intended as medical advice for individual conditions or treatments  Talk to your doctor, nurse or pharmacist before following any medical regimen to see if it is safe and effective for you

## 2023-05-22 ENCOUNTER — TELEPHONE (OUTPATIENT)
Dept: HEMATOLOGY ONCOLOGY | Facility: CLINIC | Age: 73
End: 2023-05-22

## 2023-05-23 ENCOUNTER — TELEPHONE (OUTPATIENT)
Dept: HEMATOLOGY ONCOLOGY | Facility: CLINIC | Age: 73
End: 2023-05-23

## 2023-05-23 NOTE — TELEPHONE ENCOUNTER
Patient Call    Who are you speaking with? Patient    If it is not the patient, are they listed on an active communication consent form? N/A   What is the reason for this call? Pt called to inform the staff that she is going to have her labs done the Saturday prior to her infusion so it won't be before her appt  She said that Dr Buddy Perez said that was ok   Does this require a call back? N/A   If a call back is required, please list best call back number n/a   If a call back is required, advise that a message will be forwarded to their care team and someone will return their call as soon as possible  Did you relay this information to the patient?  N/A

## 2023-05-24 RX ORDER — SODIUM CHLORIDE 9 MG/ML
20 INJECTION, SOLUTION INTRAVENOUS ONCE
Status: CANCELLED | OUTPATIENT
Start: 2023-05-31

## 2023-05-25 ENCOUNTER — OFFICE VISIT (OUTPATIENT)
Dept: HEMATOLOGY ONCOLOGY | Facility: CLINIC | Age: 73
End: 2023-05-25

## 2023-05-25 VITALS
HEIGHT: 69 IN | OXYGEN SATURATION: 99 % | WEIGHT: 188 LBS | BODY MASS INDEX: 27.85 KG/M2 | TEMPERATURE: 97.5 F | SYSTOLIC BLOOD PRESSURE: 136 MMHG | RESPIRATION RATE: 16 BRPM | HEART RATE: 75 BPM | DIASTOLIC BLOOD PRESSURE: 80 MMHG

## 2023-05-25 DIAGNOSIS — Z79.899 HIGH RISK MEDICATION USE: ICD-10-CM

## 2023-05-25 DIAGNOSIS — C43.9 METASTATIC MELANOMA (HCC): Primary | ICD-10-CM

## 2023-05-25 DIAGNOSIS — E27.40 ADRENAL INSUFFICIENCY (HCC): ICD-10-CM

## 2023-05-25 NOTE — PROGRESS NOTES
[facility] HEMATOLOGY ONCOLOGY SPECIALISTS [address]  [facility] [address]  [phone]     Date of Visit: 1/06/6394  Name: Belle José   YOB: 1950        Subjective    VISIT DIAGNOSIS:  There are no diagnoses linked to this encounter  Oncology History   [facility] ([facility] )   10/13/2022 -  Cancer Staged    Staging form: Melanoma of the Skin, AJCC 8th Edition  - Clinical stage from 10/13/2022: Stage IV (cTX, cNX, cM1a) - Signed by Agustin Delgado MD on 11/22/2022       10/13/2022 Biopsy    A  Skin, right lateral medial leg, punch biopsy:  Portion of severely atypical melanocytic dermal proliferation with prominent melanosis consistent with melanoma  See note  B  Skin, right medial leg  punch biopsy:  Portion of severely atypical melanocytic dermal proliferation with prominent melanosis consistent with melanoma  See note  C  Skin, right medial leg, punch biopsy:   Portion of severely atypical melanocytic dermal proliferation with prominent melanosis consistent with melanoma  See note       11/22/2022 Initial Diagnosis    Metastatic melanoma ([facility] )     12/14/2022 -  Chemotherapy    alteplase (CATHFLO), 2 mg, Intracatheter, Every 2 hour PRN, 6 of 8 cycles  NIVOLUMAB-RELATLIMAB-RMBW (OPDUALAG) IVPB, 480 mg of nivolumab, Intravenous, Once, 6 of 8 cycles  Administration: 480 mg of nivolumab (12/14/2022), 480 mg of nivolumab (1/11/2023), 480 mg of nivolumab (2/8/2023), 480 mg of nivolumab (3/8/2023), 480 mg of nivolumab (4/5/2023), 480 mg of nivolumab (5/3/2023)     1/5/2023 [facility] liquid testing  TMB 1 Mut/Mb  MSI-high not detected          Cancer Staging   Metastatic melanoma ([facility] )  Staging form: Melanoma of the Skin, AJCC 8th Edition  - Clinical stage from 10/13/2022: Stage IV (cTX, cNX, cM1a) - Signed by Agustin Delgado MD on 11/22/2022     Treatment Details   Treatment goal Curative   Plan Name OP Nivolumab and Relatlimab-rmbw Q 28 Days   Status Active   Start Date 12/14/2022   End Date 6/28/2023 (Planned)   Provider Taylor Johnson MD   Chemotherapy alteplase (CATHFLO), 2 mg, Intracatheter, Every 2 hour PRN, 6 of 8 cycles    NIVOLUMAB-RELATLIMAB-RMBW (OPDUALAG) IVPB, 480 mg of nivolumab, Intravenous, Once, 6 of 8 cycles  Administration: 480 mg of nivolumab (12/14/2022), 480 mg of nivolumab (1/11/2023), 480 mg of nivolumab (2/8/2023), 480 mg of nivolumab (3/8/2023), 480 mg of nivolumab (4/5/2023), 480 mg of nivolumab (5/3/2023)          HISTORY OF PRESENT ILLNESS: Kevan Gaxiola is a 67 y o  female  who has unresectable melanoma/metastatic melanoma on her leg on treatment with nivolumab plus relatlimab (opdualag) for continued monitoring, surveillance, and follow-up while on treatment  She continues to do well and is tolerating treatment  She was recently seen by Vascular Surgery who confirmed she did not have any concerning vascular issues at this time  Denies any immune mediated side effects that are symptomatic at this time  Denies headaches, double vision, rash, itching, chest pain, shortness of breath, and diarrhea  She is looking forward to summer  She will obtain blood work prior to her next visit in 4 weeks  REVIEW OF SYSTEMS:  Review of Systems   Constitutional: Negative for appetite change, fatigue, fever and unexpected weight change  HENT:   Negative for lump/mass  Eyes: Negative for icterus  Respiratory: Negative for cough, shortness of breath and wheezing  Cardiovascular: Negative for chest pain and leg swelling  Gastrointestinal: Negative for abdominal pain, constipation, diarrhea, nausea and vomiting  Genitourinary: Negative for difficulty urinating and hematuria  Musculoskeletal: Negative for arthralgias and myalgias  Skin: Negative for itching and rash  No new, changing, or concerning lesions     Neurological: Negative for extremity weakness, headaches, light-headedness and numbness  Hematological: Negative for adenopathy          MEDICATIONS:    Current Outpatient Medications:   •  apixaban (Eliquis) 5 mg, Take 1 tablet (5 mg total) by mouth every 12 (twelve) hours, Disp: 60 tablet, Rfl: 5  •  calcium carbonate (OS-VIRGIE) 600 MG tablet, Take 600 mg by mouth 2 (two) times a day, Disp: , Rfl:   •  famotidine (PEPCID) 10 mg tablet, Take 10 mg by mouth 2 (two) times a day, Disp: , Rfl:   •  hydrocortisone (CORTEF) 10 mg tablet, Take 1 tablet (10 mg total) by mouth 2 (two) times a day, Disp: 60 tablet, Rfl: 11  •  loratadine (CLARITIN) 10 mg tablet, Take 10 mg by mouth daily, Disp: , Rfl:   •  losartan (Cozaar) 100 MG tablet, Take 1 tablet (100 mg total) by mouth daily, Disp: 30 tablet, Rfl: 5  •  metoprolol succinate (TOPROL-XL) 25 mg 24 hr tablet, Take 1 tablet (25 mg total) by mouth 2 (two) times a day, Disp: 60 tablet, Rfl: 5  •  Multiple Vitamins-Minerals (MULTIVITAMIN WITH MINERALS) tablet, Take 1 tablet by mouth daily, Disp: , Rfl:   •  patient supplied medication, Pt takes eye drops Mura 128 - not listed in database, Disp: , Rfl:   •  Wheat Dextrin (BENEFIBER DRINK MIX PO), Take by mouth, Disp: , Rfl:   •  ondansetron (ZOFRAN) 4 mg tablet, Take 1 tablet (4 mg total) by mouth every 8 (eight) hours as needed for nausea or vomiting (Patient not taking: Reported on 5/1/2023), Disp: 20 tablet, Rfl: 0  •  triamcinolone (KENALOG) 0 1 % ointment, Apply topically 2 (two) times a day (Patient not taking: Reported on 5/1/2023), Disp: 453 6 g, Rfl: 0     ALLERGIES:  No Known Allergies     ACTIVE PROBLEMS:  Patient Active Problem List   Diagnosis   • Acute massive pulmonary embolism (HCC)   • Venous stasis of both lower extremities   • Clostridium difficile infection   • Right ventricular systolic dysfunction without heart failure   • Essential hypertension   • Metastatic melanoma ([facility] )   • High risk medication use   • Adrenal insufficiency (HCC)   • Abnormal PET scan of colon   • Mild anemia   • Anticoagulated by anticoagulation treatment          PAST MEDICAL HISTORY:   Past Medical History:   Diagnosis Date   • Acute deep vein thrombosis (DVT) of popliteal vein of left lower extremity (HCC)    • Ankle wound, left, initial encounter     Resolved 2017   • Ankle wound, right, initial encounter     resolved 2017   • DVT (deep vein thrombosis) in pregnancy    • Hypertension    • Pulmonary emboli ([facility] )      post C section   • Pulmonary embolism ([facility] )     2017   • Pulmonary embolism, blood-clot, obstetric    • Skin cancer    • Venous stasis ulcer ([facility] )    • Venous ulcers of both lower extremities ([facility] )         PAST SURGICAL HISTORY:  Past Surgical History:   Procedure Laterality Date   •  SECTION      X2        SOCIAL HISTORY:  Social History     Socioeconomic History   • Marital status:       Spouse name: None   • Number of children: None   • Years of education: None   • Highest education level: None   Occupational History   • None   Tobacco Use   • Smoking status: Never   • Smokeless tobacco: Never   Vaping Use   • Vaping Use: Never used   Substance and Sexual Activity   • Alcohol use: No     Comment: hx of social drinker   • Drug use: No   • Sexual activity: None   Other Topics Concern   • None   Social History Narrative   • None     Social Determinants of Health     Financial Resource Strain: Not on file   Food Insecurity: Not on file   Transportation Needs: Not on file   Physical Activity: Not on file   Stress: Not on file   Social Connections: Not on file   Intimate Partner Violence: Not on file   Housing Stability: Not on file        FAMILY HISTORY:  Family History   Problem Relation Age of Onset   • Heart attack Mother    • Stroke Mother    • Cancer Father    • Bone cancer Family    • Hypertension Family    • Lung cancer Family            Objective    PHYSICAL EXAMINATION:   Blood pressure 136/80, pulse 75, temperature 97 5 °F (36 4 °C), temperature "source Temporal, resp  rate 16, height 5' 9\" (1 753 m), weight 85 3 kg (188 lb), SpO2 99 %  Pain Score: 0-No pain     ECOG Performance Status    Flowsheet Row Most Recent Value   ECOG Performance Status 0 - Fully active, able to carry on all pre-disease performance without restriction             Physical Exam  Constitutional:       General: She is not in acute distress  Appearance: Normal appearance  She is not toxic-appearing  HENT:      Mouth/Throat:      Mouth: Mucous membranes are moist       Pharynx: Oropharynx is clear  Eyes:      General: No scleral icterus  Conjunctiva/sclera: Conjunctivae normal    Cardiovascular:      Rate and Rhythm: Normal rate and regular rhythm  Pulses: Normal pulses  Heart sounds: No murmur heard  No friction rub  No gallop  Pulmonary:      Effort: Pulmonary effort is normal  No respiratory distress  Breath sounds: Normal breath sounds  No wheezing or rales  Abdominal:      General: Bowel sounds are normal  There is no distension  Palpations: Abdomen is soft  There is no mass  Tenderness: There is no abdominal tenderness  There is no rebound  Musculoskeletal:         General: No swelling or tenderness  Right lower leg: No edema (uses compression stockings)  Left lower leg: No edema (uses compression stockings)  Lymphadenopathy:      Head:      Right side of head: No submandibular, preauricular or posterior auricular adenopathy  Left side of head: No submandibular, preauricular or posterior auricular adenopathy  Cervical: No cervical adenopathy  Right cervical: No superficial or posterior cervical adenopathy  Left cervical: No superficial or posterior cervical adenopathy  Upper Body:      Right upper body: No supraclavicular or axillary adenopathy  Left upper body: No supraclavicular or axillary adenopathy  Skin:     General: Skin is warm and dry  Findings: No rash  Comments:  Well " healed surgical scar  No evidence of recurrence at primary site  Neurological:      General: No focal deficit present  Mental Status: She is alert and oriented to person, place, and time  Psychiatric:         Mood and Affect: Mood normal          Behavior: Behavior normal          Thought Content: Thought content normal          Judgment: Judgment normal          I reviewed lab data in the chart  Hemoglobin   Date Value Ref Range Status   04/29/2023 11 6 11 5 - 15 4 g/dL Final   04/01/2023 11 1 (L) 11 5 - 15 4 g/dL Final   03/04/2023 12 8 11 5 - 15 4 g/dL Final     MCV   Date Value Ref Range Status   04/29/2023 90 82 - 98 fL Final   04/01/2023 90 82 - 98 fL Final   03/04/2023 91 82 - 98 fL Final     Platelets   Date Value Ref Range Status   04/29/2023 177 149 - 390 Thousands/uL Final   04/01/2023 214 149 - 390 Thousands/uL Final   03/04/2023 183 149 - 390 Thousands/uL Final     WBC   Date Value Ref Range Status   04/29/2023 4 64 4 31 - 10 16 Thousand/uL Final   04/01/2023 5 15 4 31 - 10 16 Thousand/uL Final   03/04/2023 4 84 4 31 - 10 16 Thousand/uL Final      Albumin   Date Value Ref Range Status   04/29/2023 3 4 (L) 3 5 - 5 0 g/dL Final   04/01/2023 3 1 (L) 3 5 - 5 0 g/dL Final   03/04/2023 3 3 (L) 3 5 - 5 0 g/dL Final     Alkaline Phosphatase   Date Value Ref Range Status   04/29/2023 39 (L) 46 - 116 U/L Final   04/01/2023 36 (L) 46 - 116 U/L Final   03/04/2023 39 (L) 46 - 116 U/L Final     ALT   Date Value Ref Range Status   04/29/2023 19 12 - 78 U/L Final     Comment:     Specimen collection should occur prior to Sulfasalazine and/or Sulfapyridine administration due to the potential for falsely depressed results  04/01/2023 32 12 - 78 U/L Final     Comment:     Specimen collection should occur prior to Sulfasalazine and/or Sulfapyridine administration due to the potential for falsely depressed results      03/04/2023 30 12 - 78 U/L Final     Comment:     Specimen collection should occur prior to Sulfasalazine and/or Sulfapyridine administration due to the potential for falsely depressed results  AST   Date Value Ref Range Status   04/29/2023 15 5 - 45 U/L Final     Comment:     Specimen collection should occur prior to Sulfasalazine administration due to the potential for falsely depressed results  04/01/2023 19 5 - 45 U/L Final     Comment:     Specimen collection should occur prior to Sulfasalazine administration due to the potential for falsely depressed results  03/04/2023 29 5 - 45 U/L Final     Comment:     Specimen collection should occur prior to Sulfasalazine administration due to the potential for falsely depressed results  BUN   Date Value Ref Range Status   04/29/2023 25 5 - 25 mg/dL Final   04/01/2023 14 5 - 25 mg/dL Final   03/04/2023 14 5 - 25 mg/dL Final     Calcium   Date Value Ref Range Status   04/29/2023 9 1 8 3 - 10 1 mg/dL Final   04/01/2023 9 3 8 3 - 10 1 mg/dL Final   03/04/2023 9 1 8 3 - 10 1 mg/dL Final     Chloride   Date Value Ref Range Status   04/29/2023 107 96 - 108 mmol/L Final   04/01/2023 106 96 - 108 mmol/L Final   03/04/2023 109 (H) 96 - 108 mmol/L Final     CO2   Date Value Ref Range Status   04/29/2023 29 21 - 32 mmol/L Final   04/01/2023 30 21 - 32 mmol/L Final   03/04/2023 26 21 - 32 mmol/L Final     Creatinine   Date Value Ref Range Status   04/29/2023 0 96 0 60 - 1 30 mg/dL Final     Comment:     Standardized to IDMS reference method   04/01/2023 0 90 0 60 - 1 30 mg/dL Final     Comment:     Standardized to IDMS reference method   03/04/2023 0 83 0 60 - 1 30 mg/dL Final     Comment:     Standardized to IDMS reference method     Glucose   Date Value Ref Range Status   11/17/2022 85 65 - 140 mg/dL Final     Comment:     If the patient is fasting, the ADA then defines impaired fasting glucose as > 100 mg/dL and diabetes as > or equal to 123 mg/dL    Specimen collection should occur prior to Sulfasalazine administration due to the potential for falsely "depressed results  Specimen collection should occur prior to Sulfapyridine administration due to the potential for falsely elevated results  08/03/2020 112 (H) 65 - 99 mg/dL Final     Comment:     If the patient is fasting, the ADA then defines impaired fasting glucose as > 100 mg/dL and diabetes as > or equal to 123 mg/dL  Specimen collection should occur prior to Sulfasalazine administration due to the potential for falsely depressed results  Specimen collection should occur prior to Sulfapyridine administration due to the potential for falsely elevated results  01/24/2017 91 65 - 140 mg/dL Final     Comment:     If the patient is fasting, the ADA then defines impaired fasting glucose as > 100 mg/dL and diabetes as > or equal to 123 mg/dL  Potassium   Date Value Ref Range Status   04/29/2023 4 0 3 5 - 5 3 mmol/L Final   04/01/2023 3 7 3 5 - 5 3 mmol/L Final   03/04/2023 4 0 3 5 - 5 3 mmol/L Final     Total Bilirubin   Date Value Ref Range Status   04/29/2023 0 39 0 20 - 1 00 mg/dL Final     Comment:     Use of this assay is not recommended for patients undergoing treatment with eltrombopag due to the potential for falsely elevated results  04/01/2023 0 52 0 20 - 1 00 mg/dL Final     Comment:     Use of this assay is not recommended for patients undergoing treatment with eltrombopag due to the potential for falsely elevated results  03/04/2023 0 76 0 20 - 1 00 mg/dL Final     Comment:     Use of this assay is not recommended for patients undergoing treatment with eltrombopag due to the potential for falsely elevated results        LD   Date Value Ref Range Status   04/29/2023 176 81 - 234 U/L Final   04/01/2023 211 81 - 234 U/L Final   03/04/2023 206 81 - 234 U/L Final     No results found for: \"TSH\"  No results found for: \"[id number]\"   Free T4   Date Value Ref Range Status   04/29/2023 1 04 0 76 - 1 46 ng/dL Final     Comment:     Specimen collection should occur prior to Sulfasalazine administration due " to the potential for falsely elevated results  04/01/2023 1 12 0 76 - 1 46 ng/dL Final     Comment:     Specimen collection should occur prior to Sulfasalazine administration due to the potential for falsely elevated results  03/04/2023 1 03 0 76 - 1 46 ng/dL Final     Comment:     Specimen collection should occur prior to Sulfasalazine administration due to the potential for falsely elevated results  RECENT IMAGING:  No results found  Assessment/Plan  Ms Fox Billings is a 67 yr female who has unresectable melanoma/metastatic melanoma on treatment with nivolumab plus relatlimab ([facility]) here for continued follow-up, monitoring, and surveillance while on treatment  1  Metastatic melanoma ([facility] )  She is doing well and tolerating treatment  No new labs for review today  She will continue treatment with nivolumab plus relatlimab and has her next infusion in 4 weeks  Prior to her follow-up visit in 4 weeks, she will get repeat blood work  She has standing orders for blood work prior to each treatment  She knows to continue to monitor for immune mediated side effects and call with any issues or concerns prior to her next visit  She is due for imaging and June and we will order imaging at her next visit  2  High risk medication use  3  Adrenal insufficiency ([facility] )  She is on treatment with immunotherapy and we will continue to monitor for side effects and lab abnormalities  We will monitor labs with each treatment to ensure safety of continuing on treatment  She knows to watch for signs and symptoms for immune mediated side effects  Denies any symptomatic immune mediated side effects at this time  She knows to continue to monitor for immune mediated side effects and call with any issues or concerns  4  Abnormal PET scan of colon  Underwent colonoscopy with 6 benign polyps identified    She will continue to follow with GI as planned and is scheduled for repeat colonoscopy in 3 years         No follow-ups on file       Mary Houser MD, PhD

## 2023-05-25 NOTE — LETTER
June 4, 2023     DO Katherine Lemon [address]    Patient: Nelda Kulkarni   YOB: 1950   Date of Visit: 5/25/2023       Dear Dr Tram Roldan: Thank you for referring Nelda Kulkarni to me for evaluation  Below are my notes for this consultation  If you have questions, please do not hesitate to call me  I look forward to following your patient along with you  Sincerely,        Pollo Traore MD        CC: MD Atilio Mireles DO [place], DO  6/4/2023  6:54 PM  Attested  [facility] [address] [place] [address]  [phone]     Date of Visit: 3/30/0931  Name: Nelda Kulkarni   YOB: 1950        Subjective    VISIT DIAGNOSIS:  There are no diagnoses linked to this encounter  Oncology History   Metastatic melanoma ([facility] )   10/13/2022 -  Cancer Staged    Staging form: Melanoma of the Skin, AJCC 8th Edition  - Clinical stage from 10/13/2022: Stage IV (cTX, cNX, cM1a) - Signed by Pollo Traore MD on 11/22/2022       10/13/2022 Biopsy    A  Skin, right lateral medial leg, punch biopsy:  Portion of severely atypical melanocytic dermal proliferation with prominent melanosis consistent with melanoma  See note  B  Skin, right medial leg  punch biopsy:  Portion of severely atypical melanocytic dermal proliferation with prominent melanosis consistent with melanoma  See note  C  Skin, right medial leg, punch biopsy:   Portion of severely atypical melanocytic dermal proliferation with prominent melanosis consistent with melanoma  See note       11/22/2022 Initial Diagnosis    Metastatic melanoma ([facility] )     12/14/2022 -  Chemotherapy    alteplase (CATHFLO), 2 mg, Intracatheter, Every 2 hour PRN, 6 of 8 cycles  NIVOLUMAB-RELATLIMAB-RMBW (OPDUALAG) IVPB, 480 mg of nivolumab, Intravenous, Once, 6 of 8 cycles  Administration: 480 mg of nivolumab (12/14/2022), 480 mg of nivolumab (1/11/2023), 480 mg of nivolumab (2/8/2023), 480 mg of nivolumab (3/8/2023), 480 mg of nivolumab (4/5/2023), 480 mg of nivolumab (5/3/2023)     1/5/2023 [facility] liquid testing  TMB 1 Mut/Mb  MSI-high not detected          Cancer Staging   Metastatic melanoma ([facility] )  Staging form: Melanoma of the Skin, AJCC 8th Edition  - Clinical stage from 10/13/2022: Stage IV (cTX, cNX, cM1a) - Signed by Radha Sandoval MD on 11/22/2022     Treatment Details   Treatment goal Curative   Plan Name OP Nivolumab and Relatlimab-rmbw Q 28 Days   Status Active   Start Date 12/14/2022   End Date 6/28/2023 (Planned)   Provider Radha Sandoval MD   Chemotherapy alteplase (CATHFLO), 2 mg, Intracatheter, Every 2 hour PRN, 6 of 8 cycles    NIVOLUMAB-RELATLIMAB-RMBW (OPDUALAG) IVPB, 480 mg of nivolumab, Intravenous, Once, 6 of 8 cycles  Administration: 480 mg of nivolumab (12/14/2022), 480 mg of nivolumab (1/11/2023), 480 mg of nivolumab (2/8/2023), 480 mg of nivolumab (3/8/2023), 480 mg of nivolumab (4/5/2023), 480 mg of nivolumab (5/3/2023)          HISTORY OF PRESENT ILLNESS: Lnidsay Johnston is a 67 y o  female  who has unresectable melanoma/metastatic melanoma on her leg on treatment with nivolumab plus relatlimab (opdualag) for continued monitoring, surveillance, and follow-up while on treatment  She continues to do well and is tolerating treatment  She was recently seen by Vascular Surgery who confirmed she did not have any concerning vascular issues at this time  Denies any immune mediated side effects that are symptomatic at this time  Denies headaches, double vision, rash, itching, chest pain, shortness of breath, and diarrhea  She is looking forward to summer  She will obtain blood work prior to her next visit in 4 weeks  REVIEW OF SYSTEMS:  Review of Systems   Constitutional: Negative for appetite change, fatigue, fever and unexpected weight change  HENT:   Negative for lump/mass  Eyes: Negative for icterus  Respiratory: Negative for cough, shortness of breath and wheezing  Cardiovascular: Negative for chest pain and leg swelling  Gastrointestinal: Negative for abdominal pain, constipation, diarrhea, nausea and vomiting  Genitourinary: Negative for difficulty urinating and hematuria  Musculoskeletal: Negative for arthralgias and myalgias  Skin: Negative for itching and rash  No new, changing, or concerning lesions  Neurological: Negative for extremity weakness, headaches, light-headedness and numbness  Hematological: Negative for adenopathy          MEDICATIONS:    Current Outpatient Medications:   •  apixaban (Eliquis) 5 mg, Take 1 tablet (5 mg total) by mouth every 12 (twelve) hours, Disp: 60 tablet, Rfl: 5  •  calcium carbonate (OS-VIRGIE) 600 MG tablet, Take 600 mg by mouth 2 (two) times a day, Disp: , Rfl:   •  famotidine (PEPCID) 10 mg tablet, Take 10 mg by mouth 2 (two) times a day, Disp: , Rfl:   •  hydrocortisone (CORTEF) 10 mg tablet, Take 1 tablet (10 mg total) by mouth 2 (two) times a day, Disp: 60 tablet, Rfl: 11  •  loratadine (CLARITIN) 10 mg tablet, Take 10 mg by mouth daily, Disp: , Rfl:   •  losartan (Cozaar) 100 MG tablet, Take 1 tablet (100 mg total) by mouth daily, Disp: 30 tablet, Rfl: 5  •  metoprolol succinate (TOPROL-XL) 25 mg 24 hr tablet, Take 1 tablet (25 mg total) by mouth 2 (two) times a day, Disp: 60 tablet, Rfl: 5  •  Multiple Vitamins-Minerals (MULTIVITAMIN WITH MINERALS) tablet, Take 1 tablet by mouth daily, Disp: , Rfl:   •  patient supplied medication, Pt takes eye drops Mura 128 - not listed in database, Disp: , Rfl:   •  Wheat Dextrin (BENEFIBER DRINK MIX PO), Take by mouth, Disp: , Rfl:   •  ondansetron (ZOFRAN) 4 mg tablet, Take 1 tablet (4 mg total) by mouth every 8 (eight) hours as needed for nausea or vomiting (Patient not taking: Reported on 5/1/2023), Disp: 20 tablet, Rfl: 0  •  triamcinolone (KENALOG) 0 1 % ointment, Apply topically 2 (two) times a day (Patient not taking: Reported on 2023), Disp: 453 6 g, Rfl: 0     ALLERGIES:  No Known Allergies     ACTIVE PROBLEMS:  Patient Active Problem List   Diagnosis   • Acute massive pulmonary embolism (HCC)   • Venous stasis of both lower extremities   • Clostridium difficile infection   • Right ventricular systolic dysfunction without heart failure   • Essential hypertension   • Metastatic melanoma ([facility] )   • High risk medication use   • Adrenal insufficiency (HCC)   • Abnormal PET scan of colon   • Mild anemia   • Anticoagulated by anticoagulation treatment          PAST MEDICAL HISTORY:   Past Medical History:   Diagnosis Date   • Acute deep vein thrombosis (DVT) of popliteal vein of left lower extremity (HCC)    • Ankle wound, left, initial encounter     Resolved 2017   • Ankle wound, right, initial encounter     resolved 2017   • DVT (deep vein thrombosis) in pregnancy    • Hypertension    • Pulmonary emboli ([facility] )      post C section   • Pulmonary embolism ([facility] )     2017   • Pulmonary embolism, blood-clot, obstetric    • Skin cancer    • Venous stasis ulcer (HCC)    • Venous ulcers of both lower extremities ([facility] )         PAST SURGICAL HISTORY:  Past Surgical History:   Procedure Laterality Date   •  SECTION      X2        SOCIAL HISTORY:  Social History     Socioeconomic History   • Marital status:       Spouse name: None   • Number of children: None   • Years of education: None   • Highest education level: None   Occupational History   • None   Tobacco Use   • Smoking status: Never   • Smokeless tobacco: Never   Vaping Use   • Vaping Use: Never used   Substance and Sexual Activity   • Alcohol use: No     Comment: hx of social drinker   • Drug use: No   • Sexual activity: None   Other Topics Concern   • None   Social History Narrative   • None     Social Determinants of Health     Financial Resource Strain: Not on file   Food Insecurity: Not on file "  Transportation Needs: Not on file   Physical Activity: Not on file   Stress: Not on file   Social Connections: Not on file   Intimate Partner Violence: Not on file   Housing Stability: Not on file        FAMILY HISTORY:  Family History   Problem Relation Age of Onset   • Heart attack Mother    • Stroke Mother    • Cancer Father    • Bone cancer Family    • Hypertension Family    • Lung cancer Family            Objective    PHYSICAL EXAMINATION:   Blood pressure 136/80, pulse 75, temperature 97 5 °F (36 4 °C), temperature source Temporal, resp  rate 16, height 5' 9\" (1 753 m), weight 85 3 kg (188 lb), SpO2 99 %  Pain Score: 0-No pain     ECOG Performance Status      Flowsheet Row Most Recent Value   ECOG Performance Status 0 - Fully active, able to carry on all pre-disease performance without restriction               Physical Exam  Constitutional:       General: She is not in acute distress  Appearance: Normal appearance  She is not toxic-appearing  HENT:      Mouth/Throat:      Mouth: Mucous membranes are moist       Pharynx: Oropharynx is clear  Eyes:      General: No scleral icterus  Conjunctiva/sclera: Conjunctivae normal    Cardiovascular:      Rate and Rhythm: Normal rate and regular rhythm  Pulses: Normal pulses  Heart sounds: No murmur heard  No friction rub  No gallop  Pulmonary:      Effort: Pulmonary effort is normal  No respiratory distress  Breath sounds: Normal breath sounds  No wheezing or rales  Abdominal:      General: Bowel sounds are normal  There is no distension  Palpations: Abdomen is soft  There is no mass  Tenderness: There is no abdominal tenderness  There is no rebound  Musculoskeletal:         General: No swelling or tenderness  Right lower leg: No edema (uses compression stockings)  Left lower leg: No edema (uses compression stockings)     Lymphadenopathy:      Head:      Right side of head: No submandibular, preauricular or " posterior auricular adenopathy  Left side of head: No submandibular, preauricular or posterior auricular adenopathy  Cervical: No cervical adenopathy  Right cervical: No superficial or posterior cervical adenopathy  Left cervical: No superficial or posterior cervical adenopathy  Upper Body:      Right upper body: No supraclavicular or axillary adenopathy  Left upper body: No supraclavicular or axillary adenopathy  Skin:     General: Skin is warm and dry  Findings: No rash  Comments: Well healed surgical scar  No evidence of recurrence at primary site  Neurological:      General: No focal deficit present  Mental Status: She is alert and oriented to person, place, and time  Psychiatric:         Mood and Affect: Mood normal          Behavior: Behavior normal          Thought Content: Thought content normal          Judgment: Judgment normal          I reviewed lab data in the chart      Hemoglobin   Date Value Ref Range Status   04/29/2023 11 6 11 5 - 15 4 g/dL Final   04/01/2023 11 1 (L) 11 5 - 15 4 g/dL Final   03/04/2023 12 8 11 5 - 15 4 g/dL Final     MCV   Date Value Ref Range Status   04/29/2023 90 82 - 98 fL Final   04/01/2023 90 82 - 98 fL Final   03/04/2023 91 82 - 98 fL Final     Platelets   Date Value Ref Range Status   04/29/2023 177 149 - 390 Thousands/uL Final   04/01/2023 214 149 - 390 Thousands/uL Final   03/04/2023 183 149 - 390 Thousands/uL Final     WBC   Date Value Ref Range Status   04/29/2023 4 64 4 31 - 10 16 Thousand/uL Final   04/01/2023 5 15 4 31 - 10 16 Thousand/uL Final   03/04/2023 4 84 4 31 - 10 16 Thousand/uL Final      Albumin   Date Value Ref Range Status   04/29/2023 3 4 (L) 3 5 - 5 0 g/dL Final   04/01/2023 3 1 (L) 3 5 - 5 0 g/dL Final   03/04/2023 3 3 (L) 3 5 - 5 0 g/dL Final     Alkaline Phosphatase   Date Value Ref Range Status   04/29/2023 39 (L) 46 - 116 U/L Final   04/01/2023 36 (L) 46 - 116 U/L Final   03/04/2023 39 (L) 46 - 116 U/L Final     ALT   Date Value Ref Range Status   04/29/2023 19 12 - 78 U/L Final     Comment:     Specimen collection should occur prior to Sulfasalazine and/or Sulfapyridine administration due to the potential for falsely depressed results  04/01/2023 32 12 - 78 U/L Final     Comment:     Specimen collection should occur prior to Sulfasalazine and/or Sulfapyridine administration due to the potential for falsely depressed results  03/04/2023 30 12 - 78 U/L Final     Comment:     Specimen collection should occur prior to Sulfasalazine and/or Sulfapyridine administration due to the potential for falsely depressed results  AST   Date Value Ref Range Status   04/29/2023 15 5 - 45 U/L Final     Comment:     Specimen collection should occur prior to Sulfasalazine administration due to the potential for falsely depressed results  04/01/2023 19 5 - 45 U/L Final     Comment:     Specimen collection should occur prior to Sulfasalazine administration due to the potential for falsely depressed results  03/04/2023 29 5 - 45 U/L Final     Comment:     Specimen collection should occur prior to Sulfasalazine administration due to the potential for falsely depressed results        BUN   Date Value Ref Range Status   04/29/2023 25 5 - 25 mg/dL Final   04/01/2023 14 5 - 25 mg/dL Final   03/04/2023 14 5 - 25 mg/dL Final     Calcium   Date Value Ref Range Status   04/29/2023 9 1 8 3 - 10 1 mg/dL Final   04/01/2023 9 3 8 3 - 10 1 mg/dL Final   03/04/2023 9 1 8 3 - 10 1 mg/dL Final     Chloride   Date Value Ref Range Status   04/29/2023 107 96 - 108 mmol/L Final   04/01/2023 106 96 - 108 mmol/L Final   03/04/2023 109 (H) 96 - 108 mmol/L Final     CO2   Date Value Ref Range Status   04/29/2023 29 21 - 32 mmol/L Final   04/01/2023 30 21 - 32 mmol/L Final   03/04/2023 26 21 - 32 mmol/L Final     Creatinine   Date Value Ref Range Status   04/29/2023 0 96 0 60 - 1 30 mg/dL Final     Comment:     Standardized to IDMS reference method   04/01/2023 0 90 0 60 - 1 30 mg/dL Final     Comment:     Standardized to IDMS reference method   03/04/2023 0 83 0 60 - 1 30 mg/dL Final     Comment:     Standardized to IDMS reference method     Glucose   Date Value Ref Range Status   11/17/2022 85 65 - 140 mg/dL Final     Comment:     If the patient is fasting, the ADA then defines impaired fasting glucose as > 100 mg/dL and diabetes as > or equal to 123 mg/dL  Specimen collection should occur prior to Sulfasalazine administration due to the potential for falsely depressed results  Specimen collection should occur prior to Sulfapyridine administration due to the potential for falsely elevated results  08/03/2020 112 (H) 65 - 99 mg/dL Final     Comment:     If the patient is fasting, the ADA then defines impaired fasting glucose as > 100 mg/dL and diabetes as > or equal to 123 mg/dL  Specimen collection should occur prior to Sulfasalazine administration due to the potential for falsely depressed results  Specimen collection should occur prior to Sulfapyridine administration due to the potential for falsely elevated results  01/24/2017 91 65 - 140 mg/dL Final     Comment:     If the patient is fasting, the ADA then defines impaired fasting glucose as > 100 mg/dL and diabetes as > or equal to 123 mg/dL  Potassium   Date Value Ref Range Status   04/29/2023 4 0 3 5 - 5 3 mmol/L Final   04/01/2023 3 7 3 5 - 5 3 mmol/L Final   03/04/2023 4 0 3 5 - 5 3 mmol/L Final     Total Bilirubin   Date Value Ref Range Status   04/29/2023 0 39 0 20 - 1 00 mg/dL Final     Comment:     Use of this assay is not recommended for patients undergoing treatment with eltrombopag due to the potential for falsely elevated results  04/01/2023 0 52 0 20 - 1 00 mg/dL Final     Comment:     Use of this assay is not recommended for patients undergoing treatment with eltrombopag due to the potential for falsely elevated results     03/04/2023 0 76 0 20 - 1 00 mg/dL Final "Comment:     Use of this assay is not recommended for patients undergoing treatment with eltrombopag due to the potential for falsely elevated results  LD   Date Value Ref Range Status   04/29/2023 176 81 - 234 U/L Final   04/01/2023 211 81 - 234 U/L Final   03/04/2023 206 81 - 234 U/L Final     No results found for: \"TSH\"  No results found for: \"R1FXOTU\"   Free T4   Date Value Ref Range Status   04/29/2023 1 04 0 76 - 1 46 ng/dL Final     Comment:     Specimen collection should occur prior to Sulfasalazine administration due to the potential for falsely elevated results  04/01/2023 1 12 0 76 - 1 46 ng/dL Final     Comment:     Specimen collection should occur prior to Sulfasalazine administration due to the potential for falsely elevated results  03/04/2023 1 03 0 76 - 1 46 ng/dL Final     Comment:     Specimen collection should occur prior to Sulfasalazine administration due to the potential for falsely elevated results  RECENT IMAGING:  No results found  Assessment/Plan  Ms Jd Bahena is a 67 yr female who has unresectable melanoma/metastatic melanoma on treatment with nivolumab plus relatlimab ([facility]) here for continued follow-up, monitoring, and surveillance while on treatment  1  Metastatic melanoma ([address] )  She is doing well and tolerating treatment  No new labs for review today  She will continue treatment with nivolumab plus relatlimab and has her next infusion in 4 weeks  Prior to her follow-up visit in 4 weeks, she will get repeat blood work  She has standing orders for blood work prior to each treatment  She knows to continue to monitor for immune mediated side effects and call with any issues or concerns prior to her next visit  She is due for imaging and June and we will order imaging at her next visit  2  High risk medication use  3   Adrenal insufficiency ([address] )  She is on treatment with immunotherapy and we will continue to monitor for side effects and lab " abnormalities  We will monitor labs with each treatment to ensure safety of continuing on treatment  She knows to watch for signs and symptoms for immune mediated side effects  Denies any symptomatic immune mediated side effects at this time  She knows to continue to monitor for immune mediated side effects and call with any issues or concerns  4  Abnormal PET scan of colon  Underwent colonoscopy with 6 benign polyps identified  She will continue to follow with GI as planned and is scheduled for repeat colonoscopy in 3 years  No follow-ups on file  Ronnie Ormond, MD, PhD  Attestation signed by Andre Coronado MD at 6/4/2023  6:54 PM:  I interviewed, took the history and examined the patient on 5/25/2023  I discussed the case with the Resident and reviewed the Resident’s note , prescribed medications, and orders placed  I supervised the Resident and I agree with the Resident management plan as it was presented to me  I was present in the clinic and examined the patient  Ms Becky Garcia is a 67 yr female with metastatic melanoma on treatment with nivolumab plus relatlimab here for follow-up, surveillance, and monitoring while on treatment  She is doing well and feels good  Ever since we corrected her adrenal insufficiency she is having no issues or concerns or complaints  No more leg pain  She denies any immune mediated side effects  Denies headaches, double vision, rash, itching, chest pain, shortness of breath, diarrhea  No muscle weakness or fatigue  No concerning, changing, or  new skin lesions  No Lymphadenopathy  Crease in pigmentation on her right leg  On exam ECOG PS 0  No concerning skin lesions  Decreased pigmentation on the right lower extremity and biopsy-proven melanoma  No lymphadenopathy  No nodularity in her leg site of pigmentation      Ms Becky Garcia is a 35-year-old female with metastatic melanoma on treatment with nivolumab plus relatlimab here for continued monitoring, follow-up, surveillance while on treatment  She is doing well and feels good  Labs reviewed and okay  She is okay to continue on treatment with nivolumab plus relatlimab  She knows to continue to monitor for immune mediated side effects  She knows to call with issues or concerns prior to her next visit  She has standing orders for blood work prior to each treatment          Ann Diallo MD 06/04/23

## 2023-05-27 ENCOUNTER — APPOINTMENT (OUTPATIENT)
Dept: LAB | Facility: HOSPITAL | Age: 73
End: 2023-05-27

## 2023-05-27 DIAGNOSIS — C43.9 METASTATIC MELANOMA (HCC): ICD-10-CM

## 2023-05-27 DIAGNOSIS — Z79.899 HIGH RISK MEDICATION USE: ICD-10-CM

## 2023-05-27 LAB
ALBUMIN SERPL BCP-MCNC: 3.7 G/DL (ref 3.5–5)
ALP SERPL-CCNC: 37 U/L (ref 34–104)
ALT SERPL W P-5'-P-CCNC: 9 U/L (ref 7–52)
ANION GAP SERPL CALCULATED.3IONS-SCNC: 5 MMOL/L (ref 4–13)
AST SERPL W P-5'-P-CCNC: 13 U/L (ref 13–39)
BASOPHILS # BLD AUTO: 0.1 THOUSANDS/ÂΜL (ref 0–0.1)
BASOPHILS NFR BLD AUTO: 2 % (ref 0–1)
BILIRUB SERPL-MCNC: 0.5 MG/DL (ref 0.2–1)
BUN SERPL-MCNC: 17 MG/DL (ref 5–25)
CALCIUM SERPL-MCNC: 9.1 MG/DL (ref 8.4–10.2)
CHLORIDE SERPL-SCNC: 105 MMOL/L (ref 96–108)
CO2 SERPL-SCNC: 30 MMOL/L (ref 21–32)
CREAT SERPL-MCNC: 0.89 MG/DL (ref 0.6–1.3)
EOSINOPHIL # BLD AUTO: 0.3 THOUSAND/ÂΜL (ref 0–0.61)
EOSINOPHIL NFR BLD AUTO: 5 % (ref 0–6)
ERYTHROCYTE [DISTWIDTH] IN BLOOD BY AUTOMATED COUNT: 13 % (ref 11.6–15.1)
GFR SERPL CREATININE-BSD FRML MDRD: 64 ML/MIN/1.73SQ M
GLUCOSE P FAST SERPL-MCNC: 89 MG/DL (ref 65–99)
HCT VFR BLD AUTO: 38.8 % (ref 34.8–46.1)
HGB BLD-MCNC: 12.2 G/DL (ref 11.5–15.4)
IMM GRANULOCYTES # BLD AUTO: 0.02 THOUSAND/UL (ref 0–0.2)
IMM GRANULOCYTES NFR BLD AUTO: 0 % (ref 0–2)
LDH SERPL-CCNC: 137 U/L (ref 140–271)
LYMPHOCYTES # BLD AUTO: 2.85 THOUSANDS/ÂΜL (ref 0.6–4.47)
LYMPHOCYTES NFR BLD AUTO: 44 % (ref 14–44)
MCH RBC QN AUTO: 29 PG (ref 26.8–34.3)
MCHC RBC AUTO-ENTMCNC: 31.4 G/DL (ref 31.4–37.4)
MCV RBC AUTO: 92 FL (ref 82–98)
MONOCYTES # BLD AUTO: 0.54 THOUSAND/ÂΜL (ref 0.17–1.22)
MONOCYTES NFR BLD AUTO: 9 % (ref 4–12)
NEUTROPHILS # BLD AUTO: 2.57 THOUSANDS/ÂΜL (ref 1.85–7.62)
NEUTS SEG NFR BLD AUTO: 40 % (ref 43–75)
NRBC BLD AUTO-RTO: 0 /100 WBCS
PLATELET # BLD AUTO: 209 THOUSANDS/UL (ref 149–390)
PMV BLD AUTO: 10.3 FL (ref 8.9–12.7)
POTASSIUM SERPL-SCNC: 3.8 MMOL/L (ref 3.5–5.3)
PROT SERPL-MCNC: 6.8 G/DL (ref 6.4–8.4)
RBC # BLD AUTO: 4.2 MILLION/UL (ref 3.81–5.12)
SODIUM SERPL-SCNC: 140 MMOL/L (ref 135–147)
T3FREE SERPL-MCNC: 3.03 PG/ML (ref 2.5–3.9)
T4 FREE SERPL-MCNC: 1.17 NG/DL (ref 0.61–1.12)
TSH SERPL DL<=0.05 MIU/L-ACNC: 3.45 UIU/ML (ref 0.45–4.5)
WBC # BLD AUTO: 6.38 THOUSAND/UL (ref 4.31–10.16)

## 2023-05-31 ENCOUNTER — HOSPITAL ENCOUNTER (OUTPATIENT)
Dept: INFUSION CENTER | Facility: HOSPITAL | Age: 73
Discharge: HOME | End: 2023-05-31
Attending: INTERNAL MEDICINE
Payer: COMMERCIAL

## 2023-05-31 VITALS
SYSTOLIC BLOOD PRESSURE: 124 MMHG | OXYGEN SATURATION: 92 % | HEART RATE: 70 BPM | RESPIRATION RATE: 16 BRPM | TEMPERATURE: 97 F | DIASTOLIC BLOOD PRESSURE: 58 MMHG

## 2023-05-31 DIAGNOSIS — C43.9 METASTATIC MELANOMA (HCC): Primary | ICD-10-CM

## 2023-05-31 PROCEDURE — 96413 CHEMO IV INFUSION 1 HR: CPT

## 2023-05-31 RX ORDER — SODIUM CHLORIDE 9 MG/ML
20 INJECTION, SOLUTION INTRAVENOUS ONCE
Status: COMPLETED | OUTPATIENT
Start: 2023-05-31 | End: 2023-05-31

## 2023-05-31 RX ADMIN — SODIUM CHLORIDE 480 MG OF NIVOLUMAB: 9 INJECTION, SOLUTION INTRAVENOUS at 14:53

## 2023-05-31 RX ADMIN — SODIUM CHLORIDE 20 ML/HR: 9 INJECTION, SOLUTION INTRAVENOUS at 14:10

## 2023-06-07 ENCOUNTER — CONSULT (OUTPATIENT)
Dept: GASTROENTEROLOGY | Facility: CLINIC | Age: 73
End: 2023-06-07
Payer: COMMERCIAL

## 2023-06-07 VITALS
OXYGEN SATURATION: 100 % | HEART RATE: 81 BPM | WEIGHT: 189 LBS | HEIGHT: 69 IN | DIASTOLIC BLOOD PRESSURE: 82 MMHG | BODY MASS INDEX: 27.99 KG/M2 | RESPIRATION RATE: 18 BRPM | SYSTOLIC BLOOD PRESSURE: 154 MMHG

## 2023-06-07 DIAGNOSIS — R94.8 ABNORMAL PET SCAN OF COLON: ICD-10-CM

## 2023-06-07 DIAGNOSIS — Z86.010 HISTORY OF COLON POLYPS: ICD-10-CM

## 2023-06-07 DIAGNOSIS — K57.90 DIVERTICULOSIS: Primary | ICD-10-CM

## 2023-06-07 DIAGNOSIS — R93.3 ABNORMAL FINDING ON GI TRACT IMAGING: ICD-10-CM

## 2023-06-07 PROBLEM — Z86.0100 HISTORY OF COLON POLYPS: Status: ACTIVE | Noted: 2023-06-07

## 2023-06-07 PROCEDURE — 99213 OFFICE O/P EST LOW 20 MIN: CPT | Performed by: INTERNAL MEDICINE

## 2023-06-07 NOTE — PROGRESS NOTES
[address] Gastroenterology  Gastroenterology Outpatient Follow up  Patient Ollie Blood   Age 67 y o  Gender female   MRN: [id number]  [facility] [id number]     ASSESSMENT AND PLAN:   Problem List Items Addressed This Visit        Digestive    Diverticulosis - Primary     Jes Humphries was noted to have severe diverticulosis in her sigmoid colon at time of colonoscopy  Based upon this I did encourage her to try to obtain 25 to 30 g of fiber in her diet daily  In addition she should use a fiber supplement  She is currently using Benefiber 2 teaspoonfuls mixed in 6 to 8 ounce of noncarbonated liquid daily  Other    Abnormal PET scan of colon     Patient did undergo her colonoscopy due to an abnormal PET scan revealing a short segment of F DG activity in the mid to distal sigmoid colon  This is the probable location of the 1 5 cm polyp  In addition there is fairly extensive diverticulosis in this region  The above findings are the likely cause for the abnormal PET scan in this region  History of colon polyps     Debbie underwent colonoscopy on April 18, 2023  This revealed Severe diverticulosis sigmoid colon with luminal narrowing  Erythematous prominent folds at 18 cm likely related to severe diverticulosis  Biopsy unremarkable  4 polyps cecum measuring 4 to 6 mm  Biopsy revealed tubular adenoma  5 mm sessile polyp proximal transverse colon  Biopsy revealed tubular adenoma  12 mm pedunculated polyp at 15 cm status post hot snare polypectomy  Biopsy revealed tubular adenoma  single clip placed  Based upon these findings she will need to undergo repeat colonoscopy in April 2026  Abnormal finding on GI tract imaging     Please see comments under abnormal PET scan           _____________________________________________________________    HPI:   Jes Humphries is 79years of age and presents in follow-up from her recent colonoscopy  Colonoscopy  was performed on April 18    The results are outlined below   She has not had any issues since her colonoscopy  She denies any abdominal pain nausea vomiting  Her bowel habits are very regular, denies any diarrhea constipation bleeding or black stools  She did start using a fiber supplement following her colonoscopy due to fairly significant diverticulosis  She was diagnosed with adrenal insufficiency likely related to her immunotherapy  She is now on steroids and is asymptomatic from the standpoint now  Records reviewed for 10 minutes prior to office visit  5/27/2023 laboratory data  Chemistry complete normal  BUN 17  Creatinine 0 89  AST 13  ALT 9  WBC 6 38 Hgb 12 2 HCT 38 8 MCV 92 platelet count 825,414    4/18/2023 colonoscopy  Pediatric scope  Severe diverticulosis sigmoid colon with luminal narrowing  Erythematous prominent folds at 18 cm likely related to severe diverticulosis  Biopsy unremarkable  4 polyps cecum measuring 4 to 6 mm  Biopsy revealed tubular adenoma  5 mm sessile polyp proximal transverse colon  Biopsy revealed tubular adenoma  12 mm pedunculated polyp at 15 cm status post hot snare polypectomy  Biopsy revealed tubular adenoma single clip placed  4/1/2023 laboratory data  Sodium 136  Potassium 3 7  Chloride 106  CO2 30  BUN 14  Creatinine 0 9  Glucose 89  AST 19  ALT 32  Alk phos 36  Albumin 3 1  W B C 5 15 Hgb 11 1 down from 12 8 HCT 35 3 MCV 90 platelet count 789,236  Cortisol 1 0  ACTH 2 8     3/10/2023 pelvic ultrasound  Mild amount of simple appearing fluid within the endometrial canal   The endometrium is not thickened  12 x 10 x 11 mm anechoic structure right adnexa without internal blood flow  Suspicious for ovarian or paraovarian cyst   Left ovary not visualized  6 mm intramural leiomyoma in the anterior uterine body     3/6/2023 PET scan  1   Previously noted mildly hypermetabolic right inguinal/external iliac lymph nodes are essentially stable in size and exhibit persistent but mildly improved FDG activity   Findings are nonspecific and could reflect persistent regis metastatic disease   of low metabolic activity versus benign/inflammatory lymphadenopathy      2   Scattered areas of subcutaneous FDG activity involving the bilateral distal lower extremities extending from the level of the mid tibial shaft to the ankles/feet appear more prominent than the prior exam   While findings could be related to varicose   veins and inflammatory changes such as edema/cellulitis, given history of lower extremity melanoma, correlation with direct visualization and possibly MRI is recommended for further characterization and exclusion of developing malignancy at these sites     Attention to these regions on short interval follow-up imaging is recommended      3   Persistent short segment of focal FDG activity involving the mid to distal sigmoid colon   Given persistence and focality of this finding, further evaluation with colonoscopy is recommended to exclude colonic malignancy      4   Persistent nonspecific hypodense prominence to the endometrial cavity towards correlation with pelvic ultrasound is recommended for further characterization      5   Subtle asymmetric FDG activity associated with subtle asymmetric soft tissue prominence involving the right frontal scalp, possibly related to recent biopsy procedure although correlation with direct visualization and biopsy pathology results is   recommended to exclude a malignant process in this location      3/26/2020-Cologuard negative    Allergies   Allergen Reactions   • Etomidate Other (See Comments)     Current Outpatient Medications   Medication Sig Dispense Refill   • apixaban (Eliquis) 5 mg Take 1 tablet (5 mg total) by mouth every 12 (twelve) hours 60 tablet 5   • calcium carbonate (OS-VIRGIE) 600 MG tablet Take 600 mg by mouth 2 (two) times a day     • famotidine (PEPCID) 10 mg tablet Take 10 mg by mouth 2 (two) times a day     • hydrocortisone (CORTEF) 10 mg tablet Take 1 tablet (10 mg total) by mouth 2 (two) times a day 60 tablet 11   • loratadine (CLARITIN) 10 mg tablet Take 10 mg by mouth daily     • losartan (Cozaar) 100 MG tablet Take 1 tablet (100 mg total) by mouth daily 30 tablet 5   • metoprolol succinate (TOPROL-XL) 25 mg 24 hr tablet Take 1 tablet (25 mg total) by mouth 2 (two) times a day 60 tablet 5   • Multiple Vitamins-Minerals (MULTIVITAMIN WITH MINERALS) tablet Take 1 tablet by mouth daily     • patient supplied medication Pt takes eye drops Mura 128 - not listed in database     • Wheat Dextrin (BENEFIBER DRINK MIX PO) Take by mouth     • ondansetron (ZOFRAN) 4 mg tablet Take 1 tablet (4 mg total) by mouth every 8 (eight) hours as needed for nausea or vomiting (Patient not taking: Reported on 2023) 20 tablet 0   • triamcinolone (KENALOG) 0 1 % ointment Apply topically 2 (two) times a day (Patient not taking: Reported on 2023) 453 6 g 0     No current facility-administered medications for this visit       MEDICAL HISTORY:  Past Medical History:   Diagnosis Date   • Acute deep vein thrombosis (DVT) of popliteal vein of left lower extremity (HCC)    • Ankle wound, left, initial encounter     Resolved 2017   • Ankle wound, right, initial encounter     resolved 2017   • DVT (deep vein thrombosis) in pregnancy    • Hypertension    • Pulmonary emboli ([address] )      post C section   • Pulmonary embolism ([address] )     2017   • Pulmonary embolism, blood-clot, obstetric    • Skin cancer    • Venous stasis ulcer ([address] )    • Venous ulcers of both lower extremities (HCC)      Past Surgical History:   Procedure Laterality Date   •  SECTION      X2     Social History     Substance and Sexual Activity   Alcohol Use No    Comment: hx of social drinker     Social History     Substance and Sexual Activity   Drug Use No     Social History     Tobacco Use   Smoking Status Never   Smokeless Tobacco Never     Family History   Problem Relation Age of Onset   • Heart attack "Mother    • Stroke Mother    • Cancer Father    • Bone cancer Family    • Hypertension Family    • Lung cancer Family        Objective   Blood pressure 154/82, pulse 81, resp  rate 18, height 5' 9\" (1 753 m), weight 85 7 kg (189 lb), SpO2 100 %  Body mass index is 27 91 kg/m²  PHYSICAL EXAM:   General Appearance: Alert, cooperative, no distress  HEENT: Normocephalic, atraumatic, anicteric  Neck: Supple, symmetrical, trachea midline  Lungs: Clear to auscultation bilaterally; no rales, rhonchi or wheezing; respirations unlabored   Heart: Regular rate and rhythm; no murmur, rub, or gallop  Abdomen: Soft, bowel sounds normal, non-tender, non-distended; no masses, there is no hepatosplenomegaly  No spider angiomas  Genitalia: Deferred   Rectal: Deferred   Extremities: No cyanosis, clubbing  Trace edema lower extremities     Skin: No jaundice, rashes, or lesions   Lymph nodes: No palpable cervical lymphadenopathy   Lab Results:   Appointment on 05/27/2023   Component Date Value   • WBC 05/27/2023 6 38    • RBC 05/27/2023 4 20    • Hemoglobin 05/27/2023 12 2    • Hematocrit 05/27/2023 38 8    • MCV 05/27/2023 92    • MCH 05/27/2023 29 0    • MCHC 05/27/2023 31 4    • RDW 05/27/2023 13 0    • MPV 05/27/2023 10 3    • Platelets 76/26/8318 209    • nRBC 05/27/2023 0    • Neutrophils Relative 05/27/2023 40 (L)    • Immat GRANS % 05/27/2023 0    • Lymphocytes Relative 05/27/2023 44    • Monocytes Relative 05/27/2023 9    • Eosinophils Relative 05/27/2023 5    • Basophils Relative 05/27/2023 2 (H)    • Neutrophils Absolute 05/27/2023 2 57    • Immature Grans Absolute 05/27/2023 0 02    • Lymphocytes Absolute 05/27/2023 2 85    • Monocytes Absolute 05/27/2023 0 54    • Eosinophils Absolute 05/27/2023 0 30    • Basophils Absolute 05/27/2023 0 10    • Sodium 05/27/2023 140    • Potassium 05/27/2023 3 8    • Chloride 05/27/2023 105    • CO2 05/27/2023 30    • ANION GAP 05/27/2023 5    • BUN 05/27/2023 17    • Creatinine " 05/27/2023 0 89    • Glucose, Fasting 05/27/2023 89    • Calcium 05/27/2023 9 1    • AST 05/27/2023 13    • ALT 05/27/2023 9    • Alkaline Phosphatase 05/27/2023 37    • Total Protein 05/27/2023 6 8    • Albumin 05/27/2023 3 7    • Total Bilirubin 05/27/2023 0 50    • eGFR 05/27/2023 64    • LD 05/27/2023 137 (L)    • T3, Free 05/27/2023 3 03    • Free T4 05/27/2023 1 17 (H)    • TSH 3RD GENERATON 05/27/2023 3 454    Lab on 04/29/2023   Component Date Value   • WBC 04/29/2023 4 64    • RBC 04/29/2023 4 07    • Hemoglobin 04/29/2023 11 6    • Hematocrit 04/29/2023 36 8    • MCV 04/29/2023 90    • MCH 04/29/2023 28 5    • MCHC 04/29/2023 31 5    • RDW 04/29/2023 13 6    • MPV 04/29/2023 11 3    • Platelets 48/08/0204 177    • nRBC 04/29/2023 0    • Neutrophils Relative 04/29/2023 35 (L)    • Immat GRANS % 04/29/2023 0    • Lymphocytes Relative 04/29/2023 50 (H)    • Monocytes Relative 04/29/2023 10    • Eosinophils Relative 04/29/2023 4    • Basophils Relative 04/29/2023 1    • Neutrophils Absolute 04/29/2023 1 60 (L)    • Immature Grans Absolute 04/29/2023 0 01    • Lymphocytes Absolute 04/29/2023 2 34    • Monocytes Absolute 04/29/2023 0 45    • Eosinophils Absolute 04/29/2023 0 18    • Basophils Absolute 04/29/2023 0 06    • Sodium 04/29/2023 139    • Potassium 04/29/2023 4 0    • Chloride 04/29/2023 107    • CO2 04/29/2023 29    • ANION GAP 04/29/2023 3 (L)    • BUN 04/29/2023 25    • Creatinine 04/29/2023 0 96    • Glucose, Fasting 04/29/2023 91    • Calcium 04/29/2023 9 1    • Corrected Calcium 04/29/2023 9 6    • AST 04/29/2023 15    • ALT 04/29/2023 19    • Alkaline Phosphatase 04/29/2023 39 (L)    • Total Protein 04/29/2023 6 8    • Albumin 04/29/2023 3 4 (L)    • Total Bilirubin 04/29/2023 0 39    • eGFR 04/29/2023 59    • LD 04/29/2023 176    • T3, Free 04/29/2023 2 75    • Free T4 04/29/2023 1 04    • TSH 3RD GENERATON 04/29/2023 4 320    • Vitamin B-12 04/29/2023 414    • Folate 04/29/2023 >20 0 (H)    • Iron Saturation 04/29/2023 32    • TIBC 04/29/2023 195 (L)    • Iron 04/29/2023 63    • Ferritin 04/29/2023 215    Hospital Outpatient Visit on 04/18/2023   Component Date Value   • Case Report 04/18/2023                      Value:Surgical Pathology Report                         Case: [id number]                                   Authorizing Provider:  Isela Cordova DO  Collected:           04/18/2023 1009              [facility]ing Location:     [facility] Received:            04/18/2023 1455                                     Endoscopy                                                                    Pathologist:           Josafat Odom MD                                                         Specimens:   A) - Large Intestine, Cecum, POLYP(S)X4                                                             B) - Large Intestine, Transverse Colon, PROXIMAL, POLYP                                             C) - Colon, ERYTHEMA FOLD BX, AT 18 CM                                                              D) - Colon, POLYP                                                                         • Final Diagnosis 04/18/2023                      Value: This result contains rich text formatting which cannot be displayed here  • Additional Information 04/18/2023                      Value: This result contains rich text formatting which cannot be displayed here  • Synoptic Checklist 04/18/2023                      Value:                            COLON/RECTUM POLYP FORM - GI - All Specimens                                                                                     :    Adenoma(s)     • Gross Description 04/18/2023                      Value: This result contains rich text formatting which cannot be displayed here  Radiology Results:   No results found    Alireza Bah DO   06/07/23   Cc:

## 2023-06-07 NOTE — ASSESSMENT & PLAN NOTE
Barb Noriega was noted to have severe diverticulosis in her sigmoid colon at time of colonoscopy  Based upon this I did encourage her to try to obtain 25 to 30 g of fiber in her diet daily  In addition she should use a fiber supplement  She is currently using Benefiber 2 teaspoonfuls mixed in 6 to 8 ounce of noncarbonated liquid daily

## 2023-06-07 NOTE — ASSESSMENT & PLAN NOTE
Barb Noriega underwent colonoscopy on April 18, 2023  This revealed Severe diverticulosis sigmoid colon with luminal narrowing  Erythematous prominent folds at 18 cm likely related to severe diverticulosis  Biopsy unremarkable  4 polyps cecum measuring 4 to 6 mm  Biopsy revealed tubular adenoma  5 mm sessile polyp proximal transverse colon  Biopsy revealed tubular adenoma  12 mm pedunculated polyp at 15 cm status post hot snare polypectomy  Biopsy revealed tubular adenoma  single clip placed  Based upon these findings she will need to undergo repeat colonoscopy in April 2026

## 2023-06-07 NOTE — LETTER
June 7, 2023     Radha Sandoval MD  [address] [facility]    Patient: Lindsay Johnston   YOB: 1950   Date of Visit: 6/7/2023       Dear Dr Chelsea Ward: Thank you for referring Lindsay Johnston to me for evaluation  Below are my notes for this consultation  If you have questions, please do not hesitate to call me  I look forward to following your patient along with you  Sincerely,        Diana Denton DO        CC: No Recipients    Diana Denton DO  6/7/2023  4:15 PM  Incomplete  [facility]  Gastroenterology Outpatient Follow up  Patient Lindsay Johnston   Age 67 y o  Gender female   MRN: [id number]  [facility] [id number]     ASSESSMENT AND PLAN:   Problem List Items Addressed This Visit          Digestive    Diverticulosis - Primary     Lia Romero was noted to have severe diverticulosis in her sigmoid colon at time of colonoscopy  Based upon this I did encourage her to try to obtain 25 to 30 g of fiber in her diet daily  In addition she should use a fiber supplement  She is currently using Benefiber 2 teaspoonfuls mixed in 6 to 8 ounce of noncarbonated liquid daily  Other    Abnormal PET scan of colon     Patient did undergo her colonoscopy due to an abnormal PET scan revealing a short segment of F DG activity in the mid to distal sigmoid colon  This is the probable location of the 1 5 cm polyp  In addition there is fairly extensive diverticulosis in this region  The above findings are the likely cause for the abnormal PET scan in this region  History of colon polyps     Debbie underwent colonoscopy on April 18, 2023  This revealed Severe diverticulosis sigmoid colon with luminal narrowing  Erythematous prominent folds at 18 cm likely related to severe diverticulosis  Biopsy unremarkable  4 polyps cecum measuring 4 to 6 mm  Biopsy revealed tubular adenoma  5 mm sessile polyp proximal transverse colon    Biopsy revealed tubular adenoma  12 mm pedunculated polyp at 15 cm status post hot snare polypectomy  Biopsy revealed tubular adenoma  single clip placed  Based upon these findings she will need to undergo repeat colonoscopy in April 2026  Abnormal finding on GI tract imaging     Please see comments under abnormal PET scan           _____________________________________________________________    HPI:   Jan Tyler is 79years of age and presents in follow-up from her recent colonoscopy  Colonoscopy  was performed on April 18  The results are outlined below  She has not had any issues since her colonoscopy  She denies any abdominal pain nausea vomiting  Her bowel habits are very regular, denies any diarrhea constipation bleeding or black stools  She did start using a fiber supplement following her colonoscopy due to fairly significant diverticulosis  She was diagnosed with adrenal insufficiency likely related to her immunotherapy  She is now on steroids and is asymptomatic from the standpoint now  Records reviewed for 10 minutes prior to office visit  5/27/2023 laboratory data  Chemistry complete normal  BUN 17  Creatinine 0 89  AST 13  ALT 9  WBC 6 38 Hgb 12 2 HCT 38 8 MCV 92 platelet count 173,223    4/18/2023 colonoscopy  Pediatric scope  Severe diverticulosis sigmoid colon with luminal narrowing  Erythematous prominent folds at 18 cm likely related to severe diverticulosis  Biopsy unremarkable  4 polyps cecum measuring 4 to 6 mm  Biopsy revealed tubular adenoma  5 mm sessile polyp proximal transverse colon  Biopsy revealed tubular adenoma  12 mm pedunculated polyp at 15 cm status post hot snare polypectomy  Biopsy revealed tubular adenoma single clip placed      4/1/2023 laboratory data  Sodium 136  Potassium 3 7  Chloride 106  CO2 30  BUN 14  Creatinine 0 9  Glucose 89  AST 19  ALT 32  Alk phos 36  Albumin 3 1  W B C 5 15 Hgb 11 1 down from 12 8 HCT 35 3 MCV 90 platelet count 704,777  Cortisol 1 0  ACTH 2 8 3/10/2023 pelvic ultrasound  Mild amount of simple appearing fluid within the endometrial canal   The endometrium is not thickened  12 x 10 x 11 mm anechoic structure right adnexa without internal blood flow  Suspicious for ovarian or paraovarian cyst   Left ovary not visualized  6 mm intramural leiomyoma in the anterior uterine body     3/6/2023 PET scan  1  Previously noted mildly hypermetabolic right inguinal/external iliac lymph nodes are essentially stable in size and exhibit persistent but mildly improved FDG activity  Findings are nonspecific and could reflect persistent regis metastatic disease   of low metabolic activity versus benign/inflammatory lymphadenopathy  2   Scattered areas of subcutaneous FDG activity involving the bilateral distal lower extremities extending from the level of the mid tibial shaft to the ankles/feet appear more prominent than the prior exam   While findings could be related to varicose   veins and inflammatory changes such as edema/cellulitis, given history of lower extremity melanoma, correlation with direct visualization and possibly MRI is recommended for further characterization and exclusion of developing malignancy at these sites  Attention to these regions on short interval follow-up imaging is recommended  3   Persistent short segment of focal FDG activity involving the mid to distal sigmoid colon  Given persistence and focality of this finding, further evaluation with colonoscopy is recommended to exclude colonic malignancy  4   Persistent nonspecific hypodense prominence to the endometrial cavity towards correlation with pelvic ultrasound is recommended for further characterization       5   Subtle asymmetric FDG activity associated with subtle asymmetric soft tissue prominence involving the right frontal scalp, possibly related to recent biopsy procedure although correlation with direct visualization and biopsy pathology results is   recommended to exclude a malignant process in this location  3/26/2020-Cologuard negative    Allergies   Allergen Reactions   • Etomidate Other (See Comments)     Current Outpatient Medications   Medication Sig Dispense Refill   • apixaban (Eliquis) 5 mg Take 1 tablet (5 mg total) by mouth every 12 (twelve) hours 60 tablet 5   • calcium carbonate (OS-VIRGIE) 600 MG tablet Take 600 mg by mouth 2 (two) times a day     • famotidine (PEPCID) 10 mg tablet Take 10 mg by mouth 2 (two) times a day     • hydrocortisone (CORTEF) 10 mg tablet Take 1 tablet (10 mg total) by mouth 2 (two) times a day 60 tablet 11   • loratadine (CLARITIN) 10 mg tablet Take 10 mg by mouth daily     • losartan (Cozaar) 100 MG tablet Take 1 tablet (100 mg total) by mouth daily 30 tablet 5   • metoprolol succinate (TOPROL-XL) 25 mg 24 hr tablet Take 1 tablet (25 mg total) by mouth 2 (two) times a day 60 tablet 5   • Multiple Vitamins-Minerals (MULTIVITAMIN WITH MINERALS) tablet Take 1 tablet by mouth daily     • patient supplied medication Pt takes eye drops Mura 128 - not listed in database     • Wheat Dextrin (BENEFIBER DRINK MIX PO) Take by mouth     • ondansetron (ZOFRAN) 4 mg tablet Take 1 tablet (4 mg total) by mouth every 8 (eight) hours as needed for nausea or vomiting (Patient not taking: Reported on 5/1/2023) 20 tablet 0   • triamcinolone (KENALOG) 0 1 % ointment Apply topically 2 (two) times a day (Patient not taking: Reported on 5/1/2023) 453 6 g 0     No current facility-administered medications for this visit       MEDICAL HISTORY:  Past Medical History:   Diagnosis Date   • Acute deep vein thrombosis (DVT) of popliteal vein of left lower extremity (HCC)    • Ankle wound, left, initial encounter     Resolved 6/2017   • Ankle wound, right, initial encounter     resolved 6/2017   • DVT (deep vein thrombosis) in pregnancy    • Hypertension    • Pulmonary emboli ([facility] [address] )     1989 post C section   • Pulmonary embolism ([facility] [address] )     7/2017   • Pulmonary embolism, "blood-clot, obstetric    • Skin cancer    • Venous stasis ulcer ([address] )    • Venous ulcers of both lower extremities (HCC)      Past Surgical History:   Procedure Laterality Date   •  SECTION      X2     Social History     Substance and Sexual Activity   Alcohol Use No    Comment: hx of social drinker     Social History     Substance and Sexual Activity   Drug Use No     Social History     Tobacco Use   Smoking Status Never   Smokeless Tobacco Never     Family History   Problem Relation Age of Onset   • Heart attack Mother    • Stroke Mother    • Cancer Father    • Bone cancer Family    • Hypertension Family    • Lung cancer Family        Objective   Blood pressure 154/82, pulse 81, resp  rate 18, height 5' 9\" (1 753 m), weight 85 7 kg (189 lb), SpO2 100 %  Body mass index is 27 91 kg/m²  PHYSICAL EXAM:   General Appearance: Alert, cooperative, no distress  HEENT: Normocephalic, atraumatic, anicteric  Neck: Supple, symmetrical, trachea midline  Lungs: Clear to auscultation bilaterally; no rales, rhonchi or wheezing; respirations unlabored   Heart: Regular rate and rhythm; no murmur, rub, or gallop  Abdomen: Soft, bowel sounds normal, non-tender, non-distended; no masses, there is no hepatosplenomegaly  No spider angiomas  Genitalia: Deferred   Rectal: Deferred   Extremities: No cyanosis, clubbing  Trace edema lower extremities     Skin: No jaundice, rashes, or lesions   Lymph nodes: No palpable cervical lymphadenopathy   Lab Results:   Appointment on 2023   Component Date Value   • WBC 2023 6 38    • RBC 2023 4 20    • Hemoglobin 2023 12 2    • Hematocrit 2023 38 8    • MCV 2023 92    • MCH 2023 29 0    • MCHC 2023 31 4    • RDW 2023 13 0    • MPV 2023 10 3    • Platelets 6702 209    • nRBC 2023 0    • Neutrophils Relative 2023 40 (L)    • Immat GRANS % 2023 0    • Lymphocytes Relative 2023 44    • Monocytes Relative " 05/27/2023 9    • Eosinophils Relative 05/27/2023 5    • Basophils Relative 05/27/2023 2 (H)    • Neutrophils Absolute 05/27/2023 2 57    • Immature Grans Absolute 05/27/2023 0 02    • Lymphocytes Absolute 05/27/2023 2 85    • Monocytes Absolute 05/27/2023 0 54    • Eosinophils Absolute 05/27/2023 0 30    • Basophils Absolute 05/27/2023 0 10    • Sodium 05/27/2023 140    • Potassium 05/27/2023 3 8    • Chloride 05/27/2023 105    • CO2 05/27/2023 30    • ANION GAP 05/27/2023 5    • BUN 05/27/2023 17    • Creatinine 05/27/2023 0 89    • Glucose, Fasting 05/27/2023 89    • Calcium 05/27/2023 9 1    • AST 05/27/2023 13    • ALT 05/27/2023 9    • Alkaline Phosphatase 05/27/2023 37    • Total Protein 05/27/2023 6 8    • Albumin 05/27/2023 3 7    • Total Bilirubin 05/27/2023 0 50    • eGFR 05/27/2023 64    • LD 05/27/2023 137 (L)    • T3, Free 05/27/2023 3 03    • Free T4 05/27/2023 1 17 (H)    • TSH 3RD GENERATON 05/27/2023 3 454    Lab on 04/29/2023   Component Date Value   • WBC 04/29/2023 4 64    • RBC 04/29/2023 4 07    • Hemoglobin 04/29/2023 11 6    • Hematocrit 04/29/2023 36 8    • MCV 04/29/2023 90    • MCH 04/29/2023 28 5    • MCHC 04/29/2023 31 5    • RDW 04/29/2023 13 6    • MPV 04/29/2023 11 3    • Platelets 16/48/0362 177    • nRBC 04/29/2023 0    • Neutrophils Relative 04/29/2023 35 (L)    • Immat GRANS % 04/29/2023 0    • Lymphocytes Relative 04/29/2023 50 (H)    • Monocytes Relative 04/29/2023 10    • Eosinophils Relative 04/29/2023 4    • Basophils Relative 04/29/2023 1    • Neutrophils Absolute 04/29/2023 1 60 (L)    • Immature Grans Absolute 04/29/2023 0 01    • Lymphocytes Absolute 04/29/2023 2 34    • Monocytes Absolute 04/29/2023 0 45    • Eosinophils Absolute 04/29/2023 0 18    • Basophils Absolute 04/29/2023 0 06    • Sodium 04/29/2023 139    • Potassium 04/29/2023 4 0    • Chloride 04/29/2023 107    • CO2 04/29/2023 29    • ANION GAP 04/29/2023 3 (L)    • BUN 04/29/2023 25    • Creatinine 04/29/2023 0 96    • Glucose, Fasting 04/29/2023 91    • Calcium 04/29/2023 9 1    • Corrected Calcium 04/29/2023 9 6    • AST 04/29/2023 15    • ALT 04/29/2023 19    • Alkaline Phosphatase 04/29/2023 39 (L)    • Total Protein 04/29/2023 6 8    • Albumin 04/29/2023 3 4 (L)    • Total Bilirubin 04/29/2023 0 39    • eGFR 04/29/2023 59    • LD 04/29/2023 176    • T3, Free 04/29/2023 2 75    • Free T4 04/29/2023 1 04    • TSH 3RD GENERATON 04/29/2023 4 320    • Vitamin B-12 04/29/2023 414    • Folate 04/29/2023 >20 0 (H)    • Iron Saturation 04/29/2023 32    • TIBC 04/29/2023 195 (L)    • Iron 04/29/2023 63    • Ferritin 04/29/2023 [address] Outpatient Visit on 04/18/2023   Component Date Value   • Case Report 04/18/2023                      Value:Surgical Pathology Report                         Case: [id number]                                   Authorizing Provider:  Tanesha Dubon DO  Collected:           04/18/2023 1009              Ordering Location:     [facility] Received:            04/18/2023 1455                                     Endoscopy                                                                    Pathologist:           Deana Chaudhry MD                                                         Specimens:   A) - Large Intestine, Cecum, POLYP(S)X4                                                             B) - Large Intestine, Transverse Colon, PROXIMAL, POLYP                                             C) - Colon, ERYTHEMA FOLD BX, AT 18 CM                                                              D) - Colon, POLYP                                                                         • Final Diagnosis 04/18/2023                      Value: This result contains rich text formatting which cannot be displayed here  • Additional Information 04/18/2023                      Value: This result contains rich text formatting which cannot be displayed here     • Synoptic Checklist 04/18/2023                      Value:                            COLON/RECTUM POLYP FORM - GI - All Specimens                                                                                     :    Adenoma(s)     • Gross Description 04/18/2023                      Value: This result contains rich text formatting which cannot be displayed here  Radiology Results:   No results found  Hemanth Bah, DO   06/07/23   Cc:    [address] [place] [address], DO  6/7/2023 12:47 PM  Sign when Signing Visit  [facility]  Gastroenterology Outpatient Follow up  Patient Ayo Odom   Age 67 y o  Gender female   MRN: [id number]  [facility] [id number]     ASSESSMENT AND PLAN:   Problem List Items Addressed This Visit    None     _____________________________________________________________    HPI: Please see complete note in epic    5/27/2023 laboratory data  Chemistry complete normal  BUN 17  Creatinine 0 89  AST 13  ALT 9  WBC 6 38 Hgb 12 2 HCT 38 8 MCV 92 platelet count 312,833    4/18/2023 colonoscopy  Pediatric scope  Severe diverticulosis sigmoid colon with luminal narrowing  Erythematous prominent folds at 18 cm likely related to severe diverticulosis  Biopsy unremarkable  4 polyps cecum measuring 4 to 6 mm  Biopsy revealed tubular adenoma  5 mm sessile polyp proximal transverse colon  Biopsy revealed tubular adenoma  12 mm pedunculated polyp at 15 cm status post hot snare polypectomy  Biopsy revealed tubular adenoma single clip placed  4/1/2023 laboratory data  Sodium 136  Potassium 3 7  Chloride 106  CO2 30  BUN 14  Creatinine 0 9  Glucose 89  AST 19  ALT 32  Alk phos 36  Albumin 3 1  W B C 5 15 Hgb 11 1 down from 12 8 HCT 35 3 MCV 90 platelet count 882,333  Cortisol 1 0  ACTH 2 8     3/10/2023 pelvic ultrasound  Mild amount of simple appearing fluid within the endometrial canal   The endometrium is not thickened  12 x 10 x 11 mm anechoic structure right adnexa without internal blood flow  Suspicious for ovarian or paraovarian cyst   Left ovary not visualized  6 mm intramural leiomyoma in the anterior uterine body     3/6/2023 PET scan  1  Previously noted mildly hypermetabolic right inguinal/external iliac lymph nodes are essentially stable in size and exhibit persistent but mildly improved FDG activity  Findings are nonspecific and could reflect persistent regis metastatic disease   of low metabolic activity versus benign/inflammatory lymphadenopathy  2   Scattered areas of subcutaneous FDG activity involving the bilateral distal lower extremities extending from the level of the mid tibial shaft to the ankles/feet appear more prominent than the prior exam   While findings could be related to varicose   veins and inflammatory changes such as edema/cellulitis, given history of lower extremity melanoma, correlation with direct visualization and possibly MRI is recommended for further characterization and exclusion of developing malignancy at these sites  Attention to these regions on short interval follow-up imaging is recommended  3   Persistent short segment of focal FDG activity involving the mid to distal sigmoid colon  Given persistence and focality of this finding, further evaluation with colonoscopy is recommended to exclude colonic malignancy  4   Persistent nonspecific hypodense prominence to the endometrial cavity towards correlation with pelvic ultrasound is recommended for further characterization  5   Subtle asymmetric FDG activity associated with subtle asymmetric soft tissue prominence involving the right frontal scalp, possibly related to recent biopsy procedure although correlation with direct visualization and biopsy pathology results is   recommended to exclude a malignant process in this location       3/26/2020-Cologuard negative    No Known Allergies  Current Outpatient Medications   Medication Sig Dispense Refill   • apixaban (Eliquis) 5 mg Take 1 tablet (5 mg total) by mouth every 12 (twelve) hours 60 tablet 5   • calcium carbonate (OS-VIRGIE) 600 MG tablet Take 600 mg by mouth 2 (two) times a day     • famotidine (PEPCID) 10 mg tablet Take 10 mg by mouth 2 (two) times a day     • hydrocortisone (CORTEF) 10 mg tablet Take 1 tablet (10 mg total) by mouth 2 (two) times a day 60 tablet 11   • loratadine (CLARITIN) 10 mg tablet Take 10 mg by mouth daily     • losartan (Cozaar) 100 MG tablet Take 1 tablet (100 mg total) by mouth daily 30 tablet 5   • metoprolol succinate (TOPROL-XL) 25 mg 24 hr tablet Take 1 tablet (25 mg total) by mouth 2 (two) times a day 60 tablet 5   • Multiple Vitamins-Minerals (MULTIVITAMIN WITH MINERALS) tablet Take 1 tablet by mouth daily     • ondansetron (ZOFRAN) 4 mg tablet Take 1 tablet (4 mg total) by mouth every 8 (eight) hours as needed for nausea or vomiting (Patient not taking: Reported on 2023) 20 tablet 0   • patient supplied medication Pt takes eye drops Mura 128 - not listed in database     • triamcinolone (KENALOG) 0 1 % ointment Apply topically 2 (two) times a day (Patient not taking: Reported on 2023) 453 6 g 0   • Wheat Dextrin (BENEFIBER DRINK MIX PO) Take by mouth       No current facility-administered medications for this visit       MEDICAL HISTORY:  Past Medical History:   Diagnosis Date   • Acute deep vein thrombosis (DVT) of popliteal vein of left lower extremity (HCC)    • Ankle wound, left, initial encounter     Resolved 2017   • Ankle wound, right, initial encounter     resolved 2017   • DVT (deep vein thrombosis) in pregnancy    • Hypertension    • Pulmonary emboli ([address] )      post C section   • Pulmonary embolism ([address] )     2017   • Pulmonary embolism, blood-clot, obstetric    • Skin cancer    • Venous stasis ulcer ([address] )    • Venous ulcers of both lower extremities ([address] )      Past Surgical History:   Procedure Laterality Date   •  SECTION      X2     Social History     Substance and Sexual Activity Alcohol Use No    Comment: hx of social drinker     Social History     Substance and Sexual Activity   Drug Use No     Social History     Tobacco Use   Smoking Status Never   Smokeless Tobacco Never     Family History   Problem Relation Age of Onset   • Heart attack Mother    • Stroke Mother    • Cancer Father    • Bone cancer Family    • Hypertension Family    • Lung cancer Family        REVIEW OF SYSTEMS:  CONSTITUTIONAL: Denies any fever, chills, rigors, and weight loss  HEENT: No earache or tinnitus  Denies hearing loss or visual disturbances  CARDIOVASCULAR: No chest pain or palpitations  RESPIRATORY: Denies any cough, hemoptysis, shortness of breath or dyspnea on exertion  GASTROINTESTINAL: As noted in the History of Present Illness  GENITOURINARY: No problems with urination  Denies any hematuria or dysuria  NEUROLOGIC: No dizziness or vertigo, denies headaches  MUSCULOSKELETAL: Denies any muscle or joint pain  SKIN: Denies skin rashes or itching  ENDOCRINE: Denies excessive thirst  Denies intolerance to heat or cold  PSYCHOSOCIAL: Denies depression or anxiety  Denies any recent memory loss  Objective   There were no vitals taken for this visit  There is no height or weight on file to calculate BMI  PHYSICAL EXAM:   General Appearance: Alert, cooperative, no distress  HEENT: Normocephalic, atraumatic, anicteric  Neck: Supple, symmetrical, trachea midline  Lungs: Clear to auscultation bilaterally; no rales, rhonchi or wheezing; respirations unlabored   Heart: Regular rate and rhythm; no murmur, rub, or gallop  Abdomen: Soft, bowel sounds normal, non-tender, non-distended; no masses, there is no hepatosplenomegaly   No spider angiomas  Genitalia: Deferred   Rectal: Deferred   Extremities: No cyanosis, clubbing or edema   Skin: No jaundice, rashes, or lesions   Lymph nodes: No palpable cervical lymphadenopathy   Lab Results:   Appointment on 05/27/2023   Component Date Value   • WBC 05/27/2023 6 38    • RBC 05/27/2023 4 20    • Hemoglobin 05/27/2023 12 2    • Hematocrit 05/27/2023 38 8    • MCV 05/27/2023 92    • MCH 05/27/2023 29 0    • MCHC 05/27/2023 31 4    • RDW 05/27/2023 13 0    • MPV 05/27/2023 10 3    • Platelets 40/47/6198 209    • nRBC 05/27/2023 0    • Neutrophils Relative 05/27/2023 40 (L)    • Immat GRANS % 05/27/2023 0    • Lymphocytes Relative 05/27/2023 44    • Monocytes Relative 05/27/2023 9    • Eosinophils Relative 05/27/2023 5    • Basophils Relative 05/27/2023 2 (H)    • Neutrophils Absolute 05/27/2023 2 57    • Immature Grans Absolute 05/27/2023 0 02    • Lymphocytes Absolute 05/27/2023 2 85    • Monocytes Absolute 05/27/2023 0 54    • Eosinophils Absolute 05/27/2023 0 30    • Basophils Absolute 05/27/2023 0 10    • Sodium 05/27/2023 140    • Potassium 05/27/2023 3 8    • Chloride 05/27/2023 105    • CO2 05/27/2023 30    • ANION GAP 05/27/2023 5    • BUN 05/27/2023 17    • Creatinine 05/27/2023 0 89    • Glucose, Fasting 05/27/2023 89    • Calcium 05/27/2023 9 1    • AST 05/27/2023 13    • ALT 05/27/2023 9    • Alkaline Phosphatase 05/27/2023 37    • Total Protein 05/27/2023 6 8    • Albumin 05/27/2023 3 7    • Total Bilirubin 05/27/2023 0 50    • eGFR 05/27/2023 64    • LD 05/27/2023 137 (L)    • T3, Free 05/27/2023 3 03    • Free T4 05/27/2023 1 17 (H)    • TSH 3RD GENERATON 05/27/2023 3 454    Lab on 04/29/2023   Component Date Value   • WBC 04/29/2023 4 64    • RBC 04/29/2023 4 07    • Hemoglobin 04/29/2023 11 6    • Hematocrit 04/29/2023 36 8    • MCV 04/29/2023 90    • MCH 04/29/2023 28 5    • MCHC 04/29/2023 31 5    • RDW 04/29/2023 13 6    • MPV 04/29/2023 11 3    • Platelets 69/33/2778 177    • nRBC 04/29/2023 0    • Neutrophils Relative 04/29/2023 35 (L)    • Immat GRANS % 04/29/2023 0    • Lymphocytes Relative 04/29/2023 50 (H)    • Monocytes Relative 04/29/2023 10    • Eosinophils Relative 04/29/2023 4    • Basophils Relative 04/29/2023 1    • Neutrophils Absolute 04/29/2023 1 60 (L)    • Immature Grans Absolute 04/29/2023 0 01    • Lymphocytes Absolute 04/29/2023 2 34    • Monocytes Absolute 04/29/2023 0 45    • Eosinophils Absolute 04/29/2023 0 18    • Basophils Absolute 04/29/2023 0 06    • Sodium 04/29/2023 139    • Potassium 04/29/2023 4 0    • Chloride 04/29/2023 107    • CO2 04/29/2023 29    • ANION GAP 04/29/2023 3 (L)    • BUN 04/29/2023 25    • Creatinine 04/29/2023 0 96    • Glucose, Fasting 04/29/2023 91    • Calcium 04/29/2023 9 1    • Corrected Calcium 04/29/2023 9 6    • AST 04/29/2023 15    • ALT 04/29/2023 19    • Alkaline Phosphatase 04/29/2023 39 (L)    • Total Protein 04/29/2023 6 8    • Albumin 04/29/2023 3 4 (L)    • Total Bilirubin 04/29/2023 0 39    • eGFR 04/29/2023 59    • LD 04/29/2023 176    • T3, Free 04/29/2023 2 75    • Free T4 04/29/2023 1 04    • TSH 3RD GENERATON 04/29/2023 4 320    • Vitamin B-12 04/29/2023 414    • Folate 04/29/2023 >20 0 (H)    • Iron Saturation 04/29/2023 32    • TIBC 04/29/2023 195 (L)    • Iron 04/29/2023 63    • Ferritin 04/29/2023 [address] Outpatient Visit on 04/18/2023   Component Date Value   • Case Report 04/18/2023                      Value:Surgical Pathology Report                         Case: [id number]                                   Authorizing Provider:  Alessandra Pan DO  Collected:           04/18/2023 1009              Ordering Location:     [facility] Received:            04/18/2023 1455                                     Endoscopy                                                                    Pathologist:           Jc Hernandez MD                                                         Specimens:   A) - Large Intestine, Cecum, POLYP(S)X4                                                             B) - Large Intestine, Transverse Colon, PROXIMAL, POLYP                                             C) - Colon, ERYTHEMA FOLD BX, AT 18 CM D) - Colon, POLYP                                                                         • Final Diagnosis 04/18/2023                      Value: This result contains rich text formatting which cannot be displayed here  • Additional Information 04/18/2023                      Value: This result contains rich text formatting which cannot be displayed here  • Synoptic Checklist 04/18/2023                      Value:                            COLON/RECTUM POLYP FORM - GI - All Specimens                                                                                     :    Adenoma(s)     • Gross Description 04/18/2023                      Value: This result contains rich text formatting which cannot be displayed here  Radiology Results:   No results found    Josh Bah DO   06/07/23   Cc:

## 2023-06-07 NOTE — PATIENT INSTRUCTIONS
History of colon polyps  Debbie underwent colonoscopy on April 18, 2023  This revealed Severe diverticulosis sigmoid colon with luminal narrowing  Erythematous prominent folds at 18 cm likely related to severe diverticulosis  Biopsy unremarkable  4 polyps cecum measuring 4 to 6 mm  Biopsy revealed tubular adenoma  5 mm sessile polyp proximal transverse colon  Biopsy revealed tubular adenoma  12 mm pedunculated polyp at 15 cm status post hot snare polypectomy  Biopsy revealed tubular adenoma  single clip placed  Based upon these findings she will need to undergo repeat colonoscopy in April 2026  Diverticulosis  Bailey Pineda was noted to have severe diverticulosis in her sigmoid colon at time of colonoscopy  Based upon this I did encourage her to try to obtain 25 to 30 g of fiber in her diet daily  In addition she should use a fiber supplement  She is currently using Benefiber 2 teaspoonfuls mixed in 6 to 8 ounce of noncarbonated liquid daily  Abnormal PET scan of colon  Patient did undergo her colonoscopy due to an abnormal PET scan revealing a short segment of F DG activity in the mid to distal sigmoid colon  This is the probable location of the 1 5 cm polyp  In addition there is fairly extensive diverticulosis in this region  The above findings are the likely cause for the abnormal PET scan in this region      Abnormal finding on GI tract imaging  Please see comments under abnormal PET scan

## 2023-06-07 NOTE — ASSESSMENT & PLAN NOTE
Patient did undergo her colonoscopy due to an abnormal PET scan revealing a short segment of F DG activity in the mid to distal sigmoid colon  This is the probable location of the 1 5 cm polyp  In addition there is fairly extensive diverticulosis in this region  The above findings are the likely cause for the abnormal PET scan in this region

## 2023-06-21 RX ORDER — SODIUM CHLORIDE 9 MG/ML
20 INJECTION, SOLUTION INTRAVENOUS ONCE
Status: CANCELLED | OUTPATIENT
Start: 2023-06-28

## 2023-06-23 ENCOUNTER — TELEPHONE (OUTPATIENT)
Dept: HEMATOLOGY ONCOLOGY | Facility: CLINIC | Age: 73
End: 2023-06-23

## 2023-06-24 ENCOUNTER — APPOINTMENT (OUTPATIENT)
Dept: LAB | Facility: CLINIC | Age: 73
End: 2023-06-24
Payer: COMMERCIAL

## 2023-06-24 DIAGNOSIS — C43.9 METASTATIC MELANOMA (HCC): ICD-10-CM

## 2023-06-24 DIAGNOSIS — Z79.899 HIGH RISK MEDICATION USE: ICD-10-CM

## 2023-06-24 LAB
ALBUMIN SERPL BCP-MCNC: 3.4 G/DL (ref 3.5–5)
ALP SERPL-CCNC: 42 U/L (ref 46–116)
ALT SERPL W P-5'-P-CCNC: 18 U/L (ref 12–78)
ANION GAP SERPL CALCULATED.3IONS-SCNC: 0 MMOL/L
AST SERPL W P-5'-P-CCNC: 13 U/L (ref 5–45)
BASOPHILS # BLD AUTO: 0.09 THOUSANDS/ÂΜL (ref 0–0.1)
BASOPHILS NFR BLD AUTO: 2 % (ref 0–1)
BILIRUB SERPL-MCNC: 0.59 MG/DL (ref 0.2–1)
BUN SERPL-MCNC: 21 MG/DL (ref 5–25)
CALCIUM ALBUM COR SERPL-MCNC: 9.6 MG/DL (ref 8.3–10.1)
CALCIUM SERPL-MCNC: 9.1 MG/DL (ref 8.3–10.1)
CHLORIDE SERPL-SCNC: 110 MMOL/L (ref 96–108)
CO2 SERPL-SCNC: 31 MMOL/L (ref 21–32)
CREAT SERPL-MCNC: 0.95 MG/DL (ref 0.6–1.3)
EOSINOPHIL # BLD AUTO: 0.2 THOUSAND/ÂΜL (ref 0–0.61)
EOSINOPHIL NFR BLD AUTO: 4 % (ref 0–6)
ERYTHROCYTE [DISTWIDTH] IN BLOOD BY AUTOMATED COUNT: 14.1 % (ref 11.6–15.1)
GFR SERPL CREATININE-BSD FRML MDRD: 60 ML/MIN/1.73SQ M
GLUCOSE P FAST SERPL-MCNC: 89 MG/DL (ref 65–99)
HCT VFR BLD AUTO: 40.5 % (ref 34.8–46.1)
HGB BLD-MCNC: 12.7 G/DL (ref 11.5–15.4)
IMM GRANULOCYTES # BLD AUTO: 0.01 THOUSAND/UL (ref 0–0.2)
IMM GRANULOCYTES NFR BLD AUTO: 0 % (ref 0–2)
LDH SERPL-CCNC: 130 U/L (ref 81–234)
LYMPHOCYTES # BLD AUTO: 2.36 THOUSANDS/ÂΜL (ref 0.6–4.47)
LYMPHOCYTES NFR BLD AUTO: 49 % (ref 14–44)
MCH RBC QN AUTO: 29 PG (ref 26.8–34.3)
MCHC RBC AUTO-ENTMCNC: 31.4 G/DL (ref 31.4–37.4)
MCV RBC AUTO: 93 FL (ref 82–98)
MONOCYTES # BLD AUTO: 0.4 THOUSAND/ÂΜL (ref 0.17–1.22)
MONOCYTES NFR BLD AUTO: 8 % (ref 4–12)
NEUTROPHILS # BLD AUTO: 1.83 THOUSANDS/ÂΜL (ref 1.85–7.62)
NEUTS SEG NFR BLD AUTO: 37 % (ref 43–75)
NRBC BLD AUTO-RTO: 0 /100 WBCS
PLATELET # BLD AUTO: 183 THOUSANDS/UL (ref 149–390)
PMV BLD AUTO: 11.7 FL (ref 8.9–12.7)
POTASSIUM SERPL-SCNC: 4.3 MMOL/L (ref 3.5–5.3)
PROT SERPL-MCNC: 6.7 G/DL (ref 6.4–8.4)
RBC # BLD AUTO: 4.38 MILLION/UL (ref 3.81–5.12)
SODIUM SERPL-SCNC: 141 MMOL/L (ref 135–147)
T3FREE SERPL-MCNC: 2.79 PG/ML (ref 2.5–3.9)
T4 FREE SERPL-MCNC: 0.96 NG/DL (ref 0.61–1.12)
TSH SERPL DL<=0.05 MIU/L-ACNC: 3.76 UIU/ML (ref 0.45–4.5)
WBC # BLD AUTO: 4.89 THOUSAND/UL (ref 4.31–10.16)

## 2023-06-24 PROCEDURE — 80053 COMPREHEN METABOLIC PANEL: CPT

## 2023-06-24 PROCEDURE — 84439 ASSAY OF FREE THYROXINE: CPT

## 2023-06-24 PROCEDURE — 36415 COLL VENOUS BLD VENIPUNCTURE: CPT

## 2023-06-24 PROCEDURE — 83615 LACTATE (LD) (LDH) ENZYME: CPT

## 2023-06-24 PROCEDURE — 84443 ASSAY THYROID STIM HORMONE: CPT

## 2023-06-24 PROCEDURE — 85025 COMPLETE CBC W/AUTO DIFF WBC: CPT

## 2023-06-24 PROCEDURE — 84481 FREE ASSAY (FT-3): CPT

## 2023-06-28 ENCOUNTER — OFFICE VISIT (OUTPATIENT)
Dept: HEMATOLOGY ONCOLOGY | Facility: CLINIC | Age: 73
End: 2023-06-28
Payer: COMMERCIAL

## 2023-06-28 ENCOUNTER — TELEPHONE (OUTPATIENT)
Dept: HEMATOLOGY ONCOLOGY | Facility: CLINIC | Age: 73
End: 2023-06-28

## 2023-06-28 ENCOUNTER — HOSPITAL ENCOUNTER (OUTPATIENT)
Dept: INFUSION CENTER | Facility: HOSPITAL | Age: 73
Discharge: HOME | End: 2023-06-28
Attending: INTERNAL MEDICINE
Payer: COMMERCIAL

## 2023-06-28 VITALS
HEIGHT: 69 IN | WEIGHT: 189 LBS | TEMPERATURE: 97.3 F | DIASTOLIC BLOOD PRESSURE: 98 MMHG | SYSTOLIC BLOOD PRESSURE: 124 MMHG | RESPIRATION RATE: 16 BRPM | OXYGEN SATURATION: 100 % | HEART RATE: 106 BPM | BODY MASS INDEX: 27.99 KG/M2

## 2023-06-28 VITALS
TEMPERATURE: 95.5 F | OXYGEN SATURATION: 96 % | DIASTOLIC BLOOD PRESSURE: 80 MMHG | HEIGHT: 69 IN | HEART RATE: 72 BPM | BODY MASS INDEX: 27.9 KG/M2 | SYSTOLIC BLOOD PRESSURE: 137 MMHG | RESPIRATION RATE: 16 BRPM

## 2023-06-28 DIAGNOSIS — E27.40 ADRENAL INSUFFICIENCY (HCC): ICD-10-CM

## 2023-06-28 DIAGNOSIS — Z79.899 HIGH RISK MEDICATION USE: ICD-10-CM

## 2023-06-28 DIAGNOSIS — C43.9 METASTATIC MELANOMA (HCC): Primary | ICD-10-CM

## 2023-06-28 PROCEDURE — 99214 OFFICE O/P EST MOD 30 MIN: CPT | Performed by: INTERNAL MEDICINE

## 2023-06-28 RX ORDER — SODIUM CHLORIDE 9 MG/ML
20 INJECTION, SOLUTION INTRAVENOUS ONCE
Status: COMPLETED | OUTPATIENT
Start: 2023-06-28 | End: 2023-06-28

## 2023-06-28 RX ORDER — CALCIUM CITRATE/VITAMIN D3 200MG-6.25
TABLET ORAL
COMMUNITY

## 2023-06-28 RX ADMIN — SODIUM CHLORIDE 20 ML/HR: 0.9 INJECTION, SOLUTION INTRAVENOUS at 13:45

## 2023-06-28 RX ADMIN — SODIUM CHLORIDE 480 MG OF NIVOLUMAB: 9 INJECTION, SOLUTION INTRAVENOUS at 14:18

## 2023-06-28 NOTE — LETTER
August 4, 2023     Lalo Dsouza DO  [address]    Patient: Katharine Ignacio   YOB: 1950   Date of Visit: 6/28/2023       Dear Dr. Hilary Olivo: Thank you for referring Katharine Ignacio to me for evaluation. Below are my notes for this consultation. If you have questions, please do not hesitate to call me. I look forward to following your patient along with you. Sincerely,        Feliciano Myers MD        CC: MD Jai Malone DO Ward Rothman, MD  8/3/2023  7:59 AM  Sign when Signing Visit  [address] [facility] [address]  [address]  [phone]     Date of Visit: 7/57/0708  Name: Katharine Ignacio   YOB: 1950        Subjective    VISIT DIAGNOSIS:  Diagnoses and all orders for this visit:    Metastatic melanoma ([address])  -     Infusion Calculated Appointment Request; Future  -     Infusion Calculated Appointment Request; Future  -     Infusion Calculated Appointment Request; Future  -     NM pet ct tumor imaging whole body; Future    High risk medication use    Adrenal insufficiency (HCC)    Other orders  -     Multiple Minerals-Vitamins (Citracal Plus) TABS; Take by mouth        Oncology History   Metastatic melanoma ([address])   10/13/2022 -  Cancer Staged    Staging form: Melanoma of the Skin, AJCC 8th Edition  - Clinical stage from 10/13/2022: Stage IV (cTX, cNX, cM1a) - Signed by Feliciano Myers MD on 11/22/2022       10/13/2022 Biopsy    A. Skin, right lateral medial leg, punch biopsy:  Portion of severely atypical melanocytic dermal proliferation with prominent melanosis consistent with melanoma. See note. B. Skin, right medial leg. punch biopsy:  Portion of severely atypical melanocytic dermal proliferation with prominent melanosis consistent with melanoma. See note.         C. Skin, right medial leg, punch biopsy:   Portion of severely atypical melanocytic dermal proliferation with prominent melanosis consistent with melanoma. See note. 11/22/2022 Initial Diagnosis    Metastatic melanoma ([address])     12/14/2022 -  Chemotherapy    alteplase (CATHFLO), 2 mg, Intracatheter, Every 2 hour PRN, 9 of 14 cycles  NIVOLUMAB-RELATLIMAB-RMBW (OPDUALAG) IVPB, 480 mg of nivolumab, Intravenous, Once, 9 of 14 cycles  Administration: 480 mg of nivolumab (12/14/2022), 480 mg of nivolumab (1/11/2023), 480 mg of nivolumab (2/8/2023), 480 mg of nivolumab (3/8/2023), 480 mg of nivolumab (4/5/2023), 480 mg of nivolumab (5/3/2023), 480 mg of nivolumab (5/31/2023), 480 mg of nivolumab (6/28/2023)     1/5/2023 [facility] liquid testing  TMB 1 Mut/Mb  MSI-high not detected          Cancer Staging   Metastatic melanoma ([address])  Staging form: Melanoma of the Skin, AJCC 8th Edition  - Clinical stage from 10/13/2022: Stage IV (cTX, cNX, cM1a) - Signed by Kevin Light MD on 11/22/2022     Treatment Details   Treatment goal Curative   Plan Name OP Nivolumab and Relatlimab-rmbw Q 28 Days   Status Active   Start Date 12/14/2022   End Date 12/13/2023 (Planned)   Provider Kevin Light MD   Chemotherapy alteplase (CATHFLO), 2 mg, Intracatheter, Every 2 hour PRN, 9 of 14 cycles    NIVOLUMAB-RELATLIMAB-RMBW (OPDUALAG) IVPB, 480 mg of nivolumab, Intravenous, Once, 9 of 14 cycles  Administration: 480 mg of nivolumab (12/14/2022), 480 mg of nivolumab (1/11/2023), 480 mg of nivolumab (2/8/2023), 480 mg of nivolumab (3/8/2023), 480 mg of nivolumab (4/5/2023), 480 mg of nivolumab (5/3/2023), 480 mg of nivolumab (5/31/2023), 480 mg of nivolumab (6/28/2023), 480 mg of nivolumab (7/26/2023)       HISTORY OF PRESENT ILLNESS: Carmelita Niño is a 67 y.o. female  who has unresectable/metastatic melanoma on treatment with nivolumab plus relatlimab here for continued monitoring, follow-up, and surveillance while on treatment. Doing well with no issues at this time. Feels good.   Energy is good.  Eating well. Tolerating treatment without any immune mediated side effects. Continues to see decreased pigmentation in her right lower extremity. Denies any new, changing, concerning skin lesions. Denies any lymphadenopathy. She does state that her mother is not doing well and declining. Denies any immune mediated side effects. Denies headaches, double vision, rash, itching, chest pain, shortness breath, and diarrhea. No muscle weakness or fatigue. REVIEW OF SYSTEMS:  Review of Systems   Constitutional: Negative for appetite change, fatigue, fever and unexpected weight change. HENT:   Negative for lump/mass, trouble swallowing and voice change. Eyes: Negative for icterus. Respiratory: Negative for cough, shortness of breath and wheezing. Cardiovascular: Negative for chest pain and leg swelling. Gastrointestinal: Negative for abdominal distention, abdominal pain, constipation, diarrhea, nausea and vomiting. Genitourinary: Negative for difficulty urinating and hematuria. Musculoskeletal: Negative for arthralgias, gait problem and myalgias. Skin: Negative for itching and rash. No new, changing, or concerning lesions. Neurological: Negative for extremity weakness, gait problem, headaches, light-headedness and numbness. Hematological: Negative for adenopathy.         MEDICATIONS:    Current Outpatient Medications:   •  apixaban (Eliquis) 5 mg, Take 1 tablet (5 mg total) by mouth every 12 (twelve) hours, Disp: 60 tablet, Rfl: 5  •  famotidine (PEPCID) 10 mg tablet, Take 10 mg by mouth 2 (two) times a day, Disp: , Rfl:   •  hydrocortisone (CORTEF) 10 mg tablet, Take 1 tablet (10 mg total) by mouth 2 (two) times a day, Disp: 60 tablet, Rfl: 11  •  loratadine (CLARITIN) 10 mg tablet, Take 10 mg by mouth daily, Disp: , Rfl:   •  losartan (Cozaar) 100 MG tablet, Take 1 tablet (100 mg total) by mouth daily, Disp: 30 tablet, Rfl: 5  •  metoprolol succinate (TOPROL-XL) 25 mg 24 hr tablet, Take 1 tablet (25 mg total) by mouth 2 (two) times a day, Disp: 60 tablet, Rfl: 5  •  Multiple Minerals-Vitamins (Citracal Plus) TABS, Take by mouth, Disp: , Rfl:   •  Multiple Vitamins-Minerals (MULTIVITAMIN WITH MINERALS) tablet, Take 1 tablet by mouth daily, Disp: , Rfl:   •  patient supplied medication, Pt takes eye drops Mura 128 - not listed in database, Disp: , Rfl:   •  Wheat Dextrin (BENEFIBER DRINK MIX PO), Take by mouth, Disp: , Rfl:      ALLERGIES:  Allergies   Allergen Reactions   • Etomidate Other (See Comments)        ACTIVE PROBLEMS:  Patient Active Problem List   Diagnosis   • Acute massive pulmonary embolism (HCC)   • Venous stasis of both lower extremities   • Clostridium difficile infection   • Right ventricular systolic dysfunction without heart failure   • Essential hypertension   • Metastatic melanoma ([address])   • High risk medication use   • Adrenal insufficiency (HCC)   • Abnormal PET scan of colon   • Mild anemia   • Anticoagulated by anticoagulation treatment   • History of colon polyps   • Diverticulosis   • Abnormal finding on GI tract imaging          PAST MEDICAL HISTORY:   Past Medical History:   Diagnosis Date   • Acute deep vein thrombosis (DVT) of popliteal vein of left lower extremity (HCC)    • Ankle wound, left, initial encounter     Resolved 2017   • Ankle wound, right, initial encounter     resolved 2017   • DVT (deep vein thrombosis) in pregnancy    • Hypertension    • Pulmonary emboli (HCC)      post C section   • Pulmonary embolism ([address])     2017   • Pulmonary embolism, blood-clot, obstetric    • Skin cancer    • Venous stasis ulcer (HCC)    • Venous ulcers of both lower extremities (HCC)         PAST SURGICAL HISTORY:  Past Surgical History:   Procedure Laterality Date   •  SECTION      X2        SOCIAL HISTORY:  Social History     Socioeconomic History   • Marital status:       Spouse name: None   • Number of children: None   • Years of education: None   • Highest education level: None   Occupational History   • None   Tobacco Use   • Smoking status: Never   • Smokeless tobacco: Never   Vaping Use   • Vaping Use: Never used   Substance and Sexual Activity   • Alcohol use: No     Comment: hx of social drinker   • Drug use: No   • Sexual activity: None   Other Topics Concern   • None   Social History Narrative   • None     Social Determinants of Health     Financial Resource Strain: Not on file   Food Insecurity: Not on file   Transportation Needs: Not on file   Physical Activity: Not on file   Stress: Not on file   Social Connections: Not on file   Intimate Partner Violence: Not on file   Housing Stability: Not on file        FAMILY HISTORY:  Family History   Problem Relation Age of Onset   • Heart attack Mother    • Stroke Mother    • Cancer Father    • Bone cancer Family    • Hypertension Family    • Lung cancer Family            Objective    PHYSICAL EXAMINATION:   Blood pressure 124/98, pulse (!) 106, temperature (!) 97.3 °F (36.3 °C), temperature source Temporal, resp. rate 16, height 5' 9" (1.753 m), weight 85.7 kg (189 lb), SpO2 100 %. Pain Score: 0-No pain     ECOG Performance Status      Flowsheet Row Most Recent Value   ECOG Performance Status 0 - Fully active, able to carry on all pre-disease performance without restriction               Physical Exam  Constitutional:       General: She is not in acute distress. Appearance: Normal appearance. She is not toxic-appearing. HENT:      Head: Normocephalic and atraumatic. Right Ear: External ear normal.      Left Ear: External ear normal.      Nose: Nose normal.      Mouth/Throat:      Mouth: Mucous membranes are moist.      Pharynx: Oropharynx is clear. Eyes:      General: No scleral icterus. Right eye: No discharge. Left eye: No discharge. Conjunctiva/sclera: Conjunctivae normal.   Cardiovascular:      Rate and Rhythm: Normal rate and regular rhythm. Pulses: Normal pulses. Heart sounds: No murmur heard. No friction rub. No gallop. Pulmonary:      Effort: Pulmonary effort is normal. No respiratory distress. Breath sounds: Normal breath sounds. No wheezing or rales. Abdominal:      General: Bowel sounds are normal. There is no distension. Palpations: There is no mass. Tenderness: There is no abdominal tenderness. There is no rebound. Musculoskeletal:         General: No swelling or tenderness. Cervical back: Normal range of motion. No rigidity. Right lower leg: Edema (minimal) present. Left lower leg: Edema (minimal) present. Lymphadenopathy:      Head:      Right side of head: No submandibular, preauricular or posterior auricular adenopathy. Left side of head: No submandibular, preauricular or posterior auricular adenopathy. Cervical: No cervical adenopathy. Right cervical: No superficial or posterior cervical adenopathy. Left cervical: No superficial or posterior cervical adenopathy. Upper Body:      Right upper body: No supraclavicular or axillary adenopathy. Left upper body: No supraclavicular or axillary adenopathy. Skin:     General: Skin is warm. Coloration: Skin is not jaundiced. Findings: Lesion (decreasing pigmentation along medial right lower leg) present. No rash. Comments: Well healed surgical scar. No evidence of recurrence at primary site. Neurological:      General: No focal deficit present. Mental Status: She is alert and oriented to person, place, and time. Cranial Nerves: No cranial nerve deficit. Motor: No weakness. Gait: Gait normal.   Psychiatric:         Mood and Affect: Mood normal.         Behavior: Behavior normal.         Thought Content: Thought content normal.         Judgment: Judgment normal.         I reviewed lab data in the chart.     WBC   Date Value Ref Range Status   07/22/2023 4.95 4.31 - 10.16 Thousand/uL Final 06/24/2023 4.89 4.31 - 10.16 Thousand/uL Final   05/27/2023 6.38 4.31 - 10.16 Thousand/uL Final     Hemoglobin   Date Value Ref Range Status   07/22/2023 13.1 11.5 - 15.4 g/dL Final   06/24/2023 12.7 11.5 - 15.4 g/dL Final   05/27/2023 12.2 11.5 - 15.4 g/dL Final     Platelets   Date Value Ref Range Status   07/22/2023 188 149 - 390 Thousands/uL Final   06/24/2023 183 149 - 390 Thousands/uL Final   05/27/2023 209 149 - 390 Thousands/uL Final     MCV   Date Value Ref Range Status   07/22/2023 93 82 - 98 fL Final   06/24/2023 93 82 - 98 fL Final   05/27/2023 92 82 - 98 fL Final      Potassium   Date Value Ref Range Status   07/22/2023 4.4 3.5 - 5.3 mmol/L Final   06/24/2023 4.3 3.5 - 5.3 mmol/L Final   05/27/2023 3.8 3.5 - 5.3 mmol/L Final     Chloride   Date Value Ref Range Status   07/22/2023 108 96 - 108 mmol/L Final   06/24/2023 110 (H) 96 - 108 mmol/L Final   05/27/2023 105 96 - 108 mmol/L Final     CO2   Date Value Ref Range Status   07/22/2023 30 21 - 32 mmol/L Final   06/24/2023 31 21 - 32 mmol/L Final   05/27/2023 30 21 - 32 mmol/L Final     BUN   Date Value Ref Range Status   07/22/2023 21 5 - 25 mg/dL Final   06/24/2023 21 5 - 25 mg/dL Final   05/27/2023 17 5 - 25 mg/dL Final     Creatinine   Date Value Ref Range Status   07/22/2023 0.91 0.60 - 1.30 mg/dL Final     Comment:     Standardized to IDMS reference method   06/24/2023 0.95 0.60 - 1.30 mg/dL Final     Comment:     Standardized to IDMS reference method   05/27/2023 0.89 0.60 - 1.30 mg/dL Final     Comment:     Standardized to IDMS reference method     Glucose   Date Value Ref Range Status   11/17/2022 85 65 - 140 mg/dL Final     Comment:     If the patient is fasting, the ADA then defines impaired fasting glucose as > 100 mg/dL and diabetes as > or equal to 123 mg/dL. Specimen collection should occur prior to Sulfasalazine administration due to the potential for falsely depressed results.  Specimen collection should occur prior to Sulfapyridine administration due to the potential for falsely elevated results. 08/03/2020 112 (H) 65 - 99 mg/dL Final     Comment:     If the patient is fasting, the ADA then defines impaired fasting glucose as > 100 mg/dL and diabetes as > or equal to 123 mg/dL. Specimen collection should occur prior to Sulfasalazine administration due to the potential for falsely depressed results. Specimen collection should occur prior to Sulfapyridine administration due to the potential for falsely elevated results. 01/24/2017 91 65 - 140 mg/dL Final     Comment:     If the patient is fasting, the ADA then defines impaired fasting glucose as > 100 mg/dL and diabetes as > or equal to 123 mg/dL. Calcium   Date Value Ref Range Status   07/22/2023 8.9 8.3 - 10.1 mg/dL Final   06/24/2023 9.1 8.3 - 10.1 mg/dL Final   05/27/2023 9.1 8.4 - 10.2 mg/dL Final     Albumin   Date Value Ref Range Status   07/22/2023 3.4 (L) 3.5 - 5.0 g/dL Final   06/24/2023 3.4 (L) 3.5 - 5.0 g/dL Final   05/27/2023 3.7 3.5 - 5.0 g/dL Final     Total Bilirubin   Date Value Ref Range Status   07/22/2023 0.40 0.20 - 1.00 mg/dL Final     Comment:     Use of this assay is not recommended for patients undergoing treatment with eltrombopag due to the potential for falsely elevated results. 06/24/2023 0.59 0.20 - 1.00 mg/dL Final     Comment:     Use of this assay is not recommended for patients undergoing treatment with eltrombopag due to the potential for falsely elevated results. 05/27/2023 0.50 0.20 - 1.00 mg/dL Final     Comment:     Use of this assay is not recommended for patients undergoing treatment with eltrombopag due to the potential for falsely elevated results. N-acetyl-p-benzoquinone imine (metabolite of Acetaminophen) will generate erroneously low results in samples for patients that have taken an overdose of Acetaminophen.      Alkaline Phosphatase   Date Value Ref Range Status   07/22/2023 46 46 - 116 U/L Final   06/24/2023 42 (L) 46 - 116 U/L Final 05/27/2023 37 34 - 104 U/L Final     AST   Date Value Ref Range Status   07/22/2023 15 5 - 45 U/L Final     Comment:     Specimen collection should occur prior to Sulfasalazine administration due to the potential for falsely depressed results. 06/24/2023 13 5 - 45 U/L Final     Comment:     Specimen collection should occur prior to Sulfasalazine administration due to the potential for falsely depressed results. 05/27/2023 13 13 - 39 U/L Final     ALT   Date Value Ref Range Status   07/22/2023 18 12 - 78 U/L Final     Comment:     Specimen collection should occur prior to Sulfasalazine and/or Sulfapyridine administration due to the potential for falsely depressed results. 06/24/2023 18 12 - 78 U/L Final     Comment:     Specimen collection should occur prior to Sulfasalazine and/or Sulfapyridine administration due to the potential for falsely depressed results. 05/27/2023 9 7 - 52 U/L Final     Comment:     Specimen collection should occur prior to Sulfasalazine administration due to the potential for falsely depressed results. LD   Date Value Ref Range Status   07/22/2023 163 81 - 234 U/L Final   06/24/2023 130 81 - 234 U/L Final   05/27/2023 137 (L) 140 - 271 U/L Final     No results found for: "TSH"  No results found for: "R3QYGBA"   Free T4   Date Value Ref Range Status   07/22/2023 0.94 0.61 - 1.12 ng/dL Final     Comment:     Specimens with biotin concentrations > 10 ng/mL can lead to significant (> 10%) positive bias in result.   06/24/2023 0.96 0.61 - 1.12 ng/dL Final     Comment:     Specimens with biotin concentrations > 10 ng/mL can lead to significant (> 10%) positive bias in result. 05/27/2023 1.17 (H) 0.61 - 1.12 ng/dL Final     Comment:     Specimens with biotin concentrations > 10 ng/mL can lead to significant (> 10%) positive bias in result. RECENT IMAGING:         Assessment/Plan  Ms. Elizabeth Segovia is a 67 yr female with unresectable/metastatic melanoma on treatment with nivolumab plus relatlimab here for continued monitoring, follow-up, and surveillance. 1. Metastatic melanoma ([address])  She is doing well and tolerating treatment. Clinical evidence of disease response with decreasing pigmentation along the area in her medial right lower leg associated with diffuse melanoma infiltrates in the skin. Labs reviewed and okay. She can continue with treatment with nivolumab plus relatlimab. She is due for full body imaging to assess continued treatment response. Orders placed and prescription provided she knows to continue to monitor for immune mediated side effects. She knows to call with issues or concerns prior to her next visit. - Infusion Calculated Appointment Request; Future  - Infusion Calculated Appointment Request; Future  - Infusion Calculated Appointment Request; Future  - NM pet ct tumor imaging whole body; Future    2. High risk medication use  3. Adrenal insufficiency ([address])  She is on treatment with immunotherapy and we will continue to monitor for side effects and lab abnormalities. We will monitor labs with each treatment to ensure safety of continuing on treatment. She knows to watch for signs and symptoms for immune mediated side effects. She is doing well and has great energy after diagnosis of adrenal insufficiency and supplementation with hydrocortisone. She knows to continue to monitor for development of additional immune mediated side effects. She knows to call with issues or concerns. Return in about 4 weeks (around 7/26/2023) for Office Visit, Infusion - See Treatment Plan, labs, scans.      Keri Crouch MD, PhD

## 2023-06-28 NOTE — TELEPHONE ENCOUNTER
Return in about 4 weeks (around 7/26/2023) for Office Visit, Infusion - See Treatment Plan, labs, scans      Check out comments: Needs additional office visits and infusions visits scheduled     I made first 4 week appt, please schedule additional appts and infusions

## 2023-06-28 NOTE — PLAN OF CARE
Problem: Knowledge Deficit  Goal: Patient/family/caregiver demonstrates understanding of disease process, treatment plan, medications, and discharge instructions  Description: Complete learning assessment and assess knowledge base    Interventions:  - Provide teaching at level of understanding  - Provide teaching via preferred learning methods  Outcome: Progressing     Problem: INFECTION - ADULT  Goal: Absence or prevention of progression during hospitalization  Description: INTERVENTIONS:  - Assess and monitor for signs and symptoms of infection  - Monitor lab/diagnostic results  - Monitor all insertion sites, i e  indwelling lines, tubes, and drains  - Monitor endotracheal if appropriate and nasal secretions for changes in amount and color  - [place] appropriate cooling/warming therapies per order  - Administer medications as ordered  - Instruct and encourage patient and family to use good hand hygiene technique  - Identify and instruct in appropriate isolation precautions for identified infection/condition  Outcome: Progressing     Problem: DISCHARGE PLANNING  Goal: Discharge to home or other facility with appropriate resources  Description: INTERVENTIONS:  - Identify barriers to discharge w/patient and caregiver  - Arrange for needed discharge resources and transportation as appropriate  - Identify discharge learning needs (meds, wound care, etc )  - Arrange for interpretive services to assist at discharge as needed  - Refer to Case Management Department for coordinating discharge planning if the patient needs post-hospital services based on physician/advanced practitioner order or complex needs related to functional status, cognitive ability, or social support system  Outcome: Progressing

## 2023-07-19 RX ORDER — SODIUM CHLORIDE 9 MG/ML
20 INJECTION, SOLUTION INTRAVENOUS ONCE
Status: CANCELLED | OUTPATIENT
Start: 2023-07-26

## 2023-07-20 ENCOUNTER — OFFICE VISIT (OUTPATIENT)
Dept: HEMATOLOGY ONCOLOGY | Facility: CLINIC | Age: 73
End: 2023-07-20
Payer: COMMERCIAL

## 2023-07-20 VITALS
WEIGHT: 189 LBS | OXYGEN SATURATION: 98 % | HEIGHT: 69 IN | TEMPERATURE: 97.8 F | HEART RATE: 61 BPM | DIASTOLIC BLOOD PRESSURE: 86 MMHG | RESPIRATION RATE: 16 BRPM | BODY MASS INDEX: 27.99 KG/M2 | SYSTOLIC BLOOD PRESSURE: 130 MMHG

## 2023-07-20 DIAGNOSIS — Z79.899 HIGH RISK MEDICATION USE: ICD-10-CM

## 2023-07-20 DIAGNOSIS — E27.40 ADRENAL INSUFFICIENCY (HCC): ICD-10-CM

## 2023-07-20 DIAGNOSIS — C43.9 METASTATIC MELANOMA (HCC): Primary | ICD-10-CM

## 2023-07-20 PROCEDURE — 99214 OFFICE O/P EST MOD 30 MIN: CPT | Performed by: INTERNAL MEDICINE

## 2023-07-20 NOTE — LETTER
August 4, 2023     Warren Menon DO  [address]    Patient: Bari Schwartz   YOB: 1950   Date of Visit: 7/20/2023       Dear Dr. Haritha Ly: Thank you for referring Bari Schwartz to me for evaluation. Below are my notes for this consultation. If you have questions, please do not hesitate to call me. I look forward to following your patient along with you. Sincerely,        Keri Montez MD        CC: Ronie Cool, MD Dennie Crease, MD Sindy Patches, DO Danelle Sabot, MD  8/4/2023  4:43 PM  Sign when Signing Visit  [address]  [phone]     Date of Visit: 6/47/7783  Name: Bari Schwartz   YOB: 1950        Subjective    VISIT DIAGNOSIS:  Diagnoses and all orders for this visit:    Metastatic melanoma ([facility])    High risk medication use    Adrenal insufficiency [facility])        Oncology History   Metastatic melanoma ([facility])   10/13/2022 -  Cancer Staged    Staging form: Melanoma of the Skin, AJCC 8th Edition  - Clinical stage from 10/13/2022: Stage IV (cTX, cNX, cM1a) - Signed by Keri Montez MD on 11/22/2022       10/13/2022 Biopsy    A. Skin, right lateral medial leg, punch biopsy:  Portion of severely atypical melanocytic dermal proliferation with prominent melanosis consistent with melanoma. See note. B. Skin, right medial leg. punch biopsy:  Portion of severely atypical melanocytic dermal proliferation with prominent melanosis consistent with melanoma. See note. C. Skin, right medial leg, punch biopsy:   Portion of severely atypical melanocytic dermal proliferation with prominent melanosis consistent with melanoma. See note.      11/22/2022 Initial Diagnosis    Metastatic melanoma ([address])     12/14/2022 -  Chemotherapy    alteplase (CATHFLO), 2 mg, Intracatheter, Every 2 hour PRN, 9 of 14 cycles  NIVOLUMAB-RELATLIMAB-RMBW (OPDUALAG) IVPB, 480 mg of nivolumab, Intravenous, Once, 9 of 14 cycles  Administration: 480 mg of nivolumab (12/14/2022), 480 mg of nivolumab (1/11/2023), 480 mg of nivolumab (2/8/2023), 480 mg of nivolumab (3/8/2023), 480 mg of nivolumab (4/5/2023), 480 mg of nivolumab (5/3/2023), 480 mg of nivolumab (5/31/2023), 480 mg of nivolumab (6/28/2023)     1/5/2023 [facility] liquid testing  TMB 1 Mut/Mb  MSI-high not detected          Cancer Staging   Metastatic melanoma ([address])  Staging form: Melanoma of the Skin, AJCC 8th Edition  - Clinical stage from 10/13/2022: Stage IV (cTX, cNX, cM1a) - Signed by Anay Ruiz MD on 11/22/2022     Treatment Details   Treatment goal Curative   Plan Name OP Nivolumab and Relatlimab-rmbw Q 28 Days   Status Active   Start Date 12/14/2022   End Date 12/13/2023 (Planned)   Provider Anay Ruiz MD   Chemotherapy alteplase (CATHFLO), 2 mg, Intracatheter, Every 2 hour PRN, 9 of 14 cycles    NIVOLUMAB-RELATLIMAB-RMBW (OPDUALAG) IVPB, 480 mg of nivolumab, Intravenous, Once, 9 of 14 cycles  Administration: 480 mg of nivolumab (12/14/2022), 480 mg of nivolumab (1/11/2023), 480 mg of nivolumab (2/8/2023), 480 mg of nivolumab (3/8/2023), 480 mg of nivolumab (4/5/2023), 480 mg of nivolumab (5/3/2023), 480 mg of nivolumab (5/31/2023), 480 mg of nivolumab (6/28/2023), 480 mg of nivolumab (7/26/2023)          HISTORY OF PRESENT ILLNESS: Maryanne Nageotte is a 67 y.o. female  who has metastatic melanoma on treatment with nivolumab plus relatlimab/Opdualag here for continued monitoring, follow-up, and surveillance. She is doing well and tolerating treatment. No issues since her last visit. She does state her mother passed away shortly after her last visit. She is doing okay and handling things as as best as she can. [place] any new, changing, concerning skin lesions denies any lymphadenopathy. Continues to express decrease in pigmentation on her right lower extremity. Scheduled for PET scan tomorrow to evaluate overall response to treatment. Mediated side effects. Denies headaches, double vision, rash, itching, chest pain, shortness breath, and diarrhea. Denies any muscle weakness or fatigue. She is doing great and feels amazing since identification of adrenal insufficiency and treatment with hydrocortisone. Energy is really good. REVIEW OF SYSTEMS:  Review of Systems   Constitutional: Negative for appetite change, fatigue, fever and unexpected weight change. HENT:   Negative for lump/mass, trouble swallowing and voice change. Eyes: Negative for icterus. Respiratory: Negative for cough, shortness of breath and wheezing. Cardiovascular: Negative for leg swelling. Gastrointestinal: Negative for abdominal pain, constipation, diarrhea, nausea and vomiting. Genitourinary: Negative for difficulty urinating and hematuria. Musculoskeletal: Negative for arthralgias, gait problem and myalgias. Skin: Negative for itching and rash. No new, changing, or concerning lesions. Neurological: Negative for extremity weakness, gait problem, headaches, light-headedness and numbness. Hematological: Negative for adenopathy.         MEDICATIONS:    Current Outpatient Medications:   •  apixaban (Eliquis) 5 mg, Take 1 tablet (5 mg total) by mouth every 12 (twelve) hours, Disp: 60 tablet, Rfl: 5  •  famotidine (PEPCID) 10 mg tablet, Take 10 mg by mouth 2 (two) times a day, Disp: , Rfl:   •  hydrocortisone (CORTEF) 10 mg tablet, Take 1 tablet (10 mg total) by mouth 2 (two) times a day, Disp: 60 tablet, Rfl: 11  •  loratadine (CLARITIN) 10 mg tablet, Take 10 mg by mouth daily, Disp: , Rfl:   •  losartan (Cozaar) 100 MG tablet, Take 1 tablet (100 mg total) by mouth daily, Disp: 30 tablet, Rfl: 5  •  metoprolol succinate (TOPROL-XL) 25 mg 24 hr tablet, Take 1 tablet (25 mg total) by mouth 2 (two) times a day, Disp: 60 tablet, Rfl: 5  •  Multiple Minerals-Vitamins (Citracal Plus) TABS, Take by mouth, Disp: , Rfl:   •  Multiple Vitamins-Minerals (MULTIVITAMIN WITH MINERALS) tablet, Take 1 tablet by mouth daily, Disp: , Rfl:   •  patient supplied medication, Pt takes eye drops Mura 128 - not listed in database, Disp: , Rfl:   •  Wheat Dextrin (BENEFIBER DRINK MIX PO), Take by mouth, Disp: , Rfl:      ALLERGIES:  Allergies   Allergen Reactions   • Etomidate Other (See Comments)        ACTIVE PROBLEMS:  Patient Active Problem List   Diagnosis   • Acute massive pulmonary embolism ([address])   • Venous stasis of both lower extremities   • Clostridium difficile infection   • Right ventricular systolic dysfunction without heart failure   • Essential hypertension   • Metastatic melanoma ([address])   • High risk medication use   • Adrenal insufficiency (HCC)   • Abnormal PET scan of colon   • Mild anemia   • Anticoagulated by anticoagulation treatment   • History of colon polyps   • Diverticulosis   • Abnormal finding on GI tract imaging          PAST MEDICAL HISTORY:   Past Medical History:   Diagnosis Date   • Acute deep vein thrombosis (DVT) of popliteal vein of left lower extremity (HCC)    • Ankle wound, left, initial encounter     Resolved 2017   • Ankle wound, right, initial encounter     resolved 2017   • DVT (deep vein thrombosis) in pregnancy    • Hypertension    • Pulmonary emboli (HCC)      post C section   • Pulmonary embolism ([address])     2017   • Pulmonary embolism, blood-clot, obstetric    • Skin cancer    • Venous stasis ulcer (HCC)    • Venous ulcers of both lower extremities (HCC)         PAST SURGICAL HISTORY:  Past Surgical History:   Procedure Laterality Date   •  SECTION      X2        SOCIAL HISTORY:  Social History     Socioeconomic History   • Marital status:       Spouse name: None   • Number of children: None   • Years of education: None   • Highest education level: None   Occupational History   • None   Tobacco Use   • Smoking status: Never   • Smokeless tobacco: Never   Vaping Use   • Vaping Use: Never used   Substance and Sexual Activity   • Alcohol use: No     Comment: hx of social drinker   • Drug use: No   • Sexual activity: None   Other Topics Concern   • None   Social History Narrative   • None     Social Determinants of Health     Financial Resource Strain: Not on file   Food Insecurity: Not on file   Transportation Needs: Not on file   Physical Activity: Not on file   Stress: Not on file   Social Connections: Not on file   Intimate Partner Violence: Not on file   Housing Stability: Not on file        FAMILY HISTORY:  Family History   Problem Relation Age of Onset   • Heart attack Mother    • Stroke Mother    • Cancer Father    • Bone cancer Family    • Hypertension Family    • Lung cancer Family            Objective    PHYSICAL EXAMINATION:   Blood pressure 130/86, pulse 61, temperature 97.8 °F (36.6 °C), temperature source Temporal, resp. rate 16, height 5' 9" (1.753 m), weight 85.7 kg (189 lb), SpO2 98 %. Pain Score: 0-No pain     ECOG Performance Status      Flowsheet Row Most Recent Value   ECOG Performance Status 0 - Fully active, able to carry on all pre-disease performance without restriction               Physical Exam  Constitutional:       General: She is not in acute distress. Appearance: Normal appearance. She is not toxic-appearing. HENT:      Head: Normocephalic and atraumatic. Right Ear: External ear normal.      Left Ear: External ear normal.      Nose: Nose normal.      Mouth/Throat:      Mouth: Mucous membranes are moist.      Pharynx: Oropharynx is clear. Eyes:      General: No scleral icterus. Right eye: No discharge. Left eye: No discharge. Conjunctiva/sclera: Conjunctivae normal.   Cardiovascular:      Rate and Rhythm: Normal rate and regular rhythm. Pulses: Normal pulses. Heart sounds: No murmur heard. No friction rub. No gallop.    Pulmonary:      Effort: Pulmonary effort is normal. No respiratory distress. Breath sounds: Normal breath sounds. No wheezing or rales. Abdominal:      General: Bowel sounds are normal. There is no distension. Palpations: There is no mass. Tenderness: There is no abdominal tenderness. There is no rebound. Musculoskeletal:         General: No swelling or tenderness. Cervical back: Normal range of motion. No rigidity. Right lower leg: No edema. Left lower leg: No edema. Lymphadenopathy:      Head:      Right side of head: No submandibular, preauricular or posterior auricular adenopathy. Left side of head: No submandibular, preauricular or posterior auricular adenopathy. Cervical: No cervical adenopathy. Right cervical: No superficial or posterior cervical adenopathy. Left cervical: No superficial or posterior cervical adenopathy. Upper Body:      Right upper body: No supraclavicular or axillary adenopathy. Left upper body: No supraclavicular or axillary adenopathy. Skin:     Coloration: Skin is not jaundiced. Findings: No lesion or rash. Comments: Decreased pigmentation on the right lower extremity, still with areas of purplish black/blue along the medial right lower leg. No distinct nodules or masses. Neurological:      General: No focal deficit present. Mental Status: She is alert and oriented to person, place, and time. Psychiatric:         Mood and Affect: Mood normal.         Behavior: Behavior normal.         Thought Content: Thought content normal.         Judgment: Judgment normal.         I reviewed lab data in the chart.     WBC   Date Value Ref Range Status   07/22/2023 4.95 4.31 - 10.16 Thousand/uL Final   06/24/2023 4.89 4.31 - 10.16 Thousand/uL Final   05/27/2023 6.38 4.31 - 10.16 Thousand/uL Final     Hemoglobin   Date Value Ref Range Status   07/22/2023 13.1 11.5 - 15.4 g/dL Final   06/24/2023 12.7 11.5 - 15.4 g/dL Final   05/27/2023 12.2 11.5 - 15.4 g/dL Final Platelets   Date Value Ref Range Status   07/22/2023 188 149 - 390 Thousands/uL Final   06/24/2023 183 149 - 390 Thousands/uL Final   05/27/2023 209 149 - 390 Thousands/uL Final     MCV   Date Value Ref Range Status   07/22/2023 93 82 - 98 fL Final   06/24/2023 93 82 - 98 fL Final   05/27/2023 92 82 - 98 fL Final      Potassium   Date Value Ref Range Status   07/22/2023 4.4 3.5 - 5.3 mmol/L Final   06/24/2023 4.3 3.5 - 5.3 mmol/L Final   05/27/2023 3.8 3.5 - 5.3 mmol/L Final     Chloride   Date Value Ref Range Status   07/22/2023 108 96 - 108 mmol/L Final   06/24/2023 110 (H) 96 - 108 mmol/L Final   05/27/2023 105 96 - 108 mmol/L Final     CO2   Date Value Ref Range Status   07/22/2023 30 21 - 32 mmol/L Final   06/24/2023 31 21 - 32 mmol/L Final   05/27/2023 30 21 - 32 mmol/L Final     BUN   Date Value Ref Range Status   07/22/2023 21 5 - 25 mg/dL Final   06/24/2023 21 5 - 25 mg/dL Final   05/27/2023 17 5 - 25 mg/dL Final     Creatinine   Date Value Ref Range Status   07/22/2023 0.91 0.60 - 1.30 mg/dL Final     Comment:     Standardized to IDMS reference method   06/24/2023 0.95 0.60 - 1.30 mg/dL Final     Comment:     Standardized to IDMS reference method   05/27/2023 0.89 0.60 - 1.30 mg/dL Final     Comment:     Standardized to IDMS reference method     Glucose   Date Value Ref Range Status   11/17/2022 85 65 - 140 mg/dL Final     Comment:     If the patient is fasting, the ADA then defines impaired fasting glucose as > 100 mg/dL and diabetes as > or equal to 123 mg/dL. Specimen collection should occur prior to Sulfasalazine administration due to the potential for falsely depressed results. Specimen collection should occur prior to Sulfapyridine administration due to the potential for falsely elevated results. 08/03/2020 112 (H) 65 - 99 mg/dL Final     Comment:     If the patient is fasting, the ADA then defines impaired fasting glucose as > 100 mg/dL and diabetes as > or equal to 123 mg/dL.   Specimen collection should occur prior to Sulfasalazine administration due to the potential for falsely depressed results. Specimen collection should occur prior to Sulfapyridine administration due to the potential for falsely elevated results. 01/24/2017 91 65 - 140 mg/dL Final     Comment:     If the patient is fasting, the ADA then defines impaired fasting glucose as > 100 mg/dL and diabetes as > or equal to 123 mg/dL. Calcium   Date Value Ref Range Status   07/22/2023 8.9 8.3 - 10.1 mg/dL Final   06/24/2023 9.1 8.3 - 10.1 mg/dL Final   05/27/2023 9.1 8.4 - 10.2 mg/dL Final     Albumin   Date Value Ref Range Status   07/22/2023 3.4 (L) 3.5 - 5.0 g/dL Final   06/24/2023 3.4 (L) 3.5 - 5.0 g/dL Final   05/27/2023 3.7 3.5 - 5.0 g/dL Final     Total Bilirubin   Date Value Ref Range Status   07/22/2023 0.40 0.20 - 1.00 mg/dL Final     Comment:     Use of this assay is not recommended for patients undergoing treatment with eltrombopag due to the potential for falsely elevated results. 06/24/2023 0.59 0.20 - 1.00 mg/dL Final     Comment:     Use of this assay is not recommended for patients undergoing treatment with eltrombopag due to the potential for falsely elevated results. 05/27/2023 0.50 0.20 - 1.00 mg/dL Final     Comment:     Use of this assay is not recommended for patients undergoing treatment with eltrombopag due to the potential for falsely elevated results. N-acetyl-p-benzoquinone imine (metabolite of Acetaminophen) will generate erroneously low results in samples for patients that have taken an overdose of Acetaminophen. Alkaline Phosphatase   Date Value Ref Range Status   07/22/2023 46 46 - 116 U/L Final   06/24/2023 42 (L) 46 - 116 U/L Final   05/27/2023 37 34 - 104 U/L Final     AST   Date Value Ref Range Status   07/22/2023 15 5 - 45 U/L Final     Comment:     Specimen collection should occur prior to Sulfasalazine administration due to the potential for falsely depressed results.     06/24/2023 13 5 - 45 U/L Final     Comment:     Specimen collection should occur prior to Sulfasalazine administration due to the potential for falsely depressed results. 05/27/2023 13 13 - 39 U/L Final     ALT   Date Value Ref Range Status   07/22/2023 18 12 - 78 U/L Final     Comment:     Specimen collection should occur prior to Sulfasalazine and/or Sulfapyridine administration due to the potential for falsely depressed results. 06/24/2023 18 12 - 78 U/L Final     Comment:     Specimen collection should occur prior to Sulfasalazine and/or Sulfapyridine administration due to the potential for falsely depressed results. 05/27/2023 9 7 - 52 U/L Final     Comment:     Specimen collection should occur prior to Sulfasalazine administration due to the potential for falsely depressed results. LD   Date Value Ref Range Status   07/22/2023 163 81 - 234 U/L Final   06/24/2023 130 81 - 234 U/L Final   05/27/2023 137 (L) 140 - 271 U/L Final     No results found for: "TSH"  No results found for: "H0LQCAU"   Free T4   Date Value Ref Range Status   07/22/2023 0.94 0.61 - 1.12 ng/dL Final     Comment:     Specimens with biotin concentrations > 10 ng/mL can lead to significant (> 10%) positive bias in result.   06/24/2023 0.96 0.61 - 1.12 ng/dL Final     Comment:     Specimens with biotin concentrations > 10 ng/mL can lead to significant (> 10%) positive bias in result. 05/27/2023 1.17 (H) 0.61 - 1.12 ng/dL Final     Comment:     Specimens with biotin concentrations > 10 ng/mL can lead to significant (> 10%) positive bias in result. RECENT IMAGING:   PET scheduled for tomorrow         Assessment/Plan  Ms. Lobito Garcia is a 67 yr female with metastatic melanoma on treatment with nivolumab plus relatlimab/Opdualag here for continued monitoring, follow-up, and surveillance on treatment. 1. Metastatic melanoma ([address])  Doing well and tolerating treatment with nivolumab plus relatlimab/Opdualag. Labs reviewed and okay.   Okay to continue with treatment. She is scheduled for imaging tomorrow to assess overall continued response to treatment. I will call her with these results. She knows to continue to monitor for immune mediated side effects. She knows to call with issues or concerns prior to her next visit. 2. High risk medication use  3. Adrenal insufficiency ([address])  She is on treatment with immunotherapy and we will continue to monitor for side effects and lab abnormalities. We will monitor labs with each treatment to ensure safety of continuing on treatment. She knows to watch for signs and symptoms for immune mediated side effects. Denies any immune mediated side effects at this time. Continue hydrocortisone. Return for Office Visit, Infusion - See Treatment Plan, labs.      Ronnell Ibarra MD,PhD

## 2023-07-21 ENCOUNTER — HOSPITAL ENCOUNTER (OUTPATIENT)
Dept: RADIOLOGY | Age: 73
Discharge: HOME | End: 2023-07-21
Payer: COMMERCIAL

## 2023-07-21 DIAGNOSIS — C43.9 METASTATIC MELANOMA (HCC): ICD-10-CM

## 2023-07-21 LAB — GLUCOSE SERPL-MCNC: 80 MG/DL (ref 65–140)

## 2023-07-21 PROCEDURE — G1004 CDSM NDSC: HCPCS

## 2023-07-21 PROCEDURE — 82948 REAGENT STRIP/BLOOD GLUCOSE: CPT

## 2023-07-21 PROCEDURE — A9552 F18 FDG: HCPCS

## 2023-07-21 PROCEDURE — 78816 PET IMAGE W/CT FULL BODY: CPT

## 2023-07-22 ENCOUNTER — APPOINTMENT (OUTPATIENT)
Dept: LAB | Facility: CLINIC | Age: 73
End: 2023-07-22
Payer: COMMERCIAL

## 2023-07-22 DIAGNOSIS — Z79.899 HIGH RISK MEDICATION USE: ICD-10-CM

## 2023-07-22 DIAGNOSIS — C43.9 METASTATIC MELANOMA (HCC): ICD-10-CM

## 2023-07-22 LAB
ALBUMIN SERPL BCP-MCNC: 3.4 G/DL (ref 3.5–5)
ALP SERPL-CCNC: 46 U/L (ref 46–116)
ALT SERPL W P-5'-P-CCNC: 18 U/L (ref 12–78)
ANION GAP SERPL CALCULATED.3IONS-SCNC: 3 MMOL/L
AST SERPL W P-5'-P-CCNC: 15 U/L (ref 5–45)
BASOPHILS # BLD AUTO: 0.06 THOUSANDS/ÂΜL (ref 0–0.1)
BASOPHILS NFR BLD AUTO: 1 % (ref 0–1)
BILIRUB SERPL-MCNC: 0.4 MG/DL (ref 0.2–1)
BUN SERPL-MCNC: 21 MG/DL (ref 5–25)
CALCIUM ALBUM COR SERPL-MCNC: 9.4 MG/DL (ref 8.3–10.1)
CALCIUM SERPL-MCNC: 8.9 MG/DL (ref 8.3–10.1)
CHLORIDE SERPL-SCNC: 108 MMOL/L (ref 96–108)
CO2 SERPL-SCNC: 30 MMOL/L (ref 21–32)
CREAT SERPL-MCNC: 0.91 MG/DL (ref 0.6–1.3)
EOSINOPHIL # BLD AUTO: 0.19 THOUSAND/ÂΜL (ref 0–0.61)
EOSINOPHIL NFR BLD AUTO: 4 % (ref 0–6)
ERYTHROCYTE [DISTWIDTH] IN BLOOD BY AUTOMATED COUNT: 13.3 % (ref 11.6–15.1)
GFR SERPL CREATININE-BSD FRML MDRD: 63 ML/MIN/1.73SQ M
GLUCOSE P FAST SERPL-MCNC: 87 MG/DL (ref 65–99)
HCT VFR BLD AUTO: 42.6 % (ref 34.8–46.1)
HGB BLD-MCNC: 13.1 G/DL (ref 11.5–15.4)
IMM GRANULOCYTES # BLD AUTO: 0.01 THOUSAND/UL (ref 0–0.2)
IMM GRANULOCYTES NFR BLD AUTO: 0 % (ref 0–2)
LDH SERPL-CCNC: 163 U/L (ref 81–234)
LYMPHOCYTES # BLD AUTO: 2.45 THOUSANDS/ÂΜL (ref 0.6–4.47)
LYMPHOCYTES NFR BLD AUTO: 49 % (ref 14–44)
MCH RBC QN AUTO: 28.5 PG (ref 26.8–34.3)
MCHC RBC AUTO-ENTMCNC: 30.8 G/DL (ref 31.4–37.4)
MCV RBC AUTO: 93 FL (ref 82–98)
MONOCYTES # BLD AUTO: 0.43 THOUSAND/ÂΜL (ref 0.17–1.22)
MONOCYTES NFR BLD AUTO: 9 % (ref 4–12)
NEUTROPHILS # BLD AUTO: 1.81 THOUSANDS/ÂΜL (ref 1.85–7.62)
NEUTS SEG NFR BLD AUTO: 37 % (ref 43–75)
NRBC BLD AUTO-RTO: 0 /100 WBCS
PLATELET # BLD AUTO: 188 THOUSANDS/UL (ref 149–390)
PMV BLD AUTO: 10.9 FL (ref 8.9–12.7)
POTASSIUM SERPL-SCNC: 4.4 MMOL/L (ref 3.5–5.3)
PROT SERPL-MCNC: 6.8 G/DL (ref 6.4–8.4)
RBC # BLD AUTO: 4.6 MILLION/UL (ref 3.81–5.12)
SODIUM SERPL-SCNC: 141 MMOL/L (ref 135–147)
T3FREE SERPL-MCNC: 2.7 PG/ML (ref 2.5–3.9)
T4 FREE SERPL-MCNC: 0.94 NG/DL (ref 0.61–1.12)
TSH SERPL DL<=0.05 MIU/L-ACNC: 3.43 UIU/ML (ref 0.45–4.5)
WBC # BLD AUTO: 4.95 THOUSAND/UL (ref 4.31–10.16)

## 2023-07-22 PROCEDURE — 36415 COLL VENOUS BLD VENIPUNCTURE: CPT

## 2023-07-22 PROCEDURE — 84481 FREE ASSAY (FT-3): CPT

## 2023-07-22 PROCEDURE — 80053 COMPREHEN METABOLIC PANEL: CPT

## 2023-07-22 PROCEDURE — 83615 LACTATE (LD) (LDH) ENZYME: CPT

## 2023-07-22 PROCEDURE — 85025 COMPLETE CBC W/AUTO DIFF WBC: CPT

## 2023-07-22 PROCEDURE — 84443 ASSAY THYROID STIM HORMONE: CPT

## 2023-07-22 PROCEDURE — 84439 ASSAY OF FREE THYROXINE: CPT

## 2023-07-26 ENCOUNTER — HOSPITAL ENCOUNTER (OUTPATIENT)
Dept: INFUSION CENTER | Facility: HOSPITAL | Age: 73
Discharge: HOME | End: 2023-07-26
Attending: INTERNAL MEDICINE
Payer: COMMERCIAL

## 2023-07-26 VITALS
DIASTOLIC BLOOD PRESSURE: 66 MMHG | RESPIRATION RATE: 18 BRPM | SYSTOLIC BLOOD PRESSURE: 144 MMHG | HEART RATE: 73 BPM | BODY MASS INDEX: 27.91 KG/M2 | HEIGHT: 69 IN | TEMPERATURE: 97.1 F

## 2023-07-26 DIAGNOSIS — C43.9 METASTATIC MELANOMA (HCC): Primary | ICD-10-CM

## 2023-07-26 PROCEDURE — 96413 CHEMO IV INFUSION 1 HR: CPT

## 2023-07-26 RX ORDER — SODIUM CHLORIDE 9 MG/ML
20 INJECTION, SOLUTION INTRAVENOUS ONCE
Status: COMPLETED | OUTPATIENT
Start: 2023-07-26 | End: 2023-07-26

## 2023-07-26 RX ADMIN — SODIUM CHLORIDE 20 ML/HR: 0.9 INJECTION, SOLUTION INTRAVENOUS at 09:02

## 2023-07-26 RX ADMIN — SODIUM CHLORIDE 480 MG OF NIVOLUMAB: 9 INJECTION, SOLUTION INTRAVENOUS at 09:32

## 2023-07-31 ENCOUNTER — TELEPHONE (OUTPATIENT)
Dept: HEMATOLOGY ONCOLOGY | Facility: CLINIC | Age: 73
End: 2023-07-31

## 2023-07-31 NOTE — TELEPHONE ENCOUNTER
Appointment Change  Cancel, Reschedule, Change to Virtual      Who are you speaking with? Patient   If it is not the patient, are they listed on an active communication consent form? N/A   Which provider is the appointment scheduled with? Dr. Savannah Chopra   When is the appointment scheduled? Please list date and time 8/21/23 3:00PM, 10/12/23 3:00PM   At which location is the appointment scheduled to take place? [facility]   Was the appointment rescheduled or changed from an in person visit to a virtual visit? If so, please list the details of the change. 8/21/23 11:40AM, 10/12/23 2:00PM   What is the reason for the appointment change? Provider   Was [facility] transport scheduled for this visit? No   Does [facility] transport need to be scheduled for the new visit (if applicable) No   Does the patient need an infusion appointment rescheduled? No   Does the patient have an infusion appointment scheduled? If so, when? Yes, 8/23/23   Is the patient undergoing chemotherapy? Yes   Was the no-show policy reviewed for appointments being changed with less then 24 hours of notice?  No

## 2023-08-03 NOTE — PROGRESS NOTES
[facility]ATOLOGY [facility]  [address]  [place] [id number] [phone]     Date of Visit: 8/86/0281  Name: Maggie Birmingham   YOB: 1950        Subjective    VISIT DIAGNOSIS:  Diagnoses and all orders for this visit:    Metastatic melanoma ([address])  -     Infusion Calculated Appointment Request; Future  -     Infusion Calculated Appointment Request; Future  -     Infusion Calculated Appointment Request; Future  -     NM pet ct tumor imaging whole body; Future    High risk medication use    Adrenal insufficiency (HCC)    Other orders  -     Multiple Minerals-Vitamins (Citracal Plus) TABS; Take by mouth        Oncology History   Metastatic melanoma ([address])   10/13/2022 -  Cancer Staged    Staging form: Melanoma of the Skin, AJCC 8th Edition  - Clinical stage from 10/13/2022: Stage IV (cTX, cNX, cM1a) - Signed by Mary Harris MD on 11/22/2022       10/13/2022 Biopsy    A. Skin, right lateral medial leg, punch biopsy:  Portion of severely atypical melanocytic dermal proliferation with prominent melanosis consistent with melanoma. See note. B. Skin, right medial leg. punch biopsy:  Portion of severely atypical melanocytic dermal proliferation with prominent melanosis consistent with melanoma. See note. C. Skin, right medial leg, punch biopsy:   Portion of severely atypical melanocytic dermal proliferation with prominent melanosis consistent with melanoma. See note.      11/22/2022 Initial Diagnosis    Metastatic melanoma ([address])     12/14/2022 -  Chemotherapy    alteplase (CATHFLO), 2 mg, Intracatheter, Every 2 hour PRN, 9 of 14 cycles  NIVOLUMAB-RELATLIMAB-RMBW (OPDUALAG) IVPB, 480 mg of nivolumab, Intravenous, Once, 9 of 14 cycles  Administration: 480 mg of nivolumab (12/14/2022), 480 mg of nivolumab (1/11/2023), 480 mg of nivolumab (2/8/2023), 480 mg of nivolumab (3/8/2023), 480 mg of nivolumab (4/5/2023), 480 mg of nivolumab (5/3/2023), 480 mg of nivolumab (5/31/2023), 480 mg of nivolumab (6/28/2023)     1/5/2023 [facility] liquid testing  TMB 1 Mut/Mb  MSI-high not detected          Cancer Staging   Metastatic melanoma ([address])  Staging form: Melanoma of the Skin, AJCC 8th Edition  - Clinical stage from 10/13/2022: Stage IV (cTX, cNX, cM1a) - Signed by Gui Chi MD on 11/22/2022     Treatment Details   Treatment goal Curative   Plan Name OP Nivolumab and Relatlimab-rmbw Q 28 Days   Status Active   Start Date 12/14/2022   End Date 12/13/2023 (Planned)   Provider Gui Chi MD   Chemotherapy alteplase (CATHFLO), 2 mg, Intracatheter, Every 2 hour PRN, 9 of 14 cycles    NIVOLUMAB-RELATLIMAB-RMBW (OPDUALAG) IVPB, 480 mg of nivolumab, Intravenous, Once, 9 of 14 cycles  Administration: 480 mg of nivolumab (12/14/2022), 480 mg of nivolumab (1/11/2023), 480 mg of nivolumab (2/8/2023), 480 mg of nivolumab (3/8/2023), 480 mg of nivolumab (4/5/2023), 480 mg of nivolumab (5/3/2023), 480 mg of nivolumab (5/31/2023), 480 mg of nivolumab (6/28/2023), 480 mg of nivolumab (7/26/2023)       HISTORY OF PRESENT ILLNESS: Chris Benson is a 67 y.o. female  who has unresectable/metastatic melanoma on treatment with nivolumab plus relatlimab here for continued monitoring, follow-up, and surveillance while on treatment. Doing well with no issues at this time. Feels good. Energy is good. Eating well. Tolerating treatment without any immune mediated side effects. Continues to see decreased pigmentation in her right lower extremity. Denies any new, changing, concerning skin lesions. Denies any lymphadenopathy. She does state that her mother is not doing well and declining. Denies any immune mediated side effects. Denies headaches, double vision, rash, itching, chest pain, shortness breath, and diarrhea. No muscle weakness or fatigue.         REVIEW OF SYSTEMS:  Review of Systems   Constitutional: Negative for appetite change, fatigue, fever and unexpected weight change. HENT:   Negative for lump/mass, trouble swallowing and voice change. Eyes: Negative for icterus. Respiratory: Negative for cough, shortness of breath and wheezing. Cardiovascular: Negative for chest pain and leg swelling. Gastrointestinal: Negative for abdominal distention, abdominal pain, constipation, diarrhea, nausea and vomiting. Genitourinary: Negative for difficulty urinating and hematuria. Musculoskeletal: Negative for arthralgias, gait problem and myalgias. Skin: Negative for itching and rash. No new, changing, or concerning lesions. Neurological: Negative for extremity weakness, gait problem, headaches, light-headedness and numbness. Hematological: Negative for adenopathy.         MEDICATIONS:    Current Outpatient Medications:   •  apixaban (Eliquis) 5 mg, Take 1 tablet (5 mg total) by mouth every 12 (twelve) hours, Disp: 60 tablet, Rfl: 5  •  famotidine (PEPCID) 10 mg tablet, Take 10 mg by mouth 2 (two) times a day, Disp: , Rfl:   •  hydrocortisone (CORTEF) 10 mg tablet, Take 1 tablet (10 mg total) by mouth 2 (two) times a day, Disp: 60 tablet, Rfl: 11  •  loratadine (CLARITIN) 10 mg tablet, Take 10 mg by mouth daily, Disp: , Rfl:   •  losartan (Cozaar) 100 MG tablet, Take 1 tablet (100 mg total) by mouth daily, Disp: 30 tablet, Rfl: 5  •  metoprolol succinate (TOPROL-XL) 25 mg 24 hr tablet, Take 1 tablet (25 mg total) by mouth 2 (two) times a day, Disp: 60 tablet, Rfl: 5  •  Multiple Minerals-Vitamins (Citracal Plus) TABS, Take by mouth, Disp: , Rfl:   •  Multiple Vitamins-Minerals (MULTIVITAMIN WITH MINERALS) tablet, Take 1 tablet by mouth daily, Disp: , Rfl:   •  patient supplied medication, Pt takes eye drops Mura 128 - not listed in database, Disp: , Rfl:   •  Wheat Dextrin (BENEFIBER DRINK MIX PO), Take by mouth, Disp: , Rfl:      ALLERGIES:  Allergies   Allergen Reactions   • Etomidate Other (See Comments)        ACTIVE PROBLEMS:  Patient Active Problem List   Diagnosis   • Acute massive pulmonary embolism (HCC)   • Venous stasis of both lower extremities   • Clostridium difficile infection   • Right ventricular systolic dysfunction without heart failure   • Essential hypertension   • Metastatic melanoma ([address])   • High risk medication use   • Adrenal insufficiency (HCC)   • Abnormal PET scan of colon   • Mild anemia   • Anticoagulated by anticoagulation treatment   • History of colon polyps   • Diverticulosis   • Abnormal finding on GI tract imaging          PAST MEDICAL HISTORY:   Past Medical History:   Diagnosis Date   • Acute deep vein thrombosis (DVT) of popliteal vein of left lower extremity (HCC)    • Ankle wound, left, initial encounter     Resolved 2017   • Ankle wound, right, initial encounter     resolved 2017   • DVT (deep vein thrombosis) in pregnancy    • Hypertension    • Pulmonary emboli ([address])      post C section   • Pulmonary embolism ([address])     2017   • Pulmonary embolism, blood-clot, obstetric    • Skin cancer    • Venous stasis ulcer ([address])    • Venous ulcers of both lower extremities ([address])         PAST SURGICAL HISTORY:  Past Surgical History:   Procedure Laterality Date   •  SECTION      X2        SOCIAL HISTORY:  Social History     Socioeconomic History   • Marital status:       Spouse name: None   • Number of children: None   • Years of education: None   • Highest education level: None   Occupational History   • None   Tobacco Use   • Smoking status: Never   • Smokeless tobacco: Never   Vaping Use   • Vaping Use: Never used   Substance and Sexual Activity   • Alcohol use: No     Comment: hx of social drinker   • Drug use: No   • Sexual activity: None   Other Topics Concern   • None   Social History Narrative   • None     Social Determinants of Health     Financial Resource Strain: Not on file   Food Insecurity: Not on file   Transportation Needs: Not on file   Physical Activity: Not on file   Stress: Not on file   Social Connections: Not on file   Intimate Partner Violence: Not on file   Housing Stability: Not on file        FAMILY HISTORY:  Family History   Problem Relation Age of Onset   • Heart attack Mother    • Stroke Mother    • Cancer Father    • Bone cancer Family    • Hypertension Family    • Lung cancer Family            Objective    PHYSICAL EXAMINATION:   Blood pressure 124/98, pulse (!) 106, temperature (!) 97.3 °F (36.3 °C), temperature source Temporal, resp. rate 16, height 5' 9" (1.753 m), weight 85.7 kg (189 lb), SpO2 100 %. Pain Score: 0-No pain     ECOG Performance Status    Flowsheet Row Most Recent Value   ECOG Performance Status 0 - Fully active, able to carry on all pre-disease performance without restriction             Physical Exam  Constitutional:       General: She is not in acute distress. Appearance: Normal appearance. She is not toxic-appearing. HENT:      Head: Normocephalic and atraumatic. Right Ear: External ear normal.      Left Ear: External ear normal.      Nose: Nose normal.      Mouth/Throat:      Mouth: Mucous membranes are moist.      Pharynx: Oropharynx is clear. Eyes:      General: No scleral icterus. Right eye: No discharge. Left eye: No discharge. Conjunctiva/sclera: Conjunctivae normal.   Cardiovascular:      Rate and Rhythm: Normal rate and regular rhythm. Pulses: Normal pulses. Heart sounds: No murmur heard. No friction rub. No gallop. Pulmonary:      Effort: Pulmonary effort is normal. No respiratory distress. Breath sounds: Normal breath sounds. No wheezing or rales. Abdominal:      General: Bowel sounds are normal. There is no distension. Palpations: There is no mass. Tenderness: There is no abdominal tenderness. There is no rebound. Musculoskeletal:         General: No swelling or tenderness. Cervical back: Normal range of motion. No rigidity.       Right lower leg: Edema (minimal) present. Left lower leg: Edema (minimal) present. Lymphadenopathy:      Head:      Right side of head: No submandibular, preauricular or posterior auricular adenopathy. Left side of head: No submandibular, preauricular or posterior auricular adenopathy. Cervical: No cervical adenopathy. Right cervical: No superficial or posterior cervical adenopathy. Left cervical: No superficial or posterior cervical adenopathy. Upper Body:      Right upper body: No supraclavicular or axillary adenopathy. Left upper body: No supraclavicular or axillary adenopathy. Skin:     General: Skin is warm. Coloration: Skin is not jaundiced. Findings: Lesion (decreasing pigmentation along medial right lower leg) present. No rash. Comments: Well healed surgical scar. No evidence of recurrence at primary site. Neurological:      General: No focal deficit present. Mental Status: She is alert and oriented to person, place, and time. Cranial Nerves: No cranial nerve deficit. Motor: No weakness. Gait: Gait normal.   Psychiatric:         Mood and Affect: Mood normal.         Behavior: Behavior normal.         Thought Content: Thought content normal.         Judgment: Judgment normal.         I reviewed lab data in the chart.     WBC   Date Value Ref Range Status   07/22/2023 4.95 4.31 - 10.16 Thousand/uL Final   06/24/2023 4.89 4.31 - 10.16 Thousand/uL Final   05/27/2023 6.38 4.31 - 10.16 Thousand/uL Final     Hemoglobin   Date Value Ref Range Status   07/22/2023 13.1 11.5 - 15.4 g/dL Final   06/24/2023 12.7 11.5 - 15.4 g/dL Final   05/27/2023 12.2 11.5 - 15.4 g/dL Final     Platelets   Date Value Ref Range Status   07/22/2023 188 149 - 390 Thousands/uL Final   06/24/2023 183 149 - 390 Thousands/uL Final   05/27/2023 209 149 - 390 Thousands/uL Final     MCV   Date Value Ref Range Status   07/22/2023 93 82 - 98 fL Final   06/24/2023 93 82 - 98 fL Final   05/27/2023 92 82 - 98 fL Final      Potassium   Date Value Ref Range Status   07/22/2023 4.4 3.5 - 5.3 mmol/L Final   06/24/2023 4.3 3.5 - 5.3 mmol/L Final   05/27/2023 3.8 3.5 - 5.3 mmol/L Final     Chloride   Date Value Ref Range Status   07/22/2023 108 96 - 108 mmol/L Final   06/24/2023 110 (H) 96 - 108 mmol/L Final   05/27/2023 105 96 - 108 mmol/L Final     CO2   Date Value Ref Range Status   07/22/2023 30 21 - 32 mmol/L Final   06/24/2023 31 21 - 32 mmol/L Final   05/27/2023 30 21 - 32 mmol/L Final     BUN   Date Value Ref Range Status   07/22/2023 21 5 - 25 mg/dL Final   06/24/2023 21 5 - 25 mg/dL Final   05/27/2023 17 5 - 25 mg/dL Final     Creatinine   Date Value Ref Range Status   07/22/2023 0.91 0.60 - 1.30 mg/dL Final     Comment:     Standardized to IDMS reference method   06/24/2023 0.95 0.60 - 1.30 mg/dL Final     Comment:     Standardized to IDMS reference method   05/27/2023 0.89 0.60 - 1.30 mg/dL Final     Comment:     Standardized to IDMS reference method     Glucose   Date Value Ref Range Status   11/17/2022 85 65 - 140 mg/dL Final     Comment:     If the patient is fasting, the ADA then defines impaired fasting glucose as > 100 mg/dL and diabetes as > or equal to 123 mg/dL. Specimen collection should occur prior to Sulfasalazine administration due to the potential for falsely depressed results. Specimen collection should occur prior to Sulfapyridine administration due to the potential for falsely elevated results. 08/03/2020 112 (H) 65 - 99 mg/dL Final     Comment:     If the patient is fasting, the ADA then defines impaired fasting glucose as > 100 mg/dL and diabetes as > or equal to 123 mg/dL. Specimen collection should occur prior to Sulfasalazine administration due to the potential for falsely depressed results. Specimen collection should occur prior to Sulfapyridine administration due to the potential for falsely elevated results.    01/24/2017 91 65 - 140 mg/dL Final     Comment:     If the patient is fasting, the ADA then defines impaired fasting glucose as > 100 mg/dL and diabetes as > or equal to 123 mg/dL. Calcium   Date Value Ref Range Status   07/22/2023 8.9 8.3 - 10.1 mg/dL Final   06/24/2023 9.1 8.3 - 10.1 mg/dL Final   05/27/2023 9.1 8.4 - 10.2 mg/dL Final     Albumin   Date Value Ref Range Status   07/22/2023 3.4 (L) 3.5 - 5.0 g/dL Final   06/24/2023 3.4 (L) 3.5 - 5.0 g/dL Final   05/27/2023 3.7 3.5 - 5.0 g/dL Final     Total Bilirubin   Date Value Ref Range Status   07/22/2023 0.40 0.20 - 1.00 mg/dL Final     Comment:     Use of this assay is not recommended for patients undergoing treatment with eltrombopag due to the potential for falsely elevated results. 06/24/2023 0.59 0.20 - 1.00 mg/dL Final     Comment:     Use of this assay is not recommended for patients undergoing treatment with eltrombopag due to the potential for falsely elevated results. 05/27/2023 0.50 0.20 - 1.00 mg/dL Final     Comment:     Use of this assay is not recommended for patients undergoing treatment with eltrombopag due to the potential for falsely elevated results. N-acetyl-p-benzoquinone imine (metabolite of Acetaminophen) will generate erroneously low results in samples for patients that have taken an overdose of Acetaminophen. Alkaline Phosphatase   Date Value Ref Range Status   07/22/2023 46 46 - 116 U/L Final   06/24/2023 42 (L) 46 - 116 U/L Final   05/27/2023 37 34 - 104 U/L Final     AST   Date Value Ref Range Status   07/22/2023 15 5 - 45 U/L Final     Comment:     Specimen collection should occur prior to Sulfasalazine administration due to the potential for falsely depressed results. 06/24/2023 13 5 - 45 U/L Final     Comment:     Specimen collection should occur prior to Sulfasalazine administration due to the potential for falsely depressed results.     05/27/2023 13 13 - 39 U/L Final     ALT   Date Value Ref Range Status   07/22/2023 18 12 - 78 U/L Final     Comment:     Specimen collection should occur prior to Sulfasalazine and/or Sulfapyridine administration due to the potential for falsely depressed results. 06/24/2023 18 12 - 78 U/L Final     Comment:     Specimen collection should occur prior to Sulfasalazine and/or Sulfapyridine administration due to the potential for falsely depressed results. 05/27/2023 9 7 - 52 U/L Final     Comment:     Specimen collection should occur prior to Sulfasalazine administration due to the potential for falsely depressed results. LD   Date Value Ref Range Status   07/22/2023 163 81 - 234 U/L Final   06/24/2023 130 81 - 234 U/L Final   05/27/2023 137 (L) 140 - 271 U/L Final     No results found for: "TSH"  No results found for: "Z1ITHGA"   Free T4   Date Value Ref Range Status   07/22/2023 0.94 0.61 - 1.12 ng/dL Final     Comment:     Specimens with biotin concentrations > 10 ng/mL can lead to significant (> 10%) positive bias in result.   06/24/2023 0.96 0.61 - 1.12 ng/dL Final     Comment:     Specimens with biotin concentrations > 10 ng/mL can lead to significant (> 10%) positive bias in result. 05/27/2023 1.17 (H) 0.61 - 1.12 ng/dL Final     Comment:     Specimens with biotin concentrations > 10 ng/mL can lead to significant (> 10%) positive bias in result. RECENT IMAGING:         Assessment/Plan  Ms. Lobito Garcia is a 67 yr female with unresectable/metastatic melanoma on treatment with nivolumab plus relatlimab here for continued monitoring, follow-up, and surveillance. 1. Metastatic melanoma ([address])  She is doing well and tolerating treatment. Clinical evidence of disease response with decreasing pigmentation along the area in her medial right lower leg associated with diffuse melanoma infiltrates in the skin. Labs reviewed and okay. She can continue with treatment with nivolumab plus relatlimab. She is due for full body imaging to assess continued treatment response.   Orders placed and prescription provided she knows to continue to monitor for immune mediated side effects. She knows to call with issues or concerns prior to her next visit. - Infusion Calculated Appointment Request; Future  - Infusion Calculated Appointment Request; Future  - Infusion Calculated Appointment Request; Future  - NM pet ct tumor imaging whole body; Future    2. High risk medication use  3. Adrenal insufficiency ([address])  She is on treatment with immunotherapy and we will continue to monitor for side effects and lab abnormalities. We will monitor labs with each treatment to ensure safety of continuing on treatment. She knows to watch for signs and symptoms for immune mediated side effects. She is doing well and has great energy after diagnosis of adrenal insufficiency and supplementation with hydrocortisone. She knows to continue to monitor for development of additional immune mediated side effects. She knows to call with issues or concerns. Return in about 4 weeks (around 7/26/2023) for Office Visit, Infusion - See Treatment Plan, labs, scans.      Christina Carlson MD, PhD

## 2023-08-04 NOTE — PROGRESS NOTES
[facility]ATOLOG[facility]  [address]  [place] [id number] [phone]     Date of Visit: 3/77/6233  Name: Katja Ramirez   YOB: 1950        Subjective    VISIT DIAGNOSIS:  Diagnoses and all orders for this visit:    Metastatic melanoma ([address] [facility])    High risk medication use    Adrenal insufficiency [facility])        Oncology History   Metastatic melanoma ([address] [facility])   10/13/2022 -  Cancer Staged    Staging form: Melanoma of the Skin, AJCC 8th Edition  - Clinical stage from 10/13/2022: Stage IV (cTX, cNX, cM1a) - Signed by Jeff Lieberman MD on 11/22/2022       10/13/2022 Biopsy    A. Skin, right lateral medial leg, punch biopsy:  Portion of severely atypical melanocytic dermal proliferation with prominent melanosis consistent with melanoma. See note. B. Skin, right medial leg. punch biopsy:  Portion of severely atypical melanocytic dermal proliferation with prominent melanosis consistent with melanoma. See note. C. Skin, right medial leg, punch biopsy:   Portion of severely atypical melanocytic dermal proliferation with prominent melanosis consistent with melanoma. See note.      11/22/2022 Initial Diagnosis    Metastatic melanoma ([address] [facility])     12/14/2022 -  Chemotherapy    alteplase (CATHFLO), 2 mg, Intracatheter, Every 2 hour PRN, 9 of 14 cycles  NIVOLUMAB-RELATLIMAB-RMBW (OPDUALAG) IVPB, 480 mg of nivolumab, Intravenous, Once, 9 of 14 cycles  Administration: 480 mg of nivolumab (12/14/2022), 480 mg of nivolumab (1/11/2023), 480 mg of nivolumab (2/8/2023), 480 mg of nivolumab (3/8/2023), 480 mg of nivolumab (4/5/2023), 480 mg of nivolumab (5/3/2023), 480 mg of nivolumab (5/31/2023), 480 mg of nivolumab (6/28/2023)     1/5/2023 [facility] liquid testing  TMB 1 Mut/Mb  MSI-high not detected          Cancer Staging   Metastatic melanoma ([address] [facility])  Staging form: Melanoma of the Skin, AJCC 8th Edition  - Clinical stage from 10/13/2022: Stage IV (cTX, Loka belia Zidanem Mostu, LP4U) - Signed by Mick Henry MD on 11/22/2022     Treatment Details   Treatment goal Curative   Plan Name OP Nivolumab and Relatlimab-rmbw Q 28 Days   Status Active   Start Date 12/14/2022   End Date 12/13/2023 (Planned)   Provider Mick Henry MD   Chemotherapy alteplase (CATHFLO), 2 mg, Intracatheter, Every 2 hour PRN, 9 of 14 cycles    NIVOLUMAB-RELATLIMAB-RMBW (OPDUALAG) IVPB, 480 mg of nivolumab, Intravenous, Once, 9 of 14 cycles  Administration: 480 mg of nivolumab (12/14/2022), 480 mg of nivolumab (1/11/2023), 480 mg of nivolumab (2/8/2023), 480 mg of nivolumab (3/8/2023), 480 mg of nivolumab (4/5/2023), 480 mg of nivolumab (5/3/2023), 480 mg of nivolumab (5/31/2023), 480 mg of nivolumab (6/28/2023), 480 mg of nivolumab (7/26/2023)          HISTORY OF PRESENT ILLNESS: Tyra Quiroga is a 67 y.o. female  who has metastatic melanoma on treatment with nivolumab plus relatlimab/Opdualag here for continued monitoring, follow-up, and surveillance. She is doing well and tolerating treatment. No issues since her last visit. She does state her mother passed away shortly after her last visit. She is doing okay and handling things as as best as she can. Nuys any new, changing, concerning skin lesions denies any lymphadenopathy. Continues to express decrease in pigmentation on her right lower extremity. Scheduled for PET scan tomorrow to evaluate overall response to treatment. Mediated side effects. Denies headaches, double vision, rash, itching, chest pain, shortness breath, and diarrhea. Denies any muscle weakness or fatigue. She is doing great and feels amazing since identification of adrenal insufficiency and treatment with hydrocortisone. Energy is really good. REVIEW OF SYSTEMS:  Review of Systems   Constitutional: Negative for appetite change, fatigue, fever and unexpected weight change. HENT:   Negative for lump/mass, trouble swallowing and voice change.     Eyes: Negative for icterus. Respiratory: Negative for cough, shortness of breath and wheezing. Cardiovascular: Negative for leg swelling. Gastrointestinal: Negative for abdominal pain, constipation, diarrhea, nausea and vomiting. Genitourinary: Negative for difficulty urinating and hematuria. Musculoskeletal: Negative for arthralgias, gait problem and myalgias. Skin: Negative for itching and rash. No new, changing, or concerning lesions. Neurological: Negative for extremity weakness, gait problem, headaches, light-headedness and numbness. Hematological: Negative for adenopathy.         MEDICATIONS:    Current Outpatient Medications:   •  apixaban (Eliquis) 5 mg, Take 1 tablet (5 mg total) by mouth every 12 (twelve) hours, Disp: 60 tablet, Rfl: 5  •  famotidine (PEPCID) 10 mg tablet, Take 10 mg by mouth 2 (two) times a day, Disp: , Rfl:   •  hydrocortisone (CORTEF) 10 mg tablet, Take 1 tablet (10 mg total) by mouth 2 (two) times a day, Disp: 60 tablet, Rfl: 11  •  loratadine (CLARITIN) 10 mg tablet, Take 10 mg by mouth daily, Disp: , Rfl:   •  losartan (Cozaar) 100 MG tablet, Take 1 tablet (100 mg total) by mouth daily, Disp: 30 tablet, Rfl: 5  •  metoprolol succinate (TOPROL-XL) 25 mg 24 hr tablet, Take 1 tablet (25 mg total) by mouth 2 (two) times a day, Disp: 60 tablet, Rfl: 5  •  Multiple Minerals-Vitamins (Citracal Plus) TABS, Take by mouth, Disp: , Rfl:   •  Multiple Vitamins-Minerals (MULTIVITAMIN WITH MINERALS) tablet, Take 1 tablet by mouth daily, Disp: , Rfl:   •  patient supplied medication, Pt takes eye drops Mura 128 - not listed in database, Disp: , Rfl:   •  Wheat Dextrin (BENEFIBER DRINK MIX PO), Take by mouth, Disp: , Rfl:      ALLERGIES:  Allergies   Allergen Reactions   • Etomidate Other (See Comments)        ACTIVE PROBLEMS:  Patient Active Problem List   Diagnosis   • Acute massive pulmonary embolism (HCC)   • Venous stasis of both lower extremities   • Clostridium difficile infection   • Right ventricular systolic dysfunction without heart failure   • Essential hypertension   • Metastatic melanoma ([address])   • High risk medication use   • Adrenal insufficiency (HCC)   • Abnormal PET scan of colon   • Mild anemia   • Anticoagulated by anticoagulation treatment   • History of colon polyps   • Diverticulosis   • Abnormal finding on GI tract imaging          PAST MEDICAL HISTORY:   Past Medical History:   Diagnosis Date   • Acute deep vein thrombosis (DVT) of popliteal vein of left lower extremity (HCC)    • Ankle wound, left, initial encounter     Resolved 2017   • Ankle wound, right, initial encounter     resolved 2017   • DVT (deep vein thrombosis) in pregnancy    • Hypertension    • Pulmonary emboli ([address])      post C section   • Pulmonary embolism ([address])     2017   • Pulmonary embolism, blood-clot, obstetric    • Skin cancer    • Venous stasis ulcer ([address])    • Venous ulcers of both lower extremities ([address])         PAST SURGICAL HISTORY:  Past Surgical History:   Procedure Laterality Date   •  SECTION      X2        SOCIAL HISTORY:  Social History     Socioeconomic History   • Marital status:       Spouse name: None   • Number of children: None   • Years of education: None   • Highest education level: None   Occupational History   • None   Tobacco Use   • Smoking status: Never   • Smokeless tobacco: Never   Vaping Use   • Vaping Use: Never used   Substance and Sexual Activity   • Alcohol use: No     Comment: hx of social drinker   • Drug use: No   • Sexual activity: None   Other Topics Concern   • None   Social History Narrative   • None     Social Determinants of Health     Financial Resource Strain: Not on file   Food Insecurity: Not on file   Transportation Needs: Not on file   Physical Activity: Not on file   Stress: Not on file   Social Connections: Not on file   Intimate Partner Violence: Not on file   Housing Stability: Not on file        FAMILY HISTORY:  Family History   Problem Relation Age of Onset   • Heart attack Mother    • Stroke Mother    • Cancer Father    • Bone cancer Family    • Hypertension Family    • Lung cancer Family            Objective    PHYSICAL EXAMINATION:   Blood pressure 130/86, pulse 61, temperature 97.8 °F (36.6 °C), temperature source Temporal, resp. rate 16, height 5' 9" (1.753 m), weight 85.7 kg (189 lb), SpO2 98 %. Pain Score: 0-No pain     ECOG Performance Status    Flowsheet Row Most Recent Value   ECOG Performance Status 0 - Fully active, able to carry on all pre-disease performance without restriction             Physical Exam  Constitutional:       General: She is not in acute distress. Appearance: Normal appearance. She is not toxic-appearing. HENT:      Head: Normocephalic and atraumatic. Right Ear: External ear normal.      Left Ear: External ear normal.      Nose: Nose normal.      Mouth/Throat:      Mouth: Mucous membranes are moist.      Pharynx: Oropharynx is clear. Eyes:      General: No scleral icterus. Right eye: No discharge. Left eye: No discharge. Conjunctiva/sclera: Conjunctivae normal.   Cardiovascular:      Rate and Rhythm: Normal rate and regular rhythm. Pulses: Normal pulses. Heart sounds: No murmur heard. No friction rub. No gallop. Pulmonary:      Effort: Pulmonary effort is normal. No respiratory distress. Breath sounds: Normal breath sounds. No wheezing or rales. Abdominal:      General: Bowel sounds are normal. There is no distension. Palpations: There is no mass. Tenderness: There is no abdominal tenderness. There is no rebound. Musculoskeletal:         General: No swelling or tenderness. Cervical back: Normal range of motion. No rigidity. Right lower leg: No edema. Left lower leg: No edema. Lymphadenopathy:      Head:      Right side of head: No submandibular, preauricular or posterior auricular adenopathy. Left side of head: No submandibular, preauricular or posterior auricular adenopathy. Cervical: No cervical adenopathy. Right cervical: No superficial or posterior cervical adenopathy. Left cervical: No superficial or posterior cervical adenopathy. Upper Body:      Right upper body: No supraclavicular or axillary adenopathy. Left upper body: No supraclavicular or axillary adenopathy. Skin:     Coloration: Skin is not jaundiced. Findings: No lesion or rash. Comments: Decreased pigmentation on the right lower extremity, still with areas of purplish black/blue along the medial right lower leg. No distinct nodules or masses. Neurological:      General: No focal deficit present. Mental Status: She is alert and oriented to person, place, and time. Psychiatric:         Mood and Affect: Mood normal.         Behavior: Behavior normal.         Thought Content: Thought content normal.         Judgment: Judgment normal.         I reviewed lab data in the chart.     WBC   Date Value Ref Range Status   07/22/2023 4.95 4.31 - 10.16 Thousand/uL Final   06/24/2023 4.89 4.31 - 10.16 Thousand/uL Final   05/27/2023 6.38 4.31 - 10.16 Thousand/uL Final     Hemoglobin   Date Value Ref Range Status   07/22/2023 13.1 11.5 - 15.4 g/dL Final   06/24/2023 12.7 11.5 - 15.4 g/dL Final   05/27/2023 12.2 11.5 - 15.4 g/dL Final     Platelets   Date Value Ref Range Status   07/22/2023 188 149 - 390 Thousands/uL Final   06/24/2023 183 149 - 390 Thousands/uL Final   05/27/2023 209 149 - 390 Thousands/uL Final     MCV   Date Value Ref Range Status   07/22/2023 93 82 - 98 fL Final   06/24/2023 93 82 - 98 fL Final   05/27/2023 92 82 - 98 fL Final      Potassium   Date Value Ref Range Status   07/22/2023 4.4 3.5 - 5.3 mmol/L Final   06/24/2023 4.3 3.5 - 5.3 mmol/L Final   05/27/2023 3.8 3.5 - 5.3 mmol/L Final     Chloride   Date Value Ref Range Status   07/22/2023 108 96 - 108 mmol/L Final   06/24/2023 110 (H) 96 - 108 mmol/L Final   05/27/2023 105 96 - 108 mmol/L Final     CO2   Date Value Ref Range Status   07/22/2023 30 21 - 32 mmol/L Final   06/24/2023 31 21 - 32 mmol/L Final   05/27/2023 30 21 - 32 mmol/L Final     BUN   Date Value Ref Range Status   07/22/2023 21 5 - 25 mg/dL Final   06/24/2023 21 5 - 25 mg/dL Final   05/27/2023 17 5 - 25 mg/dL Final     Creatinine   Date Value Ref Range Status   07/22/2023 0.91 0.60 - 1.30 mg/dL Final     Comment:     Standardized to IDMS reference method   06/24/2023 0.95 0.60 - 1.30 mg/dL Final     Comment:     Standardized to IDMS reference method   05/27/2023 0.89 0.60 - 1.30 mg/dL Final     Comment:     Standardized to IDMS reference method     Glucose   Date Value Ref Range Status   11/17/2022 85 65 - 140 mg/dL Final     Comment:     If the patient is fasting, the ADA then defines impaired fasting glucose as > 100 mg/dL and diabetes as > or equal to 123 mg/dL. Specimen collection should occur prior to Sulfasalazine administration due to the potential for falsely depressed results. Specimen collection should occur prior to Sulfapyridine administration due to the potential for falsely elevated results. 08/03/2020 112 (H) 65 - 99 mg/dL Final     Comment:     If the patient is fasting, the ADA then defines impaired fasting glucose as > 100 mg/dL and diabetes as > or equal to 123 mg/dL. Specimen collection should occur prior to Sulfasalazine administration due to the potential for falsely depressed results. Specimen collection should occur prior to Sulfapyridine administration due to the potential for falsely elevated results. 01/24/2017 91 65 - 140 mg/dL Final     Comment:     If the patient is fasting, the ADA then defines impaired fasting glucose as > 100 mg/dL and diabetes as > or equal to 123 mg/dL.      Calcium   Date Value Ref Range Status   07/22/2023 8.9 8.3 - 10.1 mg/dL Final   06/24/2023 9.1 8.3 - 10.1 mg/dL Final   05/27/2023 9.1 8.4 - 10.2 mg/dL Final     Albumin Date Value Ref Range Status   07/22/2023 3.4 (L) 3.5 - 5.0 g/dL Final   06/24/2023 3.4 (L) 3.5 - 5.0 g/dL Final   05/27/2023 3.7 3.5 - 5.0 g/dL Final     Total Bilirubin   Date Value Ref Range Status   07/22/2023 0.40 0.20 - 1.00 mg/dL Final     Comment:     Use of this assay is not recommended for patients undergoing treatment with eltrombopag due to the potential for falsely elevated results. 06/24/2023 0.59 0.20 - 1.00 mg/dL Final     Comment:     Use of this assay is not recommended for patients undergoing treatment with eltrombopag due to the potential for falsely elevated results. 05/27/2023 0.50 0.20 - 1.00 mg/dL Final     Comment:     Use of this assay is not recommended for patients undergoing treatment with eltrombopag due to the potential for falsely elevated results. N-acetyl-p-benzoquinone imine (metabolite of Acetaminophen) will generate erroneously low results in samples for patients that have taken an overdose of Acetaminophen. Alkaline Phosphatase   Date Value Ref Range Status   07/22/2023 46 46 - 116 U/L Final   06/24/2023 42 (L) 46 - 116 U/L Final   05/27/2023 37 34 - 104 U/L Final     AST   Date Value Ref Range Status   07/22/2023 15 5 - 45 U/L Final     Comment:     Specimen collection should occur prior to Sulfasalazine administration due to the potential for falsely depressed results. 06/24/2023 13 5 - 45 U/L Final     Comment:     Specimen collection should occur prior to Sulfasalazine administration due to the potential for falsely depressed results. 05/27/2023 13 13 - 39 U/L Final     ALT   Date Value Ref Range Status   07/22/2023 18 12 - 78 U/L Final     Comment:     Specimen collection should occur prior to Sulfasalazine and/or Sulfapyridine administration due to the potential for falsely depressed results.     06/24/2023 18 12 - 78 U/L Final     Comment:     Specimen collection should occur prior to Sulfasalazine and/or Sulfapyridine administration due to the potential for falsely depressed results. 05/27/2023 9 7 - 52 U/L Final     Comment:     Specimen collection should occur prior to Sulfasalazine administration due to the potential for falsely depressed results. LD   Date Value Ref Range Status   07/22/2023 163 81 - 234 U/L Final   06/24/2023 130 81 - 234 U/L Final   05/27/2023 137 (L) 140 - 271 U/L Final     No results found for: "TSH"  No results found for: "X6CLEQL"   Free T4   Date Value Ref Range Status   07/22/2023 0.94 0.61 - 1.12 ng/dL Final     Comment:     Specimens with biotin concentrations > 10 ng/mL can lead to significant (> 10%) positive bias in result.   06/24/2023 0.96 0.61 - 1.12 ng/dL Final     Comment:     Specimens with biotin concentrations > 10 ng/mL can lead to significant (> 10%) positive bias in result. 05/27/2023 1.17 (H) 0.61 - 1.12 ng/dL Final     Comment:     Specimens with biotin concentrations > 10 ng/mL can lead to significant (> 10%) positive bias in result. RECENT IMAGING:   PET scheduled for tomorrow         Assessment/Plan  Ms. Amparo Simental is a 67 yr female with metastatic melanoma on treatment with nivolumab plus relatlimab/Opdualag here for continued monitoring, follow-up, and surveillance on treatment. 1. Metastatic melanoma ([address])  Doing well and tolerating treatment with nivolumab plus relatlimab/Opdualag. Labs reviewed and okay. Okay to continue with treatment. She is scheduled for imaging tomorrow to assess overall continued response to treatment. I will call her with these results. She knows to continue to monitor for immune mediated side effects. She knows to call with issues or concerns prior to her next visit. 2. High risk medication use  3. Adrenal insufficiency ([address])  She is on treatment with immunotherapy and we will continue to monitor for side effects and lab abnormalities. We will monitor labs with each treatment to ensure safety of continuing on treatment.   She knows to watch for signs and symptoms for immune mediated side effects. Denies any immune mediated side effects at this time. Continue hydrocortisone. Return for Office Visit, Infusion - See Treatment Plan, labs.      Lenny Washington MD,PhD

## 2023-08-16 RX ORDER — SODIUM CHLORIDE 9 MG/ML
20 INJECTION, SOLUTION INTRAVENOUS ONCE
Status: CANCELLED | OUTPATIENT
Start: 2023-08-23

## 2023-08-19 ENCOUNTER — APPOINTMENT (OUTPATIENT)
Dept: LAB | Facility: CLINIC | Age: 73
End: 2023-08-19
Payer: COMMERCIAL

## 2023-08-19 DIAGNOSIS — Z79.899 HIGH RISK MEDICATION USE: ICD-10-CM

## 2023-08-19 DIAGNOSIS — C43.9 METASTATIC MELANOMA (HCC): ICD-10-CM

## 2023-08-19 LAB
ALBUMIN SERPL BCP-MCNC: 3.3 G/DL (ref 3.5–5)
ALP SERPL-CCNC: 48 U/L (ref 46–116)
ALT SERPL W P-5'-P-CCNC: 19 U/L (ref 12–78)
ANION GAP SERPL CALCULATED.3IONS-SCNC: 1 MMOL/L
AST SERPL W P-5'-P-CCNC: 19 U/L (ref 5–45)
BASOPHILS # BLD AUTO: 0.09 THOUSANDS/ÂΜL (ref 0–0.1)
BASOPHILS NFR BLD AUTO: 2 % (ref 0–1)
BILIRUB SERPL-MCNC: 0.6 MG/DL (ref 0.2–1)
BUN SERPL-MCNC: 21 MG/DL (ref 5–25)
CALCIUM ALBUM COR SERPL-MCNC: 9.5 MG/DL (ref 8.3–10.1)
CALCIUM SERPL-MCNC: 8.9 MG/DL (ref 8.3–10.1)
CHLORIDE SERPL-SCNC: 110 MMOL/L (ref 96–108)
CO2 SERPL-SCNC: 30 MMOL/L (ref 21–32)
CREAT SERPL-MCNC: 0.96 MG/DL (ref 0.6–1.3)
EOSINOPHIL # BLD AUTO: 0.19 THOUSAND/ÂΜL (ref 0–0.61)
EOSINOPHIL NFR BLD AUTO: 4 % (ref 0–6)
ERYTHROCYTE [DISTWIDTH] IN BLOOD BY AUTOMATED COUNT: 13.2 % (ref 11.6–15.1)
GFR SERPL CREATININE-BSD FRML MDRD: 58 ML/MIN/1.73SQ M
GLUCOSE P FAST SERPL-MCNC: 87 MG/DL (ref 65–99)
HCT VFR BLD AUTO: 43.1 % (ref 34.8–46.1)
HGB BLD-MCNC: 13 G/DL (ref 11.5–15.4)
IMM GRANULOCYTES # BLD AUTO: 0 THOUSAND/UL (ref 0–0.2)
IMM GRANULOCYTES NFR BLD AUTO: 0 % (ref 0–2)
LDH SERPL-CCNC: 178 U/L (ref 81–234)
LYMPHOCYTES # BLD AUTO: 2.53 THOUSANDS/ÂΜL (ref 0.6–4.47)
LYMPHOCYTES NFR BLD AUTO: 47 % (ref 14–44)
MCH RBC QN AUTO: 28.2 PG (ref 26.8–34.3)
MCHC RBC AUTO-ENTMCNC: 30.2 G/DL (ref 31.4–37.4)
MCV RBC AUTO: 94 FL (ref 82–98)
MONOCYTES # BLD AUTO: 0.5 THOUSAND/ÂΜL (ref 0.17–1.22)
MONOCYTES NFR BLD AUTO: 10 % (ref 4–12)
NEUTROPHILS # BLD AUTO: 1.9 THOUSANDS/ÂΜL (ref 1.85–7.62)
NEUTS SEG NFR BLD AUTO: 37 % (ref 43–75)
NRBC BLD AUTO-RTO: 0 /100 WBCS
PLATELET # BLD AUTO: 180 THOUSANDS/UL (ref 149–390)
PMV BLD AUTO: 10.8 FL (ref 8.9–12.7)
POTASSIUM SERPL-SCNC: 4.3 MMOL/L (ref 3.5–5.3)
PROT SERPL-MCNC: 6.8 G/DL (ref 6.4–8.4)
RBC # BLD AUTO: 4.61 MILLION/UL (ref 3.81–5.12)
SODIUM SERPL-SCNC: 141 MMOL/L (ref 135–147)
T3FREE SERPL-MCNC: 3.05 PG/ML (ref 2.5–3.9)
T4 FREE SERPL-MCNC: 0.85 NG/DL (ref 0.61–1.12)
TSH SERPL DL<=0.05 MIU/L-ACNC: 4.04 UIU/ML (ref 0.45–4.5)
WBC # BLD AUTO: 5.21 THOUSAND/UL (ref 4.31–10.16)

## 2023-08-19 PROCEDURE — 84481 FREE ASSAY (FT-3): CPT

## 2023-08-19 PROCEDURE — 84439 ASSAY OF FREE THYROXINE: CPT

## 2023-08-19 PROCEDURE — 85025 COMPLETE CBC W/AUTO DIFF WBC: CPT

## 2023-08-19 PROCEDURE — 84443 ASSAY THYROID STIM HORMONE: CPT

## 2023-08-19 PROCEDURE — 80053 COMPREHEN METABOLIC PANEL: CPT

## 2023-08-19 PROCEDURE — 83615 LACTATE (LD) (LDH) ENZYME: CPT

## 2023-08-19 PROCEDURE — 36415 COLL VENOUS BLD VENIPUNCTURE: CPT

## 2023-08-21 ENCOUNTER — OFFICE VISIT (OUTPATIENT)
Dept: HEMATOLOGY ONCOLOGY | Facility: CLINIC | Age: 73
End: 2023-08-21
Payer: COMMERCIAL

## 2023-08-21 VITALS
RESPIRATION RATE: 16 BRPM | WEIGHT: 195.5 LBS | BODY MASS INDEX: 28.96 KG/M2 | HEART RATE: 80 BPM | DIASTOLIC BLOOD PRESSURE: 84 MMHG | SYSTOLIC BLOOD PRESSURE: 130 MMHG | OXYGEN SATURATION: 98 % | HEIGHT: 69 IN

## 2023-08-21 DIAGNOSIS — I10 ESSENTIAL HYPERTENSION: ICD-10-CM

## 2023-08-21 DIAGNOSIS — C43.9 METASTATIC MELANOMA (HCC): Primary | ICD-10-CM

## 2023-08-21 DIAGNOSIS — Z79.899 HIGH RISK MEDICATION USE: ICD-10-CM

## 2023-08-21 DIAGNOSIS — E27.40 ADRENAL INSUFFICIENCY (HCC): ICD-10-CM

## 2023-08-21 PROCEDURE — 99214 OFFICE O/P EST MOD 30 MIN: CPT | Performed by: INTERNAL MEDICINE

## 2023-08-21 NOTE — LETTER
September 23, 2023     Shira Rose, DO  [address]    Patient: Paradise Mallory   YOB: 1950   Date of Visit: 8/21/2023       Dear Dr. Yony Paez: Thank you for referring Paradise Mallory to me for evaluation. Below are my notes for this consultation. If you have questions, please do not hesitate to call me. I look forward to following your patient along with you. Sincerely,        Wesley Lyons MD        CC: MD Wesley Gonzalez MD  9/23/2023  9:56 AM  Sign when Signing Visit  [address] [facility] [address]  [address]  [phone]     Date of Visit: [birth year]34[birth year]1924  Name: Paradise Mallory   YOB: 1950        Subjective    VISIT DIAGNOSIS:  Diagnoses and all orders for this visit:    Metastatic melanoma ([address] )    High risk medication use    Adrenal insufficiency [facility])        Oncology History   Metastatic melanoma ([address])   10/13/2022 -  Cancer Staged    Staging form: Melanoma of the Skin, AJCC 8th Edition  - Clinical stage from 10/13/2022: Stage IV (cTX, cNX, cM1a) - Signed by Wesley Lyons MD on 11/22/2022       10/13/2022 Biopsy    A. Skin, right lateral medial leg, punch biopsy:  Portion of severely atypical melanocytic dermal proliferation with prominent melanosis consistent with melanoma. See note. B. Skin, right medial leg. punch biopsy:  Portion of severely atypical melanocytic dermal proliferation with prominent melanosis consistent with melanoma. See note. C. Skin, right medial leg, punch biopsy:   Portion of severely atypical melanocytic dermal proliferation with prominent melanosis consistent with melanoma. See note.      11/22/2022 Initial Diagnosis    Metastatic melanoma ([address])     12/14/2022 -  Chemotherapy    alteplase (CATHFLO), 2 mg, Intracatheter, Every 2 hour PRN, 11 of 14 cycles  NIVOLUMAB-RELATLIMAB-RMBW (OPDUALAG) IVPB, 480 mg of nivolumab, Intravenous, Once, 11 of 14 cycles  Administration: 480 mg of nivolumab (12/14/2022), 480 mg of nivolumab (1/11/2023), 480 mg of nivolumab (2/8/2023), 480 mg of nivolumab (3/8/2023), 480 mg of nivolumab (4/5/2023), 480 mg of nivolumab (5/3/2023), 480 mg of nivolumab (5/31/2023), 480 mg of nivolumab (6/28/2023), 480 mg of nivolumab (7/26/2023), 480 mg of nivolumab (8/23/2023), 480 mg of nivolumab (9/20/2023)     1/5/2023 [facility] liquid testing  TMB 1 Mut/Mb  MSI-high not detected          Cancer Staging   Metastatic melanoma ([address])  Staging form: Melanoma of the Skin, AJCC 8th Edition  - Clinical stage from 10/13/2022: Stage IV (cTX, cNX, cM1a) - Signed by Wesley Lyons MD on 11/22/2022     Treatment Details   Treatment goal Curative   Plan Name OP Nivolumab and Relatlimab-rmbw Q 28 Days   Status Active   Start Date 12/14/2022   End Date 12/13/2023 (Planned)   Provider Wesley Lyons MD   Chemotherapy alteplase (CATHFLO), 2 mg, Intracatheter, Every 2 hour PRN, 11 of 14 cycles    NIVOLUMAB-RELATLIMAB-RMBW (OPDUALAG) IVPB, 480 mg of nivolumab, Intravenous, Once, 11 of 14 cycles  Administration: 480 mg of nivolumab (12/14/2022), 480 mg of nivolumab (1/11/2023), 480 mg of nivolumab (2/8/2023), 480 mg of nivolumab (3/8/2023), 480 mg of nivolumab (4/5/2023), 480 mg of nivolumab (5/3/2023), 480 mg of nivolumab (5/31/2023), 480 mg of nivolumab (6/28/2023), 480 mg of nivolumab (7/26/2023), 480 mg of nivolumab (8/23/2023), 480 mg of nivolumab (9/20/2023)          HISTORY OF PRESENT ILLNESS: Paradise Mallory is a 68 y.o. female  who has metastatic melanoma on treatment with nivolumab plus relatlimab/Opdualag here for continued monitoring, follow-up, and surveillance while on treatment. She is doing well and feels good. Denies any new, changing, concerning skin lesions. Denies any lymphadenopathy. Getting ready as the summer winds down to start school again. Energy is well.   Eating well.    Denies immune mediated side effects. Denies headaches, double vision, rash, itching, chest pain, shortness of breath, diarrhea. Denies muscle weakness and fatigue. REVIEW OF SYSTEMS:  Review of Systems   Constitutional: Negative for appetite change, fatigue, fever and unexpected weight change. HENT:   Negative for lump/mass, trouble swallowing and voice change. Eyes: Negative for icterus. Respiratory: Negative for cough, shortness of breath and wheezing. Cardiovascular: Negative for leg swelling. Gastrointestinal: Negative for abdominal pain, constipation, diarrhea, nausea and vomiting. Genitourinary: Negative for difficulty urinating and hematuria. Musculoskeletal: Negative for arthralgias, gait problem and myalgias. Skin: Negative for itching and rash. No new, changing, or concerning lesions. Neurological: Negative for extremity weakness, gait problem, headaches, light-headedness and numbness. Hematological: Negative for adenopathy.         MEDICATIONS:    Current Outpatient Medications:   •  famotidine (PEPCID) 10 mg tablet, Take 10 mg by mouth 2 (two) times a day, Disp: , Rfl:   •  hydrocortisone (CORTEF) 10 mg tablet, Take 1 tablet (10 mg total) by mouth 2 (two) times a day, Disp: 60 tablet, Rfl: 11  •  loratadine (CLARITIN) 10 mg tablet, Take 10 mg by mouth daily, Disp: , Rfl:   •  Multiple Minerals-Vitamins (Citracal Plus) TABS, Take by mouth, Disp: , Rfl:   •  Multiple Vitamins-Minerals (MULTIVITAMIN WITH MINERALS) tablet, Take 1 tablet by mouth daily, Disp: , Rfl:   •  patient supplied medication, Pt takes eye drops Mura 128 - not listed in database, Disp: , Rfl:   •  Wheat Dextrin (BENEFIBER DRINK MIX PO), Take by mouth, Disp: , Rfl:   •  apixaban (Eliquis) 5 mg, Take 1 tablet (5 mg total) by mouth every 12 (twelve) hours, Disp: 60 tablet, Rfl: 5  •  losartan (Cozaar) 100 MG tablet, Take 1 tablet (100 mg total) by mouth daily, Disp: 30 tablet, Rfl: 5  • metoprolol succinate (TOPROL-XL) 25 mg 24 hr tablet, Take 1 tablet (25 mg total) by mouth 2 (two) times a day, Disp: 60 tablet, Rfl: 5  No current facility-administered medications for this visit. ALLERGIES:  Allergies   Allergen Reactions   • Etomidate Other (See Comments)        ACTIVE PROBLEMS:  Patient Active Problem List   Diagnosis   • Acute massive pulmonary embolism (HCC)   • Venous stasis of both lower extremities   • Clostridium difficile infection   • Right ventricular systolic dysfunction without heart failure   • Essential hypertension   • Metastatic melanoma ([address])   • High risk medication use   • Adrenal insufficiency (HCC)   • Abnormal PET scan of colon   • Mild anemia   • Anticoagulated by anticoagulation treatment   • History of colon polyps   • Diverticulosis   • Abnormal finding on GI tract imaging          PAST MEDICAL HISTORY:   Past Medical History:   Diagnosis Date   • Acute deep vein thrombosis (DVT) of popliteal vein of left lower extremity (HCC)    • Ankle wound, left, initial encounter     Resolved 2017   • Ankle wound, right, initial encounter     resolved 2017   • DVT (deep vein thrombosis) in pregnancy    • Hypertension    • Pulmonary emboli ([address])      post C section   • Pulmonary embolism ([address])     2017   • Pulmonary embolism, blood-clot, obstetric    • Skin cancer    • Venous stasis ulcer ([address])    • Venous ulcers of both lower extremities ([address])         PAST SURGICAL HISTORY:  Past Surgical History:   Procedure Laterality Date   •  SECTION      X2        SOCIAL HISTORY:  Social History     Socioeconomic History   • Marital status:       Spouse name: None   • Number of children: None   • Years of education: None   • Highest education level: None   Occupational History   • None   Tobacco Use   • Smoking status: Never   • Smokeless tobacco: Never   Vaping Use   • Vaping Use: Never used   Substance and Sexual Activity   • Alcohol use: No     Comment: hx of social drinker   • Drug use: No   • Sexual activity: None   Other Topics Concern   • None   Social History Narrative   • None     Social Determinants of Health     Financial Resource Strain: Not on file   Food Insecurity: Not on file   Transportation Needs: Not on file   Physical Activity: Not on file   Stress: Not on file   Social Connections: Not on file   Intimate Partner Violence: Not on file   Housing Stability: Not on file        FAMILY HISTORY:  Family History   Problem Relation Age of Onset   • Heart attack Mother    • Stroke Mother    • Cancer Father    • Bone cancer Family    • Hypertension Family    • Lung cancer Family            Objective    PHYSICAL EXAMINATION:   Blood pressure 130/84, pulse 80, resp. rate 16, height 5' 9" (1.753 m), weight 88.7 kg (195 lb 8 oz), SpO2 98 %. Pain Score: 0-No pain      Physical Exam  Constitutional:       General: She is not in acute distress. Appearance: Normal appearance. She is not toxic-appearing. HENT:      Head: Normocephalic and atraumatic. Right Ear: External ear normal.      Left Ear: External ear normal.      Nose: Nose normal.      Mouth/Throat:      Mouth: Mucous membranes are moist.      Pharynx: Oropharynx is clear. Eyes:      General: No scleral icterus. Right eye: No discharge. Left eye: No discharge. Conjunctiva/sclera: Conjunctivae normal.   Cardiovascular:      Rate and Rhythm: Normal rate and regular rhythm. Pulses: Normal pulses. Heart sounds: No murmur heard. No friction rub. No gallop. Pulmonary:      Effort: Pulmonary effort is normal. No respiratory distress. Breath sounds: Normal breath sounds. No wheezing or rales. Abdominal:      General: Bowel sounds are normal. There is no distension. Palpations: There is no mass. Tenderness: There is no abdominal tenderness. There is no rebound. Musculoskeletal:         General: No swelling or tenderness. Normal range of motion. Cervical back: Normal range of motion. No rigidity. Right lower leg: No edema. Left lower leg: No edema. Lymphadenopathy:      Head:      Right side of head: No submandibular, preauricular or posterior auricular adenopathy. Left side of head: No submandibular, preauricular or posterior auricular adenopathy. Cervical: No cervical adenopathy. Right cervical: No superficial or posterior cervical adenopathy. Left cervical: No superficial or posterior cervical adenopathy. Upper Body:      Right upper body: No supraclavicular or axillary adenopathy. Left upper body: No supraclavicular or axillary adenopathy. Skin:     General: Skin is warm. Coloration: Skin is not jaundiced. Findings: Lesion (imrpoving) present. No rash. Comments: Pigmented area continues to lighten on the medial aspect of her right lower leg. No nodules or masses. Neurological:      General: No focal deficit present. Mental Status: She is alert and oriented to person, place, and time. Cranial Nerves: No cranial nerve deficit. Gait: Gait normal.   Psychiatric:         Mood and Affect: Mood normal.         Behavior: Behavior normal.         Thought Content: Thought content normal.         Judgment: Judgment normal.         I reviewed lab data in the chart.     WBC   Date Value Ref Range Status   09/16/2023 4.96 4.31 - 10.16 Thousand/uL Final   08/19/2023 5.21 4.31 - 10.16 Thousand/uL Final   07/22/2023 4.95 4.31 - 10.16 Thousand/uL Final     Hemoglobin   Date Value Ref Range Status   09/16/2023 13.1 11.5 - 15.4 g/dL Final   08/19/2023 13.0 11.5 - 15.4 g/dL Final   07/22/2023 13.1 11.5 - 15.4 g/dL Final     Platelets   Date Value Ref Range Status   09/16/2023 191 149 - 390 Thousands/uL Final   08/19/2023 180 149 - 390 Thousands/uL Final   07/22/2023 188 149 - 390 Thousands/uL Final     MCV   Date Value Ref Range Status   09/16/2023 92 82 - 98 fL Final   08/19/2023 94 82 - 98 fL Final 07/22/2023 93 82 - 98 fL Final      Potassium   Date Value Ref Range Status   09/16/2023 4.5 3.5 - 5.3 mmol/L Final   08/19/2023 4.3 3.5 - 5.3 mmol/L Final   07/22/2023 4.4 3.5 - 5.3 mmol/L Final     Chloride   Date Value Ref Range Status   09/16/2023 104 96 - 108 mmol/L Final   08/19/2023 110 (H) 96 - 108 mmol/L Final   07/22/2023 108 96 - 108 mmol/L Final     CO2   Date Value Ref Range Status   09/16/2023 31 21 - 32 mmol/L Final   08/19/2023 30 21 - 32 mmol/L Final   07/22/2023 30 21 - 32 mmol/L Final     BUN   Date Value Ref Range Status   09/16/2023 22 5 - 25 mg/dL Final   08/19/2023 21 5 - 25 mg/dL Final   07/22/2023 21 5 - 25 mg/dL Final     Creatinine   Date Value Ref Range Status   09/16/2023 0.96 0.60 - 1.30 mg/dL Final     Comment:     Standardized to IDMS reference method   08/19/2023 0.96 0.60 - 1.30 mg/dL Final     Comment:     Standardized to IDMS reference method   07/22/2023 0.91 0.60 - 1.30 mg/dL Final     Comment:     Standardized to IDMS reference method     Glucose   Date Value Ref Range Status   11/17/2022 85 65 - 140 mg/dL Final     Comment:     If the patient is fasting, the ADA then defines impaired fasting glucose as > 100 mg/dL and diabetes as > or equal to 123 mg/dL. Specimen collection should occur prior to Sulfasalazine administration due to the potential for falsely depressed results. Specimen collection should occur prior to Sulfapyridine administration due to the potential for falsely elevated results. 08/03/2020 112 (H) 65 - 99 mg/dL Final     Comment:     If the patient is fasting, the ADA then defines impaired fasting glucose as > 100 mg/dL and diabetes as > or equal to 123 mg/dL. Specimen collection should occur prior to Sulfasalazine administration due to the potential for falsely depressed results. Specimen collection should occur prior to Sulfapyridine administration due to the potential for falsely elevated results.    01/24/2017 91 65 - 140 mg/dL Final     Comment: If the patient is fasting, the ADA then defines impaired fasting glucose as > 100 mg/dL and diabetes as > or equal to 123 mg/dL. Calcium   Date Value Ref Range Status   09/16/2023 9.5 8.4 - 10.2 mg/dL Final   08/19/2023 8.9 8.3 - 10.1 mg/dL Final   07/22/2023 8.9 8.3 - 10.1 mg/dL Final     Albumin   Date Value Ref Range Status   09/16/2023 3.8 3.5 - 5.0 g/dL Final   08/19/2023 3.3 (L) 3.5 - 5.0 g/dL Final   07/22/2023 3.4 (L) 3.5 - 5.0 g/dL Final     Total Bilirubin   Date Value Ref Range Status   09/16/2023 0.55 0.20 - 1.00 mg/dL Final     Comment:     Use of this assay is not recommended for patients undergoing treatment with eltrombopag due to the potential for falsely elevated results. N-acetyl-p-benzoquinone imine (metabolite of Acetaminophen) will generate erroneously low results in samples for patients that have taken an overdose of Acetaminophen.   08/19/2023 0.60 0.20 - 1.00 mg/dL Final     Comment:     Use of this assay is not recommended for patients undergoing treatment with eltrombopag due to the potential for falsely elevated results. 07/22/2023 0.40 0.20 - 1.00 mg/dL Final     Comment:     Use of this assay is not recommended for patients undergoing treatment with eltrombopag due to the potential for falsely elevated results. Alkaline Phosphatase   Date Value Ref Range Status   09/16/2023 42 34 - 104 U/L Final   08/19/2023 48 46 - 116 U/L Final   07/22/2023 46 46 - 116 U/L Final     AST   Date Value Ref Range Status   09/16/2023 19 13 - 39 U/L Final   08/19/2023 19 5 - 45 U/L Final     Comment:     Specimen collection should occur prior to Sulfasalazine administration due to the potential for falsely depressed results. 07/22/2023 15 5 - 45 U/L Final     Comment:     Specimen collection should occur prior to Sulfasalazine administration due to the potential for falsely depressed results.       ALT   Date Value Ref Range Status   09/16/2023 14 7 - 52 U/L Final     Comment:     Specimen collection should occur prior to Sulfasalazine administration due to the potential for falsely depressed results. 08/19/2023 19 12 - 78 U/L Final     Comment:     Specimen collection should occur prior to Sulfasalazine and/or Sulfapyridine administration due to the potential for falsely depressed results. 07/22/2023 18 12 - 78 U/L Final     Comment:     Specimen collection should occur prior to Sulfasalazine and/or Sulfapyridine administration due to the potential for falsely depressed results. LD   Date Value Ref Range Status   09/16/2023 236 140 - 271 U/L Final   08/19/2023 178 81 - 234 U/L Final   07/22/2023 163 81 - 234 U/L Final     No results found for: "TSH"  No results found for: "B2RZMZY"   Free T4   Date Value Ref Range Status   09/16/2023 1.02 0.61 - 1.12 ng/dL Final     Comment:     Specimens with biotin concentrations > 10 ng/mL can lead to significant (> 10%) positive bias in result. 08/19/2023 0.85 0.61 - 1.12 ng/dL Final     Comment:     Specimens with biotin concentrations > 10 ng/mL can lead to significant (> 10%) positive bias in result.   07/22/2023 0.94 0.61 - 1.12 ng/dL Final     Comment:     Specimens with biotin concentrations > 10 ng/mL can lead to significant (> 10%) positive bias in result. RECENT IMAGING:  No results found. Assessment/Plan  Ms. Marysol Lopez is a 73yr female with metastatic melanoma on the right lower extremity on treatment with nivolumab plus relatlimab here for continued monitoring, follow-up, surveillance while on treatment. 1. Metastatic melanoma ([address])  She is doing well and tolerating treatment. Clinically continues to improve. Denies any side effects. Labs reviewed and okay. She is okay to continue treatment with her nivolumab plus relatlimab. She is standing orders for blood work prior to each treatment. She knows to continue to monitor for immune mediated side effects.   She knows to call with any issues or concerns prior to her next visit. 2. High risk medication use  3. Adrenal insufficiency ([address])  She is on treatment with immunotherapy and we will continue to monitor for side effects and lab abnormalities. We will monitor labs with each treatment to ensure safety of continuing on treatment. She knows to watch for signs and symptoms for immune mediated side effects. Denies any immune mediated side effects at this time. Stable dose of 10 mg hydrocortisone twice daily for her adrenal insufficiency. Continue as planned. Return in about 4 weeks (around 9/18/2023) for Office Visit, Infusion - See Treatment Plan, labs.    Giana Morrow MD, PhD

## 2023-08-22 RX ORDER — LOSARTAN POTASSIUM 100 MG/1
100 TABLET ORAL DAILY
Qty: 30 TABLET | Refills: 5 | Status: SHIPPED | OUTPATIENT
Start: 2023-08-22

## 2023-08-23 ENCOUNTER — HOSPITAL ENCOUNTER (OUTPATIENT)
Dept: INFUSION CENTER | Facility: HOSPITAL | Age: 73
Discharge: HOME | End: 2023-08-23
Attending: INTERNAL MEDICINE
Payer: COMMERCIAL

## 2023-08-23 VITALS
SYSTOLIC BLOOD PRESSURE: 121 MMHG | TEMPERATURE: 97.2 F | BODY MASS INDEX: 28.87 KG/M2 | HEART RATE: 73 BPM | DIASTOLIC BLOOD PRESSURE: 58 MMHG | RESPIRATION RATE: 16 BRPM | HEIGHT: 69 IN

## 2023-08-23 DIAGNOSIS — C43.9 METASTATIC MELANOMA (HCC): Primary | ICD-10-CM

## 2023-08-23 PROCEDURE — 96413 CHEMO IV INFUSION 1 HR: CPT

## 2023-08-23 RX ORDER — SODIUM CHLORIDE 9 MG/ML
20 INJECTION, SOLUTION INTRAVENOUS ONCE
Status: COMPLETED | OUTPATIENT
Start: 2023-08-23 | End: 2023-08-23

## 2023-08-23 RX ADMIN — SODIUM CHLORIDE 20 ML/HR: 0.9 INJECTION, SOLUTION INTRAVENOUS at 14:51

## 2023-08-23 RX ADMIN — SODIUM CHLORIDE 480 MG OF NIVOLUMAB: 9 INJECTION, SOLUTION INTRAVENOUS at 14:52

## 2023-08-23 NOTE — PROGRESS NOTES
Pt here for OPDUALAG. Pt offered no complaints today. Labs 8/19/23 reviewed. Vitals stable. Pt resting comfortably in chair with call bell in place.

## 2023-08-23 NOTE — PLAN OF CARE
Problem: Potential for Falls  Goal: Patient will remain free of falls  Description: INTERVENTIONS:    - Keep Call bell within reach    Outcome: Progressing

## 2023-08-31 DIAGNOSIS — I10 ESSENTIAL HYPERTENSION: ICD-10-CM

## 2023-08-31 DIAGNOSIS — I26.99 OTHER PULMONARY EMBOLISM WITHOUT ACUTE COR PULMONALE, UNSPECIFIED CHRONICITY (HCC): ICD-10-CM

## 2023-08-31 RX ORDER — METOPROLOL SUCCINATE 25 MG/1
25 TABLET, EXTENDED RELEASE ORAL 2 TIMES DAILY
Qty: 60 TABLET | Refills: 5 | Status: SHIPPED | OUTPATIENT
Start: 2023-08-31

## 2023-09-13 ENCOUNTER — TELEPHONE (OUTPATIENT)
Dept: HEMATOLOGY ONCOLOGY | Facility: CLINIC | Age: 73
End: 2023-09-13

## 2023-09-13 RX ORDER — SODIUM CHLORIDE 9 MG/ML
20 INJECTION, SOLUTION INTRAVENOUS ONCE
Status: CANCELLED | OUTPATIENT
Start: 2023-09-20

## 2023-09-16 ENCOUNTER — APPOINTMENT (OUTPATIENT)
Dept: LAB | Facility: CLINIC | Age: 73
End: 2023-09-16
Payer: COMMERCIAL

## 2023-09-16 DIAGNOSIS — Z79.899 HIGH RISK MEDICATION USE: ICD-10-CM

## 2023-09-16 DIAGNOSIS — C43.9 METASTATIC MELANOMA (HCC): ICD-10-CM

## 2023-09-16 LAB
ALBUMIN SERPL BCP-MCNC: 3.8 G/DL (ref 3.5–5)
ALP SERPL-CCNC: 42 U/L (ref 34–104)
ALT SERPL W P-5'-P-CCNC: 14 U/L (ref 7–52)
ANION GAP SERPL CALCULATED.3IONS-SCNC: 6 MMOL/L
AST SERPL W P-5'-P-CCNC: 19 U/L (ref 13–39)
BASOPHILS # BLD AUTO: 0.07 THOUSANDS/ÂΜL (ref 0–0.1)
BASOPHILS NFR BLD AUTO: 1 % (ref 0–1)
BILIRUB SERPL-MCNC: 0.55 MG/DL (ref 0.2–1)
BUN SERPL-MCNC: 22 MG/DL (ref 5–25)
CALCIUM SERPL-MCNC: 9.5 MG/DL (ref 8.4–10.2)
CHLORIDE SERPL-SCNC: 104 MMOL/L (ref 96–108)
CO2 SERPL-SCNC: 31 MMOL/L (ref 21–32)
CREAT SERPL-MCNC: 0.96 MG/DL (ref 0.6–1.3)
EOSINOPHIL # BLD AUTO: 0.21 THOUSAND/ÂΜL (ref 0–0.61)
EOSINOPHIL NFR BLD AUTO: 4 % (ref 0–6)
ERYTHROCYTE [DISTWIDTH] IN BLOOD BY AUTOMATED COUNT: 13.3 % (ref 11.6–15.1)
GFR SERPL CREATININE-BSD FRML MDRD: 58 ML/MIN/1.73SQ M
GLUCOSE P FAST SERPL-MCNC: 86 MG/DL (ref 65–99)
HCT VFR BLD AUTO: 42.3 % (ref 34.8–46.1)
HGB BLD-MCNC: 13.1 G/DL (ref 11.5–15.4)
IMM GRANULOCYTES # BLD AUTO: 0.01 THOUSAND/UL (ref 0–0.2)
IMM GRANULOCYTES NFR BLD AUTO: 0 % (ref 0–2)
LDH SERPL-CCNC: 236 U/L (ref 140–271)
LYMPHOCYTES # BLD AUTO: 2.55 THOUSANDS/ÂΜL (ref 0.6–4.47)
LYMPHOCYTES NFR BLD AUTO: 52 % (ref 14–44)
MCH RBC QN AUTO: 28.4 PG (ref 26.8–34.3)
MCHC RBC AUTO-ENTMCNC: 31 G/DL (ref 31.4–37.4)
MCV RBC AUTO: 92 FL (ref 82–98)
MONOCYTES # BLD AUTO: 0.46 THOUSAND/ÂΜL (ref 0.17–1.22)
MONOCYTES NFR BLD AUTO: 9 % (ref 4–12)
NEUTROPHILS # BLD AUTO: 1.66 THOUSANDS/ÂΜL (ref 1.85–7.62)
NEUTS SEG NFR BLD AUTO: 34 % (ref 43–75)
NRBC BLD AUTO-RTO: 0 /100 WBCS
PLATELET # BLD AUTO: 191 THOUSANDS/UL (ref 149–390)
PMV BLD AUTO: 11.3 FL (ref 8.9–12.7)
POTASSIUM SERPL-SCNC: 4.5 MMOL/L (ref 3.5–5.3)
PROT SERPL-MCNC: 6.7 G/DL (ref 6.4–8.4)
RBC # BLD AUTO: 4.62 MILLION/UL (ref 3.81–5.12)
SODIUM SERPL-SCNC: 141 MMOL/L (ref 135–147)
T3FREE SERPL-MCNC: 3.15 PG/ML (ref 2.5–3.9)
T4 FREE SERPL-MCNC: 1.02 NG/DL (ref 0.61–1.12)
TSH SERPL DL<=0.05 MIU/L-ACNC: 3.49 UIU/ML (ref 0.45–4.5)
WBC # BLD AUTO: 4.96 THOUSAND/UL (ref 4.31–10.16)

## 2023-09-16 PROCEDURE — 83615 LACTATE (LD) (LDH) ENZYME: CPT

## 2023-09-16 PROCEDURE — 84443 ASSAY THYROID STIM HORMONE: CPT

## 2023-09-16 PROCEDURE — 84481 FREE ASSAY (FT-3): CPT

## 2023-09-16 PROCEDURE — 36415 COLL VENOUS BLD VENIPUNCTURE: CPT

## 2023-09-16 PROCEDURE — 84439 ASSAY OF FREE THYROXINE: CPT

## 2023-09-16 PROCEDURE — 85025 COMPLETE CBC W/AUTO DIFF WBC: CPT

## 2023-09-16 PROCEDURE — 80053 COMPREHEN METABOLIC PANEL: CPT

## 2023-09-18 ENCOUNTER — OFFICE VISIT (OUTPATIENT)
Dept: HEMATOLOGY ONCOLOGY | Facility: CLINIC | Age: 73
End: 2023-09-18
Payer: COMMERCIAL

## 2023-09-18 VITALS
HEIGHT: 69 IN | SYSTOLIC BLOOD PRESSURE: 120 MMHG | DIASTOLIC BLOOD PRESSURE: 72 MMHG | TEMPERATURE: 97.7 F | HEART RATE: 83 BPM | RESPIRATION RATE: 16 BRPM | OXYGEN SATURATION: 97 % | WEIGHT: 198 LBS | BODY MASS INDEX: 29.33 KG/M2

## 2023-09-18 DIAGNOSIS — C43.9 METASTATIC MELANOMA (HCC): Primary | ICD-10-CM

## 2023-09-18 DIAGNOSIS — Z79.899 HIGH RISK MEDICATION USE: ICD-10-CM

## 2023-09-18 DIAGNOSIS — E27.40 ADRENAL INSUFFICIENCY (HCC): ICD-10-CM

## 2023-09-18 PROCEDURE — 99214 OFFICE O/P EST MOD 30 MIN: CPT | Performed by: INTERNAL MEDICINE

## 2023-09-18 NOTE — LETTER
September 23, 2023     Heidy Liao, DO  [address]    Patient: Jose Ho   YOB: 1950   Date of Visit: 9/18/2023       Dear Dr. Tyler Corral: Thank you for referring Jose Ho to me for evaluation. Below are my notes for this consultation. If you have questions, please do not hesitate to call me. I look forward to following your patient along with you. Sincerely,        Sharron Yeung MD        CC: MD Sharron Yen MD  9/23/2023  9:49 AM  Sign when Signing Visit  [address] [facility] [address]  [address]  [phone]     Date of Visit: 4/98/3895  Name: Jose Ho   YOB: 1950        Subjective    VISIT DIAGNOSIS:  Diagnoses and all orders for this visit:    Metastatic melanoma ([address])  -     NM pet ct tumor imaging whole body; Future    High risk medication use    Adrenal insufficiency ([address])        Oncology History   Metastatic melanoma ([address])   10/13/2022 -  Cancer Staged    Staging form: Melanoma of the Skin, AJCC 8th Edition  - Clinical stage from 10/13/2022: Stage IV (cTX, cNX, cM1a) - Signed by Sharron Yeung MD on 11/22/2022       10/13/2022 Biopsy    A. Skin, right lateral medial leg, punch biopsy:  Portion of severely atypical melanocytic dermal proliferation with prominent melanosis consistent with melanoma. See note. B. Skin, right medial leg. punch biopsy:  Portion of severely atypical melanocytic dermal proliferation with prominent melanosis consistent with melanoma. See note. C. Skin, right medial leg, punch biopsy:   Portion of severely atypical melanocytic dermal proliferation with prominent melanosis consistent with melanoma. See note.      11/22/2022 Initial Diagnosis    Metastatic melanoma ([address])     12/14/2022 -  Chemotherapy    alteplase (CATHFLO), 2 mg, Intracatheter, Every 2 hour PRN, 11 of 14 cycles  NIVOLUMAB-RELATLIMAB-RMBW (OPDUALAG) IVPB, 480 mg of nivolumab, Intravenous, Once, 11 of 14 cycles  Administration: 480 mg of nivolumab (12/14/2022), 480 mg of nivolumab (1/11/2023), 480 mg of nivolumab (2/8/2023), 480 mg of nivolumab (3/8/2023), 480 mg of nivolumab (4/5/2023), 480 mg of nivolumab (5/3/2023), 480 mg of nivolumab (5/31/2023), 480 mg of nivolumab (6/28/2023), 480 mg of nivolumab (7/26/2023), 480 mg of nivolumab (8/23/2023), 480 mg of nivolumab (9/20/2023)     1/5/2023 [facility] liquid testing  TMB 1 Mut/Mb  MSI-high not detected          Cancer Staging   Metastatic melanoma ([address])  Staging form: Melanoma of the Skin, AJCC 8th Edition  - Clinical stage from 10/13/2022: Stage IV (cTX, cNX, cM1a) - Signed by Amena Sanchez MD on 11/22/2022     Treatment Details   Treatment goal Curative   Plan Name OP Nivolumab and Relatlimab-rmbw Q 28 Days   Status Active   Start Date 12/14/2022   End Date 12/13/2023 (Planned)   Provider Amena Sanchez MD   Chemotherapy alteplase (CATHFLO), 2 mg, Intracatheter, Every 2 hour PRN, 11 of 14 cycles    NIVOLUMAB-RELATLIMAB-RMBW (OPDUALAG) IVPB, 480 mg of nivolumab, Intravenous, Once, 11 of 14 cycles  Administration: 480 mg of nivolumab (12/14/2022), 480 mg of nivolumab (1/11/2023), 480 mg of nivolumab (2/8/2023), 480 mg of nivolumab (3/8/2023), 480 mg of nivolumab (4/5/2023), 480 mg of nivolumab (5/3/2023), 480 mg of nivolumab (5/31/2023), 480 mg of nivolumab (6/28/2023), 480 mg of nivolumab (7/26/2023), 480 mg of nivolumab (8/23/2023), 480 mg of nivolumab (9/20/2023)          HISTORY OF PRESENT ILLNESS: Ivette Ulrich is a 68 y.o. female  who has metastatic melanoma on treatment with nivolumab plus relatlimab/Opdualag here for continued monitoring, follow-up and surveillance while on treatment. She is doing well and feels good. 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 Energy is good.   No issues concerns or complaints today. No new, changing, concerning skin lesions. No lymphadenopathy. She thinks the pigmented spots on her right lower leg continue to lighten. Denies any additional immune mediated side effects. Denies headaches, double vision, rash, itching, chest pain, shortness of breath, diarrhea. Denies any muscle weakness or fatigue. REVIEW OF SYSTEMS:  Review of Systems   Constitutional: Negative for appetite change, fatigue, fever and unexpected weight change. HENT:   Negative for lump/mass, trouble swallowing and voice change. Eyes: Negative for icterus. Respiratory: Negative for cough, shortness of breath and wheezing. Cardiovascular: Negative for leg swelling. Gastrointestinal: Negative for abdominal pain, constipation, diarrhea, nausea and vomiting. Genitourinary: Negative for difficulty urinating and hematuria. Musculoskeletal: Negative for arthralgias, gait problem and myalgias. Skin: Negative for itching and rash. No new, changing, or concerning lesions. Neurological: Negative for extremity weakness, gait problem, headaches, light-headedness and numbness. Hematological: Negative for adenopathy.         MEDICATIONS:    Current Outpatient Medications:   •  apixaban (Eliquis) 5 mg, Take 1 tablet (5 mg total) by mouth every 12 (twelve) hours, Disp: 60 tablet, Rfl: 5  •  famotidine (PEPCID) 10 mg tablet, Take 10 mg by mouth 2 (two) times a day, Disp: , Rfl:   •  hydrocortisone (CORTEF) 10 mg tablet, Take 1 tablet (10 mg total) by mouth 2 (two) times a day, Disp: 60 tablet, Rfl: 11  •  loratadine (CLARITIN) 10 mg tablet, Take 10 mg by mouth daily, Disp: , Rfl:   •  losartan (Cozaar) 100 MG tablet, Take 1 tablet (100 mg total) by mouth daily, Disp: 30 tablet, Rfl: 5  •  metoprolol succinate (TOPROL-XL) 25 mg 24 hr tablet, Take 1 tablet (25 mg total) by mouth 2 (two) times a day, Disp: 60 tablet, Rfl: 5  •  Multiple Minerals-Vitamins (Citracal Plus) TABS, Take by mouth, Disp: , Rfl:   •  Multiple Vitamins-Minerals (MULTIVITAMIN WITH MINERALS) tablet, Take 1 tablet by mouth daily, Disp: , Rfl:   •  patient supplied medication, Pt takes eye drops Mura 128 - not listed in database, Disp: , Rfl:   •  Wheat Dextrin (BENEFIBER DRINK MIX PO), Take by mouth, Disp: , Rfl:   No current facility-administered medications for this visit. ALLERGIES:  Allergies   Allergen Reactions   • Etomidate Other (See Comments)        ACTIVE PROBLEMS:  Patient Active Problem List   Diagnosis   • Acute massive pulmonary embolism (HCC)   • Venous stasis of both lower extremities   • Clostridium difficile infection   • Right ventricular systolic dysfunction without heart failure   • Essential hypertension   • Metastatic melanoma ([facility])   • High risk medication use   • Adrenal insufficiency (HCC)   • Abnormal PET scan of colon   • Mild anemia   • Anticoagulated by anticoagulation treatment   • History of colon polyps   • Diverticulosis   • Abnormal finding on GI tract imaging          PAST MEDICAL HISTORY:   Past Medical History:   Diagnosis Date   • Acute deep vein thrombosis (DVT) of popliteal vein of left lower extremity (HCC)    • Ankle wound, left, initial encounter     Resolved 2017   • Ankle wound, right, initial encounter     resolved 2017   • DVT (deep vein thrombosis) in pregnancy    • Hypertension    • Pulmonary emboli ([facility])      post C section   • Pulmonary embolism ([facility])     2017   • Pulmonary embolism, blood-clot, obstetric    • Skin cancer    • Venous stasis ulcer ([facility])    • Venous ulcers of both lower extremities ([facility])         PAST SURGICAL HISTORY:  Past Surgical History:   Procedure Laterality Date   •  SECTION      X2        SOCIAL HISTORY:  Social History     Socioeconomic History   • Marital status:       Spouse name: None   • Number of children: None   • Years of education: None   • Highest education level: None   Occupational History   • None   Tobacco Use   • Smoking status: Never   • Smokeless tobacco: Never   Vaping Use   • Vaping Use: Never used   Substance and Sexual Activity   • Alcohol use: No     Comment: hx of social drinker   • Drug use: No   • Sexual activity: None   Other Topics Concern   • None   Social History Narrative   • None     Social Determinants of Health     Financial Resource Strain: Not on file   Food Insecurity: Not on file   Transportation Needs: Not on file   Physical Activity: Not on file   Stress: Not on file   Social Connections: Not on file   Intimate Partner Violence: Not on file   Housing Stability: Not on file        FAMILY HISTORY:  Family History   Problem Relation Age of Onset   • Heart attack Mother    • Stroke Mother    • Cancer Father    • Bone cancer Family    • Hypertension Family    • Lung cancer Family            Objective    PHYSICAL EXAMINATION:   Blood pressure 120/72, pulse 83, temperature 97.7 °F (36.5 °C), temperature source Temporal, resp. rate 16, height 5' 9" (1.753 m), weight 89.8 kg (198 lb), SpO2 97 %. Pain Score: 0-No pain      Physical Exam  Constitutional:       General: She is not in acute distress. Appearance: Normal appearance. She is not toxic-appearing. HENT:      Head: Normocephalic and atraumatic. Right Ear: External ear normal.      Left Ear: External ear normal.      Nose: Nose normal.      Mouth/Throat:      Mouth: Mucous membranes are moist.      Pharynx: Oropharynx is clear. Eyes:      General: No scleral icterus. Right eye: No discharge. Left eye: No discharge. Conjunctiva/sclera: Conjunctivae normal.   Cardiovascular:      Rate and Rhythm: Normal rate and regular rhythm. Pulses: Normal pulses. Heart sounds: No murmur heard. No friction rub. No gallop. Pulmonary:      Effort: Pulmonary effort is normal. No respiratory distress. Breath sounds: Normal breath sounds. No wheezing or rales.    Abdominal:      General: Bowel sounds are normal. There is no distension. Palpations: There is no mass. Tenderness: There is no abdominal tenderness. There is no rebound. Musculoskeletal:         General: No swelling or tenderness. Cervical back: Normal range of motion. No rigidity. Right lower leg: No edema. Left lower leg: No edema. Lymphadenopathy:      Head:      Right side of head: No submandibular, preauricular or posterior auricular adenopathy. Left side of head: No submandibular, preauricular or posterior auricular adenopathy. Cervical: No cervical adenopathy. Right cervical: No superficial or posterior cervical adenopathy. Left cervical: No superficial or posterior cervical adenopathy. Upper Body:      Right upper body: No supraclavicular or axillary adenopathy. Left upper body: No supraclavicular or axillary adenopathy. Lower Body: No right inguinal adenopathy. No left inguinal adenopathy. Skin:     General: Skin is warm. Coloration: Skin is not jaundiced. Findings: Lesion (decreased pigmentation along right lower leg - medially) present. No rash. Comments: No concerning skin lesions. Decreasing pigmentation at the area of depigmentation in the right lower leg/medially. Continues to improve over time with treatment. No discrete nodules or masses. Neurological:      General: No focal deficit present. Mental Status: She is alert and oriented to person, place, and time. Psychiatric:         Mood and Affect: Mood normal.         Behavior: Behavior normal.         Thought Content: Thought content normal.         Judgment: Judgment normal.         I reviewed lab data in the chart.     WBC   Date Value Ref Range Status   09/16/2023 4.96 4.31 - 10.16 Thousand/uL Final   08/19/2023 5.21 4.31 - 10.16 Thousand/uL Final   07/22/2023 4.95 4.31 - 10.16 Thousand/uL Final     Hemoglobin   Date Value Ref Range Status   09/16/2023 13.1 11.5 - 15.4 g/dL Final   08/19/2023 13.0 11.5 - 15.4 g/dL Final   07/22/2023 13.1 11.5 - 15.4 g/dL Final     Platelets   Date Value Ref Range Status   09/16/2023 191 149 - 390 Thousands/uL Final   08/19/2023 180 149 - 390 Thousands/uL Final   07/22/2023 188 149 - 390 Thousands/uL Final     MCV   Date Value Ref Range Status   09/16/2023 92 82 - 98 fL Final   08/19/2023 94 82 - 98 fL Final   07/22/2023 93 82 - 98 fL Final      Potassium   Date Value Ref Range Status   09/16/2023 4.5 3.5 - 5.3 mmol/L Final   08/19/2023 4.3 3.5 - 5.3 mmol/L Final   07/22/2023 4.4 3.5 - 5.3 mmol/L Final     Chloride   Date Value Ref Range Status   09/16/2023 104 96 - 108 mmol/L Final   08/19/2023 110 (H) 96 - 108 mmol/L Final   07/22/2023 108 96 - 108 mmol/L Final     CO2   Date Value Ref Range Status   09/16/2023 31 21 - 32 mmol/L Final   08/19/2023 30 21 - 32 mmol/L Final   07/22/2023 30 21 - 32 mmol/L Final     BUN   Date Value Ref Range Status   09/16/2023 22 5 - 25 mg/dL Final   08/19/2023 21 5 - 25 mg/dL Final   07/22/2023 21 5 - 25 mg/dL Final     Creatinine   Date Value Ref Range Status   09/16/2023 0.96 0.60 - 1.30 mg/dL Final     Comment:     Standardized to IDMS reference method   08/19/2023 0.96 0.60 - 1.30 mg/dL Final     Comment:     Standardized to IDMS reference method   07/22/2023 0.91 0.60 - 1.30 mg/dL Final     Comment:     Standardized to IDMS reference method     Glucose   Date Value Ref Range Status   11/17/2022 85 65 - 140 mg/dL Final     Comment:     If the patient is fasting, the ADA then defines impaired fasting glucose as > 100 mg/dL and diabetes as > or equal to 123 mg/dL. Specimen collection should occur prior to Sulfasalazine administration due to the potential for falsely depressed results. Specimen collection should occur prior to Sulfapyridine administration due to the potential for falsely elevated results.    08/03/2020 112 (H) 65 - 99 mg/dL Final     Comment:     If the patient is fasting, the ADA then defines impaired fasting glucose as > 100 mg/dL and diabetes as > or equal to 123 mg/dL. Specimen collection should occur prior to Sulfasalazine administration due to the potential for falsely depressed results. Specimen collection should occur prior to Sulfapyridine administration due to the potential for falsely elevated results. 01/24/2017 91 65 - 140 mg/dL Final     Comment:     If the patient is fasting, the ADA then defines impaired fasting glucose as > 100 mg/dL and diabetes as > or equal to 123 mg/dL. Calcium   Date Value Ref Range Status   09/16/2023 9.5 8.4 - 10.2 mg/dL Final   08/19/2023 8.9 8.3 - 10.1 mg/dL Final   07/22/2023 8.9 8.3 - 10.1 mg/dL Final     Albumin   Date Value Ref Range Status   09/16/2023 3.8 3.5 - 5.0 g/dL Final   08/19/2023 3.3 (L) 3.5 - 5.0 g/dL Final   07/22/2023 3.4 (L) 3.5 - 5.0 g/dL Final     Total Bilirubin   Date Value Ref Range Status   09/16/2023 0.55 0.20 - 1.00 mg/dL Final     Comment:     Use of this assay is not recommended for patients undergoing treatment with eltrombopag due to the potential for falsely elevated results. N-acetyl-p-benzoquinone imine (metabolite of Acetaminophen) will generate erroneously low results in samples for patients that have taken an overdose of Acetaminophen.   08/19/2023 0.60 0.20 - 1.00 mg/dL Final     Comment:     Use of this assay is not recommended for patients undergoing treatment with eltrombopag due to the potential for falsely elevated results. 07/22/2023 0.40 0.20 - 1.00 mg/dL Final     Comment:     Use of this assay is not recommended for patients undergoing treatment with eltrombopag due to the potential for falsely elevated results.      Alkaline Phosphatase   Date Value Ref Range Status   09/16/2023 42 34 - 104 U/L Final   08/19/2023 48 46 - 116 U/L Final   07/22/2023 46 46 - 116 U/L Final     AST   Date Value Ref Range Status   09/16/2023 19 13 - 39 U/L Final   08/19/2023 19 5 - 45 U/L Final     Comment:     Specimen collection should occur prior to Sulfasalazine administration due to the potential for falsely depressed results. 07/22/2023 15 5 - 45 U/L Final     Comment:     Specimen collection should occur prior to Sulfasalazine administration due to the potential for falsely depressed results. ALT   Date Value Ref Range Status   09/16/2023 14 7 - 52 U/L Final     Comment:     Specimen collection should occur prior to Sulfasalazine administration due to the potential for falsely depressed results. 08/19/2023 19 12 - 78 U/L Final     Comment:     Specimen collection should occur prior to Sulfasalazine and/or Sulfapyridine administration due to the potential for falsely depressed results. 07/22/2023 18 12 - 78 U/L Final     Comment:     Specimen collection should occur prior to Sulfasalazine and/or Sulfapyridine administration due to the potential for falsely depressed results. LD   Date Value Ref Range Status   09/16/2023 236 140 - 271 U/L Final   08/19/2023 178 81 - 234 U/L Final   07/22/2023 163 81 - 234 U/L Final     No results found for: "TSH"  No results found for: "T3YEEYQ"   Free T4   Date Value Ref Range Status   09/16/2023 1.02 0.61 - 1.12 ng/dL Final     Comment:     Specimens with biotin concentrations > 10 ng/mL can lead to significant (> 10%) positive bias in result. 08/19/2023 0.85 0.61 - 1.12 ng/dL Final     Comment:     Specimens with biotin concentrations > 10 ng/mL can lead to significant (> 10%) positive bias in result.   07/22/2023 0.94 0.61 - 1.12 ng/dL Final     Comment:     Specimens with biotin concentrations > 10 ng/mL can lead to significant (> 10%) positive bias in result. RECENT IMAGING:  No results found. Assessment/Plan  Ms. Shara Eduardo is a 70-year-old female with metastatic melanoma on treatment with nivolumab plus relatlimab/Opdualag here for continued monitoring follow-up and surveillance while on treatment. 1. Metastatic melanoma ([address])  Doing well and tolerating treatment with nivolumab plus relatlimab. Clinical improvement of disease while on treatment. Labs reviewed and okay. She will continue with treatment and can get her next infusion. She is due for full body imaging and assessment prior to her next visit. Orders placed and prescription provided for PET scan. She has standing orders for blood work prior to each treatment. She knows to continue to watch for immune mediated side effects. She knows to call with any issues or concerns prior to her next visit.    - NM pet ct tumor imaging whole body; Future    2. High risk medication use  3. Adrenal insufficiency ([address])  She is on treatment with immunotherapy and we will continue to monitor for side effects and lab abnormalities. We will monitor labs with each treatment to ensure safety of continuing on treatment. She knows to watch for signs and symptoms for immune mediated side effects. Denies any immune mediated side effects at this time. Doing well with good energy. Continues on 10 mg hydrocortisone twice a day. Continue to adjust as needed for her adrenal insufficiency, but seems to be on a stable dose at this time. Return in about 4 weeks (around 10/16/2023) for Office Visit, Infusion - See Treatment Plan, labs.      Niecy Ambrocio, PhD

## 2023-09-20 ENCOUNTER — HOSPITAL ENCOUNTER (OUTPATIENT)
Dept: INFUSION CENTER | Facility: HOSPITAL | Age: 73
Discharge: HOME | End: 2023-09-20
Attending: INTERNAL MEDICINE
Payer: COMMERCIAL

## 2023-09-20 VITALS
OXYGEN SATURATION: 95 % | RESPIRATION RATE: 95 BRPM | HEART RATE: 83 BPM | HEIGHT: 69 IN | BODY MASS INDEX: 29.23 KG/M2 | DIASTOLIC BLOOD PRESSURE: 62 MMHG | TEMPERATURE: 97.3 F | SYSTOLIC BLOOD PRESSURE: 137 MMHG

## 2023-09-20 DIAGNOSIS — C43.9 METASTATIC MELANOMA (HCC): Primary | ICD-10-CM

## 2023-09-20 PROCEDURE — 96413 CHEMO IV INFUSION 1 HR: CPT

## 2023-09-20 RX ORDER — SODIUM CHLORIDE 9 MG/ML
20 INJECTION, SOLUTION INTRAVENOUS ONCE
Status: COMPLETED | OUTPATIENT
Start: 2023-09-20 | End: 2023-09-20

## 2023-09-20 RX ADMIN — SODIUM CHLORIDE 480 MG OF NIVOLUMAB: 9 INJECTION, SOLUTION INTRAVENOUS at 14:50

## 2023-09-20 RX ADMIN — SODIUM CHLORIDE 20 ML/HR: 0.9 INJECTION, SOLUTION INTRAVENOUS at 14:44
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
## 2023-09-23 NOTE — PROGRESS NOTES
[facility]  [address] [place] [id number] [phone]     Date of Visit: 7/55/9170  Name: Jose Ho   YOB: 1950        Subjective    VISIT DIAGNOSIS:  Diagnoses and all orders for this visit:    Metastatic melanoma ([address] [facility])  -     NM pet ct tumor imaging whole body; Future    High risk medication use    Adrenal insufficiency ([address] [facility])        Oncology History   Metastatic melanoma ([address] [facility])   10/13/2022 -  Cancer Staged    Staging form: Melanoma of the Skin, AJCC 8th Edition  - Clinical stage from 10/13/2022: Stage IV (cTX, cNX, cM1a) - Signed by Sharron Yeung MD on 11/22/2022       10/13/2022 Biopsy    A. Skin, right lateral medial leg, punch biopsy:  Portion of severely atypical melanocytic dermal proliferation with prominent melanosis consistent with melanoma. See note. B. Skin, right medial leg. punch biopsy:  Portion of severely atypical melanocytic dermal proliferation with prominent melanosis consistent with melanoma. See note. C. Skin, right medial leg, punch biopsy:   Portion of severely atypical melanocytic dermal proliferation with prominent melanosis consistent with melanoma. See note.      11/22/2022 Initial Diagnosis    Metastatic melanoma ([address])     12/14/2022 -  Chemotherapy    alteplase (CATHFLO), 2 mg, Intracatheter, Every 2 hour PRN, 11 of 14 cycles  NIVOLUMAB-RELATLIMAB-RMBW (OPDUALAG) IVPB, 480 mg of nivolumab, Intravenous, Once, 11 of 14 cycles  Administration: 480 mg of nivolumab (12/14/2022), 480 mg of nivolumab (1/11/2023), 480 mg of nivolumab (2/8/2023), 480 mg of nivolumab (3/8/2023), 480 mg of nivolumab (4/5/2023), 480 mg of nivolumab (5/3/2023), 480 mg of nivolumab (5/31/2023), 480 mg of nivolumab (6/28/2023), 480 mg of nivolumab (7/26/2023), 480 mg of nivolumab (8/23/2023), 480 mg of nivolumab (9/20/2023)     1/5/2023 Genomic Testing    Foundation One liquid testing  TMB 1 Mut/Mb  MSI-high not detected          Cancer Staging   Metastatic melanoma ([address])  Staging form: Melanoma of the Skin, AJCC 8th Edition  - Clinical stage from 10/13/2022: Stage IV (cTX, cNX, cM1a) - Signed by Allison Cruz MD on 11/22/2022     Treatment Details   Treatment goal Curative   Plan Name OP Nivolumab and Relatlimab-rmbw Q 28 Days   Status Active   Start Date 12/14/2022   End Date 12/13/2023 (Planned)   Provider Allison Cruz MD   Chemotherapy alteplase (CATHFLO), 2 mg, Intracatheter, Every 2 hour PRN, 11 of 14 cycles    NIVOLUMAB-RELATLIMAB-RMBW (OPDUALAG) IVPB, 480 mg of nivolumab, Intravenous, Once, 11 of 14 cycles  Administration: 480 mg of nivolumab (12/14/2022), 480 mg of nivolumab (1/11/2023), 480 mg of nivolumab (2/8/2023), 480 mg of nivolumab (3/8/2023), 480 mg of nivolumab (4/5/2023), 480 mg of nivolumab (5/3/2023), 480 mg of nivolumab (5/31/2023), 480 mg of nivolumab (6/28/2023), 480 mg of nivolumab (7/26/2023), 480 mg of nivolumab (8/23/2023), 480 mg of nivolumab (9/20/2023)          HISTORY OF PRESENT ILLNESS: Sarika Wallace is a 68 y.o. female  who has metastatic melanoma on treatment with nivolumab plus relatlimab/Opdualag here for continued monitoring, follow-up and surveillance while on treatment. She is doing well and feels good. 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 Energy is good. No issues concerns or complaints today. No new, changing, concerning skin lesions. No lymphadenopathy. She thinks the pigmented spots on her right lower leg continue to lighten. Denies any additional immune mediated side effects. Denies headaches, double vision, rash, itching, chest pain, shortness of breath, diarrhea. Denies any muscle weakness or fatigue. REVIEW OF SYSTEMS:  Review of Systems   Constitutional: Negative for appetite change, fatigue, fever and unexpected weight change. HENT:   Negative for lump/mass, trouble swallowing and voice change. Eyes: Negative for icterus. Respiratory: Negative for cough, shortness of breath and wheezing. Cardiovascular: Negative for leg swelling. Gastrointestinal: Negative for abdominal pain, constipation, diarrhea, nausea and vomiting. Genitourinary: Negative for difficulty urinating and hematuria. Musculoskeletal: Negative for arthralgias, gait problem and myalgias. Skin: Negative for itching and rash. No new, changing, or concerning lesions. Neurological: Negative for extremity weakness, gait problem, headaches, light-headedness and numbness. Hematological: Negative for adenopathy. MEDICATIONS:    Current Outpatient Medications:   •  apixaban (Eliquis) 5 mg, Take 1 tablet (5 mg total) by mouth every 12 (twelve) hours, Disp: 60 tablet, Rfl: 5  •  famotidine (PEPCID) 10 mg tablet, Take 10 mg by mouth 2 (two) times a day, Disp: , Rfl:   •  hydrocortisone (CORTEF) 10 mg tablet, Take 1 tablet (10 mg total) by mouth 2 (two) times a day, Disp: 60 tablet, Rfl: 11  •  loratadine (CLARITIN) 10 mg tablet, Take 10 mg by mouth daily, Disp: , Rfl:   •  losartan (Cozaar) 100 MG tablet, Take 1 tablet (100 mg total) by mouth daily, Disp: 30 tablet, Rfl: 5  •  metoprolol succinate (TOPROL-XL) 25 mg 24 hr tablet, Take 1 tablet (25 mg total) by mouth 2 (two) times a day, Disp: 60 tablet, Rfl: 5  •  Multiple Minerals-Vitamins (Citracal Plus) TABS, Take by mouth, Disp: , Rfl:   •  Multiple Vitamins-Minerals (MULTIVITAMIN WITH MINERALS) tablet, Take 1 tablet by mouth daily, Disp: , Rfl:   •  patient supplied medication, Pt takes eye drops Mura 128 - not listed in database, Disp: , Rfl:   •  Wheat Dextrin (BENEFIBER DRINK MIX PO), Take by mouth, Disp: , Rfl:   No current facility-administered medications for this visit.      ALLERGIES:  Allergies   Allergen Reactions   • Etomidate Other (See Comments)        ACTIVE PROBLEMS:  Patient Active Problem List   Diagnosis   • Acute massive pulmonary embolism (HCC)   • Venous stasis of both lower extremities   • Clostridium difficile infection   • Right ventricular systolic dysfunction without heart failure   • Essential hypertension   • Metastatic melanoma (HCC)   • High risk medication use   • Adrenal insufficiency (HCC)   • Abnormal PET scan of colon   • Mild anemia   • Anticoagulated by anticoagulation treatment   • History of colon polyps   • Diverticulosis   • Abnormal finding on GI tract imaging          PAST MEDICAL HISTORY:   Past Medical History:   Diagnosis Date   • Acute deep vein thrombosis (DVT) of popliteal vein of left lower extremity (HCC)    • Ankle wound, left, initial encounter     Resolved 2017   • Ankle wound, right, initial encounter     resolved 2017   • DVT (deep vein thrombosis) in pregnancy    • Hypertension    • Pulmonary emboli ([address])      post C section   • Pulmonary embolism ([address])     2017   • Pulmonary embolism, blood-clot, obstetric    • Skin cancer    • Venous stasis ulcer ([address])    • Venous ulcers of both lower extremities ([address])         PAST SURGICAL HISTORY:  Past Surgical History:   Procedure Laterality Date   •  SECTION      X2        SOCIAL HISTORY:  Social History     Socioeconomic History   • Marital status:       Spouse name: None   • Number of children: None   • Years of education: None   • Highest education level: None   Occupational History   • None   Tobacco Use   • Smoking status: Never   • Smokeless tobacco: Never   Vaping Use   • Vaping Use: Never used   Substance and Sexual Activity   • Alcohol use: No     Comment: hx of social drinker   • Drug use: No   • Sexual activity: None   Other Topics Concern   • None   Social History Narrative   • None     Social Determinants of Health     Financial Resource Strain: Not on file   Food Insecurity: Not on file   Transportation Needs: Not on file   Physical Activity: Not on file   Stress: Not on file   Social Connections: Not on file   Intimate Partner Violence: Not on file   Housing Stability: Not on file        FAMILY HISTORY:  Family History   Problem Relation Age of Onset   • Heart attack Mother    • Stroke Mother    • Cancer Father    • Bone cancer Family    • Hypertension Family    • Lung cancer Family            Objective    PHYSICAL EXAMINATION:   Blood pressure 120/72, pulse 83, temperature 97.7 °F (36.5 °C), temperature source Temporal, resp. rate 16, height 5' 9" (1.753 m), weight 89.8 kg (198 lb), SpO2 97 %. Pain Score: 0-No pain      Physical Exam  Constitutional:       General: She is not in acute distress. Appearance: Normal appearance. She is not toxic-appearing. HENT:      Head: Normocephalic and atraumatic. Right Ear: External ear normal.      Left Ear: External ear normal.      Nose: Nose normal.      Mouth/Throat:      Mouth: Mucous membranes are moist.      Pharynx: Oropharynx is clear. Eyes:      General: No scleral icterus. Right eye: No discharge. Left eye: No discharge. Conjunctiva/sclera: Conjunctivae normal.   Cardiovascular:      Rate and Rhythm: Normal rate and regular rhythm. Pulses: Normal pulses. Heart sounds: No murmur heard. No friction rub. No gallop. Pulmonary:      Effort: Pulmonary effort is normal. No respiratory distress. Breath sounds: Normal breath sounds. No wheezing or rales. Abdominal:      General: Bowel sounds are normal. There is no distension. Palpations: There is no mass. Tenderness: There is no abdominal tenderness. There is no rebound. Musculoskeletal:         General: No swelling or tenderness. Cervical back: Normal range of motion. No rigidity. Right lower leg: No edema. Left lower leg: No edema. Lymphadenopathy:      Head:      Right side of head: No submandibular, preauricular or posterior auricular adenopathy. Left side of head: No submandibular, preauricular or posterior auricular adenopathy. Cervical: No cervical adenopathy.       Right cervical: No superficial or posterior cervical adenopathy. Left cervical: No superficial or posterior cervical adenopathy. Upper Body:      Right upper body: No supraclavicular or axillary adenopathy. Left upper body: No supraclavicular or axillary adenopathy. Lower Body: No right inguinal adenopathy. No left inguinal adenopathy. Skin:     General: Skin is warm. Coloration: Skin is not jaundiced. Findings: Lesion (decreased pigmentation along right lower leg - medially) present. No rash. Comments: No concerning skin lesions. Decreasing pigmentation at the area of depigmentation in the right lower leg/medially. Continues to improve over time with treatment. No discrete nodules or masses. Neurological:      General: No focal deficit present. Mental Status: She is alert and oriented to person, place, and time. Psychiatric:         Mood and Affect: Mood normal.         Behavior: Behavior normal.         Thought Content: Thought content normal.         Judgment: Judgment normal.         I reviewed lab data in the chart.     WBC   Date Value Ref Range Status   09/16/2023 4.96 4.31 - 10.16 Thousand/uL Final   08/19/2023 5.21 4.31 - 10.16 Thousand/uL Final   07/22/2023 4.95 4.31 - 10.16 Thousand/uL Final     Hemoglobin   Date Value Ref Range Status   09/16/2023 13.1 11.5 - 15.4 g/dL Final   08/19/2023 13.0 11.5 - 15.4 g/dL Final   07/22/2023 13.1 11.5 - 15.4 g/dL Final     Platelets   Date Value Ref Range Status   09/16/2023 191 149 - 390 Thousands/uL Final   08/19/2023 180 149 - 390 Thousands/uL Final   07/22/2023 188 149 - 390 Thousands/uL Final     MCV   Date Value Ref Range Status   09/16/2023 92 82 - 98 fL Final   08/19/2023 94 82 - 98 fL Final   07/22/2023 93 82 - 98 fL Final      Potassium   Date Value Ref Range Status   09/16/2023 4.5 3.5 - 5.3 mmol/L Final   08/19/2023 4.3 3.5 - 5.3 mmol/L Final   07/22/2023 4.4 3.5 - 5.3 mmol/L Final     Chloride   Date Value Ref Range Status 09/16/2023 104 96 - 108 mmol/L Final   08/19/2023 110 (H) 96 - 108 mmol/L Final   07/22/2023 108 96 - 108 mmol/L Final     CO2   Date Value Ref Range Status   09/16/2023 31 21 - 32 mmol/L Final   08/19/2023 30 21 - 32 mmol/L Final   07/22/2023 30 21 - 32 mmol/L Final     BUN   Date Value Ref Range Status   09/16/2023 22 5 - 25 mg/dL Final   08/19/2023 21 5 - 25 mg/dL Final   07/22/2023 21 5 - 25 mg/dL Final     Creatinine   Date Value Ref Range Status   09/16/2023 0.96 0.60 - 1.30 mg/dL Final     Comment:     Standardized to IDMS reference method   08/19/2023 0.96 0.60 - 1.30 mg/dL Final     Comment:     Standardized to IDMS reference method   07/22/2023 0.91 0.60 - 1.30 mg/dL Final     Comment:     Standardized to IDMS reference method     Glucose   Date Value Ref Range Status   11/17/2022 85 65 - 140 mg/dL Final     Comment:     If the patient is fasting, the ADA then defines impaired fasting glucose as > 100 mg/dL and diabetes as > or equal to 123 mg/dL. Specimen collection should occur prior to Sulfasalazine administration due to the potential for falsely depressed results. Specimen collection should occur prior to Sulfapyridine administration due to the potential for falsely elevated results. 08/03/2020 112 (H) 65 - 99 mg/dL Final     Comment:     If the patient is fasting, the ADA then defines impaired fasting glucose as > 100 mg/dL and diabetes as > or equal to 123 mg/dL. Specimen collection should occur prior to Sulfasalazine administration due to the potential for falsely depressed results. Specimen collection should occur prior to Sulfapyridine administration due to the potential for falsely elevated results. 01/24/2017 91 65 - 140 mg/dL Final     Comment:     If the patient is fasting, the ADA then defines impaired fasting glucose as > 100 mg/dL and diabetes as > or equal to 123 mg/dL.      Calcium   Date Value Ref Range Status   09/16/2023 9.5 8.4 - 10.2 mg/dL Final   08/19/2023 8.9 8.3 - 10.1 mg/dL Final   07/22/2023 8.9 8.3 - 10.1 mg/dL Final     Albumin   Date Value Ref Range Status   09/16/2023 3.8 3.5 - 5.0 g/dL Final   08/19/2023 3.3 (L) 3.5 - 5.0 g/dL Final   07/22/2023 3.4 (L) 3.5 - 5.0 g/dL Final     Total Bilirubin   Date Value Ref Range Status   09/16/2023 0.55 0.20 - 1.00 mg/dL Final     Comment:     Use of this assay is not recommended for patients undergoing treatment with eltrombopag due to the potential for falsely elevated results. N-acetyl-p-benzoquinone imine (metabolite of Acetaminophen) will generate erroneously low results in samples for patients that have taken an overdose of Acetaminophen.   08/19/2023 0.60 0.20 - 1.00 mg/dL Final     Comment:     Use of this assay is not recommended for patients undergoing treatment with eltrombopag due to the potential for falsely elevated results. 07/22/2023 0.40 0.20 - 1.00 mg/dL Final     Comment:     Use of this assay is not recommended for patients undergoing treatment with eltrombopag due to the potential for falsely elevated results. Alkaline Phosphatase   Date Value Ref Range Status   09/16/2023 42 34 - 104 U/L Final   08/19/2023 48 46 - 116 U/L Final   07/22/2023 46 46 - 116 U/L Final     AST   Date Value Ref Range Status   09/16/2023 19 13 - 39 U/L Final   08/19/2023 19 5 - 45 U/L Final     Comment:     Specimen collection should occur prior to Sulfasalazine administration due to the potential for falsely depressed results. 07/22/2023 15 5 - 45 U/L Final     Comment:     Specimen collection should occur prior to Sulfasalazine administration due to the potential for falsely depressed results. ALT   Date Value Ref Range Status   09/16/2023 14 7 - 52 U/L Final     Comment:     Specimen collection should occur prior to Sulfasalazine administration due to the potential for falsely depressed results.     08/19/2023 19 12 - 78 U/L Final     Comment:     Specimen collection should occur prior to Sulfasalazine and/or Sulfapyridine administration due to the potential for falsely depressed results. 07/22/2023 18 12 - 78 U/L Final     Comment:     Specimen collection should occur prior to Sulfasalazine and/or Sulfapyridine administration due to the potential for falsely depressed results. LD   Date Value Ref Range Status   09/16/2023 236 140 - 271 U/L Final   08/19/2023 178 81 - 234 U/L Final   07/22/2023 163 81 - 234 U/L Final     No results found for: "TSH"  No results found for: "D5XVEDX"   Free T4   Date Value Ref Range Status   09/16/2023 1.02 0.61 - 1.12 ng/dL Final     Comment:     Specimens with biotin concentrations > 10 ng/mL can lead to significant (> 10%) positive bias in result. 08/19/2023 0.85 0.61 - 1.12 ng/dL Final     Comment:     Specimens with biotin concentrations > 10 ng/mL can lead to significant (> 10%) positive bias in result.   07/22/2023 0.94 0.61 - 1.12 ng/dL Final     Comment:     Specimens with biotin concentrations > 10 ng/mL can lead to significant (> 10%) positive bias in result. RECENT IMAGING:  No results found. Assessment/Plan  Ms. Kedric Siemens is a 72-year-old female with metastatic melanoma on treatment with nivolumab plus relatlimab/Opdualag here for continued monitoring follow-up and surveillance while on treatment. 1. Metastatic melanoma ([address])  Doing well and tolerating treatment with nivolumab plus relatlimab. Clinical improvement of disease while on treatment. Labs reviewed and okay. She will continue with treatment and can get her next infusion. She is due for full body imaging and assessment prior to her next visit. Orders placed and prescription provided for PET scan. She has standing orders for blood work prior to each treatment. She knows to continue to watch for immune mediated side effects. She knows to call with any issues or concerns prior to her next visit.    - NM pet ct tumor imaging whole body; Future    2. High risk medication use  3.  Adrenal insufficiency [facility])  She is on treatment with immunotherapy and we will continue to monitor for side effects and lab abnormalities. We will monitor labs with each treatment to ensure safety of continuing on treatment. She knows to watch for signs and symptoms for immune mediated side effects. Denies any immune mediated side effects at this time. Doing well with good energy. Continues on 10 mg hydrocortisone twice a day. Continue to adjust as needed for her adrenal insufficiency, but seems to be on a stable dose at this time. Return in about 4 weeks (around 10/16/2023) for Office Visit, Infusion - See Treatment Plan, labs.      Emma Bautista, PhD

## 2023-09-23 NOTE — PROGRESS NOTES
[facility]  [address] [id number] [phone]     Date of Visit: 7/16/1130  Name: Reg Truong   YOB: 1950        Subjective    VISIT DIAGNOSIS:  Diagnoses and all orders for this visit:    Metastatic melanoma ([address])    High risk medication use    Adrenal insufficiency [facility])        Oncology History   Metastatic melanoma ([address])   10/13/2022 -  Cancer Staged    Staging form: Melanoma of the Skin, AJCC 8th Edition  - Clinical stage from 10/13/2022: Stage IV (cTX, cNX, cM1a) - Signed by García Greene MD on 11/22/2022       10/13/2022 Biopsy    A. Skin, right lateral medial leg, punch biopsy:  Portion of severely atypical melanocytic dermal proliferation with prominent melanosis consistent with melanoma. See note. B. Skin, right medial leg. punch biopsy:  Portion of severely atypical melanocytic dermal proliferation with prominent melanosis consistent with melanoma. See note. C. Skin, right medial leg, punch biopsy:   Portion of severely atypical melanocytic dermal proliferation with prominent melanosis consistent with melanoma. See note.      11/22/2022 Initial Diagnosis    Metastatic melanoma ([address])     12/14/2022 -  Chemotherapy    alteplase (CATHFLO), 2 mg, Intracatheter, Every 2 hour PRN, 11 of 14 cycles  NIVOLUMAB-RELATLIMAB-RMBW (OPDUALAG) IVPB, 480 mg of nivolumab, Intravenous, Once, 11 of 14 cycles  Administration: 480 mg of nivolumab (12/14/2022), 480 mg of nivolumab (1/11/2023), 480 mg of nivolumab (2/8/2023), 480 mg of nivolumab (3/8/2023), 480 mg of nivolumab (4/5/2023), 480 mg of nivolumab (5/3/2023), 480 mg of nivolumab (5/31/2023), 480 mg of nivolumab (6/28/2023), 480 mg of nivolumab (7/26/2023), 480 mg of nivolumab (8/23/2023), 480 mg of nivolumab (9/20/2023)     1/5/2023 Genomic Testing    [facility] liquid testing  TMB 1 Mut/Mb  MSI-high not detected          Cancer Staging   Metastatic melanoma [facility]  Staging form: Melanoma of the Skin, AJCC 8th Edition  - Clinical stage from 10/13/2022: Stage IV (cTX, cNX, cM1a) - Signed by Mega Guardado MD on 11/22/2022     Treatment Details   Treatment goal Curative   Plan Name OP Nivolumab and Relatlimab-rmbw Q 28 Days   Status Active   Start Date 12/14/2022   End Date 12/13/2023 (Planned)   Provider Meag Guardado MD   Chemotherapy alteplase (CATHFLO), 2 mg, Intracatheter, Every 2 hour PRN, 11 of 14 cycles    NIVOLUMAB-RELATLIMAB-RMBW (OPDUALAG) IVPB, 480 mg of nivolumab, Intravenous, Once, 11 of 14 cycles  Administration: 480 mg of nivolumab (12/14/2022), 480 mg of nivolumab (1/11/2023), 480 mg of nivolumab (2/8/2023), 480 mg of nivolumab (3/8/2023), 480 mg of nivolumab (4/5/2023), 480 mg of nivolumab (5/3/2023), 480 mg of nivolumab (5/31/2023), 480 mg of nivolumab (6/28/2023), 480 mg of nivolumab (7/26/2023), 480 mg of nivolumab (8/23/2023), 480 mg of nivolumab (9/20/2023)          HISTORY OF PRESENT ILLNESS: Alyssa Manrique is a 68 y.o. female  who has metastatic melanoma on treatment with nivolumab plus relatlimab/Opdualag here for continued monitoring, follow-up, and surveillance while on treatment. She is doing well and feels good. Denies any new, changing, concerning skin lesions. Denies any lymphadenopathy. Getting ready as the summer winds down to start school again. Energy is well. Eating well. Denies immune mediated side effects. Denies headaches, double vision, rash, itching, chest pain, shortness of breath, diarrhea. Denies muscle weakness and fatigue. REVIEW OF SYSTEMS:  Review of Systems   Constitutional: Negative for appetite change, fatigue, fever and unexpected weight change. HENT:   Negative for lump/mass, trouble swallowing and voice change. Eyes: Negative for icterus. Respiratory: Negative for cough, shortness of breath and wheezing. Cardiovascular: Negative for leg swelling.    Gastrointestinal: Negative for abdominal pain, constipation, diarrhea, nausea and vomiting. Genitourinary: Negative for difficulty urinating and hematuria. Musculoskeletal: Negative for arthralgias, gait problem and myalgias. Skin: Negative for itching and rash. No new, changing, or concerning lesions. Neurological: Negative for extremity weakness, gait problem, headaches, light-headedness and numbness. Hematological: Negative for adenopathy. MEDICATIONS:    Current Outpatient Medications:   •  famotidine (PEPCID) 10 mg tablet, Take 10 mg by mouth 2 (two) times a day, Disp: , Rfl:   •  hydrocortisone (CORTEF) 10 mg tablet, Take 1 tablet (10 mg total) by mouth 2 (two) times a day, Disp: 60 tablet, Rfl: 11  •  loratadine (CLARITIN) 10 mg tablet, Take 10 mg by mouth daily, Disp: , Rfl:   •  Multiple Minerals-Vitamins (Citracal Plus) TABS, Take by mouth, Disp: , Rfl:   •  Multiple Vitamins-Minerals (MULTIVITAMIN WITH MINERALS) tablet, Take 1 tablet by mouth daily, Disp: , Rfl:   •  patient supplied medication, Pt takes eye drops Mura 128 - not listed in database, Disp: , Rfl:   •  Wheat Dextrin (BENEFIBER DRINK MIX PO), Take by mouth, Disp: , Rfl:   •  apixaban (Eliquis) 5 mg, Take 1 tablet (5 mg total) by mouth every 12 (twelve) hours, Disp: 60 tablet, Rfl: 5  •  losartan (Cozaar) 100 MG tablet, Take 1 tablet (100 mg total) by mouth daily, Disp: 30 tablet, Rfl: 5  •  metoprolol succinate (TOPROL-XL) 25 mg 24 hr tablet, Take 1 tablet (25 mg total) by mouth 2 (two) times a day, Disp: 60 tablet, Rfl: 5  No current facility-administered medications for this visit.      ALLERGIES:  Allergies   Allergen Reactions   • Etomidate Other (See Comments)        ACTIVE PROBLEMS:  Patient Active Problem List   Diagnosis   • Acute massive pulmonary embolism (HCC)   • Venous stasis of both lower extremities   • Clostridium difficile infection   • Right ventricular systolic dysfunction without heart failure   • Essential hypertension   • Metastatic melanoma ([address])   • High risk medication use   • Adrenal insufficiency (HCC)   • Abnormal PET scan of colon   • Mild anemia   • Anticoagulated by anticoagulation treatment   • History of colon polyps   • Diverticulosis   • Abnormal finding on GI tract imaging          PAST MEDICAL HISTORY:   Past Medical History:   Diagnosis Date   • Acute deep vein thrombosis (DVT) of popliteal vein of left lower extremity (HCC)    • Ankle wound, left, initial encounter     Resolved 2017   • Ankle wound, right, initial encounter     resolved 2017   • DVT (deep vein thrombosis) in pregnancy    • Hypertension    • Pulmonary emboli ([address])      post C section   • Pulmonary embolism ([address])     2017   • Pulmonary embolism, blood-clot, obstetric    • Skin cancer    • Venous stasis ulcer ([address])    • Venous ulcers of both lower extremities ([address])         PAST SURGICAL HISTORY:  Past Surgical History:   Procedure Laterality Date   •  SECTION      X2        SOCIAL HISTORY:  Social History     Socioeconomic History   • Marital status:       Spouse name: None   • Number of children: None   • Years of education: None   • Highest education level: None   Occupational History   • None   Tobacco Use   • Smoking status: Never   • Smokeless tobacco: Never   Vaping Use   • Vaping Use: Never used   Substance and Sexual Activity   • Alcohol use: No     Comment: hx of social drinker   • Drug use: No   • Sexual activity: None   Other Topics Concern   • None   Social History Narrative   • None     Social Determinants of Health     Financial Resource Strain: Not on file   Food Insecurity: Not on file   Transportation Needs: Not on file   Physical Activity: Not on file   Stress: Not on file   Social Connections: Not on file   Intimate Partner Violence: Not on file   Housing Stability: Not on file        FAMILY HISTORY:  Family History   Problem Relation Age of Onset   • Heart attack Mother    • Stroke Mother    • Cancer Father • Bone cancer Family    • Hypertension Family    • Lung cancer Family            Objective    PHYSICAL EXAMINATION:   Blood pressure 130/84, pulse 80, resp. rate 16, height 5' 9" (1.753 m), weight 88.7 kg (195 lb 8 oz), SpO2 98 %. Pain Score: 0-No pain      Physical Exam  Constitutional:       General: She is not in acute distress. Appearance: Normal appearance. She is not toxic-appearing. HENT:      Head: Normocephalic and atraumatic. Right Ear: External ear normal.      Left Ear: External ear normal.      Nose: Nose normal.      Mouth/Throat:      Mouth: Mucous membranes are moist.      Pharynx: Oropharynx is clear. Eyes:      General: No scleral icterus. Right eye: No discharge. Left eye: No discharge. Conjunctiva/sclera: Conjunctivae normal.   Cardiovascular:      Rate and Rhythm: Normal rate and regular rhythm. Pulses: Normal pulses. Heart sounds: No murmur heard. No friction rub. No gallop. Pulmonary:      Effort: Pulmonary effort is normal. No respiratory distress. Breath sounds: Normal breath sounds. No wheezing or rales. Abdominal:      General: Bowel sounds are normal. There is no distension. Palpations: There is no mass. Tenderness: There is no abdominal tenderness. There is no rebound. Musculoskeletal:         General: No swelling or tenderness. Normal range of motion. Cervical back: Normal range of motion. No rigidity. Right lower leg: No edema. Left lower leg: No edema. Lymphadenopathy:      Head:      Right side of head: No submandibular, preauricular or posterior auricular adenopathy. Left side of head: No submandibular, preauricular or posterior auricular adenopathy. Cervical: No cervical adenopathy. Right cervical: No superficial or posterior cervical adenopathy. Left cervical: No superficial or posterior cervical adenopathy.       Upper Body:      Right upper body: No supraclavicular or axillary adenopathy. Left upper body: No supraclavicular or axillary adenopathy. Skin:     General: Skin is warm. Coloration: Skin is not jaundiced. Findings: Lesion (imrpoving) present. No rash. Comments: Pigmented area continues to lighten on the medial aspect of her right lower leg. No nodules or masses. Neurological:      General: No focal deficit present. Mental Status: She is alert and oriented to person, place, and time. Cranial Nerves: No cranial nerve deficit. Gait: Gait normal.   Psychiatric:         Mood and Affect: Mood normal.         Behavior: Behavior normal.         Thought Content: Thought content normal.         Judgment: Judgment normal.         I reviewed lab data in the chart.     WBC   Date Value Ref Range Status   09/16/2023 4.96 4.31 - 10.16 Thousand/uL Final   08/19/2023 5.21 4.31 - 10.16 Thousand/uL Final   07/22/2023 4.95 4.31 - 10.16 Thousand/uL Final     Hemoglobin   Date Value Ref Range Status   09/16/2023 13.1 11.5 - 15.4 g/dL Final   08/19/2023 13.0 11.5 - 15.4 g/dL Final   07/22/2023 13.1 11.5 - 15.4 g/dL Final     Platelets   Date Value Ref Range Status   09/16/2023 191 149 - 390 Thousands/uL Final   08/19/2023 180 149 - 390 Thousands/uL Final   07/22/2023 188 149 - 390 Thousands/uL Final     MCV   Date Value Ref Range Status   09/16/2023 92 82 - 98 fL Final   08/19/2023 94 82 - 98 fL Final   07/22/2023 93 82 - 98 fL Final      Potassium   Date Value Ref Range Status   09/16/2023 4.5 3.5 - 5.3 mmol/L Final   08/19/2023 4.3 3.5 - 5.3 mmol/L Final   07/22/2023 4.4 3.5 - 5.3 mmol/L Final     Chloride   Date Value Ref Range Status   09/16/2023 104 96 - 108 mmol/L Final   08/19/2023 110 (H) 96 - 108 mmol/L Final   07/22/2023 108 96 - 108 mmol/L Final     CO2   Date Value Ref Range Status   09/16/2023 31 21 - 32 mmol/L Final   08/19/2023 30 21 - 32 mmol/L Final   07/22/2023 30 21 - 32 mmol/L Final     BUN   Date Value Ref Range Status   09/16/2023 22 5 - 25 mg/dL Final   08/19/2023 21 5 - 25 mg/dL Final   07/22/2023 21 5 - 25 mg/dL Final     Creatinine   Date Value Ref Range Status   09/16/2023 0.96 0.60 - 1.30 mg/dL Final     Comment:     Standardized to IDMS reference method   08/19/2023 0.96 0.60 - 1.30 mg/dL Final     Comment:     Standardized to IDMS reference method   07/22/2023 0.91 0.60 - 1.30 mg/dL Final     Comment:     Standardized to IDMS reference method     Glucose   Date Value Ref Range Status   11/17/2022 85 65 - 140 mg/dL Final     Comment:     If the patient is fasting, the ADA then defines impaired fasting glucose as > 100 mg/dL and diabetes as > or equal to 123 mg/dL. Specimen collection should occur prior to Sulfasalazine administration due to the potential for falsely depressed results. Specimen collection should occur prior to Sulfapyridine administration due to the potential for falsely elevated results. 08/03/2020 112 (H) 65 - 99 mg/dL Final     Comment:     If the patient is fasting, the ADA then defines impaired fasting glucose as > 100 mg/dL and diabetes as > or equal to 123 mg/dL. Specimen collection should occur prior to Sulfasalazine administration due to the potential for falsely depressed results. Specimen collection should occur prior to Sulfapyridine administration due to the potential for falsely elevated results. 01/24/2017 91 65 - 140 mg/dL Final     Comment:     If the patient is fasting, the ADA then defines impaired fasting glucose as > 100 mg/dL and diabetes as > or equal to 123 mg/dL.      Calcium   Date Value Ref Range Status   09/16/2023 9.5 8.4 - 10.2 mg/dL Final   08/19/2023 8.9 8.3 - 10.1 mg/dL Final   07/22/2023 8.9 8.3 - 10.1 mg/dL Final     Albumin   Date Value Ref Range Status   09/16/2023 3.8 3.5 - 5.0 g/dL Final   08/19/2023 3.3 (L) 3.5 - 5.0 g/dL Final   07/22/2023 3.4 (L) 3.5 - 5.0 g/dL Final     Total Bilirubin   Date Value Ref Range Status   09/16/2023 0.55 0.20 - 1.00 mg/dL Final     Comment: Use of this assay is not recommended for patients undergoing treatment with eltrombopag due to the potential for falsely elevated results. N-acetyl-p-benzoquinone imine (metabolite of Acetaminophen) will generate erroneously low results in samples for patients that have taken an overdose of Acetaminophen.   08/19/2023 0.60 0.20 - 1.00 mg/dL Final     Comment:     Use of this assay is not recommended for patients undergoing treatment with eltrombopag due to the potential for falsely elevated results. 07/22/2023 0.40 0.20 - 1.00 mg/dL Final     Comment:     Use of this assay is not recommended for patients undergoing treatment with eltrombopag due to the potential for falsely elevated results. Alkaline Phosphatase   Date Value Ref Range Status   09/16/2023 42 34 - 104 U/L Final   08/19/2023 48 46 - 116 U/L Final   07/22/2023 46 46 - 116 U/L Final     AST   Date Value Ref Range Status   09/16/2023 19 13 - 39 U/L Final   08/19/2023 19 5 - 45 U/L Final     Comment:     Specimen collection should occur prior to Sulfasalazine administration due to the potential for falsely depressed results. 07/22/2023 15 5 - 45 U/L Final     Comment:     Specimen collection should occur prior to Sulfasalazine administration due to the potential for falsely depressed results. ALT   Date Value Ref Range Status   09/16/2023 14 7 - 52 U/L Final     Comment:     Specimen collection should occur prior to Sulfasalazine administration due to the potential for falsely depressed results. 08/19/2023 19 12 - 78 U/L Final     Comment:     Specimen collection should occur prior to Sulfasalazine and/or Sulfapyridine administration due to the potential for falsely depressed results. 07/22/2023 18 12 - 78 U/L Final     Comment:     Specimen collection should occur prior to Sulfasalazine and/or Sulfapyridine administration due to the potential for falsely depressed results.        LD   Date Value Ref Range Status   09/16/2023 236 140 - 271 U/L Final   08/19/2023 178 81 - 234 U/L Final   07/22/2023 163 81 - 234 U/L Final     No results found for: "TSH"  No results found for: "[id number]"   Free T4   Date Value Ref Range Status   09/16/2023 1.02 0.61 - 1.12 ng/dL Final     Comment:     Specimens with biotin concentrations > 10 ng/mL can lead to significant (> 10%) positive bias in result. 08/19/2023 0.85 0.61 - 1.12 ng/dL Final     Comment:     Specimens with biotin concentrations > 10 ng/mL can lead to significant (> 10%) positive bias in result.   07/22/2023 0.94 0.61 - 1.12 ng/dL Final     Comment:     Specimens with biotin concentrations > 10 ng/mL can lead to significant (> 10%) positive bias in result. RECENT IMAGING:  No results found. Assessment/Plan  Ms. Fabio Rosario is a 73yr female with metastatic melanoma on the right lower extremity on treatment with nivolumab plus relatlimab here for continued monitoring, follow-up, surveillance while on treatment. 1. Metastatic melanoma ([address])  She is doing well and tolerating treatment. Clinically continues to improve. Denies any side effects. Labs reviewed and okay. She is okay to continue treatment with her nivolumab plus relatlimab. She is standing orders for blood work prior to each treatment. She knows to continue to monitor for immune mediated side effects. She knows to call with any issues or concerns prior to her next visit. 2. High risk medication use  3. Adrenal insufficiency ([address])  She is on treatment with immunotherapy and we will continue to monitor for side effects and lab abnormalities. We will monitor labs with each treatment to ensure safety of continuing on treatment. She knows to watch for signs and symptoms for immune mediated side effects. Denies any immune mediated side effects at this time. Stable dose of 10 mg hydrocortisone twice daily for her adrenal insufficiency. Continue as planned.         Return in about 4 weeks (around 9/18/2023) for Office Visit, Infusion - See Treatment Plan, labs.    Jayna Schneider MD, PhD

## 2023-10-11 RX ORDER — SODIUM CHLORIDE 9 MG/ML
20 INJECTION, SOLUTION INTRAVENOUS ONCE
Status: CANCELLED | OUTPATIENT
Start: 2023-10-18

## 2023-10-12 ENCOUNTER — OFFICE VISIT (OUTPATIENT)
Dept: HEMATOLOGY ONCOLOGY | Facility: CLINIC | Age: 73
End: 2023-10-12
Payer: COMMERCIAL

## 2023-10-12 VITALS
RESPIRATION RATE: 18 BRPM | HEART RATE: 77 BPM | HEIGHT: 69 IN | OXYGEN SATURATION: 97 % | WEIGHT: 198 LBS | SYSTOLIC BLOOD PRESSURE: 150 MMHG | BODY MASS INDEX: 29.33 KG/M2 | TEMPERATURE: 97 F | DIASTOLIC BLOOD PRESSURE: 70 MMHG

## 2023-10-12 DIAGNOSIS — C43.9 METASTATIC MELANOMA (HCC): Primary | ICD-10-CM

## 2023-10-12 DIAGNOSIS — Z79.899 HIGH RISK MEDICATION USE: ICD-10-CM

## 2023-10-12 DIAGNOSIS — E27.40 ADRENAL INSUFFICIENCY (HCC): ICD-10-CM

## 2023-10-12 PROCEDURE — 99214 OFFICE O/P EST MOD 30 MIN: CPT | Performed by: INTERNAL MEDICINE

## 2023-10-12 NOTE — PROGRESS NOTES
[facility]  [address] [place] [id number] [phone]     Date of Visit: 77/19/9723  Name: Marylee Burn   YOB: 1950        Subjective    VISIT DIAGNOSIS:  There are no diagnoses linked to this encounter. Oncology History   Metastatic melanoma ([address] [facility])   10/13/2022 -  Cancer Staged    Staging form: Melanoma of the Skin, AJCC 8th Edition  - Clinical stage from 10/13/2022: Stage IV (cTX, cNX, cM1a) - Signed by Felicita Dawson MD on 11/22/2022       10/13/2022 Biopsy    A. Skin, right lateral medial leg, punch biopsy:  Portion of severely atypical melanocytic dermal proliferation with prominent melanosis consistent with melanoma. See note. B. Skin, right medial leg. punch biopsy:  Portion of severely atypical melanocytic dermal proliferation with prominent melanosis consistent with melanoma. See note. C. Skin, right medial leg, punch biopsy:   Portion of severely atypical melanocytic dermal proliferation with prominent melanosis consistent with melanoma. See note.      11/22/2022 Initial Diagnosis    Metastatic melanoma ([address] [facility])     12/14/2022 -  Chemotherapy    alteplase (CATHFLO), 2 mg, Intracatheter, Every 2 hour PRN, 11 of 14 cycles  NIVOLUMAB-RELATLIMAB-RMBW (OPDUALAG) IVPB, 480 mg of nivolumab, Intravenous, Once, 11 of 14 cycles  Administration: 480 mg of nivolumab (12/14/2022), 480 mg of nivolumab (1/11/2023), 480 mg of nivolumab (2/8/2023), 480 mg of nivolumab (3/8/2023), 480 mg of nivolumab (4/5/2023), 480 mg of nivolumab (5/3/2023), 480 mg of nivolumab (5/31/2023), 480 mg of nivolumab (6/28/2023), 480 mg of nivolumab (7/26/2023), 480 mg of nivolumab (8/23/2023), 480 mg of nivolumab (9/20/2023)     1/5/2023 Genomic Testing    Foundation One liquid testing  TMB 1 Mut/Mb  MSI-high not detected          Cancer Staging   Metastatic melanoma ([address] [facility])  Staging form: Melanoma of the Skin, AJCC 8th Edition  - Clinical stage from 10/13/2022: Stage IV Mariana Robertson, [id number]) - Signed by Lali Martinez MD on 11/22/2022     Treatment Details   Treatment goal Curative   Plan Name OP Nivolumab and Relatlimab-rmbw Q 28 Days   Status Active   Start Date 12/14/2022   End Date 12/13/2023 (Planned)   Provider Lali Martinez MD   Chemotherapy alteplase (CATHFLO), 2 mg, Intracatheter, Every 2 hour PRN, 11 of 14 cycles    NIVOLUMAB-RELATLIMAB-RMBW (OPDUALAG) IVPB, 480 mg of nivolumab, Intravenous, Once, 11 of 14 cycles  Administration: 480 mg of nivolumab (12/14/2022), 480 mg of nivolumab (1/11/2023), 480 mg of nivolumab (2/8/2023), 480 mg of nivolumab (3/8/2023), 480 mg of nivolumab (4/5/2023), 480 mg of nivolumab (5/3/2023), 480 mg of nivolumab (5/31/2023), 480 mg of nivolumab (6/28/2023), 480 mg of nivolumab (7/26/2023), 480 mg of nivolumab (8/23/2023), 480 mg of nivolumab (9/20/2023)          HISTORY OF PRESENT ILLNESS: Chloe Peoples is a 68 y.o. female  who has metastatic melanoma on treatment with nivolumab plus relatlimab/Opdualag here for continued monitoring, follow-up and surveillance while on treatment. She is doing well and feels good. 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 Energy is good. No issues concerns or complaints today. No new, changing, concerning skin lesions. No lymphadenopathy. She thinks the pigmented spots on her right lower leg continue to lighten. Denies any additional immune mediated side effects. Denies headaches, double vision, rash, itching, chest pain, shortness of breath, diarrhea. Denies any muscle weakness or fatigue. REVIEW OF SYSTEMS:  Review of Systems   Constitutional:  Negative for appetite change, fatigue, fever and unexpected weight change. HENT:   Negative for lump/mass, trouble swallowing and voice change. Eyes:  Negative for icterus. Respiratory:  Negative for cough, shortness of breath and wheezing. Cardiovascular:  Negative for leg swelling.    Gastrointestinal: Negative for abdominal pain, constipation, diarrhea, nausea and vomiting. Genitourinary:  Negative for difficulty urinating and hematuria. Musculoskeletal:  Negative for arthralgias, gait problem and myalgias. Skin:  Negative for itching and rash. No new, changing, or concerning lesions. Neurological:  Negative for extremity weakness, gait problem, headaches, light-headedness and numbness. Hematological:  Negative for adenopathy.         MEDICATIONS:    Current Outpatient Medications:     apixaban (Eliquis) 5 mg, Take 1 tablet (5 mg total) by mouth every 12 (twelve) hours, Disp: 60 tablet, Rfl: 5    famotidine (PEPCID) 10 mg tablet, Take 10 mg by mouth 2 (two) times a day, Disp: , Rfl:     hydrocortisone (CORTEF) 10 mg tablet, Take 1 tablet (10 mg total) by mouth 2 (two) times a day, Disp: 60 tablet, Rfl: 11    loratadine (CLARITIN) 10 mg tablet, Take 10 mg by mouth daily, Disp: , Rfl:     losartan (Cozaar) 100 MG tablet, Take 1 tablet (100 mg total) by mouth daily, Disp: 30 tablet, Rfl: 5    metoprolol succinate (TOPROL-XL) 25 mg 24 hr tablet, Take 1 tablet (25 mg total) by mouth 2 (two) times a day, Disp: 60 tablet, Rfl: 5    Multiple Minerals-Vitamins (Citracal Plus) TABS, Take by mouth, Disp: , Rfl:     Multiple Vitamins-Minerals (MULTIVITAMIN WITH MINERALS) tablet, Take 1 tablet by mouth daily, Disp: , Rfl:     patient supplied medication, Pt takes eye drops Mura 128 - not listed in database, Disp: , Rfl:     Wheat Dextrin (BENEFIBER DRINK MIX PO), Take by mouth, Disp: , Rfl:      ALLERGIES:  Allergies   Allergen Reactions    Etomidate Other (See Comments)        ACTIVE PROBLEMS:  Patient Active Problem List   Diagnosis    Acute massive pulmonary embolism (HCC)    Venous stasis of both lower extremities    Clostridium difficile infection    Right ventricular systolic dysfunction without heart failure    Essential hypertension    Metastatic melanoma ([address])    High risk medication use    Adrenal insufficiency (HCC)    Abnormal PET scan of colon    Mild anemia    Anticoagulated by anticoagulation treatment    History of colon polyps    Diverticulosis    Abnormal finding on GI tract imaging          PAST MEDICAL HISTORY:   Past Medical History:   Diagnosis Date    Acute deep vein thrombosis (DVT) of popliteal vein of left lower extremity (HCC)     Ankle wound, left, initial encounter     Resolved 2017    Ankle wound, right, initial encounter     resolved 2017    DVT (deep vein thrombosis) in pregnancy     Hypertension     Pulmonary emboli ([address])      post C section    Pulmonary embolism ([address])     2017    Pulmonary embolism, blood-clot, obstetric     Skin cancer     Venous stasis ulcer ([address])     Venous ulcers of both lower extremities ([address])         PAST SURGICAL HISTORY:  Past Surgical History:   Procedure Laterality Date     SECTION      X2        SOCIAL HISTORY:  Social History     Socioeconomic History    Marital status:       Spouse name: Not on file    Number of children: Not on file    Years of education: Not on file    Highest education level: Not on file   Occupational History    Not on file   Tobacco Use    Smoking status: Never    Smokeless tobacco: Never   Vaping Use    Vaping Use: Never used   Substance and Sexual Activity    Alcohol use: No     Comment: hx of social drinker    Drug use: No    Sexual activity: Not on file   Other Topics Concern    Not on file   Social History Narrative    Not on file     Social Determinants of Health     Financial Resource Strain: Not on file   Food Insecurity: Not on file   Transportation Needs: Not on file   Physical Activity: Not on file   Stress: Not on file   Social Connections: Not on file   Intimate Partner Violence: Not on file   Housing Stability: Not on file        FAMILY HISTORY:  Family History   Problem Relation Age of Onset    Heart attack Mother     Stroke Mother     Cancer Father     Bone cancer Family     Hypertension Family Lung cancer Family            Objective    PHYSICAL EXAMINATION:   Blood pressure 150/70, pulse 77, temperature (!) 97 °F (36.1 °C), resp. rate 18, height 5' 9" (1.753 m), weight 89.8 kg (198 lb), SpO2 97 %. Pain Score: 0-No pain      Physical Exam  Constitutional:       General: She is not in acute distress. Appearance: Normal appearance. She is not toxic-appearing. HENT:      Head: Normocephalic and atraumatic. Right Ear: External ear normal.      Left Ear: External ear normal.      Nose: Nose normal.      Mouth/Throat:      Mouth: Mucous membranes are moist.      Pharynx: Oropharynx is clear. Eyes:      General: No scleral icterus. Right eye: No discharge. Left eye: No discharge. Conjunctiva/sclera: Conjunctivae normal.   Cardiovascular:      Rate and Rhythm: Normal rate and regular rhythm. Pulses: Normal pulses. Heart sounds: No murmur heard. No friction rub. No gallop. Pulmonary:      Effort: Pulmonary effort is normal. No respiratory distress. Breath sounds: Normal breath sounds. No wheezing or rales. Abdominal:      General: Bowel sounds are normal. There is no distension. Palpations: There is no mass. Tenderness: There is no abdominal tenderness. There is no rebound. Musculoskeletal:         General: No swelling or tenderness. Cervical back: Normal range of motion. No rigidity. Right lower leg: No edema. Left lower leg: No edema. Lymphadenopathy:      Head:      Right side of head: No submandibular, preauricular or posterior auricular adenopathy. Left side of head: No submandibular, preauricular or posterior auricular adenopathy. Cervical: No cervical adenopathy. Right cervical: No superficial or posterior cervical adenopathy. Left cervical: No superficial or posterior cervical adenopathy. Upper Body:      Right upper body: No supraclavicular or axillary adenopathy.       Left upper body: No supraclavicular or axillary adenopathy. Lower Body: No right inguinal adenopathy. No left inguinal adenopathy. Skin:     General: Skin is warm. Coloration: Skin is not jaundiced. Findings: Lesion (decreased pigmentation along right lower leg - medially) present. No rash. Comments: No concerning skin lesions. Decreasing pigmentation at the area of depigmentation in the right lower leg/medially. Continues to improve over time with treatment. No discrete nodules or masses. Neurological:      General: No focal deficit present. Mental Status: She is alert and oriented to person, place, and time. Psychiatric:         Mood and Affect: Mood normal.         Behavior: Behavior normal.         Thought Content: Thought content normal.         Judgment: Judgment normal.         I reviewed lab data in the chart.     WBC   Date Value Ref Range Status   09/16/2023 4.96 4.31 - 10.16 Thousand/uL Final   08/19/2023 5.21 4.31 - 10.16 Thousand/uL Final   07/22/2023 4.95 4.31 - 10.16 Thousand/uL Final     Hemoglobin   Date Value Ref Range Status   09/16/2023 13.1 11.5 - 15.4 g/dL Final   08/19/2023 13.0 11.5 - 15.4 g/dL Final   07/22/2023 13.1 11.5 - 15.4 g/dL Final     Platelets   Date Value Ref Range Status   09/16/2023 191 149 - 390 Thousands/uL Final   08/19/2023 180 149 - 390 Thousands/uL Final   07/22/2023 188 149 - 390 Thousands/uL Final     MCV   Date Value Ref Range Status   09/16/2023 92 82 - 98 fL Final   08/19/2023 94 82 - 98 fL Final   07/22/2023 93 82 - 98 fL Final      Potassium   Date Value Ref Range Status   09/16/2023 4.5 3.5 - 5.3 mmol/L Final   08/19/2023 4.3 3.5 - 5.3 mmol/L Final   07/22/2023 4.4 3.5 - 5.3 mmol/L Final     Chloride   Date Value Ref Range Status   09/16/2023 104 96 - 108 mmol/L Final   08/19/2023 110 (H) 96 - 108 mmol/L Final   07/22/2023 108 96 - 108 mmol/L Final     CO2   Date Value Ref Range Status   09/16/2023 31 21 - 32 mmol/L Final   08/19/2023 30 21 - 32 mmol/L Final   07/22/2023 30 21 - 32 mmol/L Final     BUN   Date Value Ref Range Status   09/16/2023 22 5 - 25 mg/dL Final   08/19/2023 21 5 - 25 mg/dL Final   07/22/2023 21 5 - 25 mg/dL Final     Creatinine   Date Value Ref Range Status   09/16/2023 0.96 0.60 - 1.30 mg/dL Final     Comment:     Standardized to IDMS reference method   08/19/2023 0.96 0.60 - 1.30 mg/dL Final     Comment:     Standardized to IDMS reference method   07/22/2023 0.91 0.60 - 1.30 mg/dL Final     Comment:     Standardized to IDMS reference method     Glucose   Date Value Ref Range Status   11/17/2022 85 65 - 140 mg/dL Final     Comment:     If the patient is fasting, the ADA then defines impaired fasting glucose as > 100 mg/dL and diabetes as > or equal to 123 mg/dL. Specimen collection should occur prior to Sulfasalazine administration due to the potential for falsely depressed results. Specimen collection should occur prior to Sulfapyridine administration due to the potential for falsely elevated results. 08/03/2020 112 (H) 65 - 99 mg/dL Final     Comment:     If the patient is fasting, the ADA then defines impaired fasting glucose as > 100 mg/dL and diabetes as > or equal to 123 mg/dL. Specimen collection should occur prior to Sulfasalazine administration due to the potential for falsely depressed results. Specimen collection should occur prior to Sulfapyridine administration due to the potential for falsely elevated results. 01/24/2017 91 65 - 140 mg/dL Final     Comment:     If the patient is fasting, the ADA then defines impaired fasting glucose as > 100 mg/dL and diabetes as > or equal to 123 mg/dL.      Calcium   Date Value Ref Range Status   09/16/2023 9.5 8.4 - 10.2 mg/dL Final   08/19/2023 8.9 8.3 - 10.1 mg/dL Final   07/22/2023 8.9 8.3 - 10.1 mg/dL Final     Albumin   Date Value Ref Range Status   09/16/2023 3.8 3.5 - 5.0 g/dL Final   08/19/2023 3.3 (L) 3.5 - 5.0 g/dL Final   07/22/2023 3.4 (L) 3.5 - 5.0 g/dL Final     Total Bilirubin   Date Value Ref Range Status   09/16/2023 0.55 0.20 - 1.00 mg/dL Final     Comment:     Use of this assay is not recommended for patients undergoing treatment with eltrombopag due to the potential for falsely elevated results. N-acetyl-p-benzoquinone imine (metabolite of Acetaminophen) will generate erroneously low results in samples for patients that have taken an overdose of Acetaminophen.   08/19/2023 0.60 0.20 - 1.00 mg/dL Final     Comment:     Use of this assay is not recommended for patients undergoing treatment with eltrombopag due to the potential for falsely elevated results. 07/22/2023 0.40 0.20 - 1.00 mg/dL Final     Comment:     Use of this assay is not recommended for patients undergoing treatment with eltrombopag due to the potential for falsely elevated results. Alkaline Phosphatase   Date Value Ref Range Status   09/16/2023 42 34 - 104 U/L Final   08/19/2023 48 46 - 116 U/L Final   07/22/2023 46 46 - 116 U/L Final     AST   Date Value Ref Range Status   09/16/2023 19 13 - 39 U/L Final   08/19/2023 19 5 - 45 U/L Final     Comment:     Specimen collection should occur prior to Sulfasalazine administration due to the potential for falsely depressed results. 07/22/2023 15 5 - 45 U/L Final     Comment:     Specimen collection should occur prior to Sulfasalazine administration due to the potential for falsely depressed results. ALT   Date Value Ref Range Status   09/16/2023 14 7 - 52 U/L Final     Comment:     Specimen collection should occur prior to Sulfasalazine administration due to the potential for falsely depressed results. 08/19/2023 19 12 - 78 U/L Final     Comment:     Specimen collection should occur prior to Sulfasalazine and/or Sulfapyridine administration due to the potential for falsely depressed results.     07/22/2023 18 12 - 78 U/L Final     Comment:     Specimen collection should occur prior to Sulfasalazine and/or Sulfapyridine administration due to the potential for falsely depressed results. LD   Date Value Ref Range Status   09/16/2023 236 140 - 271 U/L Final   08/19/2023 178 81 - 234 U/L Final   07/22/2023 163 81 - 234 U/L Final     No results found for: "TSH"  No results found for: "X4UHFET"   Free T4   Date Value Ref Range Status   09/16/2023 1.02 0.61 - 1.12 ng/dL Final     Comment:     Specimens with biotin concentrations > 10 ng/mL can lead to significant (> 10%) positive bias in result. 08/19/2023 0.85 0.61 - 1.12 ng/dL Final     Comment:     Specimens with biotin concentrations > 10 ng/mL can lead to significant (> 10%) positive bias in result.   07/22/2023 0.94 0.61 - 1.12 ng/dL Final     Comment:     Specimens with biotin concentrations > 10 ng/mL can lead to significant (> 10%) positive bias in result. RECENT IMAGING:  No results found. Assessment/Plan  Ms. Erik Mitchell is a 66-year-old female with metastatic melanoma on treatment with nivolumab plus relatlimab/Opdualag here for continued monitoring follow-up and surveillance while on treatment. 1. Metastatic melanoma ([address])  Doing well and tolerating treatment with nivolumab plus relatlimab. Clinical improvement of disease while on treatment. Labs reviewed and okay. She will continue with treatment and can get her next infusion. PET scan to be done on 10/18/23. She has standing orders for blood work prior to each treatment. She knows to continue to watch for immune mediated side effects. She knows to call with any issues or concerns prior to her next visit. 2. High risk medication use  3. Adrenal insufficiency ([address])  She is on treatment with immunotherapy and we will continue to monitor for side effects and lab abnormalities. We will monitor labs with each treatment to ensure safety of continuing on treatment. She knows to watch for signs and symptoms for immune mediated side effects. Denies any immune mediated side effects at this time.     Doing well with good energy. Continues on 10 mg hydrocortisone twice a day. Continue to adjust as needed for her adrenal insufficiency, but seems to be on a stable dose at this time.

## 2023-10-12 NOTE — LETTER
October 15, 2023     Gay Cornelius MD  [address] [facility]    Patient: Savana Peterson   YOB: 1950   Date of Visit: 10/12/2023       Dear Dr. Maria Isabel Malone: Thank you for referring Savana Peterson to me for evaluation. Below are my notes for this consultation. If you have questions, please do not hesitate to call me. I look forward to following your patient along with you. Sincerely,        Neno Toledo MD        CC: Urmila Bach, KEN Freeman MD Abagail Allegra, DO Lucita Murphy MD  10/12/2023  3:18 PM  Attested  [facility] [place] [id number]  [phone]     Date of Visit: 92/45/8171  Name: Savana Peterson   YOB: 1950        Subjective    VISIT DIAGNOSIS:  There are no diagnoses linked to this encounter. Oncology History   Metastatic melanoma ([address] [facility])   10/13/2022 -  Cancer Staged    Staging form: Melanoma of the Skin, AJCC 8th Edition  - Clinical stage from 10/13/2022: Stage IV (cTX, cNX, cM1a) - Signed by Neno Toledo MD on 11/22/2022       10/13/2022 Biopsy    A. Skin, right lateral medial leg, punch biopsy:  Portion of severely atypical melanocytic dermal proliferation with prominent melanosis consistent with melanoma. See note. B. Skin, right medial leg. punch biopsy:  Portion of severely atypical melanocytic dermal proliferation with prominent melanosis consistent with melanoma. See note. C. Skin, right medial leg, punch biopsy:   Portion of severely atypical melanocytic dermal proliferation with prominent melanosis consistent with melanoma. See note.      11/22/2022 Initial Diagnosis    Metastatic melanoma ([address] [facility])     12/14/2022 -  Chemotherapy    alteplase (CATHFLO), 2 mg, Intracatheter, Every 2 hour PRN, 11 of 14 cycles  NIVOLUMAB-RELATLIMAB-RMBW (OPDUALAG) IVPB, 480 mg of nivolumab, Intravenous, Once, 11 of 14 cycles  Administration: 480 mg of nivolumab (12/14/2022), 480 mg of nivolumab (1/11/2023), 480 mg of nivolumab (2/8/2023), 480 mg of nivolumab (3/8/2023), 480 mg of nivolumab (4/5/2023), 480 mg of nivolumab (5/3/2023), 480 mg of nivolumab (5/31/2023), 480 mg of nivolumab (6/28/2023), 480 mg of nivolumab (7/26/2023), 480 mg of nivolumab (8/23/2023), 480 mg of nivolumab (9/20/2023)     1/5/2023 [facility] liquid testing  TMB 1 Mut/Mb  MSI-high not detected          Cancer Staging   Metastatic melanoma ([address])  Staging form: Melanoma of the Skin, AJCC 8th Edition  - Clinical stage from 10/13/2022: Stage IV (cTX, cNX, cM1a) - Signed by Mallorie Han MD on 11/22/2022     Treatment Details   Treatment goal Curative   Plan Name OP Nivolumab and Relatlimab-rmbw Q 28 Days   Status Active   Start Date 12/14/2022   End Date 12/13/2023 (Planned)   Provider Mallorie Han MD   Chemotherapy alteplase (CATHFLO), 2 mg, Intracatheter, Every 2 hour PRN, 11 of 14 cycles    NIVOLUMAB-RELATLIMAB-RMBW (OPDUALAG) IVPB, 480 mg of nivolumab, Intravenous, Once, 11 of 14 cycles  Administration: 480 mg of nivolumab (12/14/2022), 480 mg of nivolumab (1/11/2023), 480 mg of nivolumab (2/8/2023), 480 mg of nivolumab (3/8/2023), 480 mg of nivolumab (4/5/2023), 480 mg of nivolumab (5/3/2023), 480 mg of nivolumab (5/31/2023), 480 mg of nivolumab (6/28/2023), 480 mg of nivolumab (7/26/2023), 480 mg of nivolumab (8/23/2023), 480 mg of nivolumab (9/20/2023)          HISTORY OF PRESENT ILLNESS: Lilliam Winkler is a 68 y.o. female  who has metastatic melanoma on treatment with nivolumab plus relatlimab/Opdualag here for continued monitoring, follow-up and surveillance while on treatment. She is doing well and feels good. 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 Energy is good. No issues concerns or complaints today. No new, changing, concerning skin lesions. No lymphadenopathy.   She thinks the pigmented spots on her right lower leg continue to lighten. Denies any additional immune mediated side effects. Denies headaches, double vision, rash, itching, chest pain, shortness of breath, diarrhea. Denies any muscle weakness or fatigue. REVIEW OF SYSTEMS:  Review of Systems   Constitutional:  Negative for appetite change, fatigue, fever and unexpected weight change. HENT:   Negative for lump/mass, trouble swallowing and voice change. Eyes:  Negative for icterus. Respiratory:  Negative for cough, shortness of breath and wheezing. Cardiovascular:  Negative for leg swelling. Gastrointestinal:  Negative for abdominal pain, constipation, diarrhea, nausea and vomiting. Genitourinary:  Negative for difficulty urinating and hematuria. Musculoskeletal:  Negative for arthralgias, gait problem and myalgias. Skin:  Negative for itching and rash. No new, changing, or concerning lesions. Neurological:  Negative for extremity weakness, gait problem, headaches, light-headedness and numbness. Hematological:  Negative for adenopathy.         MEDICATIONS:    Current Outpatient Medications:   •  apixaban (Eliquis) 5 mg, Take 1 tablet (5 mg total) by mouth every 12 (twelve) hours, Disp: 60 tablet, Rfl: 5  •  famotidine (PEPCID) 10 mg tablet, Take 10 mg by mouth 2 (two) times a day, Disp: , Rfl:   •  hydrocortisone (CORTEF) 10 mg tablet, Take 1 tablet (10 mg total) by mouth 2 (two) times a day, Disp: 60 tablet, Rfl: 11  •  loratadine (CLARITIN) 10 mg tablet, Take 10 mg by mouth daily, Disp: , Rfl:   •  losartan (Cozaar) 100 MG tablet, Take 1 tablet (100 mg total) by mouth daily, Disp: 30 tablet, Rfl: 5  •  metoprolol succinate (TOPROL-XL) 25 mg 24 hr tablet, Take 1 tablet (25 mg total) by mouth 2 (two) times a day, Disp: 60 tablet, Rfl: 5  •  Multiple Minerals-Vitamins (Citracal Plus) TABS, Take by mouth, Disp: , Rfl:   •  Multiple Vitamins-Minerals (MULTIVITAMIN WITH MINERALS) tablet, Take 1 tablet by mouth daily, Disp: , Rfl:   •  patient supplied medication, Pt takes eye drops Mura 128 - not listed in database, Disp: , Rfl:   •  Wheat Dextrin ([address] MIX PO), Take by mouth, Disp: , Rfl:      ALLERGIES:  Allergies   Allergen Reactions   • Etomidate Other (See Comments)        ACTIVE PROBLEMS:  Patient Active Problem List   Diagnosis   • Acute massive pulmonary embolism ([address])   • Venous stasis of both lower extremities   • Clostridium difficile infection   • Right ventricular systolic dysfunction without heart failure   • Essential hypertension   • Metastatic melanoma ([address])   • High risk medication use   • Adrenal insufficiency (HCC)   • Abnormal PET scan of colon   • Mild anemia   • Anticoagulated by anticoagulation treatment   • History of colon polyps   • Diverticulosis   • Abnormal finding on GI tract imaging          PAST MEDICAL HISTORY:   Past Medical History:   Diagnosis Date   • Acute deep vein thrombosis (DVT) of popliteal vein of left lower extremity ([address])    • Ankle wound, left, initial encounter     Resolved 2017   • Ankle wound, right, initial encounter     resolved 2017   • DVT (deep vein thrombosis) in pregnancy    • Hypertension    • Pulmonary emboli ([address])      post C section   • Pulmonary embolism ([address])     2017   • Pulmonary embolism, blood-clot, obstetric    • Skin cancer    • Venous stasis ulcer (HCC)    • Venous ulcers of both lower extremities ([address])         PAST SURGICAL HISTORY:  Past Surgical History:   Procedure Laterality Date   •  SECTION      X2        SOCIAL HISTORY:  Social History     Socioeconomic History   • Marital status:       Spouse name: Not on file   • Number of children: Not on file   • Years of education: Not on file   • Highest education level: Not on file   Occupational History   • Not on file   Tobacco Use   • Smoking status: Never   • Smokeless tobacco: Never   Vaping Use   • Vaping Use: Never used   Substance and Sexual Activity   • Alcohol use: No     Comment: hx of social drinker   • Drug use: No   • Sexual activity: Not on file   Other Topics Concern   • Not on file   Social History Narrative   • Not on file     Social Determinants of Health     Financial Resource Strain: Not on file   Food Insecurity: Not on file   Transportation Needs: Not on file   Physical Activity: Not on file   Stress: Not on file   Social Connections: Not on file   Intimate Partner Violence: Not on file   Housing Stability: Not on file        FAMILY HISTORY:  Family History   Problem Relation Age of Onset   • Heart attack Mother    • Stroke Mother    • Cancer Father    • Bone cancer Family    • Hypertension Family    • Lung cancer Family            Objective    PHYSICAL EXAMINATION:   Blood pressure 150/70, pulse 77, temperature (!) 97 °F (36.1 °C), resp. rate 18, height 5' 9" (1.753 m), weight 89.8 kg (198 lb), SpO2 97 %. Pain Score: 0-No pain      Physical Exam  Constitutional:       General: She is not in acute distress. Appearance: Normal appearance. She is not toxic-appearing. HENT:      Head: Normocephalic and atraumatic. Right Ear: External ear normal.      Left Ear: External ear normal.      Nose: Nose normal.      Mouth/Throat:      Mouth: Mucous membranes are moist.      Pharynx: Oropharynx is clear. Eyes:      General: No scleral icterus. Right eye: No discharge. Left eye: No discharge. Conjunctiva/sclera: Conjunctivae normal.   Cardiovascular:      Rate and Rhythm: Normal rate and regular rhythm. Pulses: Normal pulses. Heart sounds: No murmur heard. No friction rub. No gallop. Pulmonary:      Effort: Pulmonary effort is normal. No respiratory distress. Breath sounds: Normal breath sounds. No wheezing or rales. Abdominal:      General: Bowel sounds are normal. There is no distension. Palpations: There is no mass. Tenderness: There is no abdominal tenderness. There is no rebound. Musculoskeletal:         General: No swelling or tenderness. Cervical back: Normal range of motion. No rigidity. Right lower leg: No edema. Left lower leg: No edema. Lymphadenopathy:      Head:      Right side of head: No submandibular, preauricular or posterior auricular adenopathy. Left side of head: No submandibular, preauricular or posterior auricular adenopathy. Cervical: No cervical adenopathy. Right cervical: No superficial or posterior cervical adenopathy. Left cervical: No superficial or posterior cervical adenopathy. Upper Body:      Right upper body: No supraclavicular or axillary adenopathy. Left upper body: No supraclavicular or axillary adenopathy. Lower Body: No right inguinal adenopathy. No left inguinal adenopathy. Skin:     General: Skin is warm. Coloration: Skin is not jaundiced. Findings: Lesion (decreased pigmentation along right lower leg - medially) present. No rash. Comments: No concerning skin lesions. Decreasing pigmentation at the area of depigmentation in the right lower leg/medially. Continues to improve over time with treatment. No discrete nodules or masses. Neurological:      General: No focal deficit present. Mental Status: She is alert and oriented to person, place, and time. Psychiatric:         Mood and Affect: Mood normal.         Behavior: Behavior normal.         Thought Content: Thought content normal.         Judgment: Judgment normal.         I reviewed lab data in the chart.     WBC   Date Value Ref Range Status   09/16/2023 4.96 4.31 - 10.16 Thousand/uL Final   08/19/2023 5.21 4.31 - 10.16 Thousand/uL Final   07/22/2023 4.95 4.31 - 10.16 Thousand/uL Final     Hemoglobin   Date Value Ref Range Status   09/16/2023 13.1 11.5 - 15.4 g/dL Final   08/19/2023 13.0 11.5 - 15.4 g/dL Final   07/22/2023 13.1 11.5 - 15.4 g/dL Final     Platelets   Date Value Ref Range Status   09/16/2023 191 149 - 390 Thousands/uL Final   08/19/2023 180 149 - 390 Thousands/uL Final   07/22/2023 188 149 - 390 Thousands/uL Final     MCV   Date Value Ref Range Status   09/16/2023 92 82 - 98 fL Final   08/19/2023 94 82 - 98 fL Final   07/22/2023 93 82 - 98 fL Final      Potassium   Date Value Ref Range Status   09/16/2023 4.5 3.5 - 5.3 mmol/L Final   08/19/2023 4.3 3.5 - 5.3 mmol/L Final   07/22/2023 4.4 3.5 - 5.3 mmol/L Final     Chloride   Date Value Ref Range Status   09/16/2023 104 96 - 108 mmol/L Final   08/19/2023 110 (H) 96 - 108 mmol/L Final   07/22/2023 108 96 - 108 mmol/L Final     CO2   Date Value Ref Range Status   09/16/2023 31 21 - 32 mmol/L Final   08/19/2023 30 21 - 32 mmol/L Final   07/22/2023 30 21 - 32 mmol/L Final     BUN   Date Value Ref Range Status   09/16/2023 22 5 - 25 mg/dL Final   08/19/2023 21 5 - 25 mg/dL Final   07/22/2023 21 5 - 25 mg/dL Final     Creatinine   Date Value Ref Range Status   09/16/2023 0.96 0.60 - 1.30 mg/dL Final     Comment:     Standardized to IDMS reference method   08/19/2023 0.96 0.60 - 1.30 mg/dL Final     Comment:     Standardized to IDMS reference method   07/22/2023 0.91 0.60 - 1.30 mg/dL Final     Comment:     Standardized to IDMS reference method     Glucose   Date Value Ref Range Status   11/17/2022 85 65 - 140 mg/dL Final     Comment:     If the patient is fasting, the ADA then defines impaired fasting glucose as > 100 mg/dL and diabetes as > or equal to 123 mg/dL. Specimen collection should occur prior to Sulfasalazine administration due to the potential for falsely depressed results. Specimen collection should occur prior to Sulfapyridine administration due to the potential for falsely elevated results. 08/03/2020 112 (H) 65 - 99 mg/dL Final     Comment:     If the patient is fasting, the ADA then defines impaired fasting glucose as > 100 mg/dL and diabetes as > or equal to 123 mg/dL.   Specimen collection should occur prior to Sulfasalazine administration due to the potential for falsely depressed results. Specimen collection should occur prior to Sulfapyridine administration due to the potential for falsely elevated results. 01/24/2017 91 65 - 140 mg/dL Final     Comment:     If the patient is fasting, the ADA then defines impaired fasting glucose as > 100 mg/dL and diabetes as > or equal to 123 mg/dL. Calcium   Date Value Ref Range Status   09/16/2023 9.5 8.4 - 10.2 mg/dL Final   08/19/2023 8.9 8.3 - 10.1 mg/dL Final   07/22/2023 8.9 8.3 - 10.1 mg/dL Final     Albumin   Date Value Ref Range Status   09/16/2023 3.8 3.5 - 5.0 g/dL Final   08/19/2023 3.3 (L) 3.5 - 5.0 g/dL Final   07/22/2023 3.4 (L) 3.5 - 5.0 g/dL Final     Total Bilirubin   Date Value Ref Range Status   09/16/2023 0.55 0.20 - 1.00 mg/dL Final     Comment:     Use of this assay is not recommended for patients undergoing treatment with eltrombopag due to the potential for falsely elevated results. N-acetyl-p-benzoquinone imine (metabolite of Acetaminophen) will generate erroneously low results in samples for patients that have taken an overdose of Acetaminophen.   08/19/2023 0.60 0.20 - 1.00 mg/dL Final     Comment:     Use of this assay is not recommended for patients undergoing treatment with eltrombopag due to the potential for falsely elevated results. 07/22/2023 0.40 0.20 - 1.00 mg/dL Final     Comment:     Use of this assay is not recommended for patients undergoing treatment with eltrombopag due to the potential for falsely elevated results. Alkaline Phosphatase   Date Value Ref Range Status   09/16/2023 42 34 - 104 U/L Final   08/19/2023 48 46 - 116 U/L Final   07/22/2023 46 46 - 116 U/L Final     AST   Date Value Ref Range Status   09/16/2023 19 13 - 39 U/L Final   08/19/2023 19 5 - 45 U/L Final     Comment:     Specimen collection should occur prior to Sulfasalazine administration due to the potential for falsely depressed results.     07/22/2023 15 5 - 45 U/L Final     Comment:     Specimen collection should occur prior to Sulfasalazine administration due to the potential for falsely depressed results. ALT   Date Value Ref Range Status   09/16/2023 14 7 - 52 U/L Final     Comment:     Specimen collection should occur prior to Sulfasalazine administration due to the potential for falsely depressed results. 08/19/2023 19 12 - 78 U/L Final     Comment:     Specimen collection should occur prior to Sulfasalazine and/or Sulfapyridine administration due to the potential for falsely depressed results. 07/22/2023 18 12 - 78 U/L Final     Comment:     Specimen collection should occur prior to Sulfasalazine and/or Sulfapyridine administration due to the potential for falsely depressed results. LD   Date Value Ref Range Status   09/16/2023 236 140 - 271 U/L Final   08/19/2023 178 81 - 234 U/L Final   07/22/2023 163 81 - 234 U/L Final     No results found for: "TSH"  No results found for: "Z7LPMAV"   Free T4   Date Value Ref Range Status   09/16/2023 1.02 0.61 - 1.12 ng/dL Final     Comment:     Specimens with biotin concentrations > 10 ng/mL can lead to significant (> 10%) positive bias in result. 08/19/2023 0.85 0.61 - 1.12 ng/dL Final     Comment:     Specimens with biotin concentrations > 10 ng/mL can lead to significant (> 10%) positive bias in result.   07/22/2023 0.94 0.61 - 1.12 ng/dL Final     Comment:     Specimens with biotin concentrations > 10 ng/mL can lead to significant (> 10%) positive bias in result. RECENT IMAGING:  No results found. Assessment/Plan  Ms. Fabio Rosario is a 63-year-old female with metastatic melanoma on treatment with nivolumab plus relatlimab/Opdualag here for continued monitoring follow-up and surveillance while on treatment. 1. Metastatic melanoma ([address])  Doing well and tolerating treatment with nivolumab plus relatlimab. Clinical improvement of disease while on treatment. Labs reviewed and okay.   She will continue with treatment and can get her next infusion. PET scan to be done on 10/18/23. She has standing orders for blood work prior to each treatment. She knows to continue to watch for immune mediated side effects. She knows to call with any issues or concerns prior to her next visit. 2. High risk medication use  3. Adrenal insufficiency ([address])  She is on treatment with immunotherapy and we will continue to monitor for side effects and lab abnormalities. We will monitor labs with each treatment to ensure safety of continuing on treatment. She knows to watch for signs and symptoms for immune mediated side effects. Denies any immune mediated side effects at this time. Doing well with good energy. Continues on 10 mg hydrocortisone twice a day. Continue to adjust as needed for her adrenal insufficiency, but seems to be on a stable dose at this time. Attestation signed by Matthew Machado MD at 10/15/2023 10:04 PM:  I interviewed, took the history and examined the patient. I discussed the case with the Fellow and reviewed the Fellow's note , prescribed medications, and orders placed. I supervised the Fellow and I agree with the [facility] management plan as it was presented to me. I was present in the clinic and examined the patient. Ms. Claudia Khan is a 77-year-old female with metastatic melanoma on treatment with nivolumab plus relatlimab here for continued monitoring, follow-up, surveillance. She is doing well and tolerating treatment. She states her pigmented lesion on the right lower leg continues to decrease in color and lightens. 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 Denies any new, changing, concerning skin lesions. Denies any additional immune mediated side effects. Denies headaches, double vision, rash, itching, chest pain, shortness of breath, and diarrhea. Denies muscle weakness or fatigue. On exam ECOG PS 0. Decreased/lightening of pigment on her right medial leg. No new or concerning skin lesions.   No lymphadenopathy. She is doing well and tolerating treatment. Labs reviewed and okay. She is okay to undergo her next cycle of treatment with nivolumab plus relatlimab/Opdualag. She is set for imaging on 10/18/2023. She has standing orders for blood work prior to each treatment. She knows to call with any issues or concerns. She is on a stable dose of hydrocortisone for adrenal insufficiency. She knows to call with issues or concerns prior to her next visit.     Saintclair Mango, MD, PhD

## 2023-10-14 ENCOUNTER — APPOINTMENT (OUTPATIENT)
Dept: LAB | Facility: CLINIC | Age: 73
End: 2023-10-14
Payer: COMMERCIAL

## 2023-10-14 DIAGNOSIS — Z79.899 HIGH RISK MEDICATION USE: ICD-10-CM

## 2023-10-14 DIAGNOSIS — C43.9 METASTATIC MELANOMA (HCC): ICD-10-CM

## 2023-10-14 LAB
ALBUMIN SERPL BCP-MCNC: 3.7 G/DL (ref 3.5–5)
ALP SERPL-CCNC: 39 U/L (ref 34–104)
ALT SERPL W P-5'-P-CCNC: 12 U/L (ref 7–52)
ANION GAP SERPL CALCULATED.3IONS-SCNC: 6 MMOL/L
AST SERPL W P-5'-P-CCNC: 16 U/L (ref 13–39)
BASOPHILS # BLD AUTO: 0.05 THOUSANDS/ÂΜL (ref 0–0.1)
BASOPHILS NFR BLD AUTO: 1 % (ref 0–1)
BILIRUB SERPL-MCNC: 0.6 MG/DL (ref 0.2–1)
BUN SERPL-MCNC: 25 MG/DL (ref 5–25)
CALCIUM SERPL-MCNC: 9.2 MG/DL (ref 8.4–10.2)
CHLORIDE SERPL-SCNC: 105 MMOL/L (ref 96–108)
CO2 SERPL-SCNC: 30 MMOL/L (ref 21–32)
CREAT SERPL-MCNC: 0.97 MG/DL (ref 0.6–1.3)
EOSINOPHIL # BLD AUTO: 0.21 THOUSAND/ÂΜL (ref 0–0.61)
EOSINOPHIL NFR BLD AUTO: 4 % (ref 0–6)
ERYTHROCYTE [DISTWIDTH] IN BLOOD BY AUTOMATED COUNT: 13.1 % (ref 11.6–15.1)
GFR SERPL CREATININE-BSD FRML MDRD: 58 ML/MIN/1.73SQ M
GLUCOSE P FAST SERPL-MCNC: 79 MG/DL (ref 65–99)
HCT VFR BLD AUTO: 41.1 % (ref 34.8–46.1)
HGB BLD-MCNC: 12.5 G/DL (ref 11.5–15.4)
IMM GRANULOCYTES # BLD AUTO: 0.01 THOUSAND/UL (ref 0–0.2)
IMM GRANULOCYTES NFR BLD AUTO: 0 % (ref 0–2)
LDH SERPL-CCNC: 171 U/L (ref 140–271)
LYMPHOCYTES # BLD AUTO: 2.62 THOUSANDS/ÂΜL (ref 0.6–4.47)
LYMPHOCYTES NFR BLD AUTO: 55 % (ref 14–44)
MCH RBC QN AUTO: 28.5 PG (ref 26.8–34.3)
MCHC RBC AUTO-ENTMCNC: 30.4 G/DL (ref 31.4–37.4)
MCV RBC AUTO: 94 FL (ref 82–98)
MONOCYTES # BLD AUTO: 0.43 THOUSAND/ÂΜL (ref 0.17–1.22)
MONOCYTES NFR BLD AUTO: 9 % (ref 4–12)
NEUTROPHILS # BLD AUTO: 1.51 THOUSANDS/ÂΜL (ref 1.85–7.62)
NEUTS SEG NFR BLD AUTO: 31 % (ref 43–75)
NRBC BLD AUTO-RTO: 0 /100 WBCS
PLATELET # BLD AUTO: 181 THOUSANDS/UL (ref 149–390)
PMV BLD AUTO: 11.3 FL (ref 8.9–12.7)
POTASSIUM SERPL-SCNC: 4.1 MMOL/L (ref 3.5–5.3)
PROT SERPL-MCNC: 6.4 G/DL (ref 6.4–8.4)
RBC # BLD AUTO: 4.39 MILLION/UL (ref 3.81–5.12)
SODIUM SERPL-SCNC: 141 MMOL/L (ref 135–147)
T3FREE SERPL-MCNC: 3.25 PG/ML (ref 2.5–3.9)
T4 FREE SERPL-MCNC: 0.85 NG/DL (ref 0.61–1.12)
TSH SERPL DL<=0.05 MIU/L-ACNC: 3.52 UIU/ML (ref 0.45–4.5)
WBC # BLD AUTO: 4.83 THOUSAND/UL (ref 4.31–10.16)

## 2023-10-14 PROCEDURE — 80053 COMPREHEN METABOLIC PANEL: CPT

## 2023-10-14 PROCEDURE — 85025 COMPLETE CBC W/AUTO DIFF WBC: CPT

## 2023-10-14 PROCEDURE — 84443 ASSAY THYROID STIM HORMONE: CPT

## 2023-10-14 PROCEDURE — 83615 LACTATE (LD) (LDH) ENZYME: CPT

## 2023-10-14 PROCEDURE — 84481 FREE ASSAY (FT-3): CPT

## 2023-10-14 PROCEDURE — 36415 COLL VENOUS BLD VENIPUNCTURE: CPT

## 2023-10-14 PROCEDURE — 84439 ASSAY OF FREE THYROXINE: CPT

## 2023-10-18 ENCOUNTER — HOSPITAL ENCOUNTER (OUTPATIENT)
Dept: RADIOLOGY | Age: 73
Discharge: HOME | End: 2023-10-18
Payer: COMMERCIAL

## 2023-10-18 ENCOUNTER — HOSPITAL ENCOUNTER (OUTPATIENT)
Dept: INFUSION CENTER | Facility: HOSPITAL | Age: 73
Discharge: HOME | End: 2023-10-18
Attending: INTERNAL MEDICINE
Payer: COMMERCIAL

## 2023-10-18 VITALS
SYSTOLIC BLOOD PRESSURE: 131 MMHG | TEMPERATURE: 97.5 F | RESPIRATION RATE: 16 BRPM | DIASTOLIC BLOOD PRESSURE: 66 MMHG | HEART RATE: 79 BPM

## 2023-10-18 DIAGNOSIS — C43.9 METASTATIC MELANOMA (HCC): Primary | ICD-10-CM

## 2023-10-18 DIAGNOSIS — C43.9 METASTATIC MELANOMA (HCC): ICD-10-CM

## 2023-10-18 DIAGNOSIS — C43.71 MALIGNANT MELANOMA OF RIGHT LOWER LEG (HCC): ICD-10-CM

## 2023-10-18 LAB — GLUCOSE SERPL-MCNC: 84 MG/DL (ref 65–140)

## 2023-10-18 PROCEDURE — 78816 PET IMAGE W/CT FULL BODY: CPT

## 2023-10-18 PROCEDURE — 82948 REAGENT STRIP/BLOOD GLUCOSE: CPT

## 2023-10-18 PROCEDURE — A9552 F18 FDG: HCPCS

## 2023-10-18 PROCEDURE — 96413 CHEMO IV INFUSION 1 HR: CPT

## 2023-10-18 PROCEDURE — G1004 CDSM NDSC: HCPCS

## 2023-10-18 RX ORDER — SODIUM CHLORIDE 9 MG/ML
20 INJECTION, SOLUTION INTRAVENOUS ONCE
Status: COMPLETED | OUTPATIENT
Start: 2023-10-18 | End: 2023-10-18

## 2023-10-18 RX ADMIN — SODIUM CHLORIDE 20 ML/HR: 0.9 INJECTION, SOLUTION INTRAVENOUS at 14:15

## 2023-10-18 RX ADMIN — SODIUM CHLORIDE 480 MG OF NIVOLUMAB: 9 INJECTION, SOLUTION INTRAVENOUS at 14:37

## 2023-10-18 NOTE — LETTER
[address] [place] [address]      October 23, 2023    MRN: [id number]     Phone: [phone]     Dear MsFatoumata Becca Goldsmith recently had a(n) Nuclear Medicine performed on 10/18/2023 at  [address] [place] [address] that was requested by Adalberto Mosley MD. The study was reviewed by a radiologist, which is a physician who specializes in medical imaging. The radiologist issued a report describing his or her findings. In that report there was a finding that the radiologist felt warranted further discussion with your health care provider and that discussion would be beneficial to you. The results were sent to Adalberto Mosley MD on 10/18/2023  1:04 PM. We recommend that you contact Adalberto Mosley MD at [phone] or set up an appointment to discuss the results of the imaging test. If you have already heard from Adalberto Mosley MD regarding the results of your study, you can disregard this letter. This letter is not meant to alarm you, but intended to encourage you to follow-up on your results with the provider that sent you for the imaging study. In addition, we have enclosed answers to frequently asked questions by other patients who have also received a letter to review results with their health care provider (see page two). Thank you for choosing [address] [place] [address] for your medical imaging needs. FREQUENTLY ASKED QUESTIONS    Why am I receiving this letter? [facility] requires us to notify patients who have findings on imaging exams that may require more testing or follow-up with a health professional within the next 3 months.         How serious is the finding on the imaging test?  This letter is sent to all patients who may need follow-up or more testing within the next 3 months. Receiving this letter does not necessarily mean you have a life-threatening imaging finding or disease. Recommendations in the radiologist’s imaging report are general in nature and it is up to your healthcare provider to say whether those recommendations make sense for your situation. You are strongly encouraged to talk to your health care provider about the results and ask whether additional steps need to be taken. Where can I get a copy of the final report for my recent radiology exam?  To get a full copy of the report you can access your records online at [email or web address] or please contact [facility] Medical Records Department at [phone] Monday through Friday between 8 am and 6 pm.         What do I need to do now? Please contact your health care provider who requested the imaging study to discuss what further actions (if any) are needed. You may have already heard from (your ordering provider) in regard to this test in which case you can disregard this letter. NOTICE IN ACCORDANCE WITH THE [place] “PATIENT TEST RESULT INFORMATION ACT OF 2018”    You are receiving this notice as a result of a determination by your diagnostic imaging service that further discussions of your test results are warranted and would be beneficial to you. The complete results of your test or tests have been or will be sent to the health care practitioner that ordered the test or tests. It is recommended that you contact your health care practitioner to discuss your results as soon as possible.

## 2023-11-08 RX ORDER — SODIUM CHLORIDE 9 MG/ML
20 INJECTION, SOLUTION INTRAVENOUS ONCE
Status: CANCELLED | OUTPATIENT
Start: 2023-11-15

## 2023-11-11 ENCOUNTER — APPOINTMENT (OUTPATIENT)
Dept: LAB | Facility: CLINIC | Age: 73
End: 2023-11-11
Payer: COMMERCIAL

## 2023-11-11 DIAGNOSIS — Z79.899 HIGH RISK MEDICATION USE: ICD-10-CM

## 2023-11-11 DIAGNOSIS — C43.9 METASTATIC MELANOMA (HCC): ICD-10-CM

## 2023-11-11 LAB
ALBUMIN SERPL BCP-MCNC: 3.7 G/DL (ref 3.5–5)
ALP SERPL-CCNC: 41 U/L (ref 34–104)
ALT SERPL W P-5'-P-CCNC: 11 U/L (ref 7–52)
ANION GAP SERPL CALCULATED.3IONS-SCNC: 5 MMOL/L
AST SERPL W P-5'-P-CCNC: 15 U/L (ref 13–39)
BASOPHILS # BLD AUTO: 0.06 THOUSANDS/ÂΜL (ref 0–0.1)
BASOPHILS NFR BLD AUTO: 1 % (ref 0–1)
BILIRUB SERPL-MCNC: 0.56 MG/DL (ref 0.2–1)
BUN SERPL-MCNC: 19 MG/DL (ref 5–25)
CALCIUM SERPL-MCNC: 9.5 MG/DL (ref 8.4–10.2)
CHLORIDE SERPL-SCNC: 106 MMOL/L (ref 96–108)
CO2 SERPL-SCNC: 31 MMOL/L (ref 21–32)
CREAT SERPL-MCNC: 0.82 MG/DL (ref 0.6–1.3)
EOSINOPHIL # BLD AUTO: 0.17 THOUSAND/ÂΜL (ref 0–0.61)
EOSINOPHIL NFR BLD AUTO: 3 % (ref 0–6)
ERYTHROCYTE [DISTWIDTH] IN BLOOD BY AUTOMATED COUNT: 12.8 % (ref 11.6–15.1)
GFR SERPL CREATININE-BSD FRML MDRD: 71 ML/MIN/1.73SQ M
GLUCOSE P FAST SERPL-MCNC: 91 MG/DL (ref 65–99)
HCT VFR BLD AUTO: 40.6 % (ref 34.8–46.1)
HGB BLD-MCNC: 12.6 G/DL (ref 11.5–15.4)
IMM GRANULOCYTES # BLD AUTO: 0.01 THOUSAND/UL (ref 0–0.2)
IMM GRANULOCYTES NFR BLD AUTO: 0 % (ref 0–2)
LDH SERPL-CCNC: 135 U/L (ref 140–271)
LYMPHOCYTES # BLD AUTO: 1.79 THOUSANDS/ÂΜL (ref 0.6–4.47)
LYMPHOCYTES NFR BLD AUTO: 34 % (ref 14–44)
MCH RBC QN AUTO: 28.4 PG (ref 26.8–34.3)
MCHC RBC AUTO-ENTMCNC: 31 G/DL (ref 31.4–37.4)
MCV RBC AUTO: 92 FL (ref 82–98)
MONOCYTES # BLD AUTO: 0.49 THOUSAND/ÂΜL (ref 0.17–1.22)
MONOCYTES NFR BLD AUTO: 9 % (ref 4–12)
NEUTROPHILS # BLD AUTO: 2.77 THOUSANDS/ÂΜL (ref 1.85–7.62)
NEUTS SEG NFR BLD AUTO: 53 % (ref 43–75)
NRBC BLD AUTO-RTO: 0 /100 WBCS
PLATELET # BLD AUTO: 184 THOUSANDS/UL (ref 149–390)
PMV BLD AUTO: 11.1 FL (ref 8.9–12.7)
POTASSIUM SERPL-SCNC: 4.6 MMOL/L (ref 3.5–5.3)
PROT SERPL-MCNC: 6.4 G/DL (ref 6.4–8.4)
RBC # BLD AUTO: 4.43 MILLION/UL (ref 3.81–5.12)
SODIUM SERPL-SCNC: 142 MMOL/L (ref 135–147)
T3FREE SERPL-MCNC: 2.87 PG/ML (ref 2.5–3.9)
T4 FREE SERPL-MCNC: 0.93 NG/DL (ref 0.61–1.12)
TSH SERPL DL<=0.05 MIU/L-ACNC: 2.77 UIU/ML (ref 0.45–4.5)
WBC # BLD AUTO: 5.29 THOUSAND/UL (ref 4.31–10.16)

## 2023-11-11 PROCEDURE — 80053 COMPREHEN METABOLIC PANEL: CPT

## 2023-11-11 PROCEDURE — 84481 FREE ASSAY (FT-3): CPT

## 2023-11-11 PROCEDURE — 84443 ASSAY THYROID STIM HORMONE: CPT

## 2023-11-11 PROCEDURE — 36415 COLL VENOUS BLD VENIPUNCTURE: CPT

## 2023-11-11 PROCEDURE — 85025 COMPLETE CBC W/AUTO DIFF WBC: CPT

## 2023-11-11 PROCEDURE — 84439 ASSAY OF FREE THYROXINE: CPT

## 2023-11-11 PROCEDURE — 83615 LACTATE (LD) (LDH) ENZYME: CPT

## 2023-11-13 ENCOUNTER — TELEPHONE (OUTPATIENT)
Dept: HEMATOLOGY ONCOLOGY | Facility: CLINIC | Age: 73
End: 2023-11-13

## 2023-11-13 ENCOUNTER — OFFICE VISIT (OUTPATIENT)
Dept: HEMATOLOGY ONCOLOGY | Facility: CLINIC | Age: 73
End: 2023-11-13
Payer: COMMERCIAL

## 2023-11-13 VITALS
RESPIRATION RATE: 17 BRPM | OXYGEN SATURATION: 99 % | DIASTOLIC BLOOD PRESSURE: 84 MMHG | HEART RATE: 88 BPM | TEMPERATURE: 97.6 F | BODY MASS INDEX: 29.03 KG/M2 | HEIGHT: 69 IN | SYSTOLIC BLOOD PRESSURE: 138 MMHG | WEIGHT: 196 LBS

## 2023-11-13 DIAGNOSIS — C43.9 METASTATIC MELANOMA (HCC): Primary | ICD-10-CM

## 2023-11-13 DIAGNOSIS — Z79.899 HIGH RISK MEDICATION USE: ICD-10-CM

## 2023-11-13 DIAGNOSIS — E27.40 ADRENAL INSUFFICIENCY (HCC): ICD-10-CM

## 2023-11-13 PROCEDURE — 99214 OFFICE O/P EST MOD 30 MIN: CPT | Performed by: INTERNAL MEDICINE

## 2023-11-13 NOTE — LETTER
November 30, 2023     Isis Fall MD  [address] [facility]    Patient: Aletha Olivares   YOB: 1950   Date of Visit: 11/13/2023       Dear Dr. Ortega Fletcher: Thank you for referring Aletha Olivares to me for evaluation. Below are my notes for this consultation. If you have questions, please do not hesitate to call me. I look forward to following your patient along with you. Sincerely,        Antonia Manuel MD        CC: DO Olvin Love CRNP Genevive Buffy, MD Mathis Larry, DO Natalie Pinto, MD  11/30/2023  1:09 AM  Sign when Signing Visit  [address] [facility] [address]  [address] [place] [address]  [phone]     Date of Visit: 53/29/3595  Name: Aletha Olivares   YOB: 1950        Subjective    VISIT DIAGNOSIS:  Diagnoses and all orders for this visit:    Metastatic melanoma ([address])  -     Infusion Calculated Appointment Request; Future  -     Infusion Calculated Appointment Request; Future  -     Infusion Calculated Appointment Request; Future  -     Infusion Calculated Appointment Request; Future  -     Infusion Calculated Appointment Request; Future  -     Infusion Calculated Appointment Request; Future    High risk medication use    Adrenal insufficiency ([address])        Oncology History   Metastatic melanoma ([address])   10/13/2022 -  Cancer Staged    Staging form: Melanoma of the Skin, AJCC 8th Edition  - Clinical stage from 10/13/2022: Stage IV (cTX, cNX, cM1a) - Signed by Antonia Manuel MD on 11/22/2022       10/13/2022 Biopsy    A. Skin, right lateral medial leg, punch biopsy:  Portion of severely atypical melanocytic dermal proliferation with prominent melanosis consistent with melanoma. See note. B. Skin, right medial leg. punch biopsy:  Portion of severely atypical melanocytic dermal proliferation with prominent melanosis consistent with melanoma. See note. C. Skin, right medial leg, punch biopsy:   Portion of severely atypical melanocytic dermal proliferation with prominent melanosis consistent with melanoma. See note.      11/22/2022 Initial Diagnosis    Metastatic melanoma ([address] [facility])     12/14/2022 -  Chemotherapy    alteplase (CATHFLO), 2 mg, Intracatheter, Every 2 hour PRN, 13 of 20 cycles  NIVOLUMAB-RELATLIMAB-RMBW (OPDUALAG) IVPB, 480 mg of nivolumab, Intravenous, Once, 13 of 20 cycles  Administration: 480 mg of nivolumab (12/14/2022), 480 mg of nivolumab (1/11/2023), 480 mg of nivolumab (2/8/2023), 480 mg of nivolumab (3/8/2023), 480 mg of nivolumab (4/5/2023), 480 mg of nivolumab (5/3/2023), 480 mg of nivolumab (5/31/2023), 480 mg of nivolumab (6/28/2023), 480 mg of nivolumab (7/26/2023), 480 mg of nivolumab (8/23/2023), 480 mg of nivolumab (9/20/2023), 480 mg of nivolumab (10/18/2023), 480 mg of nivolumab (11/15/2023)     1/5/2023 Genomic Testing    [facility] One liquid testing  TMB 1 Mut/Mb  MSI-high not detected          Cancer Staging   Metastatic melanoma ([address] [facility])  Staging form: Melanoma of the Skin, AJCC 8th Edition  - Clinical stage from 10/13/2022: Stage IV (cTX, cNX, cM1a) - Signed by Sekou Manzo MD on 11/22/2022     Treatment Details   Treatment goal Curative   Plan Name OP Nivolumab and Relatlimab-rmbw Q 28 Days   Status Active   Start Date 12/14/2022   End Date 5/29/2024 (Planned)   Provider Sekou Manzo MD   Chemotherapy alteplase (CATHFLO), 2 mg, Intracatheter, Every 2 hour PRN, 13 of 20 cycles    NIVOLUMAB-RELATLIMAB-RMBW (OPDUALAG) IVPB, 480 mg of nivolumab, Intravenous, Once, 13 of 20 cycles  Administration: 480 mg of nivolumab (12/14/2022), 480 mg of nivolumab (1/11/2023), 480 mg of nivolumab (2/8/2023), 480 mg of nivolumab (3/8/2023), 480 mg of nivolumab (4/5/2023), 480 mg of nivolumab (5/3/2023), 480 mg of nivolumab (5/31/2023), 480 mg of nivolumab (6/28/2023), 480 mg of nivolumab (7/26/2023), 480 mg of nivolumab (8/23/2023), 480 mg of nivolumab (9/20/2023), 480 mg of nivolumab (10/18/2023), 480 mg of nivolumab (11/15/2023)          HISTORY OF PRESENT ILLNESS: Jennifer Gutiérrez is a 68 y.o. female  who has metastatic melanoma in the right lower extremity on treatment with nivolumab plus relatlimab/Opdualag here for continued monitoring, follow-up, surveillance. She is doing well and feels great. No issues concerns or complaints. No concerning skin lesions. She thinks the pigment in her right lower extremity continues to lighten. No nodules or masses. No lymphadenopathy. Energy is good. Continues on hydrocortisone twice a day. Denies immune mediated side effects. Denies headaches, double vision, rash, itching, chest pain, shortness of breath, diarrhea. Denies muscle weakness or fatigue. To have a PET scan on 10/18/2023 and we discussed the imaging is negative for definitive evidence of melanoma recurrence or metastasis. No sufficient safety activity in any lymph nodes. There is a focus of uptake at the mid sigmoid colon but she did just have a previous colonoscopy. And there are 2 foci of cutaneous/subcutaneous activity in the right lower extremity proximal to the mid tibia-fibula region. Stable mild focus of the proximal tibia-fibula region anterior laterally as well. New focus at the mid tibia-fibula region posteriorly. Endings are nonspecific and could be inflammatory. REVIEW OF SYSTEMS:  Review of Systems   Constitutional:  Negative for appetite change, fatigue, fever and unexpected weight change. HENT:   Negative for lump/mass, trouble swallowing and voice change. Eyes:  Negative for icterus. Respiratory:  Negative for cough, shortness of breath and wheezing. Cardiovascular:  Negative for leg swelling. Gastrointestinal:  Negative for abdominal pain, constipation, diarrhea, nausea and vomiting. Genitourinary:  Negative for difficulty urinating and hematuria.     Musculoskeletal:  Negative for arthralgias, gait problem and myalgias. Skin:  Negative for itching and rash. No new, changing, or concerning lesions. Neurological:  Negative for extremity weakness, gait problem, headaches, light-headedness and numbness. Hematological:  Negative for adenopathy.         MEDICATIONS:    Current Outpatient Medications:   •  apixaban (Eliquis) 5 mg, Take 1 tablet (5 mg total) by mouth every 12 (twelve) hours, Disp: 60 tablet, Rfl: 5  •  famotidine (PEPCID) 10 mg tablet, Take 10 mg by mouth 2 (two) times a day, Disp: , Rfl:   •  hydrocortisone (CORTEF) 10 mg tablet, Take 1 tablet (10 mg total) by mouth 2 (two) times a day, Disp: 60 tablet, Rfl: 11  •  loratadine (CLARITIN) 10 mg tablet, Take 10 mg by mouth daily, Disp: , Rfl:   •  losartan (Cozaar) 100 MG tablet, Take 1 tablet (100 mg total) by mouth daily, Disp: 30 tablet, Rfl: 5  •  metoprolol succinate (TOPROL-XL) 25 mg 24 hr tablet, Take 1 tablet (25 mg total) by mouth 2 (two) times a day, Disp: 60 tablet, Rfl: 5  •  Multiple Minerals-Vitamins (Citracal Plus) TABS, Take by mouth, Disp: , Rfl:   •  Multiple Vitamins-Minerals (MULTIVITAMIN WITH MINERALS) tablet, Take 1 tablet by mouth daily, Disp: , Rfl:   •  patient supplied medication, Pt takes eye drops Mura 128 - not listed in database, Disp: , Rfl:   •  Wheat Dextrin (BENEFIBER DRINK MIX PO), Take by mouth, Disp: , Rfl:      ALLERGIES:  Allergies   Allergen Reactions   • Etomidate Other (See Comments)        ACTIVE PROBLEMS:  Patient Active Problem List   Diagnosis   • Acute massive pulmonary embolism (HCC)   • Venous stasis of both lower extremities   • Clostridium difficile infection   • Right ventricular systolic dysfunction without heart failure   • Essential hypertension   • Metastatic melanoma ([address])   • High risk medication use   • Adrenal insufficiency (HCC)   • Abnormal PET scan of colon   • Mild anemia   • Anticoagulated by anticoagulation treatment   • History of colon polyps   • Diverticulosis   • Abnormal finding on GI tract imaging          PAST MEDICAL HISTORY:   Past Medical History:   Diagnosis Date   • Acute deep vein thrombosis (DVT) of popliteal vein of left lower extremity (HCC)    • Ankle wound, left, initial encounter     Resolved 2017   • Ankle wound, right, initial encounter     resolved 2017   • DVT (deep vein thrombosis) in pregnancy    • Hypertension    • Pulmonary emboli ([address])      post C section   • Pulmonary embolism ([address])     2017   • Pulmonary embolism, blood-clot, obstetric    • Skin cancer    • Venous stasis ulcer ([address])    • Venous ulcers of both lower extremities ([address])         PAST SURGICAL HISTORY:  Past Surgical History:   Procedure Laterality Date   •  SECTION      X2        SOCIAL HISTORY:  Social History     Socioeconomic History   • Marital status:      Spouse name: None   • Number of children: None   • Years of education: None   • Highest education level: None   Occupational History   • None   Tobacco Use   • Smoking status: Never   • Smokeless tobacco: Never   Vaping Use   • Vaping Use: Never used   Substance and Sexual Activity   • Alcohol use: No     Comment: hx of social drinker   • Drug use: No   • Sexual activity: None   Other Topics Concern   • None   Social History Narrative   • None     Social Determinants of Health     Financial Resource Strain: Not on file   Food Insecurity: Not on file   Transportation Needs: Not on file   Physical Activity: Not on file   Stress: Not on file   Social Connections: Not on file   Intimate Partner Violence: Not on file   Housing Stability: Not on file        FAMILY HISTORY:  Family History   Problem Relation Age of Onset   • Heart attack Mother    • Stroke Mother    • Cancer Father    • Bone cancer Family    • Hypertension Family    • Lung cancer Family            Objective    PHYSICAL EXAMINATION:   Blood pressure 138/84, pulse 88, temperature 97.6 °F (36.4 °C), temperature source Temporal, resp.  rate 17, height 5' 9" (1.753 m), weight 88.9 kg (196 lb), SpO2 99 %. Pain Score: 0-No pain     ECOG Performance Status      Flowsheet Row Most Recent Value   ECOG Performance Status 0 - Fully active, able to carry on all pre-disease performance without restriction               Physical Exam  Constitutional:       General: She is not in acute distress. Appearance: Normal appearance. She is not ill-appearing or toxic-appearing. HENT:      Head: Normocephalic and atraumatic. Right Ear: External ear normal.      Left Ear: External ear normal.      Nose: Nose normal.      Mouth/Throat:      Mouth: Mucous membranes are moist.      Pharynx: Oropharynx is clear. Eyes:      General: No scleral icterus. Right eye: No discharge. Left eye: No discharge. Conjunctiva/sclera: Conjunctivae normal.   Cardiovascular:      Rate and Rhythm: Normal rate and regular rhythm. Pulses: Normal pulses. Heart sounds: No murmur heard. No friction rub. No gallop. Pulmonary:      Effort: Pulmonary effort is normal. No respiratory distress. Breath sounds: Normal breath sounds. No wheezing or rales. Abdominal:      General: Bowel sounds are normal. There is no distension. Palpations: There is no mass. Tenderness: There is no abdominal tenderness. There is no rebound. Musculoskeletal:         General: No swelling or tenderness. Cervical back: Normal range of motion. No rigidity. Right lower leg: No edema. Left lower leg: No edema. Lymphadenopathy:      Head:      Right side of head: No submandibular, preauricular or posterior auricular adenopathy. Left side of head: No submandibular, preauricular or posterior auricular adenopathy. Cervical: No cervical adenopathy. Right cervical: No superficial or posterior cervical adenopathy. Left cervical: No superficial or posterior cervical adenopathy.       Upper Body:      Right upper body: No supraclavicular or axillary adenopathy. Left upper body: No supraclavicular or axillary adenopathy. Lower Body: No right inguinal adenopathy. No left inguinal adenopathy. Skin:     General: Skin is warm. Coloration: Skin is not jaundiced. Findings: No lesion or rash. Comments: Decreasing pigment in the right lower extremity. No nodules or masses corresponding to findings on PET scan. Most likely related to her varicose veins. No skin thickening. Neurological:      General: No focal deficit present. Mental Status: She is alert and oriented to person, place, and time. Cranial Nerves: No cranial nerve deficit. Motor: No weakness. Gait: Gait normal.   Psychiatric:         Mood and Affect: Mood normal.         Behavior: Behavior normal.         Thought Content: Thought content normal.         Judgment: Judgment normal.         I reviewed lab data in the chart.     WBC   Date Value Ref Range Status   11/11/2023 5.29 4.31 - 10.16 Thousand/uL Final   10/14/2023 4.83 4.31 - 10.16 Thousand/uL Final   09/16/2023 4.96 4.31 - 10.16 Thousand/uL Final     Hemoglobin   Date Value Ref Range Status   11/11/2023 12.6 11.5 - 15.4 g/dL Final   10/14/2023 12.5 11.5 - 15.4 g/dL Final   09/16/2023 13.1 11.5 - 15.4 g/dL Final     Platelets   Date Value Ref Range Status   11/11/2023 184 149 - 390 Thousands/uL Final   10/14/2023 181 149 - 390 Thousands/uL Final   09/16/2023 191 149 - 390 Thousands/uL Final     MCV   Date Value Ref Range Status   11/11/2023 92 82 - 98 fL Final   10/14/2023 94 82 - 98 fL Final   09/16/2023 92 82 - 98 fL Final      Potassium   Date Value Ref Range Status   11/11/2023 4.6 3.5 - 5.3 mmol/L Final   10/14/2023 4.1 3.5 - 5.3 mmol/L Final   09/16/2023 4.5 3.5 - 5.3 mmol/L Final     Chloride   Date Value Ref Range Status   11/11/2023 106 96 - 108 mmol/L Final   10/14/2023 105 96 - 108 mmol/L Final   09/16/2023 104 96 - 108 mmol/L Final     CO2   Date Value Ref Range Status   11/11/2023 31 21 - 32 mmol/L Final   10/14/2023 30 21 - 32 mmol/L Final   09/16/2023 31 21 - 32 mmol/L Final     BUN   Date Value Ref Range Status   11/11/2023 19 5 - 25 mg/dL Final   10/14/2023 25 5 - 25 mg/dL Final   09/16/2023 22 5 - 25 mg/dL Final     Creatinine   Date Value Ref Range Status   11/11/2023 0.82 0.60 - 1.30 mg/dL Final     Comment:     Standardized to IDMS reference method   10/14/2023 0.97 0.60 - 1.30 mg/dL Final     Comment:     Standardized to IDMS reference method   09/16/2023 0.96 0.60 - 1.30 mg/dL Final     Comment:     Standardized to IDMS reference method     Glucose   Date Value Ref Range Status   11/17/2022 85 65 - 140 mg/dL Final     Comment:     If the patient is fasting, the ADA then defines impaired fasting glucose as > 100 mg/dL and diabetes as > or equal to 123 mg/dL. Specimen collection should occur prior to Sulfasalazine administration due to the potential for falsely depressed results. Specimen collection should occur prior to Sulfapyridine administration due to the potential for falsely elevated results. 08/03/2020 112 (H) 65 - 99 mg/dL Final     Comment:     If the patient is fasting, the ADA then defines impaired fasting glucose as > 100 mg/dL and diabetes as > or equal to 123 mg/dL. Specimen collection should occur prior to Sulfasalazine administration due to the potential for falsely depressed results. Specimen collection should occur prior to Sulfapyridine administration due to the potential for falsely elevated results. 01/24/2017 91 65 - 140 mg/dL Final     Comment:     If the patient is fasting, the ADA then defines impaired fasting glucose as > 100 mg/dL and diabetes as > or equal to 123 mg/dL.      Calcium   Date Value Ref Range Status   11/11/2023 9.5 8.4 - 10.2 mg/dL Final   10/14/2023 9.2 8.4 - 10.2 mg/dL Final   09/16/2023 9.5 8.4 - 10.2 mg/dL Final     Albumin   Date Value Ref Range Status   11/11/2023 3.7 3.5 - 5.0 g/dL Final   10/14/2023 3.7 3.5 - 5.0 g/dL Final 09/16/2023 3.8 3.5 - 5.0 g/dL Final     Total Bilirubin   Date Value Ref Range Status   11/11/2023 0.56 0.20 - 1.00 mg/dL Final     Comment:     Use of this assay is not recommended for patients undergoing treatment with eltrombopag due to the potential for falsely elevated results. N-acetyl-p-benzoquinone imine (metabolite of Acetaminophen) will generate erroneously low results in samples for patients that have taken an overdose of Acetaminophen. 10/14/2023 0.60 0.20 - 1.00 mg/dL Final     Comment:     Use of this assay is not recommended for patients undergoing treatment with eltrombopag due to the potential for falsely elevated results. N-acetyl-p-benzoquinone imine (metabolite of Acetaminophen) will generate erroneously low results in samples for patients that have taken an overdose of Acetaminophen.   09/16/2023 0.55 0.20 - 1.00 mg/dL Final     Comment:     Use of this assay is not recommended for patients undergoing treatment with eltrombopag due to the potential for falsely elevated results. N-acetyl-p-benzoquinone imine (metabolite of Acetaminophen) will generate erroneously low results in samples for patients that have taken an overdose of Acetaminophen. Alkaline Phosphatase   Date Value Ref Range Status   11/11/2023 41 34 - 104 U/L Final   10/14/2023 39 34 - 104 U/L Final   09/16/2023 42 34 - 104 U/L Final     AST   Date Value Ref Range Status   11/11/2023 15 13 - 39 U/L Final   10/14/2023 16 13 - 39 U/L Final   09/16/2023 19 13 - 39 U/L Final     ALT   Date Value Ref Range Status   11/11/2023 11 7 - 52 U/L Final     Comment:     Specimen collection should occur prior to Sulfasalazine administration due to the potential for falsely depressed results. 10/14/2023 12 7 - 52 U/L Final     Comment:     Specimen collection should occur prior to Sulfasalazine administration due to the potential for falsely depressed results.     09/16/2023 14 7 - 52 U/L Final     Comment:     Specimen collection should occur prior to Sulfasalazine administration due to the potential for falsely depressed results. LD   Date Value Ref Range Status   11/11/2023 135 (L) 140 - 271 U/L Final   10/14/2023 171 140 - 271 U/L Final   09/16/2023 236 140 - 271 U/L Final     No results found for: "TSH"  No results found for: "C9XATIV"   Free T4   Date Value Ref Range Status   11/11/2023 0.93 0.61 - 1.12 ng/dL Final     Comment:     Specimens with biotin concentrations > 10 ng/mL can lead to significant (> 10%) positive bias in result. 10/14/2023 0.85 0.61 - 1.12 ng/dL Final     Comment:     Specimens with biotin concentrations > 10 ng/mL can lead to significant (> 10%) positive bias in result.   09/16/2023 1.02 0.61 - 1.12 ng/dL Final     Comment:     Specimens with biotin concentrations > 10 ng/mL can lead to significant (> 10%) positive bias in result. RECENT IMAGING:  No results found. Assessment/Plan  Ms. Jennifer Camp is a 68 yr female with metastatic melanoma on treatment with nivolumab plus relatlimab/Opdualag here for continued monitoring, follow-up and surveillance. 1. Metastatic melanoma ([address])  She is doing well and tolerating treatment with nivolumab plus relatlimab/Opdualag. Labs reviewed and okay. She is okay to continue with treatment. We discussed that imaging demonstrates continued response to treatment with no new evidence of recurrent disease or areas of metastases. She will continue on treatment. She has standing orders for blood work prior to each treatment. She knows to continue to monitor for immediate side effects and/or new areas of disease. She knows to call with issues or concerns prior to her next visit.       - Infusion Calculated Appointment Request; Future  - Infusion Calculated Appointment Request; Future  - Infusion Calculated Appointment Request; Future  - Infusion Calculated Appointment Request; Future  - Infusion Calculated Appointment Request; Future  - Infusion Calculated Appointment Request; Future    2. High risk medication use  3. Adrenal insufficiency ([address])  She continues to treat with immunotherapy. She is tolerating treatment well. She does have adrenal insufficiency which she takes hydrocortisone twice a day. She is doing well. She knows to continue to monitor for additional immune mediated side effects. She knows to call with issues or concerns prior to her next visit. She does have standing orders for blood work prior to each treatment. Return in about 4 weeks (around 12/11/2023) for Office Visit, Infusion - See Treatment Plan, labs.      Jonathon Solitario MD, PhD

## 2023-11-15 ENCOUNTER — HOSPITAL ENCOUNTER (OUTPATIENT)
Dept: INFUSION CENTER | Facility: HOSPITAL | Age: 73
Discharge: HOME | End: 2023-11-15
Attending: INTERNAL MEDICINE
Payer: COMMERCIAL

## 2023-11-15 VITALS
WEIGHT: 196 LBS | HEART RATE: 73 BPM | TEMPERATURE: 97.3 F | DIASTOLIC BLOOD PRESSURE: 76 MMHG | BODY MASS INDEX: 29.03 KG/M2 | SYSTOLIC BLOOD PRESSURE: 132 MMHG | HEIGHT: 69 IN | RESPIRATION RATE: 18 BRPM

## 2023-11-15 DIAGNOSIS — C43.9 METASTATIC MELANOMA (HCC): Primary | ICD-10-CM

## 2023-11-15 PROCEDURE — 96413 CHEMO IV INFUSION 1 HR: CPT

## 2023-11-15 RX ORDER — SODIUM CHLORIDE 9 MG/ML
20 INJECTION, SOLUTION INTRAVENOUS ONCE
Status: COMPLETED | OUTPATIENT
Start: 2023-11-15 | End: 2023-11-15

## 2023-11-15 RX ADMIN — SODIUM CHLORIDE 480 MG OF NIVOLUMAB: 9 INJECTION, SOLUTION INTRAVENOUS at 14:59

## 2023-11-15 RX ADMIN — SODIUM CHLORIDE 20 ML/HR: 0.9 INJECTION, SOLUTION INTRAVENOUS at 14:45

## 2023-11-30 NOTE — PROGRESS NOTES
[facility]  [address] [id number] [phone]     Date of Visit: 30/08/5234  Name: Tiara Lazo   YOB: 1950        Subjective    VISIT DIAGNOSIS:  Diagnoses and all orders for this visit:    Metastatic melanoma ([address])  -     Infusion Calculated Appointment Request; Future  -     Infusion Calculated Appointment Request; Future  -     Infusion Calculated Appointment Request; Future  -     Infusion Calculated Appointment Request; Future  -     Infusion Calculated Appointment Request; Future  -     Infusion Calculated Appointment Request; Future    High risk medication use    Adrenal insufficiency ([address])        Oncology History   Metastatic melanoma ([address])   10/13/2022 -  Cancer Staged    Staging form: Melanoma of the Skin, AJCC 8th Edition  - Clinical stage from 10/13/2022: Stage IV (cTX, cNX, cM1a) - Signed by Dagmar Carlton MD on 11/22/2022       10/13/2022 Biopsy    A. Skin, right lateral medial leg, punch biopsy:  Portion of severely atypical melanocytic dermal proliferation with prominent melanosis consistent with melanoma. See note. B. Skin, right medial leg. punch biopsy:  Portion of severely atypical melanocytic dermal proliferation with prominent melanosis consistent with melanoma. See note. C. Skin, right medial leg, punch biopsy:   Portion of severely atypical melanocytic dermal proliferation with prominent melanosis consistent with melanoma. See note.      11/22/2022 Initial Diagnosis    Metastatic melanoma ([address])     12/14/2022 -  Chemotherapy    alteplase (CATHFLO), 2 mg, Intracatheter, Every 2 hour PRN, 13 of 20 cycles  NIVOLUMAB-RELATLIMAB-RMBW (OPDUALAG) IVPB, 480 mg of nivolumab, Intravenous, Once, 13 of 20 cycles  Administration: 480 mg of nivolumab (12/14/2022), 480 mg of nivolumab (1/11/2023), 480 mg of nivolumab (2/8/2023), 480 mg of nivolumab (3/8/2023), 480 mg of nivolumab (4/5/2023), 480 mg of nivolumab (5/3/2023), 480 mg of nivolumab (5/31/2023), 480 mg of nivolumab (6/28/2023), 480 mg of nivolumab (7/26/2023), 480 mg of nivolumab (8/23/2023), 480 mg of nivolumab (9/20/2023), 480 mg of nivolumab (10/18/2023), 480 mg of nivolumab (11/15/2023)     1/5/2023 [facility] liquid testing  TMB 1 Mut/Mb  MSI-high not detected          Cancer Staging   Metastatic melanoma ([address])  Staging form: Melanoma of the Skin, AJCC 8th Edition  - Clinical stage from 10/13/2022: Stage IV (cTX, cNX, cM1a) - Signed by García Greene MD on 11/22/2022     Treatment Details   Treatment goal Curative   Plan Name OP Nivolumab and Relatlimab-rmbw Q 28 Days   Status Active   Start Date 12/14/2022   End Date 5/29/2024 (Planned)   Provider García Greene MD   Chemotherapy alteplase (CATHFLO), 2 mg, Intracatheter, Every 2 hour PRN, 13 of 20 cycles    NIVOLUMAB-RELATLIMAB-RMBW (OPDUALAG) IVPB, 480 mg of nivolumab, Intravenous, Once, 13 of 20 cycles  Administration: 480 mg of nivolumab (12/14/2022), 480 mg of nivolumab (1/11/2023), 480 mg of nivolumab (2/8/2023), 480 mg of nivolumab (3/8/2023), 480 mg of nivolumab (4/5/2023), 480 mg of nivolumab (5/3/2023), 480 mg of nivolumab (5/31/2023), 480 mg of nivolumab (6/28/2023), 480 mg of nivolumab (7/26/2023), 480 mg of nivolumab (8/23/2023), 480 mg of nivolumab (9/20/2023), 480 mg of nivolumab (10/18/2023), 480 mg of nivolumab (11/15/2023)          HISTORY OF PRESENT ILLNESS: Reg Truong is a 68 y.o. female  who has metastatic melanoma in the right lower extremity on treatment with nivolumab plus relatlimab/Opdualag here for continued monitoring, follow-up, surveillance. She is doing well and feels great. No issues concerns or complaints. No concerning skin lesions. She thinks the pigment in her right lower extremity continues to lighten. No nodules or masses. No lymphadenopathy. Energy is good. Continues on hydrocortisone twice a day.       Denies immune mediated side effects. Denies headaches, double vision, rash, itching, chest pain, shortness of breath, diarrhea. Denies muscle weakness or fatigue. To have a PET scan on 10/18/2023 and we discussed the imaging is negative for definitive evidence of melanoma recurrence or metastasis. No sufficient safety activity in any lymph nodes. There is a focus of uptake at the mid sigmoid colon but she did just have a previous colonoscopy. And there are 2 foci of cutaneous/subcutaneous activity in the right lower extremity proximal to the mid tibia-fibula region. Stable mild focus of the proximal tibia-fibula region anterior laterally as well. New focus at the mid tibia-fibula region posteriorly. Endings are nonspecific and could be inflammatory. REVIEW OF SYSTEMS:  Review of Systems   Constitutional:  Negative for appetite change, fatigue, fever and unexpected weight change. HENT:   Negative for lump/mass, trouble swallowing and voice change. Eyes:  Negative for icterus. Respiratory:  Negative for cough, shortness of breath and wheezing. Cardiovascular:  Negative for leg swelling. Gastrointestinal:  Negative for abdominal pain, constipation, diarrhea, nausea and vomiting. Genitourinary:  Negative for difficulty urinating and hematuria. Musculoskeletal:  Negative for arthralgias, gait problem and myalgias. Skin:  Negative for itching and rash. No new, changing, or concerning lesions. Neurological:  Negative for extremity weakness, gait problem, headaches, light-headedness and numbness. Hematological:  Negative for adenopathy.         MEDICATIONS:    Current Outpatient Medications:     apixaban (Eliquis) 5 mg, Take 1 tablet (5 mg total) by mouth every 12 (twelve) hours, Disp: 60 tablet, Rfl: 5    famotidine (PEPCID) 10 mg tablet, Take 10 mg by mouth 2 (two) times a day, Disp: , Rfl:     hydrocortisone (CORTEF) 10 mg tablet, Take 1 tablet (10 mg total) by mouth 2 (two) times a day, Disp: 60 tablet, Rfl: 11    loratadine (CLARITIN) 10 mg tablet, Take 10 mg by mouth daily, Disp: , Rfl:     losartan (Cozaar) 100 MG tablet, Take 1 tablet (100 mg total) by mouth daily, Disp: 30 tablet, Rfl: 5    metoprolol succinate (TOPROL-XL) 25 mg 24 hr tablet, Take 1 tablet (25 mg total) by mouth 2 (two) times a day, Disp: 60 tablet, Rfl: 5    Multiple Minerals-Vitamins (Citracal Plus) TABS, Take by mouth, Disp: , Rfl:     Multiple Vitamins-Minerals (MULTIVITAMIN WITH MINERALS) tablet, Take 1 tablet by mouth daily, Disp: , Rfl:     patient supplied medication, Pt takes eye drops Mura 128 - not listed in database, Disp: , Rfl:     Wheat Dextrin (BENEFIBER DRINK MIX PO), Take by mouth, Disp: , Rfl:      ALLERGIES:  Allergies   Allergen Reactions    Etomidate Other (See Comments)        ACTIVE PROBLEMS:  Patient Active Problem List   Diagnosis    Acute massive pulmonary embolism (HCC)    Venous stasis of both lower extremities    Clostridium difficile infection    Right ventricular systolic dysfunction without heart failure    Essential hypertension    Metastatic melanoma ([address])    High risk medication use    Adrenal insufficiency (HCC)    Abnormal PET scan of colon    Mild anemia    Anticoagulated by anticoagulation treatment    History of colon polyps    Diverticulosis    Abnormal finding on GI tract imaging          PAST MEDICAL HISTORY:   Past Medical History:   Diagnosis Date    Acute deep vein thrombosis (DVT) of popliteal vein of left lower extremity (HCC)     Ankle wound, left, initial encounter     Resolved 6/2017    Ankle wound, right, initial encounter     resolved 6/2017    DVT (deep vein thrombosis) in pregnancy     Hypertension     Pulmonary emboli ([address])     1989 post C section    Pulmonary embolism ([address])     7/2017    Pulmonary embolism, blood-clot, obstetric     Skin cancer     Venous stasis ulcer (HCC)     Venous ulcers of both lower extremities (HCC)         PAST SURGICAL HISTORY:  Past Surgical History:   Procedure Laterality Date     SECTION      X2        SOCIAL HISTORY:  Social History     Socioeconomic History    Marital status:      Spouse name: None    Number of children: None    Years of education: None    Highest education level: None   Occupational History    None   Tobacco Use    Smoking status: Never    Smokeless tobacco: Never   Vaping Use    Vaping Use: Never used   Substance and Sexual Activity    Alcohol use: No     Comment: hx of social drinker    Drug use: No    Sexual activity: None   Other Topics Concern    None   Social History Narrative    None     Social Determinants of Health     Financial Resource Strain: Not on file   Food Insecurity: Not on file   Transportation Needs: Not on file   Physical Activity: Not on file   Stress: Not on file   Social Connections: Not on file   Intimate Partner Violence: Not on file   Housing Stability: Not on file        FAMILY HISTORY:  Family History   Problem Relation Age of Onset    Heart attack Mother     Stroke Mother     Cancer Father     Bone cancer Family     Hypertension Family     Lung cancer Family            Objective    PHYSICAL EXAMINATION:   Blood pressure 138/84, pulse 88, temperature 97.6 °F (36.4 °C), temperature source Temporal, resp. rate 17, height 5' 9" (1.753 m), weight 88.9 kg (196 lb), SpO2 99 %. Pain Score: 0-No pain     ECOG Performance Status      Flowsheet Row Most Recent Value   ECOG Performance Status 0 - Fully active, able to carry on all pre-disease performance without restriction               Physical Exam  Constitutional:       General: She is not in acute distress. Appearance: Normal appearance. She is not ill-appearing or toxic-appearing. HENT:      Head: Normocephalic and atraumatic. Right Ear: External ear normal.      Left Ear: External ear normal.      Nose: Nose normal.      Mouth/Throat:      Mouth: Mucous membranes are moist.      Pharynx: Oropharynx is clear.    Eyes: General: No scleral icterus. Right eye: No discharge. Left eye: No discharge. Conjunctiva/sclera: Conjunctivae normal.   Cardiovascular:      Rate and Rhythm: Normal rate and regular rhythm. Pulses: Normal pulses. Heart sounds: No murmur heard. No friction rub. No gallop. Pulmonary:      Effort: Pulmonary effort is normal. No respiratory distress. Breath sounds: Normal breath sounds. No wheezing or rales. Abdominal:      General: Bowel sounds are normal. There is no distension. Palpations: There is no mass. Tenderness: There is no abdominal tenderness. There is no rebound. Musculoskeletal:         General: No swelling or tenderness. Cervical back: Normal range of motion. No rigidity. Right lower leg: No edema. Left lower leg: No edema. Lymphadenopathy:      Head:      Right side of head: No submandibular, preauricular or posterior auricular adenopathy. Left side of head: No submandibular, preauricular or posterior auricular adenopathy. Cervical: No cervical adenopathy. Right cervical: No superficial or posterior cervical adenopathy. Left cervical: No superficial or posterior cervical adenopathy. Upper Body:      Right upper body: No supraclavicular or axillary adenopathy. Left upper body: No supraclavicular or axillary adenopathy. Lower Body: No right inguinal adenopathy. No left inguinal adenopathy. Skin:     General: Skin is warm. Coloration: Skin is not jaundiced. Findings: No lesion or rash. Comments: Decreasing pigment in the right lower extremity. No nodules or masses corresponding to findings on PET scan. Most likely related to her varicose veins. No skin thickening. Neurological:      General: No focal deficit present. Mental Status: She is alert and oriented to person, place, and time. Cranial Nerves: No cranial nerve deficit. Motor: No weakness.       Gait: Gait normal.   Psychiatric:         Mood and Affect: Mood normal.         Behavior: Behavior normal.         Thought Content: Thought content normal.         Judgment: Judgment normal.         I reviewed lab data in the chart.     WBC   Date Value Ref Range Status   11/11/2023 5.29 4.31 - 10.16 Thousand/uL Final   10/14/2023 4.83 4.31 - 10.16 Thousand/uL Final   09/16/2023 4.96 4.31 - 10.16 Thousand/uL Final     Hemoglobin   Date Value Ref Range Status   11/11/2023 12.6 11.5 - 15.4 g/dL Final   10/14/2023 12.5 11.5 - 15.4 g/dL Final   09/16/2023 13.1 11.5 - 15.4 g/dL Final     Platelets   Date Value Ref Range Status   11/11/2023 184 149 - 390 Thousands/uL Final   10/14/2023 181 149 - 390 Thousands/uL Final   09/16/2023 191 149 - 390 Thousands/uL Final     MCV   Date Value Ref Range Status   11/11/2023 92 82 - 98 fL Final   10/14/2023 94 82 - 98 fL Final   09/16/2023 92 82 - 98 fL Final      Potassium   Date Value Ref Range Status   11/11/2023 4.6 3.5 - 5.3 mmol/L Final   10/14/2023 4.1 3.5 - 5.3 mmol/L Final   09/16/2023 4.5 3.5 - 5.3 mmol/L Final     Chloride   Date Value Ref Range Status   11/11/2023 106 96 - 108 mmol/L Final   10/14/2023 105 96 - 108 mmol/L Final   09/16/2023 104 96 - 108 mmol/L Final     CO2   Date Value Ref Range Status   11/11/2023 31 21 - 32 mmol/L Final   10/14/2023 30 21 - 32 mmol/L Final   09/16/2023 31 21 - 32 mmol/L Final     BUN   Date Value Ref Range Status   11/11/2023 19 5 - 25 mg/dL Final   10/14/2023 25 5 - 25 mg/dL Final   09/16/2023 22 5 - 25 mg/dL Final     Creatinine   Date Value Ref Range Status   11/11/2023 0.82 0.60 - 1.30 mg/dL Final     Comment:     Standardized to IDMS reference method   10/14/2023 0.97 0.60 - 1.30 mg/dL Final     Comment:     Standardized to IDMS reference method   09/16/2023 0.96 0.60 - 1.30 mg/dL Final     Comment:     Standardized to IDMS reference method     Glucose   Date Value Ref Range Status   11/17/2022 85 65 - 140 mg/dL Final     Comment:     If the patient is fasting, the ADA then defines impaired fasting glucose as > 100 mg/dL and diabetes as > or equal to 123 mg/dL. Specimen collection should occur prior to Sulfasalazine administration due to the potential for falsely depressed results. Specimen collection should occur prior to Sulfapyridine administration due to the potential for falsely elevated results. 08/03/2020 112 (H) 65 - 99 mg/dL Final     Comment:     If the patient is fasting, the ADA then defines impaired fasting glucose as > 100 mg/dL and diabetes as > or equal to 123 mg/dL. Specimen collection should occur prior to Sulfasalazine administration due to the potential for falsely depressed results. Specimen collection should occur prior to Sulfapyridine administration due to the potential for falsely elevated results. 01/24/2017 91 65 - 140 mg/dL Final     Comment:     If the patient is fasting, the ADA then defines impaired fasting glucose as > 100 mg/dL and diabetes as > or equal to 123 mg/dL. Calcium   Date Value Ref Range Status   11/11/2023 9.5 8.4 - 10.2 mg/dL Final   10/14/2023 9.2 8.4 - 10.2 mg/dL Final   09/16/2023 9.5 8.4 - 10.2 mg/dL Final     Albumin   Date Value Ref Range Status   11/11/2023 3.7 3.5 - 5.0 g/dL Final   10/14/2023 3.7 3.5 - 5.0 g/dL Final   09/16/2023 3.8 3.5 - 5.0 g/dL Final     Total Bilirubin   Date Value Ref Range Status   11/11/2023 0.56 0.20 - 1.00 mg/dL Final     Comment:     Use of this assay is not recommended for patients undergoing treatment with eltrombopag due to the potential for falsely elevated results. N-acetyl-p-benzoquinone imine (metabolite of Acetaminophen) will generate erroneously low results in samples for patients that have taken an overdose of Acetaminophen. 10/14/2023 0.60 0.20 - 1.00 mg/dL Final     Comment:     Use of this assay is not recommended for patients undergoing treatment with eltrombopag due to the potential for falsely elevated results.   N-acetyl-p-benzoquinone imine (metabolite of Acetaminophen) will generate erroneously low results in samples for patients that have taken an overdose of Acetaminophen.   09/16/2023 0.55 0.20 - 1.00 mg/dL Final     Comment:     Use of this assay is not recommended for patients undergoing treatment with eltrombopag due to the potential for falsely elevated results. N-acetyl-p-benzoquinone imine (metabolite of Acetaminophen) will generate erroneously low results in samples for patients that have taken an overdose of Acetaminophen. Alkaline Phosphatase   Date Value Ref Range Status   11/11/2023 41 34 - 104 U/L Final   10/14/2023 39 34 - 104 U/L Final   09/16/2023 42 34 - 104 U/L Final     AST   Date Value Ref Range Status   11/11/2023 15 13 - 39 U/L Final   10/14/2023 16 13 - 39 U/L Final   09/16/2023 19 13 - 39 U/L Final     ALT   Date Value Ref Range Status   11/11/2023 11 7 - 52 U/L Final     Comment:     Specimen collection should occur prior to Sulfasalazine administration due to the potential for falsely depressed results. 10/14/2023 12 7 - 52 U/L Final     Comment:     Specimen collection should occur prior to Sulfasalazine administration due to the potential for falsely depressed results. 09/16/2023 14 7 - 52 U/L Final     Comment:     Specimen collection should occur prior to Sulfasalazine administration due to the potential for falsely depressed results. LD   Date Value Ref Range Status   11/11/2023 135 (L) 140 - 271 U/L Final   10/14/2023 171 140 - 271 U/L Final   09/16/2023 236 140 - 271 U/L Final     No results found for: "TSH"  No results found for: "L1GKKUN"   Free T4   Date Value Ref Range Status   11/11/2023 0.93 0.61 - 1.12 ng/dL Final     Comment:     Specimens with biotin concentrations > 10 ng/mL can lead to significant (> 10%) positive bias in result.    10/14/2023 0.85 0.61 - 1.12 ng/dL Final     Comment:     Specimens with biotin concentrations > 10 ng/mL can lead to significant (> 10%) positive bias in result.   09/16/2023 1.02 0.61 - 1.12 ng/dL Final     Comment:     Specimens with biotin concentrations > 10 ng/mL can lead to significant (> 10%) positive bias in result. RECENT IMAGING:  No results found. Assessment/Plan  Ms. Filemon Bennett is a 68 yr female with metastatic melanoma on treatment with nivolumab plus relatlimab/Opdualag here for continued monitoring, follow-up and surveillance. 1. Metastatic melanoma ([address])  She is doing well and tolerating treatment with nivolumab plus relatlimab/Opdualag. Labs reviewed and okay. She is okay to continue with treatment. We discussed that imaging demonstrates continued response to treatment with no new evidence of recurrent disease or areas of metastases. She will continue on treatment. She has standing orders for blood work prior to each treatment. She knows to continue to monitor for immediate side effects and/or new areas of disease. She knows to call with issues or concerns prior to her next visit. - Infusion Calculated Appointment Request; Future  - Infusion Calculated Appointment Request; Future  - Infusion Calculated Appointment Request; Future  - Infusion Calculated Appointment Request; Future  - Infusion Calculated Appointment Request; Future  - Infusion Calculated Appointment Request; Future    2. High risk medication use  3. Adrenal insufficiency ([address])  She continues to treat with immunotherapy. She is tolerating treatment well. She does have adrenal insufficiency which she takes hydrocortisone twice a day. She is doing well. She knows to continue to monitor for additional immune mediated side effects. She knows to call with issues or concerns prior to her next visit. She does have standing orders for blood work prior to each treatment. Return in about 4 weeks (around 12/11/2023) for Office Visit, Infusion - See Treatment Plan, labs.      Rony Irving MD, PhD

## 2023-12-06 RX ORDER — SODIUM CHLORIDE 9 MG/ML
20 INJECTION, SOLUTION INTRAVENOUS ONCE
OUTPATIENT
Start: 2023-12-13

## 2023-12-11 ENCOUNTER — APPOINTMENT (OUTPATIENT)
Dept: LAB | Facility: CLINIC | Age: 73
End: 2023-12-11
Payer: COMMERCIAL

## 2023-12-11 ENCOUNTER — OFFICE VISIT (OUTPATIENT)
Dept: HEMATOLOGY ONCOLOGY | Facility: CLINIC | Age: 73
End: 2023-12-11
Payer: COMMERCIAL

## 2023-12-11 VITALS
DIASTOLIC BLOOD PRESSURE: 64 MMHG | HEART RATE: 92 BPM | BODY MASS INDEX: 28.58 KG/M2 | WEIGHT: 193 LBS | OXYGEN SATURATION: 98 % | HEIGHT: 69 IN | SYSTOLIC BLOOD PRESSURE: 122 MMHG | TEMPERATURE: 98 F | RESPIRATION RATE: 18 BRPM

## 2023-12-11 DIAGNOSIS — E27.40 ADRENAL INSUFFICIENCY (HCC): ICD-10-CM

## 2023-12-11 DIAGNOSIS — C43.9 METASTATIC MELANOMA (HCC): Primary | ICD-10-CM

## 2023-12-11 DIAGNOSIS — Z79.899 HIGH RISK MEDICATION USE: ICD-10-CM

## 2023-12-11 LAB
ALBUMIN SERPL BCP-MCNC: 3.8 G/DL (ref 3.5–5)
ALP SERPL-CCNC: 38 U/L (ref 34–104)
ALT SERPL W P-5'-P-CCNC: 11 U/L (ref 7–52)
ANION GAP SERPL CALCULATED.3IONS-SCNC: 5 MMOL/L
AST SERPL W P-5'-P-CCNC: 16 U/L (ref 13–39)
BASOPHILS # BLD AUTO: 0.07 THOUSANDS/ÂΜL (ref 0–0.1)
BASOPHILS NFR BLD AUTO: 1 % (ref 0–1)
BILIRUB SERPL-MCNC: 0.42 MG/DL (ref 0.2–1)
BUN SERPL-MCNC: 21 MG/DL (ref 5–25)
CALCIUM SERPL-MCNC: 9.1 MG/DL (ref 8.4–10.2)
CHLORIDE SERPL-SCNC: 103 MMOL/L (ref 96–108)
CO2 SERPL-SCNC: 29 MMOL/L (ref 21–32)
CREAT SERPL-MCNC: 0.86 MG/DL (ref 0.6–1.3)
EOSINOPHIL # BLD AUTO: 0.22 THOUSAND/ÂΜL (ref 0–0.61)
EOSINOPHIL NFR BLD AUTO: 3 % (ref 0–6)
ERYTHROCYTE [DISTWIDTH] IN BLOOD BY AUTOMATED COUNT: 12.4 % (ref 11.6–15.1)
GFR SERPL CREATININE-BSD FRML MDRD: 67 ML/MIN/1.73SQ M
GLUCOSE SERPL-MCNC: 88 MG/DL (ref 65–140)
HCT VFR BLD AUTO: 38.1 % (ref 34.8–46.1)
HGB BLD-MCNC: 12.1 G/DL (ref 11.5–15.4)
IMM GRANULOCYTES # BLD AUTO: 0.04 THOUSAND/UL (ref 0–0.2)
IMM GRANULOCYTES NFR BLD AUTO: 1 % (ref 0–2)
LDH SERPL-CCNC: 163 U/L (ref 140–271)
LYMPHOCYTES # BLD AUTO: 2.83 THOUSANDS/ÂΜL (ref 0.6–4.47)
LYMPHOCYTES NFR BLD AUTO: 42 % (ref 14–44)
MCH RBC QN AUTO: 28.7 PG (ref 26.8–34.3)
MCHC RBC AUTO-ENTMCNC: 31.8 G/DL (ref 31.4–37.4)
MCV RBC AUTO: 90 FL (ref 82–98)
MONOCYTES # BLD AUTO: 0.56 THOUSAND/ÂΜL (ref 0.17–1.22)
MONOCYTES NFR BLD AUTO: 8 % (ref 4–12)
NEUTROPHILS # BLD AUTO: 3.1 THOUSANDS/ÂΜL (ref 1.85–7.62)
NEUTS SEG NFR BLD AUTO: 45 % (ref 43–75)
NRBC BLD AUTO-RTO: 0 /100 WBCS
PLATELET # BLD AUTO: 238 THOUSANDS/UL (ref 149–390)
PMV BLD AUTO: 10.3 FL (ref 8.9–12.7)
POTASSIUM SERPL-SCNC: 3.8 MMOL/L (ref 3.5–5.3)
PROT SERPL-MCNC: 6.8 G/DL (ref 6.4–8.4)
RBC # BLD AUTO: 4.22 MILLION/UL (ref 3.81–5.12)
SODIUM SERPL-SCNC: 137 MMOL/L (ref 135–147)
T3FREE SERPL-MCNC: 2.58 PG/ML (ref 2.5–3.9)
T4 FREE SERPL-MCNC: 1.1 NG/DL (ref 0.61–1.12)
TSH SERPL DL<=0.05 MIU/L-ACNC: 2.07 UIU/ML (ref 0.45–4.5)
WBC # BLD AUTO: 6.82 THOUSAND/UL (ref 4.31–10.16)

## 2023-12-11 PROCEDURE — 36415 COLL VENOUS BLD VENIPUNCTURE: CPT | Performed by: INTERNAL MEDICINE

## 2023-12-11 PROCEDURE — 80053 COMPREHEN METABOLIC PANEL: CPT | Performed by: INTERNAL MEDICINE

## 2023-12-11 PROCEDURE — 84439 ASSAY OF FREE THYROXINE: CPT | Performed by: INTERNAL MEDICINE

## 2023-12-11 PROCEDURE — 99214 OFFICE O/P EST MOD 30 MIN: CPT | Performed by: INTERNAL MEDICINE

## 2023-12-11 PROCEDURE — 84443 ASSAY THYROID STIM HORMONE: CPT | Performed by: INTERNAL MEDICINE

## 2023-12-11 PROCEDURE — 84481 FREE ASSAY (FT-3): CPT | Performed by: INTERNAL MEDICINE

## 2023-12-11 PROCEDURE — 83615 LACTATE (LD) (LDH) ENZYME: CPT | Performed by: INTERNAL MEDICINE

## 2023-12-11 PROCEDURE — 85025 COMPLETE CBC W/AUTO DIFF WBC: CPT | Performed by: INTERNAL MEDICINE

## 2023-12-13 ENCOUNTER — HOSPITAL ENCOUNTER (OUTPATIENT)
Dept: INFUSION CENTER | Facility: HOSPITAL | Age: 73
Discharge: HOME | End: 2023-12-13
Attending: INTERNAL MEDICINE
Payer: COMMERCIAL

## 2023-12-13 VITALS
SYSTOLIC BLOOD PRESSURE: 110 MMHG | HEART RATE: 89 BPM | TEMPERATURE: 96.9 F | RESPIRATION RATE: 18 BRPM | DIASTOLIC BLOOD PRESSURE: 73 MMHG

## 2023-12-13 DIAGNOSIS — C43.9 METASTATIC MELANOMA (HCC): Primary | ICD-10-CM

## 2023-12-13 RX ORDER — SODIUM CHLORIDE 9 MG/ML
20 INJECTION, SOLUTION INTRAVENOUS ONCE
Status: COMPLETED | OUTPATIENT
Start: 2023-12-13 | End: 2023-12-13

## 2023-12-13 RX ADMIN — SODIUM CHLORIDE 20 ML/HR: 0.9 INJECTION, SOLUTION INTRAVENOUS at 15:10

## 2023-12-13 RX ADMIN — SODIUM CHLORIDE 480 MG OF NIVOLUMAB: 9 INJECTION, SOLUTION INTRAVENOUS at 15:15

## 2023-12-13 NOTE — PROGRESS NOTES
[facility] HEMATOLOGY ONCOLOGY SPECIALISTS [facility]  [address]  [facility] [address]  [phone]     Date of Visit: 84/13/6798  Name: Louis Ferraro   YOB: 1950        Subjective    VISIT DIAGNOSIS:  Diagnoses and all orders for this visit:    Metastatic melanoma ([facility])  -     CBC and differential; Standing  -     Comprehensive metabolic panel; Standing  -     LD,Blood; Standing  -     T3, free; Standing  -     T4, free; Standing  -     TSH, 3rd generation; Standing  -     CBC and differential  -     Comprehensive metabolic panel  -     LD,Blood  -     T3, free  -     T4, free  -     TSH, 3rd generation    High risk medication use  -     CBC and differential; Standing  -     Comprehensive metabolic panel; Standing  -     LD,Blood; Standing  -     T3, free; Standing  -     T4, free; Standing  -     TSH, 3rd generation; Standing  -     CBC and differential  -     Comprehensive metabolic panel  -     LD,Blood  -     T3, free  -     T4, free  -     TSH, 3rd generation    Adrenal insufficiency (HCC)        Oncology History   Metastatic melanoma ([facility])   10/13/2022 -  Cancer Staged    Staging form: Melanoma of the Skin, AJCC 8th Edition  - Clinical stage from 10/13/2022: Stage IV (cTX, cNX, cM1a) - Signed by Lashanda Soni MD on 11/22/2022       10/13/2022 Biopsy    A. Skin, right lateral medial leg, punch biopsy:  Portion of severely atypical melanocytic dermal proliferation with prominent melanosis consistent with melanoma. See note. B. Skin, right medial leg. punch biopsy:  Portion of severely atypical melanocytic dermal proliferation with prominent melanosis consistent with melanoma. See note. C. Skin, right medial leg, punch biopsy:   Portion of severely atypical melanocytic dermal proliferation with prominent melanosis consistent with melanoma. See note.      11/22/2022 Initial Diagnosis    Metastatic melanoma ([address] [facility])     12/14/2022 -  Chemotherapy    alteplase (CATHFLO), 2 mg, Intracatheter, Every 2 hour PRN, 13 of 20 cycles  NIVOLUMAB-RELATLIMAB-RMBW (OPDUALAG) IVPB, 480 mg of nivolumab, Intravenous, Once, 13 of 20 cycles  Administration: 480 mg of nivolumab (12/14/2022), 480 mg of nivolumab (1/11/2023), 480 mg of nivolumab (2/8/2023), 480 mg of nivolumab (3/8/2023), 480 mg of nivolumab (4/5/2023), 480 mg of nivolumab (5/3/2023), 480 mg of nivolumab (5/31/2023), 480 mg of nivolumab (6/28/2023), 480 mg of nivolumab (7/26/2023), 480 mg of nivolumab (8/23/2023), 480 mg of nivolumab (9/20/2023), 480 mg of nivolumab (10/18/2023), 480 mg of nivolumab (11/15/2023)     1/5/2023 [facility] liquid testing  TMB 1 Mut/Mb  MSI-high not detected          Cancer Staging   Metastatic melanoma ([address])  Staging form: Melanoma of the Skin, AJCC 8th Edition  - Clinical stage from 10/13/2022: Stage IV (cTX, cNX, cM1a) - Signed by Kellie Manzanares MD on 11/22/2022     Treatment Details   Treatment goal Curative   Plan Name OP Nivolumab and Relatlimab-rmbw Q 28 Days   Status Active   Start Date 12/14/2022   End Date 5/29/2024 (Planned)   Provider Kellie Manzanares MD   Chemotherapy alteplase (CATHFLO), 2 mg, Intracatheter, Every 2 hour PRN, 13 of 20 cycles    NIVOLUMAB-RELATLIMAB-RMBW (OPDUALAG) IVPB, 480 mg of nivolumab, Intravenous, Once, 13 of 20 cycles  Administration: 480 mg of nivolumab (12/14/2022), 480 mg of nivolumab (1/11/2023), 480 mg of nivolumab (2/8/2023), 480 mg of nivolumab (3/8/2023), 480 mg of nivolumab (4/5/2023), 480 mg of nivolumab (5/3/2023), 480 mg of nivolumab (5/31/2023), 480 mg of nivolumab (6/28/2023), 480 mg of nivolumab (7/26/2023), 480 mg of nivolumab (8/23/2023), 480 mg of nivolumab (9/20/2023), 480 mg of nivolumab (10/18/2023), 480 mg of nivolumab (11/15/2023)          HISTORY OF PRESENT ILLNESS: Alex Woodard is a 68 y.o. female  who has metastatic melanoma on treatment with nivolumab and relatlimab/Opdualag here for continued monitoring, follow-up and surveillance. She is doing well and tolerating treatment. No issues concerns or complaints today. No new, changing, concerning skin lesions. No lymphadenopathy. Denies any immune mediated side effects or new immune mediated side effects. Denies headaches, double vision, rash, itching, chest pain, shortness of breath, diarrhea. Denies any muscle weakness or fatigue. Pigmentation on her right lower extremity continues to decrease. REVIEW OF SYSTEMS:  Review of Systems   Constitutional:  Negative for appetite change, fatigue, fever and unexpected weight change. HENT:   Negative for lump/mass, trouble swallowing and voice change. Eyes:  Negative for icterus. Respiratory:  Negative for cough, shortness of breath and wheezing. Cardiovascular:  Negative for leg swelling. Gastrointestinal:  Negative for abdominal pain, constipation, diarrhea, nausea and vomiting. Genitourinary:  Negative for difficulty urinating and hematuria. Musculoskeletal:  Negative for arthralgias, gait problem and myalgias. Skin:  Negative for itching and rash. No new, changing, or concerning lesions. Neurological:  Negative for extremity weakness, gait problem, headaches, light-headedness and numbness. Hematological:  Negative for adenopathy.         MEDICATIONS:    Current Outpatient Medications:     apixaban (Eliquis) 5 mg, Take 1 tablet (5 mg total) by mouth every 12 (twelve) hours, Disp: 60 tablet, Rfl: 5    famotidine (PEPCID) 10 mg tablet, Take 10 mg by mouth 2 (two) times a day, Disp: , Rfl:     hydrocortisone (CORTEF) 10 mg tablet, Take 1 tablet (10 mg total) by mouth 2 (two) times a day, Disp: 60 tablet, Rfl: 11    loratadine (CLARITIN) 10 mg tablet, Take 10 mg by mouth daily, Disp: , Rfl:     losartan (Cozaar) 100 MG tablet, Take 1 tablet (100 mg total) by mouth daily, Disp: 30 tablet, Rfl: 5    metoprolol succinate (TOPROL-XL) 25 mg 24 hr tablet, Take 1 tablet (25 mg total) by mouth 2 (two) times a day, Disp: 60 tablet, Rfl: 5    Multiple Minerals-Vitamins (Citracal Plus) TABS, Take by mouth, Disp: , Rfl:     Multiple Vitamins-Minerals (MULTIVITAMIN WITH MINERALS) tablet, Take 1 tablet by mouth daily, Disp: , Rfl:     patient supplied medication, Pt takes eye drops Mura 128 - not listed in database, Disp: , Rfl:     Wheat Dextrin (BENEFIBER DRINK MIX PO), Take by mouth, Disp: , Rfl:      ALLERGIES:  Allergies   Allergen Reactions    Etomidate Other (See Comments)        ACTIVE PROBLEMS:  Patient Active Problem List   Diagnosis    Acute massive pulmonary embolism (HCC)    Venous stasis of both lower extremities    Clostridium difficile infection    Right ventricular systolic dysfunction without heart failure    Essential hypertension    Metastatic melanoma ([address])    High risk medication use    Adrenal insufficiency (HCC)    Abnormal PET scan of colon    Mild anemia    Anticoagulated by anticoagulation treatment    History of colon polyps    Diverticulosis    Abnormal finding on GI tract imaging          PAST MEDICAL HISTORY:   Past Medical History:   Diagnosis Date    Acute deep vein thrombosis (DVT) of popliteal vein of left lower extremity (HCC)     Ankle wound, left, initial encounter     Resolved 2017    Ankle wound, right, initial encounter     resolved 2017    DVT (deep vein thrombosis) in pregnancy     Hypertension     Pulmonary emboli (HCC)      post C section    Pulmonary embolism ([address])     2017    Pulmonary embolism, blood-clot, obstetric     Skin cancer     Venous stasis ulcer (HCC)     Venous ulcers of both lower extremities (HCC)         PAST SURGICAL HISTORY:  Past Surgical History:   Procedure Laterality Date     SECTION      X2        SOCIAL HISTORY:  Social History     Socioeconomic History    Marital status:       Spouse name: None    Number of children: None    Years of education: None    Highest education level: None   Occupational History    None   Tobacco Use Smoking status: Never    Smokeless tobacco: Never   Vaping Use    Vaping Use: Never used   Substance and Sexual Activity    Alcohol use: No     Comment: hx of social drinker    Drug use: No    Sexual activity: None   Other Topics Concern    None   Social History Narrative    None     Social Determinants of Health     Financial Resource Strain: Not on file   Food Insecurity: Not on file   Transportation Needs: Not on file   Physical Activity: Not on file   Stress: Not on file   Social Connections: Not on file   Intimate Partner Violence: Not on file   Housing Stability: Not on file        FAMILY HISTORY:  Family History   Problem Relation Age of Onset    Heart attack Mother     Stroke Mother     Cancer Father     Bone cancer Family     Hypertension Family     Lung cancer Family            Objective    PHYSICAL EXAMINATION:   Blood pressure 122/64, pulse 92, temperature 98 °F (36.7 °C), temperature source Temporal, resp. rate 18, height 5' 9" (1.753 m), weight 87.5 kg (193 lb), SpO2 98 %. Pain Score: 0-No pain     ECOG Performance Status      Flowsheet Row Most Recent Value   ECOG Performance Status 0 - Fully active, able to carry on all pre-disease performance without restriction               Physical Exam  Constitutional:       General: She is not in acute distress. Appearance: Normal appearance. She is not ill-appearing or toxic-appearing. HENT:      Head: Normocephalic and atraumatic. Right Ear: External ear normal.      Left Ear: External ear normal.      Nose: Nose normal.      Mouth/Throat:      Mouth: Mucous membranes are moist.      Pharynx: Oropharynx is clear. Eyes:      General: No scleral icterus. Right eye: No discharge. Left eye: No discharge. Conjunctiva/sclera: Conjunctivae normal.   Cardiovascular:      Rate and Rhythm: Normal rate and regular rhythm. Pulses: Normal pulses. Heart sounds: No murmur heard. No friction rub. No gallop.    Pulmonary: Effort: Pulmonary effort is normal. No respiratory distress. Breath sounds: Normal breath sounds. No wheezing or rales. Abdominal:      General: Bowel sounds are normal. There is no distension. Palpations: There is no mass. Tenderness: There is no abdominal tenderness. There is no rebound. Musculoskeletal:         General: No swelling or tenderness. Cervical back: Normal range of motion. No rigidity. Right lower leg: No edema. Left lower leg: No edema. Lymphadenopathy:      Head:      Right side of head: No submandibular, preauricular or posterior auricular adenopathy. Left side of head: No submandibular, preauricular or posterior auricular adenopathy. Cervical: No cervical adenopathy. Right cervical: No superficial or posterior cervical adenopathy. Left cervical: No superficial or posterior cervical adenopathy. Upper Body:      Right upper body: No supraclavicular or axillary adenopathy. Left upper body: No supraclavicular or axillary adenopathy. Skin:     General: Skin is warm. Coloration: Skin is not jaundiced. Findings: No lesion or rash. Comments: Decreased pigmentation on the right lower leg/below the knee area. No nodularity or masses. Neurological:      General: No focal deficit present. Mental Status: She is alert and oriented to person, place, and time. Cranial Nerves: No cranial nerve deficit. Motor: No weakness. Gait: Gait normal.   Psychiatric:         Mood and Affect: Mood normal.         Behavior: Behavior normal.         Thought Content: Thought content normal.         Judgment: Judgment normal.         I reviewed lab data in the chart.     WBC   Date Value Ref Range Status   12/11/2023 6.82 4.31 - 10.16 Thousand/uL Final   11/11/2023 5.29 4.31 - 10.16 Thousand/uL Final   10/14/2023 4.83 4.31 - 10.16 Thousand/uL Final     Hemoglobin   Date Value Ref Range Status   12/11/2023 12.1 11.5 - 15.4 g/dL Final   11/11/2023 12.6 11.5 - 15.4 g/dL Final   10/14/2023 12.5 11.5 - 15.4 g/dL Final     Platelets   Date Value Ref Range Status   12/11/2023 238 149 - 390 Thousands/uL Final   11/11/2023 184 149 - 390 Thousands/uL Final   10/14/2023 181 149 - 390 Thousands/uL Final     MCV   Date Value Ref Range Status   12/11/2023 90 82 - 98 fL Final   11/11/2023 92 82 - 98 fL Final   10/14/2023 94 82 - 98 fL Final      Potassium   Date Value Ref Range Status   12/11/2023 3.8 3.5 - 5.3 mmol/L Final   11/11/2023 4.6 3.5 - 5.3 mmol/L Final   10/14/2023 4.1 3.5 - 5.3 mmol/L Final     Chloride   Date Value Ref Range Status   12/11/2023 103 96 - 108 mmol/L Final   11/11/2023 106 96 - 108 mmol/L Final   10/14/2023 105 96 - 108 mmol/L Final     CO2   Date Value Ref Range Status   12/11/2023 29 21 - 32 mmol/L Final   11/11/2023 31 21 - 32 mmol/L Final   10/14/2023 30 21 - 32 mmol/L Final     BUN   Date Value Ref Range Status   12/11/2023 21 5 - 25 mg/dL Final   11/11/2023 19 5 - 25 mg/dL Final   10/14/2023 25 5 - 25 mg/dL Final     Creatinine   Date Value Ref Range Status   12/11/2023 0.86 0.60 - 1.30 mg/dL Final     Comment:     Standardized to IDMS reference method   11/11/2023 0.82 0.60 - 1.30 mg/dL Final     Comment:     Standardized to IDMS reference method   10/14/2023 0.97 0.60 - 1.30 mg/dL Final     Comment:     Standardized to IDMS reference method     Glucose   Date Value Ref Range Status   12/11/2023 88 65 - 140 mg/dL Final     Comment:     If the patient is fasting, the ADA then defines impaired fasting glucose as > 100 mg/dL and diabetes as > or equal to 123 mg/dL. 11/17/2022 85 65 - 140 mg/dL Final     Comment:     If the patient is fasting, the ADA then defines impaired fasting glucose as > 100 mg/dL and diabetes as > or equal to 123 mg/dL. Specimen collection should occur prior to Sulfasalazine administration due to the potential for falsely depressed results.  Specimen collection should occur prior to Sulfapyridine administration due to the potential for falsely elevated results. 08/03/2020 112 (H) 65 - 99 mg/dL Final     Comment:     If the patient is fasting, the ADA then defines impaired fasting glucose as > 100 mg/dL and diabetes as > or equal to 123 mg/dL. Specimen collection should occur prior to Sulfasalazine administration due to the potential for falsely depressed results. Specimen collection should occur prior to Sulfapyridine administration due to the potential for falsely elevated results. Calcium   Date Value Ref Range Status   12/11/2023 9.1 8.4 - 10.2 mg/dL Final   11/11/2023 9.5 8.4 - 10.2 mg/dL Final   10/14/2023 9.2 8.4 - 10.2 mg/dL Final     Albumin   Date Value Ref Range Status   12/11/2023 3.8 3.5 - 5.0 g/dL Final   11/11/2023 3.7 3.5 - 5.0 g/dL Final   10/14/2023 3.7 3.5 - 5.0 g/dL Final     Total Bilirubin   Date Value Ref Range Status   12/11/2023 0.42 0.20 - 1.00 mg/dL Final     Comment:     Use of this assay is not recommended for patients undergoing treatment with eltrombopag due to the potential for falsely elevated results. N-acetyl-p-benzoquinone imine (metabolite of Acetaminophen) will generate erroneously low results in samples for patients that have taken an overdose of Acetaminophen. 11/11/2023 0.56 0.20 - 1.00 mg/dL Final     Comment:     Use of this assay is not recommended for patients undergoing treatment with eltrombopag due to the potential for falsely elevated results. N-acetyl-p-benzoquinone imine (metabolite of Acetaminophen) will generate erroneously low results in samples for patients that have taken an overdose of Acetaminophen. 10/14/2023 0.60 0.20 - 1.00 mg/dL Final     Comment:     Use of this assay is not recommended for patients undergoing treatment with eltrombopag due to the potential for falsely elevated results.   N-acetyl-p-benzoquinone imine (metabolite of Acetaminophen) will generate erroneously low results in samples for patients that have taken an overdose of Acetaminophen. Alkaline Phosphatase   Date Value Ref Range Status   12/11/2023 38 34 - 104 U/L Final   11/11/2023 41 34 - 104 U/L Final   10/14/2023 39 34 - 104 U/L Final     AST   Date Value Ref Range Status   12/11/2023 16 13 - 39 U/L Final   11/11/2023 15 13 - 39 U/L Final   10/14/2023 16 13 - 39 U/L Final     ALT   Date Value Ref Range Status   12/11/2023 11 7 - 52 U/L Final     Comment:     Specimen collection should occur prior to Sulfasalazine administration due to the potential for falsely depressed results. 11/11/2023 11 7 - 52 U/L Final     Comment:     Specimen collection should occur prior to Sulfasalazine administration due to the potential for falsely depressed results. 10/14/2023 12 7 - 52 U/L Final     Comment:     Specimen collection should occur prior to Sulfasalazine administration due to the potential for falsely depressed results. LD   Date Value Ref Range Status   12/11/2023 163 140 - 271 U/L Final   11/11/2023 135 (L) 140 - 271 U/L Final   10/14/2023 171 140 - 271 U/L Final     No results found for: "TSH"  No results found for: "C8UYMAN"   Free T4   Date Value Ref Range Status   12/11/2023 1.10 0.61 - 1.12 ng/dL Final     Comment:     Specimens with biotin concentrations > 10 ng/mL can lead to significant (> 10%) positive bias in result. 11/11/2023 0.93 0.61 - 1.12 ng/dL Final     Comment:     Specimens with biotin concentrations > 10 ng/mL can lead to significant (> 10%) positive bias in result. 10/14/2023 0.85 0.61 - 1.12 ng/dL Final     Comment:     Specimens with biotin concentrations > 10 ng/mL can lead to significant (> 10%) positive bias in result. RECENT IMAGING:  Procedure: Mammo screening bilateral w 3d & cad    Result Date: 12/5/2023  Narrative: This result has an attachment that is not available. HISTORY: Patient is 68years old and is seen for a screening mammogram of both breasts.  No relevant medical history has been documented for this patient. No relevant surgical history has been documented for this patient. No relevant family history has been documented for this patient. No relevant hormone history has been documented for this patient. FILMS COMPARED: The present examination has been compared to prior imaging studies. MAMMOGRAM FINDINGS: A minimum of two views were obtained. 3D tomosynthesis was performed. Computer-aided detection was utilized by the radiologist in the interpretation of this examination. The breasts have scattered areas of fibroglandular density. No suspicious masses, calcifications or other abnormalities are seen. Impression: Impression: There is no mammographic evidence of malignancy. BI-RADS Category:  1 - Negative Follow Up Mamm in 1 Yr. is recommended. 5 Year Tyrer-Cuzick: 1.8 % 10 Year Tyrer-Cuzick: 3.81 % Lifetime Tyrer-Cuzick: 4.66 % In patients with a documented history of breast cancer a risk score will not be calculated. Based on the information provided by the patient, risk scores for breast cancer for 5 years, 10 years and lifetime was calculated using both breast density and family history. Patients should consult with their referring providers regarding the significance of the score, potential need for additional risk assessment and supplemental screening including whole breast ultrasound and MRI Workstation: [id number]           Assessment/Plan  Ms. Dalia Parra is a 68-year-old female with metastatic melanoma mainly with skin pigmentation on the right lower extremity with response to treatment on treatment with nivolumab plus relatlimab here for continued monitoring, follow-up and surveillance. 1. Metastatic melanoma ([address])  She is doing well and tolerating treatment with nivolumab plus relatlimab. She is having nice treatment response. Will get blood work following her visit with me today as infusion is later this week. She knows to continue monitor for immune mediated side effects.   She knows to call with issues or concerns prior to her next visit. New orders placed for repeating blood work to be done prior to each treatment. She would do for full body imaging in approximately 1 to 2 months. We will place orders at her next visit. - CBC and differential; Standing  - Comprehensive metabolic panel; Standing  - LD,Blood; Standing  - T3, free; Standing  - T4, free; Standing  - TSH, 3rd generation; Standing  - CBC and differential  - Comprehensive metabolic panel  - LD,Blood  - T3, free  - T4, free  - TSH, 3rd generation    2. High risk medication use  3. Adrenal insufficiency ([address])  She is on treatment with immunotherapy and we will continue to monitor for side effects and lab abnormalities. We will monitor labs with each treatment to ensure safety of continuing on treatment. She knows to watch for signs and symptoms for immune mediated side effects. Denies any additional immune mediated side effects at this time. She has standing orders for blood work prior to each treatment. She knows to call with issues or concerns prior to her next visit. - CBC and differential; Standing  - Comprehensive metabolic panel; Standing  - LD,Blood; Standing  - T3, free; Standing  - T4, free; Standing  - TSH, 3rd generation; Standing  - CBC and differential  - Comprehensive metabolic panel  - LD,Blood  - T3, free  - T4, free  - TSH, 3rd generation        Return for Office Visit, Infusion - See Treatment Plan, labs.      Rony Irving MD, PhD

## 2023-12-13 NOTE — PLAN OF CARE
Problem: Potential for Falls  Goal: Patient will remain free of falls  Description: INTERVENTIONS:  - Educate patient/family on patient safety including physical limitations  - Instruct patient to call for assistance with activity   - Consult OT/PT to assist with strengthening/mobility   - Keep Call bell within reach  - Keep bed low and locked with side rails adjusted as appropriate  - Keep care items and personal belongings within reach  - Initiate and maintain comfort rounds  - Make Fall Risk Sign visible to staff  - Consider moving patient to room near nurses station  Outcome: Progressing     Problem: INFECTION - ADULT  Goal: Absence or prevention of progression during hospitalization  Description: INTERVENTIONS:  - Assess and monitor for signs and symptoms of infection  - Monitor lab/diagnostic results  - Monitor all insertion sites, i.e. indwelling lines, tubes, and drains  - Monitor endotracheal if appropriate and nasal secretions for changes in amount and color  - Sylvester appropriate cooling/warming therapies per order  - Administer medications as ordered  - Instruct and encourage patient and family to use good hand hygiene technique  - Identify and instruct in appropriate isolation precautions for identified infection/condition  Outcome: Progressing     Problem: Knowledge Deficit  Goal: Patient/family/caregiver demonstrates understanding of disease process, treatment plan, medications, and discharge instructions  Description: Complete learning assessment and assess knowledge base.   Interventions:  - Provide teaching at level of understanding  - Provide teaching via preferred learning methods  Outcome: Progressing

## 2023-12-13 NOTE — PROGRESS NOTES
Recent labs reviewed. Pt tolerated Opdualag tx well with no issues. PIV removed without incident. Farnaz Velez is aware of future appt on 1/10/24 at 2:00PM.     AVS printed and given to Farnaz Velez: Yes X No (Declined by Farnaz Velez)    Pt discharged off unit in stable condition with all personal belongings.

## 2024-01-03 RX ORDER — SODIUM CHLORIDE 9 MG/ML
20 INJECTION, SOLUTION INTRAVENOUS ONCE
OUTPATIENT
Start: 2024-01-10

## 2024-01-06 ENCOUNTER — APPOINTMENT (OUTPATIENT)
Dept: LAB | Facility: CLINIC | Age: 74
End: 2024-01-06
Payer: COMMERCIAL

## 2024-01-08 ENCOUNTER — OFFICE VISIT (OUTPATIENT)
Dept: HEMATOLOGY ONCOLOGY | Facility: CLINIC | Age: 74
End: 2024-01-08
Payer: COMMERCIAL

## 2024-01-08 VITALS
DIASTOLIC BLOOD PRESSURE: 76 MMHG | HEIGHT: 69 IN | BODY MASS INDEX: 29.47 KG/M2 | OXYGEN SATURATION: 97 % | TEMPERATURE: 98 F | HEART RATE: 78 BPM | SYSTOLIC BLOOD PRESSURE: 132 MMHG | WEIGHT: 199 LBS | RESPIRATION RATE: 18 BRPM

## 2024-01-08 DIAGNOSIS — C43.9 METASTATIC MELANOMA (HCC): Primary | ICD-10-CM

## 2024-01-08 DIAGNOSIS — Z79.899 HIGH RISK MEDICATION USE: ICD-10-CM

## 2024-01-08 DIAGNOSIS — C43.71 MALIGNANT MELANOMA OF RIGHT LOWER LEG (HCC): ICD-10-CM

## 2024-01-08 PROCEDURE — 99214 OFFICE O/P EST MOD 30 MIN: CPT | Performed by: INTERNAL MEDICINE

## 2024-01-08 NOTE — LETTER
February 5, 2024     Anh Bailey MD  [address] [facility]  2nd Floor  [facility] [address]    Patient: Debbie Damon   YOB: 1950   Date of Visit: 1/8/2024       Dear Dr. Bailey:    Thank you for referring Debbie Damon to me for evaluation. Below are my notes for this consultation.    If you have questions, please do not hesitate to call me. I look forward to following your patient along with you.         Sincerely,        Rafaela Reyes MD        CC: Juan Carlos Perry, KEN Amos MD Joseph John [place], DO Rafaela Reyes MD  2/5/2024 10:33 PM  Sign when Signing Visit  [facility] [place]  [address] [address]  [phone]  [phone]     Date of Visit: 1/8/2024  Name: Debbie Damon   YOB: 1950        Subjective    VISIT DIAGNOSIS:  Diagnoses and all orders for this visit:    Metastatic melanoma (HCC)  -     NM pet ct tumor imaging whole body; Future    Malignant melanoma of right lower leg (HCC)  -     NM pet ct tumor imaging whole body; Future    High risk medication use        Oncology History   Metastatic melanoma (HCC)   10/13/2022 -  Cancer Staged    Staging form: Melanoma of the Skin, AJCC 8th Edition  - Clinical stage from 10/13/2022: Stage IV (cTX, cNX, cM1a) - Signed by Rafaela Reyes MD on 11/22/2022       10/13/2022 Biopsy    A. Skin, right lateral medial leg, punch biopsy:  Portion of severely atypical melanocytic dermal proliferation with prominent melanosis consistent with melanoma. See note.        B. Skin, right medial leg. punch biopsy:  Portion of severely atypical melanocytic dermal proliferation with prominent melanosis consistent with melanoma. See note.        C. Skin, right medial leg, punch biopsy:   Portion of severely atypical melanocytic dermal proliferation with prominent melanosis consistent with melanoma. See note.     11/22/2022 Initial Diagnosis    Metastatic  melanoma (HCC)     12/14/2022 -  Chemotherapy    alteplase (CATHFLO), 2 mg, Intracatheter, Every 2 hour PRN, 15 of 20 cycles  NIVOLUMAB-RELATLIMAB-RMBW (OPDUALAG) IVPB, 480 mg of nivolumab, Intravenous, Once, 15 of 20 cycles  Administration: 480 mg of nivolumab (12/14/2022), 480 mg of nivolumab (1/11/2023), 480 mg of nivolumab (2/8/2023), 480 mg of nivolumab (3/8/2023), 480 mg of nivolumab (4/5/2023), 480 mg of nivolumab (5/3/2023), 480 mg of nivolumab (5/31/2023), 480 mg of nivolumab (6/28/2023), 480 mg of nivolumab (7/26/2023), 480 mg of nivolumab (8/23/2023), 480 mg of nivolumab (9/20/2023), 480 mg of nivolumab (10/18/2023), 480 mg of nivolumab (11/15/2023), 480 mg of nivolumab (12/13/2023), 480 mg of nivolumab (1/10/2024)     1/5/2023 Genomic Testing    [facility] liquid testing  TMB 1 Mut/Mb  MSI-high not detected          Cancer Staging   Metastatic melanoma (HCC)  Staging form: Melanoma of the Skin, AJCC 8th Edition  - Clinical stage from 10/13/2022: Stage IV (cTX, cNX, cM1a) - Signed by Rafaela Reyes MD on 11/22/2022     Treatment Details   Treatment goal Curative   Plan Name OP Nivolumab and Relatlimab-rmbw Q 28 Days   Status Active   Start Date 12/14/2022   End Date 5/29/2024 (Planned)   Provider Rafaela Reyes MD   Chemotherapy alteplase (CATHFLO), 2 mg, Intracatheter, Every 2 hour PRN, 15 of 20 cycles    NIVOLUMAB-RELATLIMAB-RMBW (OPDUALAG) IVPB, 480 mg of nivolumab, Intravenous, Once, 15 of 20 cycles  Administration: 480 mg of nivolumab (12/14/2022), 480 mg of nivolumab (1/11/2023), 480 mg of nivolumab (2/8/2023), 480 mg of nivolumab (3/8/2023), 480 mg of nivolumab (4/5/2023), 480 mg of nivolumab (5/3/2023), 480 mg of nivolumab (5/31/2023), 480 mg of nivolumab (6/28/2023), 480 mg of nivolumab (7/26/2023), 480 mg of nivolumab (8/23/2023), 480 mg of nivolumab (9/20/2023), 480 mg of nivolumab (10/18/2023), 480 mg of nivolumab (11/15/2023), 480 mg of nivolumab (12/13/2023), 480 mg of nivolumab  (1/10/2024)          HISTORY OF PRESENT ILLNESS: Debbie Damon is a 73 y.o. female  who has metastatic melanoma on treatment with nivolumab plus relatlimab here for continued monitoring follow-up and surveillance.    She is doing well and feels good.  Has no issues concerns or complaints at this time.  Energy is good.  She continues to take 10 mg hydrocortisone twice daily.  Eating well.  No concerning lesions or changing lesions.  She states the pigmentation on her right lower leg continues to lighten up.    Denies any headaches, double vision, rash, itching, chest pain, shortness of breath, diarrhea.  Denies muscle weakness and fatigue.        REVIEW OF SYSTEMS:  Review of Systems   Constitutional:  Negative for appetite change, fatigue, fever and unexpected weight change.   HENT:   Negative for lump/mass, trouble swallowing and voice change.    Eyes:  Negative for icterus.   Respiratory:  Negative for cough, shortness of breath and wheezing.    Cardiovascular:  Negative for leg swelling.   Gastrointestinal:  Negative for abdominal pain, constipation, diarrhea, nausea and vomiting.   Genitourinary:  Negative for difficulty urinating and hematuria.    Musculoskeletal:  Negative for arthralgias, gait problem and myalgias.   Skin:  Negative for itching and rash.        No new, changing, or concerning lesions.   Neurological:  Negative for extremity weakness, gait problem, headaches, light-headedness and numbness.   Hematological:  Negative for adenopathy.        MEDICATIONS:    Current Outpatient Medications:   •  apixaban (Eliquis) 5 mg, Take 1 tablet (5 mg total) by mouth every 12 (twelve) hours, Disp: 60 tablet, Rfl: 5  •  famotidine (PEPCID) 10 mg tablet, Take 10 mg by mouth 2 (two) times a day, Disp: , Rfl:   •  hydrocortisone (CORTEF) 10 mg tablet, Take 1 tablet (10 mg total) by mouth 2 (two) times a day, Disp: 60 tablet, Rfl: 11  •  loratadine (CLARITIN) 10 mg tablet, Take 10 mg by mouth daily, Disp: , Rfl:   •   losartan (Cozaar) 100 MG tablet, Take 1 tablet (100 mg total) by mouth daily, Disp: 30 tablet, Rfl: 5  •  metoprolol succinate (TOPROL-XL) 25 mg 24 hr tablet, Take 1 tablet (25 mg total) by mouth 2 (two) times a day, Disp: 60 tablet, Rfl: 5  •  Multiple Minerals-Vitamins (Citracal Plus) TABS, Take by mouth, Disp: , Rfl:   •  Multiple Vitamins-Minerals (MULTIVITAMIN WITH MINERALS) tablet, Take 1 tablet by mouth daily, Disp: , Rfl:   •  patient supplied medication, Pt takes eye drops Mura 128 - not listed in database, Disp: , Rfl:   •  Wheat Dextrin (BENEFIBER DRINK MIX PO), Take by mouth, Disp: , Rfl:      ALLERGIES:  Allergies   Allergen Reactions   • Etomidate Other (See Comments)        ACTIVE PROBLEMS:  Patient Active Problem List   Diagnosis   • Acute massive pulmonary embolism (HCC)   • Venous stasis of both lower extremities   • Clostridium difficile infection   • Right ventricular systolic dysfunction without heart failure   • Essential hypertension   • Metastatic melanoma (HCC)   • High risk medication use   • Adrenal insufficiency (HCC)   • Abnormal PET scan of colon   • Mild anemia   • Anticoagulated by anticoagulation treatment   • History of colon polyps   • Diverticulosis   • Abnormal finding on GI tract imaging          PAST MEDICAL HISTORY:   Past Medical History:   Diagnosis Date   • Acute deep vein thrombosis (DVT) of popliteal vein of left lower extremity (HCC)    • Ankle wound, left, initial encounter     Resolved 2017   • Ankle wound, right, initial encounter     resolved 2017   • DVT (deep vein thrombosis) in pregnancy    • Hypertension    • Pulmonary emboli (HCC)      post C section   • Pulmonary embolism (HCC)     2017   • Pulmonary embolism, blood-clot, obstetric    • Skin cancer    • Venous stasis ulcer (HCC)    • Venous ulcers of both lower extremities (HCC)         PAST SURGICAL HISTORY:  Past Surgical History:   Procedure Laterality Date   •  SECTION      X2     "    SOCIAL HISTORY:  Social History     Socioeconomic History   • Marital status:      Spouse name: None   • Number of children: None   • Years of education: None   • Highest education level: None   Occupational History   • None   Tobacco Use   • Smoking status: Never   • Smokeless tobacco: Never   Vaping Use   • Vaping status: Never Used   Substance and Sexual Activity   • Alcohol use: No     Comment: hx of social drinker   • Drug use: No   • Sexual activity: None   Other Topics Concern   • None   Social History Narrative   • None     Social Determinants of Health     Financial Resource Strain: Not on file   Food Insecurity: Not on file   Transportation Needs: Not on file   Physical Activity: Not on file   Stress: Not on file   Social Connections: Not on file   Intimate Partner Violence: Not on file   Housing Stability: Not on file        FAMILY HISTORY:  Family History   Problem Relation Age of Onset   • Heart attack Mother    • Stroke Mother    • Cancer Father    • Bone cancer Family    • Hypertension Family    • Lung cancer Family            Objective    PHYSICAL EXAMINATION:   Blood pressure 132/76, pulse 78, temperature 98 °F (36.7 °C), temperature source Temporal, resp. rate 18, height 5' 9\" (1.753 m), weight 90.3 kg (199 lb), SpO2 97%.     Pain Score: 0-No pain     ECOG Performance Status      Flowsheet Row Most Recent Value   ECOG Performance Status 0 - Fully active, able to carry on all pre-disease performance without restriction               Physical Exam  Constitutional:       General: She is not in acute distress.     Appearance: Normal appearance. She is not ill-appearing or toxic-appearing.   HENT:      Head: Normocephalic and atraumatic.      Right Ear: External ear normal.      Left Ear: External ear normal.      Nose: Nose normal.      Mouth/Throat:      Mouth: Mucous membranes are moist.      Pharynx: Oropharynx is clear.   Eyes:      General: No scleral icterus.        Right eye: No " discharge.         Left eye: No discharge.      Conjunctiva/sclera: Conjunctivae normal.   Cardiovascular:      Rate and Rhythm: Normal rate and regular rhythm.      Pulses: Normal pulses.      Heart sounds: No murmur heard.     No friction rub. No gallop.   Pulmonary:      Effort: Pulmonary effort is normal. No respiratory distress.      Breath sounds: Normal breath sounds. No wheezing or rales.   Abdominal:      General: Bowel sounds are normal. There is no distension.      Palpations: There is no mass.      Tenderness: There is no abdominal tenderness. There is no rebound.   Musculoskeletal:         General: No swelling or tenderness.      Cervical back: Normal range of motion. No rigidity.      Right lower leg: No edema.      Left lower leg: No edema.   Lymphadenopathy:      Head:      Right side of head: No submandibular, preauricular or posterior auricular adenopathy.      Left side of head: No submandibular, preauricular or posterior auricular adenopathy.      Cervical: No cervical adenopathy.      Right cervical: No superficial or posterior cervical adenopathy.     Left cervical: No superficial or posterior cervical adenopathy.      Upper Body:      Right upper body: No supraclavicular or axillary adenopathy.      Left upper body: No supraclavicular or axillary adenopathy.   Skin:     General: Skin is warm.      Coloration: Skin is not jaundiced.      Findings: No lesion or rash.      Comments: Decrease in pigmentation in the right lower extremity, mainly in the area medial and superior to the ankle.   Neurological:      General: No focal deficit present.      Mental Status: She is alert and oriented to person, place, and time.      Cranial Nerves: No cranial nerve deficit.      Motor: No weakness.      Gait: Gait normal.   Psychiatric:         Mood and Affect: Mood normal.         Behavior: Behavior normal.         Thought Content: Thought content normal.         Judgment: Judgment normal.         I  reviewed lab data in the chart.   Latest Reference Range & Units 01/06/24 07:17   Sodium 135 - 147 mmol/L 141   Potassium 3.5 - 5.3 mmol/L 4.3   Chloride 96 - 108 mmol/L 104   Carbon Dioxide 21 - 32 mmol/L 30   ANION GAP mmol/L 7   BUN 5 - 25 mg/dL 22   Creatinine 0.60 - 1.30 mg/dL 0.92   GLUCOSE, FASTING 65 - 99 mg/dL 86   Calcium 8.4 - 10.2 mg/dL 10.2   AST 13 - 39 U/L 17   ALT 7 - 52 U/L 11   ALK PHOS 34 - 104 U/L 41   Total Protein 6.4 - 8.4 g/dL 6.5   Albumin 3.5 - 5.0 g/dL 3.8   Total Bilirubin 0.20 - 1.00 mg/dL 0.61   GFR, Calculated ml/min/1.73sq m 61   LD (LDH) 140 - 271 U/L 196   WBC 4.31 - 10.16 Thousand/uL 5.37   RBC 3.81 - 5.12 Million/uL 4.43   Hemoglobin 11.5 - 15.4 g/dL 12.6   Hematocrit 34.8 - 46.1 % 40.5   MCV 82 - 98 fL 91   MCH 26.8 - 34.3 pg 28.4   MCHC 31.4 - 37.4 g/dL 31.1 (L)   RDW 11.6 - 15.1 % 13.6   Platelet Count 149 - 390 Thousands/uL 181   MPV 8.9 - 12.7 fL 11.1   nRBC /100 WBCs 0   Neutrophils % 43 - 75 % 31 (L)   Immat GRANS % 0 - 2 % 0   Lymphocytes Relative 14 - 44 % 52 (H)   Monocytes Relative 4 - 12 % 10   Eosinophils 0 - 6 % 5   Basophils Relative 0 - 1 % 2 (H)   Immature Grans Absolute 0.00 - 0.20 Thousand/uL 0.01   Absolute Neutrophils 1.85 - 7.62 Thousands/µL 1.67 (L)   Lymphocytes Absolute 0.60 - 4.47 Thousands/µL 2.79   Absolute Monocytes 0.17 - 1.22 Thousand/µL 0.53   Absolute Eosinophils 0.00 - 0.61 Thousand/µL 0.29   Basophils Absolute 0.00 - 0.10 Thousands/µL 0.08   TSH 3RD GENERATON 0.450 - 4.500 uIU/mL 4.366   Free T4 0.61 - 1.12 ng/dL 0.98   T3, Free 2.50 - 3.90 pg/mL 3.08   (L): Data is abnormally low  (H): Data is abnormally high    RECENT IMAGING:       Assessment/Plan  Ms. Damon is a 73-year-old female with metastatic melanoma on treatment with relatlimab plus nivolumab here for continued monitoring, follow-up and surveillance.    1. Metastatic melanoma (HCC)  2. Malignant melanoma of right lower leg (HCC)  She is doing well and tolerating treatment.  No immune  mediated side effects.  Labs reviewed and okay.  She is okay to continue treatment.  She is also due for scans prior to her next visit to assess response to treatment.  Orders placed in prescription provided for PET scan.    She knows to call with issues or concerns prior to her next visit.  She has standing orders for blood work prior to each treatment.      - NM pet ct tumor imaging whole body; Future    3. High risk medication use  She is on treatment with immunotherapy and we will continue to monitor for side effects and lab abnormalities.  We will monitor labs with each treatment to ensure safety of continuing on treatment.  She knows to watch for signs and symptoms for immune mediated side effects.  Denies any immune mediated side effects at this time.          Return already scheduled, for Imaging - See orders.     Rafaela Reyes MD, PhD

## 2024-01-10 ENCOUNTER — HOSPITAL ENCOUNTER (OUTPATIENT)
Dept: INFUSION CENTER | Facility: HOSPITAL | Age: 74
Discharge: HOME | End: 2024-01-10
Attending: INTERNAL MEDICINE
Payer: COMMERCIAL

## 2024-01-10 VITALS
HEART RATE: 80 BPM | TEMPERATURE: 97.3 F | RESPIRATION RATE: 18 BRPM | SYSTOLIC BLOOD PRESSURE: 153 MMHG | DIASTOLIC BLOOD PRESSURE: 72 MMHG | BODY MASS INDEX: 29.37 KG/M2 | OXYGEN SATURATION: 97 % | HEIGHT: 69 IN

## 2024-01-10 DIAGNOSIS — C43.9 METASTATIC MELANOMA (HCC): Primary | ICD-10-CM

## 2024-01-10 PROCEDURE — 96413 CHEMO IV INFUSION 1 HR: CPT

## 2024-01-10 RX ORDER — SODIUM CHLORIDE 9 MG/ML
20 INJECTION, SOLUTION INTRAVENOUS ONCE
Status: COMPLETED | OUTPATIENT
Start: 2024-01-10 | End: 2024-01-10

## 2024-01-10 RX ADMIN — SODIUM CHLORIDE 480 MG OF NIVOLUMAB: 9 INJECTION, SOLUTION INTRAVENOUS at 14:54

## 2024-01-10 RX ADMIN — SODIUM CHLORIDE 20 ML/HR: 9 INJECTION, SOLUTION INTRAVENOUS at 14:30

## 2024-01-10 NOTE — PROGRESS NOTES
Debbie Damon  tolerated treatment well with no complications.      Debbie Damon is aware of future appt on 2/7 at 1400.     AVS printed and given to Debbie Damon:   No (Declined by Debbie Damon) x

## 2024-01-10 NOTE — PLAN OF CARE
Problem: INFECTION - ADULT  Goal: Absence or prevention of progression during hospitalization  Description: INTERVENTIONS:  - Assess and monitor for signs and symptoms of infection  - Monitor lab/diagnostic results  - Monitor all insertion sites, i.e. indwelling lines, tubes, and drains  - Monitor endotracheal if appropriate and nasal secretions for changes in amount and color  - [place] appropriate cooling/warming therapies per order  - Administer medications as ordered  - Instruct and encourage patient and family to use good hand hygiene technique  - Identify and instruct in appropriate isolation precautions for identified infection/condition  Outcome: Progressing     Problem: DISCHARGE PLANNING  Goal: Discharge to home or other facility with appropriate resources  Description: INTERVENTIONS:  - Identify barriers to discharge w/patient and caregiver  - Arrange for needed discharge resources and transportation as appropriate  - Identify discharge learning needs (meds, wound care, etc.)  - Arrange for interpretive services to assist at discharge as needed  - Refer to Case Management Department for coordinating discharge planning if the patient needs post-hospital services based on physician/advanced practitioner order or complex needs related to functional status, cognitive ability, or social support system  Outcome: Progressing     Problem: Knowledge Deficit  Goal: Patient/family/caregiver demonstrates understanding of disease process, treatment plan, medications, and discharge instructions  Description: Complete learning assessment and assess knowledge base.  Interventions:  - Provide teaching at level of understanding  - Provide teaching via preferred learning methods  Outcome: Progressing

## 2024-01-17 ENCOUNTER — TELEPHONE (OUTPATIENT)
Dept: HEMATOLOGY ONCOLOGY | Facility: CLINIC | Age: 74
End: 2024-01-17

## 2024-01-17 NOTE — TELEPHONE ENCOUNTER
Appointment Change  Cancel, Reschedule, Change to Virtual      Who are you speaking with? Patient   If it is not the patient, is the caller listed on the communication consent form? N/A   Which provider is the appointment scheduled with? Dr. Reyes   When was the original appointment scheduled?    Please list date and time 2/5/24 @ 2:25pm   At which location is the appointment scheduled to take place? [facility]   Was the appointment rescheduled?     Was the appointment changed from an in person visit to a virtual visit?    If so, please list the details of the change. Yes 2/1/24 @ 2:40pm   What is the reason for the appointment change? Dr. Reyes will be OOO       Was [facility] transport scheduled? N/A   Does [facility] transport need to be scheduled for the new visit (if applicable) No   Does the patient need an infusion appointment rescheduled? No   Does the patient have an upcoming infusion appointment scheduled? If so, when? No   Is the patient undergoing chemotherapy? yes   For appointments cancelled with less than 24 hours:  Was the no-show policy reviewed? Yes

## 2024-01-24 ENCOUNTER — HOSPITAL ENCOUNTER (OUTPATIENT)
Dept: RADIOLOGY | Age: 74
Discharge: HOME | End: 2024-01-24
Payer: COMMERCIAL

## 2024-01-24 DIAGNOSIS — C43.9 METASTATIC MELANOMA (HCC): ICD-10-CM

## 2024-01-24 DIAGNOSIS — C43.71 MALIGNANT MELANOMA OF RIGHT LOWER LEG (HCC): ICD-10-CM

## 2024-01-24 LAB — GLUCOSE SERPL-MCNC: 86 MG/DL (ref 65–140)

## 2024-01-24 PROCEDURE — 82948 REAGENT STRIP/BLOOD GLUCOSE: CPT

## 2024-01-24 PROCEDURE — 78816 PET IMAGE W/CT FULL BODY: CPT

## 2024-01-24 PROCEDURE — A9552 F18 FDG: HCPCS

## 2024-01-24 PROCEDURE — G1004 CDSM NDSC: HCPCS

## 2024-01-31 RX ORDER — SODIUM CHLORIDE 9 MG/ML
20 INJECTION, SOLUTION INTRAVENOUS ONCE
Status: CANCELLED | OUTPATIENT
Start: 2024-02-07

## 2024-02-01 ENCOUNTER — OFFICE VISIT (OUTPATIENT)
Dept: HEMATOLOGY ONCOLOGY | Facility: CLINIC | Age: 74
End: 2024-02-01
Payer: COMMERCIAL

## 2024-02-01 VITALS
SYSTOLIC BLOOD PRESSURE: 132 MMHG | HEIGHT: 69 IN | WEIGHT: 197 LBS | TEMPERATURE: 98.5 F | DIASTOLIC BLOOD PRESSURE: 78 MMHG | RESPIRATION RATE: 18 BRPM | OXYGEN SATURATION: 99 % | BODY MASS INDEX: 29.18 KG/M2 | HEART RATE: 89 BPM

## 2024-02-01 DIAGNOSIS — E27.40 ADRENAL INSUFFICIENCY (HCC): ICD-10-CM

## 2024-02-01 DIAGNOSIS — C43.9 METASTATIC MELANOMA (HCC): Primary | ICD-10-CM

## 2024-02-01 DIAGNOSIS — Z79.899 HIGH RISK MEDICATION USE: ICD-10-CM

## 2024-02-01 PROCEDURE — 99214 OFFICE O/P EST MOD 30 MIN: CPT | Performed by: INTERNAL MEDICINE

## 2024-02-01 NOTE — LETTER
February 3, 2024     Anh Bailey MD  [address] [facility]  2nd Floor  [facility] [address]    Patient: Debbie Damon   YOB: 1950   Date of Visit: 2/1/2024       Dear Dr. Bailey:    Thank you for referring Debbie Damon to me for evaluation. Below are my notes for this consultation.    If you have questions, please do not hesitate to call me. I look forward to following your patient along with you.         Sincerely,        Rafaela Reyes MD        CC: Juan Carlos Perry, KEN Amos MD Joseph John Miniss[place], DO Rafaela Reyes MD  2/3/2024 10:19 PM  Sign when Signing Visit  [facility] [place]  [address] [facility] [address]  [phone]  [phone]     Date of Visit: 2/1/2024  Name: Debbie Damon   YOB: 1950        Subjective    VISIT DIAGNOSIS:  Diagnoses and all orders for this visit:    Metastatic melanoma (HCC)    High risk medication use    Adrenal insufficiency (HCC)        Oncology History   Metastatic melanoma (HCC)   10/13/2022 -  Cancer Staged    Staging form: Melanoma of the Skin, AJCC 8th Edition  - Clinical stage from 10/13/2022: Stage IV (cTX, cNX, cM1a) - Signed by Rafaela Reyes MD on 11/22/2022       10/13/2022 Biopsy    A. Skin, right lateral medial leg, punch biopsy:  Portion of severely atypical melanocytic dermal proliferation with prominent melanosis consistent with melanoma. See note.        B. Skin, right medial leg. punch biopsy:  Portion of severely atypical melanocytic dermal proliferation with prominent melanosis consistent with melanoma. See note.        C. Skin, right medial leg, punch biopsy:   Portion of severely atypical melanocytic dermal proliferation with prominent melanosis consistent with melanoma. See note.     11/22/2022 Initial Diagnosis    Metastatic melanoma (HCC)     12/14/2022 -  Chemotherapy    alteplase (CATHFLO), 2 mg, Intracatheter, Every 2 hour PRN, 15 of 20  cycles  NIVOLUMAB-RELATLIMAB-RMBW (OPDUALAG) IVPB, 480 mg of nivolumab, Intravenous, Once, 15 of 20 cycles  Administration: 480 mg of nivolumab (12/14/2022), 480 mg of nivolumab (1/11/2023), 480 mg of nivolumab (2/8/2023), 480 mg of nivolumab (3/8/2023), 480 mg of nivolumab (4/5/2023), 480 mg of nivolumab (5/3/2023), 480 mg of nivolumab (5/31/2023), 480 mg of nivolumab (6/28/2023), 480 mg of nivolumab (7/26/2023), 480 mg of nivolumab (8/23/2023), 480 mg of nivolumab (9/20/2023), 480 mg of nivolumab (10/18/2023), 480 mg of nivolumab (11/15/2023), 480 mg of nivolumab (12/13/2023), 480 mg of nivolumab (1/10/2024)     1/5/2023 Genomic Testing    [facility] liquid testing  TMB 1 Mut/Mb  MSI-high not detected          Cancer Staging   Metastatic melanoma (HCC)  Staging form: Melanoma of the Skin, AJCC 8th Edition  - Clinical stage from 10/13/2022: Stage IV (cTX, cNX, cM1a) - Signed by Rafaela Reyes MD on 11/22/2022     Treatment Details   Treatment goal Curative   Plan Name OP Nivolumab and Relatlimab-rmbw Q 28 Days   Status Active   Start Date 12/14/2022   End Date 5/29/2024 (Planned)   Provider Rafaela Reyes MD   Chemotherapy alteplase (CATHFLO), 2 mg, Intracatheter, Every 2 hour PRN, 15 of 20 cycles    NIVOLUMAB-RELATLIMAB-RMBW (OPDUALAG) IVPB, 480 mg of nivolumab, Intravenous, Once, 15 of 20 cycles  Administration: 480 mg of nivolumab (12/14/2022), 480 mg of nivolumab (1/11/2023), 480 mg of nivolumab (2/8/2023), 480 mg of nivolumab (3/8/2023), 480 mg of nivolumab (4/5/2023), 480 mg of nivolumab (5/3/2023), 480 mg of nivolumab (5/31/2023), 480 mg of nivolumab (6/28/2023), 480 mg of nivolumab (7/26/2023), 480 mg of nivolumab (8/23/2023), 480 mg of nivolumab (9/20/2023), 480 mg of nivolumab (10/18/2023), 480 mg of nivolumab (11/15/2023), 480 mg of nivolumab (12/13/2023), 480 mg of nivolumab (1/10/2024)          HISTORY OF PRESENT ILLNESS: Debbie Damon is a 73 y.o. female  who is here for follow-up.    She is  feeling fatigue. She experiences ankle itchiness. She scratches them sometimes. She applied Eucerin lotion and it helps. She believes it is part of the compression stockings. Last week, she was experiencing pain behind her knee when she was walking. It feels more like an ache. This week, it appears to be well. It hurts when she wears a wedge shoe.  Fine when she wears flats. She is eating appropriately. She denies any other issues or concerns.    Denies any new, changing, concerning skin lesions.  Denies any lymphadenopathy.    Denies immune mediated side effects.  Denies headache, double vision, rash, itching, chest pain, shortness breath, diarrhea.  Denies muscle weakness or fatigue.    Review of Systems   Constitutional:  Negative for appetite change, fatigue, fever and unexpected weight change.   HENT:   Negative for lump/mass, trouble swallowing and voice change.    Eyes:  Negative for icterus.   Respiratory:  Negative for cough, shortness of breath and wheezing.    Cardiovascular:  Negative for leg swelling.   Gastrointestinal:  Negative for abdominal pain, constipation, diarrhea, nausea and vomiting.   Genitourinary:  Negative for difficulty urinating and hematuria.    Musculoskeletal:  Negative for arthralgias, gait problem and myalgias.   Skin:  Negative for itching and rash.        No new, changing, or concerning lesions.   Neurological:  Negative for extremity weakness, gait problem, headaches, light-headedness and numbness.   Hematological:  Negative for adenopathy.        MEDICATIONS:    Current Outpatient Medications:   •  apixaban (Eliquis) 5 mg, Take 1 tablet (5 mg total) by mouth every 12 (twelve) hours, Disp: 60 tablet, Rfl: 5  •  famotidine (PEPCID) 10 mg tablet, Take 10 mg by mouth 2 (two) times a day, Disp: , Rfl:   •  hydrocortisone (CORTEF) 10 mg tablet, Take 1 tablet (10 mg total) by mouth 2 (two) times a day, Disp: 60 tablet, Rfl: 11  •  loratadine (CLARITIN) 10 mg tablet, Take 10 mg by  mouth daily, Disp: , Rfl:   •  losartan (Cozaar) 100 MG tablet, Take 1 tablet (100 mg total) by mouth daily, Disp: 30 tablet, Rfl: 5  •  metoprolol succinate (TOPROL-XL) 25 mg 24 hr tablet, Take 1 tablet (25 mg total) by mouth 2 (two) times a day, Disp: 60 tablet, Rfl: 5  •  Multiple Minerals-Vitamins (Citracal Plus) TABS, Take by mouth, Disp: , Rfl:   •  Multiple Vitamins-Minerals (MULTIVITAMIN WITH MINERALS) tablet, Take 1 tablet by mouth daily, Disp: , Rfl:   •  patient supplied medication, Pt takes eye drops Mura 128 - not listed in database, Disp: , Rfl:   •  Wheat Dextrin (BENEFIBER DRINK MIX PO), Take by mouth, Disp: , Rfl:      ALLERGIES:  Allergies   Allergen Reactions   • Etomidate Other (See Comments)        ACTIVE PROBLEMS:  Patient Active Problem List   Diagnosis   • Acute massive pulmonary embolism (HCC)   • Venous stasis of both lower extremities   • Clostridium difficile infection   • Right ventricular systolic dysfunction without heart failure   • Essential hypertension   • Metastatic melanoma (HCC)   • High risk medication use   • Adrenal insufficiency (HCC)   • Abnormal PET scan of colon   • Mild anemia   • Anticoagulated by anticoagulation treatment   • History of colon polyps   • Diverticulosis   • Abnormal finding on GI tract imaging          PAST MEDICAL HISTORY:   Past Medical History:   Diagnosis Date   • Acute deep vein thrombosis (DVT) of popliteal vein of left lower extremity (HCC)    • Ankle wound, left, initial encounter     Resolved 6/2017   • Ankle wound, right, initial encounter     resolved 6/2017   • DVT (deep vein thrombosis) in pregnancy    • Hypertension    • Pulmonary emboli (HCC)     1989 post C section   • Pulmonary embolism (HCC)     7/2017   • Pulmonary embolism, blood-clot, obstetric    • Skin cancer    • Venous stasis ulcer (HCC)    • Venous ulcers of both lower extremities (HCC)         PAST SURGICAL HISTORY:  Past Surgical History:   Procedure Laterality Date   •  " SECTION      X2        SOCIAL HISTORY:  Social History     Socioeconomic History   • Marital status:      Spouse name: Not on file   • Number of children: Not on file   • Years of education: Not on file   • Highest education level: Not on file   Occupational History   • Not on file   Tobacco Use   • Smoking status: Never   • Smokeless tobacco: Never   Vaping Use   • Vaping status: Never Used   Substance and Sexual Activity   • Alcohol use: No     Comment: hx of social drinker   • Drug use: No   • Sexual activity: Not on file   Other Topics Concern   • Not on file   Social History Narrative   • Not on file     Social Determinants of Health     Financial Resource Strain: Not on file   Food Insecurity: Not on file   Transportation Needs: Not on file   Physical Activity: Not on file   Stress: Not on file   Social Connections: Not on file   Intimate Partner Violence: Not on file   Housing Stability: Not on file        FAMILY HISTORY:  Family History   Problem Relation Age of Onset   • Heart attack Mother    • Stroke Mother    • Cancer Father    • Bone cancer Family    • Hypertension Family    • Lung cancer Family            Objective    PHYSICAL EXAMINATION:   Blood pressure 132/78, pulse 89, temperature 98.5 °F (36.9 °C), temperature source Temporal, resp. rate 18, height 5' 9\", weight 89.4 kg (197 lb), SpO2 99%.     Pain Score: 0-No pain     ECOG Performance Status      Flowsheet Row Most Recent Value   ECOG Performance Status 0 - Fully active, able to carry on all pre-disease performance without restriction                 Physical Exam  Constitutional:       General: She is not in acute distress.     Appearance: Normal appearance. She is not ill-appearing or toxic-appearing.   HENT:      Head: Normocephalic and atraumatic.      Right Ear: External ear normal.      Left Ear: External ear normal.      Nose: Nose normal.      Mouth/Throat:      Mouth: Mucous membranes are moist.      Pharynx: Oropharynx " is clear.   Eyes:      General: No scleral icterus.        Right eye: No discharge.         Left eye: No discharge.      Conjunctiva/sclera: Conjunctivae normal.   Cardiovascular:      Rate and Rhythm: Normal rate and regular rhythm.      Pulses: Normal pulses.      Heart sounds: No murmur heard.     No friction rub. No gallop.   Pulmonary:      Effort: Pulmonary effort is normal. No respiratory distress.      Breath sounds: Normal breath sounds. No wheezing or rales.   Abdominal:      General: Bowel sounds are normal. There is no distension.      Palpations: There is no mass.      Tenderness: There is no abdominal tenderness. There is no rebound.   Musculoskeletal:         General: No swelling or tenderness.      Cervical back: Normal range of motion. No rigidity.      Right lower leg: No edema.      Left lower leg: No edema.   Lymphadenopathy:      Head:      Right side of head: No submandibular, preauricular or posterior auricular adenopathy.      Left side of head: No submandibular, preauricular or posterior auricular adenopathy.      Cervical: No cervical adenopathy.      Right cervical: No superficial or posterior cervical adenopathy.     Left cervical: No superficial or posterior cervical adenopathy.      Upper Body:      Right upper body: No supraclavicular or axillary adenopathy.      Left upper body: No supraclavicular or axillary adenopathy.      Lower Body: No right inguinal adenopathy. No left inguinal adenopathy.   Skin:     General: Skin is warm.      Coloration: Skin is not jaundiced.      Findings: No lesion or rash.      Comments: Continued decrease in pigmentation on the right lower extremity, namely on the lower medial aspect   Neurological:      General: No focal deficit present.      Mental Status: She is alert and oriented to person, place, and time.      Cranial Nerves: No cranial nerve deficit.      Motor: No weakness.      Gait: Gait normal.   Psychiatric:         Mood and Affect: Mood  normal.         Behavior: Behavior normal.         Thought Content: Thought content normal.         Judgment: Judgment normal.         I reviewed lab data in the chart.    WBC   Date Value Ref Range Status   01/06/2024 5.37 4.31 - 10.16 Thousand/uL Final   12/11/2023 6.82 4.31 - 10.16 Thousand/uL Final   11/11/2023 5.29 4.31 - 10.16 Thousand/uL Final     Hemoglobin   Date Value Ref Range Status   01/06/2024 12.6 11.5 - 15.4 g/dL Final   12/11/2023 12.1 11.5 - 15.4 g/dL Final   11/11/2023 12.6 11.5 - 15.4 g/dL Final     Platelets   Date Value Ref Range Status   01/06/2024 181 149 - 390 Thousands/uL Final   12/11/2023 238 149 - 390 Thousands/uL Final   11/11/2023 184 149 - 390 Thousands/uL Final     MCV   Date Value Ref Range Status   01/06/2024 91 82 - 98 fL Final   12/11/2023 90 82 - 98 fL Final   11/11/2023 92 82 - 98 fL Final      Potassium   Date Value Ref Range Status   01/06/2024 4.3 3.5 - 5.3 mmol/L Final   12/11/2023 3.8 3.5 - 5.3 mmol/L Final   11/11/2023 4.6 3.5 - 5.3 mmol/L Final     Chloride   Date Value Ref Range Status   01/06/2024 104 96 - 108 mmol/L Final   12/11/2023 103 96 - 108 mmol/L Final   11/11/2023 106 96 - 108 mmol/L Final     CO2   Date Value Ref Range Status   01/06/2024 30 21 - 32 mmol/L Final   12/11/2023 29 21 - 32 mmol/L Final   11/11/2023 31 21 - 32 mmol/L Final     BUN   Date Value Ref Range Status   01/06/2024 22 5 - 25 mg/dL Final   12/11/2023 21 5 - 25 mg/dL Final   11/11/2023 19 5 - 25 mg/dL Final     Creatinine   Date Value Ref Range Status   01/06/2024 0.92 0.60 - 1.30 mg/dL Final     Comment:     Standardized to IDMS reference method   12/11/2023 0.86 0.60 - 1.30 mg/dL Final     Comment:     Standardized to IDMS reference method   11/11/2023 0.82 0.60 - 1.30 mg/dL Final     Comment:     Standardized to IDMS reference method     Glucose   Date Value Ref Range Status   12/11/2023 88 65 - 140 mg/dL Final     Comment:     If the patient is fasting, the ADA then defines impaired  fasting glucose as > 100 mg/dL and diabetes as > or equal to 123 mg/dL.   11/17/2022 85 65 - 140 mg/dL Final     Comment:     If the patient is fasting, the ADA then defines impaired fasting glucose as > 100 mg/dL and diabetes as > or equal to 123 mg/dL.  Specimen collection should occur prior to Sulfasalazine administration due to the potential for falsely depressed results. Specimen collection should occur prior to Sulfapyridine administration due to the potential for falsely elevated results.   08/03/2020 112 (H) 65 - 99 mg/dL Final     Comment:     If the patient is fasting, the ADA then defines impaired fasting glucose as > 100 mg/dL and diabetes as > or equal to 123 mg/dL.  Specimen collection should occur prior to Sulfasalazine administration due to the potential for falsely depressed results. Specimen collection should occur prior to Sulfapyridine administration due to the potential for falsely elevated results.     Calcium   Date Value Ref Range Status   01/06/2024 10.2 8.4 - 10.2 mg/dL Final   12/11/2023 9.1 8.4 - 10.2 mg/dL Final   11/11/2023 9.5 8.4 - 10.2 mg/dL Final     Albumin   Date Value Ref Range Status   01/06/2024 3.8 3.5 - 5.0 g/dL Final   12/11/2023 3.8 3.5 - 5.0 g/dL Final   11/11/2023 3.7 3.5 - 5.0 g/dL Final     Total Bilirubin   Date Value Ref Range Status   01/06/2024 0.61 0.20 - 1.00 mg/dL Final     Comment:     Use of this assay is not recommended for patients undergoing treatment with eltrombopag due to the potential for falsely elevated results.  N-acetyl-p-benzoquinone imine (metabolite of Acetaminophen) will generate erroneously low results in samples for patients that have taken an overdose of Acetaminophen.   12/11/2023 0.42 0.20 - 1.00 mg/dL Final     Comment:     Use of this assay is not recommended for patients undergoing treatment with eltrombopag due to the potential for falsely elevated results.  N-acetyl-p-benzoquinone imine (metabolite of Acetaminophen) will generate  "erroneously low results in samples for patients that have taken an overdose of Acetaminophen.   11/11/2023 0.56 0.20 - 1.00 mg/dL Final     Comment:     Use of this assay is not recommended for patients undergoing treatment with eltrombopag due to the potential for falsely elevated results.  N-acetyl-p-benzoquinone imine (metabolite of Acetaminophen) will generate erroneously low results in samples for patients that have taken an overdose of Acetaminophen.     Alkaline Phosphatase   Date Value Ref Range Status   01/06/2024 41 34 - 104 U/L Final   12/11/2023 38 34 - 104 U/L Final   11/11/2023 41 34 - 104 U/L Final     AST   Date Value Ref Range Status   01/06/2024 17 13 - 39 U/L Final   12/11/2023 16 13 - 39 U/L Final   11/11/2023 15 13 - 39 U/L Final     ALT   Date Value Ref Range Status   01/06/2024 11 7 - 52 U/L Final     Comment:     Specimen collection should occur prior to Sulfasalazine administration due to the potential for falsely depressed results.    12/11/2023 11 7 - 52 U/L Final     Comment:     Specimen collection should occur prior to Sulfasalazine administration due to the potential for falsely depressed results.    11/11/2023 11 7 - 52 U/L Final     Comment:     Specimen collection should occur prior to Sulfasalazine administration due to the potential for falsely depressed results.       LD   Date Value Ref Range Status   01/06/2024 196 140 - 271 U/L Final   12/11/2023 163 140 - 271 U/L Final   11/11/2023 135 (L) 140 - 271 U/L Final     No results found for: \"TSH\"  No results found for: \"N1SNTDI\"   Free T4   Date Value Ref Range Status   01/06/2024 0.98 0.61 - 1.12 ng/dL Final     Comment:     Specimens with biotin concentrations > 10 ng/mL can lead to significant (> 10%) positive bias in result.   12/11/2023 1.10 0.61 - 1.12 ng/dL Final     Comment:     Specimens with biotin concentrations > 10 ng/mL can lead to significant (> 10%) positive bias in result.   11/11/2023 0.93 0.61 - 1.12 ng/dL Final "     Comment:     Specimens with biotin concentrations > 10 ng/mL can lead to significant (> 10%) positive bias in result.         RECENT IMAGING:  Procedure: NM pet ct tumor imaging whole body    Result Date: 1/24/2024  Narrative: WHOLE-BODY PET/CT SCAN INDICATION: Melanoma assess treatment response. C43.9: Malignant melanoma of skin, unspecified C43.71: Malignant melanoma of right lower limb, including hip MODIFIER: PS COMPARISON: PET/CT 10/18/2023 and additional priors CELL TYPE: Melanoma right leg TECHNIQUE:   8.0 mCi F-18-FDG administered IV. Multiplanar attenuation corrected and non attenuation corrected PET images were acquired 60 minutes post injection. Contiguous, low dose, axial CT sections were obtained from the vertex through the feet.  Intravenous contrast material was not utilized.  This examination, like all CT scans performed in the [facility], was performed utilizing techniques to minimize radiation dose exposure, including the use of iterative reconstruction and automated exposure control. Fasting serum glucose: 86 mg/dl FINDINGS: VISUALIZED BRAIN: No acute abnormalities are seen. Stable photopenia at the right inferior frontal lobe corresponding to encephalomalacia. HEAD/NECK: There is a physiologic distribution of FDG. No FDG avid cervical adenopathy is seen. CT images: Right maxillary sinus polyp versus retention cyst. CHEST: No FDG avid soft tissue lesions are seen. CT images: Less conspicuous scattered patchy groundglass densities and linear markings bilaterally. ABDOMEN: No FDG avid soft tissue lesions are seen. CT images: Unremarkable. PELVIS: Persistent focal FDG uptake at the mid sigmoid colon, SUV max of 5.4, previously 8.8. Region appears somewhat narrowed on CT. No FDG-avid lymph nodes. CT images: Sigmoid diverticulosis. Persistent endometrial fluid, previously evaluated on prior ultrasound. OSSEOUS STRUCTURES/EXTREMITIES: Persistent mild cutaneous focus of uptake in the  right anterolateral lower extremity at the level of the proximal fibular SUV max of 1.7, similar to the prior, previously 2.1. Possible slight skin thickening here on CT. See for example image 409 series 1200. Previously noted more distal focus at the right mid tibial or fibular region has resolved. Persistent patchy FDG uptake in the subcutaneous tissues of the distal lower extremities bilaterally probably inflammatory. Numerous varicose veins in the bilateral lower extremities. No FDG avid osseous lesions. CT images: S-shaped curvature of the thoracolumbar spine with multilevel degenerative spine changes.     Impression: 1. Persistent mild cutaneous uptake in the right anterolateral proximal tibiofibular region without significant change. This is nonspecific and could be inflammatory or posttreatment related, but again correlate for any skin lesion given the patient's history. 2. Resolution of the previously noted more distal cutaneous focus at the right mid tibiofibular region. 3. Otherwise no new findings suspicious for hypermetabolic malignancy. 4. Persistent focus of uptake at the mid sigmoid colon. Correlate with prior colonoscopy findings. Workstation performed: [id number]            Assessment/Plan  1. Metastatic melanoma (HCC)  Assessment & Plan:  The patient is a 73-year-old female with metastatic melanoma on treatment with nivolumab-relatlimab, here for continued monitoring follow-up and surveillance. She is doing well and tolerating treatment. She will have blood work done in 2 weeks closer to her infusion, but previous blood work has been fine. We discussed her PET scan results that demonstrate continued response to treatment with actual improvement in her more distal lesion in her right lower leg. No new lesions. She will continue treatment. She has a standing order for blood work prior to treatment. She knows to monitor for any immediate side effects. She knows to call with issues, concerns prior  to her next visit.      2. High risk medication use  Assessment & Plan:  The patient is on treatment with immunotherapy and we will continue to monitor for side effects and lab abnormalities. We will monitor labs with each treatment to ensure safety of continuing on treatment. Patient knows to watch for signs and symptoms for immune mediated side effects. Denies any immune mediated side effects at this time.      3. Adrenal insufficiency (HCC)  Assessment & Plan:  She is on stable dose of hydrocortisone 10 mg twice a day without any issues.           No follow-ups on file.     Rafaela Reyes MD, PhD    Transcribed for Rafaela Reyes MD, by Rosie Montenegro on 02/01/24 at 6:48 PM. Powered by Dragon [employer].

## 2024-02-01 NOTE — ASSESSMENT & PLAN NOTE
The patient is a 73-year-old female with metastatic melanoma on treatment with nivolumab-relatlimab, here for continued monitoring follow-up and surveillance. She is doing well and tolerating treatment. She will have blood work done in 2 weeks closer to her infusion, but previous blood work has been fine. We discussed her PET scan results that demonstrate continued response to treatment with actual improvement in her more distal lesion in her right lower leg. No new lesions. She will continue treatment. She has a standing order for blood work prior to treatment. She knows to monitor for any immediate side effects. She knows to call with issues, concerns prior to her next visit.

## 2024-02-01 NOTE — ASSESSMENT & PLAN NOTE
The patient is on treatment with immunotherapy and we will continue to monitor for side effects and lab abnormalities. We will monitor labs with each treatment to ensure safety of continuing on treatment. Patient knows to watch for signs and symptoms for immune mediated side effects. Denies any immune mediated side effects at this time.

## 2024-02-01 NOTE — PROGRESS NOTES
[facility] [place]  [address]  [place] [address]  [phone]  [phone]     Date of Visit: 2/1/2024  Name: Debbie Damon   YOB: 1950        Subjective    VISIT DIAGNOSIS:  Diagnoses and all orders for this visit:    Metastatic melanoma (HCC)    High risk medication use    Adrenal insufficiency (HCC)        Oncology History   Metastatic melanoma (HCC)   10/13/2022 -  Cancer Staged    Staging form: Melanoma of the Skin, AJCC 8th Edition  - Clinical stage from 10/13/2022: Stage IV (cTX, cNX, cM1a) - Signed by Rafaela Reyes MD on 11/22/2022       10/13/2022 Biopsy    A. Skin, right lateral medial leg, punch biopsy:  Portion of severely atypical melanocytic dermal proliferation with prominent melanosis consistent with melanoma. See note.        B. Skin, right medial leg. punch biopsy:  Portion of severely atypical melanocytic dermal proliferation with prominent melanosis consistent with melanoma. See note.        C. Skin, right medial leg, punch biopsy:   Portion of severely atypical melanocytic dermal proliferation with prominent melanosis consistent with melanoma. See note.     11/22/2022 Initial Diagnosis    Metastatic melanoma (HCC)     12/14/2022 -  Chemotherapy    alteplase (CATHFLO), 2 mg, Intracatheter, Every 2 hour PRN, 15 of 20 cycles  NIVOLUMAB-RELATLIMAB-RMBW (OPDUALAG) IVPB, 480 mg of nivolumab, Intravenous, Once, 15 of 20 cycles  Administration: 480 mg of nivolumab (12/14/2022), 480 mg of nivolumab (1/11/2023), 480 mg of nivolumab (2/8/2023), 480 mg of nivolumab (3/8/2023), 480 mg of nivolumab (4/5/2023), 480 mg of nivolumab (5/3/2023), 480 mg of nivolumab (5/31/2023), 480 mg of nivolumab (6/28/2023), 480 mg of nivolumab (7/26/2023), 480 mg of nivolumab (8/23/2023), 480 mg of nivolumab (9/20/2023), 480 mg of nivolumab (10/18/2023), 480 mg of nivolumab (11/15/2023), 480 mg of nivolumab (12/13/2023), 480 mg of nivolumab (1/10/2024)     1/5/2023  Genomic Testing    [employer] One liquid testing  TMB 1 Mut/Mb  MSI-high not detected          Cancer Staging   Metastatic melanoma (HCC)  Staging form: Melanoma of the Skin, AJCC 8th Edition  - Clinical stage from 10/13/2022: Stage IV (cTX, cNX, cM1a) - Signed by Rafaela Reyes MD on 11/22/2022     Treatment Details   Treatment goal Curative   Plan Name OP Nivolumab and Relatlimab-rmbw Q 28 Days   Status Active   Start Date 12/14/2022   End Date 5/29/2024 (Planned)   Provider Rafaela Reyes MD   Chemotherapy alteplase (CATHFLO), 2 mg, Intracatheter, Every 2 hour PRN, 15 of 20 cycles    NIVOLUMAB-RELATLIMAB-RMBW (OPDUALAG) IVPB, 480 mg of nivolumab, Intravenous, Once, 15 of 20 cycles  Administration: 480 mg of nivolumab (12/14/2022), 480 mg of nivolumab (1/11/2023), 480 mg of nivolumab (2/8/2023), 480 mg of nivolumab (3/8/2023), 480 mg of nivolumab (4/5/2023), 480 mg of nivolumab (5/3/2023), 480 mg of nivolumab (5/31/2023), 480 mg of nivolumab (6/28/2023), 480 mg of nivolumab (7/26/2023), 480 mg of nivolumab (8/23/2023), 480 mg of nivolumab (9/20/2023), 480 mg of nivolumab (10/18/2023), 480 mg of nivolumab (11/15/2023), 480 mg of nivolumab (12/13/2023), 480 mg of nivolumab (1/10/2024)          HISTORY OF PRESENT ILLNESS: Debbie Damon is a 73 y.o. female  who is here for follow-up.    She is feeling fatigue. She experiences ankle itchiness. She scratches them sometimes. She applied Eucerin lotion and it helps. She believes it is part of the compression stockings. Last week, she was experiencing pain behind her knee when she was walking. It feels more like an ache. This week, it appears to be well. It hurts when she wears a wedge shoe.  Fine when she wears flats. She is eating appropriately. She denies any other issues or concerns.    Denies any new, changing, concerning skin lesions.  Denies any lymphadenopathy.    Denies immune mediated side effects.  Denies headache, double vision, rash, itching, chest pain,  shortness breath, diarrhea.  Denies muscle weakness or fatigue.    Review of Systems   Constitutional:  Negative for appetite change, fatigue, fever and unexpected weight change.   HENT:   Negative for lump/mass, trouble swallowing and voice change.    Eyes:  Negative for icterus.   Respiratory:  Negative for cough, shortness of breath and wheezing.    Cardiovascular:  Negative for leg swelling.   Gastrointestinal:  Negative for abdominal pain, constipation, diarrhea, nausea and vomiting.   Genitourinary:  Negative for difficulty urinating and hematuria.    Musculoskeletal:  Negative for arthralgias, gait problem and myalgias.   Skin:  Negative for itching and rash.        No new, changing, or concerning lesions.   Neurological:  Negative for extremity weakness, gait problem, headaches, light-headedness and numbness.   Hematological:  Negative for adenopathy.        MEDICATIONS:    Current Outpatient Medications:     apixaban (Eliquis) 5 mg, Take 1 tablet (5 mg total) by mouth every 12 (twelve) hours, Disp: 60 tablet, Rfl: 5    famotidine (PEPCID) 10 mg tablet, Take 10 mg by mouth 2 (two) times a day, Disp: , Rfl:     hydrocortisone (CORTEF) 10 mg tablet, Take 1 tablet (10 mg total) by mouth 2 (two) times a day, Disp: 60 tablet, Rfl: 11    loratadine (CLARITIN) 10 mg tablet, Take 10 mg by mouth daily, Disp: , Rfl:     losartan (Cozaar) 100 MG tablet, Take 1 tablet (100 mg total) by mouth daily, Disp: 30 tablet, Rfl: 5    metoprolol succinate (TOPROL-XL) 25 mg 24 hr tablet, Take 1 tablet (25 mg total) by mouth 2 (two) times a day, Disp: 60 tablet, Rfl: 5    Multiple Minerals-Vitamins (Citracal Plus) TABS, Take by mouth, Disp: , Rfl:     Multiple Vitamins-Minerals (MULTIVITAMIN WITH MINERALS) tablet, Take 1 tablet by mouth daily, Disp: , Rfl:     patient supplied medication, Pt takes eye drops Mura 128 - not listed in database, Disp: , Rfl:     Wheat Dextrin (BENEFIBER DRINK MIX PO), Take by mouth, Disp: , Rfl:       ALLERGIES:  Allergies   Allergen Reactions    Etomidate Other (See Comments)        ACTIVE PROBLEMS:  Patient Active Problem List   Diagnosis    Acute massive pulmonary embolism (HCC)    Venous stasis of both lower extremities    Clostridium difficile infection    Right ventricular systolic dysfunction without heart failure    Essential hypertension    Metastatic melanoma (HCC)    High risk medication use    Adrenal insufficiency (HCC)    Abnormal PET scan of colon    Mild anemia    Anticoagulated by anticoagulation treatment    History of colon polyps    Diverticulosis    Abnormal finding on GI tract imaging          PAST MEDICAL HISTORY:   Past Medical History:   Diagnosis Date    Acute deep vein thrombosis (DVT) of popliteal vein of left lower extremity (HCC)     Ankle wound, left, initial encounter     Resolved 2017    Ankle wound, right, initial encounter     resolved 2017    DVT (deep vein thrombosis) in pregnancy     Hypertension     Pulmonary emboli (HCC)      post C section    Pulmonary embolism (HCC)     2017    Pulmonary embolism, blood-clot, obstetric     Skin cancer     Venous stasis ulcer (HCC)     Venous ulcers of both lower extremities (HCC)         PAST SURGICAL HISTORY:  Past Surgical History:   Procedure Laterality Date     SECTION      X2        SOCIAL HISTORY:  Social History     Socioeconomic History    Marital status:      Spouse name: Not on file    Number of children: Not on file    Years of education: Not on file    Highest education level: Not on file   Occupational History    Not on file   Tobacco Use    Smoking status: Never    Smokeless tobacco: Never   Vaping Use    Vaping status: Never Used   Substance and Sexual Activity    Alcohol use: No     Comment: hx of social drinker    Drug use: No    Sexual activity: Not on file   Other Topics Concern    Not on file   Social History Narrative    Not on file     Social Determinants of Health     Financial Resource  "Strain: Not on file   Food Insecurity: Not on file   Transportation Needs: Not on file   Physical Activity: Not on file   Stress: Not on file   Social Connections: Not on file   Intimate Partner Violence: Not on file   Housing Stability: Not on file        FAMILY HISTORY:  Family History   Problem Relation Age of Onset    Heart attack Mother     Stroke Mother     Cancer Father     Bone cancer Family     Hypertension Family     Lung cancer Family            Objective    PHYSICAL EXAMINATION:   Blood pressure 132/78, pulse 89, temperature 98.5 °F (36.9 °C), temperature source Temporal, resp. rate 18, height 5' 9\", weight 89.4 kg (197 lb), SpO2 99%.     Pain Score: 0-No pain     ECOG Performance Status      Flowsheet Row Most Recent Value   ECOG Performance Status 0 - Fully active, able to carry on all pre-disease performance without restriction                 Physical Exam  Constitutional:       General: She is not in acute distress.     Appearance: Normal appearance. She is not ill-appearing or toxic-appearing.   HENT:      Head: Normocephalic and atraumatic.      Right Ear: External ear normal.      Left Ear: External ear normal.      Nose: Nose normal.      Mouth/Throat:      Mouth: Mucous membranes are moist.      Pharynx: Oropharynx is clear.   Eyes:      General: No scleral icterus.        Right eye: No discharge.         Left eye: No discharge.      Conjunctiva/sclera: Conjunctivae normal.   Cardiovascular:      Rate and Rhythm: Normal rate and regular rhythm.      Pulses: Normal pulses.      Heart sounds: No murmur heard.     No friction rub. No gallop.   Pulmonary:      Effort: Pulmonary effort is normal. No respiratory distress.      Breath sounds: Normal breath sounds. No wheezing or rales.   Abdominal:      General: Bowel sounds are normal. There is no distension.      Palpations: There is no mass.      Tenderness: There is no abdominal tenderness. There is no rebound.   Musculoskeletal:         General: " No swelling or tenderness.      Cervical back: Normal range of motion. No rigidity.      Right lower leg: No edema.      Left lower leg: No edema.   Lymphadenopathy:      Head:      Right side of head: No submandibular, preauricular or posterior auricular adenopathy.      Left side of head: No submandibular, preauricular or posterior auricular adenopathy.      Cervical: No cervical adenopathy.      Right cervical: No superficial or posterior cervical adenopathy.     Left cervical: No superficial or posterior cervical adenopathy.      Upper Body:      Right upper body: No supraclavicular or axillary adenopathy.      Left upper body: No supraclavicular or axillary adenopathy.      Lower Body: No right inguinal adenopathy. No left inguinal adenopathy.   Skin:     General: Skin is warm.      Coloration: Skin is not jaundiced.      Findings: No lesion or rash.      Comments: Continued decrease in pigmentation on the right lower extremity, namely on the lower medial aspect   Neurological:      General: No focal deficit present.      Mental Status: She is alert and oriented to person, place, and time.      Cranial Nerves: No cranial nerve deficit.      Motor: No weakness.      Gait: Gait normal.   Psychiatric:         Mood and Affect: Mood normal.         Behavior: Behavior normal.         Thought Content: Thought content normal.         Judgment: Judgment normal.         I reviewed lab data in the chart.    WBC   Date Value Ref Range Status   01/06/2024 5.37 4.31 - 10.16 Thousand/uL Final   12/11/2023 6.82 4.31 - 10.16 Thousand/uL Final   11/11/2023 5.29 4.31 - 10.16 Thousand/uL Final     Hemoglobin   Date Value Ref Range Status   01/06/2024 12.6 11.5 - 15.4 g/dL Final   12/11/2023 12.1 11.5 - 15.4 g/dL Final   11/11/2023 12.6 11.5 - 15.4 g/dL Final     Platelets   Date Value Ref Range Status   01/06/2024 181 149 - 390 Thousands/uL Final   12/11/2023 238 149 - 390 Thousands/uL Final   11/11/2023 184 149 - 390 Thousands/uL  Final     MCV   Date Value Ref Range Status   01/06/2024 91 82 - 98 fL Final   12/11/2023 90 82 - 98 fL Final   11/11/2023 92 82 - 98 fL Final      Potassium   Date Value Ref Range Status   01/06/2024 4.3 3.5 - 5.3 mmol/L Final   12/11/2023 3.8 3.5 - 5.3 mmol/L Final   11/11/2023 4.6 3.5 - 5.3 mmol/L Final     Chloride   Date Value Ref Range Status   01/06/2024 104 96 - 108 mmol/L Final   12/11/2023 103 96 - 108 mmol/L Final   11/11/2023 106 96 - 108 mmol/L Final     CO2   Date Value Ref Range Status   01/06/2024 30 21 - 32 mmol/L Final   12/11/2023 29 21 - 32 mmol/L Final   11/11/2023 31 21 - 32 mmol/L Final     BUN   Date Value Ref Range Status   01/06/2024 22 5 - 25 mg/dL Final   12/11/2023 21 5 - 25 mg/dL Final   11/11/2023 19 5 - 25 mg/dL Final     Creatinine   Date Value Ref Range Status   01/06/2024 0.92 0.60 - 1.30 mg/dL Final     Comment:     Standardized to IDMS reference method   12/11/2023 0.86 0.60 - 1.30 mg/dL Final     Comment:     Standardized to IDMS reference method   11/11/2023 0.82 0.60 - 1.30 mg/dL Final     Comment:     Standardized to IDMS reference method     Glucose   Date Value Ref Range Status   12/11/2023 88 65 - 140 mg/dL Final     Comment:     If the patient is fasting, the ADA then defines impaired fasting glucose as > 100 mg/dL and diabetes as > or equal to 123 mg/dL.   11/17/2022 85 65 - 140 mg/dL Final     Comment:     If the patient is fasting, the ADA then defines impaired fasting glucose as > 100 mg/dL and diabetes as > or equal to 123 mg/dL.  Specimen collection should occur prior to Sulfasalazine administration due to the potential for falsely depressed results. Specimen collection should occur prior to Sulfapyridine administration due to the potential for falsely elevated results.   08/03/2020 112 (H) 65 - 99 mg/dL Final     Comment:     If the patient is fasting, the ADA then defines impaired fasting glucose as > 100 mg/dL and diabetes as > or equal to 123 mg/dL.  Specimen  collection should occur prior to Sulfasalazine administration due to the potential for falsely depressed results. Specimen collection should occur prior to Sulfapyridine administration due to the potential for falsely elevated results.     Calcium   Date Value Ref Range Status   01/06/2024 10.2 8.4 - 10.2 mg/dL Final   12/11/2023 9.1 8.4 - 10.2 mg/dL Final   11/11/2023 9.5 8.4 - 10.2 mg/dL Final     Albumin   Date Value Ref Range Status   01/06/2024 3.8 3.5 - 5.0 g/dL Final   12/11/2023 3.8 3.5 - 5.0 g/dL Final   11/11/2023 3.7 3.5 - 5.0 g/dL Final     Total Bilirubin   Date Value Ref Range Status   01/06/2024 0.61 0.20 - 1.00 mg/dL Final     Comment:     Use of this assay is not recommended for patients undergoing treatment with eltrombopag due to the potential for falsely elevated results.  N-acetyl-p-benzoquinone imine (metabolite of Acetaminophen) will generate erroneously low results in samples for patients that have taken an overdose of Acetaminophen.   12/11/2023 0.42 0.20 - 1.00 mg/dL Final     Comment:     Use of this assay is not recommended for patients undergoing treatment with eltrombopag due to the potential for falsely elevated results.  N-acetyl-p-benzoquinone imine (metabolite of Acetaminophen) will generate erroneously low results in samples for patients that have taken an overdose of Acetaminophen.   11/11/2023 0.56 0.20 - 1.00 mg/dL Final     Comment:     Use of this assay is not recommended for patients undergoing treatment with eltrombopag due to the potential for falsely elevated results.  N-acetyl-p-benzoquinone imine (metabolite of Acetaminophen) will generate erroneously low results in samples for patients that have taken an overdose of Acetaminophen.     Alkaline Phosphatase   Date Value Ref Range Status   01/06/2024 41 34 - 104 U/L Final   12/11/2023 38 34 - 104 U/L Final   11/11/2023 41 34 - 104 U/L Final     AST   Date Value Ref Range Status   01/06/2024 17 13 - 39 U/L Final  "  12/11/2023 16 13 - 39 U/L Final   11/11/2023 15 13 - 39 U/L Final     ALT   Date Value Ref Range Status   01/06/2024 11 7 - 52 U/L Final     Comment:     Specimen collection should occur prior to Sulfasalazine administration due to the potential for falsely depressed results.    12/11/2023 11 7 - 52 U/L Final     Comment:     Specimen collection should occur prior to Sulfasalazine administration due to the potential for falsely depressed results.    11/11/2023 11 7 - 52 U/L Final     Comment:     Specimen collection should occur prior to Sulfasalazine administration due to the potential for falsely depressed results.       LD   Date Value Ref Range Status   01/06/2024 196 140 - 271 U/L Final   12/11/2023 163 140 - 271 U/L Final   11/11/2023 135 (L) 140 - 271 U/L Final     No results found for: \"TSH\"  No results found for: \"H0XEMFU\"   Free T4   Date Value Ref Range Status   01/06/2024 0.98 0.61 - 1.12 ng/dL Final     Comment:     Specimens with biotin concentrations > 10 ng/mL can lead to significant (> 10%) positive bias in result.   12/11/2023 1.10 0.61 - 1.12 ng/dL Final     Comment:     Specimens with biotin concentrations > 10 ng/mL can lead to significant (> 10%) positive bias in result.   11/11/2023 0.93 0.61 - 1.12 ng/dL Final     Comment:     Specimens with biotin concentrations > 10 ng/mL can lead to significant (> 10%) positive bias in result.         RECENT IMAGING:  Procedure: NM pet ct tumor imaging whole body    Result Date: 1/24/2024  Narrative: WHOLE-BODY PET/CT SCAN INDICATION: Melanoma assess treatment response. C43.9: Malignant melanoma of skin, unspecified C43.71: Malignant melanoma of right lower limb, including hip MODIFIER: PS COMPARISON: PET/CT 10/18/2023 and additional priors CELL TYPE: Melanoma right leg TECHNIQUE:   8.0 mCi F-18-FDG administered IV. Multiplanar attenuation corrected and non attenuation corrected PET images were acquired 60 minutes post injection. Contiguous, low dose, " axial CT sections were obtained from the vertex through the feet.  Intravenous contrast material was not utilized.  This examination, like all CT scans performed in the [facility], was performed utilizing techniques to minimize radiation dose exposure, including the use of iterative reconstruction and automated exposure control. Fasting serum glucose: 86 mg/dl FINDINGS: VISUALIZED BRAIN: No acute abnormalities are seen. Stable photopenia at the right inferior frontal lobe corresponding to encephalomalacia. HEAD/NECK: There is a physiologic distribution of FDG. No FDG avid cervical adenopathy is seen. CT images: Right maxillary sinus polyp versus retention cyst. CHEST: No FDG avid soft tissue lesions are seen. CT images: Less conspicuous scattered patchy groundglass densities and linear markings bilaterally. ABDOMEN: No FDG avid soft tissue lesions are seen. CT images: Unremarkable. PELVIS: Persistent focal FDG uptake at the mid sigmoid colon, SUV max of 5.4, previously 8.8. Region appears somewhat narrowed on CT. No FDG-avid lymph nodes. CT images: Sigmoid diverticulosis. Persistent endometrial fluid, previously evaluated on prior ultrasound. OSSEOUS STRUCTURES/EXTREMITIES: Persistent mild cutaneous focus of uptake in the right anterolateral lower extremity at the level of the proximal fibular SUV max of 1.7, similar to the prior, previously 2.1. Possible slight skin thickening here on CT. See for example image 409 series 1200. Previously noted more distal focus at the right mid tibial or fibular region has resolved. Persistent patchy FDG uptake in the subcutaneous tissues of the distal lower extremities bilaterally probably inflammatory. Numerous varicose veins in the bilateral lower extremities. No FDG avid osseous lesions. CT images: S-shaped curvature of the thoracolumbar spine with multilevel degenerative spine changes.     Impression: 1. Persistent mild cutaneous uptake in the right  anterolateral proximal tibiofibular region without significant change. This is nonspecific and could be inflammatory or posttreatment related, but again correlate for any skin lesion given the patient's history. 2. Resolution of the previously noted more distal cutaneous focus at the right mid tibiofibular region. 3. Otherwise no new findings suspicious for hypermetabolic malignancy. 4. Persistent focus of uptake at the mid sigmoid colon. Correlate with prior colonoscopy findings. Workstation performed: [id number]            Assessment/Plan  1. Metastatic melanoma (HCC)  Assessment & Plan:  The patient is a 73-year-old female with metastatic melanoma on treatment with nivolumab-relatlimab, here for continued monitoring follow-up and surveillance. She is doing well and tolerating treatment. She will have blood work done in 2 weeks closer to her infusion, but previous blood work has been fine. We discussed her PET scan results that demonstrate continued response to treatment with actual improvement in her more distal lesion in her right lower leg. No new lesions. She will continue treatment. She has a standing order for blood work prior to treatment. She knows to monitor for any immediate side effects. She knows to call with issues, concerns prior to her next visit.      2. High risk medication use  Assessment & Plan:  The patient is on treatment with immunotherapy and we will continue to monitor for side effects and lab abnormalities. We will monitor labs with each treatment to ensure safety of continuing on treatment. Patient knows to watch for signs and symptoms for immune mediated side effects. Denies any immune mediated side effects at this time.      3. Adrenal insufficiency (HCC)  Assessment & Plan:  She is on stable dose of hydrocortisone 10 mg twice a day without any issues.           No follow-ups on file.     Rafaela Reyes MD, PhD    Transcribed for Rafaela Reyes MD, by Rosie Montenegro on  02/01/24 at 6:48 PM. Powered by Dragon [employer].

## 2024-02-03 ENCOUNTER — APPOINTMENT (OUTPATIENT)
Dept: LAB | Facility: HOSPITAL | Age: 74
End: 2024-02-03
Payer: COMMERCIAL

## 2024-02-06 NOTE — PROGRESS NOTES
[facility] [place]  [address]  [place] [address]  [phone]     Date of Visit: 1/8/2024  Name: Debbie Damon   YOB: 1950        Subjective    VISIT DIAGNOSIS:  Diagnoses and all orders for this visit:    Metastatic melanoma (HCC)  -     NM pet ct tumor imaging whole body; Future    Malignant melanoma of right lower leg (HCC)  -     NM pet ct tumor imaging whole body; Future    High risk medication use        Oncology History   Metastatic melanoma (HCC)   10/13/2022 -  Cancer Staged    Staging form: Melanoma of the Skin, AJCC 8th Edition  - Clinical stage from 10/13/2022: Stage IV (cTX, cNX, cM1a) - Signed by Rafaela Reyes MD on 11/22/2022       10/13/2022 Biopsy    A. Skin, right lateral medial leg, punch biopsy:  Portion of severely atypical melanocytic dermal proliferation with prominent melanosis consistent with melanoma. See note.        B. Skin, right medial leg. punch biopsy:  Portion of severely atypical melanocytic dermal proliferation with prominent melanosis consistent with melanoma. See note.        C. Skin, right medial leg, punch biopsy:   Portion of severely atypical melanocytic dermal proliferation with prominent melanosis consistent with melanoma. See note.     11/22/2022 Initial Diagnosis    Metastatic melanoma (HCC)     12/14/2022 -  Chemotherapy    alteplase (CATHFLO), 2 mg, Intracatheter, Every 2 hour PRN, 15 of 20 cycles  NIVOLUMAB-RELATLIMAB-RMBW (OPDUALAG) IVPB, 480 mg of nivolumab, Intravenous, Once, 15 of 20 cycles  Administration: 480 mg of nivolumab (12/14/2022), 480 mg of nivolumab (1/11/2023), 480 mg of nivolumab (2/8/2023), 480 mg of nivolumab (3/8/2023), 480 mg of nivolumab (4/5/2023), 480 mg of nivolumab (5/3/2023), 480 mg of nivolumab (5/31/2023), 480 mg of nivolumab (6/28/2023), 480 mg of nivolumab (7/26/2023), 480 mg of nivolumab (8/23/2023), 480 mg of nivolumab (9/20/2023), 480 mg of nivolumab (10/18/2023),  480 mg of nivolumab (11/15/2023), 480 mg of nivolumab (12/13/2023), 480 mg of nivolumab (1/10/2024)     1/5/2023 Genomic Testing    [employer] One liquid testing  TMB 1 Mut/Mb  MSI-high not detected          Cancer Staging   Metastatic melanoma (HCC)  Staging form: Melanoma of the Skin, AJCC 8th Edition  - Clinical stage from 10/13/2022: Stage IV (cTX, cNX, cM1a) - Signed by Rafaela Reyes MD on 11/22/2022     Treatment Details   Treatment goal Curative   Plan Name OP Nivolumab and Relatlimab-rmbw Q 28 Days   Status Active   Start Date 12/14/2022   End Date 5/29/2024 (Planned)   Provider Rafaela Reyes MD   Chemotherapy alteplase (CATHFLO), 2 mg, Intracatheter, Every 2 hour PRN, 15 of 20 cycles    NIVOLUMAB-RELATLIMAB-RMBW (OPDUALAG) IVPB, 480 mg of nivolumab, Intravenous, Once, 15 of 20 cycles  Administration: 480 mg of nivolumab (12/14/2022), 480 mg of nivolumab (1/11/2023), 480 mg of nivolumab (2/8/2023), 480 mg of nivolumab (3/8/2023), 480 mg of nivolumab (4/5/2023), 480 mg of nivolumab (5/3/2023), 480 mg of nivolumab (5/31/2023), 480 mg of nivolumab (6/28/2023), 480 mg of nivolumab (7/26/2023), 480 mg of nivolumab (8/23/2023), 480 mg of nivolumab (9/20/2023), 480 mg of nivolumab (10/18/2023), 480 mg of nivolumab (11/15/2023), 480 mg of nivolumab (12/13/2023), 480 mg of nivolumab (1/10/2024)          HISTORY OF PRESENT ILLNESS: Debbie Damon is a 73 y.o. female  who has metastatic melanoma on treatment with nivolumab plus relatlimab here for continued monitoring follow-up and surveillance.    She is doing well and feels good.  Has no issues concerns or complaints at this time.  Energy is good.  She continues to take 10 mg hydrocortisone twice daily.  Eating well.  No concerning lesions or changing lesions.  She states the pigmentation on her right lower leg continues to lighten up.    Denies any headaches, double vision, rash, itching, chest pain, shortness of breath, diarrhea.  Denies muscle weakness and  fatigue.        REVIEW OF SYSTEMS:  Review of Systems   Constitutional:  Negative for appetite change, fatigue, fever and unexpected weight change.   HENT:   Negative for lump/mass, trouble swallowing and voice change.    Eyes:  Negative for icterus.   Respiratory:  Negative for cough, shortness of breath and wheezing.    Cardiovascular:  Negative for leg swelling.   Gastrointestinal:  Negative for abdominal pain, constipation, diarrhea, nausea and vomiting.   Genitourinary:  Negative for difficulty urinating and hematuria.    Musculoskeletal:  Negative for arthralgias, gait problem and myalgias.   Skin:  Negative for itching and rash.        No new, changing, or concerning lesions.   Neurological:  Negative for extremity weakness, gait problem, headaches, light-headedness and numbness.   Hematological:  Negative for adenopathy.        MEDICATIONS:    Current Outpatient Medications:     apixaban (Eliquis) 5 mg, Take 1 tablet (5 mg total) by mouth every 12 (twelve) hours, Disp: 60 tablet, Rfl: 5    famotidine (PEPCID) 10 mg tablet, Take 10 mg by mouth 2 (two) times a day, Disp: , Rfl:     hydrocortisone (CORTEF) 10 mg tablet, Take 1 tablet (10 mg total) by mouth 2 (two) times a day, Disp: 60 tablet, Rfl: 11    loratadine (CLARITIN) 10 mg tablet, Take 10 mg by mouth daily, Disp: , Rfl:     losartan (Cozaar) 100 MG tablet, Take 1 tablet (100 mg total) by mouth daily, Disp: 30 tablet, Rfl: 5    metoprolol succinate (TOPROL-XL) 25 mg 24 hr tablet, Take 1 tablet (25 mg total) by mouth 2 (two) times a day, Disp: 60 tablet, Rfl: 5    Multiple Minerals-Vitamins (Citracal Plus) TABS, Take by mouth, Disp: , Rfl:     Multiple Vitamins-Minerals (MULTIVITAMIN WITH MINERALS) tablet, Take 1 tablet by mouth daily, Disp: , Rfl:     patient supplied medication, Pt takes eye drops Mura 128 - not listed in database, Disp: , Rfl:     Wheat Dextrin (BENEFIBER DRINK MIX PO), Take by mouth, Disp: , Rfl:      ALLERGIES:  Allergies   Allergen  Reactions    Etomidate Other (See Comments)        ACTIVE PROBLEMS:  Patient Active Problem List   Diagnosis    Acute massive pulmonary embolism (HCC)    Venous stasis of both lower extremities    Clostridium difficile infection    Right ventricular systolic dysfunction without heart failure    Essential hypertension    Metastatic melanoma (HCC)    High risk medication use    Adrenal insufficiency (HCC)    Abnormal PET scan of colon    Mild anemia    Anticoagulated by anticoagulation treatment    History of colon polyps    Diverticulosis    Abnormal finding on GI tract imaging          PAST MEDICAL HISTORY:   Past Medical History:   Diagnosis Date    Acute deep vein thrombosis (DVT) of popliteal vein of left lower extremity (HCC)     Ankle wound, left, initial encounter     Resolved 2017    Ankle wound, right, initial encounter     resolved 2017    DVT (deep vein thrombosis) in pregnancy     Hypertension     Pulmonary emboli (HCC)      post C section    Pulmonary embolism (HCC)     2017    Pulmonary embolism, blood-clot, obstetric     Skin cancer     Venous stasis ulcer (HCC)     Venous ulcers of both lower extremities (HCC)         PAST SURGICAL HISTORY:  Past Surgical History:   Procedure Laterality Date     SECTION      X2        SOCIAL HISTORY:  Social History     Socioeconomic History    Marital status:      Spouse name: None    Number of children: None    Years of education: None    Highest education level: None   Occupational History    None   Tobacco Use    Smoking status: Never    Smokeless tobacco: Never   Vaping Use    Vaping status: Never Used   Substance and Sexual Activity    Alcohol use: No     Comment: hx of social drinker    Drug use: No    Sexual activity: None   Other Topics Concern    None   Social History Narrative    None     Social Determinants of Health     Financial Resource Strain: Not on file   Food Insecurity: Not on file   Transportation Needs: Not on file  "  Physical Activity: Not on file   Stress: Not on file   Social Connections: Not on file   Intimate Partner Violence: Not on file   Housing Stability: Not on file        FAMILY HISTORY:  Family History   Problem Relation Age of Onset    Heart attack Mother     Stroke Mother     Cancer Father     Bone cancer Family     Hypertension Family     Lung cancer Family            Objective    PHYSICAL EXAMINATION:   Blood pressure 132/76, pulse 78, temperature 98 °F (36.7 °C), temperature source Temporal, resp. rate 18, height 5' 9\" (1.753 m), weight 90.3 kg (199 lb), SpO2 97%.     Pain Score: 0-No pain     ECOG Performance Status      Flowsheet Row Most Recent Value   ECOG Performance Status 0 - Fully active, able to carry on all pre-disease performance without restriction               Physical Exam  Constitutional:       General: She is not in acute distress.     Appearance: Normal appearance. She is not ill-appearing or toxic-appearing.   HENT:      Head: Normocephalic and atraumatic.      Right Ear: External ear normal.      Left Ear: External ear normal.      Nose: Nose normal.      Mouth/Throat:      Mouth: Mucous membranes are moist.      Pharynx: Oropharynx is clear.   Eyes:      General: No scleral icterus.        Right eye: No discharge.         Left eye: No discharge.      Conjunctiva/sclera: Conjunctivae normal.   Cardiovascular:      Rate and Rhythm: Normal rate and regular rhythm.      Pulses: Normal pulses.      Heart sounds: No murmur heard.     No friction rub. No gallop.   Pulmonary:      Effort: Pulmonary effort is normal. No respiratory distress.      Breath sounds: Normal breath sounds. No wheezing or rales.   Abdominal:      General: Bowel sounds are normal. There is no distension.      Palpations: There is no mass.      Tenderness: There is no abdominal tenderness. There is no rebound.   Musculoskeletal:         General: No swelling or tenderness.      Cervical back: Normal range of motion. No " rigidity.      Right lower leg: No edema.      Left lower leg: No edema.   Lymphadenopathy:      Head:      Right side of head: No submandibular, preauricular or posterior auricular adenopathy.      Left side of head: No submandibular, preauricular or posterior auricular adenopathy.      Cervical: No cervical adenopathy.      Right cervical: No superficial or posterior cervical adenopathy.     Left cervical: No superficial or posterior cervical adenopathy.      Upper Body:      Right upper body: No supraclavicular or axillary adenopathy.      Left upper body: No supraclavicular or axillary adenopathy.   Skin:     General: Skin is warm.      Coloration: Skin is not jaundiced.      Findings: No lesion or rash.      Comments: Decrease in pigmentation in the right lower extremity, mainly in the area medial and superior to the ankle.   Neurological:      General: No focal deficit present.      Mental Status: She is alert and oriented to person, place, and time.      Cranial Nerves: No cranial nerve deficit.      Motor: No weakness.      Gait: Gait normal.   Psychiatric:         Mood and Affect: Mood normal.         Behavior: Behavior normal.         Thought Content: Thought content normal.         Judgment: Judgment normal.         I reviewed lab data in the chart.   Latest Reference Range & Units 01/06/24 07:17   Sodium 135 - 147 mmol/L 141   Potassium 3.5 - 5.3 mmol/L 4.3   Chloride 96 - 108 mmol/L 104   Carbon Dioxide 21 - 32 mmol/L 30   ANION GAP mmol/L 7   BUN 5 - 25 mg/dL 22   Creatinine 0.60 - 1.30 mg/dL 0.92   GLUCOSE, FASTING 65 - 99 mg/dL 86   Calcium 8.4 - 10.2 mg/dL 10.2   AST 13 - 39 U/L 17   ALT 7 - 52 U/L 11   ALK PHOS 34 - 104 U/L 41   Total Protein 6.4 - 8.4 g/dL 6.5   Albumin 3.5 - 5.0 g/dL 3.8   Total Bilirubin 0.20 - 1.00 mg/dL 0.61   GFR, Calculated ml/min/1.73sq m 61   LD (LDH) 140 - 271 U/L 196   WBC 4.31 - 10.16 Thousand/uL 5.37   RBC 3.81 - 5.12 Million/uL 4.43   Hemoglobin 11.5 - 15.4 g/dL 12.6    Hematocrit 34.8 - 46.1 % 40.5   MCV 82 - 98 fL 91   MCH 26.8 - 34.3 pg 28.4   MCHC 31.4 - 37.4 g/dL 31.1 (L)   RDW 11.6 - 15.1 % 13.6   Platelet Count 149 - 390 Thousands/uL 181   MPV 8.9 - 12.7 fL 11.1   nRBC /100 WBCs 0   Neutrophils % 43 - 75 % 31 (L)   Immat GRANS % 0 - 2 % 0   Lymphocytes Relative 14 - 44 % 52 (H)   Monocytes Relative 4 - 12 % 10   Eosinophils 0 - 6 % 5   Basophils Relative 0 - 1 % 2 (H)   Immature Grans Absolute 0.00 - 0.20 Thousand/uL 0.01   Absolute Neutrophils 1.85 - 7.62 Thousands/µL 1.67 (L)   Lymphocytes Absolute 0.60 - 4.47 Thousands/µL 2.79   Absolute Monocytes 0.17 - 1.22 Thousand/µL 0.53   Absolute Eosinophils 0.00 - 0.61 Thousand/µL 0.29   Basophils Absolute 0.00 - 0.10 Thousands/µL 0.08   TSH 3RD GENERATON 0.450 - 4.500 uIU/mL 4.366   Free T4 0.61 - 1.12 ng/dL 0.98   T3, Free 2.50 - 3.90 pg/mL 3.08   (L): Data is abnormally low  (H): Data is abnormally high    RECENT IMAGING:       Assessment/Plan  Ms. Damon is a 73-year-old female with metastatic melanoma on treatment with relatlimab plus nivolumab here for continued monitoring, follow-up and surveillance.    1. Metastatic melanoma (HCC)  2. Malignant melanoma of right lower leg (HCC)  She is doing well and tolerating treatment.  No immune mediated side effects.  Labs reviewed and okay.  She is okay to continue treatment.  She is also due for scans prior to her next visit to assess response to treatment.  Orders placed in prescription provided for PET scan.    She knows to call with issues or concerns prior to her next visit.  She has standing orders for blood work prior to each treatment.      - NM pet ct tumor imaging whole body; Future    3. High risk medication use  She is on treatment with immunotherapy and we will continue to monitor for side effects and lab abnormalities.  We will monitor labs with each treatment to ensure safety of continuing on treatment.  She knows to watch for signs and symptoms for immune mediated  side effects.  Denies any immune mediated side effects at this time.          Return already scheduled, for Imaging - See orders.     Rafaela Reyes MD, PhD

## 2024-02-07 ENCOUNTER — HOSPITAL ENCOUNTER (OUTPATIENT)
Dept: INFUSION CENTER | Facility: HOSPITAL | Age: 74
Discharge: HOME | End: 2024-02-07
Attending: INTERNAL MEDICINE
Payer: COMMERCIAL

## 2024-02-07 VITALS
OXYGEN SATURATION: 95 % | SYSTOLIC BLOOD PRESSURE: 113 MMHG | HEART RATE: 92 BPM | TEMPERATURE: 97.4 F | RESPIRATION RATE: 18 BRPM | DIASTOLIC BLOOD PRESSURE: 57 MMHG

## 2024-02-07 DIAGNOSIS — C43.9 METASTATIC MELANOMA (HCC): Primary | ICD-10-CM

## 2024-02-07 PROCEDURE — 96413 CHEMO IV INFUSION 1 HR: CPT

## 2024-02-07 RX ORDER — SODIUM CHLORIDE 9 MG/ML
20 INJECTION, SOLUTION INTRAVENOUS ONCE
Status: COMPLETED | OUTPATIENT
Start: 2024-02-07 | End: 2024-02-07

## 2024-02-07 RX ADMIN — SODIUM CHLORIDE 20 ML/HR: 0.9 INJECTION, SOLUTION INTRAVENOUS at 14:55

## 2024-02-07 RX ADMIN — SODIUM CHLORIDE 480 MG OF NIVOLUMAB: 9 INJECTION, SOLUTION INTRAVENOUS at 14:59

## 2024-02-07 NOTE — PROGRESS NOTES
Recent labs reviewed. Pt tolerated Opdualag tx well with no issues. PIV removed without incident.     Debbie Damon is aware of future appt on 3/6/24 at 2:00PM.      AVS printed and given to Debbie Damon:  Yes  No (Declined by Debbie Damon) X     Pt discharged off unit in stable condition with all personal belongings.

## 2024-02-07 NOTE — PLAN OF CARE
Problem: Potential for Falls  Goal: Patient will remain free of falls  Description: INTERVENTIONS:  - Educate patient/family on patient safety including physical limitations  - Instruct patient to call for assistance with activity   - Consult OT/PT to assist with strengthening/mobility   - Keep Call bell within reach  - Keep bed low and locked with side rails adjusted as appropriate  - Keep care items and personal belongings within reach  - Initiate and maintain comfort rounds  - Make Fall Risk Sign visible to staff  - Consider moving patient to room near nurses station  Outcome: Progressing     Problem: INFECTION - ADULT  Goal: Absence or prevention of progression during hospitalization  Description: INTERVENTIONS:  - Assess and monitor for signs and symptoms of infection  - Monitor lab/diagnostic results  - Monitor all insertion sites, i.e. indwelling lines, tubes, and drains  - Monitor endotracheal if appropriate and nasal secretions for changes in amount and color  - [place] appropriate cooling/warming therapies per order  - Administer medications as ordered  - Instruct and encourage patient and family to use good hand hygiene technique  - Identify and instruct in appropriate isolation precautions for identified infection/condition  Outcome: Progressing     Problem: Knowledge Deficit  Goal: Patient/family/caregiver demonstrates understanding of disease process, treatment plan, medications, and discharge instructions  Description: Complete learning assessment and assess knowledge base.  Interventions:  - Provide teaching at level of understanding  - Provide teaching via preferred learning methods  Outcome: Progressing

## 2024-02-14 DIAGNOSIS — I10 ESSENTIAL HYPERTENSION: ICD-10-CM

## 2024-02-14 RX ORDER — LOSARTAN POTASSIUM 100 MG/1
100 TABLET ORAL DAILY
Qty: 30 TABLET | Refills: 0 | Status: SHIPPED | OUTPATIENT
Start: 2024-02-14

## 2024-02-20 DIAGNOSIS — I10 ESSENTIAL HYPERTENSION: ICD-10-CM

## 2024-02-20 DIAGNOSIS — I26.99 OTHER PULMONARY EMBOLISM WITHOUT ACUTE COR PULMONALE, UNSPECIFIED CHRONICITY (HCC): ICD-10-CM

## 2024-02-20 RX ORDER — METOPROLOL SUCCINATE 25 MG/1
25 TABLET, EXTENDED RELEASE ORAL 2 TIMES DAILY
Qty: 60 TABLET | Refills: 0 | Status: SHIPPED | OUTPATIENT
Start: 2024-02-20

## 2024-02-28 RX ORDER — SODIUM CHLORIDE 9 MG/ML
20 INJECTION, SOLUTION INTRAVENOUS ONCE
OUTPATIENT
Start: 2024-03-06

## 2024-02-29 ENCOUNTER — OFFICE VISIT (OUTPATIENT)
Dept: FAMILY MEDICINE CLINIC | Facility: CLINIC | Age: 74
End: 2024-02-29
Payer: COMMERCIAL

## 2024-02-29 VITALS
OXYGEN SATURATION: 97 % | BODY MASS INDEX: 29.18 KG/M2 | HEIGHT: 69 IN | SYSTOLIC BLOOD PRESSURE: 168 MMHG | WEIGHT: 197 LBS | TEMPERATURE: 96.4 F | HEART RATE: 78 BPM | DIASTOLIC BLOOD PRESSURE: 86 MMHG

## 2024-02-29 DIAGNOSIS — E27.3 ADRENAL INSUFFICIENCY DUE TO CANCER THERAPY (HCC): ICD-10-CM

## 2024-02-29 DIAGNOSIS — R53.83 OTHER FATIGUE: ICD-10-CM

## 2024-02-29 DIAGNOSIS — C43.9 METASTATIC MELANOMA (HCC): ICD-10-CM

## 2024-02-29 DIAGNOSIS — I10 ESSENTIAL HYPERTENSION: ICD-10-CM

## 2024-02-29 DIAGNOSIS — L03.211 CELLULITIS OF FACE: ICD-10-CM

## 2024-02-29 DIAGNOSIS — E27.40 ADRENAL INSUFFICIENCY (HCC): Primary | ICD-10-CM

## 2024-02-29 DIAGNOSIS — Z79.899 HIGH RISK MEDICATION USE: ICD-10-CM

## 2024-02-29 PROCEDURE — 99214 OFFICE O/P EST MOD 30 MIN: CPT | Performed by: FAMILY MEDICINE

## 2024-02-29 RX ORDER — HYDROCORTISONE 10 MG/1
10 TABLET ORAL 2 TIMES DAILY
Qty: 60 TABLET | Refills: 11 | Status: SHIPPED | OUTPATIENT
Start: 2024-02-29

## 2024-02-29 NOTE — PROGRESS NOTES
Assessment/Plan: She also has a small wound on her bridge of her nose regarding that with mupirocin    Problem List Items Addressed This Visit          Endocrine    Adrenal insufficiency (HCC) - Primary       Cardiovascular and Mediastinum    Essential hypertension       Other    Metastatic melanoma (HCC)        Diagnoses and all orders for this visit:    Adrenal insufficiency (HCC)    Essential hypertension    Metastatic melanoma (HCC)        No problem-specific Assessment & Plan notes found for this encounter.        Subjective:      Patient ID: Debbie Damon is a 73 y.o. female.    This is Nelson is here today for a follow-up visit she had not been seen in a long time because she has been very busy receiving both immuno and chemotherapy for her melanoma which is responding very nicely she she will see her hematologist tomorrow on Monday and she is her lesion on her leg is absolutely marvelous and has resolved very nicely blood pressure is up a little bit today I am not going to change her meds she does check her own blood pressure readings are typically about 112-118/70 so I think she just has whitecoat hypertension    Medication Refill  Associated symptoms include arthralgias and a rash. Pertinent negatives include no abdominal pain, chest pain, coughing, diaphoresis, fatigue, fever, headaches, joint swelling, myalgias, nausea, numbness or vomiting.       The following portions of the patient's history were reviewed and updated as appropriate:   She has a past medical history of Acute deep vein thrombosis (DVT) of popliteal vein of left lower extremity (HCC), Ankle wound, left, initial encounter, Ankle wound, right, initial encounter, DVT (deep vein thrombosis) in pregnancy, Hypertension, Pulmonary emboli (HCC), Pulmonary embolism (HCC), Pulmonary embolism, blood-clot, obstetric, Skin cancer, Venous stasis ulcer (HCC), and Venous ulcers of both lower extremities (HCC).,  does not have any pertinent problems on  file.,   has a past surgical history that includes  section.,  family history includes Bone cancer in her family; Cancer in her father; Heart attack in her mother; Hypertension in her family; Lung cancer in her family; Stroke in her mother.,   reports that she has never smoked. She has never used smokeless tobacco. She reports that she does not drink alcohol and does not use drugs.,  is allergic to etomidate..  Current Outpatient Medications   Medication Sig Dispense Refill    apixaban (Eliquis) 5 mg Take 1 tablet (5 mg total) by mouth every 12 (twelve) hours 60 tablet 0    famotidine (PEPCID) 10 mg tablet Take 10 mg by mouth 2 (two) times a day      hydrocortisone (CORTEF) 10 mg tablet take 1 tablet by mouth twice a day 60 tablet 11    loratadine (CLARITIN) 10 mg tablet Take 10 mg by mouth daily      losartan (Cozaar) 100 MG tablet Take 1 tablet (100 mg total) by mouth daily 30 tablet 0    metoprolol succinate (TOPROL-XL) 25 mg 24 hr tablet Take 1 tablet (25 mg total) by mouth 2 (two) times a day 60 tablet 0    Multiple Minerals-Vitamins (Citracal Plus) TABS Take by mouth      Multiple Vitamins-Minerals (MULTIVITAMIN WITH MINERALS) tablet Take 1 tablet by mouth daily      patient supplied medication Pt takes eye drops Mura 128 - not listed in database      Wheat Dextrin (BENEFIBER DRINK MIX PO) Take by mouth       No current facility-administered medications for this visit.       Review of Systems   Constitutional:  Negative for activity change, appetite change, diaphoresis, fatigue and fever.   HENT: Negative.  Negative for dental problem.    Eyes: Negative.  Negative for visual disturbance.   Respiratory:  Negative for apnea, cough, chest tightness, shortness of breath and wheezing.    Cardiovascular:  Negative for chest pain, palpitations and leg swelling.   Gastrointestinal:  Negative for abdominal distention, abdominal pain, anal bleeding, constipation, diarrhea, nausea and vomiting.   Endocrine:  "Negative for cold intolerance, heat intolerance, polydipsia, polyphagia and polyuria.        Patient has adrenal insufficiency secondary to her cancer therapy she wears a medic alert bracelet and she takes hydrocortisone   Genitourinary:  Negative for difficulty urinating, dysuria, flank pain, hematuria and urgency.   Musculoskeletal:  Positive for arthralgias. Negative for back pain, gait problem, joint swelling and myalgias.   Skin:  Positive for rash and wound. Negative for color change.   Allergic/Immunologic: Negative for environmental allergies, food allergies and immunocompromised state.   Neurological:  Negative for dizziness, seizures, syncope, speech difficulty, numbness and headaches.   Hematological:  Negative for adenopathy. Does not bruise/bleed easily.   Psychiatric/Behavioral:  Negative for agitation, behavioral problems, hallucinations, sleep disturbance and suicidal ideas.          Objective:  Vitals:    02/29/24 1600   BP: 168/86   BP Location: Left arm   Patient Position: Sitting   Cuff Size: Standard   Pulse: 78   Temp: (!) 96.4 °F (35.8 °C)   TempSrc: Temporal   SpO2: 97%   Weight: 89.4 kg (197 lb)   Height: 5' 9\" (1.753 m)     Body mass index is 29.09 kg/m².     Physical Exam  Constitutional:       General: She is not in acute distress.     Appearance: She is well-developed. She is not diaphoretic.   HENT:      Head: Normocephalic.      Right Ear: External ear normal.      Left Ear: External ear normal.      Nose: Nose normal.   Eyes:      General: No scleral icterus.        Right eye: No discharge.         Left eye: No discharge.      Conjunctiva/sclera: Conjunctivae normal.      Pupils: Pupils are equal, round, and reactive to light.   Neck:      Thyroid: No thyromegaly.      Trachea: No tracheal deviation.   Cardiovascular:      Rate and Rhythm: Normal rate and regular rhythm.      Heart sounds: Normal heart sounds. No murmur heard.     No friction rub. No gallop.   Pulmonary:      Effort: " Pulmonary effort is normal. No respiratory distress.      Breath sounds: Normal breath sounds. No wheezing.   Abdominal:      General: Bowel sounds are normal.      Palpations: Abdomen is soft. There is no mass.      Tenderness: There is no abdominal tenderness. There is no guarding.   Musculoskeletal:         General: No deformity.      Cervical back: Normal range of motion.   Lymphadenopathy:      Cervical: No cervical adenopathy.   Skin:     General: Skin is warm and dry.      Findings: No erythema or rash.   Neurological:      Mental Status: She is alert and oriented to person, place, and time.      Cranial Nerves: No cranial nerve deficit.   Psychiatric:         Thought Content: Thought content normal.         Depression Screening and Follow-up Plan: Patient was screened for depression during today's encounter. They screened negative with a PHQ-2 score of 0.

## 2024-03-02 ENCOUNTER — APPOINTMENT (OUTPATIENT)
Dept: LAB | Facility: CLINIC | Age: 74
End: 2024-03-02
Payer: COMMERCIAL

## 2024-03-04 ENCOUNTER — OFFICE VISIT (OUTPATIENT)
Dept: HEMATOLOGY ONCOLOGY | Facility: CLINIC | Age: 74
End: 2024-03-04
Payer: COMMERCIAL

## 2024-03-04 VITALS
WEIGHT: 197 LBS | BODY MASS INDEX: 29.18 KG/M2 | SYSTOLIC BLOOD PRESSURE: 142 MMHG | HEIGHT: 69 IN | OXYGEN SATURATION: 97 % | DIASTOLIC BLOOD PRESSURE: 80 MMHG | RESPIRATION RATE: 18 BRPM | TEMPERATURE: 98 F | HEART RATE: 88 BPM

## 2024-03-04 DIAGNOSIS — E27.40 ADRENAL INSUFFICIENCY (HCC): ICD-10-CM

## 2024-03-04 DIAGNOSIS — C43.9 METASTATIC MELANOMA (HCC): Primary | ICD-10-CM

## 2024-03-04 DIAGNOSIS — Z79.899 HIGH RISK MEDICATION USE: ICD-10-CM

## 2024-03-04 PROCEDURE — 99214 OFFICE O/P EST MOD 30 MIN: CPT | Performed by: INTERNAL MEDICINE

## 2024-03-04 NOTE — ASSESSMENT & PLAN NOTE
he patient is on treatment with immunotherapy and we will continue to monitor for side effects and lab abnormalities. We will monitor labs with each treatment to ensure safety of continuing on treatment. The patient knows to watch for signs and symptoms for immune mediated side effects.  Denies any immune mediated side effects at this time.  She is doing well on 10 mg hydrocortisone twice a day. She knows to increase the dose to stress levels of steroids if she has any type of infection. She knows to call with issues or concerns.

## 2024-03-04 NOTE — PROGRESS NOTES
[facility]ATOLOGY [facility] [place]  [address]  [place] [address]  [phone]  [phone]     Date of Visit: 3/4/2024  Name: Debbie Damon   YOB: 1950       Subjective    VISIT DIAGNOSIS:  Diagnoses and all orders for this visit:    Metastatic melanoma (HCC)    High risk medication use    Adrenal insufficiency (HCC)        Oncology History   Metastatic melanoma (HCC)   10/13/2022 -  Cancer Staged    Staging form: Melanoma of the Skin, AJCC 8th Edition  - Clinical stage from 10/13/2022: Stage IV (cTX, cNX, cM1a) - Signed by Rafaela Reyes MD on 11/22/2022       10/13/2022 Biopsy    A. Skin, right lateral medial leg, punch biopsy:  Portion of severely atypical melanocytic dermal proliferation with prominent melanosis consistent with melanoma. See note.        B. Skin, right medial leg. punch biopsy:  Portion of severely atypical melanocytic dermal proliferation with prominent melanosis consistent with melanoma. See note.        C. Skin, right medial leg, punch biopsy:   Portion of severely atypical melanocytic dermal proliferation with prominent melanosis consistent with melanoma. See note.     11/22/2022 Initial Diagnosis    Metastatic melanoma (HCC)     12/14/2022 -  Chemotherapy    alteplase (CATHFLO), 2 mg, Intracatheter, Every 2 hour PRN, 16 of 20 cycles  NIVOLUMAB-RELATLIMAB-RMBW (OPDUALAG) IVPB, 480 mg of nivolumab, Intravenous, Once, 16 of 20 cycles  Administration: 480 mg of nivolumab (12/14/2022), 480 mg of nivolumab (1/11/2023), 480 mg of nivolumab (2/8/2023), 480 mg of nivolumab (3/8/2023), 480 mg of nivolumab (4/5/2023), 480 mg of nivolumab (5/3/2023), 480 mg of nivolumab (5/31/2023), 480 mg of nivolumab (6/28/2023), 480 mg of nivolumab (7/26/2023), 480 mg of nivolumab (8/23/2023), 480 mg of nivolumab (9/20/2023), 480 mg of nivolumab (10/18/2023), 480 mg of nivolumab (11/15/2023), 480 mg of nivolumab (12/13/2023), 480 mg of nivolumab (1/10/2024), 480 mg of  "nivolumab (2/7/2024)     1/5/2023 Genomic Testing    Foundation One liquid testing  TMB 1 Mut/Mb  MSI-high not detected          Cancer Staging   Metastatic melanoma (HCC)  Staging form: Melanoma of the Skin, AJCC 8th Edition  - Clinical stage from 10/13/2022: Stage IV (cTX, cNX, cM1a) - Signed by Rafaela Reyes MD on 11/22/2022     Treatment Details   Treatment goal Curative   Plan Name OP Nivolumab and Relatlimab-rmbw Q 28 Days   Status Active   Start Date 12/14/2022   End Date 5/29/2024 (Planned)   Provider Rafaela Reyes MD   Chemotherapy alteplase (CATHFLO), 2 mg, Intracatheter, Every 2 hour PRN, 16 of 20 cycles    NIVOLUMAB-RELATLIMAB-RMBW (OPDUALAG) IVPB, 480 mg of nivolumab, Intravenous, Once, 16 of 20 cycles  Administration: 480 mg of nivolumab (12/14/2022), 480 mg of nivolumab (1/11/2023), 480 mg of nivolumab (2/8/2023), 480 mg of nivolumab (3/8/2023), 480 mg of nivolumab (4/5/2023), 480 mg of nivolumab (5/3/2023), 480 mg of nivolumab (5/31/2023), 480 mg of nivolumab (6/28/2023), 480 mg of nivolumab (7/26/2023), 480 mg of nivolumab (8/23/2023), 480 mg of nivolumab (9/20/2023), 480 mg of nivolumab (10/18/2023), 480 mg of nivolumab (11/15/2023), 480 mg of nivolumab (12/13/2023), 480 mg of nivolumab (1/10/2024), 480 mg of nivolumab (2/7/2024)          HISTORY OF PRESENT ILLNESS: Debbie Damon is a 73 y.o. female  who is here for follow-up.    She saw Dr. Perry on Thursday.  Prior her visit, she noted a scabbed area on her forehead at the bridge of her nose.  She states that it was red for 2 weeks, then got a little lump. It started to improve, but it was painful to touch. She thought it was her sinuses. It turned into a scab with improvement and decrease in \"bump\" and pain.  She was recommended mupirocin ointment 3 times a day.     This is the first return visit since her diagnosis of rmelanoma and ongoing treatment.  She reviewed her ongoing treatment and development of adrenal insufficiency.  She is " currently on hydrocortisone.  Dr. Perry told her she can take hydrocortisone at higher dose than what she is supposed to, if she gets a cold.   She did not, and doesn't get flu shots.  She got the Sal and Sal COVID-19 vaccine in 2020 and then Sal and Sal booster.       She denies any rash, itchy skin, double vision, headaches, chest pain, shortness of breath, abdominal pain, nausea, vomiting, diarrhea, or constipation. She takes Tylenol Arthritis occasionally, when she feels sore. She denies worsening due to medicine or immunotherapy. She denies any concerns on skin, lumps or bumps.    She has her next infusion scheduled on Wednesday, 03/06/2024, and she has an appointment with her ophthalmologist on next Friday. She had been to the dentist last week, as she does not want to have any infection.    Her Knees and hips started to hurt, as she was sitting for a long time watching her daughters tournament.  Improved with moving around.          Review of Systems   Constitutional:  Negative for appetite change, fatigue, fever and unexpected weight change.   HENT:   Negative for lump/mass, trouble swallowing and voice change.         Negative for bumps or double vision   Eyes:  Negative for icterus.   Respiratory:  Negative for cough, shortness of breath and wheezing.    Cardiovascular:  Negative for leg swelling.   Gastrointestinal:  Negative for abdominal pain, constipation, diarrhea, nausea and vomiting.   Genitourinary:  Negative for difficulty urinating and hematuria.    Musculoskeletal:  Negative for arthralgias, gait problem and myalgias.   Skin:  Negative for itching and rash.        No new, changing, or concerning lesions.  New lesions/scab on her forehead at the bridge of the nose.   Neurological:  Negative for extremity weakness, gait problem, headaches, light-headedness and numbness.   Hematological:  Negative for adenopathy.        MEDICATIONS:    Current Outpatient Medications:     apixaban  (Eliquis) 5 mg, Take 1 tablet (5 mg total) by mouth every 12 (twelve) hours, Disp: 60 tablet, Rfl: 0    famotidine (PEPCID) 10 mg tablet, Take 10 mg by mouth 2 (two) times a day, Disp: , Rfl:     hydrocortisone (CORTEF) 10 mg tablet, take 1 tablet by mouth twice a day, Disp: 60 tablet, Rfl: 11    loratadine (CLARITIN) 10 mg tablet, Take 10 mg by mouth daily, Disp: , Rfl:     losartan (Cozaar) 100 MG tablet, Take 1 tablet (100 mg total) by mouth daily, Disp: 30 tablet, Rfl: 0    metoprolol succinate (TOPROL-XL) 25 mg 24 hr tablet, Take 1 tablet (25 mg total) by mouth 2 (two) times a day, Disp: 60 tablet, Rfl: 0    Multiple Minerals-Vitamins (Citracal Plus) TABS, Take by mouth, Disp: , Rfl:     Multiple Vitamins-Minerals (MULTIVITAMIN WITH MINERALS) tablet, Take 1 tablet by mouth daily, Disp: , Rfl:     mupirocin (BACTROBAN) 2 % ointment, Apply topically 3 (three) times a day, Disp: 15 g, Rfl: 1    patient supplied medication, Pt takes eye drops Mura 128 - not listed in database, Disp: , Rfl:     Wheat Dextrin (BENEFIBER DRINK MIX PO), Take by mouth, Disp: , Rfl:      ALLERGIES:  Allergies   Allergen Reactions    Etomidate Other (See Comments)        ACTIVE PROBLEMS:  Patient Active Problem List   Diagnosis    Acute massive pulmonary embolism (HCC)    Venous stasis of both lower extremities    Clostridium difficile infection    Right ventricular systolic dysfunction without heart failure    Essential hypertension    Metastatic melanoma (HCC)    High risk medication use    Adrenal insufficiency (HCC)    Abnormal PET scan of colon    Mild anemia    Anticoagulated by anticoagulation treatment    History of colon polyps    Diverticulosis    Abnormal finding on GI tract imaging          PAST MEDICAL HISTORY:   Past Medical History:   Diagnosis Date    Acute deep vein thrombosis (DVT) of popliteal vein of left lower extremity (HCC)     Ankle wound, left, initial encounter     Resolved 6/2017    Ankle wound, right, initial  "encounter     resolved 2017    DVT (deep vein thrombosis) in pregnancy     Hypertension     Pulmonary emboli (HCC)      post C section    Pulmonary embolism (HCC)     2017    Pulmonary embolism, blood-clot, obstetric     Skin cancer     Venous stasis ulcer (HCC)     Venous ulcers of both lower extremities (HCC)         PAST SURGICAL HISTORY:  Past Surgical History:   Procedure Laterality Date     SECTION      X2        SOCIAL HISTORY:  Social History     Socioeconomic History    Marital status:      Spouse name: Not on file    Number of children: Not on file    Years of education: Not on file    Highest education level: Not on file   Occupational History    Not on file   Tobacco Use    Smoking status: Never    Smokeless tobacco: Never   Vaping Use    Vaping status: Never Used   Substance and Sexual Activity    Alcohol use: No     Comment: hx of social drinker    Drug use: No    Sexual activity: Not on file   Other Topics Concern    Not on file   Social History Narrative    Not on file     Social Determinants of Health     Financial Resource Strain: Not on file   Food Insecurity: Not on file   Transportation Needs: Not on file   Physical Activity: Not on file   Stress: Not on file   Social Connections: Not on file   Intimate Partner Violence: Not on file   Housing Stability: Not on file        FAMILY HISTORY:  Family History   Problem Relation Age of Onset    Heart attack Mother     Stroke Mother     Cancer Father     Bone cancer Family     Hypertension Family     Lung cancer Family           Objective    PHYSICAL EXAMINATION:   Blood pressure 142/80, pulse 88, temperature 98 °F (36.7 °C), temperature source Temporal, resp. rate 18, height 5' 9\", weight 89.4 kg (197 lb), SpO2 97%.     Pain Score: 0-No pain     ECOG Performance Status      Flowsheet Row Most Recent Value   ECOG Performance Status 0 - Fully active, able to carry on all pre-disease performance without restriction             "   Physical Exam  Constitutional:       General: She is not in acute distress.     Appearance: Normal appearance. She is not ill-appearing or toxic-appearing.   HENT:      Head: Normocephalic and atraumatic.      Right Ear: External ear normal.      Left Ear: External ear normal.      Nose: Nose normal.      Mouth/Throat:      Mouth: Mucous membranes are moist.      Pharynx: Oropharynx is clear.   Eyes:      General: No scleral icterus.        Right eye: No discharge.         Left eye: No discharge.      Conjunctiva/sclera: Conjunctivae normal.   Cardiovascular:      Rate and Rhythm: Normal rate and regular rhythm.      Pulses: Normal pulses.      Heart sounds: No murmur heard.     No friction rub. No gallop.   Pulmonary:      Effort: Pulmonary effort is normal. No respiratory distress.      Breath sounds: Normal breath sounds. No wheezing or rales.   Abdominal:      General: Bowel sounds are normal. There is no distension.      Palpations: There is no mass.      Tenderness: There is no abdominal tenderness. There is no rebound.   Musculoskeletal:         General: No swelling or tenderness.      Cervical back: Normal range of motion. No rigidity.      Right lower leg: No edema.      Left lower leg: No edema.   Lymphadenopathy:      Head:      Right side of head: No submandibular, preauricular or posterior auricular adenopathy.      Left side of head: No submandibular, preauricular or posterior auricular adenopathy.      Cervical: No cervical adenopathy.      Right cervical: No superficial or posterior cervical adenopathy.     Left cervical: No superficial or posterior cervical adenopathy.      Upper Body:      Right upper body: No supraclavicular or axillary adenopathy.      Left upper body: No supraclavicular or axillary adenopathy.      Lower Body: No right inguinal adenopathy. No left inguinal adenopathy.   Skin:     General: Skin is warm.      Coloration: Skin is not jaundiced.      Findings: No lesion or rash.       Comments: Well healing scab on her forehead between at the bridge of her nose.  Improvement of pigmentation on her right lower extremity - her melanoma disease - with continued response to treatment.   Neurological:      General: No focal deficit present.      Mental Status: She is alert and oriented to person, place, and time.      Cranial Nerves: No cranial nerve deficit.      Motor: No weakness.      Gait: Gait normal.   Psychiatric:         Mood and Affect: Mood normal.         Behavior: Behavior normal.         Thought Content: Thought content normal.         Judgment: Judgment normal.       I reviewed lab data in the chart.    WBC   Date Value Ref Range Status   03/02/2024 4.46 4.31 - 10.16 Thousand/uL Final   02/03/2024 4.85 4.31 - 10.16 Thousand/uL Final   01/06/2024 5.37 4.31 - 10.16 Thousand/uL Final     Hemoglobin   Date Value Ref Range Status   03/02/2024 12.7 11.5 - 15.4 g/dL Final   02/03/2024 12.9 11.5 - 15.4 g/dL Final   01/06/2024 12.6 11.5 - 15.4 g/dL Final     Platelets   Date Value Ref Range Status   03/02/2024 179 149 - 390 Thousands/uL Final   02/03/2024 194 149 - 390 Thousands/uL Final   01/06/2024 181 149 - 390 Thousands/uL Final     MCV   Date Value Ref Range Status   03/02/2024 92 82 - 98 fL Final   02/03/2024 93 82 - 98 fL Final   01/06/2024 91 82 - 98 fL Final      Potassium   Date Value Ref Range Status   03/02/2024 4.3 3.5 - 5.3 mmol/L Final   02/03/2024 4.1 3.5 - 5.3 mmol/L Final   01/06/2024 4.3 3.5 - 5.3 mmol/L Final   05/06/2019 3.8 3.5 - 5.2 mmol/L Final   05/05/2019 4.1 3.5 - 5.2 mmol/L Final   05/04/2019 3.9 3.5 - 5.2 mmol/L Final     Chloride   Date Value Ref Range Status   03/02/2024 102 96 - 108 mmol/L Final   02/03/2024 106 96 - 108 mmol/L Final   01/06/2024 104 96 - 108 mmol/L Final   05/06/2019 104 100 - 109 mmol/L Final   05/05/2019 101 100 - 109 mmol/L Final   05/04/2019 104 100 - 109 mmol/L Final     Carbon Dioxide   Date Value Ref Range Status   05/06/2019 31 23 -  31 mmol/L Final   05/05/2019 28 23 - 31 mmol/L Final   05/04/2019 26 23 - 31 mmol/L Final     CO2   Date Value Ref Range Status   03/02/2024 30 21 - 32 mmol/L Final   02/03/2024 29 21 - 32 mmol/L Final   01/06/2024 30 21 - 32 mmol/L Final     BUN   Date Value Ref Range Status   03/02/2024 21 5 - 25 mg/dL Final   02/03/2024 23 5 - 25 mg/dL Final   01/06/2024 22 5 - 25 mg/dL Final   05/06/2019 15 7 - 25 mg/dL Final   05/05/2019 11 7 - 25 mg/dL Final   05/04/2019 11 7 - 25 mg/dL Final     Creatinine   Date Value Ref Range Status   03/02/2024 0.90 0.60 - 1.30 mg/dL Final     Comment:     Standardized to IDMS reference method   02/03/2024 0.83 0.60 - 1.30 mg/dL Final     Comment:     Standardized to IDMS reference method   01/06/2024 0.92 0.60 - 1.30 mg/dL Final     Comment:     Standardized to IDMS reference method   05/06/2019 0.60 0.40 - 1.10 mg/dL Final   05/05/2019 0.53 0.40 - 1.10 mg/dL Final   05/04/2019 0.52 0.40 - 1.10 mg/dL Final     Glucose   Date Value Ref Range Status   12/11/2023 88 65 - 140 mg/dL Final     Comment:     If the patient is fasting, the ADA then defines impaired fasting glucose as > 100 mg/dL and diabetes as > or equal to 123 mg/dL.   11/17/2022 85 65 - 140 mg/dL Final     Comment:     If the patient is fasting, the ADA then defines impaired fasting glucose as > 100 mg/dL and diabetes as > or equal to 123 mg/dL.  Specimen collection should occur prior to Sulfasalazine administration due to the potential for falsely depressed results. Specimen collection should occur prior to Sulfapyridine administration due to the potential for falsely elevated results.   08/03/2020 112 (H) 65 - 99 mg/dL Final     Comment:     If the patient is fasting, the ADA then defines impaired fasting glucose as > 100 mg/dL and diabetes as > or equal to 123 mg/dL.  Specimen collection should occur prior to Sulfasalazine administration due to the potential for falsely depressed results. Specimen collection should occur  prior to Sulfapyridine administration due to the potential for falsely elevated results.   05/06/2019 104 (H) 65 - 99 mg/dL Final   05/05/2019 109 (H) 65 - 99 mg/dL Final   05/04/2019 124 (H) 65 - 99 mg/dL Final     Calcium   Date Value Ref Range Status   03/02/2024 8.9 8.4 - 10.2 mg/dL Final   02/03/2024 9.2 8.4 - 10.2 mg/dL Final   01/06/2024 10.2 8.4 - 10.2 mg/dL Final   05/06/2019 8.5 8.5 - 10.1 mg/dL Final   05/05/2019 8.3 (L) 8.5 - 10.1 mg/dL Final   05/04/2019 8.1 (L) 8.5 - 10.1 mg/dL Final     Albumin   Date Value Ref Range Status   03/02/2024 3.9 3.5 - 5.0 g/dL Final   02/03/2024 3.9 3.5 - 5.0 g/dL Final   01/06/2024 3.8 3.5 - 5.0 g/dL Final     ALBUMIN   Date Value Ref Range Status   05/03/2019 3.5 3.5 - 4.8 g/dL Final     Total Bilirubin   Date Value Ref Range Status   03/02/2024 0.53 0.20 - 1.00 mg/dL Final     Comment:     Use of this assay is not recommended for patients undergoing treatment with eltrombopag due to the potential for falsely elevated results.  N-acetyl-p-benzoquinone imine (metabolite of Acetaminophen) will generate erroneously low results in samples for patients that have taken an overdose of Acetaminophen.   02/03/2024 0.58 0.20 - 1.00 mg/dL Final     Comment:     Use of this assay is not recommended for patients undergoing treatment with eltrombopag due to the potential for falsely elevated results.  N-acetyl-p-benzoquinone imine (metabolite of Acetaminophen) will generate erroneously low results in samples for patients that have taken an overdose of Acetaminophen.   01/06/2024 0.61 0.20 - 1.00 mg/dL Final     Comment:     Use of this assay is not recommended for patients undergoing treatment with eltrombopag due to the potential for falsely elevated results.  N-acetyl-p-benzoquinone imine (metabolite of Acetaminophen) will generate erroneously low results in samples for patients that have taken an overdose of Acetaminophen.   05/03/2019 0.5 0.2 - 1.0 mg/dL Final     Alkaline  "Phosphatase   Date Value Ref Range Status   03/02/2024 45 34 - 104 U/L Final   02/03/2024 45 34 - 104 U/L Final   01/06/2024 41 34 - 104 U/L Final   05/03/2019 54 35 - 120 U/L Final     AST   Date Value Ref Range Status   03/02/2024 16 13 - 39 U/L Final   02/03/2024 14 13 - 39 U/L Final   01/06/2024 17 13 - 39 U/L Final   05/03/2019 26 <41 U/L Final     ALT   Date Value Ref Range Status   03/02/2024 11 7 - 52 U/L Final     Comment:     Specimen collection should occur prior to Sulfasalazine administration due to the potential for falsely depressed results.    02/03/2024 10 7 - 52 U/L Final     Comment:     Specimen collection should occur prior to Sulfasalazine administration due to the potential for falsely depressed results.    01/06/2024 11 7 - 52 U/L Final     Comment:     Specimen collection should occur prior to Sulfasalazine administration due to the potential for falsely depressed results.    05/03/2019 25 <56 U/L Final      LD   Date Value Ref Range Status   03/02/2024 138 (L) 140 - 271 U/L Final   02/03/2024 130 (L) 140 - 271 U/L Final   01/06/2024 196 140 - 271 U/L Final     No results found for: \"TSH\"  No results found for: \"S3SPYOS\"   Free T4   Date Value Ref Range Status   03/02/2024 0.92 0.61 - 1.12 ng/dL Final     Comment:     Specimens with biotin concentrations > 10 ng/mL can lead to significant (> 10%) positive bias in result.   02/03/2024 1.08 0.61 - 1.12 ng/dL Final     Comment:     Specimens with biotin concentrations > 10 ng/mL can lead to significant (> 10%) positive bias in result.   01/06/2024 0.98 0.61 - 1.12 ng/dL Final     Comment:     Specimens with biotin concentrations > 10 ng/mL can lead to significant (> 10%) positive bias in result.         RECENT IMAGING:  I have personally reviewed results with the patient.    No results found.       Assessment/Plan  Debbie Damon is a 73-year-old female with metastatic melanoma on treatment with nivolumab and relatlimab/Opdulag here for continued " monitoring, follow-up and surveillance while on treatment.    1. Metastatic melanoma (HCC)  Assessment & Plan:  She is doing well and responding nicely to nivolumab plus relatlimab/Opdulag.  Labs reviewed and okay. She is set to get her next cycle on Wednesday, 03/06/2024. She knows to watch for any immune mediated side effects. She knows to call with any issues or concerns prior to her next visit. She has standing orders for blood work prior to each treatment. She is due for treatment in approximately 1 month and we will skip that appointment that month. She will continue to get blood work prior to infusion and knows to call with any problems. Otherwise, we will see her again in approximately 8 weeks.      2. High risk medication use    3. Adrenal insufficiency (HCC)  Assessment & Plan:  The patient is on treatment with immunotherapy and we will continue to monitor for side effects and lab abnormalities. We will monitor labs with each treatment to ensure safety of continuing on treatment. The patient knows to watch for signs and symptoms for immune mediated side effects.  Denies any immune mediated side effects at this time.    She is doing well on 10 mg hydrocortisone twice a day. She knows to increase the dose to stress levels of steroids if she has any type of infection. She knows to call with issues or concerns.           Return in about 8 weeks (around 4/29/2024) for Office Visit, Labs - See Treatment Plan.     Rafaela Reyes MD, PhD      Transcribed for Rafaela Reyes MD, by Constanza Flores on 03/04/24 at 6:19 PM. Powered by Dragon [employer].

## 2024-03-04 NOTE — ASSESSMENT & PLAN NOTE
She is doing well and responding nicely to nivolumab (18:47 - 18:48). Labs reviewed and okay. She is set to get her next cycle on Wednesday, 03/06/2024. She knows to watch for any immune mediated side effects. She knows to call with any issues or concerns prior to her next visit. She has standing orders for blood work prior to each treatment. She is due for treatment in approximately 1 month and we will skip that appointment that month. She will continue to get blood work prior to infusion and knows to call with any problems. Otherwise, we will see her again in approximately 8 weeks.

## 2024-03-04 NOTE — LETTER
March 5, 2024     Anh Bailey MD  [address] [facility]  2nd Floor  [facility] [address]    Patient: Debbie Damon   YOB: 1950   Date of Visit: 3/4/2024       Dear Dr. Bailey:    Thank you for referring Debbie Damon to me for evaluation. Below are my notes for this consultation.    If you have questions, please do not hesitate to call me. I look forward to following your patient along with you.         Sincerely,        Rafaela Reyes MD        CC: DO Ralph Llamas MD Melissa A Wilson, MD  3/5/2024  6:05 PM  Sign when Signing Visit  [facility] [place]  [address] [facility] [address]  [phone]  [phone]     Date of Visit: 3/4/2024  Name: Debbie Damon   YOB: 1950       Subjective    VISIT DIAGNOSIS:  Diagnoses and all orders for this visit:    Metastatic melanoma (HCC)    High risk medication use    Adrenal insufficiency (HCC)        Oncology History   Metastatic melanoma (HCC)   10/13/2022 -  Cancer Staged    Staging form: Melanoma of the Skin, AJCC 8th Edition  - Clinical stage from 10/13/2022: Stage IV (cTX, cNX, cM1a) - Signed by Rafaela Reyes MD on 11/22/2022       10/13/2022 Biopsy    A. Skin, right lateral medial leg, punch biopsy:  Portion of severely atypical melanocytic dermal proliferation with prominent melanosis consistent with melanoma. See note.        B. Skin, right medial leg. punch biopsy:  Portion of severely atypical melanocytic dermal proliferation with prominent melanosis consistent with melanoma. See note.        C. Skin, right medial leg, punch biopsy:   Portion of severely atypical melanocytic dermal proliferation with prominent melanosis consistent with melanoma. See note.     11/22/2022 Initial Diagnosis    Metastatic melanoma (HCC)     12/14/2022 -  Chemotherapy    alteplase (CATHFLO), 2 mg, Intracatheter, Every 2 hour PRN, 16 of 20 cycles  NIVOLUMAB-RELATLIMAB-RMBW (OPDUALAG) IVPB,  480 mg of nivolumab, Intravenous, Once, 16 of 20 cycles  Administration: 480 mg of nivolumab (12/14/2022), 480 mg of nivolumab (1/11/2023), 480 mg of nivolumab (2/8/2023), 480 mg of nivolumab (3/8/2023), 480 mg of nivolumab (4/5/2023), 480 mg of nivolumab (5/3/2023), 480 mg of nivolumab (5/31/2023), 480 mg of nivolumab (6/28/2023), 480 mg of nivolumab (7/26/2023), 480 mg of nivolumab (8/23/2023), 480 mg of nivolumab (9/20/2023), 480 mg of nivolumab (10/18/2023), 480 mg of nivolumab (11/15/2023), 480 mg of nivolumab (12/13/2023), 480 mg of nivolumab (1/10/2024), 480 mg of nivolumab (2/7/2024)     1/5/2023 Genomic Testing    [facility] liquid testing  TMB 1 Mut/Mb  MSI-high not detected          Cancer Staging   Metastatic melanoma (HCC)  Staging form: Melanoma of the Skin, AJCC 8th Edition  - Clinical stage from 10/13/2022: Stage IV (cTX, cNX, cM1a) - Signed by Rafaela Reyes MD on 11/22/2022     Treatment Details   Treatment goal Curative   Plan Name OP Nivolumab and Relatlimab-rmbw Q 28 Days   Status Active   Start Date 12/14/2022   End Date 5/29/2024 (Planned)   Provider Rafaela Reyes MD   Chemotherapy alteplase (CATHFLO), 2 mg, Intracatheter, Every 2 hour PRN, 16 of 20 cycles    NIVOLUMAB-RELATLIMAB-RMBW (OPDUALAG) IVPB, 480 mg of nivolumab, Intravenous, Once, 16 of 20 cycles  Administration: 480 mg of nivolumab (12/14/2022), 480 mg of nivolumab (1/11/2023), 480 mg of nivolumab (2/8/2023), 480 mg of nivolumab (3/8/2023), 480 mg of nivolumab (4/5/2023), 480 mg of nivolumab (5/3/2023), 480 mg of nivolumab (5/31/2023), 480 mg of nivolumab (6/28/2023), 480 mg of nivolumab (7/26/2023), 480 mg of nivolumab (8/23/2023), 480 mg of nivolumab (9/20/2023), 480 mg of nivolumab (10/18/2023), 480 mg of nivolumab (11/15/2023), 480 mg of nivolumab (12/13/2023), 480 mg of nivolumab (1/10/2024), 480 mg of nivolumab (2/7/2024)          HISTORY OF PRESENT ILLNESS: Debbie Nelson is a 73 y.o. female  who is here for  "follow-up.    She saw Dr. Perry on Thursday.  Prior her visit, she noted a scabbed area on her forehead at the bridge of her nose.  She states that it was red for 2 weeks, then got a little lump. It started to improve, but it was painful to touch. She thought it was her sinuses. It turned into a scab with improvement and decrease in \"bump\" and pain.  She was recommended mupirocin ointment 3 times a day.     This is the first return visit since her diagnosis of rmelanoma and ongoing treatment.  She reviewed her ongoing treatment and development of adrenal insufficiency.  She is currently on hydrocortisone.  Dr. Perry told her she can take hydrocortisone at higher dose than what she is supposed to, if she gets a cold.   She did not, and doesn't get flu shots.  She got the Sal and Sal COVID-19 vaccine in 2020 and then Sal and Sal booster.       She denies any rash, itchy skin, double vision, headaches, chest pain, shortness of breath, abdominal pain, nausea, vomiting, diarrhea, or constipation. She takes Tylenol Arthritis occasionally, when she feels sore. She denies worsening due to medicine or immunotherapy. She denies any concerns on skin, lumps or bumps.    She has her next infusion scheduled on Wednesday, 03/06/2024, and she has an appointment with her ophthalmologist on next Friday. She had been to the dentist last week, as she does not want to have any infection.    Her Knees and hips started to hurt, as she was sitting for a long time watching her daughters tournament.  Improved with moving around.          Review of Systems   Constitutional:  Negative for appetite change, fatigue, fever and unexpected weight change.   HENT:   Negative for lump/mass, trouble swallowing and voice change.         Negative for bumps or double vision   Eyes:  Negative for icterus.   Respiratory:  Negative for cough, shortness of breath and wheezing.    Cardiovascular:  Negative for leg swelling. "   Gastrointestinal:  Negative for abdominal pain, constipation, diarrhea, nausea and vomiting.   Genitourinary:  Negative for difficulty urinating and hematuria.    Musculoskeletal:  Negative for arthralgias, gait problem and myalgias.   Skin:  Negative for itching and rash.        No new, changing, or concerning lesions.  New lesions/scab on her forehead at the bridge of the nose.   Neurological:  Negative for extremity weakness, gait problem, headaches, light-headedness and numbness.   Hematological:  Negative for adenopathy.        MEDICATIONS:    Current Outpatient Medications:   •  apixaban (Eliquis) 5 mg, Take 1 tablet (5 mg total) by mouth every 12 (twelve) hours, Disp: 60 tablet, Rfl: 0  •  famotidine (PEPCID) 10 mg tablet, Take 10 mg by mouth 2 (two) times a day, Disp: , Rfl:   •  hydrocortisone (CORTEF) 10 mg tablet, take 1 tablet by mouth twice a day, Disp: 60 tablet, Rfl: 11  •  loratadine (CLARITIN) 10 mg tablet, Take 10 mg by mouth daily, Disp: , Rfl:   •  losartan (Cozaar) 100 MG tablet, Take 1 tablet (100 mg total) by mouth daily, Disp: 30 tablet, Rfl: 0  •  metoprolol succinate (TOPROL-XL) 25 mg 24 hr tablet, Take 1 tablet (25 mg total) by mouth 2 (two) times a day, Disp: 60 tablet, Rfl: 0  •  Multiple Minerals-Vitamins (Citracal Plus) TABS, Take by mouth, Disp: , Rfl:   •  Multiple Vitamins-Minerals (MULTIVITAMIN WITH MINERALS) tablet, Take 1 tablet by mouth daily, Disp: , Rfl:   •  mupirocin (BACTROBAN) 2 % ointment, Apply topically 3 (three) times a day, Disp: 15 g, Rfl: 1  •  patient supplied medication, Pt takes eye drops Mura 128 - not listed in database, Disp: , Rfl:   •  Wheat Dextrin (BENEFIBER DRINK MIX PO), Take by mouth, Disp: , Rfl:      ALLERGIES:  Allergies   Allergen Reactions   • Etomidate Other (See Comments)        ACTIVE PROBLEMS:  Patient Active Problem List   Diagnosis   • Acute massive pulmonary embolism (HCC)   • Venous stasis of both lower extremities   • Clostridium  difficile infection   • Right ventricular systolic dysfunction without heart failure   • Essential hypertension   • Metastatic melanoma (HCC)   • High risk medication use   • Adrenal insufficiency (HCC)   • Abnormal PET scan of colon   • Mild anemia   • Anticoagulated by anticoagulation treatment   • History of colon polyps   • Diverticulosis   • Abnormal finding on GI tract imaging          PAST MEDICAL HISTORY:   Past Medical History:   Diagnosis Date   • Acute deep vein thrombosis (DVT) of popliteal vein of left lower extremity (HCC)    • Ankle wound, left, initial encounter     Resolved 2017   • Ankle wound, right, initial encounter     resolved 2017   • DVT (deep vein thrombosis) in pregnancy    • Hypertension    • Pulmonary emboli (HCC)      post C section   • Pulmonary embolism (HCC)     2017   • Pulmonary embolism, blood-clot, obstetric    • Skin cancer    • Venous stasis ulcer (HCC)    • Venous ulcers of both lower extremities (HCC)         PAST SURGICAL HISTORY:  Past Surgical History:   Procedure Laterality Date   •  SECTION      X2        SOCIAL HISTORY:  Social History     Socioeconomic History   • Marital status:      Spouse name: Not on file   • Number of children: Not on file   • Years of education: Not on file   • Highest education level: Not on file   Occupational History   • Not on file   Tobacco Use   • Smoking status: Never   • Smokeless tobacco: Never   Vaping Use   • Vaping status: Never Used   Substance and Sexual Activity   • Alcohol use: No     Comment: hx of social drinker   • Drug use: No   • Sexual activity: Not on file   Other Topics Concern   • Not on file   Social History Narrative   • Not on file     Social Determinants of Health     Financial Resource Strain: Not on file   Food Insecurity: Not on file   Transportation Needs: Not on file   Physical Activity: Not on file   Stress: Not on file   Social Connections: Not on file   Intimate Partner Violence: Not  "on file   Housing Stability: Not on file        FAMILY HISTORY:  Family History   Problem Relation Age of Onset   • Heart attack Mother    • Stroke Mother    • Cancer Father    • Bone cancer Family    • Hypertension Family    • Lung cancer Family           Objective    PHYSICAL EXAMINATION:   Blood pressure 142/80, pulse 88, temperature 98 °F (36.7 °C), temperature source Temporal, resp. rate 18, height 5' 9\", weight 89.4 kg (197 lb), SpO2 97%.     Pain Score: 0-No pain     ECOG Performance Status      Flowsheet Row Most Recent Value   ECOG Performance Status 0 - Fully active, able to carry on all pre-disease performance without restriction               Physical Exam  Constitutional:       General: She is not in acute distress.     Appearance: Normal appearance. She is not ill-appearing or toxic-appearing.   HENT:      Head: Normocephalic and atraumatic.      Right Ear: External ear normal.      Left Ear: External ear normal.      Nose: Nose normal.      Mouth/Throat:      Mouth: Mucous membranes are moist.      Pharynx: Oropharynx is clear.   Eyes:      General: No scleral icterus.        Right eye: No discharge.         Left eye: No discharge.      Conjunctiva/sclera: Conjunctivae normal.   Cardiovascular:      Rate and Rhythm: Normal rate and regular rhythm.      Pulses: Normal pulses.      Heart sounds: No murmur heard.     No friction rub. No gallop.   Pulmonary:      Effort: Pulmonary effort is normal. No respiratory distress.      Breath sounds: Normal breath sounds. No wheezing or rales.   Abdominal:      General: Bowel sounds are normal. There is no distension.      Palpations: There is no mass.      Tenderness: There is no abdominal tenderness. There is no rebound.   Musculoskeletal:         General: No swelling or tenderness.      Cervical back: Normal range of motion. No rigidity.      Right lower leg: No edema.      Left lower leg: No edema.   Lymphadenopathy:      Head:      Right side of head: No " submandibular, preauricular or posterior auricular adenopathy.      Left side of head: No submandibular, preauricular or posterior auricular adenopathy.      Cervical: No cervical adenopathy.      Right cervical: No superficial or posterior cervical adenopathy.     Left cervical: No superficial or posterior cervical adenopathy.      Upper Body:      Right upper body: No supraclavicular or axillary adenopathy.      Left upper body: No supraclavicular or axillary adenopathy.      Lower Body: No right inguinal adenopathy. No left inguinal adenopathy.   Skin:     General: Skin is warm.      Coloration: Skin is not jaundiced.      Findings: No lesion or rash.      Comments: Well healing scab on her forehead between at the bridge of her nose.  Improvement of pigmentation on her right lower extremity - her melanoma disease - with continued response to treatment.   Neurological:      General: No focal deficit present.      Mental Status: She is alert and oriented to person, place, and time.      Cranial Nerves: No cranial nerve deficit.      Motor: No weakness.      Gait: Gait normal.   Psychiatric:         Mood and Affect: Mood normal.         Behavior: Behavior normal.         Thought Content: Thought content normal.         Judgment: Judgment normal.       I reviewed lab data in the chart.    WBC   Date Value Ref Range Status   03/02/2024 4.46 4.31 - 10.16 Thousand/uL Final   02/03/2024 4.85 4.31 - 10.16 Thousand/uL Final   01/06/2024 5.37 4.31 - 10.16 Thousand/uL Final     Hemoglobin   Date Value Ref Range Status   03/02/2024 12.7 11.5 - 15.4 g/dL Final   02/03/2024 12.9 11.5 - 15.4 g/dL Final   01/06/2024 12.6 11.5 - 15.4 g/dL Final     Platelets   Date Value Ref Range Status   03/02/2024 179 149 - 390 Thousands/uL Final   02/03/2024 194 149 - 390 Thousands/uL Final   01/06/2024 181 149 - 390 Thousands/uL Final     MCV   Date Value Ref Range Status   03/02/2024 92 82 - 98 fL Final   02/03/2024 93 82 - 98 fL Final    01/06/2024 91 82 - 98 fL Final      Potassium   Date Value Ref Range Status   03/02/2024 4.3 3.5 - 5.3 mmol/L Final   02/03/2024 4.1 3.5 - 5.3 mmol/L Final   01/06/2024 4.3 3.5 - 5.3 mmol/L Final   05/06/2019 3.8 3.5 - 5.2 mmol/L Final   05/05/2019 4.1 3.5 - 5.2 mmol/L Final   05/04/2019 3.9 3.5 - 5.2 mmol/L Final     Chloride   Date Value Ref Range Status   03/02/2024 102 96 - 108 mmol/L Final   02/03/2024 106 96 - 108 mmol/L Final   01/06/2024 104 96 - 108 mmol/L Final   05/06/2019 104 100 - 109 mmol/L Final   05/05/2019 101 100 - 109 mmol/L Final   05/04/2019 104 100 - 109 mmol/L Final     Carbon Dioxide   Date Value Ref Range Status   05/06/2019 31 23 - 31 mmol/L Final   05/05/2019 28 23 - 31 mmol/L Final   05/04/2019 26 23 - 31 mmol/L Final     CO2   Date Value Ref Range Status   03/02/2024 30 21 - 32 mmol/L Final   02/03/2024 29 21 - 32 mmol/L Final   01/06/2024 30 21 - 32 mmol/L Final     BUN   Date Value Ref Range Status   03/02/2024 21 5 - 25 mg/dL Final   02/03/2024 23 5 - 25 mg/dL Final   01/06/2024 22 5 - 25 mg/dL Final   05/06/2019 15 7 - 25 mg/dL Final   05/05/2019 11 7 - 25 mg/dL Final   05/04/2019 11 7 - 25 mg/dL Final     Creatinine   Date Value Ref Range Status   03/02/2024 0.90 0.60 - 1.30 mg/dL Final     Comment:     Standardized to IDMS reference method   02/03/2024 0.83 0.60 - 1.30 mg/dL Final     Comment:     Standardized to IDMS reference method   01/06/2024 0.92 0.60 - 1.30 mg/dL Final     Comment:     Standardized to IDMS reference method   05/06/2019 0.60 0.40 - 1.10 mg/dL Final   05/05/2019 0.53 0.40 - 1.10 mg/dL Final   05/04/2019 0.52 0.40 - 1.10 mg/dL Final     Glucose   Date Value Ref Range Status   12/11/2023 88 65 - 140 mg/dL Final     Comment:     If the patient is fasting, the ADA then defines impaired fasting glucose as > 100 mg/dL and diabetes as > or equal to 123 mg/dL.   11/17/2022 85 65 - 140 mg/dL Final     Comment:     If the patient is fasting, the ADA then defines  impaired fasting glucose as > 100 mg/dL and diabetes as > or equal to 123 mg/dL.  Specimen collection should occur prior to Sulfasalazine administration due to the potential for falsely depressed results. Specimen collection should occur prior to Sulfapyridine administration due to the potential for falsely elevated results.   08/03/2020 112 (H) 65 - 99 mg/dL Final     Comment:     If the patient is fasting, the ADA then defines impaired fasting glucose as > 100 mg/dL and diabetes as > or equal to 123 mg/dL.  Specimen collection should occur prior to Sulfasalazine administration due to the potential for falsely depressed results. Specimen collection should occur prior to Sulfapyridine administration due to the potential for falsely elevated results.   05/06/2019 104 (H) 65 - 99 mg/dL Final   05/05/2019 109 (H) 65 - 99 mg/dL Final   05/04/2019 124 (H) 65 - 99 mg/dL Final     Calcium   Date Value Ref Range Status   03/02/2024 8.9 8.4 - 10.2 mg/dL Final   02/03/2024 9.2 8.4 - 10.2 mg/dL Final   01/06/2024 10.2 8.4 - 10.2 mg/dL Final   05/06/2019 8.5 8.5 - 10.1 mg/dL Final   05/05/2019 8.3 (L) 8.5 - 10.1 mg/dL Final   05/04/2019 8.1 (L) 8.5 - 10.1 mg/dL Final     Albumin   Date Value Ref Range Status   03/02/2024 3.9 3.5 - 5.0 g/dL Final   02/03/2024 3.9 3.5 - 5.0 g/dL Final   01/06/2024 3.8 3.5 - 5.0 g/dL Final     ALBUMIN   Date Value Ref Range Status   05/03/2019 3.5 3.5 - 4.8 g/dL Final     Total Bilirubin   Date Value Ref Range Status   03/02/2024 0.53 0.20 - 1.00 mg/dL Final     Comment:     Use of this assay is not recommended for patients undergoing treatment with eltrombopag due to the potential for falsely elevated results.  N-acetyl-p-benzoquinone imine (metabolite of Acetaminophen) will generate erroneously low results in samples for patients that have taken an overdose of Acetaminophen.   02/03/2024 0.58 0.20 - 1.00 mg/dL Final     Comment:     Use of this assay is not recommended for patients undergoing  "treatment with eltrombopag due to the potential for falsely elevated results.  N-acetyl-p-benzoquinone imine (metabolite of Acetaminophen) will generate erroneously low results in samples for patients that have taken an overdose of Acetaminophen.   01/06/2024 0.61 0.20 - 1.00 mg/dL Final     Comment:     Use of this assay is not recommended for patients undergoing treatment with eltrombopag due to the potential for falsely elevated results.  N-acetyl-p-benzoquinone imine (metabolite of Acetaminophen) will generate erroneously low results in samples for patients that have taken an overdose of Acetaminophen.   05/03/2019 0.5 0.2 - 1.0 mg/dL Final     Alkaline Phosphatase   Date Value Ref Range Status   03/02/2024 45 34 - 104 U/L Final   02/03/2024 45 34 - 104 U/L Final   01/06/2024 41 34 - 104 U/L Final   05/03/2019 54 35 - 120 U/L Final     AST   Date Value Ref Range Status   03/02/2024 16 13 - 39 U/L Final   02/03/2024 14 13 - 39 U/L Final   01/06/2024 17 13 - 39 U/L Final   05/03/2019 26 <41 U/L Final     ALT   Date Value Ref Range Status   03/02/2024 11 7 - 52 U/L Final     Comment:     Specimen collection should occur prior to Sulfasalazine administration due to the potential for falsely depressed results.    02/03/2024 10 7 - 52 U/L Final     Comment:     Specimen collection should occur prior to Sulfasalazine administration due to the potential for falsely depressed results.    01/06/2024 11 7 - 52 U/L Final     Comment:     Specimen collection should occur prior to Sulfasalazine administration due to the potential for falsely depressed results.    05/03/2019 25 <56 U/L Final      LD   Date Value Ref Range Status   03/02/2024 138 (L) 140 - 271 U/L Final   02/03/2024 130 (L) 140 - 271 U/L Final   01/06/2024 196 140 - 271 U/L Final     No results found for: \"TSH\"  No results found for: \"Z2KOFPJ\"   Free T4   Date Value Ref Range Status   03/02/2024 0.92 0.61 - 1.12 ng/dL Final     Comment:     Specimens with " biotin concentrations > 10 ng/mL can lead to significant (> 10%) positive bias in result.   02/03/2024 1.08 0.61 - 1.12 ng/dL Final     Comment:     Specimens with biotin concentrations > 10 ng/mL can lead to significant (> 10%) positive bias in result.   01/06/2024 0.98 0.61 - 1.12 ng/dL Final     Comment:     Specimens with biotin concentrations > 10 ng/mL can lead to significant (> 10%) positive bias in result.         RECENT IMAGING:  I have personally reviewed results with the patient.    No results found.       Assessment/Plan  Debbie Damon is a 73-year-old female with metastatic melanoma on treatment with nivolumab and relatlimab/Opdulag here for continued monitoring, follow-up and surveillance while on treatment.    1. Metastatic melanoma (HCC)  Assessment & Plan:  She is doing well and responding nicely to nivolumab plus relatlimab/Opdulag.  Labs reviewed and okay. She is set to get her next cycle on Wednesday, 03/06/2024. She knows to watch for any immune mediated side effects. She knows to call with any issues or concerns prior to her next visit. She has standing orders for blood work prior to each treatment. She is due for treatment in approximately 1 month and we will skip that appointment that month. She will continue to get blood work prior to infusion and knows to call with any problems. Otherwise, we will see her again in approximately 8 weeks.      2. High risk medication use    3. Adrenal insufficiency (HCC)  Assessment & Plan:  The patient is on treatment with immunotherapy and we will continue to monitor for side effects and lab abnormalities. We will monitor labs with each treatment to ensure safety of continuing on treatment. The patient knows to watch for signs and symptoms for immune mediated side effects.  Denies any immune mediated side effects at this time.    She is doing well on 10 mg hydrocortisone twice a day. She knows to increase the dose to stress levels of steroids if she has any  type of infection. She knows to call with issues or concerns.           Return in about 8 weeks (around 4/29/2024) for Office Visit, Labs - See Treatment Plan.     Rafaela Reyes MD, PhD      Transcribed for Rafaela Reyes MD, by Constanza Flores on 03/04/24 at 6:19 PM. Powered by Dragon [employer].

## 2024-03-05 DIAGNOSIS — I10 ESSENTIAL HYPERTENSION: ICD-10-CM

## 2024-03-05 RX ORDER — LOSARTAN POTASSIUM 100 MG/1
100 TABLET ORAL DAILY
Qty: 30 TABLET | Refills: 5 | Status: SHIPPED | OUTPATIENT
Start: 2024-03-05

## 2024-03-06 ENCOUNTER — HOSPITAL ENCOUNTER (OUTPATIENT)
Dept: INFUSION CENTER | Facility: HOSPITAL | Age: 74
Discharge: HOME | End: 2024-03-06
Attending: INTERNAL MEDICINE
Payer: COMMERCIAL

## 2024-03-06 VITALS
RESPIRATION RATE: 19 BRPM | OXYGEN SATURATION: 94 % | SYSTOLIC BLOOD PRESSURE: 142 MMHG | DIASTOLIC BLOOD PRESSURE: 67 MMHG | HEART RATE: 75 BPM | TEMPERATURE: 97.8 F

## 2024-03-06 DIAGNOSIS — C43.9 METASTATIC MELANOMA (HCC): Primary | ICD-10-CM

## 2024-03-06 RX ORDER — SODIUM CHLORIDE 9 MG/ML
20 INJECTION, SOLUTION INTRAVENOUS ONCE
Status: COMPLETED | OUTPATIENT
Start: 2024-03-06 | End: 2024-03-06

## 2024-03-06 RX ADMIN — SODIUM CHLORIDE 20 ML/HR: 0.9 INJECTION, SOLUTION INTRAVENOUS at 14:34

## 2024-03-06 RX ADMIN — SODIUM CHLORIDE 480 MG OF NIVOLUMAB: 9 INJECTION, SOLUTION INTRAVENOUS at 15:33

## 2024-03-06 NOTE — PLAN OF CARE
Problem: Knowledge Deficit  Goal: Patient/family/caregiver demonstrates understanding of disease process, treatment plan, medications, and discharge instructions  Description: Complete learning assessment and assess knowledge base.  Interventions:  - Provide teaching at level of understanding  - Provide teaching via preferred learning methods  3/6/2024 1420 by Chelle Hernandez RN  Outcome: Progressing  3/6/2024 1420 by Chelle Hernandez RN  Outcome: Progressing

## 2024-03-06 NOTE — PROGRESS NOTES
Debbie Damon tolerated treatment well with no complications.      Debbie Damon is aware of future appt on 4/3/24 at 1400.     AVS declined.

## 2024-03-13 DIAGNOSIS — I26.99 OTHER PULMONARY EMBOLISM WITHOUT ACUTE COR PULMONALE, UNSPECIFIED CHRONICITY (HCC): ICD-10-CM

## 2024-03-13 DIAGNOSIS — I10 ESSENTIAL HYPERTENSION: ICD-10-CM

## 2024-03-13 RX ORDER — METOPROLOL SUCCINATE 25 MG/1
25 TABLET, EXTENDED RELEASE ORAL 2 TIMES DAILY
Qty: 60 TABLET | Refills: 5 | Status: SHIPPED | OUTPATIENT
Start: 2024-03-13

## 2024-04-01 ENCOUNTER — APPOINTMENT (OUTPATIENT)
Dept: LAB | Facility: CLINIC | Age: 74
End: 2024-04-01
Payer: COMMERCIAL

## 2024-04-03 ENCOUNTER — HOSPITAL ENCOUNTER (OUTPATIENT)
Dept: INFUSION CENTER | Facility: HOSPITAL | Age: 74
Discharge: HOME | End: 2024-04-03
Attending: INTERNAL MEDICINE
Payer: COMMERCIAL

## 2024-04-03 VITALS
DIASTOLIC BLOOD PRESSURE: 67 MMHG | TEMPERATURE: 97.4 F | SYSTOLIC BLOOD PRESSURE: 148 MMHG | HEART RATE: 77 BPM | RESPIRATION RATE: 16 BRPM | OXYGEN SATURATION: 95 %

## 2024-04-03 DIAGNOSIS — C43.9 METASTATIC MELANOMA (HCC): Primary | ICD-10-CM

## 2024-04-03 PROCEDURE — 96413 CHEMO IV INFUSION 1 HR: CPT

## 2024-04-03 RX ORDER — SODIUM CHLORIDE 9 MG/ML
20 INJECTION, SOLUTION INTRAVENOUS ONCE
Status: CANCELLED | OUTPATIENT
Start: 2024-04-03

## 2024-04-03 RX ORDER — SODIUM CHLORIDE 9 MG/ML
20 INJECTION, SOLUTION INTRAVENOUS ONCE
Status: COMPLETED | OUTPATIENT
Start: 2024-04-03 | End: 2024-04-03

## 2024-04-03 RX ADMIN — SODIUM CHLORIDE 20 ML/HR: 0.9 INJECTION, SOLUTION INTRAVENOUS at 14:16

## 2024-04-03 RX ADMIN — SODIUM CHLORIDE 480 MG OF NIVOLUMAB: 9 INJECTION, SOLUTION INTRAVENOUS at 15:04

## 2024-04-03 NOTE — PROGRESS NOTES
Recent labs reviewed, providers office made aware of patients BUN being 28. Debbie Damon tolerated treatment well with no complications.      Debbie Damon is aware of future appt on 5/1/24 at 1400.     AVS declined.

## 2024-04-24 RX ORDER — SODIUM CHLORIDE 9 MG/ML
20 INJECTION, SOLUTION INTRAVENOUS ONCE
Status: CANCELLED | OUTPATIENT
Start: 2024-05-01

## 2024-04-27 ENCOUNTER — APPOINTMENT (OUTPATIENT)
Dept: LAB | Facility: MEDICAL CENTER | Age: 74
End: 2024-04-27
Payer: COMMERCIAL

## 2024-04-29 ENCOUNTER — OFFICE VISIT (OUTPATIENT)
Dept: HEMATOLOGY ONCOLOGY | Facility: CLINIC | Age: 74
End: 2024-04-29
Payer: COMMERCIAL

## 2024-04-29 ENCOUNTER — TELEPHONE (OUTPATIENT)
Dept: HEMATOLOGY ONCOLOGY | Facility: CLINIC | Age: 74
End: 2024-04-29

## 2024-04-29 VITALS
WEIGHT: 197 LBS | HEART RATE: 99 BPM | BODY MASS INDEX: 29.18 KG/M2 | HEIGHT: 69 IN | RESPIRATION RATE: 18 BRPM | SYSTOLIC BLOOD PRESSURE: 126 MMHG | DIASTOLIC BLOOD PRESSURE: 72 MMHG | TEMPERATURE: 98.1 F | OXYGEN SATURATION: 97 %

## 2024-04-29 DIAGNOSIS — Z79.899 HIGH RISK MEDICATION USE: ICD-10-CM

## 2024-04-29 DIAGNOSIS — C43.9 METASTATIC MELANOMA (HCC): Primary | ICD-10-CM

## 2024-04-29 PROCEDURE — 99214 OFFICE O/P EST MOD 30 MIN: CPT | Performed by: INTERNAL MEDICINE

## 2024-04-29 PROCEDURE — 1160F RVW MEDS BY RX/DR IN RCRD: CPT | Performed by: INTERNAL MEDICINE

## 2024-04-29 PROCEDURE — 1159F MED LIST DOCD IN RCRD: CPT | Performed by: INTERNAL MEDICINE

## 2024-04-29 NOTE — LETTER
May 1, 2024     Anh Bailey MD  [address]  2nd Floor  [facility] [address]    Patient: Debbie Damon   YOB: 1950   Date of Visit: 4/29/2024       Dear Dr. Bailey:    Thank you for referring Debbie Damon to me for evaluation. Below are my notes for this consultation.    If you have questions, please do not hesitate to call me. I look forward to following your patient along with you.         Sincerely,        Rafaela Reyes MD        CC: Juan Carlos Perry, KEN Amos MD Joseph John Miniss[place], DO Rafaela Reyes MD  5/1/2024  6:17 PM  Sign when Signing Visit  [facility] [place]  [address] [address]  [phone]  [phone]     Date of Visit: 4/29/2024  Name: Debbie Damon   YOB: 1950       Subjective    VISIT DIAGNOSIS:  Diagnoses and all orders for this visit:    Metastatic melanoma (HCC)  -     NM pet ct tumor imaging whole body; Future    High risk medication use  -     NM pet ct tumor imaging whole body; Future        Oncology History   Metastatic melanoma (HCC)   10/13/2022 -  Cancer Staged    Staging form: Melanoma of the Skin, AJCC 8th Edition  - Clinical stage from 10/13/2022: Stage IV (cTX, cNX, cM1a) - Signed by Rafaela Reyes MD on 11/22/2022       10/13/2022 Biopsy    A. Skin, right lateral medial leg, punch biopsy:  Portion of severely atypical melanocytic dermal proliferation with prominent melanosis consistent with melanoma. See note.        B. Skin, right medial leg. punch biopsy:  Portion of severely atypical melanocytic dermal proliferation with prominent melanosis consistent with melanoma. See note.        C. Skin, right medial leg, punch biopsy:   Portion of severely atypical melanocytic dermal proliferation with prominent melanosis consistent with melanoma. See note.     11/22/2022 Initial Diagnosis    Metastatic melanoma (HCC)     12/14/2022 -  Chemotherapy     alteplase (CATHFLO), 2 mg, Intracatheter, Every 2 hour PRN, 18 of 20 cycles  NIVOLUMAB-RELATLIMAB-RMBW (OPDUALAG) IVPB, 480 mg of nivolumab, Intravenous, Once, 18 of 20 cycles  Administration: 480 mg of nivolumab (12/14/2022), 480 mg of nivolumab (1/11/2023), 480 mg of nivolumab (2/8/2023), 480 mg of nivolumab (3/8/2023), 480 mg of nivolumab (4/5/2023), 480 mg of nivolumab (5/3/2023), 480 mg of nivolumab (5/31/2023), 480 mg of nivolumab (6/28/2023), 480 mg of nivolumab (7/26/2023), 480 mg of nivolumab (8/23/2023), 480 mg of nivolumab (9/20/2023), 480 mg of nivolumab (10/18/2023), 480 mg of nivolumab (11/15/2023), 480 mg of nivolumab (12/13/2023), 480 mg of nivolumab (1/10/2024), 480 mg of nivolumab (2/7/2024), 480 mg of nivolumab (3/6/2024), 480 mg of nivolumab (4/3/2024)     1/5/2023 Genomic Testing    Foundation One liquid testing  TMB 1 Mut/Mb  MSI-high not detected          Cancer Staging   Metastatic melanoma (HCC)  Staging form: Melanoma of the Skin, AJCC 8th Edition  - Clinical stage from 10/13/2022: Stage IV (cTX, cNX, cM1a) - Signed by Rafaela Reyes MD on 11/22/2022     Treatment Details   Treatment goal Curative   Plan Name OP Nivolumab and Relatlimab-rmbw Q 28 Days   Status Active   Start Date 12/14/2022   End Date 5/29/2024 (Planned)   Provider Rafaela Reyes MD   Chemotherapy alteplase (CATHFLO), 2 mg, Intracatheter, Every 2 hour PRN, 18 of 20 cycles    NIVOLUMAB-RELATLIMAB-RMBW (OPDUALAG) IVPB, 480 mg of nivolumab, Intravenous, Once, 18 of 20 cycles  Administration: 480 mg of nivolumab (12/14/2022), 480 mg of nivolumab (1/11/2023), 480 mg of nivolumab (2/8/2023), 480 mg of nivolumab (3/8/2023), 480 mg of nivolumab (4/5/2023), 480 mg of nivolumab (5/3/2023), 480 mg of nivolumab (5/31/2023), 480 mg of nivolumab (6/28/2023), 480 mg of nivolumab (7/26/2023), 480 mg of nivolumab (8/23/2023), 480 mg of nivolumab (9/20/2023), 480 mg of nivolumab (10/18/2023), 480 mg of nivolumab (11/15/2023), 480 mg of  nivolumab (12/13/2023), 480 mg of nivolumab (1/10/2024), 480 mg of nivolumab (2/7/2024), 480 mg of nivolumab (3/6/2024), 480 mg of nivolumab (4/3/2024)          HISTORY OF PRESENT ILLNESS: Debbie Damon is a 73 y.o. female with metastatic melanoma mainly in the right lower extremity, on treatment with nivolumab plus relatlimab/Opdualag, here for continued monitoring, follow-up and surveillance.    The patient reports a generally good state of health. Her blood pressure readings have consistently been within the normal range, with a reading of 124/68 this morning and now at 126/72. Approximately 1 month ago, she experienced pain in her left wrist area, which was triggered by certain movements, persisting for 3 to 4 days before subsiding. However, 1 week ago, she experienced difficulty in straightening her arm while drying her hair and raising her finger, although this symptom has since resolved. She denies experiencing headaches, double vision, rash, itchy skin, chest pain, shortness of breath, abdominal pain, nausea, vomiting, diarrhea, or difficulty standing up from a seated position.    Denies any new, changing, concerning skin lesions.  Denies any lymphadenopathy.    Her appetite is fine, although she occasionally overeats.    Her blood work was unremarkable.        Review of Systems   Constitutional:  Negative for appetite change, fatigue, fever and unexpected weight change.   HENT:   Negative for lump/mass, trouble swallowing and voice change.    Eyes:  Negative for icterus.   Respiratory:  Negative for cough, shortness of breath and wheezing.    Cardiovascular:  Negative for leg swelling.   Gastrointestinal:  Negative for abdominal pain, constipation, diarrhea, nausea and vomiting.   Genitourinary:  Negative for difficulty urinating and hematuria.    Musculoskeletal:  Negative for arthralgias, gait problem and myalgias.   Skin:  Negative for itching and rash.        No new, changing, or concerning lesions.    Neurological:  Negative for extremity weakness, gait problem, headaches, light-headedness and numbness.   Hematological:  Negative for adenopathy.        MEDICATIONS:    Current Outpatient Medications:   •  apixaban (Eliquis) 5 mg, Take 1 tablet (5 mg total) by mouth every 12 (twelve) hours, Disp: 60 tablet, Rfl: 5  •  famotidine (PEPCID) 10 mg tablet, Take 10 mg by mouth 2 (two) times a day, Disp: , Rfl:   •  hydrocortisone (CORTEF) 10 mg tablet, take 1 tablet by mouth twice a day, Disp: 60 tablet, Rfl: 11  •  loratadine (CLARITIN) 10 mg tablet, Take 10 mg by mouth daily, Disp: , Rfl:   •  losartan (Cozaar) 100 MG tablet, Take 1 tablet (100 mg total) by mouth daily, Disp: 30 tablet, Rfl: 5  •  metoprolol succinate (TOPROL-XL) 25 mg 24 hr tablet, Take 1 tablet (25 mg total) by mouth 2 (two) times a day, Disp: 60 tablet, Rfl: 5  •  Multiple Minerals-Vitamins (Citracal Plus) TABS, Take by mouth, Disp: , Rfl:   •  Multiple Vitamins-Minerals (MULTIVITAMIN WITH MINERALS) tablet, Take 1 tablet by mouth daily, Disp: , Rfl:   •  patient supplied medication, Pt takes eye drops Mura 128 - not listed in database, Disp: , Rfl:   •  Wheat Dextrin (BENEFIBER DRINK MIX PO), Take by mouth, Disp: , Rfl:   •  mupirocin (BACTROBAN) 2 % ointment, Apply topically 3 (three) times a day (Patient not taking: Reported on 4/29/2024), Disp: 15 g, Rfl: 1     ALLERGIES:  Allergies   Allergen Reactions   • Etomidate Other (See Comments)        ACTIVE PROBLEMS:  Patient Active Problem List   Diagnosis   • Acute massive pulmonary embolism (HCC)   • Venous stasis of both lower extremities   • Clostridium difficile infection   • Right ventricular systolic dysfunction without heart failure   • Essential hypertension   • Metastatic melanoma (HCC)   • High risk medication use   • Adrenal insufficiency (HCC)   • Abnormal PET scan of colon   • Mild anemia   • Anticoagulated by anticoagulation treatment   • History of colon polyps   • Diverticulosis   •  Abnormal finding on GI tract imaging          PAST MEDICAL HISTORY:   Past Medical History:   Diagnosis Date   • Acute deep vein thrombosis (DVT) of popliteal vein of left lower extremity (HCC)    • Ankle wound, left, initial encounter     Resolved 2017   • Ankle wound, right, initial encounter     resolved 2017   • DVT (deep vein thrombosis) in pregnancy    • Hypertension    • Pulmonary emboli (HCC)      post C section   • Pulmonary embolism (HCC)     2017   • Pulmonary embolism, blood-clot, obstetric    • Skin cancer    • Venous stasis ulcer (HCC)    • Venous ulcers of both lower extremities (HCC)         PAST SURGICAL HISTORY:  Past Surgical History:   Procedure Laterality Date   •  SECTION      X2        SOCIAL HISTORY:  Social History     Socioeconomic History   • Marital status:      Spouse name: Not on file   • Number of children: Not on file   • Years of education: Not on file   • Highest education level: Not on file   Occupational History   • Not on file   Tobacco Use   • Smoking status: Never   • Smokeless tobacco: Never   Vaping Use   • Vaping status: Never Used   Substance and Sexual Activity   • Alcohol use: No     Comment: hx of social drinker   • Drug use: No   • Sexual activity: Not on file   Other Topics Concern   • Not on file   Social History Narrative   • Not on file     Social Determinants of Health     Financial Resource Strain: Not on file   Food Insecurity: Not on file   Transportation Needs: Not on file   Physical Activity: Not on file   Stress: Not on file   Social Connections: Not on file   Intimate Partner Violence: Not on file   Housing Stability: Not on file        FAMILY HISTORY:  Family History   Problem Relation Age of Onset   • Heart attack Mother    • Stroke Mother    • Cancer Father    • Bone cancer Family    • Hypertension Family    • Lung cancer Family           Objective    PHYSICAL EXAMINATION:   Blood pressure 126/72, pulse 99, temperature 98.1 °F  "(36.7 °C), temperature source Temporal, resp. rate 18, height 5' 9\", weight 89.4 kg (197 lb), SpO2 97%.     Pain Score: 0-No pain     ECOG Performance Status      Flowsheet Row Most Recent Value   ECOG Performance Status 0 - Fully active, able to carry on all pre-disease performance without restriction               Physical Exam  Constitutional:       General: She is not in acute distress.     Appearance: Normal appearance. She is not ill-appearing or toxic-appearing.   HENT:      Head: Normocephalic and atraumatic.      Right Ear: External ear normal.      Left Ear: External ear normal.      Nose: Nose normal.      Mouth/Throat:      Mouth: Mucous membranes are moist.      Pharynx: Oropharynx is clear.   Eyes:      General: No scleral icterus.        Right eye: No discharge.         Left eye: No discharge.      Conjunctiva/sclera: Conjunctivae normal.   Cardiovascular:      Rate and Rhythm: Normal rate and regular rhythm.      Pulses: Normal pulses.      Heart sounds: No murmur heard.     No friction rub. No gallop.   Pulmonary:      Effort: Pulmonary effort is normal. No respiratory distress.      Breath sounds: Normal breath sounds. No wheezing or rales.   Abdominal:      General: Bowel sounds are normal. There is no distension.      Palpations: There is no mass.      Tenderness: There is no abdominal tenderness. There is no rebound.   Musculoskeletal:         General: No swelling or tenderness.      Cervical back: Normal range of motion. No rigidity.      Right lower leg: No edema.      Left lower leg: No edema.   Lymphadenopathy:      Head:      Right side of head: No submandibular, preauricular or posterior auricular adenopathy.      Left side of head: No submandibular, preauricular or posterior auricular adenopathy.      Cervical: No cervical adenopathy.      Right cervical: No superficial or posterior cervical adenopathy.     Left cervical: No superficial or posterior cervical adenopathy.      Upper Body: "      Right upper body: No supraclavicular or axillary adenopathy.      Left upper body: No supraclavicular or axillary adenopathy.   Skin:     General: Skin is warm.      Coloration: Skin is not jaundiced.      Findings: No lesion or rash.      Comments: No concerning skin lesions   Neurological:      General: No focal deficit present.      Mental Status: She is alert and oriented to person, place, and time.      Cranial Nerves: No cranial nerve deficit.      Motor: No weakness.      Gait: Gait normal.   Psychiatric:         Mood and Affect: Mood normal.         Behavior: Behavior normal.         Thought Content: Thought content normal.         Judgment: Judgment normal.         I reviewed lab data in the chart.    I have personally reviewed results with the patient.    WBC   Date Value Ref Range Status   04/27/2024 5.60 4.31 - 10.16 Thousand/uL Final   04/01/2024 5.67 4.31 - 10.16 Thousand/uL Final   03/02/2024 4.46 4.31 - 10.16 Thousand/uL Final     Hemoglobin   Date Value Ref Range Status   04/27/2024 13.4 11.5 - 15.4 g/dL Final   04/01/2024 12.8 11.5 - 15.4 g/dL Final   03/02/2024 12.7 11.5 - 15.4 g/dL Final     Platelets   Date Value Ref Range Status   04/27/2024 185 149 - 390 Thousands/uL Final   04/01/2024 199 149 - 390 Thousands/uL Final   03/02/2024 179 149 - 390 Thousands/uL Final     MCV   Date Value Ref Range Status   04/27/2024 91 82 - 98 fL Final   04/01/2024 93 82 - 98 fL Final   03/02/2024 92 82 - 98 fL Final      Potassium   Date Value Ref Range Status   04/27/2024 4.0 3.5 - 5.3 mmol/L Final   04/01/2024 4.5 3.5 - 5.3 mmol/L Final   03/02/2024 4.3 3.5 - 5.3 mmol/L Final   05/06/2019 3.8 3.5 - 5.2 mmol/L Final   05/05/2019 4.1 3.5 - 5.2 mmol/L Final   05/04/2019 3.9 3.5 - 5.2 mmol/L Final     Chloride   Date Value Ref Range Status   04/27/2024 103 96 - 108 mmol/L Final   04/01/2024 105 96 - 108 mmol/L Final   03/02/2024 102 96 - 108 mmol/L Final   05/06/2019 104 100 - 109 mmol/L Final   05/05/2019  101 100 - 109 mmol/L Final   05/04/2019 104 100 - 109 mmol/L Final     Carbon Dioxide   Date Value Ref Range Status   05/06/2019 31 23 - 31 mmol/L Final   05/05/2019 28 23 - 31 mmol/L Final   05/04/2019 26 23 - 31 mmol/L Final     CO2   Date Value Ref Range Status   04/27/2024 31 21 - 32 mmol/L Final   04/01/2024 29 21 - 32 mmol/L Final   03/02/2024 30 21 - 32 mmol/L Final     BUN   Date Value Ref Range Status   04/27/2024 19 5 - 25 mg/dL Final   04/01/2024 28 (H) 5 - 25 mg/dL Final   03/02/2024 21 5 - 25 mg/dL Final   05/06/2019 15 7 - 25 mg/dL Final   05/05/2019 11 7 - 25 mg/dL Final   05/04/2019 11 7 - 25 mg/dL Final     Creatinine   Date Value Ref Range Status   04/27/2024 0.85 0.60 - 1.30 mg/dL Final     Comment:     Standardized to IDMS reference method   04/01/2024 1.05 0.60 - 1.30 mg/dL Final     Comment:     Standardized to IDMS reference method   03/02/2024 0.90 0.60 - 1.30 mg/dL Final     Comment:     Standardized to IDMS reference method   05/06/2019 0.60 0.40 - 1.10 mg/dL Final   05/05/2019 0.53 0.40 - 1.10 mg/dL Final   05/04/2019 0.52 0.40 - 1.10 mg/dL Final     Glucose   Date Value Ref Range Status   12/11/2023 88 65 - 140 mg/dL Final     Comment:     If the patient is fasting, the ADA then defines impaired fasting glucose as > 100 mg/dL and diabetes as > or equal to 123 mg/dL.   11/17/2022 85 65 - 140 mg/dL Final     Comment:     If the patient is fasting, the ADA then defines impaired fasting glucose as > 100 mg/dL and diabetes as > or equal to 123 mg/dL.  Specimen collection should occur prior to Sulfasalazine administration due to the potential for falsely depressed results. Specimen collection should occur prior to Sulfapyridine administration due to the potential for falsely elevated results.   08/03/2020 112 (H) 65 - 99 mg/dL Final     Comment:     If the patient is fasting, the ADA then defines impaired fasting glucose as > 100 mg/dL and diabetes as > or equal to 123 mg/dL.  Specimen  collection should occur prior to Sulfasalazine administration due to the potential for falsely depressed results. Specimen collection should occur prior to Sulfapyridine administration due to the potential for falsely elevated results.   05/06/2019 104 (H) 65 - 99 mg/dL Final   05/05/2019 109 (H) 65 - 99 mg/dL Final   05/04/2019 124 (H) 65 - 99 mg/dL Final     Calcium   Date Value Ref Range Status   04/27/2024 9.2 8.4 - 10.2 mg/dL Final   04/01/2024 8.6 8.4 - 10.2 mg/dL Final   03/02/2024 8.9 8.4 - 10.2 mg/dL Final   05/06/2019 8.5 8.5 - 10.1 mg/dL Final   05/05/2019 8.3 (L) 8.5 - 10.1 mg/dL Final   05/04/2019 8.1 (L) 8.5 - 10.1 mg/dL Final     Albumin   Date Value Ref Range Status   04/27/2024 4.1 3.5 - 5.0 g/dL Final   04/01/2024 3.6 3.5 - 5.0 g/dL Final   03/02/2024 3.9 3.5 - 5.0 g/dL Final     ALBUMIN   Date Value Ref Range Status   05/03/2019 3.5 3.5 - 4.8 g/dL Final     Total Bilirubin   Date Value Ref Range Status   04/27/2024 0.60 0.20 - 1.00 mg/dL Final     Comment:     Use of this assay is not recommended for patients undergoing treatment with eltrombopag due to the potential for falsely elevated results.  N-acetyl-p-benzoquinone imine (metabolite of Acetaminophen) will generate erroneously low results in samples for patients that have taken an overdose of Acetaminophen.   04/01/2024 0.39 0.20 - 1.00 mg/dL Final     Comment:     Use of this assay is not recommended for patients undergoing treatment with eltrombopag due to the potential for falsely elevated results.  N-acetyl-p-benzoquinone imine (metabolite of Acetaminophen) will generate erroneously low results in samples for patients that have taken an overdose of Acetaminophen.   03/02/2024 0.53 0.20 - 1.00 mg/dL Final     Comment:     Use of this assay is not recommended for patients undergoing treatment with eltrombopag due to the potential for falsely elevated results.  N-acetyl-p-benzoquinone imine (metabolite of Acetaminophen) will generate  "erroneously low results in samples for patients that have taken an overdose of Acetaminophen.   05/03/2019 0.5 0.2 - 1.0 mg/dL Final     Alkaline Phosphatase   Date Value Ref Range Status   04/27/2024 47 34 - 104 U/L Final   04/01/2024 44 34 - 104 U/L Final   03/02/2024 45 34 - 104 U/L Final   05/03/2019 54 35 - 120 U/L Final     AST   Date Value Ref Range Status   04/27/2024 17 13 - 39 U/L Final   04/01/2024 15 13 - 39 U/L Final   03/02/2024 16 13 - 39 U/L Final   05/03/2019 26 <41 U/L Final     ALT   Date Value Ref Range Status   04/27/2024 12 7 - 52 U/L Final     Comment:     Specimen collection should occur prior to Sulfasalazine administration due to the potential for falsely depressed results.    04/01/2024 11 7 - 52 U/L Final     Comment:     Specimen collection should occur prior to Sulfasalazine administration due to the potential for falsely depressed results.    03/02/2024 11 7 - 52 U/L Final     Comment:     Specimen collection should occur prior to Sulfasalazine administration due to the potential for falsely depressed results.    05/03/2019 25 <56 U/L Final      LD   Date Value Ref Range Status   04/27/2024 173 140 - 271 U/L Final   04/01/2024 134 (L) 140 - 271 U/L Final   03/02/2024 138 (L) 140 - 271 U/L Final     No results found for: \"TSH\"  No results found for: \"S5ZHFNY\"   Free T4   Date Value Ref Range Status   04/27/2024 0.96 0.61 - 1.12 ng/dL Final     Comment:     Specimens with biotin concentrations > 10 ng/mL can lead to significant (> 10%) positive bias in result.   04/01/2024 1.03 0.61 - 1.12 ng/dL Final     Comment:     Specimens with biotin concentrations > 10 ng/mL can lead to significant (> 10%) positive bias in result.   03/02/2024 0.92 0.61 - 1.12 ng/dL Final     Comment:     Specimens with biotin concentrations > 10 ng/mL can lead to significant (> 10%) positive bias in result.         RECENT IMAGING:  No results found.       Assessment/Plan  Ms. Damon is a 73-year-old female with " metastatic melanoma on treatment with nivolumab plus relatlimab/Opdualag, here for continued monitoring, follow-up and surveillance.    1. Metastatic melanoma (HCC)  Assessment & Plan:  She is doing well clinically with response to treatment. No new, changing, concerning skin lesions or lymphadenopathy. Labs reviewed and okay. She is tolerating treatment well and she will get her next cycle on 05/01/2024. She knows to continue to monitor for any immune mediated side effects and to call with any issues or concerns prior to her next visit. She is due for full body imaging in approximately 3 to 4 weeks. Orders placed and scripts provided for a PET scan given that her disease is on her right lower extremity below her knee. She has standing orders for blood work prior to each treatment. She will return in 4 weeks for continued monitoring and follow-up prior to her next infusion.    Orders:  -     NM pet ct tumor imaging whole body; Future; Expected date: 05/29/2024    2. High risk medication use  Assessment & Plan:  The patient is on treatment with immunotherapy and we will continue to monitor for side effects and lab abnormalities. We will monitor labs with each treatment to ensure safety of continuing on treatment. The patient knows to watch for signs and symptoms for immune mediated side effects.  Denies any immune mediated side effects at this time.    Orders:  -     NM pet ct tumor imaging whole body; Future; Expected date: 05/29/2024         Follow-up as scheduled    Rafaela Reyes MD, PhD    Transcribed for Rafaela Reyes MD, by Misha Patel on 04/29/24 at 3:48 PM. Powered by [employer].

## 2024-04-29 NOTE — PROGRESS NOTES
[facility] [place]  [address]  [place] [address]  [phone]     Date of Visit: 4/29/2024  Name: Debbie Damon   YOB: 1950       Subjective    VISIT DIAGNOSIS:  Diagnoses and all orders for this visit:    Metastatic melanoma (HCC)  -     NM pet ct tumor imaging whole body; Future    High risk medication use  -     NM pet ct tumor imaging whole body; Future        Oncology History   Metastatic melanoma (HCC)   10/13/2022 -  Cancer Staged    Staging form: Melanoma of the Skin, AJCC 8th Edition  - Clinical stage from 10/13/2022: Stage IV (cTX, cNX, cM1a) - Signed by Rafaela Reyes MD on 11/22/2022       10/13/2022 Biopsy    A. Skin, right lateral medial leg, punch biopsy:  Portion of severely atypical melanocytic dermal proliferation with prominent melanosis consistent with melanoma. See note.        B. Skin, right medial leg. punch biopsy:  Portion of severely atypical melanocytic dermal proliferation with prominent melanosis consistent with melanoma. See note.        C. Skin, right medial leg, punch biopsy:   Portion of severely atypical melanocytic dermal proliferation with prominent melanosis consistent with melanoma. See note.     11/22/2022 Initial Diagnosis    Metastatic melanoma (HCC)     12/14/2022 -  Chemotherapy    alteplase (CATHFLO), 2 mg, Intracatheter, Every 2 hour PRN, 18 of 20 cycles  NIVOLUMAB-RELATLIMAB-RMBW (OPDUALAG) IVPB, 480 mg of nivolumab, Intravenous, Once, 18 of 20 cycles  Administration: 480 mg of nivolumab (12/14/2022), 480 mg of nivolumab (1/11/2023), 480 mg of nivolumab (2/8/2023), 480 mg of nivolumab (3/8/2023), 480 mg of nivolumab (4/5/2023), 480 mg of nivolumab (5/3/2023), 480 mg of nivolumab (5/31/2023), 480 mg of nivolumab (6/28/2023), 480 mg of nivolumab (7/26/2023), 480 mg of nivolumab (8/23/2023), 480 mg of nivolumab (9/20/2023), 480 mg of nivolumab (10/18/2023), 480 mg of nivolumab (11/15/2023), 480 mg of  nivolumab (12/13/2023), 480 mg of nivolumab (1/10/2024), 480 mg of nivolumab (2/7/2024), 480 mg of nivolumab (3/6/2024), 480 mg of nivolumab (4/3/2024)     1/5/2023 Genomic Testing    [employer] One liquid testing  TMB 1 Mut/Mb  MSI-high not detected          Cancer Staging   Metastatic melanoma (HCC)  Staging form: Melanoma of the Skin, AJCC 8th Edition  - Clinical stage from 10/13/2022: Stage IV (cTX, cNX, cM1a) - Signed by Rafaela Reyes MD on 11/22/2022     Treatment Details   Treatment goal Curative   Plan Name OP Nivolumab and Relatlimab-rmbw Q 28 Days   Status Active   Start Date 12/14/2022   End Date 5/29/2024 (Planned)   Provider Rafaela Reyes MD   Chemotherapy alteplase (CATHFLO), 2 mg, Intracatheter, Every 2 hour PRN, 18 of 20 cycles    NIVOLUMAB-RELATLIMAB-RMBW (OPDUALAG) IVPB, 480 mg of nivolumab, Intravenous, Once, 18 of 20 cycles  Administration: 480 mg of nivolumab (12/14/2022), 480 mg of nivolumab (1/11/2023), 480 mg of nivolumab (2/8/2023), 480 mg of nivolumab (3/8/2023), 480 mg of nivolumab (4/5/2023), 480 mg of nivolumab (5/3/2023), 480 mg of nivolumab (5/31/2023), 480 mg of nivolumab (6/28/2023), 480 mg of nivolumab (7/26/2023), 480 mg of nivolumab (8/23/2023), 480 mg of nivolumab (9/20/2023), 480 mg of nivolumab (10/18/2023), 480 mg of nivolumab (11/15/2023), 480 mg of nivolumab (12/13/2023), 480 mg of nivolumab (1/10/2024), 480 mg of nivolumab (2/7/2024), 480 mg of nivolumab (3/6/2024), 480 mg of nivolumab (4/3/2024)          HISTORY OF PRESENT ILLNESS: Debbie Damon is a 73 y.o. female with metastatic melanoma mainly in the right lower extremity, on treatment with nivolumab plus relatlimab/Opdualag, here for continued monitoring, follow-up and surveillance.    The patient reports a generally good state of health. Her blood pressure readings have consistently been within the normal range, with a reading of 124/68 this morning and now at 126/72. Approximately 1 month ago, she experienced  pain in her left wrist area, which was triggered by certain movements, persisting for 3 to 4 days before subsiding. However, 1 week ago, she experienced difficulty in straightening her arm while drying her hair and raising her finger, although this symptom has since resolved. She denies experiencing headaches, double vision, rash, itchy skin, chest pain, shortness of breath, abdominal pain, nausea, vomiting, diarrhea, or difficulty standing up from a seated position.    Denies any new, changing, concerning skin lesions.  Denies any lymphadenopathy.    Her appetite is fine, although she occasionally overeats.    Her blood work was unremarkable.        Review of Systems   Constitutional:  Negative for appetite change, fatigue, fever and unexpected weight change.   HENT:   Negative for lump/mass, trouble swallowing and voice change.    Eyes:  Negative for icterus.   Respiratory:  Negative for cough, shortness of breath and wheezing.    Cardiovascular:  Negative for leg swelling.   Gastrointestinal:  Negative for abdominal pain, constipation, diarrhea, nausea and vomiting.   Genitourinary:  Negative for difficulty urinating and hematuria.    Musculoskeletal:  Negative for arthralgias, gait problem and myalgias.   Skin:  Negative for itching and rash.        No new, changing, or concerning lesions.   Neurological:  Negative for extremity weakness, gait problem, headaches, light-headedness and numbness.   Hematological:  Negative for adenopathy.        MEDICATIONS:    Current Outpatient Medications:     apixaban (Eliquis) 5 mg, Take 1 tablet (5 mg total) by mouth every 12 (twelve) hours, Disp: 60 tablet, Rfl: 5    famotidine (PEPCID) 10 mg tablet, Take 10 mg by mouth 2 (two) times a day, Disp: , Rfl:     hydrocortisone (CORTEF) 10 mg tablet, take 1 tablet by mouth twice a day, Disp: 60 tablet, Rfl: 11    loratadine (CLARITIN) 10 mg tablet, Take 10 mg by mouth daily, Disp: , Rfl:     losartan (Cozaar) 100 MG tablet, Take 1  tablet (100 mg total) by mouth daily, Disp: 30 tablet, Rfl: 5    metoprolol succinate (TOPROL-XL) 25 mg 24 hr tablet, Take 1 tablet (25 mg total) by mouth 2 (two) times a day, Disp: 60 tablet, Rfl: 5    Multiple Minerals-Vitamins (Citracal Plus) TABS, Take by mouth, Disp: , Rfl:     Multiple Vitamins-Minerals (MULTIVITAMIN WITH MINERALS) tablet, Take 1 tablet by mouth daily, Disp: , Rfl:     patient supplied medication, Pt takes eye drops Mura 128 - not listed in database, Disp: , Rfl:     Wheat Dextrin (BENEFIBER DRINK MIX PO), Take by mouth, Disp: , Rfl:     mupirocin (BACTROBAN) 2 % ointment, Apply topically 3 (three) times a day (Patient not taking: Reported on 4/29/2024), Disp: 15 g, Rfl: 1     ALLERGIES:  Allergies   Allergen Reactions    Etomidate Other (See Comments)        ACTIVE PROBLEMS:  Patient Active Problem List   Diagnosis    Acute massive pulmonary embolism (HCC)    Venous stasis of both lower extremities    Clostridium difficile infection    Right ventricular systolic dysfunction without heart failure    Essential hypertension    Metastatic melanoma (HCC)    High risk medication use    Adrenal insufficiency (HCC)    Abnormal PET scan of colon    Mild anemia    Anticoagulated by anticoagulation treatment    History of colon polyps    Diverticulosis    Abnormal finding on GI tract imaging          PAST MEDICAL HISTORY:   Past Medical History:   Diagnosis Date    Acute deep vein thrombosis (DVT) of popliteal vein of left lower extremity (HCC)     Ankle wound, left, initial encounter     Resolved 6/2017    Ankle wound, right, initial encounter     resolved 6/2017    DVT (deep vein thrombosis) in pregnancy     Hypertension     Pulmonary emboli (HCC)     1989 post C section    Pulmonary embolism (HCC)     7/2017    Pulmonary embolism, blood-clot, obstetric     Skin cancer     Venous stasis ulcer (HCC)     Venous ulcers of both lower extremities (HCC)         PAST SURGICAL HISTORY:  Past Surgical History:  "  Procedure Laterality Date     SECTION      X2        SOCIAL HISTORY:  Social History     Socioeconomic History    Marital status:      Spouse name: Not on file    Number of children: Not on file    Years of education: Not on file    Highest education level: Not on file   Occupational History    Not on file   Tobacco Use    Smoking status: Never    Smokeless tobacco: Never   Vaping Use    Vaping status: Never Used   Substance and Sexual Activity    Alcohol use: No     Comment: hx of social drinker    Drug use: No    Sexual activity: Not on file   Other Topics Concern    Not on file   Social History Narrative    Not on file     Social Determinants of Health     Financial Resource Strain: Not on file   Food Insecurity: Not on file   Transportation Needs: Not on file   Physical Activity: Not on file   Stress: Not on file   Social Connections: Not on file   Intimate Partner Violence: Not on file   Housing Stability: Not on file        FAMILY HISTORY:  Family History   Problem Relation Age of Onset    Heart attack Mother     Stroke Mother     Cancer Father     Bone cancer Family     Hypertension Family     Lung cancer Family           Objective    PHYSICAL EXAMINATION:   Blood pressure 126/72, pulse 99, temperature 98.1 °F (36.7 °C), temperature source Temporal, resp. rate 18, height 5' 9\", weight 89.4 kg (197 lb), SpO2 97%.     Pain Score: 0-No pain     ECOG Performance Status      Flowsheet Row Most Recent Value   ECOG Performance Status 0 - Fully active, able to carry on all pre-disease performance without restriction               Physical Exam  Constitutional:       General: She is not in acute distress.     Appearance: Normal appearance. She is not ill-appearing or toxic-appearing.   HENT:      Head: Normocephalic and atraumatic.      Right Ear: External ear normal.      Left Ear: External ear normal.      Nose: Nose normal.      Mouth/Throat:      Mouth: Mucous membranes are moist.      Pharynx: " Oropharynx is clear.   Eyes:      General: No scleral icterus.        Right eye: No discharge.         Left eye: No discharge.      Conjunctiva/sclera: Conjunctivae normal.   Cardiovascular:      Rate and Rhythm: Normal rate and regular rhythm.      Pulses: Normal pulses.      Heart sounds: No murmur heard.     No friction rub. No gallop.   Pulmonary:      Effort: Pulmonary effort is normal. No respiratory distress.      Breath sounds: Normal breath sounds. No wheezing or rales.   Abdominal:      General: Bowel sounds are normal. There is no distension.      Palpations: There is no mass.      Tenderness: There is no abdominal tenderness. There is no rebound.   Musculoskeletal:         General: No swelling or tenderness.      Cervical back: Normal range of motion. No rigidity.      Right lower leg: No edema.      Left lower leg: No edema.   Lymphadenopathy:      Head:      Right side of head: No submandibular, preauricular or posterior auricular adenopathy.      Left side of head: No submandibular, preauricular or posterior auricular adenopathy.      Cervical: No cervical adenopathy.      Right cervical: No superficial or posterior cervical adenopathy.     Left cervical: No superficial or posterior cervical adenopathy.      Upper Body:      Right upper body: No supraclavicular or axillary adenopathy.      Left upper body: No supraclavicular or axillary adenopathy.   Skin:     General: Skin is warm.      Coloration: Skin is not jaundiced.      Findings: No lesion or rash.      Comments: No concerning skin lesions   Neurological:      General: No focal deficit present.      Mental Status: She is alert and oriented to person, place, and time.      Cranial Nerves: No cranial nerve deficit.      Motor: No weakness.      Gait: Gait normal.   Psychiatric:         Mood and Affect: Mood normal.         Behavior: Behavior normal.         Thought Content: Thought content normal.         Judgment: Judgment normal.         I  reviewed lab data in the chart.    I have personally reviewed results with the patient.    WBC   Date Value Ref Range Status   04/27/2024 5.60 4.31 - 10.16 Thousand/uL Final   04/01/2024 5.67 4.31 - 10.16 Thousand/uL Final   03/02/2024 4.46 4.31 - 10.16 Thousand/uL Final     Hemoglobin   Date Value Ref Range Status   04/27/2024 13.4 11.5 - 15.4 g/dL Final   04/01/2024 12.8 11.5 - 15.4 g/dL Final   03/02/2024 12.7 11.5 - 15.4 g/dL Final     Platelets   Date Value Ref Range Status   04/27/2024 185 149 - 390 Thousands/uL Final   04/01/2024 199 149 - 390 Thousands/uL Final   03/02/2024 179 149 - 390 Thousands/uL Final     MCV   Date Value Ref Range Status   04/27/2024 91 82 - 98 fL Final   04/01/2024 93 82 - 98 fL Final   03/02/2024 92 82 - 98 fL Final      Potassium   Date Value Ref Range Status   04/27/2024 4.0 3.5 - 5.3 mmol/L Final   04/01/2024 4.5 3.5 - 5.3 mmol/L Final   03/02/2024 4.3 3.5 - 5.3 mmol/L Final   05/06/2019 3.8 3.5 - 5.2 mmol/L Final   05/05/2019 4.1 3.5 - 5.2 mmol/L Final   05/04/2019 3.9 3.5 - 5.2 mmol/L Final     Chloride   Date Value Ref Range Status   04/27/2024 103 96 - 108 mmol/L Final   04/01/2024 105 96 - 108 mmol/L Final   03/02/2024 102 96 - 108 mmol/L Final   05/06/2019 104 100 - 109 mmol/L Final   05/05/2019 101 100 - 109 mmol/L Final   05/04/2019 104 100 - 109 mmol/L Final     Carbon Dioxide   Date Value Ref Range Status   05/06/2019 31 23 - 31 mmol/L Final   05/05/2019 28 23 - 31 mmol/L Final   05/04/2019 26 23 - 31 mmol/L Final     CO2   Date Value Ref Range Status   04/27/2024 31 21 - 32 mmol/L Final   04/01/2024 29 21 - 32 mmol/L Final   03/02/2024 30 21 - 32 mmol/L Final     BUN   Date Value Ref Range Status   04/27/2024 19 5 - 25 mg/dL Final   04/01/2024 28 (H) 5 - 25 mg/dL Final   03/02/2024 21 5 - 25 mg/dL Final   05/06/2019 15 7 - 25 mg/dL Final   05/05/2019 11 7 - 25 mg/dL Final   05/04/2019 11 7 - 25 mg/dL Final     Creatinine   Date Value Ref Range Status   04/27/2024 0.85  0.60 - 1.30 mg/dL Final     Comment:     Standardized to IDMS reference method   04/01/2024 1.05 0.60 - 1.30 mg/dL Final     Comment:     Standardized to IDMS reference method   03/02/2024 0.90 0.60 - 1.30 mg/dL Final     Comment:     Standardized to IDMS reference method   05/06/2019 0.60 0.40 - 1.10 mg/dL Final   05/05/2019 0.53 0.40 - 1.10 mg/dL Final   05/04/2019 0.52 0.40 - 1.10 mg/dL Final     Glucose   Date Value Ref Range Status   12/11/2023 88 65 - 140 mg/dL Final     Comment:     If the patient is fasting, the ADA then defines impaired fasting glucose as > 100 mg/dL and diabetes as > or equal to 123 mg/dL.   11/17/2022 85 65 - 140 mg/dL Final     Comment:     If the patient is fasting, the ADA then defines impaired fasting glucose as > 100 mg/dL and diabetes as > or equal to 123 mg/dL.  Specimen collection should occur prior to Sulfasalazine administration due to the potential for falsely depressed results. Specimen collection should occur prior to Sulfapyridine administration due to the potential for falsely elevated results.   08/03/2020 112 (H) 65 - 99 mg/dL Final     Comment:     If the patient is fasting, the ADA then defines impaired fasting glucose as > 100 mg/dL and diabetes as > or equal to 123 mg/dL.  Specimen collection should occur prior to Sulfasalazine administration due to the potential for falsely depressed results. Specimen collection should occur prior to Sulfapyridine administration due to the potential for falsely elevated results.   05/06/2019 104 (H) 65 - 99 mg/dL Final   05/05/2019 109 (H) 65 - 99 mg/dL Final   05/04/2019 124 (H) 65 - 99 mg/dL Final     Calcium   Date Value Ref Range Status   04/27/2024 9.2 8.4 - 10.2 mg/dL Final   04/01/2024 8.6 8.4 - 10.2 mg/dL Final   03/02/2024 8.9 8.4 - 10.2 mg/dL Final   05/06/2019 8.5 8.5 - 10.1 mg/dL Final   05/05/2019 8.3 (L) 8.5 - 10.1 mg/dL Final   05/04/2019 8.1 (L) 8.5 - 10.1 mg/dL Final     Albumin   Date Value Ref Range Status    04/27/2024 4.1 3.5 - 5.0 g/dL Final   04/01/2024 3.6 3.5 - 5.0 g/dL Final   03/02/2024 3.9 3.5 - 5.0 g/dL Final     ALBUMIN   Date Value Ref Range Status   05/03/2019 3.5 3.5 - 4.8 g/dL Final     Total Bilirubin   Date Value Ref Range Status   04/27/2024 0.60 0.20 - 1.00 mg/dL Final     Comment:     Use of this assay is not recommended for patients undergoing treatment with eltrombopag due to the potential for falsely elevated results.  N-acetyl-p-benzoquinone imine (metabolite of Acetaminophen) will generate erroneously low results in samples for patients that have taken an overdose of Acetaminophen.   04/01/2024 0.39 0.20 - 1.00 mg/dL Final     Comment:     Use of this assay is not recommended for patients undergoing treatment with eltrombopag due to the potential for falsely elevated results.  N-acetyl-p-benzoquinone imine (metabolite of Acetaminophen) will generate erroneously low results in samples for patients that have taken an overdose of Acetaminophen.   03/02/2024 0.53 0.20 - 1.00 mg/dL Final     Comment:     Use of this assay is not recommended for patients undergoing treatment with eltrombopag due to the potential for falsely elevated results.  N-acetyl-p-benzoquinone imine (metabolite of Acetaminophen) will generate erroneously low results in samples for patients that have taken an overdose of Acetaminophen.   05/03/2019 0.5 0.2 - 1.0 mg/dL Final     Alkaline Phosphatase   Date Value Ref Range Status   04/27/2024 47 34 - 104 U/L Final   04/01/2024 44 34 - 104 U/L Final   03/02/2024 45 34 - 104 U/L Final   05/03/2019 54 35 - 120 U/L Final     AST   Date Value Ref Range Status   04/27/2024 17 13 - 39 U/L Final   04/01/2024 15 13 - 39 U/L Final   03/02/2024 16 13 - 39 U/L Final   05/03/2019 26 <41 U/L Final     ALT   Date Value Ref Range Status   04/27/2024 12 7 - 52 U/L Final     Comment:     Specimen collection should occur prior to Sulfasalazine administration due to the potential for falsely  "depressed results.    04/01/2024 11 7 - 52 U/L Final     Comment:     Specimen collection should occur prior to Sulfasalazine administration due to the potential for falsely depressed results.    03/02/2024 11 7 - 52 U/L Final     Comment:     Specimen collection should occur prior to Sulfasalazine administration due to the potential for falsely depressed results.    05/03/2019 25 <56 U/L Final      LD   Date Value Ref Range Status   04/27/2024 173 140 - 271 U/L Final   04/01/2024 134 (L) 140 - 271 U/L Final   03/02/2024 138 (L) 140 - 271 U/L Final     No results found for: \"TSH\"  No results found for: \"G3LWNGD\"   Free T4   Date Value Ref Range Status   04/27/2024 0.96 0.61 - 1.12 ng/dL Final     Comment:     Specimens with biotin concentrations > 10 ng/mL can lead to significant (> 10%) positive bias in result.   04/01/2024 1.03 0.61 - 1.12 ng/dL Final     Comment:     Specimens with biotin concentrations > 10 ng/mL can lead to significant (> 10%) positive bias in result.   03/02/2024 0.92 0.61 - 1.12 ng/dL Final     Comment:     Specimens with biotin concentrations > 10 ng/mL can lead to significant (> 10%) positive bias in result.         RECENT IMAGING:  No results found.       Assessment/Plan  Ms. Damon is a 73-year-old female with metastatic melanoma on treatment with nivolumab plus relatlimab/Opdualag, here for continued monitoring, follow-up and surveillance.    1. Metastatic melanoma (HCC)  Assessment & Plan:  She is doing well clinically with response to treatment. No new, changing, concerning skin lesions or lymphadenopathy. Labs reviewed and okay. She is tolerating treatment well and she will get her next cycle on 05/01/2024. She knows to continue to monitor for any immune mediated side effects and to call with any issues or concerns prior to her next visit. She is due for full body imaging in approximately 3 to 4 weeks. Orders placed and scripts provided for a PET scan given that her disease is on her " right lower extremity below her knee. She has standing orders for blood work prior to each treatment. She will return in 4 weeks for continued monitoring and follow-up prior to her next infusion.    Orders:  -     NM pet ct tumor imaging whole body; Future; Expected date: 05/29/2024    2. High risk medication use  Assessment & Plan:  The patient is on treatment with immunotherapy and we will continue to monitor for side effects and lab abnormalities. We will monitor labs with each treatment to ensure safety of continuing on treatment. The patient knows to watch for signs and symptoms for immune mediated side effects.  Denies any immune mediated side effects at this time.    Orders:  -     NM pet ct tumor imaging whole body; Future; Expected date: 05/29/2024         Follow-up as scheduled    Rafaela Reyes MD, PhD    Transcribed for Rafaela Reyes MD, by Misha Patel on 04/29/24 at 3:48 PM. Powered by [employer].

## 2024-04-29 NOTE — ASSESSMENT & PLAN NOTE
She is doing well clinically with response to treatment. No new, changing, concerning skin lesions or lymphadenopathy. Labs reviewed and okay. She is tolerating treatment well and she will get her next cycle on 05/01/2024. She knows to continue to monitor for any immune mediated side effects and to call with any issues or concerns prior to her next visit. She is due for full body imaging in approximately 3 to 4 weeks. Orders placed and scripts provided for a PET scan given that her disease is on her right lower extremity below her knee. She has standing orders for blood work prior to each treatment. She will return in 4 weeks for continued monitoring and follow-up prior to her next infusion.

## 2024-05-01 ENCOUNTER — HOSPITAL ENCOUNTER (OUTPATIENT)
Dept: INFUSION CENTER | Facility: HOSPITAL | Age: 74
Discharge: HOME | End: 2024-05-01
Attending: INTERNAL MEDICINE
Payer: COMMERCIAL

## 2024-05-01 VITALS
HEART RATE: 89 BPM | TEMPERATURE: 97.3 F | BODY MASS INDEX: 29.08 KG/M2 | DIASTOLIC BLOOD PRESSURE: 62 MMHG | OXYGEN SATURATION: 97 % | RESPIRATION RATE: 17 BRPM | HEIGHT: 69 IN | SYSTOLIC BLOOD PRESSURE: 154 MMHG

## 2024-05-01 DIAGNOSIS — C43.9 METASTATIC MELANOMA (HCC): Primary | ICD-10-CM

## 2024-05-01 PROCEDURE — 96413 CHEMO IV INFUSION 1 HR: CPT

## 2024-05-01 RX ORDER — SODIUM CHLORIDE 9 MG/ML
20 INJECTION, SOLUTION INTRAVENOUS ONCE
Status: COMPLETED | OUTPATIENT
Start: 2024-05-01 | End: 2024-05-01

## 2024-05-01 RX ADMIN — SODIUM CHLORIDE 480 MG OF NIVOLUMAB: 9 INJECTION, SOLUTION INTRAVENOUS at 15:19

## 2024-05-01 RX ADMIN — SODIUM CHLORIDE 20 ML/HR: 0.9 INJECTION, SOLUTION INTRAVENOUS at 15:00

## 2024-05-01 NOTE — PROGRESS NOTES
Debbie Damon  tolerated treatment well with no complications.      Debbie Damon is aware of future appt on 5/29/24 at 2.     AVS printed and given to Debbie Damon:   No (Declined by Debbie Damon) x

## 2024-05-20 ENCOUNTER — HOSPITAL ENCOUNTER (OUTPATIENT)
Dept: RADIOLOGY | Age: 74
Discharge: HOME | End: 2024-05-20
Payer: COMMERCIAL

## 2024-05-20 DIAGNOSIS — C43.9 METASTATIC MELANOMA (HCC): ICD-10-CM

## 2024-05-20 DIAGNOSIS — Z79.899 HIGH RISK MEDICATION USE: ICD-10-CM

## 2024-05-20 LAB — GLUCOSE SERPL-MCNC: 82 MG/DL (ref 65–140)

## 2024-05-20 PROCEDURE — 78816 PET IMAGE W/CT FULL BODY: CPT

## 2024-05-20 PROCEDURE — A9552 F18 FDG: HCPCS

## 2024-05-20 PROCEDURE — 82948 REAGENT STRIP/BLOOD GLUCOSE: CPT

## 2024-05-22 RX ORDER — SODIUM CHLORIDE 9 MG/ML
20 INJECTION, SOLUTION INTRAVENOUS ONCE
Status: CANCELLED | OUTPATIENT
Start: 2024-05-29

## 2024-05-23 ENCOUNTER — OFFICE VISIT (OUTPATIENT)
Dept: HEMATOLOGY ONCOLOGY | Facility: CLINIC | Age: 74
End: 2024-05-23
Payer: COMMERCIAL

## 2024-05-23 VITALS
BODY MASS INDEX: 29.33 KG/M2 | WEIGHT: 198 LBS | DIASTOLIC BLOOD PRESSURE: 76 MMHG | RESPIRATION RATE: 18 BRPM | OXYGEN SATURATION: 98 % | HEART RATE: 93 BPM | TEMPERATURE: 98.1 F | SYSTOLIC BLOOD PRESSURE: 138 MMHG | HEIGHT: 69 IN

## 2024-05-23 DIAGNOSIS — E27.40 ADRENAL INSUFFICIENCY (HCC): ICD-10-CM

## 2024-05-23 DIAGNOSIS — C43.9 METASTATIC MELANOMA (HCC): Primary | ICD-10-CM

## 2024-05-23 DIAGNOSIS — Z79.899 HIGH RISK MEDICATION USE: ICD-10-CM

## 2024-05-23 PROCEDURE — 99214 OFFICE O/P EST MOD 30 MIN: CPT | Performed by: INTERNAL MEDICINE

## 2024-05-23 NOTE — LETTER
May 27, 2024     Anh Bailey MD  [address] [facility]  2nd Floor  [facility] [address]    Patient: Debbie Damon   YOB: 1950   Date of Visit: 5/23/2024       Dear Dr. Bailey:    Thank you for referring Debbie Damon to me for evaluation. Below are my notes for this consultation.    If you have questions, please do not hesitate to call me. I look forward to following your patient along with you.         Sincerely,        Rafaela Reyes MD        CC: Juan Carlos Perry, MD Marshall Toscano, DO Rafaela Reyes MD  5/27/2024  1:17 PM  Sign when Signing Visit  [facility] [place]  [address] [facility] [address]  [phone]  [phone]     Date of Visit: 5/23/2024  Name: Debbie Damon   YOB: 1950        Subjective    VISIT DIAGNOSIS:  Diagnoses and all orders for this visit:    Metastatic melanoma (HCC)    High risk medication use    Adrenal insufficiency (HCC)        Oncology History   Metastatic melanoma (HCC)   10/13/2022 -  Cancer Staged    Staging form: Melanoma of the Skin, AJCC 8th Edition  - Clinical stage from 10/13/2022: Stage IV (cTX, cNX, cM1a) - Signed by Rafaela Reyes MD on 11/22/2022       10/13/2022 Biopsy    A. Skin, right lateral medial leg, punch biopsy:  Portion of severely atypical melanocytic dermal proliferation with prominent melanosis consistent with melanoma. See note.        B. Skin, right medial leg. punch biopsy:  Portion of severely atypical melanocytic dermal proliferation with prominent melanosis consistent with melanoma. See note.        C. Skin, right medial leg, punch biopsy:   Portion of severely atypical melanocytic dermal proliferation with prominent melanosis consistent with melanoma. See note.     11/22/2022 Initial Diagnosis    Metastatic melanoma (HCC)     12/14/2022 -  Chemotherapy    alteplase (CATHFLO), 2 mg, Intracatheter, Every 2 hour PRN, 19 of 23  cycles  NIVOLUMAB-RELATLIMAB-RMBW (OPDUALAG) IVPB, 480 mg of nivolumab, Intravenous, Once, 19 of 23 cycles  Administration: 480 mg of nivolumab (12/14/2022), 480 mg of nivolumab (1/11/2023), 480 mg of nivolumab (2/8/2023), 480 mg of nivolumab (3/8/2023), 480 mg of nivolumab (4/5/2023), 480 mg of nivolumab (5/3/2023), 480 mg of nivolumab (5/31/2023), 480 mg of nivolumab (6/28/2023), 480 mg of nivolumab (7/26/2023), 480 mg of nivolumab (8/23/2023), 480 mg of nivolumab (9/20/2023), 480 mg of nivolumab (10/18/2023), 480 mg of nivolumab (11/15/2023), 480 mg of nivolumab (12/13/2023), 480 mg of nivolumab (1/10/2024), 480 mg of nivolumab (2/7/2024), 480 mg of nivolumab (3/6/2024), 480 mg of nivolumab (4/3/2024), 480 mg of nivolumab (5/1/2024)     1/5/2023 Genomic Testing    Foundation One liquid testing  TMB 1 Mut/Mb  MSI-high not detected          Cancer Staging   Metastatic melanoma (HCC)  Staging form: Melanoma of the Skin, AJCC 8th Edition  - Clinical stage from 10/13/2022: Stage IV (cTX, cNX, cM1a) - Signed by Rafaela Reyes MD on 11/22/2022     Treatment Details   Treatment goal Curative   Plan Name OP Nivolumab and Relatlimab-rmbw Q 28 Days   Status Active   Start Date 12/14/2022   End Date 8/21/2024 (Planned)   Provider Rafaela Reyes MD   Chemotherapy alteplase (CATHFLO), 2 mg, Intracatheter, Every 2 hour PRN, 19 of 23 cycles    NIVOLUMAB-RELATLIMAB-RMBW (OPDUALAG) IVPB, 480 mg of nivolumab, Intravenous, Once, 19 of 23 cycles  Administration: 480 mg of nivolumab (12/14/2022), 480 mg of nivolumab (1/11/2023), 480 mg of nivolumab (2/8/2023), 480 mg of nivolumab (3/8/2023), 480 mg of nivolumab (4/5/2023), 480 mg of nivolumab (5/3/2023), 480 mg of nivolumab (5/31/2023), 480 mg of nivolumab (6/28/2023), 480 mg of nivolumab (7/26/2023), 480 mg of nivolumab (8/23/2023), 480 mg of nivolumab (9/20/2023), 480 mg of nivolumab (10/18/2023), 480 mg of nivolumab (11/15/2023), 480 mg of nivolumab (12/13/2023), 480 mg of  nivolumab (1/10/2024), 480 mg of nivolumab (2/7/2024), 480 mg of nivolumab (3/6/2024), 480 mg of nivolumab (4/3/2024), 480 mg of nivolumab (5/1/2024)          HISTORY OF PRESENT ILLNESS: Debbie Damon is a 73 y.o. female  who has metastatic melanoma on treatment with nivolumab plus relatlimab/Opdualag here for continued monitoring, follow-up and surveillance while on treatment.    She is doing well.  No issues concerns or complaints.  Energy is good and continues on hydrocortisone 10 mg twice a day.  No new, changing, concerning skin lesions.  No lymphadenopathy.    Denies any immune mediated side effects.  Denies headaches, double vision, rash, itching, chest pain, shortness of breath, diarrhea.  Denies muscle weakness or fatigue.      I independently reviewed her imaging from 5/20/2024 where she had a PET scan.  Imaging demonstrates stable exam with response to treatment when compared to original scans.  There is no significant change in the mild cutaneous FDG uptake in the right anterior lateral lower extremity.  They are nonspecific and likely related to an inflammatory process.  Most of her disease was on the right medial aspect which has improved clinically as well.  No other FDG avid lesions in the chest abdomen or pelvis.  Persistent focus of uptake in the mid sigmoid colon that waxes and wanes.  She was evaluated with recent colonoscopy.      REVIEW OF SYSTEMS:  Review of Systems   Constitutional:  Negative for appetite change, fatigue, fever and unexpected weight change.   HENT:   Negative for lump/mass, trouble swallowing and voice change.    Eyes:  Negative for icterus.   Respiratory:  Negative for cough, shortness of breath and wheezing.    Cardiovascular:  Negative for leg swelling.   Gastrointestinal:  Negative for abdominal pain, constipation, diarrhea, nausea and vomiting.   Genitourinary:  Negative for difficulty urinating and hematuria.    Musculoskeletal:  Negative for arthralgias, gait problem  and myalgias.   Skin:  Negative for itching and rash.        No new, changing, or concerning lesions.   Neurological:  Negative for extremity weakness, gait problem, headaches, light-headedness and numbness.   Hematological:  Negative for adenopathy.        MEDICATIONS:    Current Outpatient Medications:   •  apixaban (Eliquis) 5 mg, Take 1 tablet (5 mg total) by mouth every 12 (twelve) hours, Disp: 60 tablet, Rfl: 5  •  famotidine (PEPCID) 10 mg tablet, Take 10 mg by mouth 2 (two) times a day, Disp: , Rfl:   •  hydrocortisone (CORTEF) 10 mg tablet, take 1 tablet by mouth twice a day, Disp: 60 tablet, Rfl: 11  •  loratadine (CLARITIN) 10 mg tablet, Take 10 mg by mouth daily, Disp: , Rfl:   •  losartan (Cozaar) 100 MG tablet, Take 1 tablet (100 mg total) by mouth daily, Disp: 30 tablet, Rfl: 5  •  metoprolol succinate (TOPROL-XL) 25 mg 24 hr tablet, Take 1 tablet (25 mg total) by mouth 2 (two) times a day, Disp: 60 tablet, Rfl: 5  •  Multiple Minerals-Vitamins (Citracal Plus) TABS, Take by mouth, Disp: , Rfl:   •  Multiple Vitamins-Minerals (MULTIVITAMIN WITH MINERALS) tablet, Take 1 tablet by mouth daily, Disp: , Rfl:   •  mupirocin (BACTROBAN) 2 % ointment, Apply topically 3 (three) times a day (Patient not taking: Reported on 4/29/2024), Disp: 15 g, Rfl: 1  •  patient supplied medication, Pt takes eye drops Mura 128 - not listed in database, Disp: , Rfl:   •  Wheat Dextrin (BENEFIBER DRINK MIX PO), Take by mouth, Disp: , Rfl:      ALLERGIES:  Allergies   Allergen Reactions   • Etomidate Other (See Comments)        ACTIVE PROBLEMS:  Patient Active Problem List   Diagnosis   • Acute massive pulmonary embolism (HCC)   • Venous stasis of both lower extremities   • Clostridium difficile infection   • Right ventricular systolic dysfunction without heart failure   • Essential hypertension   • Metastatic melanoma (HCC)   • High risk medication use   • Adrenal insufficiency (HCC)   • Abnormal PET scan of colon   • Mild  anemia   • Anticoagulated by anticoagulation treatment   • History of colon polyps   • Diverticulosis   • Abnormal finding on GI tract imaging          PAST MEDICAL HISTORY:   Past Medical History:   Diagnosis Date   • Acute deep vein thrombosis (DVT) of popliteal vein of left lower extremity (HCC)    • Ankle wound, left, initial encounter     Resolved 2017   • Ankle wound, right, initial encounter     resolved 2017   • DVT (deep vein thrombosis) in pregnancy    • Hypertension    • Pulmonary emboli (HCC)      post C section   • Pulmonary embolism (HCC)     2017   • Pulmonary embolism, blood-clot, obstetric    • Skin cancer    • Venous stasis ulcer (HCC)    • Venous ulcers of both lower extremities (HCC)         PAST SURGICAL HISTORY:  Past Surgical History:   Procedure Laterality Date   •  SECTION      X2        SOCIAL HISTORY:  Social History     Socioeconomic History   • Marital status:      Spouse name: Not on file   • Number of children: Not on file   • Years of education: Not on file   • Highest education level: Not on file   Occupational History   • Not on file   Tobacco Use   • Smoking status: Never   • Smokeless tobacco: Never   Vaping Use   • Vaping status: Never Used   Substance and Sexual Activity   • Alcohol use: No     Comment: hx of social drinker   • Drug use: No   • Sexual activity: Not on file   Other Topics Concern   • Not on file   Social History Narrative   • Not on file     Social Determinants of Health     Financial Resource Strain: Not on file   Food Insecurity: Not on file   Transportation Needs: Not on file   Physical Activity: Not on file   Stress: Not on file   Social Connections: Not on file   Intimate Partner Violence: Not on file   Housing Stability: Not on file        FAMILY HISTORY:  Family History   Problem Relation Age of Onset   • Heart attack Mother    • Stroke Mother    • Cancer Father    • Bone cancer Family    • Hypertension Family    • Lung cancer  "Family            Objective    PHYSICAL EXAMINATION:   Blood pressure 138/76, pulse 93, temperature 98.1 °F (36.7 °C), temperature source Temporal, resp. rate 18, height 5' 9\" (1.753 m), weight 89.8 kg (198 lb), SpO2 98%.     Pain Score: 0-No pain     ECOG Performance Status      Flowsheet Row Most Recent Value   ECOG Performance Status 0 - Fully active, able to carry on all pre-disease performance without restriction               Physical Exam  Constitutional:       General: She is not in acute distress.     Appearance: Normal appearance. She is not ill-appearing or toxic-appearing.   HENT:      Head: Normocephalic and atraumatic.      Right Ear: External ear normal.      Left Ear: External ear normal.      Nose: Nose normal.      Mouth/Throat:      Mouth: Mucous membranes are moist.      Pharynx: Oropharynx is clear.   Eyes:      General: No scleral icterus.        Right eye: No discharge.         Left eye: No discharge.      Conjunctiva/sclera: Conjunctivae normal.   Cardiovascular:      Rate and Rhythm: Normal rate and regular rhythm.      Pulses: Normal pulses.      Heart sounds: No murmur heard.     No friction rub. No gallop.   Pulmonary:      Effort: Pulmonary effort is normal. No respiratory distress.      Breath sounds: Normal breath sounds. No wheezing or rales.   Abdominal:      General: Bowel sounds are normal. There is no distension.      Palpations: There is no mass.      Tenderness: There is no abdominal tenderness. There is no rebound.   Musculoskeletal:         General: No swelling or tenderness.      Cervical back: Normal range of motion. No rigidity.      Right lower leg: No edema.      Left lower leg: No edema.   Lymphadenopathy:      Head:      Right side of head: No submandibular, preauricular or posterior auricular adenopathy.      Left side of head: No submandibular, preauricular or posterior auricular adenopathy.      Cervical: No cervical adenopathy.      Right cervical: No superficial or " posterior cervical adenopathy.     Left cervical: No superficial or posterior cervical adenopathy.      Upper Body:      Right upper body: No supraclavicular or axillary adenopathy.      Left upper body: No supraclavicular or axillary adenopathy.   Skin:     General: Skin is warm.      Coloration: Skin is not jaundiced.      Findings: No lesion or rash.      Comments: No concerning skin lesions.  Continued lightening of pigmentation in right medial aspect of calf.   Neurological:      General: No focal deficit present.      Mental Status: She is alert and oriented to person, place, and time.      Cranial Nerves: No cranial nerve deficit.      Motor: No weakness.      Gait: Gait normal.   Psychiatric:         Mood and Affect: Mood normal.         Behavior: Behavior normal.         Thought Content: Thought content normal.         Judgment: Judgment normal.         I reviewed lab data in the chart.    WBC   Date Value Ref Range Status   04/27/2024 5.60 4.31 - 10.16 Thousand/uL Final   04/01/2024 5.67 4.31 - 10.16 Thousand/uL Final   03/02/2024 4.46 4.31 - 10.16 Thousand/uL Final     Hemoglobin   Date Value Ref Range Status   04/27/2024 13.4 11.5 - 15.4 g/dL Final   04/01/2024 12.8 11.5 - 15.4 g/dL Final   03/02/2024 12.7 11.5 - 15.4 g/dL Final     Platelets   Date Value Ref Range Status   04/27/2024 185 149 - 390 Thousands/uL Final   04/01/2024 199 149 - 390 Thousands/uL Final   03/02/2024 179 149 - 390 Thousands/uL Final     MCV   Date Value Ref Range Status   04/27/2024 91 82 - 98 fL Final   04/01/2024 93 82 - 98 fL Final   03/02/2024 92 82 - 98 fL Final      Potassium   Date Value Ref Range Status   04/27/2024 4.0 3.5 - 5.3 mmol/L Final   04/01/2024 4.5 3.5 - 5.3 mmol/L Final   03/02/2024 4.3 3.5 - 5.3 mmol/L Final   05/06/2019 3.8 3.5 - 5.2 mmol/L Final   05/05/2019 4.1 3.5 - 5.2 mmol/L Final   05/04/2019 3.9 3.5 - 5.2 mmol/L Final     Chloride   Date Value Ref Range Status   04/27/2024 103 96 - 108 mmol/L Final    04/01/2024 105 96 - 108 mmol/L Final   03/02/2024 102 96 - 108 mmol/L Final   05/06/2019 104 100 - 109 mmol/L Final   05/05/2019 101 100 - 109 mmol/L Final   05/04/2019 104 100 - 109 mmol/L Final     Carbon Dioxide   Date Value Ref Range Status   05/06/2019 31 23 - 31 mmol/L Final   05/05/2019 28 23 - 31 mmol/L Final   05/04/2019 26 23 - 31 mmol/L Final     CO2   Date Value Ref Range Status   04/27/2024 31 21 - 32 mmol/L Final   04/01/2024 29 21 - 32 mmol/L Final   03/02/2024 30 21 - 32 mmol/L Final     BUN   Date Value Ref Range Status   04/27/2024 19 5 - 25 mg/dL Final   04/01/2024 28 (H) 5 - 25 mg/dL Final   03/02/2024 21 5 - 25 mg/dL Final   05/06/2019 15 7 - 25 mg/dL Final   05/05/2019 11 7 - 25 mg/dL Final   05/04/2019 11 7 - 25 mg/dL Final     Creatinine   Date Value Ref Range Status   04/27/2024 0.85 0.60 - 1.30 mg/dL Final     Comment:     Standardized to IDMS reference method   04/01/2024 1.05 0.60 - 1.30 mg/dL Final     Comment:     Standardized to IDMS reference method   03/02/2024 0.90 0.60 - 1.30 mg/dL Final     Comment:     Standardized to IDMS reference method   05/06/2019 0.60 0.40 - 1.10 mg/dL Final   05/05/2019 0.53 0.40 - 1.10 mg/dL Final   05/04/2019 0.52 0.40 - 1.10 mg/dL Final     Glucose   Date Value Ref Range Status   12/11/2023 88 65 - 140 mg/dL Final     Comment:     If the patient is fasting, the ADA then defines impaired fasting glucose as > 100 mg/dL and diabetes as > or equal to 123 mg/dL.   11/17/2022 85 65 - 140 mg/dL Final     Comment:     If the patient is fasting, the ADA then defines impaired fasting glucose as > 100 mg/dL and diabetes as > or equal to 123 mg/dL.  Specimen collection should occur prior to Sulfasalazine administration due to the potential for falsely depressed results. Specimen collection should occur prior to Sulfapyridine administration due to the potential for falsely elevated results.   08/03/2020 112 (H) 65 - 99 mg/dL Final     Comment:     If the patient  is fasting, the ADA then defines impaired fasting glucose as > 100 mg/dL and diabetes as > or equal to 123 mg/dL.  Specimen collection should occur prior to Sulfasalazine administration due to the potential for falsely depressed results. Specimen collection should occur prior to Sulfapyridine administration due to the potential for falsely elevated results.   05/06/2019 104 (H) 65 - 99 mg/dL Final   05/05/2019 109 (H) 65 - 99 mg/dL Final   05/04/2019 124 (H) 65 - 99 mg/dL Final     Calcium   Date Value Ref Range Status   04/27/2024 9.2 8.4 - 10.2 mg/dL Final   04/01/2024 8.6 8.4 - 10.2 mg/dL Final   03/02/2024 8.9 8.4 - 10.2 mg/dL Final   05/06/2019 8.5 8.5 - 10.1 mg/dL Final   05/05/2019 8.3 (L) 8.5 - 10.1 mg/dL Final   05/04/2019 8.1 (L) 8.5 - 10.1 mg/dL Final     Albumin   Date Value Ref Range Status   04/27/2024 4.1 3.5 - 5.0 g/dL Final   04/01/2024 3.6 3.5 - 5.0 g/dL Final   03/02/2024 3.9 3.5 - 5.0 g/dL Final     ALBUMIN   Date Value Ref Range Status   05/03/2019 3.5 3.5 - 4.8 g/dL Final     Total Bilirubin   Date Value Ref Range Status   04/27/2024 0.60 0.20 - 1.00 mg/dL Final     Comment:     Use of this assay is not recommended for patients undergoing treatment with eltrombopag due to the potential for falsely elevated results.  N-acetyl-p-benzoquinone imine (metabolite of Acetaminophen) will generate erroneously low results in samples for patients that have taken an overdose of Acetaminophen.   04/01/2024 0.39 0.20 - 1.00 mg/dL Final     Comment:     Use of this assay is not recommended for patients undergoing treatment with eltrombopag due to the potential for falsely elevated results.  N-acetyl-p-benzoquinone imine (metabolite of Acetaminophen) will generate erroneously low results in samples for patients that have taken an overdose of Acetaminophen.   03/02/2024 0.53 0.20 - 1.00 mg/dL Final     Comment:     Use of this assay is not recommended for patients undergoing treatment with eltrombopag due to  "the potential for falsely elevated results.  N-acetyl-p-benzoquinone imine (metabolite of Acetaminophen) will generate erroneously low results in samples for patients that have taken an overdose of Acetaminophen.   05/03/2019 0.5 0.2 - 1.0 mg/dL Final     Alkaline Phosphatase   Date Value Ref Range Status   04/27/2024 47 34 - 104 U/L Final   04/01/2024 44 34 - 104 U/L Final   03/02/2024 45 34 - 104 U/L Final   05/03/2019 54 35 - 120 U/L Final     AST   Date Value Ref Range Status   04/27/2024 17 13 - 39 U/L Final   04/01/2024 15 13 - 39 U/L Final   03/02/2024 16 13 - 39 U/L Final   05/03/2019 26 <41 U/L Final     ALT   Date Value Ref Range Status   04/27/2024 12 7 - 52 U/L Final     Comment:     Specimen collection should occur prior to Sulfasalazine administration due to the potential for falsely depressed results.    04/01/2024 11 7 - 52 U/L Final     Comment:     Specimen collection should occur prior to Sulfasalazine administration due to the potential for falsely depressed results.    03/02/2024 11 7 - 52 U/L Final     Comment:     Specimen collection should occur prior to Sulfasalazine administration due to the potential for falsely depressed results.    05/03/2019 25 <56 U/L Final      LD   Date Value Ref Range Status   04/27/2024 173 140 - 271 U/L Final   04/01/2024 134 (L) 140 - 271 U/L Final   03/02/2024 138 (L) 140 - 271 U/L Final     No results found for: \"TSH\"  No results found for: \"C2UVLTW\"   Free T4   Date Value Ref Range Status   04/27/2024 0.96 0.61 - 1.12 ng/dL Final     Comment:     Specimens with biotin concentrations > 10 ng/mL can lead to significant (> 10%) positive bias in result.   04/01/2024 1.03 0.61 - 1.12 ng/dL Final     Comment:     Specimens with biotin concentrations > 10 ng/mL can lead to significant (> 10%) positive bias in result.   03/02/2024 0.92 0.61 - 1.12 ng/dL Final     Comment:     Specimens with biotin concentrations > 10 ng/mL can lead to significant (> 10%) positive bias " in result.         RECENT IMAGING:  No results found.          Assessment/Plan  Ms. Damon is a 73 yr female with metastatic melanoma on treatment with nivolumab plus relatlimab/Opdualag here for continued monitoring, follow-up and surveillance while on treatment.    1. Metastatic melanoma (HCC)  She is doing well and tolerating treatment with nivolumab plus relatlimab/Opdualag.  Labs reviewed and she is okay to continue treatment.  We reviewed her imaging which demonstrates continued response to treatment.  We plan to treat for 2 years  She understands and is in agreement with this plan.    She has standing orders for blood work prior to treatment.  She knows to call with issues or concerns prior to her next visit.    2. High risk medication use  3. Adrenal insufficiency (HCC)  The patient is on treatment with immunotherapy and we will continue to monitor for side effects and lab abnormalities. We will monitor labs with each treatment to ensure safety of continuing on treatment. The patient knows to watch for signs and symptoms for immune mediated side effects.     Stable energy on 10 mg hydrocortisone twice a day.    Follow up as planned    Rafaela Reyes MD, PhD

## 2024-05-23 NOTE — PROGRESS NOTES
[facility] [place]  [address]  [place] [address]  [phone]     Date of Visit: 5/23/2024  Name: Debbie Damon   YOB: 1950        Subjective    VISIT DIAGNOSIS:  Diagnoses and all orders for this visit:    Metastatic melanoma (HCC)    High risk medication use    Adrenal insufficiency (HCC)        Oncology History   Metastatic melanoma (HCC)   10/13/2022 -  Cancer Staged    Staging form: Melanoma of the Skin, AJCC 8th Edition  - Clinical stage from 10/13/2022: Stage IV (cTX, cNX, cM1a) - Signed by Rafaela Reyes MD on 11/22/2022       10/13/2022 Biopsy    A. Skin, right lateral medial leg, punch biopsy:  Portion of severely atypical melanocytic dermal proliferation with prominent melanosis consistent with melanoma. See note.        B. Skin, right medial leg. punch biopsy:  Portion of severely atypical melanocytic dermal proliferation with prominent melanosis consistent with melanoma. See note.        C. Skin, right medial leg, punch biopsy:   Portion of severely atypical melanocytic dermal proliferation with prominent melanosis consistent with melanoma. See note.     11/22/2022 Initial Diagnosis    Metastatic melanoma (HCC)     12/14/2022 -  Chemotherapy    alteplase (CATHFLO), 2 mg, Intracatheter, Every 2 hour PRN, 19 of 23 cycles  NIVOLUMAB-RELATLIMAB-RMBW (OPDUALAG) IVPB, 480 mg of nivolumab, Intravenous, Once, 19 of 23 cycles  Administration: 480 mg of nivolumab (12/14/2022), 480 mg of nivolumab (1/11/2023), 480 mg of nivolumab (2/8/2023), 480 mg of nivolumab (3/8/2023), 480 mg of nivolumab (4/5/2023), 480 mg of nivolumab (5/3/2023), 480 mg of nivolumab (5/31/2023), 480 mg of nivolumab (6/28/2023), 480 mg of nivolumab (7/26/2023), 480 mg of nivolumab (8/23/2023), 480 mg of nivolumab (9/20/2023), 480 mg of nivolumab (10/18/2023), 480 mg of nivolumab (11/15/2023), 480 mg of nivolumab (12/13/2023), 480 mg of nivolumab (1/10/2024), 480 mg of  nivolumab (2/7/2024), 480 mg of nivolumab (3/6/2024), 480 mg of nivolumab (4/3/2024), 480 mg of nivolumab (5/1/2024)     1/5/2023 Genomic Testing    [employer] One liquid testing  TMB 1 Mut/Mb  MSI-high not detected          Cancer Staging   Metastatic melanoma (HCC)  Staging form: Melanoma of the Skin, AJCC 8th Edition  - Clinical stage from 10/13/2022: Stage IV (cTX, cNX, cM1a) - Signed by Rafaela Reyes MD on 11/22/2022     Treatment Details   Treatment goal Curative   Plan Name OP Nivolumab and Relatlimab-rmbw Q 28 Days   Status Active   Start Date 12/14/2022   End Date 8/21/2024 (Planned)   Provider Rafaela Reyes MD   Chemotherapy alteplase (CATHFLO), 2 mg, Intracatheter, Every 2 hour PRN, 19 of 23 cycles    NIVOLUMAB-RELATLIMAB-RMBW (OPDUALAG) IVPB, 480 mg of nivolumab, Intravenous, Once, 19 of 23 cycles  Administration: 480 mg of nivolumab (12/14/2022), 480 mg of nivolumab (1/11/2023), 480 mg of nivolumab (2/8/2023), 480 mg of nivolumab (3/8/2023), 480 mg of nivolumab (4/5/2023), 480 mg of nivolumab (5/3/2023), 480 mg of nivolumab (5/31/2023), 480 mg of nivolumab (6/28/2023), 480 mg of nivolumab (7/26/2023), 480 mg of nivolumab (8/23/2023), 480 mg of nivolumab (9/20/2023), 480 mg of nivolumab (10/18/2023), 480 mg of nivolumab (11/15/2023), 480 mg of nivolumab (12/13/2023), 480 mg of nivolumab (1/10/2024), 480 mg of nivolumab (2/7/2024), 480 mg of nivolumab (3/6/2024), 480 mg of nivolumab (4/3/2024), 480 mg of nivolumab (5/1/2024)          HISTORY OF PRESENT ILLNESS: Debbie Damon is a 73 y.o. female  who has metastatic melanoma on treatment with nivolumab plus relatlimab/Opdualag here for continued monitoring, follow-up and surveillance while on treatment.    She is doing well.  No issues concerns or complaints.  Energy is good and continues on hydrocortisone 10 mg twice a day.  No new, changing, concerning skin lesions.  No lymphadenopathy.    Denies any immune mediated side effects.  Denies headaches,  double vision, rash, itching, chest pain, shortness of breath, diarrhea.  Denies muscle weakness or fatigue.      I independently reviewed her imaging from 5/20/2024 where she had a PET scan.  Imaging demonstrates stable exam with response to treatment when compared to original scans.  There is no significant change in the mild cutaneous FDG uptake in the right anterior lateral lower extremity.  They are nonspecific and likely related to an inflammatory process.  Most of her disease was on the right medial aspect which has improved clinically as well.  No other FDG avid lesions in the chest abdomen or pelvis.  Persistent focus of uptake in the mid sigmoid colon that waxes and wanes.  She was evaluated with recent colonoscopy.      REVIEW OF SYSTEMS:  Review of Systems   Constitutional:  Negative for appetite change, fatigue, fever and unexpected weight change.   HENT:   Negative for lump/mass, trouble swallowing and voice change.    Eyes:  Negative for icterus.   Respiratory:  Negative for cough, shortness of breath and wheezing.    Cardiovascular:  Negative for leg swelling.   Gastrointestinal:  Negative for abdominal pain, constipation, diarrhea, nausea and vomiting.   Genitourinary:  Negative for difficulty urinating and hematuria.    Musculoskeletal:  Negative for arthralgias, gait problem and myalgias.   Skin:  Negative for itching and rash.        No new, changing, or concerning lesions.   Neurological:  Negative for extremity weakness, gait problem, headaches, light-headedness and numbness.   Hematological:  Negative for adenopathy.        MEDICATIONS:    Current Outpatient Medications:     apixaban (Eliquis) 5 mg, Take 1 tablet (5 mg total) by mouth every 12 (twelve) hours, Disp: 60 tablet, Rfl: 5    famotidine (PEPCID) 10 mg tablet, Take 10 mg by mouth 2 (two) times a day, Disp: , Rfl:     hydrocortisone (CORTEF) 10 mg tablet, take 1 tablet by mouth twice a day, Disp: 60 tablet, Rfl: 11    loratadine  (CLARITIN) 10 mg tablet, Take 10 mg by mouth daily, Disp: , Rfl:     losartan (Cozaar) 100 MG tablet, Take 1 tablet (100 mg total) by mouth daily, Disp: 30 tablet, Rfl: 5    metoprolol succinate (TOPROL-XL) 25 mg 24 hr tablet, Take 1 tablet (25 mg total) by mouth 2 (two) times a day, Disp: 60 tablet, Rfl: 5    Multiple Minerals-Vitamins (Citracal Plus) TABS, Take by mouth, Disp: , Rfl:     Multiple Vitamins-Minerals (MULTIVITAMIN WITH MINERALS) tablet, Take 1 tablet by mouth daily, Disp: , Rfl:     mupirocin (BACTROBAN) 2 % ointment, Apply topically 3 (three) times a day (Patient not taking: Reported on 4/29/2024), Disp: 15 g, Rfl: 1    patient supplied medication, Pt takes eye drops Mura 128 - not listed in database, Disp: , Rfl:     Wheat Dextrin (BENEFIBER DRINK MIX PO), Take by mouth, Disp: , Rfl:      ALLERGIES:  Allergies   Allergen Reactions    Etomidate Other (See Comments)        ACTIVE PROBLEMS:  Patient Active Problem List   Diagnosis    Acute massive pulmonary embolism (HCC)    Venous stasis of both lower extremities    Clostridium difficile infection    Right ventricular systolic dysfunction without heart failure    Essential hypertension    Metastatic melanoma (HCC)    High risk medication use    Adrenal insufficiency (HCC)    Abnormal PET scan of colon    Mild anemia    Anticoagulated by anticoagulation treatment    History of colon polyps    Diverticulosis    Abnormal finding on GI tract imaging          PAST MEDICAL HISTORY:   Past Medical History:   Diagnosis Date    Acute deep vein thrombosis (DVT) of popliteal vein of left lower extremity (HCC)     Ankle wound, left, initial encounter     Resolved 6/2017    Ankle wound, right, initial encounter     resolved 6/2017    DVT (deep vein thrombosis) in pregnancy     Hypertension     Pulmonary emboli (HCC)     1989 post C section    Pulmonary embolism (HCC)     7/2017    Pulmonary embolism, blood-clot, obstetric     Skin cancer     Venous stasis ulcer  "(HCC)     Venous ulcers of both lower extremities (HCC)         PAST SURGICAL HISTORY:  Past Surgical History:   Procedure Laterality Date     SECTION      X2        SOCIAL HISTORY:  Social History     Socioeconomic History    Marital status:      Spouse name: Not on file    Number of children: Not on file    Years of education: Not on file    Highest education level: Not on file   Occupational History    Not on file   Tobacco Use    Smoking status: Never    Smokeless tobacco: Never   Vaping Use    Vaping status: Never Used   Substance and Sexual Activity    Alcohol use: No     Comment: hx of social drinker    Drug use: No    Sexual activity: Not on file   Other Topics Concern    Not on file   Social History Narrative    Not on file     Social Determinants of Health     Financial Resource Strain: Not on file   Food Insecurity: Not on file   Transportation Needs: Not on file   Physical Activity: Not on file   Stress: Not on file   Social Connections: Not on file   Intimate Partner Violence: Not on file   Housing Stability: Not on file        FAMILY HISTORY:  Family History   Problem Relation Age of Onset    Heart attack Mother     Stroke Mother     Cancer Father     Bone cancer Family     Hypertension Family     Lung cancer Family            Objective    PHYSICAL EXAMINATION:   Blood pressure 138/76, pulse 93, temperature 98.1 °F (36.7 °C), temperature source Temporal, resp. rate 18, height 5' 9\" (1.753 m), weight 89.8 kg (198 lb), SpO2 98%.     Pain Score: 0-No pain     ECOG Performance Status      Flowsheet Row Most Recent Value   ECOG Performance Status 0 - Fully active, able to carry on all pre-disease performance without restriction               Physical Exam  Constitutional:       General: She is not in acute distress.     Appearance: Normal appearance. She is not ill-appearing or toxic-appearing.   HENT:      Head: Normocephalic and atraumatic.      Right Ear: External ear normal.      Left " Ear: External ear normal.      Nose: Nose normal.      Mouth/Throat:      Mouth: Mucous membranes are moist.      Pharynx: Oropharynx is clear.   Eyes:      General: No scleral icterus.        Right eye: No discharge.         Left eye: No discharge.      Conjunctiva/sclera: Conjunctivae normal.   Cardiovascular:      Rate and Rhythm: Normal rate and regular rhythm.      Pulses: Normal pulses.      Heart sounds: No murmur heard.     No friction rub. No gallop.   Pulmonary:      Effort: Pulmonary effort is normal. No respiratory distress.      Breath sounds: Normal breath sounds. No wheezing or rales.   Abdominal:      General: Bowel sounds are normal. There is no distension.      Palpations: There is no mass.      Tenderness: There is no abdominal tenderness. There is no rebound.   Musculoskeletal:         General: No swelling or tenderness.      Cervical back: Normal range of motion. No rigidity.      Right lower leg: No edema.      Left lower leg: No edema.   Lymphadenopathy:      Head:      Right side of head: No submandibular, preauricular or posterior auricular adenopathy.      Left side of head: No submandibular, preauricular or posterior auricular adenopathy.      Cervical: No cervical adenopathy.      Right cervical: No superficial or posterior cervical adenopathy.     Left cervical: No superficial or posterior cervical adenopathy.      Upper Body:      Right upper body: No supraclavicular or axillary adenopathy.      Left upper body: No supraclavicular or axillary adenopathy.   Skin:     General: Skin is warm.      Coloration: Skin is not jaundiced.      Findings: No lesion or rash.      Comments: No concerning skin lesions.  Continued lightening of pigmentation in right medial aspect of calf.   Neurological:      General: No focal deficit present.      Mental Status: She is alert and oriented to person, place, and time.      Cranial Nerves: No cranial nerve deficit.      Motor: No weakness.      Gait: Gait  normal.   Psychiatric:         Mood and Affect: Mood normal.         Behavior: Behavior normal.         Thought Content: Thought content normal.         Judgment: Judgment normal.         I reviewed lab data in the chart.    WBC   Date Value Ref Range Status   04/27/2024 5.60 4.31 - 10.16 Thousand/uL Final   04/01/2024 5.67 4.31 - 10.16 Thousand/uL Final   03/02/2024 4.46 4.31 - 10.16 Thousand/uL Final     Hemoglobin   Date Value Ref Range Status   04/27/2024 13.4 11.5 - 15.4 g/dL Final   04/01/2024 12.8 11.5 - 15.4 g/dL Final   03/02/2024 12.7 11.5 - 15.4 g/dL Final     Platelets   Date Value Ref Range Status   04/27/2024 185 149 - 390 Thousands/uL Final   04/01/2024 199 149 - 390 Thousands/uL Final   03/02/2024 179 149 - 390 Thousands/uL Final     MCV   Date Value Ref Range Status   04/27/2024 91 82 - 98 fL Final   04/01/2024 93 82 - 98 fL Final   03/02/2024 92 82 - 98 fL Final      Potassium   Date Value Ref Range Status   04/27/2024 4.0 3.5 - 5.3 mmol/L Final   04/01/2024 4.5 3.5 - 5.3 mmol/L Final   03/02/2024 4.3 3.5 - 5.3 mmol/L Final   05/06/2019 3.8 3.5 - 5.2 mmol/L Final   05/05/2019 4.1 3.5 - 5.2 mmol/L Final   05/04/2019 3.9 3.5 - 5.2 mmol/L Final     Chloride   Date Value Ref Range Status   04/27/2024 103 96 - 108 mmol/L Final   04/01/2024 105 96 - 108 mmol/L Final   03/02/2024 102 96 - 108 mmol/L Final   05/06/2019 104 100 - 109 mmol/L Final   05/05/2019 101 100 - 109 mmol/L Final   05/04/2019 104 100 - 109 mmol/L Final     Carbon Dioxide   Date Value Ref Range Status   05/06/2019 31 23 - 31 mmol/L Final   05/05/2019 28 23 - 31 mmol/L Final   05/04/2019 26 23 - 31 mmol/L Final     CO2   Date Value Ref Range Status   04/27/2024 31 21 - 32 mmol/L Final   04/01/2024 29 21 - 32 mmol/L Final   03/02/2024 30 21 - 32 mmol/L Final     BUN   Date Value Ref Range Status   04/27/2024 19 5 - 25 mg/dL Final   04/01/2024 28 (H) 5 - 25 mg/dL Final   03/02/2024 21 5 - 25 mg/dL Final   05/06/2019 15 7 - 25 mg/dL Final    05/05/2019 11 7 - 25 mg/dL Final   05/04/2019 11 7 - 25 mg/dL Final     Creatinine   Date Value Ref Range Status   04/27/2024 0.85 0.60 - 1.30 mg/dL Final     Comment:     Standardized to IDMS reference method   04/01/2024 1.05 0.60 - 1.30 mg/dL Final     Comment:     Standardized to IDMS reference method   03/02/2024 0.90 0.60 - 1.30 mg/dL Final     Comment:     Standardized to IDMS reference method   05/06/2019 0.60 0.40 - 1.10 mg/dL Final   05/05/2019 0.53 0.40 - 1.10 mg/dL Final   05/04/2019 0.52 0.40 - 1.10 mg/dL Final     Glucose   Date Value Ref Range Status   12/11/2023 88 65 - 140 mg/dL Final     Comment:     If the patient is fasting, the ADA then defines impaired fasting glucose as > 100 mg/dL and diabetes as > or equal to 123 mg/dL.   11/17/2022 85 65 - 140 mg/dL Final     Comment:     If the patient is fasting, the ADA then defines impaired fasting glucose as > 100 mg/dL and diabetes as > or equal to 123 mg/dL.  Specimen collection should occur prior to Sulfasalazine administration due to the potential for falsely depressed results. Specimen collection should occur prior to Sulfapyridine administration due to the potential for falsely elevated results.   08/03/2020 112 (H) 65 - 99 mg/dL Final     Comment:     If the patient is fasting, the ADA then defines impaired fasting glucose as > 100 mg/dL and diabetes as > or equal to 123 mg/dL.  Specimen collection should occur prior to Sulfasalazine administration due to the potential for falsely depressed results. Specimen collection should occur prior to Sulfapyridine administration due to the potential for falsely elevated results.   05/06/2019 104 (H) 65 - 99 mg/dL Final   05/05/2019 109 (H) 65 - 99 mg/dL Final   05/04/2019 124 (H) 65 - 99 mg/dL Final     Calcium   Date Value Ref Range Status   04/27/2024 9.2 8.4 - 10.2 mg/dL Final   04/01/2024 8.6 8.4 - 10.2 mg/dL Final   03/02/2024 8.9 8.4 - 10.2 mg/dL Final   05/06/2019 8.5 8.5 - 10.1 mg/dL Final    05/05/2019 8.3 (L) 8.5 - 10.1 mg/dL Final   05/04/2019 8.1 (L) 8.5 - 10.1 mg/dL Final     Albumin   Date Value Ref Range Status   04/27/2024 4.1 3.5 - 5.0 g/dL Final   04/01/2024 3.6 3.5 - 5.0 g/dL Final   03/02/2024 3.9 3.5 - 5.0 g/dL Final     ALBUMIN   Date Value Ref Range Status   05/03/2019 3.5 3.5 - 4.8 g/dL Final     Total Bilirubin   Date Value Ref Range Status   04/27/2024 0.60 0.20 - 1.00 mg/dL Final     Comment:     Use of this assay is not recommended for patients undergoing treatment with eltrombopag due to the potential for falsely elevated results.  N-acetyl-p-benzoquinone imine (metabolite of Acetaminophen) will generate erroneously low results in samples for patients that have taken an overdose of Acetaminophen.   04/01/2024 0.39 0.20 - 1.00 mg/dL Final     Comment:     Use of this assay is not recommended for patients undergoing treatment with eltrombopag due to the potential for falsely elevated results.  N-acetyl-p-benzoquinone imine (metabolite of Acetaminophen) will generate erroneously low results in samples for patients that have taken an overdose of Acetaminophen.   03/02/2024 0.53 0.20 - 1.00 mg/dL Final     Comment:     Use of this assay is not recommended for patients undergoing treatment with eltrombopag due to the potential for falsely elevated results.  N-acetyl-p-benzoquinone imine (metabolite of Acetaminophen) will generate erroneously low results in samples for patients that have taken an overdose of Acetaminophen.   05/03/2019 0.5 0.2 - 1.0 mg/dL Final     Alkaline Phosphatase   Date Value Ref Range Status   04/27/2024 47 34 - 104 U/L Final   04/01/2024 44 34 - 104 U/L Final   03/02/2024 45 34 - 104 U/L Final   05/03/2019 54 35 - 120 U/L Final     AST   Date Value Ref Range Status   04/27/2024 17 13 - 39 U/L Final   04/01/2024 15 13 - 39 U/L Final   03/02/2024 16 13 - 39 U/L Final   05/03/2019 26 <41 U/L Final     ALT   Date Value Ref Range Status   04/27/2024 12 7 - 52 U/L Final  "    Comment:     Specimen collection should occur prior to Sulfasalazine administration due to the potential for falsely depressed results.    04/01/2024 11 7 - 52 U/L Final     Comment:     Specimen collection should occur prior to Sulfasalazine administration due to the potential for falsely depressed results.    03/02/2024 11 7 - 52 U/L Final     Comment:     Specimen collection should occur prior to Sulfasalazine administration due to the potential for falsely depressed results.    05/03/2019 25 <56 U/L Final      LD   Date Value Ref Range Status   04/27/2024 173 140 - 271 U/L Final   04/01/2024 134 (L) 140 - 271 U/L Final   03/02/2024 138 (L) 140 - 271 U/L Final     No results found for: \"TSH\"  No results found for: \"G4BTSRQ\"   Free T4   Date Value Ref Range Status   04/27/2024 0.96 0.61 - 1.12 ng/dL Final     Comment:     Specimens with biotin concentrations > 10 ng/mL can lead to significant (> 10%) positive bias in result.   04/01/2024 1.03 0.61 - 1.12 ng/dL Final     Comment:     Specimens with biotin concentrations > 10 ng/mL can lead to significant (> 10%) positive bias in result.   03/02/2024 0.92 0.61 - 1.12 ng/dL Final     Comment:     Specimens with biotin concentrations > 10 ng/mL can lead to significant (> 10%) positive bias in result.         RECENT IMAGING:  No results found.          Assessment/Plan  Ms. Damon is a 73 yr female with metastatic melanoma on treatment with nivolumab plus relatlimab/Opdualag here for continued monitoring, follow-up and surveillance while on treatment.    1. Metastatic melanoma (HCC)  She is doing well and tolerating treatment with nivolumab plus relatlimab/Opdualag.  Labs reviewed and she is okay to continue treatment.  We reviewed her imaging which demonstrates continued response to treatment.  We plan to treat for 2 years  She understands and is in agreement with this plan.    She has standing orders for blood work prior to treatment.  She knows to call with " issues or concerns prior to her next visit.    2. High risk medication use  3. Adrenal insufficiency (HCC)  The patient is on treatment with immunotherapy and we will continue to monitor for side effects and lab abnormalities. We will monitor labs with each treatment to ensure safety of continuing on treatment. The patient knows to watch for signs and symptoms for immune mediated side effects.     Stable energy on 10 mg hydrocortisone twice a day.    Follow up as planned    Rafaela Reyes MD, PhD

## 2024-05-25 ENCOUNTER — APPOINTMENT (OUTPATIENT)
Dept: LAB | Facility: CLINIC | Age: 74
End: 2024-05-25
Payer: COMMERCIAL

## 2024-05-28 NOTE — PROGRESS NOTES
ED to inpatient nurses report      Chief Complaint:  Chief Complaint   Patient presents with    Fatigue    Wheezing     Present to ED from: home    MOA:     LOC: alert and orientated to name, place, date  Mobility: Requires assistance * 1  Oxygen Baseline: 4L    Current needs required: 4L     Code Status:   Prior    What abnormal results were found and what did you give/do to treat them? Pneumonia-abx  Any procedures or intervention occur? none    Mental Status:  Level of Consciousness: Alert (0)    Psych Assessment:        Vitals:  Patient Vitals for the past 24 hrs:   BP Temp Temp src Pulse Resp SpO2 Height Weight   05/28/24 1607 131/62 -- -- (!) 118 23 95 % -- --   05/28/24 1446 107/68 -- -- (!) 117 24 95 % -- --   05/28/24 1351 (!) 117/46 -- -- (!) 116 22 93 % -- --   05/28/24 1252 (!) 126/57 -- -- (!) 115 22 97 % -- --   05/28/24 1236 -- -- -- (!) 115 24 100 % -- --   05/28/24 1235 -- -- -- (!) 110 20 96 % -- --   05/28/24 1233 -- -- -- (!) 111 20 97 % -- --   05/28/24 1215 134/63 -- -- -- -- -- -- --   05/28/24 1213 -- 98 °F (36.7 °C) Oral (!) 114 18 96 % 1.6 m (5' 3\") 59.9 kg (132 lb)        LDAs:   Peripheral IV 05/28/24 Left Antecubital (Active)   Site Assessment Clean, dry & intact 05/28/24 1606   Line Status Normal saline locked 05/28/24 1606   Phlebitis Assessment No symptoms 05/28/24 1606   Infiltration Assessment 0 05/28/24 1606   Dressing Status Clean, dry & intact 05/28/24 1606   Dressing Type Transparent 05/28/24 1248   Dressing Intervention New 05/28/24 1248       Ambulatory Status:  No data recorded    Diagnosis:  DISPOSITION Decision To Admit 05/28/2024 04:21:27 PM   Final diagnoses:   Pneumonia of left lower lobe due to infectious organism   Other fatigue        Consults:  None     Pain Score:  Pain Assessment  Pain Assessment: 0-10  Pain Level: 5    C-SSRS:   Risk of Suicide: No Risk    Sepsis Screening:  Sepsis Risk Score: 5.99    Odell Fall Risk:       Swallow Screening        Preferred  [facility]  Gastroenterology Outpatient consultation  Patient Kristin Strickland   Age 67 y o  Gender female   MRN: [id number]  [facility] [id number]     ASSESSMENT AND PLAN:   Problem List Items Addressed This Visit        Other    Clostridium difficile infection     Patient does have a history of C  difficile in 2017  Abnormal PET scan of colon - Primary     Patient was noted to have an abnormality on a PET scan done March 6, 2023  This involves the mid to distal sigmoid colon  Given persistence and focality of this finding further evaluation colonoscopy is recommended  Based upon this finding she will be scheduled for colonoscopy  Of note she has had a Cologuard around 2017 and again in March 2020  She will be scheduled for colonoscopy  We will need clearance to hold her Eliquis for 2 days prior to her colonoscopy either from hematology or her primary care provider  She is on this due to a massive saddle embolus in 2017  She received a Colyte preparation and split dose with the second dose completed 3 hours prior to arrival   2 bisacodyl tablets  Adult scope  I explained to the patient the procedure of colonoscopy as well as its potential risks which are approximately 3 in 1000 chance of bleeding, infection, and perforation  Perforation may require a surgeon to repair it  I also explained the small but significant chance of missing lesions with colonoscopy which has been reported to be about 5-10%  Relevant Medications    polyethylene glycol (COLYTE) 4000 mL solution    Other Relevant Orders    Colonoscopy    Mild anemia     Debbie was noted to have a mild anemia with a hemoglobin of 11 1 hematocrit 35 3 MCV of 90 on April 1  I would recommend repeating a CBC at some point in the near future    Check iron studies  Check vitamin B12  Check folate         Relevant Orders    CBC    Vitamin B12    Iron Panel (Includes Ferritin, Iron Sat%, Iron, and TIBC)    Folate    Anticoagulated by anticoagulation treatment     Patient is fully anticoagulated on Eliquis  She will need to hold her Eliquis for 2 full days prior to her colonoscopy  We will get clearance from either Dr Richard Chicas or Dr Naveen Benoit to hold her anticoagulation  _____________________________________________________________    HPI:   Laci Cruz is a delightful 70-year-old woman home seen in the office in consultation at request of Dr Naveen Benoit of hematology oncology  She has a history of unresectable metastatic melanoma on her right lower extremity/leg  She had a recent PET scan on March 6, 2023 which is outlined below, that showed activity in the mid to distal sigmoid colon  She has been on chemotherapy (immunotherapy) with [facility] ) since December  This treatment has been complicated by adrenal insufficiency  She is feeling better since going on hydrocortisone  She presents for an abnormal PET scan as noted above  She denies any diarrhea constipation bleeding or black stools  There is been no change in her bowel pattern  From an upper GI standpoint she denies any heartburn indigestion dysphagia nausea vomiting or early satiety  There is been no unintentional weight loss  She has kept up-to-date with colorectal cancer screening having had 2 Cologuard's  The last Cologuard was March 26, 2020  There is no family history colon cancer colon polyps that she is aware  She does not smoke tobacco and does not drink alcohol  4/1/2023 laboratory data  Sodium 136  Potassium 3 7  Chloride 106  CO2 30  BUN 14  Creatinine 0 9  Glucose 89  AST 19  ALT 32  Alk phos 36  Albumin 3 1  W B C 5 15 Hgb 11 1 down from 12 8 HCT 35 3 MCV 90 platelet count 765,037  Cortisol 1 0  ACTH 2 8    3/10/2023 pelvic ultrasound  Mild amount of simple appearing fluid within the endometrial canal   The endometrium is not thickened  12 x 10 x 11 mm anechoic structure right adnexa without internal blood flow    Suspicious for ovarian or paraovarian cyst   Left ovary not visualized  6 mm intramural leiomyoma in the anterior uterine body    3/6/2023 PET scan  1  Previously noted mildly hypermetabolic right inguinal/external iliac lymph nodes are essentially stable in size and exhibit persistent but mildly improved FDG activity  Findings are nonspecific and could reflect persistent regis metastatic disease   of low metabolic activity versus benign/inflammatory lymphadenopathy      2   Scattered areas of subcutaneous FDG activity involving the bilateral distal lower extremities extending from the level of the mid tibial shaft to the ankles/feet appear more prominent than the prior exam   While findings could be related to varicose   veins and inflammatory changes such as edema/cellulitis, given history of lower extremity melanoma, correlation with direct visualization and possibly MRI is recommended for further characterization and exclusion of developing malignancy at these sites  Attention to these regions on short interval follow-up imaging is recommended      3   Persistent short segment of focal FDG activity involving the mid to distal sigmoid colon    Given persistence and focality of this finding, further evaluation with colonoscopy is recommended to exclude colonic malignancy      4   Persistent nonspecific hypodense prominence to the endometrial cavity towards correlation with pelvic ultrasound is recommended for further characterization      5   Subtle asymmetric FDG activity associated with subtle asymmetric soft tissue prominence involving the right frontal scalp, possibly related to recent biopsy procedure although correlation with direct visualization and biopsy pathology results is   recommended to exclude a malignant process in this location      3/26/2020-Cologuard negative    No Known Allergies  Current Outpatient Medications   Medication Sig Dispense Refill   • apixaban (Eliquis) 5 mg Take 1 tablet (5 mg total) by mouth every 12 (twelve) hours 60 tablet 5   • calcium carbonate (OS-VIRGIE) 600 MG tablet Take 600 mg by mouth 2 (two) times a day     • famotidine (PEPCID) 10 mg tablet Take 10 mg by mouth 2 (two) times a day     • hydrocortisone (CORTEF) 10 mg tablet Take 1 tablet (10 mg total) by mouth 2 (two) times a day 60 tablet 11   • loratadine (CLARITIN) 10 mg tablet Take 10 mg by mouth daily     • losartan (Cozaar) 100 MG tablet Take 1 tablet (100 mg total) by mouth daily 30 tablet 5   • metoprolol succinate (TOPROL-XL) 25 mg 24 hr tablet Take 1 tablet (25 mg total) by mouth 2 (two) times a day 60 tablet 5   • Multiple Vitamins-Minerals (MULTIVITAMIN WITH MINERALS) tablet Take 1 tablet by mouth daily     • ondansetron (ZOFRAN) 4 mg tablet Take 1 tablet (4 mg total) by mouth every 8 (eight) hours as needed for nausea or vomiting 20 tablet 0   • patient supplied medication Pt takes eye drops [address]ura [address] - not listed in database     • polyethylene glycol (COLYTE) 4000 mL solution Take 4,000 mL by mouth once for 1 dose Take 4000 mL by mouth once for 1 dose  Use as directed 4000 mL 0   • triamcinolone (KENALOG) 0 1 % ointment Apply topically 2 (two) times a day 453 6 g 0     No current facility-administered medications for this visit       Facility-Administered Medications Ordered in Other Visits   Medication Dose Route Frequency Provider Last Rate Last Admin   • alteplase (CATHFLO) injection 2 mg  2 mg Intracatheter Q2H PRN Mallika Melgar MD         MEDICAL HISTORY:  Past Medical History:   Diagnosis Date   • Acute deep vein thrombosis (DVT) of popliteal vein of left lower extremity (HCC)    • Ankle wound, left, initial encounter     Resolved 6/2017   • Ankle wound, right, initial encounter     resolved 6/2017   • DVT (deep vein thrombosis) in pregnancy    • Pulmonary emboli ([address] )     1989 post C section   • Pulmonary embolism ([address] )     7/2017   • Pulmonary embolism, blood-clot, obstetric    • Skin cancer    • Venous stasis ulcer [address] [address] )    • Venous ulcers "of both lower extremities ([address] )      Past Surgical History:   Procedure Laterality Date   •  SECTION      X2     Social History     Substance and Sexual Activity   Alcohol Use No    Comment: hx of social drinker     Social History     Substance and Sexual Activity   Drug Use No     Social History     Tobacco Use   Smoking Status Never   Smokeless Tobacco Never     Family History   Problem Relation Age of Onset   • Heart attack Mother    • Stroke Mother    • Cancer Father    • Bone cancer Family    • Hypertension Family    • Lung cancer Family        REVIEW OF SYSTEMS:  CONSTITUTIONAL: Denies any fever, chills, rigors, and weight loss  HEENT: No earache or tinnitus  Denies hearing loss or visual disturbances  CARDIOVASCULAR: No chest pain or palpitations  RESPIRATORY: Denies any cough, hemoptysis, shortness of breath or dyspnea on exertion  GASTROINTESTINAL: As noted in the History of Present Illness  GENITOURINARY: No problems with urination  Denies any hematuria or dysuria  NEUROLOGIC: No dizziness or vertigo, denies headaches  MUSCULOSKELETAL: Denies any muscle or joint pain  She does report sore ankles  SKIN: Denies skin rashes or itching  ENDOCRINE: Denies excessive thirst  Denies intolerance to heat or cold  PSYCHOSOCIAL: Denies depression or anxiety  Denies any recent memory loss  Objective   Blood pressure 142/74, pulse 88, resp  rate 18, height 5' 9\" (1 753 m), weight 89 4 kg (197 lb), SpO2 93 %  Body mass index is 29 09 kg/m²  PHYSICAL EXAM:   General Appearance: Alert, cooperative, no distress  HEENT: Normocephalic, atraumatic, anicteric  Neck: Supple, symmetrical, trachea midline  Lungs: Clear to auscultation bilaterally; no rales, rhonchi or wheezing; respirations unlabored   Heart: Regular rate and rhythm; no murmur, rub, or gallop  Abdomen: Soft, bowel sounds normal, non-tender, non-distended; no masses, there is no hepatosplenomegaly   No spider angiomas  Genitalia: " Deferred   Rectal: Deferred   Extremities: No cyanosis, clubbing or edema   Skin: No jaundice, rashes  Skin lesion RLE anterior /known melanoma    Lymph nodes: No palpable cervical lymphadenopathy   Lab Results:   Appointment on 04/01/2023   Component Date Value   • WBC 04/01/2023 5 15    • RBC 04/01/2023 3 91    • Hemoglobin 04/01/2023 11 1 (L)    • Hematocrit 04/01/2023 35 3    • MCV 04/01/2023 90    • MCH 04/01/2023 28 4    • MCHC 04/01/2023 31 4    • RDW 04/01/2023 12 9    • MPV 04/01/2023 11 6    • Platelets 05/26/9055 214    • nRBC 04/01/2023 0    • Neutrophils Relative 04/01/2023 35 (L)    • Immat GRANS % 04/01/2023 0    • Lymphocytes Relative 04/01/2023 46 (H)    • Monocytes Relative 04/01/2023 10    • Eosinophils Relative 04/01/2023 7 (H)    • Basophils Relative 04/01/2023 2 (H)    • Neutrophils Absolute 04/01/2023 1 80 (L)    • Immature Grans Absolute 04/01/2023 0 01    • Lymphocytes Absolute 04/01/2023 2 39    • Monocytes Absolute 04/01/2023 0 51    • Eosinophils Absolute 04/01/2023 0 34    • Basophils Absolute 04/01/2023 0 10    • Sodium 04/01/2023 136    • Potassium 04/01/2023 3 7    • Chloride 04/01/2023 106    • CO2 04/01/2023 30    • ANION GAP 04/01/2023 0 (L)    • BUN 04/01/2023 14    • Creatinine 04/01/2023 0 90    • Glucose, Fasting 04/01/2023 89    • Calcium 04/01/2023 9 3    • Corrected Calcium 04/01/2023 10 0    • AST 04/01/2023 19    • ALT 04/01/2023 32    • Alkaline Phosphatase 04/01/2023 36 (L)    • Total Protein 04/01/2023 6 6    • Albumin 04/01/2023 3 1 (L)    • Total Bilirubin 04/01/2023 0 52    • eGFR 04/01/2023 64    • LD 04/01/2023 211    • T3, Free 04/01/2023 2 71    • Free T4 04/01/2023 1 12    • TSH 3RD GENERATON 04/01/2023 2 470    Appointment on 03/17/2023   Component Date Value   • ACTH 03/17/2023 2 8 (L)    • Cortisol - AM 03/17/2023 1 0 (LL)    Hospital Outpatient Visit on 03/06/2023   Component Date Value   • POC Glucose 03/06/2023 83    Office Visit on 03/02/2023   Component Date Value   • Case Report 03/02/2023                      Value:Surgical Pathology Report                         Case: [id number]                                   Authorizing Provider:  Eli Jones MD           Collected:           03/02/2023 1530              Ordering Location:     [facility]      Received:            03/02/2023 [address] [facility] [id number]                                                                       Pathologist:           Sav Cantu MD                                                           Specimen:    Skin, Other, Specimen A; right forehead,skin; shave;                                      • Final Diagnosis 03/02/2023                      Value: This result contains rich text formatting which cannot be displayed here  • Additional Information 03/02/2023                      Value: This result contains rich text formatting which cannot be displayed here  • Gross Description 03/02/2023                      Value: This result contains rich text formatting which cannot be displayed here  • Clinical Information 03/02/2023                      Value:Specimen A; right forehead,skin; shave; 67year old female with a history of metastatic melanoma with a 0 6 cm X 0 5 cm pearly skin colored papule with telangectasias   DDX BCC vs nevus    ATTN [facility]   Ancillary Orders on 02/24/2023   Component Date Value   • WBC 03/04/2023 4 84    • RBC 03/04/2023 4 48    • Hemoglobin 03/04/2023 12 8    • Hematocrit 03/04/2023 40 8    • MCV 03/04/2023 91    • MCH 03/04/2023 28 6    • MCHC 03/04/2023 31 4    • RDW 03/04/2023 13 2    • MPV 03/04/2023 11 7    • Platelets 92/74/6664 183    • nRBC 03/04/2023 0    • Neutrophils Relative 03/04/2023 34 (L)    • Immat GRANS % 03/04/2023 0    • Lymphocytes Relative 03/04/2023 45 (H)    • Monocytes Relative 03/04/2023 14 (H)    • Eosinophils Relative 03/04/2023 5    • Basophils Relative 03/04/2023 2 (H)    • Neutrophils Absolute 03/04/2023 1 65 (L)    • Immature Grans Absolute 03/04/2023 0 00    • Lymphocytes Absolute 03/04/2023 2 21    • Monocytes Absolute 03/04/2023 0 66    • Eosinophils Absolute 03/04/2023 0 24    • Basophils Absolute 03/04/2023 0 08    • Sodium 03/04/2023 139    • Potassium 03/04/2023 4 0    • Chloride 03/04/2023 109 (H)    • CO2 03/04/2023 26    • ANION GAP 03/04/2023 4    • BUN 03/04/2023 14    • Creatinine 03/04/2023 0 83    • Glucose, Fasting 03/04/2023 108 (H)    • Calcium 03/04/2023 9 1    • Corrected Calcium 03/04/2023 9 7    • AST 03/04/2023 29    • ALT 03/04/2023 30    • Alkaline Phosphatase 03/04/2023 39 (L)    • Total Protein 03/04/2023 6 7    • Albumin 03/04/2023 3 3 (L)    • Total Bilirubin 03/04/2023 0 76    • eGFR 03/04/2023 70    • LD 03/04/2023 206    • T3, Free 03/04/2023 3 56    • Free T4 03/04/2023 1 03    • TSH 3RD GENERATON 03/04/2023 2 650      Radiology Results:     Mario Bah DO   04/06/23   Cc:

## 2024-05-29 ENCOUNTER — HOSPITAL ENCOUNTER (OUTPATIENT)
Dept: INFUSION CENTER | Facility: HOSPITAL | Age: 74
Discharge: HOME | End: 2024-05-29
Attending: INTERNAL MEDICINE
Payer: COMMERCIAL

## 2024-05-29 VITALS — WEIGHT: 197.97 LBS | HEIGHT: 69 IN | BODY MASS INDEX: 29.32 KG/M2

## 2024-05-29 DIAGNOSIS — C43.9 METASTATIC MELANOMA (HCC): Primary | ICD-10-CM

## 2024-05-29 RX ORDER — SODIUM CHLORIDE 9 MG/ML
20 INJECTION, SOLUTION INTRAVENOUS ONCE
Status: COMPLETED | OUTPATIENT
Start: 2024-05-29 | End: 2024-05-29

## 2024-05-29 RX ADMIN — SODIUM CHLORIDE 20 ML/HR: 0.9 INJECTION, SOLUTION INTRAVENOUS at 14:12

## 2024-05-29 RX ADMIN — SODIUM CHLORIDE 480 MG OF NIVOLUMAB: 9 INJECTION, SOLUTION INTRAVENOUS at 14:41

## 2024-05-29 NOTE — PROGRESS NOTES
Labs reviewed. Patient tolerated IV Opdualag well with no complications.      Debbie Damon is aware of future appt on 06/26/24 at 0800.     AVS declined.

## 2024-06-19 RX ORDER — SODIUM CHLORIDE 9 MG/ML
20 INJECTION, SOLUTION INTRAVENOUS ONCE
Status: CANCELLED | OUTPATIENT
Start: 2024-06-26

## 2024-06-22 ENCOUNTER — APPOINTMENT (OUTPATIENT)
Dept: LAB | Facility: CLINIC | Age: 74
End: 2024-06-22
Payer: COMMERCIAL

## 2024-06-24 ENCOUNTER — OFFICE VISIT (OUTPATIENT)
Dept: HEMATOLOGY ONCOLOGY | Facility: CLINIC | Age: 74
End: 2024-06-24
Payer: COMMERCIAL

## 2024-06-24 VITALS
HEIGHT: 69 IN | RESPIRATION RATE: 18 BRPM | BODY MASS INDEX: 29.33 KG/M2 | SYSTOLIC BLOOD PRESSURE: 128 MMHG | OXYGEN SATURATION: 98 % | WEIGHT: 198 LBS | HEART RATE: 94 BPM | TEMPERATURE: 97.8 F | DIASTOLIC BLOOD PRESSURE: 76 MMHG

## 2024-06-24 DIAGNOSIS — C43.9 METASTATIC MELANOMA (HCC): Primary | ICD-10-CM

## 2024-06-24 DIAGNOSIS — E27.40 ADRENAL INSUFFICIENCY (HCC): ICD-10-CM

## 2024-06-24 DIAGNOSIS — Z79.899 HIGH RISK MEDICATION USE: ICD-10-CM

## 2024-06-24 PROCEDURE — 99214 OFFICE O/P EST MOD 30 MIN: CPT | Performed by: INTERNAL MEDICINE

## 2024-06-24 NOTE — LETTER
June 25, 2024     Juan Carlos Perry DO  [address] [facility] [address]    Patient: Debbie Damon   YOB: 1950   Date of Visit: 6/24/2024       Dear Dr. Perry:    Thank you for referring Debbie Damon to me for evaluation. Below are my notes for this consultation.    If you have questions, please do not hesitate to call me. I look forward to following your patient along with you.         Sincerely,        Rafaela Reyes MD        CC: KEN Lui MD Jacquelyn S Carr, MD Joseph John Minissale, DO Melissa A Wilson, MD  6/25/2024  7:47 PM  Sign when Signing Visit  [facility] [address] [address]  [phone]  [phone]     Date of Visit: 6/24/2024  Name: Debbie Damon   YOB: 1950        Subjective    VISIT DIAGNOSIS:  Diagnoses and all orders for this visit:    Metastatic melanoma (HCC)    High risk medication use    Adrenal insufficiency (HCC)        Oncology History   Metastatic melanoma (HCC)   10/13/2022 -  Cancer Staged    Staging form: Melanoma of the Skin, AJCC 8th Edition  - Clinical stage from 10/13/2022: Stage IV (cTX, cNX, cM1a) - Signed by Rafaela Reyes MD on 11/22/2022       10/13/2022 Biopsy    A. Skin, right lateral medial leg, punch biopsy:  Portion of severely atypical melanocytic dermal proliferation with prominent melanosis consistent with melanoma. See note.        B. Skin, right medial leg. punch biopsy:  Portion of severely atypical melanocytic dermal proliferation with prominent melanosis consistent with melanoma. See note.        C. Skin, right medial leg, punch biopsy:   Portion of severely atypical melanocytic dermal proliferation with prominent melanosis consistent with melanoma. See note.     11/22/2022 Initial Diagnosis    Metastatic melanoma (HCC)     12/14/2022 -  Chemotherapy    alteplase (CATHFLO), 2 mg, Intracatheter, Every 2 hour PRN, 20 of 27  cycles  NIVOLUMAB-RELATLIMAB-RMBW (OPDUALAG) IVPB, 480 mg of nivolumab, Intravenous, Once, 20 of 27 cycles  Administration: 480 mg of nivolumab (12/14/2022), 480 mg of nivolumab (1/11/2023), 480 mg of nivolumab (2/8/2023), 480 mg of nivolumab (3/8/2023), 480 mg of nivolumab (4/5/2023), 480 mg of nivolumab (5/3/2023), 480 mg of nivolumab (5/31/2023), 480 mg of nivolumab (6/28/2023), 480 mg of nivolumab (7/26/2023), 480 mg of nivolumab (8/23/2023), 480 mg of nivolumab (9/20/2023), 480 mg of nivolumab (10/18/2023), 480 mg of nivolumab (11/15/2023), 480 mg of nivolumab (12/13/2023), 480 mg of nivolumab (1/10/2024), 480 mg of nivolumab (2/7/2024), 480 mg of nivolumab (3/6/2024), 480 mg of nivolumab (4/3/2024), 480 mg of nivolumab (5/1/2024), 480 mg of nivolumab (5/29/2024)     1/5/2023 Genomic Testing    Foundation One liquid testing  TMB 1 Mut/Mb  MSI-high not detected          Cancer Staging   Metastatic melanoma (HCC)  Staging form: Melanoma of the Skin, AJCC 8th Edition  - Clinical stage from 10/13/2022: Stage IV (cTX, cNX, cM1a) - Signed by Rafaela Reyes MD on 11/22/2022     Treatment Details   Treatment goal Curative   Plan Name OP Nivolumab and Relatlimab-rmbw Q 28 Days   Status Active   Start Date 12/14/2022   End Date 12/11/2024 (Planned)   Provider Rafaela Reyes MD   Chemotherapy alteplase (CATHFLO), 2 mg, Intracatheter, Every 2 hour PRN, 20 of 27 cycles    NIVOLUMAB-RELATLIMAB-RMBW (OPDUALAG) IVPB, 480 mg of nivolumab, Intravenous, Once, 20 of 27 cycles  Administration: 480 mg of nivolumab (12/14/2022), 480 mg of nivolumab (1/11/2023), 480 mg of nivolumab (2/8/2023), 480 mg of nivolumab (3/8/2023), 480 mg of nivolumab (4/5/2023), 480 mg of nivolumab (5/3/2023), 480 mg of nivolumab (5/31/2023), 480 mg of nivolumab (6/28/2023), 480 mg of nivolumab (7/26/2023), 480 mg of nivolumab (8/23/2023), 480 mg of nivolumab (9/20/2023), 480 mg of nivolumab (10/18/2023), 480 mg of nivolumab (11/15/2023), 480 mg of  nivolumab (12/13/2023), 480 mg of nivolumab (1/10/2024), 480 mg of nivolumab (2/7/2024), 480 mg of nivolumab (3/6/2024), 480 mg of nivolumab (4/3/2024), 480 mg of nivolumab (5/1/2024), 480 mg of nivolumab (5/29/2024)          HISTORY OF PRESENT ILLNESS: Debbie Damon is a 73 y.o. female  who has metastatic melanoma on treatment with nivolumab plus relatlimab/Opdualag here for continued monitoring, follow-up and surveillance.    She is doing well and feels good.  Energy is good.  Eating well.  Denies any new, changing, concerning skin lesions.  Denies lymphadenopathy.  Pigment in her right lower extremity continues to lighten up.  No masses or nodularity in the right or left leg.    Denies any new or additional reviewed side effects.  Drugs headache COVID double vision, rash, itching, chest pain, shortness breath diarrhea.  No muscle weakness or fatigue.        REVIEW OF SYSTEMS:  Review of Systems   Constitutional:  Negative for appetite change, fatigue, fever and unexpected weight change.   HENT:   Negative for lump/mass, trouble swallowing and voice change.    Eyes:  Negative for icterus.   Respiratory:  Negative for cough, shortness of breath and wheezing.    Cardiovascular:  Negative for leg swelling.   Gastrointestinal:  Negative for abdominal pain, constipation, diarrhea, nausea and vomiting.   Genitourinary:  Negative for difficulty urinating and hematuria.    Musculoskeletal:  Negative for arthralgias, gait problem and myalgias.   Skin:  Negative for itching and rash.        No new, changing, or concerning lesions.   Neurological:  Negative for extremity weakness, gait problem, headaches, light-headedness and numbness.   Hematological:  Negative for adenopathy.        MEDICATIONS:    Current Outpatient Medications:   •  apixaban (Eliquis) 5 mg, Take 1 tablet (5 mg total) by mouth every 12 (twelve) hours, Disp: 60 tablet, Rfl: 5  •  famotidine (PEPCID) 10 mg tablet, Take 10 mg by mouth 2 (two) times a day,  Disp: , Rfl:   •  hydrocortisone (CORTEF) 10 mg tablet, take 1 tablet by mouth twice a day, Disp: 60 tablet, Rfl: 11  •  loratadine (CLARITIN) 10 mg tablet, Take 10 mg by mouth daily, Disp: , Rfl:   •  losartan (Cozaar) 100 MG tablet, Take 1 tablet (100 mg total) by mouth daily, Disp: 30 tablet, Rfl: 5  •  metoprolol succinate (TOPROL-XL) 25 mg 24 hr tablet, Take 1 tablet (25 mg total) by mouth 2 (two) times a day, Disp: 60 tablet, Rfl: 5  •  Multiple Minerals-Vitamins (Citracal Plus) TABS, Take by mouth, Disp: , Rfl:   •  Multiple Vitamins-Minerals (MULTIVITAMIN WITH MINERALS) tablet, Take 1 tablet by mouth daily, Disp: , Rfl:   •  patient supplied medication, Pt takes eye drops Mura 128 - not listed in database, Disp: , Rfl:   •  Wheat Dextrin (BENEFIBER DRINK MIX PO), Take by mouth, Disp: , Rfl:   •  mupirocin (BACTROBAN) 2 % ointment, Apply topically 3 (three) times a day (Patient not taking: Reported on 4/29/2024), Disp: 15 g, Rfl: 1     ALLERGIES:  Allergies   Allergen Reactions   • Etomidate Other (See Comments)        ACTIVE PROBLEMS:  Patient Active Problem List   Diagnosis   • Acute massive pulmonary embolism (HCC)   • Venous stasis of both lower extremities   • Clostridium difficile infection   • Right ventricular systolic dysfunction without heart failure   • Essential hypertension   • Metastatic melanoma (HCC)   • High risk medication use   • Adrenal insufficiency (HCC)   • Abnormal PET scan of colon   • Mild anemia   • Anticoagulated by anticoagulation treatment   • History of colon polyps   • Diverticulosis   • Abnormal finding on GI tract imaging          PAST MEDICAL HISTORY:   Past Medical History:   Diagnosis Date   • Acute deep vein thrombosis (DVT) of popliteal vein of left lower extremity (HCC)    • Ankle wound, left, initial encounter     Resolved 6/2017   • Ankle wound, right, initial encounter     resolved 6/2017   • DVT (deep vein thrombosis) in pregnancy    • Hypertension    • Pulmonary  "emboli (HCC)      post C section   • Pulmonary embolism (HCC)     2017   • Pulmonary embolism, blood-clot, obstetric    • Skin cancer    • Venous stasis ulcer (HCC)    • Venous ulcers of both lower extremities (HCC)         PAST SURGICAL HISTORY:  Past Surgical History:   Procedure Laterality Date   •  SECTION      X2        SOCIAL HISTORY:  Social History     Socioeconomic History   • Marital status:      Spouse name: Not on file   • Number of children: Not on file   • Years of education: Not on file   • Highest education level: Not on file   Occupational History   • Not on file   Tobacco Use   • Smoking status: Never   • Smokeless tobacco: Never   Vaping Use   • Vaping status: Never Used   Substance and Sexual Activity   • Alcohol use: No     Comment: hx of social drinker   • Drug use: No   • Sexual activity: Not on file   Other Topics Concern   • Not on file   Social History Narrative   • Not on file     Social Determinants of Health     Financial Resource Strain: Not on file   Food Insecurity: Not on file   Transportation Needs: Not on file   Physical Activity: Not on file   Stress: Not on file   Social Connections: Not on file   Intimate Partner Violence: Not on file   Housing Stability: Not on file        FAMILY HISTORY:  Family History   Problem Relation Age of Onset   • Heart attack Mother    • Stroke Mother    • Cancer Father    • Bone cancer Family    • Hypertension Family    • Lung cancer Family            Objective    PHYSICAL EXAMINATION:   Blood pressure 128/76, pulse 94, temperature 97.8 °F (36.6 °C), temperature source Temporal, resp. rate 18, height 5' 9\" (1.753 m), weight 89.8 kg (198 lb), SpO2 98%.     Pain Score: 0-No pain     ECOG Performance Status      Flowsheet Row Most Recent Value   ECOG Performance Status 0 - Fully active, able to carry on all pre-disease performance without restriction               Physical Exam  Constitutional:       General: She is not in acute " distress.     Appearance: Normal appearance. She is not ill-appearing or toxic-appearing.   HENT:      Head: Normocephalic and atraumatic.      Right Ear: External ear normal.      Left Ear: External ear normal.      Nose: Nose normal.      Mouth/Throat:      Mouth: Mucous membranes are moist.      Pharynx: Oropharynx is clear.   Eyes:      General: No scleral icterus.        Right eye: No discharge.         Left eye: No discharge.      Conjunctiva/sclera: Conjunctivae normal.   Cardiovascular:      Rate and Rhythm: Normal rate and regular rhythm.      Pulses: Normal pulses.      Heart sounds: No murmur heard.     No friction rub. No gallop.   Pulmonary:      Effort: Pulmonary effort is normal. No respiratory distress.      Breath sounds: Normal breath sounds. No wheezing or rales.   Abdominal:      General: Bowel sounds are normal. There is no distension.      Palpations: There is no mass.      Tenderness: There is no abdominal tenderness. There is no rebound.   Musculoskeletal:         General: No swelling or tenderness.      Cervical back: Normal range of motion. No rigidity.      Right lower leg: No edema.      Left lower leg: No edema.   Lymphadenopathy:      Head:      Right side of head: No submandibular, preauricular or posterior auricular adenopathy.      Left side of head: No submandibular, preauricular or posterior auricular adenopathy.      Cervical: No cervical adenopathy.      Right cervical: No superficial or posterior cervical adenopathy.     Left cervical: No superficial or posterior cervical adenopathy.      Upper Body:      Right upper body: No supraclavicular or axillary adenopathy.      Left upper body: No supraclavicular or axillary adenopathy.   Skin:     General: Skin is warm.      Coloration: Skin is not jaundiced.      Findings: No lesion or rash.      Comments: Well healed surgical scar.  No evidence of recurrence at primary site.   Neurological:      General: No focal deficit present.       Mental Status: She is alert and oriented to person, place, and time.      Cranial Nerves: No cranial nerve deficit.      Motor: No weakness.      Gait: Gait normal.   Psychiatric:         Mood and Affect: Mood normal.         Behavior: Behavior normal.         Thought Content: Thought content normal.         Judgment: Judgment normal.         I reviewed lab data in the chart.    WBC   Date Value Ref Range Status   06/22/2024 5.52 4.31 - 10.16 Thousand/uL Final   05/25/2024 5.06 4.31 - 10.16 Thousand/uL Final   04/27/2024 5.60 4.31 - 10.16 Thousand/uL Final     Hemoglobin   Date Value Ref Range Status   06/22/2024 13.7 11.5 - 15.4 g/dL Final   05/25/2024 12.5 11.5 - 15.4 g/dL Final   04/27/2024 13.4 11.5 - 15.4 g/dL Final     Platelets   Date Value Ref Range Status   06/22/2024 182 149 - 390 Thousands/uL Final   05/25/2024 171 149 - 390 Thousands/uL Final   04/27/2024 185 149 - 390 Thousands/uL Final     MCV   Date Value Ref Range Status   06/22/2024 92 82 - 98 fL Final   05/25/2024 91 82 - 98 fL Final   04/27/2024 91 82 - 98 fL Final      Potassium   Date Value Ref Range Status   06/22/2024 4.3 3.5 - 5.3 mmol/L Final   05/25/2024 4.1 3.5 - 5.3 mmol/L Final   04/27/2024 4.0 3.5 - 5.3 mmol/L Final   05/06/2019 3.8 3.5 - 5.2 mmol/L Final   05/05/2019 4.1 3.5 - 5.2 mmol/L Final   05/04/2019 3.9 3.5 - 5.2 mmol/L Final     Chloride   Date Value Ref Range Status   06/22/2024 104 96 - 108 mmol/L Final   05/25/2024 103 96 - 108 mmol/L Final   04/27/2024 103 96 - 108 mmol/L Final   05/06/2019 104 100 - 109 mmol/L Final   05/05/2019 101 100 - 109 mmol/L Final   05/04/2019 104 100 - 109 mmol/L Final     Carbon Dioxide   Date Value Ref Range Status   05/06/2019 31 23 - 31 mmol/L Final   05/05/2019 28 23 - 31 mmol/L Final   05/04/2019 26 23 - 31 mmol/L Final     CO2   Date Value Ref Range Status   06/22/2024 27 21 - 32 mmol/L Final   05/25/2024 30 21 - 32 mmol/L Final   04/27/2024 31 21 - 32 mmol/L Final     BUN   Date Value Ref  Range Status   06/22/2024 25 5 - 25 mg/dL Final   05/25/2024 22 5 - 25 mg/dL Final   04/27/2024 19 5 - 25 mg/dL Final   05/06/2019 15 7 - 25 mg/dL Final   05/05/2019 11 7 - 25 mg/dL Final   05/04/2019 11 7 - 25 mg/dL Final     Creatinine   Date Value Ref Range Status   06/22/2024 0.89 0.60 - 1.30 mg/dL Final     Comment:     Standardized to IDMS reference method   05/25/2024 0.86 0.60 - 1.30 mg/dL Final     Comment:     Standardized to IDMS reference method   04/27/2024 0.85 0.60 - 1.30 mg/dL Final     Comment:     Standardized to IDMS reference method   05/06/2019 0.60 0.40 - 1.10 mg/dL Final   05/05/2019 0.53 0.40 - 1.10 mg/dL Final   05/04/2019 0.52 0.40 - 1.10 mg/dL Final     Glucose   Date Value Ref Range Status   12/11/2023 88 65 - 140 mg/dL Final     Comment:     If the patient is fasting, the ADA then defines impaired fasting glucose as > 100 mg/dL and diabetes as > or equal to 123 mg/dL.   11/17/2022 85 65 - 140 mg/dL Final     Comment:     If the patient is fasting, the ADA then defines impaired fasting glucose as > 100 mg/dL and diabetes as > or equal to 123 mg/dL.  Specimen collection should occur prior to Sulfasalazine administration due to the potential for falsely depressed results. Specimen collection should occur prior to Sulfapyridine administration due to the potential for falsely elevated results.   08/03/2020 112 (H) 65 - 99 mg/dL Final     Comment:     If the patient is fasting, the ADA then defines impaired fasting glucose as > 100 mg/dL and diabetes as > or equal to 123 mg/dL.  Specimen collection should occur prior to Sulfasalazine administration due to the potential for falsely depressed results. Specimen collection should occur prior to Sulfapyridine administration due to the potential for falsely elevated results.   05/06/2019 104 (H) 65 - 99 mg/dL Final   05/05/2019 109 (H) 65 - 99 mg/dL Final   05/04/2019 124 (H) 65 - 99 mg/dL Final     Calcium   Date Value Ref Range Status   06/22/2024  9.2 8.4 - 10.2 mg/dL Final   05/25/2024 8.9 8.4 - 10.2 mg/dL Final   04/27/2024 9.2 8.4 - 10.2 mg/dL Final   05/06/2019 8.5 8.5 - 10.1 mg/dL Final   05/05/2019 8.3 (L) 8.5 - 10.1 mg/dL Final   05/04/2019 8.1 (L) 8.5 - 10.1 mg/dL Final     Albumin   Date Value Ref Range Status   06/22/2024 4.0 3.5 - 5.0 g/dL Final   05/25/2024 3.8 3.5 - 5.0 g/dL Final   04/27/2024 4.1 3.5 - 5.0 g/dL Final     ALBUMIN   Date Value Ref Range Status   05/03/2019 3.5 3.5 - 4.8 g/dL Final     Total Bilirubin   Date Value Ref Range Status   06/22/2024 0.55 0.20 - 1.00 mg/dL Final     Comment:     Use of this assay is not recommended for patients undergoing treatment with eltrombopag due to the potential for falsely elevated results.  N-acetyl-p-benzoquinone imine (metabolite of Acetaminophen) will generate erroneously low results in samples for patients that have taken an overdose of Acetaminophen.   05/25/2024 0.57 0.20 - 1.00 mg/dL Final     Comment:     Use of this assay is not recommended for patients undergoing treatment with eltrombopag due to the potential for falsely elevated results.  N-acetyl-p-benzoquinone imine (metabolite of Acetaminophen) will generate erroneously low results in samples for patients that have taken an overdose of Acetaminophen.   04/27/2024 0.60 0.20 - 1.00 mg/dL Final     Comment:     Use of this assay is not recommended for patients undergoing treatment with eltrombopag due to the potential for falsely elevated results.  N-acetyl-p-benzoquinone imine (metabolite of Acetaminophen) will generate erroneously low results in samples for patients that have taken an overdose of Acetaminophen.   05/03/2019 0.5 0.2 - 1.0 mg/dL Final     Alkaline Phosphatase   Date Value Ref Range Status   06/22/2024 46 34 - 104 U/L Final   05/25/2024 42 34 - 104 U/L Final   04/27/2024 47 34 - 104 U/L Final   05/03/2019 54 35 - 120 U/L Final     AST   Date Value Ref Range Status   06/22/2024 15 13 - 39 U/L Final   05/25/2024 17 13 -  "39 U/L Final   04/27/2024 17 13 - 39 U/L Final   05/03/2019 26 <41 U/L Final     ALT   Date Value Ref Range Status   06/22/2024 13 7 - 52 U/L Final     Comment:     Specimen collection should occur prior to Sulfasalazine administration due to the potential for falsely depressed results.    05/25/2024 12 7 - 52 U/L Final     Comment:     Specimen collection should occur prior to Sulfasalazine administration due to the potential for falsely depressed results.    04/27/2024 12 7 - 52 U/L Final     Comment:     Specimen collection should occur prior to Sulfasalazine administration due to the potential for falsely depressed results.    05/03/2019 25 <56 U/L Final      LD   Date Value Ref Range Status   06/22/2024 165 140 - 271 U/L Final   05/25/2024 206 140 - 271 U/L Final   04/27/2024 173 140 - 271 U/L Final     No results found for: \"TSH\"  No results found for: \"T7SRYIN\"   Free T4   Date Value Ref Range Status   06/22/2024 1.00 0.61 - 1.12 ng/dL Final     Comment:     Specimens with biotin concentrations > 10 ng/mL can lead to significant (> 10%) positive bias in result.   05/25/2024 0.99 0.61 - 1.12 ng/dL Final     Comment:     Specimens with biotin concentrations > 10 ng/mL can lead to significant (> 10%) positive bias in result.   04/27/2024 0.96 0.61 - 1.12 ng/dL Final     Comment:     Specimens with biotin concentrations > 10 ng/mL can lead to significant (> 10%) positive bias in result.         RECENT IMAGING:  No results found.          Assessment/Plan  Ms. Damon is a 73-year-old female with metastatic melanoma on treatment with nivolumab plus relatlimab here for continued monitoring, follow-up and surveillance.    1. Metastatic melanoma (HCC)  She is doing well and tolerating treatment with nivolumab plus relatlimab.  Labs reviewed and okay.  She is okay to proceed with her next cycle of nivolumab plus relatlimab.  She knows to continue to monitor for immune mediated side effects.  She knows to call with " issues or concerns prior to her next visit.  She has standing orders for blood work prior to each treatment.  We will continue treatment for 2 years and then plan to stop.      2. High risk medication use  3. Adrenal insufficiency (HCC)  The patient is on treatment with immunotherapy and we will continue to monitor for side effects and lab abnormalities. We will monitor labs with each treatment to ensure safety of continuing on treatment. The patient knows to watch for signs and symptoms for immune mediated side effects.     Energy is good and continues to hydrocortisone 10 mg twice daily.      Return in about 4 weeks (around 7/22/2024) for Office Visit, Infusion - See Treatment Plan, labs.     Rafaela Reyes MD, PhD

## 2024-06-25 NOTE — PROGRESS NOTES
[facility]ATOLOG[facility] [place]  [address]  [place] [address]  [phone]  [phone]     Date of Visit: 6/24/2024  Name: Debbie Damon   YOB: 1950        Subjective    VISIT DIAGNOSIS:  Diagnoses and all orders for this visit:    Metastatic melanoma (HCC)    High risk medication use    Adrenal insufficiency (HCC)        Oncology History   Metastatic melanoma (HCC)   10/13/2022 -  Cancer Staged    Staging form: Melanoma of the Skin, AJCC 8th Edition  - Clinical stage from 10/13/2022: Stage IV (cTX, cNX, cM1a) - Signed by Rafaela Reyes MD on 11/22/2022       10/13/2022 Biopsy    A. Skin, right lateral medial leg, punch biopsy:  Portion of severely atypical melanocytic dermal proliferation with prominent melanosis consistent with melanoma. See note.        B. Skin, right medial leg. punch biopsy:  Portion of severely atypical melanocytic dermal proliferation with prominent melanosis consistent with melanoma. See note.        C. Skin, right medial leg, punch biopsy:   Portion of severely atypical melanocytic dermal proliferation with prominent melanosis consistent with melanoma. See note.     11/22/2022 Initial Diagnosis    Metastatic melanoma (HCC)     12/14/2022 -  Chemotherapy    alteplase (CATHFLO), 2 mg, Intracatheter, Every 2 hour PRN, 20 of 27 cycles  NIVOLUMAB-RELATLIMAB-RMBW (OPDUALAG) IVPB, 480 mg of nivolumab, Intravenous, Once, 20 of 27 cycles  Administration: 480 mg of nivolumab (12/14/2022), 480 mg of nivolumab (1/11/2023), 480 mg of nivolumab (2/8/2023), 480 mg of nivolumab (3/8/2023), 480 mg of nivolumab (4/5/2023), 480 mg of nivolumab (5/3/2023), 480 mg of nivolumab (5/31/2023), 480 mg of nivolumab (6/28/2023), 480 mg of nivolumab (7/26/2023), 480 mg of nivolumab (8/23/2023), 480 mg of nivolumab (9/20/2023), 480 mg of nivolumab (10/18/2023), 480 mg of nivolumab (11/15/2023), 480 mg of nivolumab (12/13/2023), 480 mg of nivolumab (1/10/2024), 480 mg of  nivolumab (2/7/2024), 480 mg of nivolumab (3/6/2024), 480 mg of nivolumab (4/3/2024), 480 mg of nivolumab (5/1/2024), 480 mg of nivolumab (5/29/2024)     1/5/2023 Genomic Testing    [employer] One liquid testing  TMB 1 Mut/Mb  MSI-high not detected          Cancer Staging   Metastatic melanoma (HCC)  Staging form: Melanoma of the Skin, AJCC 8th Edition  - Clinical stage from 10/13/2022: Stage IV (cTX, cNX, cM1a) - Signed by Rafaela Reyes MD on 11/22/2022     Treatment Details   Treatment goal Curative   Plan Name OP Nivolumab and Relatlimab-rmbw Q 28 Days   Status Active   Start Date 12/14/2022   End Date 12/11/2024 (Planned)   Provider Rafaela Reyes MD   Chemotherapy alteplase (CATHFLO), 2 mg, Intracatheter, Every 2 hour PRN, 20 of 27 cycles    NIVOLUMAB-RELATLIMAB-RMBW (OPDUALAG) IVPB, 480 mg of nivolumab, Intravenous, Once, 20 of 27 cycles  Administration: 480 mg of nivolumab (12/14/2022), 480 mg of nivolumab (1/11/2023), 480 mg of nivolumab (2/8/2023), 480 mg of nivolumab (3/8/2023), 480 mg of nivolumab (4/5/2023), 480 mg of nivolumab (5/3/2023), 480 mg of nivolumab (5/31/2023), 480 mg of nivolumab (6/28/2023), 480 mg of nivolumab (7/26/2023), 480 mg of nivolumab (8/23/2023), 480 mg of nivolumab (9/20/2023), 480 mg of nivolumab (10/18/2023), 480 mg of nivolumab (11/15/2023), 480 mg of nivolumab (12/13/2023), 480 mg of nivolumab (1/10/2024), 480 mg of nivolumab (2/7/2024), 480 mg of nivolumab (3/6/2024), 480 mg of nivolumab (4/3/2024), 480 mg of nivolumab (5/1/2024), 480 mg of nivolumab (5/29/2024)          HISTORY OF PRESENT ILLNESS: Debbie Damon is a 73 y.o. female  who has metastatic melanoma on treatment with nivolumab plus relatlimab/Opdualag here for continued monitoring, follow-up and surveillance.    She is doing well and feels good.  Energy is good.  Eating well.  Denies any new, changing, concerning skin lesions.  Denies lymphadenopathy.  Pigment in her right lower extremity continues to lighten  up.  No masses or nodularity in the right or left leg.    Denies any new or additional reviewed side effects.  Drugs headache COVID double vision, rash, itching, chest pain, shortness breath diarrhea.  No muscle weakness or fatigue.        REVIEW OF SYSTEMS:  Review of Systems   Constitutional:  Negative for appetite change, fatigue, fever and unexpected weight change.   HENT:   Negative for lump/mass, trouble swallowing and voice change.    Eyes:  Negative for icterus.   Respiratory:  Negative for cough, shortness of breath and wheezing.    Cardiovascular:  Negative for leg swelling.   Gastrointestinal:  Negative for abdominal pain, constipation, diarrhea, nausea and vomiting.   Genitourinary:  Negative for difficulty urinating and hematuria.    Musculoskeletal:  Negative for arthralgias, gait problem and myalgias.   Skin:  Negative for itching and rash.        No new, changing, or concerning lesions.   Neurological:  Negative for extremity weakness, gait problem, headaches, light-headedness and numbness.   Hematological:  Negative for adenopathy.        MEDICATIONS:    Current Outpatient Medications:     apixaban (Eliquis) 5 mg, Take 1 tablet (5 mg total) by mouth every 12 (twelve) hours, Disp: 60 tablet, Rfl: 5    famotidine (PEPCID) 10 mg tablet, Take 10 mg by mouth 2 (two) times a day, Disp: , Rfl:     hydrocortisone (CORTEF) 10 mg tablet, take 1 tablet by mouth twice a day, Disp: 60 tablet, Rfl: 11    loratadine (CLARITIN) 10 mg tablet, Take 10 mg by mouth daily, Disp: , Rfl:     losartan (Cozaar) 100 MG tablet, Take 1 tablet (100 mg total) by mouth daily, Disp: 30 tablet, Rfl: 5    metoprolol succinate (TOPROL-XL) 25 mg 24 hr tablet, Take 1 tablet (25 mg total) by mouth 2 (two) times a day, Disp: 60 tablet, Rfl: 5    Multiple Minerals-Vitamins (Citracal Plus) TABS, Take by mouth, Disp: , Rfl:     Multiple Vitamins-Minerals (MULTIVITAMIN WITH MINERALS) tablet, Take 1 tablet by mouth daily, Disp: , Rfl:      patient supplied medication, Pt takes eye drops Laila 128 - not listed in database, Disp: , Rfl:     Wheat Dextrin (BENEFIBER DRINK MIX PO), Take by mouth, Disp: , Rfl:     mupirocin (BACTROBAN) 2 % ointment, Apply topically 3 (three) times a day (Patient not taking: Reported on 2024), Disp: 15 g, Rfl: 1     ALLERGIES:  Allergies   Allergen Reactions    Etomidate Other (See Comments)        ACTIVE PROBLEMS:  Patient Active Problem List   Diagnosis    Acute massive pulmonary embolism (HCC)    Venous stasis of both lower extremities    Clostridium difficile infection    Right ventricular systolic dysfunction without heart failure    Essential hypertension    Metastatic melanoma (HCC)    High risk medication use    Adrenal insufficiency (HCC)    Abnormal PET scan of colon    Mild anemia    Anticoagulated by anticoagulation treatment    History of colon polyps    Diverticulosis    Abnormal finding on GI tract imaging          PAST MEDICAL HISTORY:   Past Medical History:   Diagnosis Date    Acute deep vein thrombosis (DVT) of popliteal vein of left lower extremity (HCC)     Ankle wound, left, initial encounter     Resolved 2017    Ankle wound, right, initial encounter     resolved 2017    DVT (deep vein thrombosis) in pregnancy     Hypertension     Pulmonary emboli (HCC)      post C section    Pulmonary embolism (HCC)     2017    Pulmonary embolism, blood-clot, obstetric     Skin cancer     Venous stasis ulcer (HCC)     Venous ulcers of both lower extremities (HCC)         PAST SURGICAL HISTORY:  Past Surgical History:   Procedure Laterality Date     SECTION      X2        SOCIAL HISTORY:  Social History     Socioeconomic History    Marital status:      Spouse name: Not on file    Number of children: Not on file    Years of education: Not on file    Highest education level: Not on file   Occupational History    Not on file   Tobacco Use    Smoking status: Never    Smokeless tobacco: Never  "  Vaping Use    Vaping status: Never Used   Substance and Sexual Activity    Alcohol use: No     Comment: hx of social drinker    Drug use: No    Sexual activity: Not on file   Other Topics Concern    Not on file   Social History Narrative    Not on file     Social Determinants of Health     Financial Resource Strain: Not on file   Food Insecurity: Not on file   Transportation Needs: Not on file   Physical Activity: Not on file   Stress: Not on file   Social Connections: Not on file   Intimate Partner Violence: Not on file   Housing Stability: Not on file        FAMILY HISTORY:  Family History   Problem Relation Age of Onset    Heart attack Mother     Stroke Mother     Cancer Father     Bone cancer Family     Hypertension Family     Lung cancer Family            Objective    PHYSICAL EXAMINATION:   Blood pressure 128/76, pulse 94, temperature 97.8 °F (36.6 °C), temperature source Temporal, resp. rate 18, height 5' 9\" (1.753 m), weight 89.8 kg (198 lb), SpO2 98%.     Pain Score: 0-No pain     ECOG Performance Status      Flowsheet Row Most Recent Value   ECOG Performance Status 0 - Fully active, able to carry on all pre-disease performance without restriction               Physical Exam  Constitutional:       General: She is not in acute distress.     Appearance: Normal appearance. She is not ill-appearing or toxic-appearing.   HENT:      Head: Normocephalic and atraumatic.      Right Ear: External ear normal.      Left Ear: External ear normal.      Nose: Nose normal.      Mouth/Throat:      Mouth: Mucous membranes are moist.      Pharynx: Oropharynx is clear.   Eyes:      General: No scleral icterus.        Right eye: No discharge.         Left eye: No discharge.      Conjunctiva/sclera: Conjunctivae normal.   Cardiovascular:      Rate and Rhythm: Normal rate and regular rhythm.      Pulses: Normal pulses.      Heart sounds: No murmur heard.     No friction rub. No gallop.   Pulmonary:      Effort: Pulmonary effort " is normal. No respiratory distress.      Breath sounds: Normal breath sounds. No wheezing or rales.   Abdominal:      General: Bowel sounds are normal. There is no distension.      Palpations: There is no mass.      Tenderness: There is no abdominal tenderness. There is no rebound.   Musculoskeletal:         General: No swelling or tenderness.      Cervical back: Normal range of motion. No rigidity.      Right lower leg: No edema.      Left lower leg: No edema.   Lymphadenopathy:      Head:      Right side of head: No submandibular, preauricular or posterior auricular adenopathy.      Left side of head: No submandibular, preauricular or posterior auricular adenopathy.      Cervical: No cervical adenopathy.      Right cervical: No superficial or posterior cervical adenopathy.     Left cervical: No superficial or posterior cervical adenopathy.      Upper Body:      Right upper body: No supraclavicular or axillary adenopathy.      Left upper body: No supraclavicular or axillary adenopathy.   Skin:     General: Skin is warm.      Coloration: Skin is not jaundiced.      Findings: No lesion or rash.      Comments: Well healed surgical scar.  No evidence of recurrence at primary site.   Neurological:      General: No focal deficit present.      Mental Status: She is alert and oriented to person, place, and time.      Cranial Nerves: No cranial nerve deficit.      Motor: No weakness.      Gait: Gait normal.   Psychiatric:         Mood and Affect: Mood normal.         Behavior: Behavior normal.         Thought Content: Thought content normal.         Judgment: Judgment normal.         I reviewed lab data in the chart.    WBC   Date Value Ref Range Status   06/22/2024 5.52 4.31 - 10.16 Thousand/uL Final   05/25/2024 5.06 4.31 - 10.16 Thousand/uL Final   04/27/2024 5.60 4.31 - 10.16 Thousand/uL Final     Hemoglobin   Date Value Ref Range Status   06/22/2024 13.7 11.5 - 15.4 g/dL Final   05/25/2024 12.5 11.5 - 15.4 g/dL Final    04/27/2024 13.4 11.5 - 15.4 g/dL Final     Platelets   Date Value Ref Range Status   06/22/2024 182 149 - 390 Thousands/uL Final   05/25/2024 171 149 - 390 Thousands/uL Final   04/27/2024 185 149 - 390 Thousands/uL Final     MCV   Date Value Ref Range Status   06/22/2024 92 82 - 98 fL Final   05/25/2024 91 82 - 98 fL Final   04/27/2024 91 82 - 98 fL Final      Potassium   Date Value Ref Range Status   06/22/2024 4.3 3.5 - 5.3 mmol/L Final   05/25/2024 4.1 3.5 - 5.3 mmol/L Final   04/27/2024 4.0 3.5 - 5.3 mmol/L Final   05/06/2019 3.8 3.5 - 5.2 mmol/L Final   05/05/2019 4.1 3.5 - 5.2 mmol/L Final   05/04/2019 3.9 3.5 - 5.2 mmol/L Final     Chloride   Date Value Ref Range Status   06/22/2024 104 96 - 108 mmol/L Final   05/25/2024 103 96 - 108 mmol/L Final   04/27/2024 103 96 - 108 mmol/L Final   05/06/2019 104 100 - 109 mmol/L Final   05/05/2019 101 100 - 109 mmol/L Final   05/04/2019 104 100 - 109 mmol/L Final     Carbon Dioxide   Date Value Ref Range Status   05/06/2019 31 23 - 31 mmol/L Final   05/05/2019 28 23 - 31 mmol/L Final   05/04/2019 26 23 - 31 mmol/L Final     CO2   Date Value Ref Range Status   06/22/2024 27 21 - 32 mmol/L Final   05/25/2024 30 21 - 32 mmol/L Final   04/27/2024 31 21 - 32 mmol/L Final     BUN   Date Value Ref Range Status   06/22/2024 25 5 - 25 mg/dL Final   05/25/2024 22 5 - 25 mg/dL Final   04/27/2024 19 5 - 25 mg/dL Final   05/06/2019 15 7 - 25 mg/dL Final   05/05/2019 11 7 - 25 mg/dL Final   05/04/2019 11 7 - 25 mg/dL Final     Creatinine   Date Value Ref Range Status   06/22/2024 0.89 0.60 - 1.30 mg/dL Final     Comment:     Standardized to IDMS reference method   05/25/2024 0.86 0.60 - 1.30 mg/dL Final     Comment:     Standardized to IDMS reference method   04/27/2024 0.85 0.60 - 1.30 mg/dL Final     Comment:     Standardized to IDMS reference method   05/06/2019 0.60 0.40 - 1.10 mg/dL Final   05/05/2019 0.53 0.40 - 1.10 mg/dL Final   05/04/2019 0.52 0.40 - 1.10 mg/dL Final      Glucose   Date Value Ref Range Status   12/11/2023 88 65 - 140 mg/dL Final     Comment:     If the patient is fasting, the ADA then defines impaired fasting glucose as > 100 mg/dL and diabetes as > or equal to 123 mg/dL.   11/17/2022 85 65 - 140 mg/dL Final     Comment:     If the patient is fasting, the ADA then defines impaired fasting glucose as > 100 mg/dL and diabetes as > or equal to 123 mg/dL.  Specimen collection should occur prior to Sulfasalazine administration due to the potential for falsely depressed results. Specimen collection should occur prior to Sulfapyridine administration due to the potential for falsely elevated results.   08/03/2020 112 (H) 65 - 99 mg/dL Final     Comment:     If the patient is fasting, the ADA then defines impaired fasting glucose as > 100 mg/dL and diabetes as > or equal to 123 mg/dL.  Specimen collection should occur prior to Sulfasalazine administration due to the potential for falsely depressed results. Specimen collection should occur prior to Sulfapyridine administration due to the potential for falsely elevated results.   05/06/2019 104 (H) 65 - 99 mg/dL Final   05/05/2019 109 (H) 65 - 99 mg/dL Final   05/04/2019 124 (H) 65 - 99 mg/dL Final     Calcium   Date Value Ref Range Status   06/22/2024 9.2 8.4 - 10.2 mg/dL Final   05/25/2024 8.9 8.4 - 10.2 mg/dL Final   04/27/2024 9.2 8.4 - 10.2 mg/dL Final   05/06/2019 8.5 8.5 - 10.1 mg/dL Final   05/05/2019 8.3 (L) 8.5 - 10.1 mg/dL Final   05/04/2019 8.1 (L) 8.5 - 10.1 mg/dL Final     Albumin   Date Value Ref Range Status   06/22/2024 4.0 3.5 - 5.0 g/dL Final   05/25/2024 3.8 3.5 - 5.0 g/dL Final   04/27/2024 4.1 3.5 - 5.0 g/dL Final     ALBUMIN   Date Value Ref Range Status   05/03/2019 3.5 3.5 - 4.8 g/dL Final     Total Bilirubin   Date Value Ref Range Status   06/22/2024 0.55 0.20 - 1.00 mg/dL Final     Comment:     Use of this assay is not recommended for patients undergoing treatment with eltrombopag due to the  potential for falsely elevated results.  N-acetyl-p-benzoquinone imine (metabolite of Acetaminophen) will generate erroneously low results in samples for patients that have taken an overdose of Acetaminophen.   05/25/2024 0.57 0.20 - 1.00 mg/dL Final     Comment:     Use of this assay is not recommended for patients undergoing treatment with eltrombopag due to the potential for falsely elevated results.  N-acetyl-p-benzoquinone imine (metabolite of Acetaminophen) will generate erroneously low results in samples for patients that have taken an overdose of Acetaminophen.   04/27/2024 0.60 0.20 - 1.00 mg/dL Final     Comment:     Use of this assay is not recommended for patients undergoing treatment with eltrombopag due to the potential for falsely elevated results.  N-acetyl-p-benzoquinone imine (metabolite of Acetaminophen) will generate erroneously low results in samples for patients that have taken an overdose of Acetaminophen.   05/03/2019 0.5 0.2 - 1.0 mg/dL Final     Alkaline Phosphatase   Date Value Ref Range Status   06/22/2024 46 34 - 104 U/L Final   05/25/2024 42 34 - 104 U/L Final   04/27/2024 47 34 - 104 U/L Final   05/03/2019 54 35 - 120 U/L Final     AST   Date Value Ref Range Status   06/22/2024 15 13 - 39 U/L Final   05/25/2024 17 13 - 39 U/L Final   04/27/2024 17 13 - 39 U/L Final   05/03/2019 26 <41 U/L Final     ALT   Date Value Ref Range Status   06/22/2024 13 7 - 52 U/L Final     Comment:     Specimen collection should occur prior to Sulfasalazine administration due to the potential for falsely depressed results.    05/25/2024 12 7 - 52 U/L Final     Comment:     Specimen collection should occur prior to Sulfasalazine administration due to the potential for falsely depressed results.    04/27/2024 12 7 - 52 U/L Final     Comment:     Specimen collection should occur prior to Sulfasalazine administration due to the potential for falsely depressed results.    05/03/2019 25 <56 U/L Final      LD  "  Date Value Ref Range Status   06/22/2024 165 140 - 271 U/L Final   05/25/2024 206 140 - 271 U/L Final   04/27/2024 173 140 - 271 U/L Final     No results found for: \"TSH\"  No results found for: \"X3QMGNR\"   Free T4   Date Value Ref Range Status   06/22/2024 1.00 0.61 - 1.12 ng/dL Final     Comment:     Specimens with biotin concentrations > 10 ng/mL can lead to significant (> 10%) positive bias in result.   05/25/2024 0.99 0.61 - 1.12 ng/dL Final     Comment:     Specimens with biotin concentrations > 10 ng/mL can lead to significant (> 10%) positive bias in result.   04/27/2024 0.96 0.61 - 1.12 ng/dL Final     Comment:     Specimens with biotin concentrations > 10 ng/mL can lead to significant (> 10%) positive bias in result.         RECENT IMAGING:  No results found.          Assessment/Plan  Ms. Damon is a 73-year-old female with metastatic melanoma on treatment with nivolumab plus relatlimab here for continued monitoring, follow-up and surveillance.    1. Metastatic melanoma (HCC)  She is doing well and tolerating treatment with nivolumab plus relatlimab.  Labs reviewed and okay.  She is okay to proceed with her next cycle of nivolumab plus relatlimab.  She knows to continue to monitor for immune mediated side effects.  She knows to call with issues or concerns prior to her next visit.  She has standing orders for blood work prior to each treatment.  We will continue treatment for 2 years and then plan to stop.      2. High risk medication use  3. Adrenal insufficiency (HCC)  The patient is on treatment with immunotherapy and we will continue to monitor for side effects and lab abnormalities. We will monitor labs with each treatment to ensure safety of continuing on treatment. The patient knows to watch for signs and symptoms for immune mediated side effects.     Energy is good and continues to hydrocortisone 10 mg twice daily.      Return in about 4 weeks (around 7/22/2024) for Office Visit, Infusion - See " Treatment Plan, labs.     Rafaela Reyes MD, PhD

## 2024-06-26 ENCOUNTER — HOSPITAL ENCOUNTER (OUTPATIENT)
Dept: INFUSION CENTER | Facility: HOSPITAL | Age: 74
Discharge: HOME | End: 2024-06-26
Attending: INTERNAL MEDICINE
Payer: COMMERCIAL

## 2024-06-26 VITALS
SYSTOLIC BLOOD PRESSURE: 146 MMHG | TEMPERATURE: 97.5 F | OXYGEN SATURATION: 98 % | HEART RATE: 77 BPM | RESPIRATION RATE: 16 BRPM | DIASTOLIC BLOOD PRESSURE: 72 MMHG

## 2024-06-26 DIAGNOSIS — C43.9 METASTATIC MELANOMA (HCC): Primary | ICD-10-CM

## 2024-06-26 PROCEDURE — 96413 CHEMO IV INFUSION 1 HR: CPT

## 2024-06-26 RX ORDER — SODIUM CHLORIDE 9 MG/ML
20 INJECTION, SOLUTION INTRAVENOUS ONCE
Status: COMPLETED | OUTPATIENT
Start: 2024-06-26 | End: 2024-06-26

## 2024-06-26 RX ADMIN — SODIUM CHLORIDE 480 MG OF NIVOLUMAB: 9 INJECTION, SOLUTION INTRAVENOUS at 09:14

## 2024-06-26 RX ADMIN — SODIUM CHLORIDE 20 ML/HR: 0.9 INJECTION, SOLUTION INTRAVENOUS at 08:29

## 2024-06-26 NOTE — PROGRESS NOTES
Recent labs reviewed. Debbie Damon tolerated Keytruda treatment well with no complications.      Debbie Damon is aware of future appt on 7/24/24 at 0900.     AVS declined.

## 2024-06-26 NOTE — PLAN OF CARE
Problem: Potential for Falls  Goal: Patient will remain free of falls  Description: INTERVENTIONS:  - Educate patient/family on patient safety including physical limitations  - Instruct patient to call for assistance with activity   - Consult OT/PT to assist with strengthening/mobility   - Keep Call bell within reach  - Keep bed low and locked with side rails adjusted as appropriate  - Keep care items and personal belongings within reach  - Initiate and maintain comfort rounds  - Make Fall Risk Sign visible to staff  - Consider moving patient to room near nurses station  Outcome: Progressing     Problem: INFECTION - ADULT  Goal: Absence or prevention of progression during hospitalization  Description: INTERVENTIONS:  - Assess and monitor for signs and symptoms of infection  - Monitor lab/diagnostic results  - Monitor all insertion sites, i.e. indwelling lines, tubes, and drains  - Monitor endotracheal if appropriate and nasal secretions for changes in amount and color  - [place] appropriate cooling/warming therapies per order  - Administer medications as ordered  - Instruct and encourage patient and family to use good hand hygiene technique  - Identify and instruct in appropriate isolation precautions for identified infection/condition  Outcome: Progressing     Problem: Knowledge Deficit  Goal: Patient/family/caregiver demonstrates understanding of disease process, treatment plan, medications, and discharge instructions  Description: Complete learning assessment and assess knowledge base.  Interventions:  - Provide teaching at level of understanding  - Provide teaching via preferred learning methods  Outcome: Progressing

## 2024-07-08 ENCOUNTER — TELEPHONE (OUTPATIENT)
Dept: FAMILY MEDICINE CLINIC | Facility: CLINIC | Age: 74
End: 2024-07-08

## 2024-07-08 ENCOUNTER — CLINICAL SUPPORT (OUTPATIENT)
Dept: FAMILY MEDICINE CLINIC | Facility: CLINIC | Age: 74
End: 2024-07-08
Payer: COMMERCIAL

## 2024-07-08 DIAGNOSIS — Z11.1 SCREENING FOR TUBERCULOSIS: Primary | ICD-10-CM

## 2024-07-08 PROCEDURE — 86580 TB INTRADERMAL TEST: CPT | Performed by: FAMILY MEDICINE

## 2024-07-11 LAB
INDURATION: 0 MM
TB SKIN TEST: NEGATIVE

## 2024-07-17 RX ORDER — SODIUM CHLORIDE 9 MG/ML
20 INJECTION, SOLUTION INTRAVENOUS ONCE
Status: CANCELLED | OUTPATIENT
Start: 2024-07-24

## 2024-07-20 ENCOUNTER — APPOINTMENT (OUTPATIENT)
Dept: LAB | Facility: CLINIC | Age: 74
End: 2024-07-20
Payer: COMMERCIAL

## 2024-07-22 ENCOUNTER — TELEPHONE (OUTPATIENT)
Dept: HEMATOLOGY ONCOLOGY | Facility: CLINIC | Age: 74
End: 2024-07-22

## 2024-07-22 ENCOUNTER — OFFICE VISIT (OUTPATIENT)
Dept: HEMATOLOGY ONCOLOGY | Facility: CLINIC | Age: 74
End: 2024-07-22
Payer: COMMERCIAL

## 2024-07-22 VITALS
DIASTOLIC BLOOD PRESSURE: 84 MMHG | BODY MASS INDEX: 29.62 KG/M2 | OXYGEN SATURATION: 97 % | RESPIRATION RATE: 18 BRPM | WEIGHT: 200 LBS | TEMPERATURE: 98 F | HEART RATE: 88 BPM | HEIGHT: 69 IN | SYSTOLIC BLOOD PRESSURE: 132 MMHG

## 2024-07-22 DIAGNOSIS — Z79.899 HIGH RISK MEDICATION USE: ICD-10-CM

## 2024-07-22 DIAGNOSIS — E27.40 ADRENAL INSUFFICIENCY (HCC): ICD-10-CM

## 2024-07-22 DIAGNOSIS — C43.9 METASTATIC MELANOMA (HCC): Primary | ICD-10-CM

## 2024-07-22 PROCEDURE — 99214 OFFICE O/P EST MOD 30 MIN: CPT | Performed by: INTERNAL MEDICINE

## 2024-07-22 NOTE — PROGRESS NOTES
[facility] [place]  [address]  [place] [address]  [phone]  [phone]     Date of Visit: 7/22/2024  Name: Debbie Damon   YOB: 1950        Subjective    VISIT DIAGNOSIS:  Diagnoses and all orders for this visit:    Metastatic melanoma (HCC)  -     Infusion Calculated Appointment Request; Future  -     Infusion Calculated Appointment Request; Future  -     Infusion Calculated Appointment Request; Future  -     Infusion Calculated Appointment Request; Future    High risk medication use    Adrenal insufficiency (HCC)        Oncology History   Metastatic melanoma (HCC)   10/13/2022 -  Cancer Staged    Staging form: Melanoma of the Skin, AJCC 8th Edition  - Clinical stage from 10/13/2022: Stage IV (cTX, cNX, cM1a) - Signed by Rafaela Reyes MD on 11/22/2022       10/13/2022 Biopsy    A. Skin, right lateral medial leg, punch biopsy:  Portion of severely atypical melanocytic dermal proliferation with prominent melanosis consistent with melanoma. See note.        B. Skin, right medial leg. punch biopsy:  Portion of severely atypical melanocytic dermal proliferation with prominent melanosis consistent with melanoma. See note.        C. Skin, right medial leg, punch biopsy:   Portion of severely atypical melanocytic dermal proliferation with prominent melanosis consistent with melanoma. See note.     11/22/2022 Initial Diagnosis    Metastatic melanoma (HCC)     12/14/2022 -  Chemotherapy    alteplase (CATHFLO), 2 mg, Intracatheter, Every 2 hour PRN, 21 of 27 cycles  NIVOLUMAB-RELATLIMAB-RMBW (OPDUALAG) IVPB, 480 mg of nivolumab, Intravenous, Once, 21 of 27 cycles  Administration: 480 mg of nivolumab (12/14/2022), 480 mg of nivolumab (1/11/2023), 480 mg of nivolumab (2/8/2023), 480 mg of nivolumab (3/8/2023), 480 mg of nivolumab (4/5/2023), 480 mg of nivolumab (5/3/2023), 480 mg of nivolumab (5/31/2023), 480 mg of nivolumab (6/28/2023), 480 mg of nivolumab  (7/26/2023), 480 mg of nivolumab (8/23/2023), 480 mg of nivolumab (9/20/2023), 480 mg of nivolumab (10/18/2023), 480 mg of nivolumab (11/15/2023), 480 mg of nivolumab (12/13/2023), 480 mg of nivolumab (1/10/2024), 480 mg of nivolumab (2/7/2024), 480 mg of nivolumab (3/6/2024), 480 mg of nivolumab (4/3/2024), 480 mg of nivolumab (5/1/2024), 480 mg of nivolumab (5/29/2024), 480 mg of nivolumab (6/26/2024)     1/5/2023 Genomic Testing    [employer] One liquid testing  TMB 1 Mut/Mb  MSI-high not detected          Cancer Staging   Metastatic melanoma (HCC)  Staging form: Melanoma of the Skin, AJCC 8th Edition  - Clinical stage from 10/13/2022: Stage IV (cTX, cNX, cM1a) - Signed by Rafaela Reyes MD on 11/22/2022     Treatment Details   Treatment goal Curative   Plan Name OP Nivolumab and Relatlimab-rmbw Q 28 Days   Status Active   Start Date 12/14/2022   End Date 12/11/2024 (Planned)   Provider Rafaela Reyes MD   Chemotherapy alteplase (CATHFLO), 2 mg, Intracatheter, Every 2 hour PRN, 21 of 27 cycles    NIVOLUMAB-RELATLIMAB-RMBW (OPDUALAG) IVPB, 480 mg of nivolumab, Intravenous, Once, 21 of 27 cycles  Administration: 480 mg of nivolumab (12/14/2022), 480 mg of nivolumab (1/11/2023), 480 mg of nivolumab (2/8/2023), 480 mg of nivolumab (3/8/2023), 480 mg of nivolumab (4/5/2023), 480 mg of nivolumab (5/3/2023), 480 mg of nivolumab (5/31/2023), 480 mg of nivolumab (6/28/2023), 480 mg of nivolumab (7/26/2023), 480 mg of nivolumab (8/23/2023), 480 mg of nivolumab (9/20/2023), 480 mg of nivolumab (10/18/2023), 480 mg of nivolumab (11/15/2023), 480 mg of nivolumab (12/13/2023), 480 mg of nivolumab (1/10/2024), 480 mg of nivolumab (2/7/2024), 480 mg of nivolumab (3/6/2024), 480 mg of nivolumab (4/3/2024), 480 mg of nivolumab (5/1/2024), 480 mg of nivolumab (5/29/2024), 480 mg of nivolumab (6/26/2024)          HISTORY OF PRESENT ILLNESS: Debbie Damon is a 73 y.o. female  who has metastatic melanoma on treatment with  nivolumab plus relatlimab/Opdualag here for continued monitoring, follow-up and surveillance.    She is doing well.  Feels good.  No issues concerns or complaints today.  Denies any new, changing, concerning skin lesions denies any lymphadenopathy.    Eating well, too much as she says.    Denies any immune mediated side effects.  Denies headaches, double vision, rash, itching, chest pain, shortness of breath, diarrhea.  No muscle weakness or fatigue.  Does state that she might have some pain in the left ankle every once in a while after standing up but then it goes away.  Episodic and not related to anything.    REVIEW OF SYSTEMS:  Review of Systems   Constitutional:  Negative for appetite change, fatigue, fever and unexpected weight change.   HENT:   Negative for lump/mass, trouble swallowing and voice change.    Eyes:  Negative for icterus.   Respiratory:  Negative for cough, shortness of breath and wheezing.    Cardiovascular:  Negative for leg swelling.   Gastrointestinal:  Negative for abdominal pain, constipation, diarrhea, nausea and vomiting.   Genitourinary:  Negative for difficulty urinating and hematuria.    Musculoskeletal:  Negative for arthralgias, gait problem and myalgias.   Skin:  Negative for itching and rash.        No new, changing, or concerning lesions.   Neurological:  Negative for extremity weakness, gait problem, headaches, light-headedness and numbness.   Hematological:  Negative for adenopathy.        MEDICATIONS:    Current Outpatient Medications:     apixaban (Eliquis) 5 mg, Take 1 tablet (5 mg total) by mouth every 12 (twelve) hours, Disp: 60 tablet, Rfl: 5    famotidine (PEPCID) 10 mg tablet, Take 10 mg by mouth 2 (two) times a day, Disp: , Rfl:     hydrocortisone (CORTEF) 10 mg tablet, take 1 tablet by mouth twice a day, Disp: 60 tablet, Rfl: 11    loratadine (CLARITIN) 10 mg tablet, Take 10 mg by mouth daily, Disp: , Rfl:     losartan (Cozaar) 100 MG tablet, Take 1 tablet (100 mg  total) by mouth daily, Disp: 30 tablet, Rfl: 5    metoprolol succinate (TOPROL-XL) 25 mg 24 hr tablet, Take 1 tablet (25 mg total) by mouth 2 (two) times a day, Disp: 60 tablet, Rfl: 5    Multiple Minerals-Vitamins (Citracal Plus) TABS, Take by mouth, Disp: , Rfl:     Multiple Vitamins-Minerals (MULTIVITAMIN WITH MINERALS) tablet, Take 1 tablet by mouth daily, Disp: , Rfl:     patient supplied medication, Pt takes eye drops Mura 128 - not listed in database, Disp: , Rfl:     Wheat Dextrin (BENEFIBER DRINK MIX PO), Take by mouth, Disp: , Rfl:      ALLERGIES:  Allergies   Allergen Reactions    Etomidate Other (See Comments)        ACTIVE PROBLEMS:  Patient Active Problem List   Diagnosis    Acute massive pulmonary embolism (HCC)    Venous stasis of both lower extremities    Clostridium difficile infection    Right ventricular systolic dysfunction without heart failure    Essential hypertension    Metastatic melanoma (HCC)    High risk medication use    Adrenal insufficiency (HCC)    Abnormal PET scan of colon    Mild anemia    Anticoagulated by anticoagulation treatment    History of colon polyps    Diverticulosis    Abnormal finding on GI tract imaging          PAST MEDICAL HISTORY:   Past Medical History:   Diagnosis Date    Acute deep vein thrombosis (DVT) of popliteal vein of left lower extremity (HCC)     Ankle wound, left, initial encounter     Resolved 2017    Ankle wound, right, initial encounter     resolved 2017    DVT (deep vein thrombosis) in pregnancy     Hypertension     Pulmonary emboli (HCC)      post C section    Pulmonary embolism (HCC)     2017    Pulmonary embolism, blood-clot, obstetric     Skin cancer     Venous stasis ulcer (HCC)     Venous ulcers of both lower extremities (HCC)         PAST SURGICAL HISTORY:  Past Surgical History:   Procedure Laterality Date     SECTION      X2        SOCIAL HISTORY:  Social History     Socioeconomic History    Marital status:       "Spouse name: Not on file    Number of children: Not on file    Years of education: Not on file    Highest education level: Not on file   Occupational History    Not on file   Tobacco Use    Smoking status: Never    Smokeless tobacco: Never   Vaping Use    Vaping status: Never Used   Substance and Sexual Activity    Alcohol use: No     Comment: hx of social drinker    Drug use: No    Sexual activity: Not on file   Other Topics Concern    Not on file   Social History Narrative    Not on file     Social Determinants of Health     Financial Resource Strain: Not on file   Food Insecurity: Not on file   Transportation Needs: Not on file   Physical Activity: Not on file   Stress: Not on file   Social Connections: Not on file   Intimate Partner Violence: Not on file   Housing Stability: Not on file        FAMILY HISTORY:  Family History   Problem Relation Age of Onset    Heart attack Mother     Stroke Mother     Cancer Father     Bone cancer Family     Hypertension Family     Lung cancer Family            Objective    PHYSICAL EXAMINATION:   Blood pressure 132/84, pulse 88, temperature 98 °F (36.7 °C), temperature source Temporal, resp. rate 18, height 5' 9\" (1.753 m), weight 90.7 kg (200 lb), SpO2 97%.     Pain Score: 0-No pain     ECOG Performance Status      Flowsheet Row Most Recent Value   ECOG Performance Status 0 - Fully active, able to carry on all pre-disease performance without restriction               Physical Exam  Constitutional:       General: She is not in acute distress.     Appearance: Normal appearance. She is not ill-appearing or toxic-appearing.   HENT:      Head: Normocephalic and atraumatic.      Right Ear: External ear normal.      Left Ear: External ear normal.      Nose: Nose normal.      Mouth/Throat:      Mouth: Mucous membranes are moist.      Pharynx: Oropharynx is clear.   Eyes:      General: No scleral icterus.        Right eye: No discharge.         Left eye: No discharge.      " Conjunctiva/sclera: Conjunctivae normal.   Cardiovascular:      Rate and Rhythm: Normal rate and regular rhythm.      Pulses: Normal pulses.      Heart sounds: No murmur heard.     No friction rub. No gallop.   Pulmonary:      Effort: Pulmonary effort is normal. No respiratory distress.      Breath sounds: Normal breath sounds. No wheezing or rales.   Abdominal:      General: Bowel sounds are normal. There is no distension.      Palpations: There is no mass.      Tenderness: There is no abdominal tenderness. There is no rebound.   Musculoskeletal:         General: No swelling or tenderness.      Cervical back: Normal range of motion. No rigidity.      Right lower leg: No edema.      Left lower leg: No edema.   Lymphadenopathy:      Head:      Right side of head: No submandibular, preauricular or posterior auricular adenopathy.      Left side of head: No submandibular, preauricular or posterior auricular adenopathy.      Cervical: No cervical adenopathy.      Right cervical: No superficial or posterior cervical adenopathy.     Left cervical: No superficial or posterior cervical adenopathy.      Upper Body:      Right upper body: No supraclavicular or axillary adenopathy.      Left upper body: No supraclavicular or axillary adenopathy.   Skin:     General: Skin is warm.      Coloration: Skin is not jaundiced.      Findings: No lesion or rash.      Comments: Right lower extremity - resolved pigmentation.  No nodularity or masses.   Neurological:      General: No focal deficit present.      Mental Status: She is alert and oriented to person, place, and time.      Cranial Nerves: No cranial nerve deficit.      Motor: No weakness.      Gait: Gait normal.   Psychiatric:         Mood and Affect: Mood normal.         Behavior: Behavior normal.         Thought Content: Thought content normal.         Judgment: Judgment normal.         I reviewed lab data in the chart.    WBC   Date Value Ref Range Status   07/20/2024 5.51  4.31 - 10.16 Thousand/uL Final   06/22/2024 5.52 4.31 - 10.16 Thousand/uL Final   05/25/2024 5.06 4.31 - 10.16 Thousand/uL Final     Hemoglobin   Date Value Ref Range Status   07/20/2024 13.2 11.5 - 15.4 g/dL Final   06/22/2024 13.7 11.5 - 15.4 g/dL Final   05/25/2024 12.5 11.5 - 15.4 g/dL Final     Platelets   Date Value Ref Range Status   07/20/2024 191 149 - 390 Thousands/uL Final   06/22/2024 182 149 - 390 Thousands/uL Final   05/25/2024 171 149 - 390 Thousands/uL Final     MCV   Date Value Ref Range Status   07/20/2024 90 82 - 98 fL Final   06/22/2024 92 82 - 98 fL Final   05/25/2024 91 82 - 98 fL Final      Potassium   Date Value Ref Range Status   07/20/2024 4.2 3.5 - 5.3 mmol/L Final   06/22/2024 4.3 3.5 - 5.3 mmol/L Final   05/25/2024 4.1 3.5 - 5.3 mmol/L Final   05/06/2019 3.8 3.5 - 5.2 mmol/L Final   05/05/2019 4.1 3.5 - 5.2 mmol/L Final   05/04/2019 3.9 3.5 - 5.2 mmol/L Final     Chloride   Date Value Ref Range Status   07/20/2024 105 96 - 108 mmol/L Final   06/22/2024 104 96 - 108 mmol/L Final   05/25/2024 103 96 - 108 mmol/L Final   05/06/2019 104 100 - 109 mmol/L Final   05/05/2019 101 100 - 109 mmol/L Final   05/04/2019 104 100 - 109 mmol/L Final     Carbon Dioxide   Date Value Ref Range Status   05/06/2019 31 23 - 31 mmol/L Final   05/05/2019 28 23 - 31 mmol/L Final   05/04/2019 26 23 - 31 mmol/L Final     CO2   Date Value Ref Range Status   07/20/2024 28 21 - 32 mmol/L Final   06/22/2024 27 21 - 32 mmol/L Final   05/25/2024 30 21 - 32 mmol/L Final     BUN   Date Value Ref Range Status   07/20/2024 22 5 - 25 mg/dL Final   06/22/2024 25 5 - 25 mg/dL Final   05/25/2024 22 5 - 25 mg/dL Final   05/06/2019 15 7 - 25 mg/dL Final   05/05/2019 11 7 - 25 mg/dL Final   05/04/2019 11 7 - 25 mg/dL Final     Creatinine   Date Value Ref Range Status   07/20/2024 0.79 0.60 - 1.30 mg/dL Final     Comment:     Standardized to IDMS reference method   06/22/2024 0.89 0.60 - 1.30 mg/dL Final     Comment:      Standardized to IDMS reference method   05/25/2024 0.86 0.60 - 1.30 mg/dL Final     Comment:     Standardized to IDMS reference method   05/06/2019 0.60 0.40 - 1.10 mg/dL Final   05/05/2019 0.53 0.40 - 1.10 mg/dL Final   05/04/2019 0.52 0.40 - 1.10 mg/dL Final     Glucose   Date Value Ref Range Status   12/11/2023 88 65 - 140 mg/dL Final     Comment:     If the patient is fasting, the ADA then defines impaired fasting glucose as > 100 mg/dL and diabetes as > or equal to 123 mg/dL.   11/17/2022 85 65 - 140 mg/dL Final     Comment:     If the patient is fasting, the ADA then defines impaired fasting glucose as > 100 mg/dL and diabetes as > or equal to 123 mg/dL.  Specimen collection should occur prior to Sulfasalazine administration due to the potential for falsely depressed results. Specimen collection should occur prior to Sulfapyridine administration due to the potential for falsely elevated results.   08/03/2020 112 (H) 65 - 99 mg/dL Final     Comment:     If the patient is fasting, the ADA then defines impaired fasting glucose as > 100 mg/dL and diabetes as > or equal to 123 mg/dL.  Specimen collection should occur prior to Sulfasalazine administration due to the potential for falsely depressed results. Specimen collection should occur prior to Sulfapyridine administration due to the potential for falsely elevated results.   05/06/2019 104 (H) 65 - 99 mg/dL Final   05/05/2019 109 (H) 65 - 99 mg/dL Final   05/04/2019 124 (H) 65 - 99 mg/dL Final     Calcium   Date Value Ref Range Status   07/20/2024 9.1 8.4 - 10.2 mg/dL Final   06/22/2024 9.2 8.4 - 10.2 mg/dL Final   05/25/2024 8.9 8.4 - 10.2 mg/dL Final   05/06/2019 8.5 8.5 - 10.1 mg/dL Final   05/05/2019 8.3 (L) 8.5 - 10.1 mg/dL Final   05/04/2019 8.1 (L) 8.5 - 10.1 mg/dL Final     Albumin   Date Value Ref Range Status   07/20/2024 3.7 3.5 - 5.0 g/dL Final   06/22/2024 4.0 3.5 - 5.0 g/dL Final   05/25/2024 3.8 3.5 - 5.0 g/dL Final     ALBUMIN   Date Value Ref  Range Status   05/03/2019 3.5 3.5 - 4.8 g/dL Final     Total Bilirubin   Date Value Ref Range Status   07/20/2024 0.51 0.20 - 1.00 mg/dL Final     Comment:     Use of this assay is not recommended for patients undergoing treatment with eltrombopag due to the potential for falsely elevated results.  N-acetyl-p-benzoquinone imine (metabolite of Acetaminophen) will generate erroneously low results in samples for patients that have taken an overdose of Acetaminophen.   06/22/2024 0.55 0.20 - 1.00 mg/dL Final     Comment:     Use of this assay is not recommended for patients undergoing treatment with eltrombopag due to the potential for falsely elevated results.  N-acetyl-p-benzoquinone imine (metabolite of Acetaminophen) will generate erroneously low results in samples for patients that have taken an overdose of Acetaminophen.   05/25/2024 0.57 0.20 - 1.00 mg/dL Final     Comment:     Use of this assay is not recommended for patients undergoing treatment with eltrombopag due to the potential for falsely elevated results.  N-acetyl-p-benzoquinone imine (metabolite of Acetaminophen) will generate erroneously low results in samples for patients that have taken an overdose of Acetaminophen.   05/03/2019 0.5 0.2 - 1.0 mg/dL Final     Alkaline Phosphatase   Date Value Ref Range Status   07/20/2024 43 34 - 104 U/L Final   06/22/2024 46 34 - 104 U/L Final   05/25/2024 42 34 - 104 U/L Final   05/03/2019 54 35 - 120 U/L Final     AST   Date Value Ref Range Status   07/20/2024 14 13 - 39 U/L Final   06/22/2024 15 13 - 39 U/L Final   05/25/2024 17 13 - 39 U/L Final   05/03/2019 26 <41 U/L Final     ALT   Date Value Ref Range Status   07/20/2024 13 7 - 52 U/L Final     Comment:     Specimen collection should occur prior to Sulfasalazine administration due to the potential for falsely depressed results.    06/22/2024 13 7 - 52 U/L Final     Comment:     Specimen collection should occur prior to Sulfasalazine administration due to the  "potential for falsely depressed results.    05/25/2024 12 7 - 52 U/L Final     Comment:     Specimen collection should occur prior to Sulfasalazine administration due to the potential for falsely depressed results.    05/03/2019 25 <56 U/L Final      LD   Date Value Ref Range Status   07/20/2024 128 (L) 140 - 271 U/L Final   06/22/2024 165 140 - 271 U/L Final   05/25/2024 206 140 - 271 U/L Final     No results found for: \"TSH\"  No results found for: \"B0IYIHS\"   Free T4   Date Value Ref Range Status   07/20/2024 0.86 0.61 - 1.12 ng/dL Final     Comment:     Specimens with biotin concentrations > 10 ng/mL can lead to significant (> 10%) positive bias in result.   06/22/2024 1.00 0.61 - 1.12 ng/dL Final     Comment:     Specimens with biotin concentrations > 10 ng/mL can lead to significant (> 10%) positive bias in result.   05/25/2024 0.99 0.61 - 1.12 ng/dL Final     Comment:     Specimens with biotin concentrations > 10 ng/mL can lead to significant (> 10%) positive bias in result.         RECENT IMAGING:  No results found.          Assessment/Plan  Ms. Damon is a 73-year-old female with metastatic melanoma on treatment with nivolumab plus relatlimab here for continued monitoring, follow-up and surveillance while on treatment.      1. Metastatic melanoma (HCC)  She is doing well and tolerating treatment with nivolumab plus relatlimab/Opdualag.  Labs reviewed and okay to continue with her next cycle.  She is due for her neck cycle of treatment on 7/24/2024.  She will be due for imaging in September.  We will get that scheduled next time she is here next month.  She knows to continue to monitor for immune mediated side effects.  She knows to call with issues or concerns prior to her next visit.  She has standing orders for blood work prior to each treatment.      - Infusion Calculated Appointment Request; Future  - Infusion Calculated Appointment Request; Future  - Infusion Calculated Appointment Request; Future  - " Infusion Calculated Appointment Request; Future    2. High risk medication use  3. Adrenal insufficiency (HCC)  The patient is on treatment with immunotherapy and we will continue to monitor for side effects and lab abnormalities. We will monitor labs with each treatment to ensure safety of continuing on treatment. The patient knows to watch for signs and symptoms for immune mediated side effects. Denies any immune mediated side effects at this time.     On stable dose of hydrocortisone.        Follow up as scheduled    Rafaela Reyes MD, PhD

## 2024-07-22 NOTE — LETTER
July 22, 2024     Anh Bailey MD  [address] [facility] [facility]  2nd Floor  [facility] [address]    Patient: Debbie Damon   YOB: 1950   Date of Visit: 7/22/2024       Dear Dr. Bailey:    Thank you for referring Debbie Damon to me for evaluation. Below are my notes for this consultation.    If you have questions, please do not hesitate to call me. I look forward to following your patient along with you.         Sincerely,        Rafaela Reyes MD        CC: Juan Carlos Perry, KEN Amos MD Joseph John Minissale, DO Rafaela Reyes MD  7/22/2024  2:31 PM  Sign when Signing Visit  [facility] [place]  [address] [facility] [address]  [phone]  [phone]     Date of Visit: 7/22/2024  Name: Debbie Damon   YOB: 1950        Subjective    VISIT DIAGNOSIS:  Diagnoses and all orders for this visit:    Metastatic melanoma (HCC)  -     Infusion Calculated Appointment Request; Future  -     Infusion Calculated Appointment Request; Future  -     Infusion Calculated Appointment Request; Future  -     Infusion Calculated Appointment Request; Future    High risk medication use    Adrenal insufficiency (HCC)        Oncology History   Metastatic melanoma (HCC)   10/13/2022 -  Cancer Staged    Staging form: Melanoma of the Skin, AJCC 8th Edition  - Clinical stage from 10/13/2022: Stage IV (cTX, cNX, cM1a) - Signed by Rafaela Reyes MD on 11/22/2022       10/13/2022 Biopsy    A. Skin, right lateral medial leg, punch biopsy:  Portion of severely atypical melanocytic dermal proliferation with prominent melanosis consistent with melanoma. See note.        B. Skin, right medial leg. punch biopsy:  Portion of severely atypical melanocytic dermal proliferation with prominent melanosis consistent with melanoma. See note.        C. Skin, right medial leg, punch biopsy:   Portion of severely atypical melanocytic dermal proliferation with  prominent melanosis consistent with melanoma. See note.     11/22/2022 Initial Diagnosis    Metastatic melanoma (HCC)     12/14/2022 -  Chemotherapy    alteplase (CATHFLO), 2 mg, Intracatheter, Every 2 hour PRN, 21 of 27 cycles  NIVOLUMAB-RELATLIMAB-RMBW (OPDUALAG) IVPB, 480 mg of nivolumab, Intravenous, Once, 21 of 27 cycles  Administration: 480 mg of nivolumab (12/14/2022), 480 mg of nivolumab (1/11/2023), 480 mg of nivolumab (2/8/2023), 480 mg of nivolumab (3/8/2023), 480 mg of nivolumab (4/5/2023), 480 mg of nivolumab (5/3/2023), 480 mg of nivolumab (5/31/2023), 480 mg of nivolumab (6/28/2023), 480 mg of nivolumab (7/26/2023), 480 mg of nivolumab (8/23/2023), 480 mg of nivolumab (9/20/2023), 480 mg of nivolumab (10/18/2023), 480 mg of nivolumab (11/15/2023), 480 mg of nivolumab (12/13/2023), 480 mg of nivolumab (1/10/2024), 480 mg of nivolumab (2/7/2024), 480 mg of nivolumab (3/6/2024), 480 mg of nivolumab (4/3/2024), 480 mg of nivolumab (5/1/2024), 480 mg of nivolumab (5/29/2024), 480 mg of nivolumab (6/26/2024)     1/5/2023 Genomic Testing    [facility] liquid testing  TMB 1 Mut/Mb  MSI-high not detected          Cancer Staging   Metastatic melanoma (HCC)  Staging form: Melanoma of the Skin, AJCC 8th Edition  - Clinical stage from 10/13/2022: Stage IV (cTX, cNX, cM1a) - Signed by Rafaela Reyes MD on 11/22/2022     Treatment Details   Treatment goal Curative   Plan Name OP Nivolumab and Relatlimab-rmbw Q 28 Days   Status Active   Start Date 12/14/2022   End Date 12/11/2024 (Planned)   Provider Rafaela Reyes MD   Chemotherapy alteplase (CATHFLO), 2 mg, Intracatheter, Every 2 hour PRN, 21 of 27 cycles    NIVOLUMAB-RELATLIMAB-RMBW (OPDUALAG) IVPB, 480 mg of nivolumab, Intravenous, Once, 21 of 27 cycles  Administration: 480 mg of nivolumab (12/14/2022), 480 mg of nivolumab (1/11/2023), 480 mg of nivolumab (2/8/2023), 480 mg of nivolumab (3/8/2023), 480 mg of nivolumab (4/5/2023), 480 mg of nivolumab  (5/3/2023), 480 mg of nivolumab (5/31/2023), 480 mg of nivolumab (6/28/2023), 480 mg of nivolumab (7/26/2023), 480 mg of nivolumab (8/23/2023), 480 mg of nivolumab (9/20/2023), 480 mg of nivolumab (10/18/2023), 480 mg of nivolumab (11/15/2023), 480 mg of nivolumab (12/13/2023), 480 mg of nivolumab (1/10/2024), 480 mg of nivolumab (2/7/2024), 480 mg of nivolumab (3/6/2024), 480 mg of nivolumab (4/3/2024), 480 mg of nivolumab (5/1/2024), 480 mg of nivolumab (5/29/2024), 480 mg of nivolumab (6/26/2024)          HISTORY OF PRESENT ILLNESS: Debbie Damon is a 73 y.o. female  who has metastatic melanoma on treatment with nivolumab plus relatlimab/Opdualag here for continued monitoring, follow-up and surveillance.    She is doing well.  Feels good.  No issues concerns or complaints today.  Denies any new, changing, concerning skin lesions denies any lymphadenopathy.    Eating well, too much as she says.    Denies any immune mediated side effects.  Denies headaches, double vision, rash, itching, chest pain, shortness of breath, diarrhea.  No muscle weakness or fatigue.  Does state that she might have some pain in the left ankle every once in a while after standing up but then it goes away.  Episodic and not related to anything.    REVIEW OF SYSTEMS:  Review of Systems   Constitutional:  Negative for appetite change, fatigue, fever and unexpected weight change.   HENT:   Negative for lump/mass, trouble swallowing and voice change.    Eyes:  Negative for icterus.   Respiratory:  Negative for cough, shortness of breath and wheezing.    Cardiovascular:  Negative for leg swelling.   Gastrointestinal:  Negative for abdominal pain, constipation, diarrhea, nausea and vomiting.   Genitourinary:  Negative for difficulty urinating and hematuria.    Musculoskeletal:  Negative for arthralgias, gait problem and myalgias.   Skin:  Negative for itching and rash.        No new, changing, or concerning lesions.   Neurological:  Negative for  extremity weakness, gait problem, headaches, light-headedness and numbness.   Hematological:  Negative for adenopathy.        MEDICATIONS:    Current Outpatient Medications:   •  apixaban (Eliquis) 5 mg, Take 1 tablet (5 mg total) by mouth every 12 (twelve) hours, Disp: 60 tablet, Rfl: 5  •  famotidine (PEPCID) 10 mg tablet, Take 10 mg by mouth 2 (two) times a day, Disp: , Rfl:   •  hydrocortisone (CORTEF) 10 mg tablet, take 1 tablet by mouth twice a day, Disp: 60 tablet, Rfl: 11  •  loratadine (CLARITIN) 10 mg tablet, Take 10 mg by mouth daily, Disp: , Rfl:   •  losartan (Cozaar) 100 MG tablet, Take 1 tablet (100 mg total) by mouth daily, Disp: 30 tablet, Rfl: 5  •  metoprolol succinate (TOPROL-XL) 25 mg 24 hr tablet, Take 1 tablet (25 mg total) by mouth 2 (two) times a day, Disp: 60 tablet, Rfl: 5  •  Multiple Minerals-Vitamins (Citracal Plus) TABS, Take by mouth, Disp: , Rfl:   •  Multiple Vitamins-Minerals (MULTIVITAMIN WITH MINERALS) tablet, Take 1 tablet by mouth daily, Disp: , Rfl:   •  patient supplied medication, Pt takes eye drops Mura 128 - not listed in database, Disp: , Rfl:   •  Wheat Dextrin (BENEFIBER DRINK MIX PO), Take by mouth, Disp: , Rfl:      ALLERGIES:  Allergies   Allergen Reactions   • Etomidate Other (See Comments)        ACTIVE PROBLEMS:  Patient Active Problem List   Diagnosis   • Acute massive pulmonary embolism (HCC)   • Venous stasis of both lower extremities   • Clostridium difficile infection   • Right ventricular systolic dysfunction without heart failure   • Essential hypertension   • Metastatic melanoma (HCC)   • High risk medication use   • Adrenal insufficiency (HCC)   • Abnormal PET scan of colon   • Mild anemia   • Anticoagulated by anticoagulation treatment   • History of colon polyps   • Diverticulosis   • Abnormal finding on GI tract imaging          PAST MEDICAL HISTORY:   Past Medical History:   Diagnosis Date   • Acute deep vein thrombosis (DVT) of popliteal vein of left  "lower extremity (HCC)    • Ankle wound, left, initial encounter     Resolved 2017   • Ankle wound, right, initial encounter     resolved 2017   • DVT (deep vein thrombosis) in pregnancy    • Hypertension    • Pulmonary emboli (HCC)      post C section   • Pulmonary embolism (HCC)     2017   • Pulmonary embolism, blood-clot, obstetric    • Skin cancer    • Venous stasis ulcer (HCC)    • Venous ulcers of both lower extremities (HCC)         PAST SURGICAL HISTORY:  Past Surgical History:   Procedure Laterality Date   •  SECTION      X2        SOCIAL HISTORY:  Social History     Socioeconomic History   • Marital status:      Spouse name: Not on file   • Number of children: Not on file   • Years of education: Not on file   • Highest education level: Not on file   Occupational History   • Not on file   Tobacco Use   • Smoking status: Never   • Smokeless tobacco: Never   Vaping Use   • Vaping status: Never Used   Substance and Sexual Activity   • Alcohol use: No     Comment: hx of social drinker   • Drug use: No   • Sexual activity: Not on file   Other Topics Concern   • Not on file   Social History Narrative   • Not on file     Social Determinants of Health     Financial Resource Strain: Not on file   Food Insecurity: Not on file   Transportation Needs: Not on file   Physical Activity: Not on file   Stress: Not on file   Social Connections: Not on file   Intimate Partner Violence: Not on file   Housing Stability: Not on file        FAMILY HISTORY:  Family History   Problem Relation Age of Onset   • Heart attack Mother    • Stroke Mother    • Cancer Father    • Bone cancer Family    • Hypertension Family    • Lung cancer Family            Objective    PHYSICAL EXAMINATION:   Blood pressure 132/84, pulse 88, temperature 98 °F (36.7 °C), temperature source Temporal, resp. rate 18, height 5' 9\" (1.753 m), weight 90.7 kg (200 lb), SpO2 97%.     Pain Score: 0-No pain     ECOG Performance Status  "     Flowsheet Row Most Recent Value   ECOG Performance Status 0 - Fully active, able to carry on all pre-disease performance without restriction               Physical Exam  Constitutional:       General: She is not in acute distress.     Appearance: Normal appearance. She is not ill-appearing or toxic-appearing.   HENT:      Head: Normocephalic and atraumatic.      Right Ear: External ear normal.      Left Ear: External ear normal.      Nose: Nose normal.      Mouth/Throat:      Mouth: Mucous membranes are moist.      Pharynx: Oropharynx is clear.   Eyes:      General: No scleral icterus.        Right eye: No discharge.         Left eye: No discharge.      Conjunctiva/sclera: Conjunctivae normal.   Cardiovascular:      Rate and Rhythm: Normal rate and regular rhythm.      Pulses: Normal pulses.      Heart sounds: No murmur heard.     No friction rub. No gallop.   Pulmonary:      Effort: Pulmonary effort is normal. No respiratory distress.      Breath sounds: Normal breath sounds. No wheezing or rales.   Abdominal:      General: Bowel sounds are normal. There is no distension.      Palpations: There is no mass.      Tenderness: There is no abdominal tenderness. There is no rebound.   Musculoskeletal:         General: No swelling or tenderness.      Cervical back: Normal range of motion. No rigidity.      Right lower leg: No edema.      Left lower leg: No edema.   Lymphadenopathy:      Head:      Right side of head: No submandibular, preauricular or posterior auricular adenopathy.      Left side of head: No submandibular, preauricular or posterior auricular adenopathy.      Cervical: No cervical adenopathy.      Right cervical: No superficial or posterior cervical adenopathy.     Left cervical: No superficial or posterior cervical adenopathy.      Upper Body:      Right upper body: No supraclavicular or axillary adenopathy.      Left upper body: No supraclavicular or axillary adenopathy.   Skin:     General: Skin is  warm.      Coloration: Skin is not jaundiced.      Findings: No lesion or rash.      Comments: Right lower extremity - resolved pigmentation.  No nodularity or masses.   Neurological:      General: No focal deficit present.      Mental Status: She is alert and oriented to person, place, and time.      Cranial Nerves: No cranial nerve deficit.      Motor: No weakness.      Gait: Gait normal.   Psychiatric:         Mood and Affect: Mood normal.         Behavior: Behavior normal.         Thought Content: Thought content normal.         Judgment: Judgment normal.         I reviewed lab data in the chart.    WBC   Date Value Ref Range Status   07/20/2024 5.51 4.31 - 10.16 Thousand/uL Final   06/22/2024 5.52 4.31 - 10.16 Thousand/uL Final   05/25/2024 5.06 4.31 - 10.16 Thousand/uL Final     Hemoglobin   Date Value Ref Range Status   07/20/2024 13.2 11.5 - 15.4 g/dL Final   06/22/2024 13.7 11.5 - 15.4 g/dL Final   05/25/2024 12.5 11.5 - 15.4 g/dL Final     Platelets   Date Value Ref Range Status   07/20/2024 191 149 - 390 Thousands/uL Final   06/22/2024 182 149 - 390 Thousands/uL Final   05/25/2024 171 149 - 390 Thousands/uL Final     MCV   Date Value Ref Range Status   07/20/2024 90 82 - 98 fL Final   06/22/2024 92 82 - 98 fL Final   05/25/2024 91 82 - 98 fL Final      Potassium   Date Value Ref Range Status   07/20/2024 4.2 3.5 - 5.3 mmol/L Final   06/22/2024 4.3 3.5 - 5.3 mmol/L Final   05/25/2024 4.1 3.5 - 5.3 mmol/L Final   05/06/2019 3.8 3.5 - 5.2 mmol/L Final   05/05/2019 4.1 3.5 - 5.2 mmol/L Final   05/04/2019 3.9 3.5 - 5.2 mmol/L Final     Chloride   Date Value Ref Range Status   07/20/2024 105 96 - 108 mmol/L Final   06/22/2024 104 96 - 108 mmol/L Final   05/25/2024 103 96 - 108 mmol/L Final   05/06/2019 104 100 - 109 mmol/L Final   05/05/2019 101 100 - 109 mmol/L Final   05/04/2019 104 100 - 109 mmol/L Final     Carbon Dioxide   Date Value Ref Range Status   05/06/2019 31 23 - 31 mmol/L Final   05/05/2019 28 23  - 31 mmol/L Final   05/04/2019 26 23 - 31 mmol/L Final     CO2   Date Value Ref Range Status   07/20/2024 28 21 - 32 mmol/L Final   06/22/2024 27 21 - 32 mmol/L Final   05/25/2024 30 21 - 32 mmol/L Final     BUN   Date Value Ref Range Status   07/20/2024 22 5 - 25 mg/dL Final   06/22/2024 25 5 - 25 mg/dL Final   05/25/2024 22 5 - 25 mg/dL Final   05/06/2019 15 7 - 25 mg/dL Final   05/05/2019 11 7 - 25 mg/dL Final   05/04/2019 11 7 - 25 mg/dL Final     Creatinine   Date Value Ref Range Status   07/20/2024 0.79 0.60 - 1.30 mg/dL Final     Comment:     Standardized to IDMS reference method   06/22/2024 0.89 0.60 - 1.30 mg/dL Final     Comment:     Standardized to IDMS reference method   05/25/2024 0.86 0.60 - 1.30 mg/dL Final     Comment:     Standardized to IDMS reference method   05/06/2019 0.60 0.40 - 1.10 mg/dL Final   05/05/2019 0.53 0.40 - 1.10 mg/dL Final   05/04/2019 0.52 0.40 - 1.10 mg/dL Final     Glucose   Date Value Ref Range Status   12/11/2023 88 65 - 140 mg/dL Final     Comment:     If the patient is fasting, the ADA then defines impaired fasting glucose as > 100 mg/dL and diabetes as > or equal to 123 mg/dL.   11/17/2022 85 65 - 140 mg/dL Final     Comment:     If the patient is fasting, the ADA then defines impaired fasting glucose as > 100 mg/dL and diabetes as > or equal to 123 mg/dL.  Specimen collection should occur prior to Sulfasalazine administration due to the potential for falsely depressed results. Specimen collection should occur prior to Sulfapyridine administration due to the potential for falsely elevated results.   08/03/2020 112 (H) 65 - 99 mg/dL Final     Comment:     If the patient is fasting, the ADA then defines impaired fasting glucose as > 100 mg/dL and diabetes as > or equal to 123 mg/dL.  Specimen collection should occur prior to Sulfasalazine administration due to the potential for falsely depressed results. Specimen collection should occur prior to Sulfapyridine administration  due to the potential for falsely elevated results.   05/06/2019 104 (H) 65 - 99 mg/dL Final   05/05/2019 109 (H) 65 - 99 mg/dL Final   05/04/2019 124 (H) 65 - 99 mg/dL Final     Calcium   Date Value Ref Range Status   07/20/2024 9.1 8.4 - 10.2 mg/dL Final   06/22/2024 9.2 8.4 - 10.2 mg/dL Final   05/25/2024 8.9 8.4 - 10.2 mg/dL Final   05/06/2019 8.5 8.5 - 10.1 mg/dL Final   05/05/2019 8.3 (L) 8.5 - 10.1 mg/dL Final   05/04/2019 8.1 (L) 8.5 - 10.1 mg/dL Final     Albumin   Date Value Ref Range Status   07/20/2024 3.7 3.5 - 5.0 g/dL Final   06/22/2024 4.0 3.5 - 5.0 g/dL Final   05/25/2024 3.8 3.5 - 5.0 g/dL Final     ALBUMIN   Date Value Ref Range Status   05/03/2019 3.5 3.5 - 4.8 g/dL Final     Total Bilirubin   Date Value Ref Range Status   07/20/2024 0.51 0.20 - 1.00 mg/dL Final     Comment:     Use of this assay is not recommended for patients undergoing treatment with eltrombopag due to the potential for falsely elevated results.  N-acetyl-p-benzoquinone imine (metabolite of Acetaminophen) will generate erroneously low results in samples for patients that have taken an overdose of Acetaminophen.   06/22/2024 0.55 0.20 - 1.00 mg/dL Final     Comment:     Use of this assay is not recommended for patients undergoing treatment with eltrombopag due to the potential for falsely elevated results.  N-acetyl-p-benzoquinone imine (metabolite of Acetaminophen) will generate erroneously low results in samples for patients that have taken an overdose of Acetaminophen.   05/25/2024 0.57 0.20 - 1.00 mg/dL Final     Comment:     Use of this assay is not recommended for patients undergoing treatment with eltrombopag due to the potential for falsely elevated results.  N-acetyl-p-benzoquinone imine (metabolite of Acetaminophen) will generate erroneously low results in samples for patients that have taken an overdose of Acetaminophen.   05/03/2019 0.5 0.2 - 1.0 mg/dL Final     Alkaline Phosphatase   Date Value Ref Range Status  "  07/20/2024 43 34 - 104 U/L Final   06/22/2024 46 34 - 104 U/L Final   05/25/2024 42 34 - 104 U/L Final   05/03/2019 54 35 - 120 U/L Final     AST   Date Value Ref Range Status   07/20/2024 14 13 - 39 U/L Final   06/22/2024 15 13 - 39 U/L Final   05/25/2024 17 13 - 39 U/L Final   05/03/2019 26 <41 U/L Final     ALT   Date Value Ref Range Status   07/20/2024 13 7 - 52 U/L Final     Comment:     Specimen collection should occur prior to Sulfasalazine administration due to the potential for falsely depressed results.    06/22/2024 13 7 - 52 U/L Final     Comment:     Specimen collection should occur prior to Sulfasalazine administration due to the potential for falsely depressed results.    05/25/2024 12 7 - 52 U/L Final     Comment:     Specimen collection should occur prior to Sulfasalazine administration due to the potential for falsely depressed results.    05/03/2019 25 <56 U/L Final      LD   Date Value Ref Range Status   07/20/2024 128 (L) 140 - 271 U/L Final   06/22/2024 165 140 - 271 U/L Final   05/25/2024 206 140 - 271 U/L Final     No results found for: \"TSH\"  No results found for: \"B6NTANO\"   Free T4   Date Value Ref Range Status   07/20/2024 0.86 0.61 - 1.12 ng/dL Final     Comment:     Specimens with biotin concentrations > 10 ng/mL can lead to significant (> 10%) positive bias in result.   06/22/2024 1.00 0.61 - 1.12 ng/dL Final     Comment:     Specimens with biotin concentrations > 10 ng/mL can lead to significant (> 10%) positive bias in result.   05/25/2024 0.99 0.61 - 1.12 ng/dL Final     Comment:     Specimens with biotin concentrations > 10 ng/mL can lead to significant (> 10%) positive bias in result.         RECENT IMAGING:  No results found.          Assessment/Plan  Ms. Damon is a 73-year-old female with metastatic melanoma on treatment with nivolumab plus relatlimab here for continued monitoring, follow-up and surveillance while on treatment.      1. Metastatic melanoma (HCC)  She is doing " well and tolerating treatment with nivolumab plus relatlimab/Opdualag.  Labs reviewed and okay to continue with her next cycle.  She is due for her neck cycle of treatment on 7/24/2024.  She will be due for imaging in September.  We will get that scheduled next time she is here next month.  She knows to continue to monitor for immune mediated side effects.  She knows to call with issues or concerns prior to her next visit.  She has standing orders for blood work prior to each treatment.      - Infusion Calculated Appointment Request; Future  - Infusion Calculated Appointment Request; Future  - Infusion Calculated Appointment Request; Future  - Infusion Calculated Appointment Request; Future    2. High risk medication use  3. Adrenal insufficiency (HCC)  The patient is on treatment with immunotherapy and we will continue to monitor for side effects and lab abnormalities. We will monitor labs with each treatment to ensure safety of continuing on treatment. The patient knows to watch for signs and symptoms for immune mediated side effects. Denies any immune mediated side effects at this time.     On stable dose of hydrocortisone.        Follow up as scheduled    Rafaela Reyes MD, PhD

## 2024-07-24 ENCOUNTER — HOSPITAL ENCOUNTER (OUTPATIENT)
Dept: INFUSION CENTER | Facility: HOSPITAL | Age: 74
Discharge: HOME | End: 2024-07-24
Attending: INTERNAL MEDICINE
Payer: COMMERCIAL

## 2024-07-24 VITALS
SYSTOLIC BLOOD PRESSURE: 145 MMHG | TEMPERATURE: 97.8 F | RESPIRATION RATE: 17 BRPM | OXYGEN SATURATION: 97 % | HEART RATE: 79 BPM | DIASTOLIC BLOOD PRESSURE: 67 MMHG

## 2024-07-24 DIAGNOSIS — C43.9 METASTATIC MELANOMA (HCC): Primary | ICD-10-CM

## 2024-07-24 PROCEDURE — 96413 CHEMO IV INFUSION 1 HR: CPT

## 2024-07-24 RX ORDER — SODIUM CHLORIDE 9 MG/ML
20 INJECTION, SOLUTION INTRAVENOUS ONCE
Status: COMPLETED | OUTPATIENT
Start: 2024-07-24 | End: 2024-07-24

## 2024-07-24 RX ADMIN — SODIUM CHLORIDE 20 ML/HR: 0.9 INJECTION, SOLUTION INTRAVENOUS at 09:16

## 2024-07-24 RX ADMIN — SODIUM CHLORIDE 480 MG OF NIVOLUMAB: 9 INJECTION, SOLUTION INTRAVENOUS at 10:01

## 2024-07-24 NOTE — PROGRESS NOTES
Recent labs reviewed. Pt tolerated Opdualag tx well with no issues. PIV removed without incident.     Debbie Damon is aware of future appt on 8/21/24 at 2:00PM.      AVS declined by Debbie Damon.     Pt discharged off unit in stable condition with all personal belongings.

## 2024-07-24 NOTE — PLAN OF CARE
Problem: Potential for Falls  Goal: Patient will remain free of falls  Description: INTERVENTIONS:  - Educate patient/family on patient safety including physical limitations  - Instruct patient to call for assistance with activity   - Consult OT/PT to assist with strengthening/mobility   - Keep Call bell within reach  - Keep bed low and locked with side rails adjusted as appropriate  - Keep care items and personal belongings within reach  - Initiate and maintain comfort rounds  - Make Fall Risk Sign visible to staff  - Consider moving patient to room near nurses station  Outcome: Progressing     Problem: INFECTION - ADULT  Goal: Absence or prevention of progression during hospitalization  Description: INTERVENTIONS:  - Assess and monitor for signs and symptoms of infection  - Monitor lab/diagnostic results  - Monitor all insertion sites, i.e. indwelling lines, tubes, and drains  - Monitor endotracheal if appropriate and nasal secretions for changes in amount and color  - [place] appropriate cooling/warming therapies per order  - Administer medications as ordered  - Instruct and encourage patient and family to use good hand hygiene technique  - Identify and instruct in appropriate isolation precautions for identified infection/condition  Outcome: Progressing     Problem: Knowledge Deficit  Goal: Patient/family/caregiver demonstrates understanding of disease process, treatment plan, medications, and discharge instructions  Description: Complete learning assessment and assess knowledge base.  Interventions:  - Provide teaching at level of understanding  - Provide teaching via preferred learning methods  Outcome: Progressing

## 2024-07-25 ENCOUNTER — TELEPHONE (OUTPATIENT)
Age: 74
End: 2024-07-25

## 2024-07-25 NOTE — TELEPHONE ENCOUNTER
Received a phone call from patient.  Patient stated that on 9/18 she is scheduled for an infusion at 2:00 PM in [place] and an office visit with Dr. Reyes at 3:00 PM in [place].  Patient will not be able to make it to office visit in time.  Patient requesting to reschedule but there are no available appointments.  Patient stated that this happened before and she skipped the appointment with Dr. Reyes for that month.  Please advise.

## 2024-08-14 RX ORDER — SODIUM CHLORIDE 9 MG/ML
20 INJECTION, SOLUTION INTRAVENOUS ONCE
Status: CANCELLED | OUTPATIENT
Start: 2024-08-21

## 2024-08-17 ENCOUNTER — APPOINTMENT (OUTPATIENT)
Dept: LAB | Facility: CLINIC | Age: 74
End: 2024-08-17
Payer: COMMERCIAL

## 2024-08-19 ENCOUNTER — TELEPHONE (OUTPATIENT)
Dept: HEMATOLOGY ONCOLOGY | Facility: CLINIC | Age: 74
End: 2024-08-19

## 2024-08-19 NOTE — TELEPHONE ENCOUNTER
Patient received a voicemail from Jeni to reschedule 8/19 f/u. Patient wanted to speak to Tushar in regards to follow up due to having another appointment scheduled for 9/18. Per patient she normally sees  prior to each infusion treatment. Checked Dr. Reyes schedule and next available appointment is 8/29. Patient wanted to speak to Tushar to see if she can follow up with  on 9/18 as scheduled.     Advised patient I can transfer her over to [facility] patient refused stated she will wait for Tushar's call.     Thanks!

## 2024-08-19 NOTE — TELEPHONE ENCOUNTER
Left message advising patient we were cancelling the appt for today due to the provider not being in office.  Asked her to call back to R/S, hopeline number provided.

## 2024-08-19 NOTE — TELEPHONE ENCOUNTER
Returned call to Debbie. Discussed Dr. Reyes is ok with seeing her on 9/18 prior to same day infusion. Patient had completed blood work already for infusion on 8/21 and is ok for treatment without seeing Dr. Reyes first. Patient aware to call prior to next appointment with any questions or concerns.

## 2024-08-21 ENCOUNTER — HOSPITAL ENCOUNTER (OUTPATIENT)
Dept: INFUSION CENTER | Facility: HOSPITAL | Age: 74
Discharge: HOME | End: 2024-08-21
Attending: INTERNAL MEDICINE
Payer: COMMERCIAL

## 2024-08-21 VITALS
HEART RATE: 93 BPM | RESPIRATION RATE: 18 BRPM | SYSTOLIC BLOOD PRESSURE: 122 MMHG | TEMPERATURE: 97.5 F | DIASTOLIC BLOOD PRESSURE: 79 MMHG

## 2024-08-21 DIAGNOSIS — C43.9 METASTATIC MELANOMA (HCC): Primary | ICD-10-CM

## 2024-08-21 PROCEDURE — 96413 CHEMO IV INFUSION 1 HR: CPT

## 2024-08-21 RX ORDER — SODIUM CHLORIDE 9 MG/ML
20 INJECTION, SOLUTION INTRAVENOUS ONCE
Status: COMPLETED | OUTPATIENT
Start: 2024-08-21 | End: 2024-08-21

## 2024-08-21 RX ADMIN — SODIUM CHLORIDE 20 ML/HR: 0.9 INJECTION, SOLUTION INTRAVENOUS at 14:35

## 2024-08-21 RX ADMIN — SODIUM CHLORIDE 480 MG OF NIVOLUMAB: 9 INJECTION, SOLUTION INTRAVENOUS at 15:02

## 2024-08-21 NOTE — PROGRESS NOTES
Recent labs reviewed. Patient tolerated Opdualag treatment well with no issues.     Debbie Damon is aware of future appointment on 9/18/24 at 1400.      AVS declined by Debbie Damon, patient has MyChart.

## 2024-08-27 DIAGNOSIS — I10 ESSENTIAL HYPERTENSION: ICD-10-CM

## 2024-08-27 RX ORDER — LOSARTAN POTASSIUM 100 MG/1
100 TABLET ORAL DAILY
Qty: 30 TABLET | Refills: 5 | Status: SHIPPED | OUTPATIENT
Start: 2024-08-27

## 2024-09-11 RX ORDER — SODIUM CHLORIDE 9 MG/ML
20 INJECTION, SOLUTION INTRAVENOUS ONCE
Status: CANCELLED | OUTPATIENT
Start: 2024-09-18

## 2024-09-14 ENCOUNTER — APPOINTMENT (OUTPATIENT)
Dept: LAB | Facility: CLINIC | Age: 74
End: 2024-09-14
Payer: COMMERCIAL

## 2024-09-18 ENCOUNTER — HOSPITAL ENCOUNTER (OUTPATIENT)
Dept: INFUSION CENTER | Facility: HOSPITAL | Age: 74
Discharge: HOME | End: 2024-09-18
Attending: INTERNAL MEDICINE
Payer: COMMERCIAL

## 2024-09-18 ENCOUNTER — OFFICE VISIT (OUTPATIENT)
Dept: HEMATOLOGY ONCOLOGY | Facility: CLINIC | Age: 74
End: 2024-09-18
Payer: COMMERCIAL

## 2024-09-18 VITALS
TEMPERATURE: 97.1 F | SYSTOLIC BLOOD PRESSURE: 142 MMHG | RESPIRATION RATE: 19 BRPM | OXYGEN SATURATION: 98 % | HEART RATE: 78 BPM | DIASTOLIC BLOOD PRESSURE: 89 MMHG

## 2024-09-18 VITALS
SYSTOLIC BLOOD PRESSURE: 136 MMHG | BODY MASS INDEX: 29.77 KG/M2 | HEIGHT: 69 IN | OXYGEN SATURATION: 98 % | DIASTOLIC BLOOD PRESSURE: 88 MMHG | RESPIRATION RATE: 17 BRPM | HEART RATE: 86 BPM | WEIGHT: 201 LBS | TEMPERATURE: 98 F

## 2024-09-18 DIAGNOSIS — E27.40 ADRENAL INSUFFICIENCY (HCC): ICD-10-CM

## 2024-09-18 DIAGNOSIS — C43.9 METASTATIC MELANOMA (HCC): Primary | ICD-10-CM

## 2024-09-18 DIAGNOSIS — Z79.899 HIGH RISK MEDICATION USE: ICD-10-CM

## 2024-09-18 PROCEDURE — 99214 OFFICE O/P EST MOD 30 MIN: CPT | Performed by: INTERNAL MEDICINE

## 2024-09-18 PROCEDURE — 96413 CHEMO IV INFUSION 1 HR: CPT

## 2024-09-18 RX ORDER — SODIUM CHLORIDE 9 MG/ML
20 INJECTION, SOLUTION INTRAVENOUS ONCE
Status: COMPLETED | OUTPATIENT
Start: 2024-09-18 | End: 2024-09-18

## 2024-09-18 RX ADMIN — SODIUM CHLORIDE 480 MG OF NIVOLUMAB: 9 INJECTION, SOLUTION INTRAVENOUS at 15:02

## 2024-09-18 RX ADMIN — SODIUM CHLORIDE 20 ML/HR: 0.9 INJECTION, SOLUTION INTRAVENOUS at 14:10

## 2024-09-18 NOTE — LETTER
September 19, 2024     Anh Bailey MD  [address]  2nd Floor  [facility] [address]    Patient: Debbie Damon   YOB: 1950   Date of Visit: 9/18/2024       Dear Dr. Bailey:    Thank you for referring Debbie Damon to me for evaluation. Below are my notes for this consultation.    If you have questions, please do not hesitate to call me. I look forward to following your patient along with you.         Sincerely,        Rafaela Reyes MD        CC: DO Yenifer Llamas CRNP Joseph John [place], DO Harsh Bhalala, MD  9/18/2024  9:17 AM  Attested Addendum  [facility] [place]  [address] [address]  [phone]  [phone]   Date of Visit: 9/18/2024  Name: Debbie Damon   YOB: 1950       VISIT DIAGNOSIS:  Diagnoses and all orders for this visit:    Metastatic melanoma (HCC)  -     NM pet ct tumor imaging whole body; Future    High risk medication use    Adrenal insufficiency (HCC)      Oncology History   Metastatic melanoma (HCC)   10/13/2022 -  Cancer Staged    Staging form: Melanoma of the Skin, AJCC 8th Edition  - Clinical stage from 10/13/2022: Stage IV (cTX, cNX, cM1a) - Signed by Rafaela Reyes MD on 11/22/2022       10/13/2022 Biopsy    A. Skin, right lateral medial leg, punch biopsy:  Portion of severely atypical melanocytic dermal proliferation with prominent melanosis consistent with melanoma. See note.        B. Skin, right medial leg. punch biopsy:  Portion of severely atypical melanocytic dermal proliferation with prominent melanosis consistent with melanoma. See note.        C. Skin, right medial leg, punch biopsy:   Portion of severely atypical melanocytic dermal proliferation with prominent melanosis consistent with melanoma. See note.     11/22/2022 Initial Diagnosis    Metastatic melanoma (HCC)     12/14/2022 -  Chemotherapy    alteplase (CATHFLO), 2 mg, Intracatheter, Every 2 hour PRN, 23 of  27 cycles  NIVOLUMAB-RELATLIMAB-RMBW (OPDUALAG) IVPB, 480 mg of nivolumab, Intravenous, Once, 23 of 27 cycles  Administration: 480 mg of nivolumab (12/14/2022), 480 mg of nivolumab (1/11/2023), 480 mg of nivolumab (2/8/2023), 480 mg of nivolumab (3/8/2023), 480 mg of nivolumab (4/5/2023), 480 mg of nivolumab (5/3/2023), 480 mg of nivolumab (5/31/2023), 480 mg of nivolumab (6/28/2023), 480 mg of nivolumab (7/26/2023), 480 mg of nivolumab (8/23/2023), 480 mg of nivolumab (9/20/2023), 480 mg of nivolumab (10/18/2023), 480 mg of nivolumab (11/15/2023), 480 mg of nivolumab (12/13/2023), 480 mg of nivolumab (1/10/2024), 480 mg of nivolumab (2/7/2024), 480 mg of nivolumab (3/6/2024), 480 mg of nivolumab (4/3/2024), 480 mg of nivolumab (5/1/2024), 480 mg of nivolumab (5/29/2024), 480 mg of nivolumab (6/26/2024), 480 mg of nivolumab (7/24/2024), 480 mg of nivolumab (8/21/2024)     1/5/2023 Genomic Testing    Foundation One liquid testing  TMB 1 Mut/Mb  MSI-high not detected          Cancer Staging   Metastatic melanoma (HCC)  Staging form: Melanoma of the Skin, AJCC 8th Edition  - Clinical stage from 10/13/2022: Stage IV (cTX, cNX, cM1a) - Signed by Rafaela Reyes MD on 11/22/2022     Treatment Details   Treatment goal Curative   Plan Name OP Nivolumab and Relatlimab-rmbw Q 28 Days   Status Active   Start Date 12/14/2022   End Date 12/11/2024 (Planned)   Provider Rafaela Reyes MD   Chemotherapy alteplase (CATHFLO), 2 mg, Intracatheter, Every 2 hour PRN, 23 of 27 cycles    NIVOLUMAB-RELATLIMAB-RMBW (OPDUALAG) IVPB, 480 mg of nivolumab, Intravenous, Once, 23 of 27 cycles  Administration: 480 mg of nivolumab (12/14/2022), 480 mg of nivolumab (1/11/2023), 480 mg of nivolumab (2/8/2023), 480 mg of nivolumab (3/8/2023), 480 mg of nivolumab (4/5/2023), 480 mg of nivolumab (5/3/2023), 480 mg of nivolumab (5/31/2023), 480 mg of nivolumab (6/28/2023), 480 mg of nivolumab (7/26/2023), 480 mg of nivolumab (8/23/2023), 480 mg of  nivolumab (9/20/2023), 480 mg of nivolumab (10/18/2023), 480 mg of nivolumab (11/15/2023), 480 mg of nivolumab (12/13/2023), 480 mg of nivolumab (1/10/2024), 480 mg of nivolumab (2/7/2024), 480 mg of nivolumab (3/6/2024), 480 mg of nivolumab (4/3/2024), 480 mg of nivolumab (5/1/2024), 480 mg of nivolumab (5/29/2024), 480 mg of nivolumab (6/26/2024), 480 mg of nivolumab (7/24/2024), 480 mg of nivolumab (8/21/2024)          HISTORY OF PRESENT ILLNESS: Debbie Damon is a 74 y.o. female who was diagnosed with metastatic melanoma on right shin in 11/2022 and started on treatment with nivolumab plus relatlimab every 28 days with good response. She hasn't had any new lesions or metastasis. 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 Last PET in 05/2024 stable.     Subjective : Presents for routine follow up before her next infusion. Mentions feeling well with good energy and working without issues. She had some non-specific left heel pain that went away on its own and did not recur. No fever, chills, shortness of breath, cough, chest pain, blurry vision, headache, nausea, vomiting, diarrhea, rash. No new lesions noticed by her and no changes to the existing lesion. ECOG score 0.    Review of Systems   Constitutional:  Negative for appetite change, chills, fatigue, fever and unexpected weight change.   HENT:   Negative for mouth sores and trouble swallowing.    Eyes:  Negative for eye problems.   Respiratory:  Negative for cough and shortness of breath.    Cardiovascular:  Negative for chest pain and palpitations.   Gastrointestinal:  Negative for abdominal pain, diarrhea, nausea and vomiting.   Musculoskeletal:  Negative for myalgias.   Skin:  Negative for itching and rash.   Neurological:  Negative for dizziness, headaches and light-headedness.        MEDICATIONS:    Current Outpatient Medications:   •  apixaban (Eliquis) 5 mg,  Take 1 tablet (5 mg total) by mouth every 12 (twelve) hours, Disp: 60 tablet, Rfl: 5  •  famotidine (PEPCID) 10 mg tablet, Take 10 mg by mouth 2 (two) times a day, Disp: , Rfl:   •  hydrocortisone (CORTEF) 10 mg tablet, take 1 tablet by mouth twice a day, Disp: 60 tablet, Rfl: 11  •  loratadine (CLARITIN) 10 mg tablet, Take 10 mg by mouth daily, Disp: , Rfl:   •  losartan (Cozaar) 100 MG tablet, Take 1 tablet (100 mg total) by mouth daily, Disp: 30 tablet, Rfl: 5  •  metoprolol succinate (TOPROL-XL) 25 mg 24 hr tablet, Take 1 tablet (25 mg total) by mouth 2 (two) times a day, Disp: 60 tablet, Rfl: 5  •  Multiple Minerals-Vitamins (Citracal Plus) TABS, Take by mouth, Disp: , Rfl:   •  Multiple Vitamins-Minerals (MULTIVITAMIN WITH MINERALS) tablet, Take 1 tablet by mouth daily, Disp: , Rfl:   •  patient supplied medication, Pt takes eye drops Mura 128 - not listed in database, Disp: , Rfl:   •  Wheat Dextrin (BENEFIBER DRINK MIX PO), Take by mouth, Disp: , Rfl:      ALLERGIES:  Allergies   Allergen Reactions   • Etomidate Other (See Comments)        ACTIVE PROBLEMS:  Patient Active Problem List   Diagnosis   • Acute massive pulmonary embolism (HCC)   • Venous stasis of both lower extremities   • Clostridium difficile infection   • Right ventricular systolic dysfunction without heart failure   • Essential hypertension   • Metastatic melanoma (HCC)   • High risk medication use   • Adrenal insufficiency (HCC)   • Abnormal PET scan of colon   • Mild anemia   • Anticoagulated by anticoagulation treatment   • History of colon polyps   • Diverticulosis   • Abnormal finding on GI tract imaging          PAST MEDICAL HISTORY:   Past Medical History:   Diagnosis Date   • Acute deep vein thrombosis (DVT) of popliteal vein of left lower extremity (HCC)    • Ankle wound, left, initial encounter     Resolved 6/2017   • Ankle wound, right, initial encounter     resolved 6/2017   • DVT (deep vein thrombosis) in pregnancy    •  "Hypertension    • Pulmonary emboli (HCC)      post C section   • Pulmonary embolism (HCC)     2017   • Pulmonary embolism, blood-clot, obstetric    • Skin cancer    • Venous stasis ulcer (HCC)    • Venous ulcers of both lower extremities (HCC)         PAST SURGICAL HISTORY:  Past Surgical History:   Procedure Laterality Date   •  SECTION      X2        SOCIAL HISTORY:  Social History     Socioeconomic History   • Marital status:      Spouse name: None   • Number of children: None   • Years of education: None   • Highest education level: None   Occupational History   • None   Tobacco Use   • Smoking status: Never   • Smokeless tobacco: Never   Vaping Use   • Vaping status: Never Used   Substance and Sexual Activity   • Alcohol use: No     Comment: hx of social drinker   • Drug use: No   • Sexual activity: None   Other Topics Concern   • None   Social History Narrative   • None     Social Determinants of Health     Financial Resource Strain: Not on file   Food Insecurity: Not on file   Transportation Needs: Not on file   Physical Activity: Not on file   Stress: Not on file   Social Connections: Not on file   Intimate Partner Violence: Not on file   Housing Stability: Not on file        FAMILY HISTORY:  Family History   Problem Relation Age of Onset   • Heart attack Mother    • Stroke Mother    • Cancer Father    • Bone cancer Family    • Hypertension Family    • Lung cancer Family           Objective    PHYSICAL EXAMINATION:   Blood pressure 136/88, pulse 86, temperature 98 °F (36.7 °C), temperature source Temporal, resp. rate 17, height 5' 9\" (1.753 m), weight 91.2 kg (201 lb), SpO2 98%.     Pain Score: 0-No pain      Physical Exam  Constitutional:       General: She is not in acute distress.     Appearance: Normal appearance. She is well-developed.   Cardiovascular:      Rate and Rhythm: Normal rate and regular rhythm.      Heart sounds: Normal heart sounds. No murmur heard.  Pulmonary:      " Effort: Pulmonary effort is normal. No respiratory distress.      Breath sounds: Normal breath sounds. No wheezing or rales.   Abdominal:      Palpations: Abdomen is soft.      Tenderness: There is no abdominal tenderness.   Musculoskeletal:         General: No swelling.      Cervical back: Neck supple.   Skin:     General: Skin is warm and dry.      Findings: Lesion (right lateral leg appx 15cm from knee joint; oval symmetrical dark grey; prior melanoma lesion on right shin light grey colored without any new changes from prior) present. No rash.   Neurological:      General: No focal deficit present.      Mental Status: She is alert and oriented to person, place, and time.   Psychiatric:         Mood and Affect: Mood normal.         Behavior: Behavior normal.         I reviewed lab data in the chart.    WBC   Date Value Ref Range Status   09/14/2024 5.05 4.31 - 10.16 Thousand/uL Final   08/17/2024 5.57 4.31 - 10.16 Thousand/uL Final   07/20/2024 5.51 4.31 - 10.16 Thousand/uL Final     Hemoglobin   Date Value Ref Range Status   09/14/2024 12.3 11.5 - 15.4 g/dL Final   08/17/2024 12.4 11.5 - 15.4 g/dL Final   07/20/2024 13.2 11.5 - 15.4 g/dL Final     Platelets   Date Value Ref Range Status   09/14/2024 187 149 - 390 Thousands/uL Final   08/17/2024 192 149 - 390 Thousands/uL Final   07/20/2024 191 149 - 390 Thousands/uL Final     MCV   Date Value Ref Range Status   09/14/2024 92 82 - 98 fL Final   08/17/2024 91 82 - 98 fL Final   07/20/2024 90 82 - 98 fL Final      Potassium   Date Value Ref Range Status   09/14/2024 4.3 3.5 - 5.3 mmol/L Final   08/17/2024 4.1 3.5 - 5.3 mmol/L Final   07/20/2024 4.2 3.5 - 5.3 mmol/L Final   05/06/2019 3.8 3.5 - 5.2 mmol/L Final   05/05/2019 4.1 3.5 - 5.2 mmol/L Final   05/04/2019 3.9 3.5 - 5.2 mmol/L Final     Chloride   Date Value Ref Range Status   09/14/2024 104 96 - 108 mmol/L Final   08/17/2024 103 96 - 108 mmol/L Final   07/20/2024 105 96 - 108 mmol/L Final   05/06/2019 104 100  - 109 mmol/L Final   05/05/2019 101 100 - 109 mmol/L Final   05/04/2019 104 100 - 109 mmol/L Final     Carbon Dioxide   Date Value Ref Range Status   05/06/2019 31 23 - 31 mmol/L Final   05/05/2019 28 23 - 31 mmol/L Final   05/04/2019 26 23 - 31 mmol/L Final     CO2   Date Value Ref Range Status   09/14/2024 30 21 - 32 mmol/L Final   08/17/2024 28 21 - 32 mmol/L Final   07/20/2024 28 21 - 32 mmol/L Final     BUN   Date Value Ref Range Status   09/14/2024 24 5 - 25 mg/dL Final   08/17/2024 22 5 - 25 mg/dL Final   07/20/2024 22 5 - 25 mg/dL Final   05/06/2019 15 7 - 25 mg/dL Final   05/05/2019 11 7 - 25 mg/dL Final   05/04/2019 11 7 - 25 mg/dL Final     Creatinine   Date Value Ref Range Status   09/14/2024 0.93 0.60 - 1.30 mg/dL Final     Comment:     Standardized to IDMS reference method   08/17/2024 0.93 0.60 - 1.30 mg/dL Final     Comment:     Standardized to IDMS reference method   07/20/2024 0.79 0.60 - 1.30 mg/dL Final     Comment:     Standardized to IDMS reference method   05/06/2019 0.60 0.40 - 1.10 mg/dL Final   05/05/2019 0.53 0.40 - 1.10 mg/dL Final   05/04/2019 0.52 0.40 - 1.10 mg/dL Final     Glucose   Date Value Ref Range Status   12/11/2023 88 65 - 140 mg/dL Final     Comment:     If the patient is fasting, the ADA then defines impaired fasting glucose as > 100 mg/dL and diabetes as > or equal to 123 mg/dL.   11/17/2022 85 65 - 140 mg/dL Final     Comment:     If the patient is fasting, the ADA then defines impaired fasting glucose as > 100 mg/dL and diabetes as > or equal to 123 mg/dL.  Specimen collection should occur prior to Sulfasalazine administration due to the potential for falsely depressed results. Specimen collection should occur prior to Sulfapyridine administration due to the potential for falsely elevated results.   08/03/2020 112 (H) 65 - 99 mg/dL Final     Comment:     If the patient is fasting, the ADA then defines impaired fasting glucose as > 100 mg/dL and diabetes as > or equal to  123 mg/dL.  Specimen collection should occur prior to Sulfasalazine administration due to the potential for falsely depressed results. Specimen collection should occur prior to Sulfapyridine administration due to the potential for falsely elevated results.   05/06/2019 104 (H) 65 - 99 mg/dL Final   05/05/2019 109 (H) 65 - 99 mg/dL Final   05/04/2019 124 (H) 65 - 99 mg/dL Final     Calcium   Date Value Ref Range Status   09/14/2024 9.0 8.4 - 10.2 mg/dL Final   08/17/2024 9.2 8.4 - 10.2 mg/dL Final   07/20/2024 9.1 8.4 - 10.2 mg/dL Final   05/06/2019 8.5 8.5 - 10.1 mg/dL Final   05/05/2019 8.3 (L) 8.5 - 10.1 mg/dL Final   05/04/2019 8.1 (L) 8.5 - 10.1 mg/dL Final     Albumin   Date Value Ref Range Status   09/14/2024 3.8 3.5 - 5.0 g/dL Final   08/17/2024 3.8 3.5 - 5.0 g/dL Final   07/20/2024 3.7 3.5 - 5.0 g/dL Final     ALBUMIN   Date Value Ref Range Status   05/03/2019 3.5 3.5 - 4.8 g/dL Final     Total Bilirubin   Date Value Ref Range Status   09/14/2024 0.47 0.20 - 1.00 mg/dL Final     Comment:     Use of this assay is not recommended for patients undergoing treatment with eltrombopag due to the potential for falsely elevated results.  N-acetyl-p-benzoquinone imine (metabolite of Acetaminophen) will generate erroneously low results in samples for patients that have taken an overdose of Acetaminophen.   08/17/2024 0.54 0.20 - 1.00 mg/dL Final     Comment:     Use of this assay is not recommended for patients undergoing treatment with eltrombopag due to the potential for falsely elevated results.  N-acetyl-p-benzoquinone imine (metabolite of Acetaminophen) will generate erroneously low results in samples for patients that have taken an overdose of Acetaminophen.   07/20/2024 0.51 0.20 - 1.00 mg/dL Final     Comment:     Use of this assay is not recommended for patients undergoing treatment with eltrombopag due to the potential for falsely elevated results.  N-acetyl-p-benzoquinone imine (metabolite of Acetaminophen)  "will generate erroneously low results in samples for patients that have taken an overdose of Acetaminophen.   05/03/2019 0.5 0.2 - 1.0 mg/dL Final     Alkaline Phosphatase   Date Value Ref Range Status   09/14/2024 45 34 - 104 U/L Final   08/17/2024 47 34 - 104 U/L Final   07/20/2024 43 34 - 104 U/L Final   05/03/2019 54 35 - 120 U/L Final     AST   Date Value Ref Range Status   09/14/2024 16 13 - 39 U/L Final   08/17/2024 18 13 - 39 U/L Final   07/20/2024 14 13 - 39 U/L Final   05/03/2019 26 <41 U/L Final     ALT   Date Value Ref Range Status   09/14/2024 13 7 - 52 U/L Final     Comment:     Specimen collection should occur prior to Sulfasalazine administration due to the potential for falsely depressed results.    08/17/2024 15 7 - 52 U/L Final     Comment:     Specimen collection should occur prior to Sulfasalazine administration due to the potential for falsely depressed results.    07/20/2024 13 7 - 52 U/L Final     Comment:     Specimen collection should occur prior to Sulfasalazine administration due to the potential for falsely depressed results.    05/03/2019 25 <56 U/L Final      LD   Date Value Ref Range Status   09/14/2024 170 140 - 271 U/L Final   08/17/2024 176 140 - 271 U/L Final   07/20/2024 128 (L) 140 - 271 U/L Final     No results found for: \"TSH\"  No results found for: \"H7ZAUNJ\"   Free T4   Date Value Ref Range Status   09/14/2024 1.01 0.61 - 1.12 ng/dL Final     Comment:     Specimens with biotin concentrations > 10 ng/mL can lead to significant (> 10%) positive bias in result.   08/17/2024 1.02 0.61 - 1.12 ng/dL Final     Comment:     Specimens with biotin concentrations > 10 ng/mL can lead to significant (> 10%) positive bias in result.   07/20/2024 0.86 0.61 - 1.12 ng/dL Final     Comment:     Specimens with biotin concentrations > 10 ng/mL can lead to significant (> 10%) positive bias in result.         RECENT IMAGING:  No results found.     Assessment/Plan  Debbie Damon is a 74/F with " history of metastatic melanoma on treatment with nivolumab and relatlimab here for continued monitoring, follow up and surveillance.     1. Metastatic melanoma (HCC)  Patient on nivolumab plus relatlimab since 12/2022 and has been doing well. She had adrenal insufficiency secondary to immunotherapy early on in her treatment course which improved with hydrocortisone 10 mg BID and since then has not had any issues with immunotherapy tolerance. Recent labs reviewed and stable.  Due for the next cycle today and okay to continue. She is also due for her PET scan which was ordered today.   She knows to monitor for side effects and call our office with any issue.     -     NM pet ct tumor imaging whole body; Future; Expected date: 10/18/2024    2. High risk medication use  3. Adrenal insufficiency (HCC)  The patient is on treatment with immunotherapy and we will continue to monitor for side effects and lab abnormalities. We will monitor labs with each treatment to ensure safety of continuing on treatment. The patient knows to watch for signs and symptoms for immune mediated side effects. Denies any immune mediated side effects at this time.     On hydrocortisone 10 mg BID without any issues    Follow up in 4 weeks    Johnson Gaston MD  PGY-3, Internal Medicine  [facility]  Attestation signed by Rafaela Reyes MD at 9/19/2024  5:28 PM:  Attending Attestation:    I reviewed the care furnished by the Resident/Fellow during the visit on 9/18/2024.  I was present in the office and personally saw and examined the patient.    Ms. Damon is a 74-year-old female with metastatic melanoma started on treatment with nivolumab plus relatlimab with response to treatment and resolution of pigmented area in the right lower extremity here for continued monitoring, follow-up and surveillance while on treatment.  She is doing well.  Feels good.  No issues concerns or complaints.  Energy is good.  Eating well.   Denies any new, changing, concerning skin lesions.  Denies lymphadenopathy.  Denies any immune mediated side effects.  Denies headaches, double vision, rash, itching, chest pain, shortness of breath, diarrhea.  No muscle weakness or fatigue.    On exam, ECOG PS 0.  Almost complete resolution of pigmentation in the right lower extremity mainly on the medial side of shin and calf.  Has an area of brown/gray pigmentation on the right upper outer calf appears to be healed spot of previous bug bite or trauma.  Not concerning for melanoma.  No lymphadenopathy.    Ms. Damon is a 74-year-old female with metastatic melanoma on treatment with nivolumab plus relatlimab with response to treatment here for continued monitoring, follow-up and surveillance while on treatment.  Is doing well and tolerating treatment.  Labs reviewed and okay for her to continue with her neck cycle of nivolumab plus relatlimab.  She knows to continue to monitor for any immune mediated side effects.  She knows to call with issues or concerns prior to her next visit.  She is standing orders for blood work prior to each treatment.    Continues to wear her adrenal insufficiency medical alert bracelet.  Continues on hydrocortisone without any side effects at this time.  She knows to call with issues or concerns prior to her next treatment.  She will return in 1 month for her next infusion.    Rafaela Reyes MD, PhD

## 2024-09-18 NOTE — PROGRESS NOTES
Recent labs reviewed. Patient tolerated Opdualag treatment well with no issues.     Debbie Damon is aware of future appointment on 10/16/24 at 1400.      AVS declined by Debbie Damon, patient has MyChart.

## 2024-09-18 NOTE — PROGRESS NOTES
[facility] [place]  [address] [facility]  [place] [id number]  [phone]   Date of Visit: 9/18/2024  Name: Debbie Damon   YOB: 1950       VISIT DIAGNOSIS:  Diagnoses and all orders for this visit:    Metastatic melanoma (HCC)  -     NM pet ct tumor imaging whole body; Future    High risk medication use    Adrenal insufficiency (HCC)      Oncology History   Metastatic melanoma (HCC)   10/13/2022 -  Cancer Staged    Staging form: Melanoma of the Skin, AJCC 8th Edition  - Clinical stage from 10/13/2022: Stage IV (cTX, cNX, cM1a) - Signed by Rafaela Reyes MD on 11/22/2022       10/13/2022 Biopsy    A. Skin, right lateral medial leg, punch biopsy:  Portion of severely atypical melanocytic dermal proliferation with prominent melanosis consistent with melanoma. See note.        B. Skin, right medial leg. punch biopsy:  Portion of severely atypical melanocytic dermal proliferation with prominent melanosis consistent with melanoma. See note.        C. Skin, right medial leg, punch biopsy:   Portion of severely atypical melanocytic dermal proliferation with prominent melanosis consistent with melanoma. See note.     11/22/2022 Initial Diagnosis    Metastatic melanoma (HCC)     12/14/2022 -  Chemotherapy    alteplase (CATHFLO), 2 mg, Intracatheter, Every 2 hour PRN, 23 of 27 cycles  NIVOLUMAB-RELATLIMAB-RMBW (OPDUALAG) IVPB, 480 mg of nivolumab, Intravenous, Once, 23 of 27 cycles  Administration: 480 mg of nivolumab (12/14/2022), 480 mg of nivolumab (1/11/2023), 480 mg of nivolumab (2/8/2023), 480 mg of nivolumab (3/8/2023), 480 mg of nivolumab (4/5/2023), 480 mg of nivolumab (5/3/2023), 480 mg of nivolumab (5/31/2023), 480 mg of nivolumab (6/28/2023), 480 mg of nivolumab (7/26/2023), 480 mg of nivolumab (8/23/2023), 480 mg of nivolumab (9/20/2023), 480 mg of nivolumab (10/18/2023), 480 mg of nivolumab (11/15/2023), 480 mg of nivolumab (12/13/2023), 480 mg of  nivolumab (1/10/2024), 480 mg of nivolumab (2/7/2024), 480 mg of nivolumab (3/6/2024), 480 mg of nivolumab (4/3/2024), 480 mg of nivolumab (5/1/2024), 480 mg of nivolumab (5/29/2024), 480 mg of nivolumab (6/26/2024), 480 mg of nivolumab (7/24/2024), 480 mg of nivolumab (8/21/2024)     1/5/2023 Genomic Testing    [facility] liquid testing  TMB 1 Mut/Mb  MSI-high not detected          Cancer Staging   Metastatic melanoma (HCC)  Staging form: Melanoma of the Skin, AJCC 8th Edition  - Clinical stage from 10/13/2022: Stage IV (cTX, cNX, cM1a) - Signed by Rafaela Reyes MD on 11/22/2022     Treatment Details   Treatment goal Curative   Plan Name OP Nivolumab and Relatlimab-rmbw Q 28 Days   Status Active   Start Date 12/14/2022   End Date 12/11/2024 (Planned)   Provider Rafaela Reyes MD   Chemotherapy alteplase (CATHFLO), 2 mg, Intracatheter, Every 2 hour PRN, 23 of 27 cycles    NIVOLUMAB-RELATLIMAB-RMBW (OPDUALAG) IVPB, 480 mg of nivolumab, Intravenous, Once, 23 of 27 cycles  Administration: 480 mg of nivolumab (12/14/2022), 480 mg of nivolumab (1/11/2023), 480 mg of nivolumab (2/8/2023), 480 mg of nivolumab (3/8/2023), 480 mg of nivolumab (4/5/2023), 480 mg of nivolumab (5/3/2023), 480 mg of nivolumab (5/31/2023), 480 mg of nivolumab (6/28/2023), 480 mg of nivolumab (7/26/2023), 480 mg of nivolumab (8/23/2023), 480 mg of nivolumab (9/20/2023), 480 mg of nivolumab (10/18/2023), 480 mg of nivolumab (11/15/2023), 480 mg of nivolumab (12/13/2023), 480 mg of nivolumab (1/10/2024), 480 mg of nivolumab (2/7/2024), 480 mg of nivolumab (3/6/2024), 480 mg of nivolumab (4/3/2024), 480 mg of nivolumab (5/1/2024), 480 mg of nivolumab (5/29/2024), 480 mg of nivolumab (6/26/2024), 480 mg of nivolumab (7/24/2024), 480 mg of nivolumab (8/21/2024)          HISTORY OF PRESENT ILLNESS: Debbie Damon is a 74 y.o. female who was diagnosed with metastatic melanoma on right shin in 11/2022 and started on treatment with nivolumab plus  relatlimab every 28 days with good response. She hasn't had any new lesions or metastasis. 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 Last PET in 05/2024 stable.     Subjective : Presents for routine follow up before her next infusion. Mentions feeling well with good energy and working without issues. She had some non-specific left heel pain that went away on its own and did not recur. No fever, chills, shortness of breath, cough, chest pain, blurry vision, headache, nausea, vomiting, diarrhea, rash. No new lesions noticed by her and no changes to the existing lesion. ECOG score 0.    Review of Systems   Constitutional:  Negative for appetite change, chills, fatigue, fever and unexpected weight change.   HENT:   Negative for mouth sores and trouble swallowing.    Eyes:  Negative for eye problems.   Respiratory:  Negative for cough and shortness of breath.    Cardiovascular:  Negative for chest pain and palpitations.   Gastrointestinal:  Negative for abdominal pain, diarrhea, nausea and vomiting.   Musculoskeletal:  Negative for myalgias.   Skin:  Negative for itching and rash.   Neurological:  Negative for dizziness, headaches and light-headedness.        MEDICATIONS:    Current Outpatient Medications:     apixaban (Eliquis) 5 mg, Take 1 tablet (5 mg total) by mouth every 12 (twelve) hours, Disp: 60 tablet, Rfl: 5    famotidine (PEPCID) 10 mg tablet, Take 10 mg by mouth 2 (two) times a day, Disp: , Rfl:     hydrocortisone (CORTEF) 10 mg tablet, take 1 tablet by mouth twice a day, Disp: 60 tablet, Rfl: 11    loratadine (CLARITIN) 10 mg tablet, Take 10 mg by mouth daily, Disp: , Rfl:     losartan (Cozaar) 100 MG tablet, Take 1 tablet (100 mg total) by mouth daily, Disp: 30 tablet, Rfl: 5    metoprolol succinate (TOPROL-XL) 25 mg 24 hr tablet, Take 1 tablet (25 mg total) by mouth 2 (two) times a day, Disp: 60 tablet, Rfl:  5    Multiple Minerals-Vitamins (Citracal Plus) TABS, Take by mouth, Disp: , Rfl:     Multiple Vitamins-Minerals (MULTIVITAMIN WITH MINERALS) tablet, Take 1 tablet by mouth daily, Disp: , Rfl:     patient supplied medication, Pt takes eye drops Mura 128 - not listed in database, Disp: , Rfl:     Wheat Dextrin (BENEFIBER DRINK MIX PO), Take by mouth, Disp: , Rfl:      ALLERGIES:  Allergies   Allergen Reactions    Etomidate Other (See Comments)        ACTIVE PROBLEMS:  Patient Active Problem List   Diagnosis    Acute massive pulmonary embolism (HCC)    Venous stasis of both lower extremities    Clostridium difficile infection    Right ventricular systolic dysfunction without heart failure    Essential hypertension    Metastatic melanoma (HCC)    High risk medication use    Adrenal insufficiency (HCC)    Abnormal PET scan of colon    Mild anemia    Anticoagulated by anticoagulation treatment    History of colon polyps    Diverticulosis    Abnormal finding on GI tract imaging          PAST MEDICAL HISTORY:   Past Medical History:   Diagnosis Date    Acute deep vein thrombosis (DVT) of popliteal vein of left lower extremity (HCC)     Ankle wound, left, initial encounter     Resolved 2017    Ankle wound, right, initial encounter     resolved 2017    DVT (deep vein thrombosis) in pregnancy     Hypertension     Pulmonary emboli (HCC)      post C section    Pulmonary embolism (HCC)     2017    Pulmonary embolism, blood-clot, obstetric     Skin cancer     Venous stasis ulcer (HCC)     Venous ulcers of both lower extremities (HCC)         PAST SURGICAL HISTORY:  Past Surgical History:   Procedure Laterality Date     SECTION      X2        SOCIAL HISTORY:  Social History     Socioeconomic History    Marital status:      Spouse name: None    Number of children: None    Years of education: None    Highest education level: None   Occupational History    None   Tobacco Use    Smoking status: Never     "Smokeless tobacco: Never   Vaping Use    Vaping status: Never Used   Substance and Sexual Activity    Alcohol use: No     Comment: hx of social drinker    Drug use: No    Sexual activity: None   Other Topics Concern    None   Social History Narrative    None     Social Determinants of Health     Financial Resource Strain: Not on file   Food Insecurity: Not on file   Transportation Needs: Not on file   Physical Activity: Not on file   Stress: Not on file   Social Connections: Not on file   Intimate Partner Violence: Not on file   Housing Stability: Not on file        FAMILY HISTORY:  Family History   Problem Relation Age of Onset    Heart attack Mother     Stroke Mother     Cancer Father     Bone cancer Family     Hypertension Family     Lung cancer Family           Objective    PHYSICAL EXAMINATION:   Blood pressure 136/88, pulse 86, temperature 98 °F (36.7 °C), temperature source Temporal, resp. rate 17, height 5' 9\" (1.753 m), weight 91.2 kg (201 lb), SpO2 98%.     Pain Score: 0-No pain      Physical Exam  Constitutional:       General: She is not in acute distress.     Appearance: Normal appearance. She is well-developed.   Cardiovascular:      Rate and Rhythm: Normal rate and regular rhythm.      Heart sounds: Normal heart sounds. No murmur heard.  Pulmonary:      Effort: Pulmonary effort is normal. No respiratory distress.      Breath sounds: Normal breath sounds. No wheezing or rales.   Abdominal:      Palpations: Abdomen is soft.      Tenderness: There is no abdominal tenderness.   Musculoskeletal:         General: No swelling.      Cervical back: Neck supple.   Skin:     General: Skin is warm and dry.      Findings: Lesion (right lateral leg appx 15cm from knee joint; oval symmetrical dark grey; prior melanoma lesion on right shin light grey colored without any new changes from prior) present. No rash.   Neurological:      General: No focal deficit present.      Mental Status: She is alert and oriented to " person, place, and time.   Psychiatric:         Mood and Affect: Mood normal.         Behavior: Behavior normal.         I reviewed lab data in the chart.    WBC   Date Value Ref Range Status   09/14/2024 5.05 4.31 - 10.16 Thousand/uL Final   08/17/2024 5.57 4.31 - 10.16 Thousand/uL Final   07/20/2024 5.51 4.31 - 10.16 Thousand/uL Final     Hemoglobin   Date Value Ref Range Status   09/14/2024 12.3 11.5 - 15.4 g/dL Final   08/17/2024 12.4 11.5 - 15.4 g/dL Final   07/20/2024 13.2 11.5 - 15.4 g/dL Final     Platelets   Date Value Ref Range Status   09/14/2024 187 149 - 390 Thousands/uL Final   08/17/2024 192 149 - 390 Thousands/uL Final   07/20/2024 191 149 - 390 Thousands/uL Final     MCV   Date Value Ref Range Status   09/14/2024 92 82 - 98 fL Final   08/17/2024 91 82 - 98 fL Final   07/20/2024 90 82 - 98 fL Final      Potassium   Date Value Ref Range Status   09/14/2024 4.3 3.5 - 5.3 mmol/L Final   08/17/2024 4.1 3.5 - 5.3 mmol/L Final   07/20/2024 4.2 3.5 - 5.3 mmol/L Final   05/06/2019 3.8 3.5 - 5.2 mmol/L Final   05/05/2019 4.1 3.5 - 5.2 mmol/L Final   05/04/2019 3.9 3.5 - 5.2 mmol/L Final     Chloride   Date Value Ref Range Status   09/14/2024 104 96 - 108 mmol/L Final   08/17/2024 103 96 - 108 mmol/L Final   07/20/2024 105 96 - 108 mmol/L Final   05/06/2019 104 100 - 109 mmol/L Final   05/05/2019 101 100 - 109 mmol/L Final   05/04/2019 104 100 - 109 mmol/L Final     Carbon Dioxide   Date Value Ref Range Status   05/06/2019 31 23 - 31 mmol/L Final   05/05/2019 28 23 - 31 mmol/L Final   05/04/2019 26 23 - 31 mmol/L Final     CO2   Date Value Ref Range Status   09/14/2024 30 21 - 32 mmol/L Final   08/17/2024 28 21 - 32 mmol/L Final   07/20/2024 28 21 - 32 mmol/L Final     BUN   Date Value Ref Range Status   09/14/2024 24 5 - 25 mg/dL Final   08/17/2024 22 5 - 25 mg/dL Final   07/20/2024 22 5 - 25 mg/dL Final   05/06/2019 15 7 - 25 mg/dL Final   05/05/2019 11 7 - 25 mg/dL Final   05/04/2019 11 7 - 25 mg/dL Final      Creatinine   Date Value Ref Range Status   09/14/2024 0.93 0.60 - 1.30 mg/dL Final     Comment:     Standardized to IDMS reference method   08/17/2024 0.93 0.60 - 1.30 mg/dL Final     Comment:     Standardized to IDMS reference method   07/20/2024 0.79 0.60 - 1.30 mg/dL Final     Comment:     Standardized to IDMS reference method   05/06/2019 0.60 0.40 - 1.10 mg/dL Final   05/05/2019 0.53 0.40 - 1.10 mg/dL Final   05/04/2019 0.52 0.40 - 1.10 mg/dL Final     Glucose   Date Value Ref Range Status   12/11/2023 88 65 - 140 mg/dL Final     Comment:     If the patient is fasting, the ADA then defines impaired fasting glucose as > 100 mg/dL and diabetes as > or equal to 123 mg/dL.   11/17/2022 85 65 - 140 mg/dL Final     Comment:     If the patient is fasting, the ADA then defines impaired fasting glucose as > 100 mg/dL and diabetes as > or equal to 123 mg/dL.  Specimen collection should occur prior to Sulfasalazine administration due to the potential for falsely depressed results. Specimen collection should occur prior to Sulfapyridine administration due to the potential for falsely elevated results.   08/03/2020 112 (H) 65 - 99 mg/dL Final     Comment:     If the patient is fasting, the ADA then defines impaired fasting glucose as > 100 mg/dL and diabetes as > or equal to 123 mg/dL.  Specimen collection should occur prior to Sulfasalazine administration due to the potential for falsely depressed results. Specimen collection should occur prior to Sulfapyridine administration due to the potential for falsely elevated results.   05/06/2019 104 (H) 65 - 99 mg/dL Final   05/05/2019 109 (H) 65 - 99 mg/dL Final   05/04/2019 124 (H) 65 - 99 mg/dL Final     Calcium   Date Value Ref Range Status   09/14/2024 9.0 8.4 - 10.2 mg/dL Final   08/17/2024 9.2 8.4 - 10.2 mg/dL Final   07/20/2024 9.1 8.4 - 10.2 mg/dL Final   05/06/2019 8.5 8.5 - 10.1 mg/dL Final   05/05/2019 8.3 (L) 8.5 - 10.1 mg/dL Final   05/04/2019 8.1 (L) 8.5 - 10.1  mg/dL Final     Albumin   Date Value Ref Range Status   09/14/2024 3.8 3.5 - 5.0 g/dL Final   08/17/2024 3.8 3.5 - 5.0 g/dL Final   07/20/2024 3.7 3.5 - 5.0 g/dL Final     ALBUMIN   Date Value Ref Range Status   05/03/2019 3.5 3.5 - 4.8 g/dL Final     Total Bilirubin   Date Value Ref Range Status   09/14/2024 0.47 0.20 - 1.00 mg/dL Final     Comment:     Use of this assay is not recommended for patients undergoing treatment with eltrombopag due to the potential for falsely elevated results.  N-acetyl-p-benzoquinone imine (metabolite of Acetaminophen) will generate erroneously low results in samples for patients that have taken an overdose of Acetaminophen.   08/17/2024 0.54 0.20 - 1.00 mg/dL Final     Comment:     Use of this assay is not recommended for patients undergoing treatment with eltrombopag due to the potential for falsely elevated results.  N-acetyl-p-benzoquinone imine (metabolite of Acetaminophen) will generate erroneously low results in samples for patients that have taken an overdose of Acetaminophen.   07/20/2024 0.51 0.20 - 1.00 mg/dL Final     Comment:     Use of this assay is not recommended for patients undergoing treatment with eltrombopag due to the potential for falsely elevated results.  N-acetyl-p-benzoquinone imine (metabolite of Acetaminophen) will generate erroneously low results in samples for patients that have taken an overdose of Acetaminophen.   05/03/2019 0.5 0.2 - 1.0 mg/dL Final     Alkaline Phosphatase   Date Value Ref Range Status   09/14/2024 45 34 - 104 U/L Final   08/17/2024 47 34 - 104 U/L Final   07/20/2024 43 34 - 104 U/L Final   05/03/2019 54 35 - 120 U/L Final     AST   Date Value Ref Range Status   09/14/2024 16 13 - 39 U/L Final   08/17/2024 18 13 - 39 U/L Final   07/20/2024 14 13 - 39 U/L Final   05/03/2019 26 <41 U/L Final     ALT   Date Value Ref Range Status   09/14/2024 13 7 - 52 U/L Final     Comment:     Specimen collection should occur prior to Sulfasalazine  "administration due to the potential for falsely depressed results.    08/17/2024 15 7 - 52 U/L Final     Comment:     Specimen collection should occur prior to Sulfasalazine administration due to the potential for falsely depressed results.    07/20/2024 13 7 - 52 U/L Final     Comment:     Specimen collection should occur prior to Sulfasalazine administration due to the potential for falsely depressed results.    05/03/2019 25 <56 U/L Final      LD   Date Value Ref Range Status   09/14/2024 170 140 - 271 U/L Final   08/17/2024 176 140 - 271 U/L Final   07/20/2024 128 (L) 140 - 271 U/L Final     No results found for: \"TSH\"  No results found for: \"Z7JXPAQ\"   Free T4   Date Value Ref Range Status   09/14/2024 1.01 0.61 - 1.12 ng/dL Final     Comment:     Specimens with biotin concentrations > 10 ng/mL can lead to significant (> 10%) positive bias in result.   08/17/2024 1.02 0.61 - 1.12 ng/dL Final     Comment:     Specimens with biotin concentrations > 10 ng/mL can lead to significant (> 10%) positive bias in result.   07/20/2024 0.86 0.61 - 1.12 ng/dL Final     Comment:     Specimens with biotin concentrations > 10 ng/mL can lead to significant (> 10%) positive bias in result.         RECENT IMAGING:  No results found.     Assessment/Plan  Debbie Damon is a 74/F with history of metastatic melanoma on treatment with nivolumab and relatlimab here for continued monitoring, follow up and surveillance.     1. Metastatic melanoma (HCC)  Patient on nivolumab plus relatlimab since 12/2022 and has been doing well. She had adrenal insufficiency secondary to immunotherapy early on in her treatment course which improved with hydrocortisone 10 mg BID and since then has not had any issues with immunotherapy tolerance. Recent labs reviewed and stable.  Due for the next cycle today and okay to continue. She is also due for her PET scan which was ordered today.   She knows to monitor for side effects and call our office with any " issue.     -     NM pet ct tumor imaging whole body; Future; Expected date: 10/18/2024    2. High risk medication use  3. Adrenal insufficiency (HCC)  The patient is on treatment with immunotherapy and we will continue to monitor for side effects and lab abnormalities. We will monitor labs with each treatment to ensure safety of continuing on treatment. The patient knows to watch for signs and symptoms for immune mediated side effects. Denies any immune mediated side effects at this time.     On hydrocortisone 10 mg BID without any issues    Follow up in 4 weeks    Johnson Gaston MD  PGY-3, Internal Medicine  [facility]

## 2024-09-19 ENCOUNTER — RA CDI HCC (OUTPATIENT)
Dept: OTHER | Facility: HOSPITAL | Age: 74
End: 2024-09-19

## 2024-09-19 NOTE — PROGRESS NOTES
HCC coding opportunities          Chart Reviewed number of suggestions sent to Provider: 1   I26.99    Patients Insurance        Commercial Insurance: [employer]

## 2024-09-20 DIAGNOSIS — I26.99 OTHER PULMONARY EMBOLISM WITHOUT ACUTE COR PULMONALE, UNSPECIFIED CHRONICITY (HCC): ICD-10-CM

## 2024-09-20 DIAGNOSIS — I10 ESSENTIAL HYPERTENSION: ICD-10-CM

## 2024-09-21 RX ORDER — METOPROLOL SUCCINATE 25 MG/1
25 TABLET, EXTENDED RELEASE ORAL 2 TIMES DAILY
Qty: 60 TABLET | Refills: 5 | Status: SHIPPED | OUTPATIENT
Start: 2024-09-21

## 2024-09-26 ENCOUNTER — TELEPHONE (OUTPATIENT)
Dept: FAMILY MEDICINE CLINIC | Facility: CLINIC | Age: 74
End: 2024-09-26

## 2024-09-26 NOTE — TELEPHONE ENCOUNTER
9/26 LEFT VM ON PT TWO TELEPHONE NUMBERS TO CALL BACK AND R/S TODAY'S APPT WITH DR LOVING AS HE WILL NOT BE IN THE OFFICE, ALSO LEFT VM WITH CARMEN ALMEIDA ,DAUGHTER FOR PT TO CALL AND R/S   None Done

## 2024-10-08 ENCOUNTER — OFFICE VISIT (OUTPATIENT)
Dept: FAMILY MEDICINE CLINIC | Facility: CLINIC | Age: 74
End: 2024-10-08
Payer: COMMERCIAL

## 2024-10-08 VITALS
TEMPERATURE: 97.6 F | DIASTOLIC BLOOD PRESSURE: 74 MMHG | WEIGHT: 200 LBS | BODY MASS INDEX: 29.62 KG/M2 | SYSTOLIC BLOOD PRESSURE: 122 MMHG | HEIGHT: 69 IN

## 2024-10-08 DIAGNOSIS — H04.123 DRY EYE SYNDROME OF BOTH EYES: Primary | ICD-10-CM

## 2024-10-08 PROCEDURE — 99214 OFFICE O/P EST MOD 30 MIN: CPT | Performed by: FAMILY MEDICINE

## 2024-10-08 RX ORDER — SILICONE ADHESIVE 1.5" X 3"
1 SHEET (EA) TOPICAL AS NEEDED
Start: 2024-10-08

## 2024-10-08 NOTE — PROGRESS NOTES
Ambulatory Visit  Name: Debbie Damon      : 1950      MRN: [id number]  Encounter Provider: Juan Carlos Perry DO  Encounter Date: 10/8/2024   Encounter department: [facility]    Assessment & Plan  Dry eye syndrome of both eyes    Orders:    sodium chloride (LAURI 128) 5 % hypertonic ophthalmic solution; Administer 1 drop to both eyes as needed (Corneal dryness)       History of Present Illness     Is Nelson here follow-up visit looks absolutely fantastic her melanoma is gone following up with Dr. Reyes he is having PET scans every 3 months offered flu shot patient does not take them          Review of Systems   Constitutional:  Negative for activity change, appetite change, diaphoresis, fatigue and fever.   HENT: Negative.  Negative for dental problem and hearing loss.    Eyes:  Positive for visual disturbance.   Respiratory:  Negative for apnea, cough, chest tightness, shortness of breath and wheezing.    Cardiovascular:  Negative for chest pain, palpitations and leg swelling.   Gastrointestinal:  Negative for abdominal distention, abdominal pain, anal bleeding, constipation, diarrhea, nausea and vomiting.   Endocrine: Negative for cold intolerance, heat intolerance, polydipsia, polyphagia and polyuria.   Genitourinary:  Negative for difficulty urinating, dysuria, flank pain, hematuria and urgency.   Musculoskeletal:  Negative for arthralgias, back pain, gait problem, joint swelling and myalgias.   Skin:  Negative for color change, rash and wound.   Allergic/Immunologic: Negative for environmental allergies, food allergies and immunocompromised state.   Neurological:  Negative for dizziness, seizures, syncope, speech difficulty, numbness and headaches.   Hematological:  Negative for adenopathy. Does not bruise/bleed easily.   Psychiatric/Behavioral:  Negative for agitation, behavioral problems, hallucinations, sleep disturbance and suicidal ideas.            Objective     /74 (BP Location:  "Left arm, Patient Position: Sitting, Cuff Size: Standard)   Temp 97.6 °F (36.4 °C) (Temporal)   Ht 5' 9\" (1.753 m)   Wt 90.7 kg (200 lb)   BMI 29.53 kg/m²     Physical Exam  Constitutional:       Appearance: She is well-developed.   HENT:      Head: Normocephalic and atraumatic.      Right Ear: External ear normal.      Left Ear: External ear normal.      Nose: Nose normal.   Eyes:      Conjunctiva/sclera: Conjunctivae normal.      Pupils: Pupils are equal, round, and reactive to light.   Cardiovascular:      Rate and Rhythm: Normal rate and regular rhythm.      Heart sounds: Normal heart sounds. No murmur heard.     No friction rub.   Pulmonary:      Effort: Pulmonary effort is normal. No respiratory distress.      Breath sounds: Normal breath sounds. No wheezing or rales.   Chest:      Chest wall: No tenderness.   Abdominal:      General: Bowel sounds are normal.      Palpations: Abdomen is soft.   Musculoskeletal:         General: Normal range of motion.      Cervical back: Normal range of motion and neck supple.   Skin:     General: Skin is warm and dry.      Capillary Refill: Capillary refill takes 2 to 3 seconds.   Neurological:      Mental Status: She is alert and oriented to person, place, and time.   Psychiatric:         Behavior: Behavior normal.         Thought Content: Thought content normal.         Judgment: Judgment normal.         "

## 2024-10-09 RX ORDER — SODIUM CHLORIDE 9 MG/ML
20 INJECTION, SOLUTION INTRAVENOUS ONCE
Status: CANCELLED | OUTPATIENT
Start: 2024-10-16

## 2024-10-12 ENCOUNTER — APPOINTMENT (OUTPATIENT)
Dept: LAB | Facility: CLINIC | Age: 74
End: 2024-10-12
Payer: COMMERCIAL

## 2024-10-14 ENCOUNTER — OFFICE VISIT (OUTPATIENT)
Dept: HEMATOLOGY ONCOLOGY | Facility: CLINIC | Age: 74
End: 2024-10-14
Payer: COMMERCIAL

## 2024-10-14 VITALS
DIASTOLIC BLOOD PRESSURE: 70 MMHG | RESPIRATION RATE: 18 BRPM | SYSTOLIC BLOOD PRESSURE: 120 MMHG | TEMPERATURE: 97.1 F | BODY MASS INDEX: 29.84 KG/M2 | OXYGEN SATURATION: 96 % | WEIGHT: 201.5 LBS | HEIGHT: 69 IN | HEART RATE: 98 BPM

## 2024-10-14 DIAGNOSIS — Z79.899 HIGH RISK MEDICATION USE: ICD-10-CM

## 2024-10-14 DIAGNOSIS — E27.40 ADRENAL INSUFFICIENCY (HCC): ICD-10-CM

## 2024-10-14 DIAGNOSIS — C43.9 METASTATIC MELANOMA (HCC): Primary | ICD-10-CM

## 2024-10-14 PROCEDURE — 99214 OFFICE O/P EST MOD 30 MIN: CPT | Performed by: INTERNAL MEDICINE

## 2024-10-14 NOTE — LETTER
October 27, 2024     Anh Bailey MD  [address] [facility]  2nd Floor  [facility] [address]    Patient: Debbie Damon   YOB: 1950   Date of Visit: 10/14/2024       Dear Dr. Bailey:    Thank you for referring Debbie Damon to me for evaluation. Below are my notes for this consultation.    If you have questions, please do not hesitate to call me. I look forward to following your patient along with you.         Sincerely,        Rafaela Reyes MD        CC: DO Yenifer Llamas CRNP Joseph John [place], DO Melissa A Wilson, MD  10/27/2024  8:52 PM  Sign when Signing Visit  [facility] [place]  [address] [facility] [address]  [phone]  [phone]     Date of Visit: 10/14/2024  Name: Debbie Damon   YOB: 1950        Subjective    VISIT DIAGNOSIS:  Diagnoses and all orders for this visit:    Metastatic melanoma (HCC)    High risk medication use    Adrenal insufficiency (HCC)        Oncology History   Metastatic melanoma (HCC)   10/13/2022 -  Cancer Staged    Staging form: Melanoma of the Skin, AJCC 8th Edition  - Clinical stage from 10/13/2022: Stage IV (cTX, cNX, cM1a) - Signed by Rafaela Reyes MD on 11/22/2022       10/13/2022 Biopsy    A. Skin, right lateral medial leg, punch biopsy:  Portion of severely atypical melanocytic dermal proliferation with prominent melanosis consistent with melanoma. See note.        B. Skin, right medial leg. punch biopsy:  Portion of severely atypical melanocytic dermal proliferation with prominent melanosis consistent with melanoma. See note.        C. Skin, right medial leg, punch biopsy:   Portion of severely atypical melanocytic dermal proliferation with prominent melanosis consistent with melanoma. See note.     11/22/2022 Initial Diagnosis    Metastatic melanoma (HCC)     12/14/2022 -  Chemotherapy    alteplase (CATHFLO), 2 mg, Intracatheter, Every 2 hour PRN, 25 of 27  cycles  NIVOLUMAB-RELATLIMAB-RMBW (OPDUALAG) IVPB, 480 mg of nivolumab, Intravenous, Once, 25 of 27 cycles  Administration: 480 mg of nivolumab (12/14/2022), 480 mg of nivolumab (1/11/2023), 480 mg of nivolumab (2/8/2023), 480 mg of nivolumab (3/8/2023), 480 mg of nivolumab (4/5/2023), 480 mg of nivolumab (5/3/2023), 480 mg of nivolumab (5/31/2023), 480 mg of nivolumab (6/28/2023), 480 mg of nivolumab (7/26/2023), 480 mg of nivolumab (8/23/2023), 480 mg of nivolumab (9/20/2023), 480 mg of nivolumab (10/18/2023), 480 mg of nivolumab (11/15/2023), 480 mg of nivolumab (12/13/2023), 480 mg of nivolumab (1/10/2024), 480 mg of nivolumab (2/7/2024), 480 mg of nivolumab (3/6/2024), 480 mg of nivolumab (4/3/2024), 480 mg of nivolumab (5/1/2024), 480 mg of nivolumab (5/29/2024), 480 mg of nivolumab (6/26/2024), 480 mg of nivolumab (7/24/2024), 480 mg of nivolumab (8/21/2024), 480 mg of nivolumab (9/18/2024), 480 mg of nivolumab (10/16/2024)     1/5/2023 Genomic Testing    [facility] One liquid testing  TMB 1 Mut/Mb  MSI-high not detected          Cancer Staging   Metastatic melanoma (HCC)  Staging form: Melanoma of the Skin, AJCC 8th Edition  - Clinical stage from 10/13/2022: Stage IV (cTX, cNX, cM1a) - Signed by Rafaela Reyes MD on 11/22/2022     Treatment Details   Treatment goal Curative   Plan Name OP Nivolumab and Relatlimab-rmbw Q 28 Days   Status Active   Start Date 12/14/2022   End Date 12/11/2024 (Planned)   Provider Rafaela Reyes MD   Chemotherapy alteplase (CATHFLO), 2 mg, Intracatheter, Every 2 hour PRN, 25 of 27 cycles    NIVOLUMAB-RELATLIMAB-RMBW (OPDUALAG) IVPB, 480 mg of nivolumab, Intravenous, Once, 25 of 27 cycles  Administration: 480 mg of nivolumab (12/14/2022), 480 mg of nivolumab (1/11/2023), 480 mg of nivolumab (2/8/2023), 480 mg of nivolumab (3/8/2023), 480 mg of nivolumab (4/5/2023), 480 mg of nivolumab (5/3/2023), 480 mg of nivolumab (5/31/2023), 480 mg of nivolumab (6/28/2023), 480 mg of  nivolumab (7/26/2023), 480 mg of nivolumab (8/23/2023), 480 mg of nivolumab (9/20/2023), 480 mg of nivolumab (10/18/2023), 480 mg of nivolumab (11/15/2023), 480 mg of nivolumab (12/13/2023), 480 mg of nivolumab (1/10/2024), 480 mg of nivolumab (2/7/2024), 480 mg of nivolumab (3/6/2024), 480 mg of nivolumab (4/3/2024), 480 mg of nivolumab (5/1/2024), 480 mg of nivolumab (5/29/2024), 480 mg of nivolumab (6/26/2024), 480 mg of nivolumab (7/24/2024), 480 mg of nivolumab (8/21/2024), 480 mg of nivolumab (9/18/2024), 480 mg of nivolumab (10/16/2024)          HISTORY OF PRESENT ILLNESS: Debbie Damon is a 74 y.o. female  who metastatic melanoma on treatment with nivolumab plus relatlimab here for continued monitoring, follow-up and surveillance.    She is doing well.  Feels good.  No issues concerns or complaints.  Denies any new, changing, concerning skin lesions.  Denies any lymphadenopathy.  Energy is good.  Eating well.  No new, changing, concerning skin lesions.  No lymphadenopathy.  No issues with her legs.  Continued resolution of pigmented areas on the right lower extremity below the knee at the site of her melanoma.    She denies immune-mediated side effects.  Denies headaches, double vision, rash, itching, chest pain, shortness of breath, diarrhea.  No muscle weakness fatigue.  She does have adrenal insufficiency secondary to her treatment and on stable dose of hydrocortisone.    She did have a PET scan on 10/17/2024 we discussed results that demonstrate mildly increased small cutaneous focus along the right anterior lower extremity at level of proximal to mid tibia.  Could just be inflammatory.  New FDG activity on the medial left ankle and plantar left calcaneus likely inflammatory.  Otherwise no new lesions concerning for metastatic disease.  Still with a persistent sigmoid colon activity for which she has had follow-up GI.  Will continue to monitor.        REVIEW OF SYSTEMS:  Review of Systems    Constitutional:  Negative for appetite change, fatigue, fever and unexpected weight change.   HENT:   Negative for lump/mass, trouble swallowing and voice change.    Eyes:  Negative for icterus.   Respiratory:  Negative for cough, shortness of breath and wheezing.    Cardiovascular:  Negative for leg swelling (uses compression stockings).   Gastrointestinal:  Negative for abdominal pain, constipation, diarrhea, nausea and vomiting.   Genitourinary:  Negative for difficulty urinating and hematuria.    Musculoskeletal:  Negative for arthralgias, gait problem and myalgias.   Skin:  Negative for itching and rash.        No new, changing, or concerning lesions.   Neurological:  Negative for extremity weakness, gait problem, headaches, light-headedness and numbness.   Hematological:  Negative for adenopathy.        MEDICATIONS:    Current Outpatient Medications:   •  apixaban (Eliquis) 5 mg, Take 1 tablet (5 mg total) by mouth every 12 (twelve) hours, Disp: 60 tablet, Rfl: 5  •  famotidine (PEPCID) 10 mg tablet, Take 10 mg by mouth 2 (two) times a day, Disp: , Rfl:   •  hydrocortisone (CORTEF) 10 mg tablet, take 1 tablet by mouth twice a day, Disp: 60 tablet, Rfl: 11  •  loratadine (CLARITIN) 10 mg tablet, Take 10 mg by mouth daily, Disp: , Rfl:   •  losartan (Cozaar) 100 MG tablet, Take 1 tablet (100 mg total) by mouth daily, Disp: 30 tablet, Rfl: 5  •  metoprolol succinate (TOPROL-XL) 25 mg 24 hr tablet, Take 1 tablet (25 mg total) by mouth 2 (two) times a day, Disp: 60 tablet, Rfl: 5  •  Multiple Minerals-Vitamins (Citracal Plus) TABS, Take by mouth, Disp: , Rfl:   •  Multiple Vitamins-Minerals (MULTIVITAMIN WITH MINERALS) tablet, Take 1 tablet by mouth daily, Disp: , Rfl:   •  patient supplied medication, Pt takes eye drops Mura 128 - not listed in database, Disp: , Rfl:   •  sodium chloride (LAURI 128) 5 % hypertonic ophthalmic solution, Administer 1 drop to both eyes as needed (Corneal dryness), Disp: , Rfl:   •   Wheat Dextrin (BENEFIBER DRINK MIX PO), Take by mouth, Disp: , Rfl:      ALLERGIES:  Allergies   Allergen Reactions   • Etomidate Other (See Comments)     Put in by daughter, unknown to patient        ACTIVE PROBLEMS:  Patient Active Problem List   Diagnosis   • Acute massive pulmonary embolism (HCC)   • Venous stasis of both lower extremities   • Clostridium difficile infection   • Right ventricular systolic dysfunction without heart failure   • Essential hypertension   • Metastatic melanoma (HCC)   • High risk medication use   • Adrenal insufficiency (HCC)   • Abnormal PET scan of colon   • Mild anemia   • Anticoagulated by anticoagulation treatment   • History of colon polyps   • Diverticulosis   • Abnormal finding on GI tract imaging          PAST MEDICAL HISTORY:   Past Medical History:   Diagnosis Date   • Acute deep vein thrombosis (DVT) of popliteal vein of left lower extremity (HCC)    • Ankle wound, left, initial encounter     Resolved 2017   • Ankle wound, right, initial encounter     resolved 2017   • DVT (deep vein thrombosis) in pregnancy    • Hypertension    • Pulmonary emboli (HCC)      post C section   • Pulmonary embolism (HCC)     2017   • Pulmonary embolism, blood-clot, obstetric    • Skin cancer    • Venous stasis ulcer (HCC)    • Venous ulcers of both lower extremities (HCC)         PAST SURGICAL HISTORY:  Past Surgical History:   Procedure Laterality Date   •  SECTION      X2        SOCIAL HISTORY:  Social History     Socioeconomic History   • Marital status:      Spouse name: Not on file   • Number of children: Not on file   • Years of education: Not on file   • Highest education level: Not on file   Occupational History   • Not on file   Tobacco Use   • Smoking status: Never   • Smokeless tobacco: Never   Vaping Use   • Vaping status: Never Used   Substance and Sexual Activity   • Alcohol use: No     Comment: hx of social drinker   • Drug use: No   • Sexual activity:  "Not on file   Other Topics Concern   • Not on file   Social History Narrative   • Not on file     Social Determinants of Health     Financial Resource Strain: Not on file   Food Insecurity: Not on file   Transportation Needs: Not on file   Physical Activity: Not on file   Stress: Not on file   Social Connections: Not on file   Intimate Partner Violence: Not on file   Housing Stability: Not on file        FAMILY HISTORY:  Family History   Problem Relation Age of Onset   • Heart attack Mother    • Stroke Mother    • Cancer Father    • Bone cancer Family    • Hypertension Family    • Lung cancer Family            Objective    PHYSICAL EXAMINATION:   Blood pressure 120/70, pulse 98, temperature (!) 97.1 °F (36.2 °C), temperature source Temporal, resp. rate 18, height 5' 9\" (1.753 m), weight 91.4 kg (201 lb 8 oz), SpO2 96%.     Pain Score: 0-No pain     ECOG Performance Status      Flowsheet Row Most Recent Value   ECOG Performance Status 0 - Fully active, able to carry on all pre-disease performance without restriction               Physical Exam  Constitutional:       General: She is not in acute distress.     Appearance: Normal appearance. She is not ill-appearing or toxic-appearing.   HENT:      Head: Normocephalic and atraumatic.      Right Ear: External ear normal.      Left Ear: External ear normal.      Nose: Nose normal.      Mouth/Throat:      Mouth: Mucous membranes are moist.      Pharynx: Oropharynx is clear.   Eyes:      General: No scleral icterus.        Right eye: No discharge.         Left eye: No discharge.      Conjunctiva/sclera: Conjunctivae normal.   Cardiovascular:      Rate and Rhythm: Normal rate and regular rhythm.      Pulses: Normal pulses.      Heart sounds: No murmur heard.     No friction rub. No gallop.   Pulmonary:      Effort: Pulmonary effort is normal. No respiratory distress.      Breath sounds: Normal breath sounds. No wheezing or rales.   Abdominal:      General: Bowel sounds are " normal. There is no distension.      Palpations: There is no mass.      Tenderness: There is no abdominal tenderness. There is no rebound.   Musculoskeletal:         General: No swelling or tenderness.      Cervical back: Normal range of motion. No rigidity.      Right lower leg: No edema.      Left lower leg: No edema.   Lymphadenopathy:      Head:      Right side of head: No submandibular, preauricular or posterior auricular adenopathy.      Left side of head: No submandibular, preauricular or posterior auricular adenopathy.      Cervical: No cervical adenopathy.      Right cervical: No superficial or posterior cervical adenopathy.     Left cervical: No superficial or posterior cervical adenopathy.      Upper Body:      Right upper body: No supraclavicular or axillary adenopathy.      Left upper body: No supraclavicular or axillary adenopathy.   Skin:     General: Skin is warm.      Coloration: Skin is not jaundiced.      Findings: No lesion or rash.      Comments: No concerning skin lesions.  No lesions corresponding to FDG sites on scan on her right or left lower extremities   Neurological:      General: No focal deficit present.      Mental Status: She is alert and oriented to person, place, and time.      Cranial Nerves: No cranial nerve deficit.      Motor: No weakness.      Gait: Gait normal.   Psychiatric:         Mood and Affect: Mood normal.         Behavior: Behavior normal.         Thought Content: Thought content normal.         Judgment: Judgment normal.         I reviewed lab data in the chart.    WBC   Date Value Ref Range Status   10/12/2024 4.99 4.31 - 10.16 Thousand/uL Final   09/14/2024 5.05 4.31 - 10.16 Thousand/uL Final   08/17/2024 5.57 4.31 - 10.16 Thousand/uL Final     Hemoglobin   Date Value Ref Range Status   10/12/2024 13.1 11.5 - 15.4 g/dL Final   09/14/2024 12.3 11.5 - 15.4 g/dL Final   08/17/2024 12.4 11.5 - 15.4 g/dL Final     Platelets   Date Value Ref Range Status   10/12/2024 192  149 - 390 Thousands/uL Final   09/14/2024 187 149 - 390 Thousands/uL Final   08/17/2024 192 149 - 390 Thousands/uL Final     MCV   Date Value Ref Range Status   10/12/2024 93 82 - 98 fL Final   09/14/2024 92 82 - 98 fL Final   08/17/2024 91 82 - 98 fL Final      Potassium   Date Value Ref Range Status   10/12/2024 4.3 3.5 - 5.3 mmol/L Final   09/14/2024 4.3 3.5 - 5.3 mmol/L Final   08/17/2024 4.1 3.5 - 5.3 mmol/L Final   05/06/2019 3.8 3.5 - 5.2 mmol/L Final   05/05/2019 4.1 3.5 - 5.2 mmol/L Final   05/04/2019 3.9 3.5 - 5.2 mmol/L Final     Chloride   Date Value Ref Range Status   10/12/2024 104 96 - 108 mmol/L Final   09/14/2024 104 96 - 108 mmol/L Final   08/17/2024 103 96 - 108 mmol/L Final   05/06/2019 104 100 - 109 mmol/L Final   05/05/2019 101 100 - 109 mmol/L Final   05/04/2019 104 100 - 109 mmol/L Final     Carbon Dioxide   Date Value Ref Range Status   05/06/2019 31 23 - 31 mmol/L Final   05/05/2019 28 23 - 31 mmol/L Final   05/04/2019 26 23 - 31 mmol/L Final     CO2   Date Value Ref Range Status   10/12/2024 31 21 - 32 mmol/L Final   09/14/2024 30 21 - 32 mmol/L Final   08/17/2024 28 21 - 32 mmol/L Final     BUN   Date Value Ref Range Status   10/12/2024 19 5 - 25 mg/dL Final   09/14/2024 24 5 - 25 mg/dL Final   08/17/2024 22 5 - 25 mg/dL Final   05/06/2019 15 7 - 25 mg/dL Final   05/05/2019 11 7 - 25 mg/dL Final   05/04/2019 11 7 - 25 mg/dL Final     Creatinine   Date Value Ref Range Status   10/12/2024 0.83 0.60 - 1.30 mg/dL Final     Comment:     Standardized to IDMS reference method   09/14/2024 0.93 0.60 - 1.30 mg/dL Final     Comment:     Standardized to IDMS reference method   08/17/2024 0.93 0.60 - 1.30 mg/dL Final     Comment:     Standardized to IDMS reference method   05/06/2019 0.60 0.40 - 1.10 mg/dL Final   05/05/2019 0.53 0.40 - 1.10 mg/dL Final   05/04/2019 0.52 0.40 - 1.10 mg/dL Final     Glucose   Date Value Ref Range Status   12/11/2023 88 65 - 140 mg/dL Final     Comment:     If the  patient is fasting, the ADA then defines impaired fasting glucose as > 100 mg/dL and diabetes as > or equal to 123 mg/dL.   11/17/2022 85 65 - 140 mg/dL Final     Comment:     If the patient is fasting, the ADA then defines impaired fasting glucose as > 100 mg/dL and diabetes as > or equal to 123 mg/dL.  Specimen collection should occur prior to Sulfasalazine administration due to the potential for falsely depressed results. Specimen collection should occur prior to Sulfapyridine administration due to the potential for falsely elevated results.   08/03/2020 112 (H) 65 - 99 mg/dL Final     Comment:     If the patient is fasting, the ADA then defines impaired fasting glucose as > 100 mg/dL and diabetes as > or equal to 123 mg/dL.  Specimen collection should occur prior to Sulfasalazine administration due to the potential for falsely depressed results. Specimen collection should occur prior to Sulfapyridine administration due to the potential for falsely elevated results.   05/06/2019 104 (H) 65 - 99 mg/dL Final   05/05/2019 109 (H) 65 - 99 mg/dL Final   05/04/2019 124 (H) 65 - 99 mg/dL Final     Calcium   Date Value Ref Range Status   10/12/2024 9.0 8.4 - 10.2 mg/dL Final   09/14/2024 9.0 8.4 - 10.2 mg/dL Final   08/17/2024 9.2 8.4 - 10.2 mg/dL Final   05/06/2019 8.5 8.5 - 10.1 mg/dL Final   05/05/2019 8.3 (L) 8.5 - 10.1 mg/dL Final   05/04/2019 8.1 (L) 8.5 - 10.1 mg/dL Final     Albumin   Date Value Ref Range Status   10/12/2024 3.8 3.5 - 5.0 g/dL Final   09/14/2024 3.8 3.5 - 5.0 g/dL Final   08/17/2024 3.8 3.5 - 5.0 g/dL Final     ALBUMIN   Date Value Ref Range Status   05/03/2019 3.5 3.5 - 4.8 g/dL Final     Total Bilirubin   Date Value Ref Range Status   10/12/2024 0.47 0.20 - 1.00 mg/dL Final     Comment:     Use of this assay is not recommended for patients undergoing treatment with eltrombopag due to the potential for falsely elevated results.  N-acetyl-p-benzoquinone imine (metabolite of Acetaminophen) will  generate erroneously low results in samples for patients that have taken an overdose of Acetaminophen.   09/14/2024 0.47 0.20 - 1.00 mg/dL Final     Comment:     Use of this assay is not recommended for patients undergoing treatment with eltrombopag due to the potential for falsely elevated results.  N-acetyl-p-benzoquinone imine (metabolite of Acetaminophen) will generate erroneously low results in samples for patients that have taken an overdose of Acetaminophen.   08/17/2024 0.54 0.20 - 1.00 mg/dL Final     Comment:     Use of this assay is not recommended for patients undergoing treatment with eltrombopag due to the potential for falsely elevated results.  N-acetyl-p-benzoquinone imine (metabolite of Acetaminophen) will generate erroneously low results in samples for patients that have taken an overdose of Acetaminophen.   05/03/2019 0.5 0.2 - 1.0 mg/dL Final     Alkaline Phosphatase   Date Value Ref Range Status   10/12/2024 47 34 - 104 U/L Final   09/14/2024 45 34 - 104 U/L Final   08/17/2024 47 34 - 104 U/L Final   05/03/2019 54 35 - 120 U/L Final     AST   Date Value Ref Range Status   10/12/2024 14 13 - 39 U/L Final   09/14/2024 16 13 - 39 U/L Final   08/17/2024 18 13 - 39 U/L Final   05/03/2019 26 <41 U/L Final     ALT   Date Value Ref Range Status   10/12/2024 10 7 - 52 U/L Final     Comment:     Specimen collection should occur prior to Sulfasalazine administration due to the potential for falsely depressed results.    09/14/2024 13 7 - 52 U/L Final     Comment:     Specimen collection should occur prior to Sulfasalazine administration due to the potential for falsely depressed results.    08/17/2024 15 7 - 52 U/L Final     Comment:     Specimen collection should occur prior to Sulfasalazine administration due to the potential for falsely depressed results.    05/03/2019 25 <56 U/L Final      LD   Date Value Ref Range Status   10/12/2024 148 140 - 271 U/L Final   09/14/2024 170 140 - 271 U/L Final  "  08/17/2024 176 140 - 271 U/L Final     No results found for: \"TSH\"  No results found for: \"V4LLEFG\"   Free T4   Date Value Ref Range Status   10/12/2024 0.97 0.61 - 1.12 ng/dL Final     Comment:     Specimens with biotin concentrations > 10 ng/mL can lead to significant (> 10%) positive bias in result.   09/14/2024 1.01 0.61 - 1.12 ng/dL Final     Comment:     Specimens with biotin concentrations > 10 ng/mL can lead to significant (> 10%) positive bias in result.   08/17/2024 1.02 0.61 - 1.12 ng/dL Final     Comment:     Specimens with biotin concentrations > 10 ng/mL can lead to significant (> 10%) positive bias in result.         RECENT IMAGING:  Procedure: NM pet ct tumor imaging whole body    Result Date: 10/17/2024  Narrative: WHOLE-BODY PET/CT SCAN INDICATION: C43.9: Malignant melanoma of skin, unspecified     , restaging MODIFIER: PS COMPARISON: PET/CT 5/20/2024 and priors CELL TYPE: Malignant melanoma, biopsy right leg 10/13/2022 TECHNIQUE:   10.2 mCi F-18-FDG administered IV. Multiplanar attenuation corrected and non attenuation corrected PET images were acquired 60 minutes post injection. Contiguous, low dose, axial CT sections were obtained from the vertex through the feet.  Intravenous contrast material was not utilized.  This examination, like all CT scans performed in the [facility], was performed utilizing techniques to minimize radiation dose exposure, including the use of iterative reconstruction and automated exposure control. Fasting serum glucose: 80 mg/dl FINDINGS: VISUALIZED BRAIN: No acute abnormalities are seen. HEAD/NECK: There is a physiologic distribution of FDG. No FDG avid cervical adenopathy is seen. CT images: Unremarkable CHEST: Small hypermetabolic focus deep to the distal right clavicle, SUV 3.1, is nonspecific but may just be inflammatory. There is no discrete CT abnormality visualized in this region. Otherwise no FDG avid soft tissue lesions are seen. CT images: " Unremarkable. ABDOMEN: No FDG avid soft tissue lesions are seen. CT images: Atherosclerotic aorta. Probable cyst along the posterior left kidney appears stable. PELVIS: Persistent focal sigmoid colon activity, SUV 6.6, prior SUV 9.6. Persistent stable wall thickening at this level. No new FDG avid soft tissue lesions are seen. CT images: Persistent endometrial fluid. OSSEOUS STRUCTURES/EXTREMITIES: There is new focal FDG activity along the left ankle medial malleolus region, SUV 5.9. There is also new focal FDG activity along the plantar left calcaneus, SUV 4.7. These findings are nonspecific but may just be inflammatory. Mildly increased small cutaneous focus of activity along the right anterolateral lower extremity at the level of the proximal to mid tibia, SUV 2.2, prior SUV 1.6. Mild FDG uptake again associated with multiple bilateral serpiginous subcutaneous varicose veins, left greater than right. No additional FDG avid lesions are seen. CT images: Stable. Spine degenerative change. Mild scoliosis.     Impression: 1. Persistent, mildly increased small cutaneous focus of activity along the right anterolateral lower extremity at the level of the proximal to mid tibia. This may just be inflammatory but be correlated with clinical exam findings and reassessed on follow-up exam to exclude tumor. 2. New focal FDG activity along the medial left ankle and plantar left calcaneus is likely inflammatory. This may also be correlated clinically. 3. Otherwise no new hypermetabolic lesions that are concerning for malignancy/metastases. 4. Persistent focal sigmoid colon FDG activity may be inflammatory. No neoplasm visualized as per colonoscopy epic notes from 4/2023. 5. Persistent endometrial fluid may be reassessed with ultrasound as clinically warranted. Workstation performed: [id number]            Assessment/Plan  Ms. Damon is a 74-year-old female with metastatic melanoma with response to treatment with nivolumab plus  relatlimab here for continued monitoring, follow-up and surveillance while on treatment.    1. Metastatic melanoma (HCC)  Continues to do well with response to treatment with nivolumab and relatlimab.  Imaging demonstrates only small areas of cutaneous uptake that are nonspecific.  Near resolution of pigmented area on the right lower extremity below the knee where her disease was.  Labs reviewed and okay.  She is okay to continue treatment with nivolumab plus relatlimab.  Plan is for treatment for 2 years and she will complete her treatment December 24.    She knows to continue to monitor for immune mediated side effects.  She knows to call with issues or concerns prior to her next visit.  She has standing orders for blood work prior to each infusion.    2. High risk medication use  3. Adrenal insufficiency (HCC)  The patient is on treatment with immunotherapy and we will continue to monitor for side effects and lab abnormalities. We will monitor labs with each treatment to ensure safety of continuing on treatment. The patient knows to watch for signs and symptoms for immune mediated side effects. Denies any immune mediated side effects at this time.     On stable dose of hydrocortisone with no issues.      Follow up as planned    Rafaela Reyes MD, PhD

## 2024-10-16 ENCOUNTER — HOSPITAL ENCOUNTER (OUTPATIENT)
Dept: INFUSION CENTER | Facility: HOSPITAL | Age: 74
Discharge: HOME | End: 2024-10-16
Attending: INTERNAL MEDICINE
Payer: COMMERCIAL

## 2024-10-16 VITALS
HEART RATE: 90 BPM | TEMPERATURE: 97.3 F | RESPIRATION RATE: 16 BRPM | OXYGEN SATURATION: 96 % | SYSTOLIC BLOOD PRESSURE: 135 MMHG | DIASTOLIC BLOOD PRESSURE: 81 MMHG

## 2024-10-16 DIAGNOSIS — C43.9 METASTATIC MELANOMA (HCC): Primary | ICD-10-CM

## 2024-10-16 PROCEDURE — 96413 CHEMO IV INFUSION 1 HR: CPT

## 2024-10-16 RX ORDER — SODIUM CHLORIDE 9 MG/ML
20 INJECTION, SOLUTION INTRAVENOUS ONCE
Status: COMPLETED | OUTPATIENT
Start: 2024-10-16 | End: 2024-10-16

## 2024-10-16 RX ADMIN — SODIUM CHLORIDE 480 MG OF NIVOLUMAB: 9 INJECTION, SOLUTION INTRAVENOUS at 14:57

## 2024-10-16 RX ADMIN — SODIUM CHLORIDE 20 ML/HR: 0.9 INJECTION, SOLUTION INTRAVENOUS at 14:31

## 2024-10-16 NOTE — PLAN OF CARE
Problem: Potential for Falls  Goal: Patient will remain free of falls  Description: INTERVENTIONS:  - Educate patient/family on patient safety including physical limitations  - Instruct patient to call for assistance with activity   - Consult OT/PT to assist with strengthening/mobility   - Keep Call bell within reach  - Keep bed low and locked with side rails adjusted as appropriate  - Keep care items and personal belongings within reach  - Initiate and maintain comfort rounds  - Make Fall Risk Sign visible to staff  - Consider moving patient to room near nurses station  Outcome: Progressing     Problem: INFECTION - ADULT  Goal: Absence or prevention of progression during hospitalization  Description: INTERVENTIONS:  - Assess and monitor for signs and symptoms of infection  - Monitor lab/diagnostic results  - Monitor all insertion sites, i.e. indwelling lines, tubes, and drains  - Monitor endotracheal if appropriate and nasal secretions for changes in amount and color  - Egan appropriate cooling/warming therapies per order  - Administer medications as ordered  - Instruct and encourage patient and family to use good hand hygiene technique  - Identify and instruct in appropriate isolation precautions for identified infection/condition  Outcome: Progressing     Problem: Knowledge Deficit  Goal: Patient/family/caregiver demonstrates understanding of disease process, treatment plan, medications, and discharge instructions  Description: Complete learning assessment and assess knowledge base.  Interventions:  - Provide teaching at level of understanding  - Provide teaching via preferred learning methods  Outcome: Progressing

## 2024-10-16 NOTE — PROGRESS NOTES
Recent labs reviewed. Pt tolerated Opdualag tx well with no issues. PIV removed without incident.     Debbie Damon is aware of future appt on 11/13/24 at 2:00PM.      AVS declined by Debbie Damon. Pt uses Mychart.     Pt discharged off unit in stable condition with all personal belongings.

## 2024-10-17 ENCOUNTER — HOSPITAL ENCOUNTER (OUTPATIENT)
Dept: RADIOLOGY | Age: 74
Discharge: HOME | End: 2024-10-17
Payer: COMMERCIAL

## 2024-10-17 DIAGNOSIS — C43.9 METASTATIC MELANOMA (HCC): ICD-10-CM

## 2024-10-17 LAB — GLUCOSE SERPL-MCNC: 80 MG/DL (ref 65–140)

## 2024-10-17 PROCEDURE — 78816 PET IMAGE W/CT FULL BODY: CPT

## 2024-10-17 PROCEDURE — A9552 F18 FDG: HCPCS

## 2024-10-17 PROCEDURE — 82948 REAGENT STRIP/BLOOD GLUCOSE: CPT

## 2024-10-28 NOTE — PROGRESS NOTES
[facility] [place]  [address]  [place] [address]  [phone]  [phone]     Date of Visit: 10/14/2024  Name: Debbie Damon   YOB: 1950        Subjective    VISIT DIAGNOSIS:  Diagnoses and all orders for this visit:    Metastatic melanoma (HCC)    High risk medication use    Adrenal insufficiency (HCC)        Oncology History   Metastatic melanoma (HCC)   10/13/2022 -  Cancer Staged    Staging form: Melanoma of the Skin, AJCC 8th Edition  - Clinical stage from 10/13/2022: Stage IV (cTX, cNX, cM1a) - Signed by Rafaela Reyes MD on 11/22/2022       10/13/2022 Biopsy    A. Skin, right lateral medial leg, punch biopsy:  Portion of severely atypical melanocytic dermal proliferation with prominent melanosis consistent with melanoma. See note.        B. Skin, right medial leg. punch biopsy:  Portion of severely atypical melanocytic dermal proliferation with prominent melanosis consistent with melanoma. See note.        C. Skin, right medial leg, punch biopsy:   Portion of severely atypical melanocytic dermal proliferation with prominent melanosis consistent with melanoma. See note.     11/22/2022 Initial Diagnosis    Metastatic melanoma (HCC)     12/14/2022 -  Chemotherapy    alteplase (CATHFLO), 2 mg, Intracatheter, Every 2 hour PRN, 25 of 27 cycles  NIVOLUMAB-RELATLIMAB-RMBW (OPDUALAG) IVPB, 480 mg of nivolumab, Intravenous, Once, 25 of 27 cycles  Administration: 480 mg of nivolumab (12/14/2022), 480 mg of nivolumab (1/11/2023), 480 mg of nivolumab (2/8/2023), 480 mg of nivolumab (3/8/2023), 480 mg of nivolumab (4/5/2023), 480 mg of nivolumab (5/3/2023), 480 mg of nivolumab (5/31/2023), 480 mg of nivolumab (6/28/2023), 480 mg of nivolumab (7/26/2023), 480 mg of nivolumab (8/23/2023), 480 mg of nivolumab (9/20/2023), 480 mg of nivolumab (10/18/2023), 480 mg of nivolumab (11/15/2023), 480 mg of nivolumab (12/13/2023), 480 mg of nivolumab (1/10/2024), 480 mg of  nivolumab (2/7/2024), 480 mg of nivolumab (3/6/2024), 480 mg of nivolumab (4/3/2024), 480 mg of nivolumab (5/1/2024), 480 mg of nivolumab (5/29/2024), 480 mg of nivolumab (6/26/2024), 480 mg of nivolumab (7/24/2024), 480 mg of nivolumab (8/21/2024), 480 mg of nivolumab (9/18/2024), 480 mg of nivolumab (10/16/2024)     1/5/2023 Genomic Testing    [facility] liquid testing  TMB 1 Mut/Mb  MSI-high not detected          Cancer Staging   Metastatic melanoma (HCC)  Staging form: Melanoma of the Skin, AJCC 8th Edition  - Clinical stage from 10/13/2022: Stage IV (cTX, cNX, cM1a) - Signed by Rafaela Reyes MD on 11/22/2022     Treatment Details   Treatment goal Curative   Plan Name OP Nivolumab and Relatlimab-rmbw Q 28 Days   Status Active   Start Date 12/14/2022   End Date 12/11/2024 (Planned)   Provider Rafaela Reyes MD   Chemotherapy alteplase (CATHFLO), 2 mg, Intracatheter, Every 2 hour PRN, 25 of 27 cycles    NIVOLUMAB-RELATLIMAB-RMBW (OPDUALAG) IVPB, 480 mg of nivolumab, Intravenous, Once, 25 of 27 cycles  Administration: 480 mg of nivolumab (12/14/2022), 480 mg of nivolumab (1/11/2023), 480 mg of nivolumab (2/8/2023), 480 mg of nivolumab (3/8/2023), 480 mg of nivolumab (4/5/2023), 480 mg of nivolumab (5/3/2023), 480 mg of nivolumab (5/31/2023), 480 mg of nivolumab (6/28/2023), 480 mg of nivolumab (7/26/2023), 480 mg of nivolumab (8/23/2023), 480 mg of nivolumab (9/20/2023), 480 mg of nivolumab (10/18/2023), 480 mg of nivolumab (11/15/2023), 480 mg of nivolumab (12/13/2023), 480 mg of nivolumab (1/10/2024), 480 mg of nivolumab (2/7/2024), 480 mg of nivolumab (3/6/2024), 480 mg of nivolumab (4/3/2024), 480 mg of nivolumab (5/1/2024), 480 mg of nivolumab (5/29/2024), 480 mg of nivolumab (6/26/2024), 480 mg of nivolumab (7/24/2024), 480 mg of nivolumab (8/21/2024), 480 mg of nivolumab (9/18/2024), 480 mg of nivolumab (10/16/2024)          HISTORY OF PRESENT ILLNESS: Debbie Nelson is a 74 y.o. female  who  metastatic melanoma on treatment with nivolumab plus relatlimab here for continued monitoring, follow-up and surveillance.    She is doing well.  Feels good.  No issues concerns or complaints.  Denies any new, changing, concerning skin lesions.  Denies any lymphadenopathy.  Energy is good.  Eating well.  No new, changing, concerning skin lesions.  No lymphadenopathy.  No issues with her legs.  Continued resolution of pigmented areas on the right lower extremity below the knee at the site of her melanoma.    She denies immune-mediated side effects.  Denies headaches, double vision, rash, itching, chest pain, shortness of breath, diarrhea.  No muscle weakness fatigue.  She does have adrenal insufficiency secondary to her treatment and on stable dose of hydrocortisone.    She did have a PET scan on 10/17/2024 we discussed results that demonstrate mildly increased small cutaneous focus along the right anterior lower extremity at level of proximal to mid tibia.  Could just be inflammatory.  New FDG activity on the medial left ankle and plantar left calcaneus likely inflammatory.  Otherwise no new lesions concerning for metastatic disease.  Still with a persistent sigmoid colon activity for which she has had follow-up GI.  Will continue to monitor.        REVIEW OF SYSTEMS:  Review of Systems   Constitutional:  Negative for appetite change, fatigue, fever and unexpected weight change.   HENT:   Negative for lump/mass, trouble swallowing and voice change.    Eyes:  Negative for icterus.   Respiratory:  Negative for cough, shortness of breath and wheezing.    Cardiovascular:  Negative for leg swelling (uses compression stockings).   Gastrointestinal:  Negative for abdominal pain, constipation, diarrhea, nausea and vomiting.   Genitourinary:  Negative for difficulty urinating and hematuria.    Musculoskeletal:  Negative for arthralgias, gait problem and myalgias.   Skin:  Negative for itching and rash.        No new,  changing, or concerning lesions.   Neurological:  Negative for extremity weakness, gait problem, headaches, light-headedness and numbness.   Hematological:  Negative for adenopathy.        MEDICATIONS:    Current Outpatient Medications:     apixaban (Eliquis) 5 mg, Take 1 tablet (5 mg total) by mouth every 12 (twelve) hours, Disp: 60 tablet, Rfl: 5    famotidine (PEPCID) 10 mg tablet, Take 10 mg by mouth 2 (two) times a day, Disp: , Rfl:     hydrocortisone (CORTEF) 10 mg tablet, take 1 tablet by mouth twice a day, Disp: 60 tablet, Rfl: 11    loratadine (CLARITIN) 10 mg tablet, Take 10 mg by mouth daily, Disp: , Rfl:     losartan (Cozaar) 100 MG tablet, Take 1 tablet (100 mg total) by mouth daily, Disp: 30 tablet, Rfl: 5    metoprolol succinate (TOPROL-XL) 25 mg 24 hr tablet, Take 1 tablet (25 mg total) by mouth 2 (two) times a day, Disp: 60 tablet, Rfl: 5    Multiple Minerals-Vitamins (Citracal Plus) TABS, Take by mouth, Disp: , Rfl:     Multiple Vitamins-Minerals (MULTIVITAMIN WITH MINERALS) tablet, Take 1 tablet by mouth daily, Disp: , Rfl:     patient supplied medication, Pt takes eye drops Mura 128 - not listed in database, Disp: , Rfl:     sodium chloride (LAURI 128) 5 % hypertonic ophthalmic solution, Administer 1 drop to both eyes as needed (Corneal dryness), Disp: , Rfl:     Wheat Dextrin (BENEFIBER DRINK MIX PO), Take by mouth, Disp: , Rfl:      ALLERGIES:  Allergies   Allergen Reactions    Etomidate Other (See Comments)     Put in by daughter, unknown to patient        ACTIVE PROBLEMS:  Patient Active Problem List   Diagnosis    Acute massive pulmonary embolism (HCC)    Venous stasis of both lower extremities    Clostridium difficile infection    Right ventricular systolic dysfunction without heart failure    Essential hypertension    Metastatic melanoma (HCC)    High risk medication use    Adrenal insufficiency (HCC)    Abnormal PET scan of colon    Mild anemia    Anticoagulated by anticoagulation  treatment    History of colon polyps    Diverticulosis    Abnormal finding on GI tract imaging          PAST MEDICAL HISTORY:   Past Medical History:   Diagnosis Date    Acute deep vein thrombosis (DVT) of popliteal vein of left lower extremity (HCC)     Ankle wound, left, initial encounter     Resolved 2017    Ankle wound, right, initial encounter     resolved 2017    DVT (deep vein thrombosis) in pregnancy     Hypertension     Pulmonary emboli (HCC)      post C section    Pulmonary embolism (HCC)     2017    Pulmonary embolism, blood-clot, obstetric     Skin cancer     Venous stasis ulcer (HCC)     Venous ulcers of both lower extremities (HCC)         PAST SURGICAL HISTORY:  Past Surgical History:   Procedure Laterality Date     SECTION      X2        SOCIAL HISTORY:  Social History     Socioeconomic History    Marital status:      Spouse name: Not on file    Number of children: Not on file    Years of education: Not on file    Highest education level: Not on file   Occupational History    Not on file   Tobacco Use    Smoking status: Never    Smokeless tobacco: Never   Vaping Use    Vaping status: Never Used   Substance and Sexual Activity    Alcohol use: No     Comment: hx of social drinker    Drug use: No    Sexual activity: Not on file   Other Topics Concern    Not on file   Social History Narrative    Not on file     Social Determinants of Health     Financial Resource Strain: Not on file   Food Insecurity: Not on file   Transportation Needs: Not on file   Physical Activity: Not on file   Stress: Not on file   Social Connections: Not on file   Intimate Partner Violence: Not on file   Housing Stability: Not on file        FAMILY HISTORY:  Family History   Problem Relation Age of Onset    Heart attack Mother     Stroke Mother     Cancer Father     Bone cancer Family     Hypertension Family     Lung cancer Family            Objective    PHYSICAL EXAMINATION:   Blood pressure 120/70, pulse  "98, temperature (!) 97.1 °F (36.2 °C), temperature source Temporal, resp. rate 18, height 5' 9\" (1.753 m), weight 91.4 kg (201 lb 8 oz), SpO2 96%.     Pain Score: 0-No pain     ECOG Performance Status      Flowsheet Row Most Recent Value   ECOG Performance Status 0 - Fully active, able to carry on all pre-disease performance without restriction               Physical Exam  Constitutional:       General: She is not in acute distress.     Appearance: Normal appearance. She is not ill-appearing or toxic-appearing.   HENT:      Head: Normocephalic and atraumatic.      Right Ear: External ear normal.      Left Ear: External ear normal.      Nose: Nose normal.      Mouth/Throat:      Mouth: Mucous membranes are moist.      Pharynx: Oropharynx is clear.   Eyes:      General: No scleral icterus.        Right eye: No discharge.         Left eye: No discharge.      Conjunctiva/sclera: Conjunctivae normal.   Cardiovascular:      Rate and Rhythm: Normal rate and regular rhythm.      Pulses: Normal pulses.      Heart sounds: No murmur heard.     No friction rub. No gallop.   Pulmonary:      Effort: Pulmonary effort is normal. No respiratory distress.      Breath sounds: Normal breath sounds. No wheezing or rales.   Abdominal:      General: Bowel sounds are normal. There is no distension.      Palpations: There is no mass.      Tenderness: There is no abdominal tenderness. There is no rebound.   Musculoskeletal:         General: No swelling or tenderness.      Cervical back: Normal range of motion. No rigidity.      Right lower leg: No edema.      Left lower leg: No edema.   Lymphadenopathy:      Head:      Right side of head: No submandibular, preauricular or posterior auricular adenopathy.      Left side of head: No submandibular, preauricular or posterior auricular adenopathy.      Cervical: No cervical adenopathy.      Right cervical: No superficial or posterior cervical adenopathy.     Left cervical: No superficial or " posterior cervical adenopathy.      Upper Body:      Right upper body: No supraclavicular or axillary adenopathy.      Left upper body: No supraclavicular or axillary adenopathy.   Skin:     General: Skin is warm.      Coloration: Skin is not jaundiced.      Findings: No lesion or rash.      Comments: No concerning skin lesions.  No lesions corresponding to FDG sites on scan on her right or left lower extremities   Neurological:      General: No focal deficit present.      Mental Status: She is alert and oriented to person, place, and time.      Cranial Nerves: No cranial nerve deficit.      Motor: No weakness.      Gait: Gait normal.   Psychiatric:         Mood and Affect: Mood normal.         Behavior: Behavior normal.         Thought Content: Thought content normal.         Judgment: Judgment normal.         I reviewed lab data in the chart.    WBC   Date Value Ref Range Status   10/12/2024 4.99 4.31 - 10.16 Thousand/uL Final   09/14/2024 5.05 4.31 - 10.16 Thousand/uL Final   08/17/2024 5.57 4.31 - 10.16 Thousand/uL Final     Hemoglobin   Date Value Ref Range Status   10/12/2024 13.1 11.5 - 15.4 g/dL Final   09/14/2024 12.3 11.5 - 15.4 g/dL Final   08/17/2024 12.4 11.5 - 15.4 g/dL Final     Platelets   Date Value Ref Range Status   10/12/2024 192 149 - 390 Thousands/uL Final   09/14/2024 187 149 - 390 Thousands/uL Final   08/17/2024 192 149 - 390 Thousands/uL Final     MCV   Date Value Ref Range Status   10/12/2024 93 82 - 98 fL Final   09/14/2024 92 82 - 98 fL Final   08/17/2024 91 82 - 98 fL Final      Potassium   Date Value Ref Range Status   10/12/2024 4.3 3.5 - 5.3 mmol/L Final   09/14/2024 4.3 3.5 - 5.3 mmol/L Final   08/17/2024 4.1 3.5 - 5.3 mmol/L Final   05/06/2019 3.8 3.5 - 5.2 mmol/L Final   05/05/2019 4.1 3.5 - 5.2 mmol/L Final   05/04/2019 3.9 3.5 - 5.2 mmol/L Final     Chloride   Date Value Ref Range Status   10/12/2024 104 96 - 108 mmol/L Final   09/14/2024 104 96 - 108 mmol/L Final   08/17/2024  103 96 - 108 mmol/L Final   05/06/2019 104 100 - 109 mmol/L Final   05/05/2019 101 100 - 109 mmol/L Final   05/04/2019 104 100 - 109 mmol/L Final     Carbon Dioxide   Date Value Ref Range Status   05/06/2019 31 23 - 31 mmol/L Final   05/05/2019 28 23 - 31 mmol/L Final   05/04/2019 26 23 - 31 mmol/L Final     CO2   Date Value Ref Range Status   10/12/2024 31 21 - 32 mmol/L Final   09/14/2024 30 21 - 32 mmol/L Final   08/17/2024 28 21 - 32 mmol/L Final     BUN   Date Value Ref Range Status   10/12/2024 19 5 - 25 mg/dL Final   09/14/2024 24 5 - 25 mg/dL Final   08/17/2024 22 5 - 25 mg/dL Final   05/06/2019 15 7 - 25 mg/dL Final   05/05/2019 11 7 - 25 mg/dL Final   05/04/2019 11 7 - 25 mg/dL Final     Creatinine   Date Value Ref Range Status   10/12/2024 0.83 0.60 - 1.30 mg/dL Final     Comment:     Standardized to IDMS reference method   09/14/2024 0.93 0.60 - 1.30 mg/dL Final     Comment:     Standardized to IDMS reference method   08/17/2024 0.93 0.60 - 1.30 mg/dL Final     Comment:     Standardized to IDMS reference method   05/06/2019 0.60 0.40 - 1.10 mg/dL Final   05/05/2019 0.53 0.40 - 1.10 mg/dL Final   05/04/2019 0.52 0.40 - 1.10 mg/dL Final     Glucose   Date Value Ref Range Status   12/11/2023 88 65 - 140 mg/dL Final     Comment:     If the patient is fasting, the ADA then defines impaired fasting glucose as > 100 mg/dL and diabetes as > or equal to 123 mg/dL.   11/17/2022 85 65 - 140 mg/dL Final     Comment:     If the patient is fasting, the ADA then defines impaired fasting glucose as > 100 mg/dL and diabetes as > or equal to 123 mg/dL.  Specimen collection should occur prior to Sulfasalazine administration due to the potential for falsely depressed results. Specimen collection should occur prior to Sulfapyridine administration due to the potential for falsely elevated results.   08/03/2020 112 (H) 65 - 99 mg/dL Final     Comment:     If the patient is fasting, the ADA then defines impaired fasting glucose  as > 100 mg/dL and diabetes as > or equal to 123 mg/dL.  Specimen collection should occur prior to Sulfasalazine administration due to the potential for falsely depressed results. Specimen collection should occur prior to Sulfapyridine administration due to the potential for falsely elevated results.   05/06/2019 104 (H) 65 - 99 mg/dL Final   05/05/2019 109 (H) 65 - 99 mg/dL Final   05/04/2019 124 (H) 65 - 99 mg/dL Final     Calcium   Date Value Ref Range Status   10/12/2024 9.0 8.4 - 10.2 mg/dL Final   09/14/2024 9.0 8.4 - 10.2 mg/dL Final   08/17/2024 9.2 8.4 - 10.2 mg/dL Final   05/06/2019 8.5 8.5 - 10.1 mg/dL Final   05/05/2019 8.3 (L) 8.5 - 10.1 mg/dL Final   05/04/2019 8.1 (L) 8.5 - 10.1 mg/dL Final     Albumin   Date Value Ref Range Status   10/12/2024 3.8 3.5 - 5.0 g/dL Final   09/14/2024 3.8 3.5 - 5.0 g/dL Final   08/17/2024 3.8 3.5 - 5.0 g/dL Final     ALBUMIN   Date Value Ref Range Status   05/03/2019 3.5 3.5 - 4.8 g/dL Final     Total Bilirubin   Date Value Ref Range Status   10/12/2024 0.47 0.20 - 1.00 mg/dL Final     Comment:     Use of this assay is not recommended for patients undergoing treatment with eltrombopag due to the potential for falsely elevated results.  N-acetyl-p-benzoquinone imine (metabolite of Acetaminophen) will generate erroneously low results in samples for patients that have taken an overdose of Acetaminophen.   09/14/2024 0.47 0.20 - 1.00 mg/dL Final     Comment:     Use of this assay is not recommended for patients undergoing treatment with eltrombopag due to the potential for falsely elevated results.  N-acetyl-p-benzoquinone imine (metabolite of Acetaminophen) will generate erroneously low results in samples for patients that have taken an overdose of Acetaminophen.   08/17/2024 0.54 0.20 - 1.00 mg/dL Final     Comment:     Use of this assay is not recommended for patients undergoing treatment with eltrombopag due to the potential for falsely elevated  "results.  N-acetyl-p-benzoquinone imine (metabolite of Acetaminophen) will generate erroneously low results in samples for patients that have taken an overdose of Acetaminophen.   05/03/2019 0.5 0.2 - 1.0 mg/dL Final     Alkaline Phosphatase   Date Value Ref Range Status   10/12/2024 47 34 - 104 U/L Final   09/14/2024 45 34 - 104 U/L Final   08/17/2024 47 34 - 104 U/L Final   05/03/2019 54 35 - 120 U/L Final     AST   Date Value Ref Range Status   10/12/2024 14 13 - 39 U/L Final   09/14/2024 16 13 - 39 U/L Final   08/17/2024 18 13 - 39 U/L Final   05/03/2019 26 <41 U/L Final     ALT   Date Value Ref Range Status   10/12/2024 10 7 - 52 U/L Final     Comment:     Specimen collection should occur prior to Sulfasalazine administration due to the potential for falsely depressed results.    09/14/2024 13 7 - 52 U/L Final     Comment:     Specimen collection should occur prior to Sulfasalazine administration due to the potential for falsely depressed results.    08/17/2024 15 7 - 52 U/L Final     Comment:     Specimen collection should occur prior to Sulfasalazine administration due to the potential for falsely depressed results.    05/03/2019 25 <56 U/L Final      LD   Date Value Ref Range Status   10/12/2024 148 140 - 271 U/L Final   09/14/2024 170 140 - 271 U/L Final   08/17/2024 176 140 - 271 U/L Final     No results found for: \"TSH\"  No results found for: \"E0ZNVPU\"   Free T4   Date Value Ref Range Status   10/12/2024 0.97 0.61 - 1.12 ng/dL Final     Comment:     Specimens with biotin concentrations > 10 ng/mL can lead to significant (> 10%) positive bias in result.   09/14/2024 1.01 0.61 - 1.12 ng/dL Final     Comment:     Specimens with biotin concentrations > 10 ng/mL can lead to significant (> 10%) positive bias in result.   08/17/2024 1.02 0.61 - 1.12 ng/dL Final     Comment:     Specimens with biotin concentrations > 10 ng/mL can lead to significant (> 10%) positive bias in result.         RECENT " IMAGING:  Procedure: NM pet ct tumor imaging whole body    Result Date: 10/17/2024  Narrative: WHOLE-BODY PET/CT SCAN INDICATION: C43.9: Malignant melanoma of skin, unspecified     , restaging MODIFIER: PS COMPARISON: PET/CT 5/20/2024 and priors CELL TYPE: Malignant melanoma, biopsy right leg 10/13/2022 TECHNIQUE:   10.2 mCi F-18-FDG administered IV. Multiplanar attenuation corrected and non attenuation corrected PET images were acquired 60 minutes post injection. Contiguous, low dose, axial CT sections were obtained from the vertex through the feet.  Intravenous contrast material was not utilized.  This examination, like all CT scans performed in the [facility], was performed utilizing techniques to minimize radiation dose exposure, including the use of iterative reconstruction and automated exposure control. Fasting serum glucose: 80 mg/dl FINDINGS: VISUALIZED BRAIN: No acute abnormalities are seen. HEAD/NECK: There is a physiologic distribution of FDG. No FDG avid cervical adenopathy is seen. CT images: Unremarkable CHEST: Small hypermetabolic focus deep to the distal right clavicle, SUV 3.1, is nonspecific but may just be inflammatory. There is no discrete CT abnormality visualized in this region. Otherwise no FDG avid soft tissue lesions are seen. CT images: Unremarkable. ABDOMEN: No FDG avid soft tissue lesions are seen. CT images: Atherosclerotic aorta. Probable cyst along the posterior left kidney appears stable. PELVIS: Persistent focal sigmoid colon activity, SUV 6.6, prior SUV 9.6. Persistent stable wall thickening at this level. No new FDG avid soft tissue lesions are seen. CT images: Persistent endometrial fluid. OSSEOUS STRUCTURES/EXTREMITIES: There is new focal FDG activity along the left ankle medial malleolus region, SUV 5.9. There is also new focal FDG activity along the plantar left calcaneus, SUV 4.7. These findings are nonspecific but may just be inflammatory. Mildly increased  small cutaneous focus of activity along the right anterolateral lower extremity at the level of the proximal to mid tibia, SUV 2.2, prior SUV 1.6. Mild FDG uptake again associated with multiple bilateral serpiginous subcutaneous varicose veins, left greater than right. No additional FDG avid lesions are seen. CT images: Stable. Spine degenerative change. Mild scoliosis.     Impression: 1. Persistent, mildly increased small cutaneous focus of activity along the right anterolateral lower extremity at the level of the proximal to mid tibia. This may just be inflammatory but be correlated with clinical exam findings and reassessed on follow-up exam to exclude tumor. 2. New focal FDG activity along the medial left ankle and plantar left calcaneus is likely inflammatory. This may also be correlated clinically. 3. Otherwise no new hypermetabolic lesions that are concerning for malignancy/metastases. 4. Persistent focal sigmoid colon FDG activity may be inflammatory. No neoplasm visualized as per colonoscopy epic notes from 4/2023. 5. Persistent endometrial fluid may be reassessed with ultrasound as clinically warranted. Workstation performed: [id number]            Assessment/Plan  Ms. Damon is a 74-year-old female with metastatic melanoma with response to treatment with nivolumab plus relatlimab here for continued monitoring, follow-up and surveillance while on treatment.    1. Metastatic melanoma (HCC)  Continues to do well with response to treatment with nivolumab and relatlimab.  Imaging demonstrates only small areas of cutaneous uptake that are nonspecific.  Near resolution of pigmented area on the right lower extremity below the knee where her disease was.  Labs reviewed and okay.  She is okay to continue treatment with nivolumab plus relatlimab.  Plan is for treatment for 2 years and she will complete her treatment December 24.    She knows to continue to monitor for immune mediated side effects.  She knows to  call with issues or concerns prior to her next visit.  She has standing orders for blood work prior to each infusion.    2. High risk medication use  3. Adrenal insufficiency (HCC)  The patient is on treatment with immunotherapy and we will continue to monitor for side effects and lab abnormalities. We will monitor labs with each treatment to ensure safety of continuing on treatment. The patient knows to watch for signs and symptoms for immune mediated side effects. Denies any immune mediated side effects at this time.     On stable dose of hydrocortisone with no issues.      Follow up as planned    Rafaela Reyes MD, PhD

## 2024-11-06 RX ORDER — SODIUM CHLORIDE 9 MG/ML
20 INJECTION, SOLUTION INTRAVENOUS ONCE
Status: CANCELLED | OUTPATIENT
Start: 2024-11-13

## 2024-11-09 ENCOUNTER — APPOINTMENT (OUTPATIENT)
Dept: LAB | Facility: CLINIC | Age: 74
End: 2024-11-09
Payer: COMMERCIAL

## 2024-11-11 ENCOUNTER — OFFICE VISIT (OUTPATIENT)
Dept: HEMATOLOGY ONCOLOGY | Facility: CLINIC | Age: 74
End: 2024-11-11
Payer: COMMERCIAL

## 2024-11-11 VITALS
RESPIRATION RATE: 18 BRPM | HEIGHT: 69 IN | SYSTOLIC BLOOD PRESSURE: 122 MMHG | WEIGHT: 198 LBS | DIASTOLIC BLOOD PRESSURE: 68 MMHG | BODY MASS INDEX: 29.33 KG/M2 | OXYGEN SATURATION: 98 % | HEART RATE: 97 BPM | TEMPERATURE: 97.9 F

## 2024-11-11 DIAGNOSIS — C43.9 METASTATIC MELANOMA (HCC): Primary | ICD-10-CM

## 2024-11-11 DIAGNOSIS — E27.40 ADRENAL INSUFFICIENCY (HCC): ICD-10-CM

## 2024-11-11 DIAGNOSIS — Z79.899 HIGH RISK MEDICATION USE: ICD-10-CM

## 2024-11-11 PROCEDURE — 99214 OFFICE O/P EST MOD 30 MIN: CPT | Performed by: INTERNAL MEDICINE

## 2024-11-11 NOTE — LETTER
November 12, 2024     Anh Bailey MD  [address] [facility]  2nd Floor  [facility] [place] [address]    Patient: Debbie Damon   YOB: 1950   Date of Visit: 11/11/2024       Dear Dr. Bailey:    Thank you for referring Debbie Damon to me for evaluation. Below are my notes for this consultation.    If you have questions, please do not hesitate to call me. I look forward to following your patient along with you.         Sincerely,        Rafaela Reyes MD        CC: DO Yenifer Llamas CRNP Joseph John [place], DO Melissa A Wilson, MD  11/12/2024 12:07 PM  Sign when Signing Visit  [facility] [place]  [address] [facility] [address]  [phone]  [phone]     Date of Visit: 11/11/2024  Name: Debbie Damon   YOB: 1950        Subjective    VISIT DIAGNOSIS:  Diagnoses and all orders for this visit:    Metastatic melanoma (HCC)    High risk medication use    Adrenal insufficiency (HCC)        Oncology History   Metastatic melanoma (HCC)   10/13/2022 -  Cancer Staged    Staging form: Melanoma of the Skin, AJCC 8th Edition  - Clinical stage from 10/13/2022: Stage IV (cTX, cNX, cM1a) - Signed by Rafaela Reyes MD on 11/22/2022       10/13/2022 Biopsy    A. Skin, right lateral medial leg, punch biopsy:  Portion of severely atypical melanocytic dermal proliferation with prominent melanosis consistent with melanoma. See note.        B. Skin, right medial leg. punch biopsy:  Portion of severely atypical melanocytic dermal proliferation with prominent melanosis consistent with melanoma. See note.        C. Skin, right medial leg, punch biopsy:   Portion of severely atypical melanocytic dermal proliferation with prominent melanosis consistent with melanoma. See note.     11/22/2022 Initial Diagnosis    Metastatic melanoma (HCC)     12/14/2022 -  Chemotherapy    alteplase (CATHFLO), 2 mg, Intracatheter, Every 2 hour PRN, 25 of 27  cycles  NIVOLUMAB-RELATLIMAB-RMBW (OPDUALAG) IVPB, 480 mg of nivolumab, Intravenous, Once, 25 of 27 cycles  Administration: 480 mg of nivolumab (12/14/2022), 480 mg of nivolumab (1/11/2023), 480 mg of nivolumab (2/8/2023), 480 mg of nivolumab (3/8/2023), 480 mg of nivolumab (4/5/2023), 480 mg of nivolumab (5/3/2023), 480 mg of nivolumab (5/31/2023), 480 mg of nivolumab (6/28/2023), 480 mg of nivolumab (7/26/2023), 480 mg of nivolumab (8/23/2023), 480 mg of nivolumab (9/20/2023), 480 mg of nivolumab (10/18/2023), 480 mg of nivolumab (11/15/2023), 480 mg of nivolumab (12/13/2023), 480 mg of nivolumab (1/10/2024), 480 mg of nivolumab (2/7/2024), 480 mg of nivolumab (3/6/2024), 480 mg of nivolumab (4/3/2024), 480 mg of nivolumab (5/1/2024), 480 mg of nivolumab (5/29/2024), 480 mg of nivolumab (6/26/2024), 480 mg of nivolumab (7/24/2024), 480 mg of nivolumab (8/21/2024), 480 mg of nivolumab (9/18/2024), 480 mg of nivolumab (10/16/2024)     1/5/2023 Genomic Testing    [facility] liquid testing  TMB 1 Mut/Mb  MSI-high not detected          Cancer Staging   Metastatic melanoma (HCC)  Staging form: Melanoma of the Skin, AJCC 8th Edition  - Clinical stage from 10/13/2022: Stage IV (cTX, cNX, cM1a) - Signed by Rafaela Reyes MD on 11/22/2022     Treatment Details   Treatment goal Curative   Plan Name OP Nivolumab and Relatlimab-rmbw Q 28 Days   Status Active   Start Date 12/14/2022   End Date 12/11/2024 (Planned)   Provider Rafaela Reyes MD   Chemotherapy alteplase (CATHFLO), 2 mg, Intracatheter, Every 2 hour PRN, 25 of 27 cycles    NIVOLUMAB-RELATLIMAB-RMBW (OPDUALAG) IVPB, 480 mg of nivolumab, Intravenous, Once, 25 of 27 cycles  Administration: 480 mg of nivolumab (12/14/2022), 480 mg of nivolumab (1/11/2023), 480 mg of nivolumab (2/8/2023), 480 mg of nivolumab (3/8/2023), 480 mg of nivolumab (4/5/2023), 480 mg of nivolumab (5/3/2023), 480 mg of nivolumab (5/31/2023), 480 mg of nivolumab (6/28/2023), 480 mg of  nivolumab (7/26/2023), 480 mg of nivolumab (8/23/2023), 480 mg of nivolumab (9/20/2023), 480 mg of nivolumab (10/18/2023), 480 mg of nivolumab (11/15/2023), 480 mg of nivolumab (12/13/2023), 480 mg of nivolumab (1/10/2024), 480 mg of nivolumab (2/7/2024), 480 mg of nivolumab (3/6/2024), 480 mg of nivolumab (4/3/2024), 480 mg of nivolumab (5/1/2024), 480 mg of nivolumab (5/29/2024), 480 mg of nivolumab (6/26/2024), 480 mg of nivolumab (7/24/2024), 480 mg of nivolumab (8/21/2024), 480 mg of nivolumab (9/18/2024), 480 mg of nivolumab (10/16/2024)          HISTORY OF PRESENT ILLNESS: Debbie Damon is a 74 y.o. female  who is a 74-year-old female with metastatic melanoma on treatment with nivolumab plus relatlimab here for continued monitoring, follow-up and surveillance while on treatment.    She is doing well and tolerating treatment.  No new, changing, concerning skin lesions.  No lymphadenopathy.  Does state that she has intermittent pain on the bottom of her left foot that comes and goes and is never in the same spot.  Nothing makes it better or worse it resolves on its own.  Nothing seems to affect when it occurs.    Energy is good.  Eating well.  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    Discussed results from her PET scan from 10/17/2024 that demonstrates persistent mildly increased small cutaneous focus activity along the right anterior lateral lower extremity at the level of the proximal to mid tibia.  Could be inflammatory -this is the spot of her original melanoma and on physical exam area has improved and there is decreased to resolved pigmentation in this area.  No palpable nodularity or masses.    New focal FDG activity along the left medial ankle and plantar left calcaneus is likely inflammatory.  No new concerning lesions for metastatic disease.  Persistent focal sigmoid colon activity may be inflammatory as colonoscopy in April 2023 was negative.        REVIEW OF SYSTEMS:  Review of Systems    Constitutional:  Negative for appetite change, fatigue, fever and unexpected weight change.   HENT:   Negative for lump/mass, trouble swallowing and voice change.    Eyes:  Negative for icterus.   Respiratory:  Negative for cough, shortness of breath and wheezing.    Cardiovascular:  Negative for leg swelling.   Gastrointestinal:  Negative for abdominal pain, constipation, diarrhea, nausea and vomiting.   Genitourinary:  Negative for difficulty urinating and hematuria.    Musculoskeletal:  Negative for arthralgias, gait problem and myalgias.   Skin:  Negative for itching and rash.        No new, changing, or concerning lesions.   Neurological:  Negative for extremity weakness, gait problem, headaches, light-headedness and numbness.   Hematological:  Negative for adenopathy.        MEDICATIONS:    Current Outpatient Medications:   •  apixaban (Eliquis) 5 mg, Take 1 tablet (5 mg total) by mouth every 12 (twelve) hours, Disp: 60 tablet, Rfl: 5  •  famotidine (PEPCID) 10 mg tablet, Take 10 mg by mouth 2 (two) times a day, Disp: , Rfl:   •  hydrocortisone (CORTEF) 10 mg tablet, take 1 tablet by mouth twice a day, Disp: 60 tablet, Rfl: 11  •  loratadine (CLARITIN) 10 mg tablet, Take 10 mg by mouth daily, Disp: , Rfl:   •  losartan (Cozaar) 100 MG tablet, Take 1 tablet (100 mg total) by mouth daily, Disp: 30 tablet, Rfl: 5  •  metoprolol succinate (TOPROL-XL) 25 mg 24 hr tablet, Take 1 tablet (25 mg total) by mouth 2 (two) times a day, Disp: 60 tablet, Rfl: 5  •  Multiple Minerals-Vitamins (Citracal Plus) TABS, Take by mouth, Disp: , Rfl:   •  Multiple Vitamins-Minerals (MULTIVITAMIN WITH MINERALS) tablet, Take 1 tablet by mouth daily, Disp: , Rfl:   •  patient supplied medication, Pt takes eye drops Mura 128 - not listed in database, Disp: , Rfl:   •  sodium chloride (LAURI 128) 5 % hypertonic ophthalmic solution, Administer 1 drop to both eyes as needed (Corneal dryness), Disp: , Rfl:   •  Wheat Dextrin (BENEFIBER DRINK  MIX PO), Take by mouth, Disp: , Rfl:      ALLERGIES:  Allergies   Allergen Reactions   • Etomidate Other (See Comments)     Put in by daughter, unknown to patient        ACTIVE PROBLEMS:  Patient Active Problem List   Diagnosis   • Acute massive pulmonary embolism (HCC)   • Venous stasis of both lower extremities   • Clostridium difficile infection   • Right ventricular systolic dysfunction without heart failure   • Essential hypertension   • Metastatic melanoma (HCC)   • High risk medication use   • Adrenal insufficiency (HCC)   • Abnormal PET scan of colon   • Mild anemia   • Anticoagulated by anticoagulation treatment   • History of colon polyps   • Diverticulosis   • Abnormal finding on GI tract imaging          PAST MEDICAL HISTORY:   Past Medical History:   Diagnosis Date   • Acute deep vein thrombosis (DVT) of popliteal vein of left lower extremity (HCC)    • Ankle wound, left, initial encounter     Resolved 2017   • Ankle wound, right, initial encounter     resolved 2017   • DVT (deep vein thrombosis) in pregnancy    • Hypertension    • Pulmonary emboli (HCC)      post C section   • Pulmonary embolism (HCC)     2017   • Pulmonary embolism, blood-clot, obstetric    • Skin cancer    • Venous stasis ulcer (HCC)    • Venous ulcers of both lower extremities (HCC)         PAST SURGICAL HISTORY:  Past Surgical History:   Procedure Laterality Date   •  SECTION      X2        SOCIAL HISTORY:  Social History     Socioeconomic History   • Marital status:      Spouse name: Not on file   • Number of children: Not on file   • Years of education: Not on file   • Highest education level: Not on file   Occupational History   • Not on file   Tobacco Use   • Smoking status: Never   • Smokeless tobacco: Never   Vaping Use   • Vaping status: Never Used   Substance and Sexual Activity   • Alcohol use: No     Comment: hx of social drinker   • Drug use: No   • Sexual activity: Not on file   Other Topics  "Concern   • Not on file   Social History Narrative   • Not on file     Social Determinants of Health     Financial Resource Strain: Not on file   Food Insecurity: Not on file   Transportation Needs: Not on file   Physical Activity: Not on file   Stress: Not on file   Social Connections: Not on file   Intimate Partner Violence: Not on file   Housing Stability: Not on file        FAMILY HISTORY:  Family History   Problem Relation Age of Onset   • Heart attack Mother    • Stroke Mother    • Cancer Father    • Bone cancer Family    • Hypertension Family    • Lung cancer Family            Objective    PHYSICAL EXAMINATION:   Blood pressure 122/68, pulse 97, temperature 97.9 °F (36.6 °C), temperature source Temporal, resp. rate 18, height 5' 9\" (1.753 m), weight 89.8 kg (198 lb), SpO2 98%.     Pain Score: 0-No pain     ECOG Performance Status      Flowsheet Row Most Recent Value   ECOG Performance Status 0 - Fully active, able to carry on all pre-disease performance without restriction               Physical Exam  Constitutional:       General: She is not in acute distress.     Appearance: Normal appearance. She is not ill-appearing or toxic-appearing.   HENT:      Head: Normocephalic and atraumatic.      Right Ear: External ear normal.      Left Ear: External ear normal.      Nose: Nose normal.      Mouth/Throat:      Mouth: Mucous membranes are moist.      Pharynx: Oropharynx is clear.   Eyes:      General: No scleral icterus.        Right eye: No discharge.         Left eye: No discharge.      Conjunctiva/sclera: Conjunctivae normal.   Cardiovascular:      Rate and Rhythm: Normal rate and regular rhythm.      Pulses: Normal pulses.      Heart sounds: No murmur heard.     No friction rub. No gallop.   Pulmonary:      Effort: Pulmonary effort is normal. No respiratory distress.      Breath sounds: Normal breath sounds. No wheezing or rales.   Abdominal:      General: Bowel sounds are normal. There is no distension.      " Palpations: There is no mass.      Tenderness: There is no abdominal tenderness. There is no rebound.   Musculoskeletal:         General: No swelling or tenderness.      Cervical back: Normal range of motion. No rigidity.      Right lower leg: No edema.      Left lower leg: No edema.   Lymphadenopathy:      Head:      Right side of head: No submandibular, preauricular or posterior auricular adenopathy.      Left side of head: No submandibular, preauricular or posterior auricular adenopathy.      Cervical: No cervical adenopathy.      Right cervical: No superficial or posterior cervical adenopathy.     Left cervical: No superficial or posterior cervical adenopathy.      Upper Body:      Right upper body: No supraclavicular or axillary adenopathy.      Left upper body: No supraclavicular or axillary adenopathy.   Skin:     General: Skin is warm.      Coloration: Skin is not jaundiced.      Findings: No lesion or rash.      Comments: No concerning skin lesions   Neurological:      General: No focal deficit present.      Mental Status: She is alert and oriented to person, place, and time.      Cranial Nerves: No cranial nerve deficit.      Motor: No weakness.      Gait: Gait normal.   Psychiatric:         Mood and Affect: Mood normal.         Behavior: Behavior normal.         Thought Content: Thought content normal.         Judgment: Judgment normal.         I reviewed lab data in the chart.    WBC   Date Value Ref Range Status   11/09/2024 4.53 4.31 - 10.16 Thousand/uL Final   10/12/2024 4.99 4.31 - 10.16 Thousand/uL Final   09/14/2024 5.05 4.31 - 10.16 Thousand/uL Final     Hemoglobin   Date Value Ref Range Status   11/09/2024 12.6 11.5 - 15.4 g/dL Final   10/12/2024 13.1 11.5 - 15.4 g/dL Final   09/14/2024 12.3 11.5 - 15.4 g/dL Final     Platelets   Date Value Ref Range Status   11/09/2024 175 149 - 390 Thousands/uL Final   10/12/2024 192 149 - 390 Thousands/uL Final   09/14/2024 187 149 - 390 Thousands/uL Final      MCV   Date Value Ref Range Status   11/09/2024 92 82 - 98 fL Final   10/12/2024 93 82 - 98 fL Final   09/14/2024 92 82 - 98 fL Final      Potassium   Date Value Ref Range Status   11/09/2024 4.5 3.5 - 5.3 mmol/L Final   10/12/2024 4.3 3.5 - 5.3 mmol/L Final   09/14/2024 4.3 3.5 - 5.3 mmol/L Final   05/06/2019 3.8 3.5 - 5.2 mmol/L Final   05/05/2019 4.1 3.5 - 5.2 mmol/L Final   05/04/2019 3.9 3.5 - 5.2 mmol/L Final     Chloride   Date Value Ref Range Status   11/09/2024 105 96 - 108 mmol/L Final   10/12/2024 104 96 - 108 mmol/L Final   09/14/2024 104 96 - 108 mmol/L Final   05/06/2019 104 100 - 109 mmol/L Final   05/05/2019 101 100 - 109 mmol/L Final   05/04/2019 104 100 - 109 mmol/L Final     Carbon Dioxide   Date Value Ref Range Status   05/06/2019 31 23 - 31 mmol/L Final   05/05/2019 28 23 - 31 mmol/L Final   05/04/2019 26 23 - 31 mmol/L Final     CO2   Date Value Ref Range Status   11/09/2024 30 21 - 32 mmol/L Final   10/12/2024 31 21 - 32 mmol/L Final   09/14/2024 30 21 - 32 mmol/L Final     BUN   Date Value Ref Range Status   11/09/2024 19 5 - 25 mg/dL Final   10/12/2024 19 5 - 25 mg/dL Final   09/14/2024 24 5 - 25 mg/dL Final   05/06/2019 15 7 - 25 mg/dL Final   05/05/2019 11 7 - 25 mg/dL Final   05/04/2019 11 7 - 25 mg/dL Final     Creatinine   Date Value Ref Range Status   11/09/2024 0.82 0.60 - 1.30 mg/dL Final     Comment:     Standardized to IDMS reference method   10/12/2024 0.83 0.60 - 1.30 mg/dL Final     Comment:     Standardized to IDMS reference method   09/14/2024 0.93 0.60 - 1.30 mg/dL Final     Comment:     Standardized to IDMS reference method   05/06/2019 0.60 0.40 - 1.10 mg/dL Final   05/05/2019 0.53 0.40 - 1.10 mg/dL Final   05/04/2019 0.52 0.40 - 1.10 mg/dL Final     Glucose   Date Value Ref Range Status   12/11/2023 88 65 - 140 mg/dL Final     Comment:     If the patient is fasting, the ADA then defines impaired fasting glucose as > 100 mg/dL and diabetes as > or equal to 123 mg/dL.    11/17/2022 85 65 - 140 mg/dL Final     Comment:     If the patient is fasting, the ADA then defines impaired fasting glucose as > 100 mg/dL and diabetes as > or equal to 123 mg/dL.  Specimen collection should occur prior to Sulfasalazine administration due to the potential for falsely depressed results. Specimen collection should occur prior to Sulfapyridine administration due to the potential for falsely elevated results.   08/03/2020 112 (H) 65 - 99 mg/dL Final     Comment:     If the patient is fasting, the ADA then defines impaired fasting glucose as > 100 mg/dL and diabetes as > or equal to 123 mg/dL.  Specimen collection should occur prior to Sulfasalazine administration due to the potential for falsely depressed results. Specimen collection should occur prior to Sulfapyridine administration due to the potential for falsely elevated results.   05/06/2019 104 (H) 65 - 99 mg/dL Final   05/05/2019 109 (H) 65 - 99 mg/dL Final   05/04/2019 124 (H) 65 - 99 mg/dL Final     Calcium   Date Value Ref Range Status   11/09/2024 8.8 8.4 - 10.2 mg/dL Final   10/12/2024 9.0 8.4 - 10.2 mg/dL Final   09/14/2024 9.0 8.4 - 10.2 mg/dL Final   05/06/2019 8.5 8.5 - 10.1 mg/dL Final   05/05/2019 8.3 (L) 8.5 - 10.1 mg/dL Final   05/04/2019 8.1 (L) 8.5 - 10.1 mg/dL Final     Albumin   Date Value Ref Range Status   11/09/2024 3.8 3.5 - 5.0 g/dL Final   10/12/2024 3.8 3.5 - 5.0 g/dL Final   09/14/2024 3.8 3.5 - 5.0 g/dL Final     ALBUMIN   Date Value Ref Range Status   05/03/2019 3.5 3.5 - 4.8 g/dL Final     Total Bilirubin   Date Value Ref Range Status   11/09/2024 0.40 0.20 - 1.00 mg/dL Final     Comment:     Use of this assay is not recommended for patients undergoing treatment with eltrombopag due to the potential for falsely elevated results.  N-acetyl-p-benzoquinone imine (metabolite of Acetaminophen) will generate erroneously low results in samples for patients that have taken an overdose of Acetaminophen.   10/12/2024 0.47  "0.20 - 1.00 mg/dL Final     Comment:     Use of this assay is not recommended for patients undergoing treatment with eltrombopag due to the potential for falsely elevated results.  N-acetyl-p-benzoquinone imine (metabolite of Acetaminophen) will generate erroneously low results in samples for patients that have taken an overdose of Acetaminophen.   09/14/2024 0.47 0.20 - 1.00 mg/dL Final     Comment:     Use of this assay is not recommended for patients undergoing treatment with eltrombopag due to the potential for falsely elevated results.  N-acetyl-p-benzoquinone imine (metabolite of Acetaminophen) will generate erroneously low results in samples for patients that have taken an overdose of Acetaminophen.   05/03/2019 0.5 0.2 - 1.0 mg/dL Final     Alkaline Phosphatase   Date Value Ref Range Status   11/09/2024 48 34 - 104 U/L Final   10/12/2024 47 34 - 104 U/L Final   09/14/2024 45 34 - 104 U/L Final   05/03/2019 54 35 - 120 U/L Final     AST   Date Value Ref Range Status   11/09/2024 17 13 - 39 U/L Final   10/12/2024 14 13 - 39 U/L Final   09/14/2024 16 13 - 39 U/L Final   05/03/2019 26 <41 U/L Final     ALT   Date Value Ref Range Status   11/09/2024 10 7 - 52 U/L Final     Comment:     Specimen collection should occur prior to Sulfasalazine administration due to the potential for falsely depressed results.    10/12/2024 10 7 - 52 U/L Final     Comment:     Specimen collection should occur prior to Sulfasalazine administration due to the potential for falsely depressed results.    09/14/2024 13 7 - 52 U/L Final     Comment:     Specimen collection should occur prior to Sulfasalazine administration due to the potential for falsely depressed results.    05/03/2019 25 <56 U/L Final      LD   Date Value Ref Range Status   11/09/2024 148 140 - 271 U/L Final   10/12/2024 148 140 - 271 U/L Final   09/14/2024 170 140 - 271 U/L Final     No results found for: \"TSH\"  No results found for: \"[id number]\"   Free T4   Date Value " Ref Range Status   11/09/2024 1.13 (H) 0.61 - 1.12 ng/dL Final     Comment:     Specimens with biotin concentrations > 10 ng/mL can lead to significant (> 10%) positive bias in result.   10/12/2024 0.97 0.61 - 1.12 ng/dL Final     Comment:     Specimens with biotin concentrations > 10 ng/mL can lead to significant (> 10%) positive bias in result.   09/14/2024 1.01 0.61 - 1.12 ng/dL Final     Comment:     Specimens with biotin concentrations > 10 ng/mL can lead to significant (> 10%) positive bias in result.         RECENT IMAGING:  Procedure: NM pet ct tumor imaging whole body    Result Date: 10/17/2024  Narrative: WHOLE-BODY PET/CT SCAN INDICATION: C43.9: Malignant melanoma of skin, unspecified     , restaging MODIFIER: PS COMPARISON: PET/CT 5/20/2024 and priors CELL TYPE: Malignant melanoma, biopsy right leg 10/13/2022 TECHNIQUE:   10.2 mCi F-18-FDG administered IV. Multiplanar attenuation corrected and non attenuation corrected PET images were acquired 60 minutes post injection. Contiguous, low dose, axial CT sections were obtained from the vertex through the feet.  Intravenous contrast material was not utilized.  This examination, like all CT scans performed in the [facility], was performed utilizing techniques to minimize radiation dose exposure, including the use of iterative reconstruction and automated exposure control. Fasting serum glucose: 80 mg/dl FINDINGS: VISUALIZED BRAIN: No acute abnormalities are seen. HEAD/NECK: There is a physiologic distribution of FDG. No FDG avid cervical adenopathy is seen. CT images: Unremarkable CHEST: Small hypermetabolic focus deep to the distal right clavicle, SUV 3.1, is nonspecific but may just be inflammatory. There is no discrete CT abnormality visualized in this region. Otherwise no FDG avid soft tissue lesions are seen. CT images: Unremarkable. ABDOMEN: No FDG avid soft tissue lesions are seen. CT images: Atherosclerotic aorta. Probable cyst along  the posterior left kidney appears stable. PELVIS: Persistent focal sigmoid colon activity, SUV 6.6, prior SUV 9.6. Persistent stable wall thickening at this level. No new FDG avid soft tissue lesions are seen. CT images: Persistent endometrial fluid. OSSEOUS STRUCTURES/EXTREMITIES: There is new focal FDG activity along the left ankle medial malleolus region, SUV 5.9. There is also new focal FDG activity along the plantar left calcaneus, SUV 4.7. These findings are nonspecific but may just be inflammatory. Mildly increased small cutaneous focus of activity along the right anterolateral lower extremity at the level of the proximal to mid tibia, SUV 2.2, prior SUV 1.6. Mild FDG uptake again associated with multiple bilateral serpiginous subcutaneous varicose veins, left greater than right. No additional FDG avid lesions are seen. CT images: Stable. Spine degenerative change. Mild scoliosis.     Impression: 1. Persistent, mildly increased small cutaneous focus of activity along the right anterolateral lower extremity at the level of the proximal to mid tibia. This may just be inflammatory but be correlated with clinical exam findings and reassessed on follow-up exam to exclude tumor. 2. New focal FDG activity along the medial left ankle and plantar left calcaneus is likely inflammatory. This may also be correlated clinically. 3. Otherwise no new hypermetabolic lesions that are concerning for malignancy/metastases. 4. Persistent focal sigmoid colon FDG activity may be inflammatory. No neoplasm visualized as per colonoscopy epic notes from 4/2023. 5. Persistent endometrial fluid may be reassessed with ultrasound as clinically warranted. Workstation performed: [id number]            Assessment/Plan  Ms. Damon is a 74-year-old female with metastatic melanoma on treatment with nivolumab plus relatlimab here for continued monitoring, follow-up and surveillance while on treatment.    1. Metastatic melanoma (HCC)  She is  doing well and tolerating treatment with nivolumab plus relatlimab and clinically she has had resolution of disease as well as evaluated on imaging.  Recent imaging demonstrates no evidence of metastatic disease and there is some minor FDG activity left in the right lower extremity but otherwise has resolved.  Labs reviewed and okay.  She is okay to continue with her neck cycle of treatment.  She knows to continue to monitor for immune mediated side effects.  She knows to call with issues or concerns prior to her next visit.    She has standing orders for blood work prior to each treatment.    2. High risk medication use  3. Adrenal insufficiency (HCC)  The patient is on treatment with immunotherapy and we will continue to monitor for side effects and lab abnormalities. We will monitor labs with each treatment to ensure safety of continuing on treatment. The patient knows to watch for signs and symptoms for immune mediated side effects. Denies any immune mediated side effects at this time.       Follow up as planned    Rafaela Reyes MD, PhD    Rafaela Reyes MD, PhD

## 2024-11-12 NOTE — PROGRESS NOTES
[facility] [place]  [address] [facility]  [place] [id number]  [phone]     Date of Visit: 11/11/2024  Name: Debbie Damon   YOB: 1950        Subjective    VISIT DIAGNOSIS:  Diagnoses and all orders for this visit:    Metastatic melanoma (HCC)    High risk medication use    Adrenal insufficiency (HCC)        Oncology History   Metastatic melanoma (HCC)   10/13/2022 -  Cancer Staged    Staging form: Melanoma of the Skin, AJCC 8th Edition  - Clinical stage from 10/13/2022: Stage IV (cTX, cNX, cM1a) - Signed by Rafaela Reyes MD on 11/22/2022       10/13/2022 Biopsy    A. Skin, right lateral medial leg, punch biopsy:  Portion of severely atypical melanocytic dermal proliferation with prominent melanosis consistent with melanoma. See note.        B. Skin, right medial leg. punch biopsy:  Portion of severely atypical melanocytic dermal proliferation with prominent melanosis consistent with melanoma. See note.        C. Skin, right medial leg, punch biopsy:   Portion of severely atypical melanocytic dermal proliferation with prominent melanosis consistent with melanoma. See note.     11/22/2022 Initial Diagnosis    Metastatic melanoma (HCC)     12/14/2022 -  Chemotherapy    alteplase (CATHFLO), 2 mg, Intracatheter, Every 2 hour PRN, 25 of 27 cycles  NIVOLUMAB-RELATLIMAB-RMBW (OPDUALAG) IVPB, 480 mg of nivolumab, Intravenous, Once, 25 of 27 cycles  Administration: 480 mg of nivolumab (12/14/2022), 480 mg of nivolumab (1/11/2023), 480 mg of nivolumab (2/8/2023), 480 mg of nivolumab (3/8/2023), 480 mg of nivolumab (4/5/2023), 480 mg of nivolumab (5/3/2023), 480 mg of nivolumab (5/31/2023), 480 mg of nivolumab (6/28/2023), 480 mg of nivolumab (7/26/2023), 480 mg of nivolumab (8/23/2023), 480 mg of nivolumab (9/20/2023), 480 mg of nivolumab (10/18/2023), 480 mg of nivolumab (11/15/2023), 480 mg of nivolumab (12/13/2023), 480 mg of nivolumab (1/10/2024), 480 mg of  nivolumab (2/7/2024), 480 mg of nivolumab (3/6/2024), 480 mg of nivolumab (4/3/2024), 480 mg of nivolumab (5/1/2024), 480 mg of nivolumab (5/29/2024), 480 mg of nivolumab (6/26/2024), 480 mg of nivolumab (7/24/2024), 480 mg of nivolumab (8/21/2024), 480 mg of nivolumab (9/18/2024), 480 mg of nivolumab (10/16/2024)     1/5/2023 Genomic Testing    [facility] liquid testing  TMB 1 Mut/Mb  MSI-high not detected          Cancer Staging   Metastatic melanoma (HCC)  Staging form: Melanoma of the Skin, AJCC 8th Edition  - Clinical stage from 10/13/2022: Stage IV (cTX, cNX, cM1a) - Signed by Rafaela Reyes MD on 11/22/2022     Treatment Details   Treatment goal Curative   Plan Name OP Nivolumab and Relatlimab-rmbw Q 28 Days   Status Active   Start Date 12/14/2022   End Date 12/11/2024 (Planned)   Provider Rafaela Reyes MD   Chemotherapy alteplase (CATHFLO), 2 mg, Intracatheter, Every 2 hour PRN, 25 of 27 cycles    NIVOLUMAB-RELATLIMAB-RMBW (OPDUALAG) IVPB, 480 mg of nivolumab, Intravenous, Once, 25 of 27 cycles  Administration: 480 mg of nivolumab (12/14/2022), 480 mg of nivolumab (1/11/2023), 480 mg of nivolumab (2/8/2023), 480 mg of nivolumab (3/8/2023), 480 mg of nivolumab (4/5/2023), 480 mg of nivolumab (5/3/2023), 480 mg of nivolumab (5/31/2023), 480 mg of nivolumab (6/28/2023), 480 mg of nivolumab (7/26/2023), 480 mg of nivolumab (8/23/2023), 480 mg of nivolumab (9/20/2023), 480 mg of nivolumab (10/18/2023), 480 mg of nivolumab (11/15/2023), 480 mg of nivolumab (12/13/2023), 480 mg of nivolumab (1/10/2024), 480 mg of nivolumab (2/7/2024), 480 mg of nivolumab (3/6/2024), 480 mg of nivolumab (4/3/2024), 480 mg of nivolumab (5/1/2024), 480 mg of nivolumab (5/29/2024), 480 mg of nivolumab (6/26/2024), 480 mg of nivolumab (7/24/2024), 480 mg of nivolumab (8/21/2024), 480 mg of nivolumab (9/18/2024), 480 mg of nivolumab (10/16/2024)          HISTORY OF PRESENT ILLNESS: Debbie Damon is a 74 y.o. female  who is a  74-year-old female with metastatic melanoma on treatment with nivolumab plus relatlimab here for continued monitoring, follow-up and surveillance while on treatment.    She is doing well and tolerating treatment.  No new, changing, concerning skin lesions.  No lymphadenopathy.  Does state that she has intermittent pain on the bottom of her left foot that comes and goes and is never in the same spot.  Nothing makes it better or worse it resolves on its own.  Nothing seems to affect when it occurs.    Energy is good.  Eating well.  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    Discussed results from her PET scan from 10/17/2024 that demonstrates persistent mildly increased small cutaneous focus activity along the right anterior lateral lower extremity at the level of the proximal to mid tibia.  Could be inflammatory -this is the spot of her original melanoma and on physical exam area has improved and there is decreased to resolved pigmentation in this area.  No palpable nodularity or masses.    New focal FDG activity along the left medial ankle and plantar left calcaneus is likely inflammatory.  No new concerning lesions for metastatic disease.  Persistent focal sigmoid colon activity may be inflammatory as colonoscopy in April 2023 was negative.        REVIEW OF SYSTEMS:  Review of Systems   Constitutional:  Negative for appetite change, fatigue, fever and unexpected weight change.   HENT:   Negative for lump/mass, trouble swallowing and voice change.    Eyes:  Negative for icterus.   Respiratory:  Negative for cough, shortness of breath and wheezing.    Cardiovascular:  Negative for leg swelling.   Gastrointestinal:  Negative for abdominal pain, constipation, diarrhea, nausea and vomiting.   Genitourinary:  Negative for difficulty urinating and hematuria.    Musculoskeletal:  Negative for arthralgias, gait problem and myalgias.   Skin:  Negative for itching and rash.        No new, changing, or concerning lesions.    Neurological:  Negative for extremity weakness, gait problem, headaches, light-headedness and numbness.   Hematological:  Negative for adenopathy.        MEDICATIONS:    Current Outpatient Medications:     apixaban (Eliquis) 5 mg, Take 1 tablet (5 mg total) by mouth every 12 (twelve) hours, Disp: 60 tablet, Rfl: 5    famotidine (PEPCID) 10 mg tablet, Take 10 mg by mouth 2 (two) times a day, Disp: , Rfl:     hydrocortisone (CORTEF) 10 mg tablet, take 1 tablet by mouth twice a day, Disp: 60 tablet, Rfl: 11    loratadine (CLARITIN) 10 mg tablet, Take 10 mg by mouth daily, Disp: , Rfl:     losartan (Cozaar) 100 MG tablet, Take 1 tablet (100 mg total) by mouth daily, Disp: 30 tablet, Rfl: 5    metoprolol succinate (TOPROL-XL) 25 mg 24 hr tablet, Take 1 tablet (25 mg total) by mouth 2 (two) times a day, Disp: 60 tablet, Rfl: 5    Multiple Minerals-Vitamins (Citracal Plus) TABS, Take by mouth, Disp: , Rfl:     Multiple Vitamins-Minerals (MULTIVITAMIN WITH MINERALS) tablet, Take 1 tablet by mouth daily, Disp: , Rfl:     patient supplied medication, Pt takes eye drops Mura 128 - not listed in database, Disp: , Rfl:     sodium chloride (LAURI 128) 5 % hypertonic ophthalmic solution, Administer 1 drop to both eyes as needed (Corneal dryness), Disp: , Rfl:     Wheat Dextrin (BENEFIBER DRINK MIX PO), Take by mouth, Disp: , Rfl:      ALLERGIES:  Allergies   Allergen Reactions    Etomidate Other (See Comments)     Put in by daughter, unknown to patient        ACTIVE PROBLEMS:  Patient Active Problem List   Diagnosis    Acute massive pulmonary embolism (HCC)    Venous stasis of both lower extremities    Clostridium difficile infection    Right ventricular systolic dysfunction without heart failure    Essential hypertension    Metastatic melanoma (HCC)    High risk medication use    Adrenal insufficiency (HCC)    Abnormal PET scan of colon    Mild anemia    Anticoagulated by anticoagulation treatment    History of colon polyps     Diverticulosis    Abnormal finding on GI tract imaging          PAST MEDICAL HISTORY:   Past Medical History:   Diagnosis Date    Acute deep vein thrombosis (DVT) of popliteal vein of left lower extremity (HCC)     Ankle wound, left, initial encounter     Resolved 2017    Ankle wound, right, initial encounter     resolved 2017    DVT (deep vein thrombosis) in pregnancy     Hypertension     Pulmonary emboli (HCC)      post C section    Pulmonary embolism (HCC)     2017    Pulmonary embolism, blood-clot, obstetric     Skin cancer     Venous stasis ulcer (HCC)     Venous ulcers of both lower extremities (HCC)         PAST SURGICAL HISTORY:  Past Surgical History:   Procedure Laterality Date     SECTION      X2        SOCIAL HISTORY:  Social History     Socioeconomic History    Marital status:      Spouse name: Not on file    Number of children: Not on file    Years of education: Not on file    Highest education level: Not on file   Occupational History    Not on file   Tobacco Use    Smoking status: Never    Smokeless tobacco: Never   Vaping Use    Vaping status: Never Used   Substance and Sexual Activity    Alcohol use: No     Comment: hx of social drinker    Drug use: No    Sexual activity: Not on file   Other Topics Concern    Not on file   Social History Narrative    Not on file     Social Determinants of Health     Financial Resource Strain: Not on file   Food Insecurity: Not on file   Transportation Needs: Not on file   Physical Activity: Not on file   Stress: Not on file   Social Connections: Not on file   Intimate Partner Violence: Not on file   Housing Stability: Not on file        FAMILY HISTORY:  Family History   Problem Relation Age of Onset    Heart attack Mother     Stroke Mother     Cancer Father     Bone cancer Family     Hypertension Family     Lung cancer Family            Objective    PHYSICAL EXAMINATION:   Blood pressure 122/68, pulse 97, temperature 97.9 °F (36.6 °C),  "temperature source Temporal, resp. rate 18, height 5' 9\" (1.753 m), weight 89.8 kg (198 lb), SpO2 98%.     Pain Score: 0-No pain     ECOG Performance Status      Flowsheet Row Most Recent Value   ECOG Performance Status 0 - Fully active, able to carry on all pre-disease performance without restriction               Physical Exam  Constitutional:       General: She is not in acute distress.     Appearance: Normal appearance. She is not ill-appearing or toxic-appearing.   HENT:      Head: Normocephalic and atraumatic.      Right Ear: External ear normal.      Left Ear: External ear normal.      Nose: Nose normal.      Mouth/Throat:      Mouth: Mucous membranes are moist.      Pharynx: Oropharynx is clear.   Eyes:      General: No scleral icterus.        Right eye: No discharge.         Left eye: No discharge.      Conjunctiva/sclera: Conjunctivae normal.   Cardiovascular:      Rate and Rhythm: Normal rate and regular rhythm.      Pulses: Normal pulses.      Heart sounds: No murmur heard.     No friction rub. No gallop.   Pulmonary:      Effort: Pulmonary effort is normal. No respiratory distress.      Breath sounds: Normal breath sounds. No wheezing or rales.   Abdominal:      General: Bowel sounds are normal. There is no distension.      Palpations: There is no mass.      Tenderness: There is no abdominal tenderness. There is no rebound.   Musculoskeletal:         General: No swelling or tenderness.      Cervical back: Normal range of motion. No rigidity.      Right lower leg: No edema.      Left lower leg: No edema.   Lymphadenopathy:      Head:      Right side of head: No submandibular, preauricular or posterior auricular adenopathy.      Left side of head: No submandibular, preauricular or posterior auricular adenopathy.      Cervical: No cervical adenopathy.      Right cervical: No superficial or posterior cervical adenopathy.     Left cervical: No superficial or posterior cervical adenopathy.      Upper Body:    "   Right upper body: No supraclavicular or axillary adenopathy.      Left upper body: No supraclavicular or axillary adenopathy.   Skin:     General: Skin is warm.      Coloration: Skin is not jaundiced.      Findings: No lesion or rash.      Comments: No concerning skin lesions   Neurological:      General: No focal deficit present.      Mental Status: She is alert and oriented to person, place, and time.      Cranial Nerves: No cranial nerve deficit.      Motor: No weakness.      Gait: Gait normal.   Psychiatric:         Mood and Affect: Mood normal.         Behavior: Behavior normal.         Thought Content: Thought content normal.         Judgment: Judgment normal.         I reviewed lab data in the chart.    WBC   Date Value Ref Range Status   11/09/2024 4.53 4.31 - 10.16 Thousand/uL Final   10/12/2024 4.99 4.31 - 10.16 Thousand/uL Final   09/14/2024 5.05 4.31 - 10.16 Thousand/uL Final     Hemoglobin   Date Value Ref Range Status   11/09/2024 12.6 11.5 - 15.4 g/dL Final   10/12/2024 13.1 11.5 - 15.4 g/dL Final   09/14/2024 12.3 11.5 - 15.4 g/dL Final     Platelets   Date Value Ref Range Status   11/09/2024 175 149 - 390 Thousands/uL Final   10/12/2024 192 149 - 390 Thousands/uL Final   09/14/2024 187 149 - 390 Thousands/uL Final     MCV   Date Value Ref Range Status   11/09/2024 92 82 - 98 fL Final   10/12/2024 93 82 - 98 fL Final   09/14/2024 92 82 - 98 fL Final      Potassium   Date Value Ref Range Status   11/09/2024 4.5 3.5 - 5.3 mmol/L Final   10/12/2024 4.3 3.5 - 5.3 mmol/L Final   09/14/2024 4.3 3.5 - 5.3 mmol/L Final   05/06/2019 3.8 3.5 - 5.2 mmol/L Final   05/05/2019 4.1 3.5 - 5.2 mmol/L Final   05/04/2019 3.9 3.5 - 5.2 mmol/L Final     Chloride   Date Value Ref Range Status   11/09/2024 105 96 - 108 mmol/L Final   10/12/2024 104 96 - 108 mmol/L Final   09/14/2024 104 96 - 108 mmol/L Final   05/06/2019 104 100 - 109 mmol/L Final   05/05/2019 101 100 - 109 mmol/L Final   05/04/2019 104 100 - 109 mmol/L  Final     Carbon Dioxide   Date Value Ref Range Status   05/06/2019 31 23 - 31 mmol/L Final   05/05/2019 28 23 - 31 mmol/L Final   05/04/2019 26 23 - 31 mmol/L Final     CO2   Date Value Ref Range Status   11/09/2024 30 21 - 32 mmol/L Final   10/12/2024 31 21 - 32 mmol/L Final   09/14/2024 30 21 - 32 mmol/L Final     BUN   Date Value Ref Range Status   11/09/2024 19 5 - 25 mg/dL Final   10/12/2024 19 5 - 25 mg/dL Final   09/14/2024 24 5 - 25 mg/dL Final   05/06/2019 15 7 - 25 mg/dL Final   05/05/2019 11 7 - 25 mg/dL Final   05/04/2019 11 7 - 25 mg/dL Final     Creatinine   Date Value Ref Range Status   11/09/2024 0.82 0.60 - 1.30 mg/dL Final     Comment:     Standardized to IDMS reference method   10/12/2024 0.83 0.60 - 1.30 mg/dL Final     Comment:     Standardized to IDMS reference method   09/14/2024 0.93 0.60 - 1.30 mg/dL Final     Comment:     Standardized to IDMS reference method   05/06/2019 0.60 0.40 - 1.10 mg/dL Final   05/05/2019 0.53 0.40 - 1.10 mg/dL Final   05/04/2019 0.52 0.40 - 1.10 mg/dL Final     Glucose   Date Value Ref Range Status   12/11/2023 88 65 - 140 mg/dL Final     Comment:     If the patient is fasting, the ADA then defines impaired fasting glucose as > 100 mg/dL and diabetes as > or equal to 123 mg/dL.   11/17/2022 85 65 - 140 mg/dL Final     Comment:     If the patient is fasting, the ADA then defines impaired fasting glucose as > 100 mg/dL and diabetes as > or equal to 123 mg/dL.  Specimen collection should occur prior to Sulfasalazine administration due to the potential for falsely depressed results. Specimen collection should occur prior to Sulfapyridine administration due to the potential for falsely elevated results.   08/03/2020 112 (H) 65 - 99 mg/dL Final     Comment:     If the patient is fasting, the ADA then defines impaired fasting glucose as > 100 mg/dL and diabetes as > or equal to 123 mg/dL.  Specimen collection should occur prior to Sulfasalazine administration due to the  potential for falsely depressed results. Specimen collection should occur prior to Sulfapyridine administration due to the potential for falsely elevated results.   05/06/2019 104 (H) 65 - 99 mg/dL Final   05/05/2019 109 (H) 65 - 99 mg/dL Final   05/04/2019 124 (H) 65 - 99 mg/dL Final     Calcium   Date Value Ref Range Status   11/09/2024 8.8 8.4 - 10.2 mg/dL Final   10/12/2024 9.0 8.4 - 10.2 mg/dL Final   09/14/2024 9.0 8.4 - 10.2 mg/dL Final   05/06/2019 8.5 8.5 - 10.1 mg/dL Final   05/05/2019 8.3 (L) 8.5 - 10.1 mg/dL Final   05/04/2019 8.1 (L) 8.5 - 10.1 mg/dL Final     Albumin   Date Value Ref Range Status   11/09/2024 3.8 3.5 - 5.0 g/dL Final   10/12/2024 3.8 3.5 - 5.0 g/dL Final   09/14/2024 3.8 3.5 - 5.0 g/dL Final     ALBUMIN   Date Value Ref Range Status   05/03/2019 3.5 3.5 - 4.8 g/dL Final     Total Bilirubin   Date Value Ref Range Status   11/09/2024 0.40 0.20 - 1.00 mg/dL Final     Comment:     Use of this assay is not recommended for patients undergoing treatment with eltrombopag due to the potential for falsely elevated results.  N-acetyl-p-benzoquinone imine (metabolite of Acetaminophen) will generate erroneously low results in samples for patients that have taken an overdose of Acetaminophen.   10/12/2024 0.47 0.20 - 1.00 mg/dL Final     Comment:     Use of this assay is not recommended for patients undergoing treatment with eltrombopag due to the potential for falsely elevated results.  N-acetyl-p-benzoquinone imine (metabolite of Acetaminophen) will generate erroneously low results in samples for patients that have taken an overdose of Acetaminophen.   09/14/2024 0.47 0.20 - 1.00 mg/dL Final     Comment:     Use of this assay is not recommended for patients undergoing treatment with eltrombopag due to the potential for falsely elevated results.  N-acetyl-p-benzoquinone imine (metabolite of Acetaminophen) will generate erroneously low results in samples for patients that have taken an overdose of  "Acetaminophen.   05/03/2019 0.5 0.2 - 1.0 mg/dL Final     Alkaline Phosphatase   Date Value Ref Range Status   11/09/2024 48 34 - 104 U/L Final   10/12/2024 47 34 - 104 U/L Final   09/14/2024 45 34 - 104 U/L Final   05/03/2019 54 35 - 120 U/L Final     AST   Date Value Ref Range Status   11/09/2024 17 13 - 39 U/L Final   10/12/2024 14 13 - 39 U/L Final   09/14/2024 16 13 - 39 U/L Final   05/03/2019 26 <41 U/L Final     ALT   Date Value Ref Range Status   11/09/2024 10 7 - 52 U/L Final     Comment:     Specimen collection should occur prior to Sulfasalazine administration due to the potential for falsely depressed results.    10/12/2024 10 7 - 52 U/L Final     Comment:     Specimen collection should occur prior to Sulfasalazine administration due to the potential for falsely depressed results.    09/14/2024 13 7 - 52 U/L Final     Comment:     Specimen collection should occur prior to Sulfasalazine administration due to the potential for falsely depressed results.    05/03/2019 25 <56 U/L Final      LD   Date Value Ref Range Status   11/09/2024 148 140 - 271 U/L Final   10/12/2024 148 140 - 271 U/L Final   09/14/2024 170 140 - 271 U/L Final     No results found for: \"TSH\"  No results found for: \"H9YCTEQ\"   Free T4   Date Value Ref Range Status   11/09/2024 1.13 (H) 0.61 - 1.12 ng/dL Final     Comment:     Specimens with biotin concentrations > 10 ng/mL can lead to significant (> 10%) positive bias in result.   10/12/2024 0.97 0.61 - 1.12 ng/dL Final     Comment:     Specimens with biotin concentrations > 10 ng/mL can lead to significant (> 10%) positive bias in result.   09/14/2024 1.01 0.61 - 1.12 ng/dL Final     Comment:     Specimens with biotin concentrations > 10 ng/mL can lead to significant (> 10%) positive bias in result.         RECENT IMAGING:  Procedure: NM pet ct tumor imaging whole body    Result Date: 10/17/2024  Narrative: WHOLE-BODY PET/CT SCAN INDICATION: C43.9: Malignant melanoma of skin, " unspecified     , restaging MODIFIER: PS COMPARISON: PET/CT 5/20/2024 and priors CELL TYPE: Malignant melanoma, biopsy right leg 10/13/2022 TECHNIQUE:   10.2 mCi F-18-FDG administered IV. Multiplanar attenuation corrected and non attenuation corrected PET images were acquired 60 minutes post injection. Contiguous, low dose, axial CT sections were obtained from the vertex through the feet.  Intravenous contrast material was not utilized.  This examination, like all CT scans performed in the [facility], was performed utilizing techniques to minimize radiation dose exposure, including the use of iterative reconstruction and automated exposure control. Fasting serum glucose: 80 mg/dl FINDINGS: VISUALIZED BRAIN: No acute abnormalities are seen. HEAD/NECK: There is a physiologic distribution of FDG. No FDG avid cervical adenopathy is seen. CT images: Unremarkable CHEST: Small hypermetabolic focus deep to the distal right clavicle, SUV 3.1, is nonspecific but may just be inflammatory. There is no discrete CT abnormality visualized in this region. Otherwise no FDG avid soft tissue lesions are seen. CT images: Unremarkable. ABDOMEN: No FDG avid soft tissue lesions are seen. CT images: Atherosclerotic aorta. Probable cyst along the posterior left kidney appears stable. PELVIS: Persistent focal sigmoid colon activity, SUV 6.6, prior SUV 9.6. Persistent stable wall thickening at this level. No new FDG avid soft tissue lesions are seen. CT images: Persistent endometrial fluid. OSSEOUS STRUCTURES/EXTREMITIES: There is new focal FDG activity along the left ankle medial malleolus region, SUV 5.9. There is also new focal FDG activity along the plantar left calcaneus, SUV 4.7. These findings are nonspecific but may just be inflammatory. Mildly increased small cutaneous focus of activity along the right anterolateral lower extremity at the level of the proximal to mid tibia, SUV 2.2, prior SUV 1.6. Mild FDG uptake  again associated with multiple bilateral serpiginous subcutaneous varicose veins, left greater than right. No additional FDG avid lesions are seen. CT images: Stable. Spine degenerative change. Mild scoliosis.     Impression: 1. Persistent, mildly increased small cutaneous focus of activity along the right anterolateral lower extremity at the level of the proximal to mid tibia. This may just be inflammatory but be correlated with clinical exam findings and reassessed on follow-up exam to exclude tumor. 2. New focal FDG activity along the medial left ankle and plantar left calcaneus is likely inflammatory. This may also be correlated clinically. 3. Otherwise no new hypermetabolic lesions that are concerning for malignancy/metastases. 4. Persistent focal sigmoid colon FDG activity may be inflammatory. No neoplasm visualized as per colonoscopy epic notes from 4/2023. 5. Persistent endometrial fluid may be reassessed with ultrasound as clinically warranted. Workstation performed: [id number]            Assessment/Plan  Ms. Damon is a 74-year-old female with metastatic melanoma on treatment with nivolumab plus relatlimab here for continued monitoring, follow-up and surveillance while on treatment.    1. Metastatic melanoma (HCC)  She is doing well and tolerating treatment with nivolumab plus relatlimab and clinically she has had resolution of disease as well as evaluated on imaging.  Recent imaging demonstrates no evidence of metastatic disease and there is some minor FDG activity left in the right lower extremity but otherwise has resolved.  Labs reviewed and okay.  She is okay to continue with her neck cycle of treatment.  She knows to continue to monitor for immune mediated side effects.  She knows to call with issues or concerns prior to her next visit.    She has standing orders for blood work prior to each treatment.    2. High risk medication use  3. Adrenal insufficiency (HCC)  The patient is on treatment with  immunotherapy and we will continue to monitor for side effects and lab abnormalities. We will monitor labs with each treatment to ensure safety of continuing on treatment. The patient knows to watch for signs and symptoms for immune mediated side effects. Denies any immune mediated side effects at this time.       Follow up as planned    Rafaela Reyes MD, PhD    Rafaela Reyes MD, PhD

## 2024-11-13 ENCOUNTER — HOSPITAL ENCOUNTER (OUTPATIENT)
Dept: INFUSION CENTER | Facility: HOSPITAL | Age: 74
Discharge: HOME | End: 2024-11-13
Attending: INTERNAL MEDICINE
Payer: COMMERCIAL

## 2024-11-13 VITALS
TEMPERATURE: 97.4 F | DIASTOLIC BLOOD PRESSURE: 82 MMHG | SYSTOLIC BLOOD PRESSURE: 136 MMHG | HEART RATE: 83 BPM | RESPIRATION RATE: 17 BRPM

## 2024-11-13 DIAGNOSIS — C43.9 METASTATIC MELANOMA (HCC): Primary | ICD-10-CM

## 2024-11-13 RX ORDER — SODIUM CHLORIDE 9 MG/ML
20 INJECTION, SOLUTION INTRAVENOUS ONCE
Status: COMPLETED | OUTPATIENT
Start: 2024-11-13 | End: 2024-11-13

## 2024-11-13 RX ADMIN — SODIUM CHLORIDE 480 MG OF NIVOLUMAB: 9 INJECTION, SOLUTION INTRAVENOUS at 14:55

## 2024-11-13 RX ADMIN — SODIUM CHLORIDE 20 ML/HR: 0.9 INJECTION, SOLUTION INTRAVENOUS at 14:36

## 2024-11-13 NOTE — PROGRESS NOTES
Debbie Damon  tolerated treatment well with no complications.      Debbie Damon is aware of future appt on 12/11 at 2p,.     AVS printed and given to Debbie Damon:   No (Declined by Debbie Damon)

## 2024-11-15 ENCOUNTER — TELEPHONE (OUTPATIENT)
Dept: HEMATOLOGY ONCOLOGY | Facility: CLINIC | Age: 74
End: 2024-11-15

## 2024-12-04 RX ORDER — SODIUM CHLORIDE 9 MG/ML
20 INJECTION, SOLUTION INTRAVENOUS ONCE
Status: CANCELLED | OUTPATIENT
Start: 2024-12-11

## 2024-12-07 ENCOUNTER — APPOINTMENT (OUTPATIENT)
Dept: LAB | Facility: CLINIC | Age: 74
End: 2024-12-07
Payer: COMMERCIAL

## 2024-12-09 ENCOUNTER — RESULTS FOLLOW-UP (OUTPATIENT)
Dept: FAMILY MEDICINE CLINIC | Facility: CLINIC | Age: 74
End: 2024-12-09

## 2024-12-11 ENCOUNTER — HOSPITAL ENCOUNTER (OUTPATIENT)
Dept: INFUSION CENTER | Facility: HOSPITAL | Age: 74
Discharge: HOME | End: 2024-12-11
Attending: INTERNAL MEDICINE
Payer: COMMERCIAL

## 2024-12-11 ENCOUNTER — TELEMEDICINE (OUTPATIENT)
Dept: HEMATOLOGY ONCOLOGY | Facility: CLINIC | Age: 74
End: 2024-12-11
Payer: COMMERCIAL

## 2024-12-11 VITALS
BODY MASS INDEX: 29.23 KG/M2 | TEMPERATURE: 96.9 F | RESPIRATION RATE: 17 BRPM | DIASTOLIC BLOOD PRESSURE: 67 MMHG | OXYGEN SATURATION: 97 % | HEIGHT: 69 IN | SYSTOLIC BLOOD PRESSURE: 148 MMHG | HEART RATE: 83 BPM

## 2024-12-11 DIAGNOSIS — Z79.899 HIGH RISK MEDICATION USE: ICD-10-CM

## 2024-12-11 DIAGNOSIS — C43.9 METASTATIC MELANOMA (HCC): Primary | ICD-10-CM

## 2024-12-11 PROCEDURE — 96413 CHEMO IV INFUSION 1 HR: CPT

## 2024-12-11 PROCEDURE — 99214 OFFICE O/P EST MOD 30 MIN: CPT | Performed by: INTERNAL MEDICINE

## 2024-12-11 RX ORDER — SODIUM CHLORIDE 9 MG/ML
20 INJECTION, SOLUTION INTRAVENOUS ONCE
Status: COMPLETED | OUTPATIENT
Start: 2024-12-11 | End: 2024-12-11

## 2024-12-11 RX ADMIN — SODIUM CHLORIDE 480 MG OF NIVOLUMAB: 9 INJECTION, SOLUTION INTRAVENOUS at 14:47

## 2024-12-11 RX ADMIN — SODIUM CHLORIDE 20 ML/HR: 0.9 INJECTION, SOLUTION INTRAVENOUS at 14:21

## 2024-12-11 NOTE — PROGRESS NOTES
Debbie Damon  tolerated final Opdualag therapy well with no complications. Patient spoke with Dr. Reyes this morning, and during appointment scheduled follow up in January. Debbie aware no future infusion appointments at this time.    AVS declined by Debbie Damon.

## 2024-12-11 NOTE — LETTER
2024     Anh aBiley MD  [address]  2nd Floor  [facility] [address]    Patient: Debbie Damon   YOB: 1950   Date of Visit: 2024       Dear Dr. Bailey:    Thank you for referring Debbie Damon to me for evaluation. Below are my notes for this consultation.    If you have questions, please do not hesitate to call me. I look forward to following your patient along with you.         Sincerely,        Rafaela Reyes MD        CC: DO Yenifer Llamas CRNP Joseph John [place], DO Melissa A Wilson, MD  2024  7:14 PM  Sign when Signing Visit  Virtual Brief Visit  Name: Debbie Damon      : 1950      MRN: [id number]  Encounter Provider: Rafaela Reyes MD  Encounter Date: 2024   Encounter department: [facility] [place]    This Visit is being completed by telephone. The Patient is located at Other and in the following state in which I hold an active license [place]    The patient was identified by name and date of birth. Debbie Damon was informed that this is a telemedicine visit and that the visit is being conducted through Telephone.  My office door was closed. No one else was in the room.  She acknowledged consent and understanding of privacy and security of the video platform. The patient has agreed to participate and understands they can discontinue the visit at any time.    Patient is aware this is a billable service.     :  Assessment & Plan  Metastatic melanoma (HCC)  Ms. Damon has metastatic melanoma on treatment with nivolumab plus relatlimab who will complete 2 years of treatment this week evaluated for continued monitoring, follow-up and surveillance while on treatment.  She is doing well and tolerating treatment.  Labs reviewed and okay.  She is set to get her final treatment of nivolumab plus relatlimab this week.  She knows to continue to monitor for immune mediated side effects.  She knows to  call with issues or concerns prior to her next visit.  She will return in approximately 1 month for toxicity check.  She has standing orders for blood work and she will get blood work drawn prior to her follow-up visit with me.       High risk medication use  The patient is on treatment with immunotherapy and we will continue to monitor for side effects and lab abnormalities. We will monitor labs with each treatment to ensure safety of continuing on treatment. The patient knows to watch for signs and symptoms for immune mediated side effects. Denies any immune mediated side effects at this time.                History of Present Illness  She is doing well and tolerating treatment with nivolumab plus relatlimab.  Denies any immune mediated side effects.  Denies headaches, double vision, rash, itching, chest pain, shortness of breath, diarrhea.  No muscle weakness or fatigue.  Did get blood work and labs reviewed and okay for her to continue on treatment.  She denies any new, changing, concerning skin lesions.  Denies any lymphadenopathy.    Had recent imaging in October and will be due for imaging in the next month.  Will place orders at her next visit.    Eating okay.  Energy is okay.  Is at work today.      Visit Time  Total Visit Duration: 10 minutes including review of history, review of blood work, review of imaging, symptom/side effect assessment and management, documentation, counseling and coordination of care.      Rafaela Reyes MD, PhD

## 2024-12-17 NOTE — PROGRESS NOTES
Virtual Brief Visit  Name: Debbie Damon      : 1950      MRN: [id number]  Encounter Provider: Rafaela Reyes MD  Encounter Date: 2024   Encounter department: [facility] [place]    This Visit is being completed by telephone. The Patient is located at Other and in the following state in which I hold an active license [place]    The patient was identified by name and date of birth. Debbie Damon was informed that this is a telemedicine visit and that the visit is being conducted through Telephone.  My office door was closed. No one else was in the room.  She acknowledged consent and understanding of privacy and security of the video platform. The patient has agreed to participate and understands they can discontinue the visit at any time.    Patient is aware this is a billable service.     :  Assessment & Plan  Metastatic melanoma (HCC)  Ms. Damon has metastatic melanoma on treatment with nivolumab plus relatlimab who will complete 2 years of treatment this week evaluated for continued monitoring, follow-up and surveillance while on treatment.  She is doing well and tolerating treatment.  Labs reviewed and okay.  She is set to get her final treatment of nivolumab plus relatlimab this week.  She knows to continue to monitor for immune mediated side effects.  She knows to call with issues or concerns prior to her next visit.  She will return in approximately 1 month for toxicity check.  She has standing orders for blood work and she will get blood work drawn prior to her follow-up visit with me.       High risk medication use  The patient is on treatment with immunotherapy and we will continue to monitor for side effects and lab abnormalities. We will monitor labs with each treatment to ensure safety of continuing on treatment. The patient knows to watch for signs and symptoms for immune mediated side effects. Denies any immune mediated side effects at this time.                 History of Present Illness   She is doing well and tolerating treatment with nivolumab plus relatlimab.  Denies any immune mediated side effects.  Denies headaches, double vision, rash, itching, chest pain, shortness of breath, diarrhea.  No muscle weakness or fatigue.  Did get blood work and labs reviewed and okay for her to continue on treatment.  She denies any new, changing, concerning skin lesions.  Denies any lymphadenopathy.    Had recent imaging in October and will be due for imaging in the next month.  Will place orders at her next visit.    Eating okay.  Energy is okay.  Is at work today.      Visit Time  Total Visit Duration: 10 minutes including review of history, review of blood work, review of imaging, symptom/side effect assessment and management, documentation, counseling and coordination of care.      Rafaela Reyes MD, PhD   Specialty Care (Routine)...

## 2025-01-07 ENCOUNTER — TELEPHONE (OUTPATIENT)
Age: 75
End: 2025-01-07

## 2025-01-07 DIAGNOSIS — Z79.899 HIGH RISK MEDICATION USE: ICD-10-CM

## 2025-01-07 DIAGNOSIS — C43.9 METASTATIC MELANOMA (HCC): ICD-10-CM

## 2025-01-07 DIAGNOSIS — E27.40 ADRENAL INSUFFICIENCY (HCC): Primary | ICD-10-CM

## 2025-01-07 NOTE — TELEPHONE ENCOUNTER
Per Dr. Reyes's note patient should have labs from standing orders before OV. Standing orders ran out, new order placed for 1 time draw. Please place new orders at upcoming visit if she is to continue with having these drawn frequently.   Made patient aware of same.

## 2025-01-07 NOTE — TELEPHONE ENCOUNTER
Pt calling in requesting orders for her labs for Dr. Reyes be placed in the system. Pt has an appt on 1/13. Pt would like to be called as soon as the orders are entered in the system, if required prior to appt, thank you!    TSH, FT3, FT4, LD, Blood, CMP and CBCD

## 2025-01-10 ENCOUNTER — APPOINTMENT (OUTPATIENT)
Dept: LAB | Facility: MEDICAL CENTER | Age: 75
End: 2025-01-10
Payer: COMMERCIAL

## 2025-01-10 DIAGNOSIS — E27.40 ADRENAL INSUFFICIENCY (HCC): ICD-10-CM

## 2025-01-10 DIAGNOSIS — Z79.899 HIGH RISK MEDICATION USE: ICD-10-CM

## 2025-01-10 DIAGNOSIS — C43.9 METASTATIC MELANOMA (HCC): ICD-10-CM

## 2025-01-10 LAB
ALBUMIN SERPL BCG-MCNC: 3.8 G/DL (ref 3.5–5)
ALP SERPL-CCNC: 51 U/L (ref 34–104)
ALT SERPL W P-5'-P-CCNC: 13 U/L (ref 7–52)
ANION GAP SERPL CALCULATED.3IONS-SCNC: 4 MMOL/L (ref 4–13)
AST SERPL W P-5'-P-CCNC: 16 U/L (ref 13–39)
BASOPHILS # BLD AUTO: 0.08 THOUSANDS/ΜL (ref 0–0.1)
BASOPHILS NFR BLD AUTO: 2 % (ref 0–1)
BILIRUB SERPL-MCNC: 0.65 MG/DL (ref 0.2–1)
BUN SERPL-MCNC: 24 MG/DL (ref 5–25)
CALCIUM SERPL-MCNC: 9 MG/DL (ref 8.4–10.2)
CHLORIDE SERPL-SCNC: 105 MMOL/L (ref 96–108)
CO2 SERPL-SCNC: 32 MMOL/L (ref 21–32)
CREAT SERPL-MCNC: 0.75 MG/DL (ref 0.6–1.3)
EOSINOPHIL # BLD AUTO: 0.24 THOUSAND/ΜL (ref 0–0.61)
EOSINOPHIL NFR BLD AUTO: 5 % (ref 0–6)
ERYTHROCYTE [DISTWIDTH] IN BLOOD BY AUTOMATED COUNT: 13.1 % (ref 11.6–15.1)
GFR SERPL CREATININE-BSD FRML MDRD: 78 ML/MIN/1.73SQ M
GLUCOSE P FAST SERPL-MCNC: 86 MG/DL (ref 65–99)
HCT VFR BLD AUTO: 39.7 % (ref 34.8–46.1)
HGB BLD-MCNC: 12.6 G/DL (ref 11.5–15.4)
IMM GRANULOCYTES # BLD AUTO: 0.02 THOUSAND/UL (ref 0–0.2)
IMM GRANULOCYTES NFR BLD AUTO: 0 % (ref 0–2)
LDH SERPL-CCNC: 124 U/L (ref 140–271)
LYMPHOCYTES # BLD AUTO: 2.43 THOUSANDS/ΜL (ref 0.6–4.47)
LYMPHOCYTES NFR BLD AUTO: 50 % (ref 14–44)
MCH RBC QN AUTO: 28.4 PG (ref 26.8–34.3)
MCHC RBC AUTO-ENTMCNC: 31.7 G/DL (ref 31.4–37.4)
MCV RBC AUTO: 89 FL (ref 82–98)
MONOCYTES # BLD AUTO: 0.5 THOUSAND/ΜL (ref 0.17–1.22)
MONOCYTES NFR BLD AUTO: 10 % (ref 4–12)
NEUTROPHILS # BLD AUTO: 1.63 THOUSANDS/ΜL (ref 1.85–7.62)
NEUTS SEG NFR BLD AUTO: 33 % (ref 43–75)
NRBC BLD AUTO-RTO: 0 /100 WBCS
PLATELET # BLD AUTO: 176 THOUSANDS/UL (ref 149–390)
PMV BLD AUTO: 11.1 FL (ref 8.9–12.7)
POTASSIUM SERPL-SCNC: 4.3 MMOL/L (ref 3.5–5.3)
PROT SERPL-MCNC: 6.3 G/DL (ref 6.4–8.4)
RBC # BLD AUTO: 4.44 MILLION/UL (ref 3.81–5.12)
SODIUM SERPL-SCNC: 141 MMOL/L (ref 135–147)
T3FREE SERPL-MCNC: 2.9 PG/ML (ref 2.5–3.9)
T4 FREE SERPL-MCNC: 1 NG/DL (ref 0.61–1.12)
TSH SERPL DL<=0.05 MIU/L-ACNC: 3.62 UIU/ML (ref 0.45–4.5)
WBC # BLD AUTO: 4.9 THOUSAND/UL (ref 4.31–10.16)

## 2025-01-10 PROCEDURE — 84481 FREE ASSAY (FT-3): CPT

## 2025-01-10 PROCEDURE — 84443 ASSAY THYROID STIM HORMONE: CPT

## 2025-01-10 PROCEDURE — 83615 LACTATE (LD) (LDH) ENZYME: CPT

## 2025-01-10 PROCEDURE — 85025 COMPLETE CBC W/AUTO DIFF WBC: CPT

## 2025-01-10 PROCEDURE — 84439 ASSAY OF FREE THYROXINE: CPT

## 2025-01-10 PROCEDURE — 80053 COMPREHEN METABOLIC PANEL: CPT

## 2025-01-10 PROCEDURE — 36415 COLL VENOUS BLD VENIPUNCTURE: CPT

## 2025-01-13 ENCOUNTER — OFFICE VISIT (OUTPATIENT)
Dept: HEMATOLOGY ONCOLOGY | Facility: CLINIC | Age: 75
End: 2025-01-13
Payer: COMMERCIAL

## 2025-01-13 VITALS
SYSTOLIC BLOOD PRESSURE: 166 MMHG | RESPIRATION RATE: 18 BRPM | HEART RATE: 74 BPM | BODY MASS INDEX: 29.77 KG/M2 | TEMPERATURE: 97.6 F | WEIGHT: 201 LBS | HEIGHT: 69 IN | OXYGEN SATURATION: 100 % | DIASTOLIC BLOOD PRESSURE: 94 MMHG

## 2025-01-13 DIAGNOSIS — C43.9 METASTATIC MELANOMA (HCC): Primary | ICD-10-CM

## 2025-01-13 DIAGNOSIS — C43.71 MALIGNANT MELANOMA OF RIGHT LOWER LEG (HCC): ICD-10-CM

## 2025-01-13 DIAGNOSIS — Z79.899 HIGH RISK MEDICATION USE: ICD-10-CM

## 2025-01-13 DIAGNOSIS — E27.40 ADRENAL INSUFFICIENCY (HCC): ICD-10-CM

## 2025-01-13 PROCEDURE — 99214 OFFICE O/P EST MOD 30 MIN: CPT | Performed by: INTERNAL MEDICINE

## 2025-01-13 NOTE — LETTER
2025     Anh Bailey MD  [address] [facility]  2nd Floor  [facility] [address]    Patient: Debbie Damon   YOB: 1950   Date of Visit: 2025       Dear Dr. Bailey:    Thank you for referring Debbie Damon to me for evaluation. Below are my notes for this consultation.    If you have questions, please do not hesitate to call me. I look forward to following your patient along with you.         Sincerely,        Rafaela Reyes MD        CC: DO Yenifer Llamas CRNP Joseph John [place], DO Melissa A Wilson, MD  2025  5:22 PM  Sign when Signing Visit  Name: Debbie Damon      : 1950      MRN: [id number]  Encounter Provider: Rafaela Reyes MD  Encounter Date: 2025   Encounter department: [facility] [place]  :  Assessment & Plan  Metastatic melanoma (HCC)  She is doing well and completed treatment with nivolumab plus relatlimab for her unresectable metastatic melanoma on her right lower leg.  Clinically this has resolved.  All of her imaging has showed resolution of disease as well.  Labs reviewed and okay.  She is not having any delayed onset immune mediated side effects.  She knows to continue to monitor for immune-mediated side effects as they can occur up to a year after treatment.  She knows to call with issues or concerns prior to her next visit.  She will return for follow-up with blood work and imaging at that time.  All orders placed and prescription provided.  Orders:  •  NM pet ct tumor imaging whole body; Future  •  CBC and differential; Future  •  Comprehensive metabolic panel; Future  •  LD,Blood; Future  •  TSH, 3rd generation; Future  •  T3, free; Future  •  T4, free; Future    High risk medication use  The patient is on treatment with immunotherapy and we will continue to monitor for side effects and lab abnormalities. We will monitor labs with each treatment to ensure safety of continuing on treatment.  The patient knows to watch for signs and symptoms for immune mediated side effects. Denies any immune mediated side effects at this time.     Continue to monitor thyroid functions.  Orders:  •  TSH, 3rd generation; Future  •  T3, free; Future  •  T4, free; Future    Adrenal insufficiency (HCC)  Secondary to immunotherapy.  On stable dose of hydrocortisone 10 mg twice daily.       Malignant melanoma of right lower leg (HCC)  She is doing well and completed treatment with nivolumab plus relatlimab for her unresectable metastatic melanoma on her right lower leg.  Clinically this has resolved.  All of her imaging has showed resolution of disease as well.  Labs reviewed and okay.  She is not having any delayed onset immune mediated side effects.  She knows to continue to monitor for immune-mediated side effects as they can occur up to a year after treatment.  She knows to call with issues or concerns prior to her next visit.  She will return for follow-up with blood work and imaging at that time.  All orders placed and prescription provided.  Orders:  •  NM pet ct tumor imaging whole body; Future  •  CBC and differential; Future  •  Comprehensive metabolic panel; Future  •  LD,Blood; Future  •  TSH, 3rd generation; Future  •  T3, free; Future  •  T4, free; Future        History of Present Illness  Chief Complaint   Patient presents with   • Follow-up     She is doing well and feels good.  No issues concerns or complaints.  Energy is good.  No new, changing, concerning skin lesions no lymphadenopathy.  Completed treatment approximately 1 month ago.    Immune-mediated side effects.  No headaches, double vision, rash, itching, chest pain, shortness of breath, diarrhea.  No muscle weakness or fatigue.          Review of Systems   Constitutional:  Negative for activity change, chills, diaphoresis, fatigue, fever and unexpected weight change.   HENT:  Negative for congestion, hearing loss, trouble swallowing and voice change.   "  Respiratory:  Negative for cough, shortness of breath and wheezing.    Cardiovascular:  Negative for chest pain, palpitations and leg swelling.   Gastrointestinal:  Negative for abdominal distention, abdominal pain, constipation, diarrhea, nausea and vomiting.   Musculoskeletal:  Negative for arthralgias and myalgias.   Skin:  Negative for color change and rash.   Neurological:  Negative for dizziness, seizures, speech difficulty, weakness, light-headedness and headaches.   Hematological:  Negative for adenopathy.     Current Outpatient Medications on File Prior to Visit   Medication Sig Dispense Refill   • apixaban (Eliquis) 5 mg Take 1 tablet (5 mg total) by mouth every 12 (twelve) hours 60 tablet 5   • famotidine (PEPCID) 10 mg tablet Take 10 mg by mouth 2 (two) times a day     • hydrocortisone (CORTEF) 10 mg tablet take 1 tablet by mouth twice a day 60 tablet 11   • loratadine (CLARITIN) 10 mg tablet Take 10 mg by mouth daily     • losartan (Cozaar) 100 MG tablet Take 1 tablet (100 mg total) by mouth daily 30 tablet 5   • metoprolol succinate (TOPROL-XL) 25 mg 24 hr tablet Take 1 tablet (25 mg total) by mouth 2 (two) times a day 60 tablet 5   • Multiple Minerals-Vitamins (Citracal Plus) TABS Take by mouth     • sodium chloride (LAURI 128) 5 % hypertonic ophthalmic solution Administer 1 drop to both eyes as needed (Corneal dryness)     • Wheat Dextrin (BENEFIBER DRINK MIX PO) Take by mouth     • Multiple Vitamins-Minerals (MULTIVITAMIN WITH MINERALS) tablet Take 1 tablet by mouth daily     • patient supplied medication Pt takes eye drops Mura 128 - not listed in database       No current facility-administered medications on file prior to visit.          Objective  /94 (BP Location: Right arm, Patient Position: Sitting, Cuff Size: Adult)   Pulse 74   Temp 97.6 °F (36.4 °C) (Temporal)   Resp 18   Ht 5' 9\" (1.753 m)   Wt 91.2 kg (201 lb)   SpO2 100%   BMI 29.68 kg/m²     Pain Screening:  Pain Score: " 0-No pain  ECOG ECOG Performance Status: 0 - Fully active, able to carry on all pre-disease performance without restriction     Physical Exam  Constitutional:       General: She is not in acute distress.     Appearance: Normal appearance. She is not ill-appearing.   HENT:      Head: Normocephalic and atraumatic.      Right Ear: External ear normal.      Left Ear: External ear normal.      Nose: Nose normal. No congestion.      Mouth/Throat:      Mouth: Mucous membranes are moist.      Pharynx: Oropharynx is clear. No oropharyngeal exudate.   Eyes:      General: No scleral icterus.        Right eye: No discharge.         Left eye: No discharge.      Conjunctiva/sclera: Conjunctivae normal.   Cardiovascular:      Rate and Rhythm: Normal rate and regular rhythm.      Pulses: Normal pulses.      Heart sounds: No murmur heard.     No friction rub. No gallop.   Pulmonary:      Effort: Pulmonary effort is normal. No respiratory distress.      Breath sounds: Normal breath sounds. No wheezing or rales.   Abdominal:      General: Bowel sounds are normal. There is no distension.      Palpations: There is no mass.      Tenderness: There is no abdominal tenderness. There is no rebound.   Musculoskeletal:         General: No swelling or tenderness. Normal range of motion.      Cervical back: Normal range of motion. No rigidity.      Right lower leg: No edema.      Left lower leg: No edema.   Lymphadenopathy:      Head:      Right side of head: No submental, submandibular, preauricular, posterior auricular or occipital adenopathy.      Left side of head: No submental, submandibular, preauricular, posterior auricular or occipital adenopathy.      Cervical: No cervical adenopathy.      Right cervical: No superficial or posterior cervical adenopathy.     Left cervical: No superficial or posterior cervical adenopathy.      Upper Body:      Right upper body: No supraclavicular or axillary adenopathy.      Left upper body: No  supraclavicular or axillary adenopathy.      Lower Body: No right inguinal adenopathy. No left inguinal adenopathy.   Skin:     General: Skin is warm.      Coloration: Skin is not jaundiced.      Findings: No lesion or rash.      Comments: Scar well healed.  No evidence of recurrence at primary site.  No concerning skin lesions   Neurological:      General: No focal deficit present.      Mental Status: She is alert and oriented to person, place, and time.      Cranial Nerves: No cranial nerve deficit.      Motor: No weakness.      Gait: Gait normal.   Psychiatric:         Mood and Affect: Mood normal.         Behavior: Behavior normal.         Thought Content: Thought content normal.         Judgment: Judgment normal.         Labs: I have reviewed the following labs:  Lab Results   Component Value Date/Time    WBC 4.90 01/10/2025 07:08 AM    RBC 4.44 01/10/2025 07:08 AM    Hemoglobin 12.6 01/10/2025 07:08 AM    Hematocrit 39.7 01/10/2025 07:08 AM    MCV 89 01/10/2025 07:08 AM    MCH 28.4 01/10/2025 07:08 AM    RDW 13.1 01/10/2025 07:08 AM    Platelets 176 01/10/2025 07:08 AM    Segmented % 33 (L) 01/10/2025 07:08 AM    Lymphocytes % 50 (H) 01/10/2025 07:08 AM    Monocytes % 10 01/10/2025 07:08 AM    Eosinophils Relative 5 01/10/2025 07:08 AM    Basophils Relative 2 (H) 01/10/2025 07:08 AM    Immature Grans % 0 01/10/2025 07:08 AM    Absolute Neutrophils 1.63 (L) 01/10/2025 07:08 AM     Lab Results   Component Value Date/Time    Potassium 4.3 01/10/2025 07:08 AM    Chloride 105 01/10/2025 07:08 AM    CO2 32 01/10/2025 07:08 AM    BUN 24 01/10/2025 07:08 AM    Creatinine 0.75 01/10/2025 07:08 AM    Glucose, Fasting 86 01/10/2025 07:08 AM    Calcium 9.0 01/10/2025 07:08 AM    AST 16 01/10/2025 07:08 AM    ALT 13 01/10/2025 07:08 AM    Alkaline Phosphatase 51 01/10/2025 07:08 AM    Total Protein 6.3 (L) 01/10/2025 07:08 AM    Albumin 3.8 01/10/2025 07:08 AM    Total Bilirubin 0.65 01/10/2025 07:08 AM    eGFR 78  01/10/2025 07:08 AM     Lab Results   Component Value Date/Time    TSH 3RD GABBYTON 3.618 01/10/2025 07:08 AM    Free T4 1.00 01/10/2025 07:08 AM     (L) 01/10/2025 07:08 AM        Radiology Results Review : No pertinent imaging studies reviewed.        Rafaela Reyes MD, PhD

## 2025-01-21 NOTE — PROGRESS NOTES
Name: Debbie Damon      : 1950      MRN: [id number]  Encounter Provider: Rafaela Reyes MD  Encounter Date: 2025   Encounter department: [facility]  :  Assessment & Plan  Metastatic melanoma (HCC)  She is doing well and completed treatment with nivolumab plus relatlimab for her unresectable metastatic melanoma on her right lower leg.  Clinically this has resolved.  All of her imaging has showed resolution of disease as well.  Labs reviewed and okay.  She is not having any delayed onset immune mediated side effects.  She knows to continue to monitor for immune-mediated side effects as they can occur up to a year after treatment.  She knows to call with issues or concerns prior to her next visit.  She will return for follow-up with blood work and imaging at that time.  All orders placed and prescription provided.  Orders:    NM pet ct tumor imaging whole body; Future    CBC and differential; Future    Comprehensive metabolic panel; Future    LD,Blood; Future    TSH, 3rd generation; Future    T3, free; Future    T4, free; Future    High risk medication use  The patient is on treatment with immunotherapy and we will continue to monitor for side effects and lab abnormalities. We will monitor labs with each treatment to ensure safety of continuing on treatment. The patient knows to watch for signs and symptoms for immune mediated side effects. Denies any immune mediated side effects at this time.     Continue to monitor thyroid functions.  Orders:    TSH, 3rd generation; Future    T3, free; Future    T4, free; Future    Adrenal insufficiency (HCC)  Secondary to immunotherapy.  On stable dose of hydrocortisone 10 mg twice daily.       Malignant melanoma of right lower leg (HCC)  She is doing well and completed treatment with nivolumab plus relatlimab for her unresectable metastatic melanoma on her right lower leg.  Clinically this has resolved.  All of her imaging has  showed resolution of disease as well.  Labs reviewed and okay.  She is not having any delayed onset immune mediated side effects.  She knows to continue to monitor for immune-mediated side effects as they can occur up to a year after treatment.  She knows to call with issues or concerns prior to her next visit.  She will return for follow-up with blood work and imaging at that time.  All orders placed and prescription provided.  Orders:    NM pet ct tumor imaging whole body; Future    CBC and differential; Future    Comprehensive metabolic panel; Future    LD,Blood; Future    TSH, 3rd generation; Future    T3, free; Future    T4, free; Future        History of Present Illness   Chief Complaint   Patient presents with    Follow-up     She is doing well and feels good.  No issues concerns or complaints.  Energy is good.  No new, changing, concerning skin lesions no lymphadenopathy.  Completed treatment approximately 1 month ago.    Immune-mediated side effects.  No headaches, double vision, rash, itching, chest pain, shortness of breath, diarrhea.  No muscle weakness or fatigue.          Review of Systems   Constitutional:  Negative for activity change, chills, diaphoresis, fatigue, fever and unexpected weight change.   HENT:  Negative for congestion, hearing loss, trouble swallowing and voice change.    Respiratory:  Negative for cough, shortness of breath and wheezing.    Cardiovascular:  Negative for chest pain, palpitations and leg swelling.   Gastrointestinal:  Negative for abdominal distention, abdominal pain, constipation, diarrhea, nausea and vomiting.   Musculoskeletal:  Negative for arthralgias and myalgias.   Skin:  Negative for color change and rash.   Neurological:  Negative for dizziness, seizures, speech difficulty, weakness, light-headedness and headaches.   Hematological:  Negative for adenopathy.     Current Outpatient Medications on File Prior to Visit   Medication Sig Dispense Refill    apixaban  "(Eliquis) 5 mg Take 1 tablet (5 mg total) by mouth every 12 (twelve) hours 60 tablet 5    famotidine (PEPCID) 10 mg tablet Take 10 mg by mouth 2 (two) times a day      hydrocortisone (CORTEF) 10 mg tablet take 1 tablet by mouth twice a day 60 tablet 11    loratadine (CLARITIN) 10 mg tablet Take 10 mg by mouth daily      losartan (Cozaar) 100 MG tablet Take 1 tablet (100 mg total) by mouth daily 30 tablet 5    metoprolol succinate (TOPROL-XL) 25 mg 24 hr tablet Take 1 tablet (25 mg total) by mouth 2 (two) times a day 60 tablet 5    Multiple Minerals-Vitamins (Citracal Plus) TABS Take by mouth      sodium chloride (LAURI 128) 5 % hypertonic ophthalmic solution Administer 1 drop to both eyes as needed (Corneal dryness)      Wheat Dextrin (BENEFIBER DRINK MIX PO) Take by mouth      Multiple Vitamins-Minerals (MULTIVITAMIN WITH MINERALS) tablet Take 1 tablet by mouth daily      patient supplied medication Pt takes eye drops Mura 128 - not listed in database       No current facility-administered medications on file prior to visit.          Objective   /94 (BP Location: Right arm, Patient Position: Sitting, Cuff Size: Adult)   Pulse 74   Temp 97.6 °F (36.4 °C) (Temporal)   Resp 18   Ht 5' 9\" (1.753 m)   Wt 91.2 kg (201 lb)   SpO2 100%   BMI 29.68 kg/m²     Pain Screening:  Pain Score: 0-No pain  ECOG ECOG Performance Status: 0 - Fully active, able to carry on all pre-disease performance without restriction     Physical Exam  Constitutional:       General: She is not in acute distress.     Appearance: Normal appearance. She is not ill-appearing.   HENT:      Head: Normocephalic and atraumatic.      Right Ear: External ear normal.      Left Ear: External ear normal.      Nose: Nose normal. No congestion.      Mouth/Throat:      Mouth: Mucous membranes are moist.      Pharynx: Oropharynx is clear. No oropharyngeal exudate.   Eyes:      General: No scleral icterus.        Right eye: No discharge.         Left eye: " No discharge.      Conjunctiva/sclera: Conjunctivae normal.   Cardiovascular:      Rate and Rhythm: Normal rate and regular rhythm.      Pulses: Normal pulses.      Heart sounds: No murmur heard.     No friction rub. No gallop.   Pulmonary:      Effort: Pulmonary effort is normal. No respiratory distress.      Breath sounds: Normal breath sounds. No wheezing or rales.   Abdominal:      General: Bowel sounds are normal. There is no distension.      Palpations: There is no mass.      Tenderness: There is no abdominal tenderness. There is no rebound.   Musculoskeletal:         General: No swelling or tenderness. Normal range of motion.      Cervical back: Normal range of motion. No rigidity.      Right lower leg: No edema.      Left lower leg: No edema.   Lymphadenopathy:      Head:      Right side of head: No submental, submandibular, preauricular, posterior auricular or occipital adenopathy.      Left side of head: No submental, submandibular, preauricular, posterior auricular or occipital adenopathy.      Cervical: No cervical adenopathy.      Right cervical: No superficial or posterior cervical adenopathy.     Left cervical: No superficial or posterior cervical adenopathy.      Upper Body:      Right upper body: No supraclavicular or axillary adenopathy.      Left upper body: No supraclavicular or axillary adenopathy.      Lower Body: No right inguinal adenopathy. No left inguinal adenopathy.   Skin:     General: Skin is warm.      Coloration: Skin is not jaundiced.      Findings: No lesion or rash.      Comments: Scar well healed.  No evidence of recurrence at primary site.  No concerning skin lesions   Neurological:      General: No focal deficit present.      Mental Status: She is alert and oriented to person, place, and time.      Cranial Nerves: No cranial nerve deficit.      Motor: No weakness.      Gait: Gait normal.   Psychiatric:         Mood and Affect: Mood normal.         Behavior: Behavior normal.          Thought Content: Thought content normal.         Judgment: Judgment normal.         Labs: I have reviewed the following labs:  Lab Results   Component Value Date/Time    WBC 4.90 01/10/2025 07:08 AM    RBC 4.44 01/10/2025 07:08 AM    Hemoglobin 12.6 01/10/2025 07:08 AM    Hematocrit 39.7 01/10/2025 07:08 AM    MCV 89 01/10/2025 07:08 AM    MCH 28.4 01/10/2025 07:08 AM    RDW 13.1 01/10/2025 07:08 AM    Platelets 176 01/10/2025 07:08 AM    Segmented % 33 (L) 01/10/2025 07:08 AM    Lymphocytes % 50 (H) 01/10/2025 07:08 AM    Monocytes % 10 01/10/2025 07:08 AM    Eosinophils Relative 5 01/10/2025 07:08 AM    Basophils Relative 2 (H) 01/10/2025 07:08 AM    Immature Grans % 0 01/10/2025 07:08 AM    Absolute Neutrophils 1.63 (L) 01/10/2025 07:08 AM     Lab Results   Component Value Date/Time    Potassium 4.3 01/10/2025 07:08 AM    Chloride 105 01/10/2025 07:08 AM    CO2 32 01/10/2025 07:08 AM    BUN 24 01/10/2025 07:08 AM    Creatinine 0.75 01/10/2025 07:08 AM    Glucose, Fasting 86 01/10/2025 07:08 AM    Calcium 9.0 01/10/2025 07:08 AM    AST 16 01/10/2025 07:08 AM    ALT 13 01/10/2025 07:08 AM    Alkaline Phosphatase 51 01/10/2025 07:08 AM    Total Protein 6.3 (L) 01/10/2025 07:08 AM    Albumin 3.8 01/10/2025 07:08 AM    Total Bilirubin 0.65 01/10/2025 07:08 AM    eGFR 78 01/10/2025 07:08 AM     Lab Results   Component Value Date/Time    TSH 3RD GENERATON 3.618 01/10/2025 07:08 AM    Free T4 1.00 01/10/2025 07:08 AM     (L) 01/10/2025 07:08 AM        Radiology Results Review : No pertinent imaging studies reviewed.        Rafaela Reyes MD, PhD

## 2025-01-21 NOTE — ASSESSMENT & PLAN NOTE
She is doing well and completed treatment with nivolumab plus relatlimab for her unresectable metastatic melanoma on her right lower leg.  Clinically this has resolved.  All of her imaging has showed resolution of disease as well.  Labs reviewed and okay.  She is not having any delayed onset immune mediated side effects.  She knows to continue to monitor for immune-mediated side effects as they can occur up to a year after treatment.  She knows to call with issues or concerns prior to her next visit.  She will return for follow-up with blood work and imaging at that time.  All orders placed and prescription provided.  Orders:    NM pet ct tumor imaging whole body; Future    CBC and differential; Future    Comprehensive metabolic panel; Future    LD,Blood; Future    TSH, 3rd generation; Future    T3, free; Future    T4, free; Future

## 2025-01-21 NOTE — ASSESSMENT & PLAN NOTE
The patient is on treatment with immunotherapy and we will continue to monitor for side effects and lab abnormalities. We will monitor labs with each treatment to ensure safety of continuing on treatment. The patient knows to watch for signs and symptoms for immune mediated side effects. Denies any immune mediated side effects at this time.     Continue to monitor thyroid functions.  Orders:    TSH, 3rd generation; Future    T3, free; Future    T4, free; Future

## 2025-02-17 DIAGNOSIS — R53.83 OTHER FATIGUE: ICD-10-CM

## 2025-02-17 DIAGNOSIS — Z79.899 HIGH RISK MEDICATION USE: ICD-10-CM

## 2025-02-17 DIAGNOSIS — E27.3 ADRENAL INSUFFICIENCY DUE TO CANCER THERAPY (HCC): ICD-10-CM

## 2025-02-17 DIAGNOSIS — C43.9 METASTATIC MELANOMA (HCC): ICD-10-CM

## 2025-02-17 RX ORDER — HYDROCORTISONE 10 MG/1
10 TABLET ORAL 2 TIMES DAILY
Qty: 60 TABLET | Refills: 11 | Status: SHIPPED | OUTPATIENT
Start: 2025-02-17

## 2025-02-20 DIAGNOSIS — I10 ESSENTIAL HYPERTENSION: ICD-10-CM

## 2025-02-20 RX ORDER — LOSARTAN POTASSIUM 100 MG/1
100 TABLET ORAL DAILY
Qty: 30 TABLET | Refills: 5 | Status: SHIPPED | OUTPATIENT
Start: 2025-02-20

## 2025-03-17 DIAGNOSIS — I10 ESSENTIAL HYPERTENSION: ICD-10-CM

## 2025-03-17 DIAGNOSIS — I26.99 OTHER PULMONARY EMBOLISM WITHOUT ACUTE COR PULMONALE, UNSPECIFIED CHRONICITY (HCC): ICD-10-CM

## 2025-03-17 RX ORDER — METOPROLOL SUCCINATE 25 MG/1
25 TABLET, EXTENDED RELEASE ORAL 2 TIMES DAILY
Qty: 60 TABLET | Refills: 5 | Status: SHIPPED | OUTPATIENT
Start: 2025-03-17

## 2025-04-08 ENCOUNTER — OFFICE VISIT (OUTPATIENT)
Dept: FAMILY MEDICINE CLINIC | Facility: CLINIC | Age: 75
End: 2025-04-08
Payer: COMMERCIAL

## 2025-04-08 VITALS
BODY MASS INDEX: 29.47 KG/M2 | OXYGEN SATURATION: 94 % | WEIGHT: 199 LBS | SYSTOLIC BLOOD PRESSURE: 152 MMHG | HEART RATE: 75 BPM | HEIGHT: 69 IN | DIASTOLIC BLOOD PRESSURE: 88 MMHG | TEMPERATURE: 97 F

## 2025-04-08 DIAGNOSIS — I10 ESSENTIAL HYPERTENSION: Primary | ICD-10-CM

## 2025-04-08 DIAGNOSIS — E27.40 ADRENAL INSUFFICIENCY (HCC): ICD-10-CM

## 2025-04-08 PROCEDURE — 99213 OFFICE O/P EST LOW 20 MIN: CPT | Performed by: FAMILY MEDICINE

## 2025-04-08 NOTE — PROGRESS NOTES
Name: Debbie Damon      : 1950      MRN: [id number]  Encounter Provider: Juan Carlos Perry DO  Encounter Date: 2025   Encounter department: [facility]  :  Assessment & Plan    Was with oncology does have follow-up surveillance imaging scheduled already       History of Present Illness   Mrs. Damon here for a follow-up visit looks absolutely marvelous got to ring the bell at the infusion center and now her oncologist is watching her with periodic PET scans and other ways of following for any recurrence of her melanoma      Review of Systems   Constitutional:  Negative for activity change, appetite change, diaphoresis, fatigue and fever.   HENT: Negative.     Eyes: Negative.    Respiratory:  Negative for apnea, cough, chest tightness, shortness of breath and wheezing.    Cardiovascular:  Negative for chest pain, palpitations and leg swelling.   Gastrointestinal:  Negative for abdominal distention, abdominal pain, anal bleeding, constipation, diarrhea, nausea and vomiting.   Endocrine: Negative for cold intolerance, heat intolerance, polydipsia, polyphagia and polyuria.   Genitourinary:  Negative for difficulty urinating, dysuria, flank pain, hematuria and urgency.   Musculoskeletal:  Negative for arthralgias, back pain, gait problem, joint swelling and myalgias.   Skin:  Negative for color change, rash and wound.        Finished infusion therapy for melanoma   Allergic/Immunologic: Negative for environmental allergies, food allergies and immunocompromised state.   Neurological:  Negative for dizziness, seizures, syncope, speech difficulty, numbness and headaches.   Hematological:  Negative for adenopathy. Does not bruise/bleed easily.   Psychiatric/Behavioral:  Negative for agitation, behavioral problems, hallucinations, sleep disturbance and suicidal ideas.        Objective   /88 (BP Location: Left arm, Patient Position: Sitting, Cuff Size: Standard)   Pulse 75   Temp (!) 97 °F (36.1 °C)  "(Temporal)   Ht 5' 9\" (1.753 m)   Wt 90.3 kg (199 lb)   SpO2 94%   BMI 29.39 kg/m²      Physical Exam  Constitutional:       Appearance: She is well-developed.   HENT:      Head: Normocephalic and atraumatic.      Right Ear: External ear normal.      Left Ear: External ear normal.      Nose: Nose normal.   Eyes:      Conjunctiva/sclera: Conjunctivae normal.      Pupils: Pupils are equal, round, and reactive to light.   Cardiovascular:      Rate and Rhythm: Normal rate and regular rhythm.      Heart sounds: Normal heart sounds. No murmur heard.     No friction rub.   Pulmonary:      Effort: Pulmonary effort is normal. No respiratory distress.      Breath sounds: Normal breath sounds. No wheezing or rales.   Chest:      Chest wall: No tenderness.   Abdominal:      General: Bowel sounds are normal.      Palpations: Abdomen is soft.   Musculoskeletal:         General: Normal range of motion.      Cervical back: Normal range of motion and neck supple.   Skin:     General: Skin is warm and dry.      Capillary Refill: Capillary refill takes 2 to 3 seconds.   Neurological:      General: No focal deficit present.      Mental Status: She is alert and oriented to person, place, and time.   Psychiatric:         Behavior: Behavior normal.         Thought Content: Thought content normal.         Judgment: Judgment normal.         "

## 2025-04-12 ENCOUNTER — APPOINTMENT (OUTPATIENT)
Dept: LAB | Facility: CLINIC | Age: 75
End: 2025-04-12
Payer: COMMERCIAL

## 2025-04-12 DIAGNOSIS — Z79.899 HIGH RISK MEDICATION USE: ICD-10-CM

## 2025-04-12 DIAGNOSIS — C43.9 METASTATIC MELANOMA (HCC): ICD-10-CM

## 2025-04-12 DIAGNOSIS — C43.71 MALIGNANT MELANOMA OF RIGHT LOWER LEG (HCC): ICD-10-CM

## 2025-04-12 LAB
ALBUMIN SERPL BCG-MCNC: 3.8 G/DL (ref 3.5–5)
ALP SERPL-CCNC: 48 U/L (ref 34–104)
ALT SERPL W P-5'-P-CCNC: 12 U/L (ref 7–52)
ANION GAP SERPL CALCULATED.3IONS-SCNC: 6 MMOL/L (ref 4–13)
AST SERPL W P-5'-P-CCNC: 17 U/L (ref 13–39)
BASOPHILS # BLD AUTO: 0.07 THOUSANDS/ÂΜL (ref 0–0.1)
BASOPHILS NFR BLD AUTO: 1 % (ref 0–1)
BILIRUB SERPL-MCNC: 0.55 MG/DL (ref 0.2–1)
BUN SERPL-MCNC: 22 MG/DL (ref 5–25)
CALCIUM SERPL-MCNC: 8.9 MG/DL (ref 8.4–10.2)
CHLORIDE SERPL-SCNC: 106 MMOL/L (ref 96–108)
CO2 SERPL-SCNC: 30 MMOL/L (ref 21–32)
CREAT SERPL-MCNC: 0.81 MG/DL (ref 0.6–1.3)
EOSINOPHIL # BLD AUTO: 0.2 THOUSAND/ÂΜL (ref 0–0.61)
EOSINOPHIL NFR BLD AUTO: 4 % (ref 0–6)
ERYTHROCYTE [DISTWIDTH] IN BLOOD BY AUTOMATED COUNT: 13.3 % (ref 11.6–15.1)
GFR SERPL CREATININE-BSD FRML MDRD: 71 ML/MIN/1.73SQ M
GLUCOSE P FAST SERPL-MCNC: 82 MG/DL (ref 65–99)
HCT VFR BLD AUTO: 40.8 % (ref 34.8–46.1)
HGB BLD-MCNC: 12.3 G/DL (ref 11.5–15.4)
IMM GRANULOCYTES # BLD AUTO: 0.01 THOUSAND/UL (ref 0–0.2)
IMM GRANULOCYTES NFR BLD AUTO: 0 % (ref 0–2)
LDH SERPL-CCNC: 182 U/L (ref 140–271)
LYMPHOCYTES # BLD AUTO: 2.44 THOUSANDS/ÂΜL (ref 0.6–4.47)
LYMPHOCYTES NFR BLD AUTO: 51 % (ref 14–44)
MCH RBC QN AUTO: 28.1 PG (ref 26.8–34.3)
MCHC RBC AUTO-ENTMCNC: 30.1 G/DL (ref 31.4–37.4)
MCV RBC AUTO: 93 FL (ref 82–98)
MONOCYTES # BLD AUTO: 0.45 THOUSAND/ÂΜL (ref 0.17–1.22)
MONOCYTES NFR BLD AUTO: 9 % (ref 4–12)
NEUTROPHILS # BLD AUTO: 1.73 THOUSANDS/ÂΜL (ref 1.85–7.62)
NEUTS SEG NFR BLD AUTO: 35 % (ref 43–75)
NRBC BLD AUTO-RTO: 0 /100 WBCS
PLATELET # BLD AUTO: 179 THOUSANDS/UL (ref 149–390)
PMV BLD AUTO: 10.8 FL (ref 8.9–12.7)
POTASSIUM SERPL-SCNC: 4.3 MMOL/L (ref 3.5–5.3)
PROT SERPL-MCNC: 6.6 G/DL (ref 6.4–8.4)
RBC # BLD AUTO: 4.38 MILLION/UL (ref 3.81–5.12)
SODIUM SERPL-SCNC: 142 MMOL/L (ref 135–147)
T3FREE SERPL-MCNC: 3.02 PG/ML (ref 2.5–3.9)
T4 FREE SERPL-MCNC: 0.93 NG/DL (ref 0.61–1.12)
TSH SERPL DL<=0.05 MIU/L-ACNC: 2.34 UIU/ML (ref 0.45–4.5)
WBC # BLD AUTO: 4.9 THOUSAND/UL (ref 4.31–10.16)

## 2025-04-12 PROCEDURE — 83615 LACTATE (LD) (LDH) ENZYME: CPT

## 2025-04-12 PROCEDURE — 84481 FREE ASSAY (FT-3): CPT

## 2025-04-12 PROCEDURE — 84439 ASSAY OF FREE THYROXINE: CPT

## 2025-04-12 PROCEDURE — 85025 COMPLETE CBC W/AUTO DIFF WBC: CPT

## 2025-04-12 PROCEDURE — 36415 COLL VENOUS BLD VENIPUNCTURE: CPT

## 2025-04-12 PROCEDURE — 80053 COMPREHEN METABOLIC PANEL: CPT

## 2025-04-12 PROCEDURE — 84443 ASSAY THYROID STIM HORMONE: CPT

## 2025-04-21 ENCOUNTER — HOSPITAL ENCOUNTER (OUTPATIENT)
Dept: RADIOLOGY | Age: 75
Discharge: HOME | End: 2025-04-21
Payer: COMMERCIAL

## 2025-04-21 DIAGNOSIS — C43.9 METASTATIC MELANOMA (HCC): ICD-10-CM

## 2025-04-21 DIAGNOSIS — C43.71 MALIGNANT MELANOMA OF RIGHT LOWER LEG (HCC): ICD-10-CM

## 2025-04-21 LAB — GLUCOSE SERPL-MCNC: 86 MG/DL (ref 65–140)

## 2025-04-21 PROCEDURE — 78816 PET IMAGE W/CT FULL BODY: CPT

## 2025-04-21 PROCEDURE — A9552 F18 FDG: HCPCS

## 2025-04-21 PROCEDURE — 82948 REAGENT STRIP/BLOOD GLUCOSE: CPT

## 2025-04-28 ENCOUNTER — OFFICE VISIT (OUTPATIENT)
Dept: HEMATOLOGY ONCOLOGY | Facility: CLINIC | Age: 75
End: 2025-04-28
Payer: COMMERCIAL

## 2025-04-28 VITALS
RESPIRATION RATE: 17 BRPM | WEIGHT: 198 LBS | OXYGEN SATURATION: 97 % | DIASTOLIC BLOOD PRESSURE: 72 MMHG | SYSTOLIC BLOOD PRESSURE: 136 MMHG | HEART RATE: 82 BPM | TEMPERATURE: 98 F | HEIGHT: 69 IN | BODY MASS INDEX: 29.33 KG/M2

## 2025-04-28 DIAGNOSIS — R22.31 LUMP OF SKIN OF RIGHT UPPER EXTREMITY: ICD-10-CM

## 2025-04-28 DIAGNOSIS — Z08 ENCOUNTER FOR FOLLOW-UP SURVEILLANCE OF MELANOMA: Primary | ICD-10-CM

## 2025-04-28 DIAGNOSIS — Z79.899 HIGH RISK MEDICATION USE: ICD-10-CM

## 2025-04-28 DIAGNOSIS — C43.9 METASTATIC MELANOMA (HCC): ICD-10-CM

## 2025-04-28 DIAGNOSIS — R93.89 ABNORMAL FINDING ON IMAGING: ICD-10-CM

## 2025-04-28 DIAGNOSIS — Z85.820 ENCOUNTER FOR FOLLOW-UP SURVEILLANCE OF MELANOMA: Primary | ICD-10-CM

## 2025-04-28 PROCEDURE — 99214 OFFICE O/P EST MOD 30 MIN: CPT | Performed by: INTERNAL MEDICINE

## 2025-04-28 NOTE — LETTER
May 5, 2025     Anh Bailey MD  [address] [facility]  2nd Floor  [facility] [address]    Patient: Debbie Damon   YOB: 1950   Date of Visit: 2025       Dear MD Juan Carlos Weber DO Suzanne Wary, CRNP Jay Barry Fisher, MD Joseph John Minissale, DO:    Thank you for referring Debbie Damon to me for evaluation. Below are my notes for this consultation.    If you have questions, please do not hesitate to call me. I look forward to following your patient along with you.         Sincerely,        Rafaela Reyes MD        CC: DO Yenifer Llamas CRNP Jay Barry Fisher, MD Joseph John Minissale, DO Melissa A Wilson, MD  2025  5:13 PM  Sign when Signing Visit  Name: Debbie Damon      : 1950      MRN: [id number]  Encounter Provider: Rafaela Reyes MD  Encounter Date: 2025   Encounter department: [facility] [place]  :  Assessment & Plan  Encounter for follow-up surveillance of melanoma  Ms. Damon is a 74-year-old female with metastatic melanoma status post treatment with nivolumab and relatlimab completing treatment in 2024 here for continued monitoring, follow-up and surveillance.    Completed treatment with combination nivolumab plus relatlimab in 2024. A PET scan on 2025 shows no evidence of metastatic disease but indicates increased FDG avidity - persistent focus, in the mid sigmoid colon and bilateral lower extremity subcutaneous activity likely due to edema. The increased activity in the right leg is circumferential and could be due to swelling. Monitoring for any new black/pigmentation and melanoma spots is advised. Blood work will be ordered for the next visit, and imaging will be scheduled subsequently at her next visit.    The treatment plan includes continued surveillance with periodic PET scans and blood work to monitor for any recurrence or new metastatic disease. The goal  of the treatment is to maintain remission and promptly identify any signs of recurrence. Potential side effects of the previous treatment with nivolumab and relatlimab include fatigue, rash, and immune-related adverse events, which should be monitored. Supportive care measures include managing any symptoms related to edema and ensuring regular follow-up visits.       Metastatic melanoma (HCC)  Completed treatment with combination nivolumab plus relatlimab in December 2024. A PET scan on 04/21/2025 shows no evidence of metastatic disease but indicates increased FDG avidity - persistent focus, in the mid sigmoid colon and bilateral lower extremity subcutaneous activity likely due to edema. The increased activity in the right leg is circumferential and could be due to swelling. Monitoring for any new black/pigmentation and melanoma spots is advised. Blood work will be ordered for the next visit, and imaging will be scheduled subsequently at her next visit.    The treatment plan includes continued surveillance with periodic PET scans and blood work to monitor for any recurrence or new metastatic disease. The goal of the treatment is to maintain remission and promptly identify any signs of recurrence. Potential side effects of the previous treatment with nivolumab and relatlimab include fatigue, rash, and immune-related adverse events, which should be monitored. Supportive care measures include managing any symptoms related to edema and ensuring regular follow-up visits.  Orders:  •  CBC and differential; Future  •  Comprehensive metabolic panel; Future  •  LD,Blood; Future  •  TSH, 3rd generation; Future  •  T3, free; Future  •  T4, free; Future    High risk medication use  Was on treatment with immunotherapy with nivolumab plus relatlimab.  Continue to monitor for any delayed onset immune-mediated side effects.  She knows to call with issues or concerns.  We continue to monitor thyroid functions as well.  Orders:  •   TSH, 3rd generation; Future  •  T3, free; Future  •  T4, free; Future    Abnormal finding on imaging  2. Increased FDG avidity in the mid sigmoid colon.  The PET scan shows persistent increased FDG avidity in the mid sigmoid colon. Previous PET scans have shown this finding persistently, but it appears to have increased.  Negatvie colonoscopy at time of PET scan with increased activity.   I will message her gastroenterologist to discuss potential need for further evaluation to rule out any significant pathology and determine if a repeat colonoscopy is necessary.       Lump of skin of right upper extremity  Lump in right hand.  Reports a lump in the right hand that does not hurt upon touch and is not visible on imaging. Differential diagnosis includes a ganglion cyst. No immediate intervention is planned, but monitoring for any changes in size or symptoms is advised.           Follow-up  Follow-up appointment is scheduled in 3 months. Blood work will be conducted at the next visit, and imaging will be scheduled subsequently.        History of Present Illness  Chief Complaint   Patient presents with   • Follow-up     History of Present Illness  The patient is a 74-year-old female with metastatic melanoma who completed treatment with combination nivolumab plus relatlimab in December 2024. She is here for follow-up, including surveillance of melanoma, metastatic melanoma, and high-risk medication use.    Prior to her visit, she underwent blood work, which demonstrates normal findings. All lab work within normal limits are not clinically significant. A PET scan performed on 04/21/2025 demonstrates no evidence of metastatic disease. However, there is persistence of a focus of increased activity in the mid sigmoid colon with increased FDG avidity since the previous exam. She has had colonoscopies in the past and I have been in touch with her GI physician in the past regarding this persistent finding, which appears to have  increased. Bilateral lower extremity subcutaneous activity is most likely associated with edema, with diffuse soft tissue subcutaneous increased density and mild circumferential decreased FDG activity in the right leg, the site of her previous melanoma.    She reports a general sense of well-being and has not experienced any pain in her right leg, which was previously affected by melanoma. At the beginning of the month, she consulted Dr. Sotomayor. Her last colonoscopy was performed two years ago, and she was advised to repeat the procedure after two years; however, she has not yet scheduled this follow-up colonoscopy.    Intermittent sharp pain in her hand has been reported, although this symptom has not been present recently. A small lump on her right hand has been noticed, which is non-tender upon palpation.  Oncology History  Cancer Staging   Metastatic melanoma (HCC)  Staging form: Melanoma of the Skin, AJCC 8th Edition  - Clinical stage from 10/13/2022: Stage IV (cTX, cNX, cM1a) - Signed by Rafaela Reyes MD on 11/22/2022  Oncology History   Metastatic melanoma (HCC)   10/13/2022 -  Cancer Staged    Staging form: Melanoma of the Skin, AJCC 8th Edition  - Clinical stage from 10/13/2022: Stage IV (cTX, cNX, cM1a) - Signed by Rafaela Reyes MD on 11/22/2022       10/13/2022 Biopsy    A. Skin, right lateral medial leg, punch biopsy:  Portion of severely atypical melanocytic dermal proliferation with prominent melanosis consistent with melanoma. See note.        B. Skin, right medial leg. punch biopsy:  Portion of severely atypical melanocytic dermal proliferation with prominent melanosis consistent with melanoma. See note.        C. Skin, right medial leg, punch biopsy:   Portion of severely atypical melanocytic dermal proliferation with prominent melanosis consistent with melanoma. See note.     11/22/2022 Initial Diagnosis    Metastatic melanoma (HCC)     12/14/2022 -  Chemotherapy    alteplase (CATHFLO), 2  mg, Intracatheter, Every 2 hour PRN, 27 of 27 cycles  NIVOLUMAB-RELATLIMAB-RMBW (OPDUALAG) IVPB, 480 mg of nivolumab, Intravenous, Once, 27 of 27 cycles  Administration: 480 mg of nivolumab (12/14/2022), 480 mg of nivolumab (1/11/2023), 480 mg of nivolumab (2/8/2023), 480 mg of nivolumab (3/8/2023), 480 mg of nivolumab (4/5/2023), 480 mg of nivolumab (5/3/2023), 480 mg of nivolumab (5/31/2023), 480 mg of nivolumab (6/28/2023), 480 mg of nivolumab (7/26/2023), 480 mg of nivolumab (8/23/2023), 480 mg of nivolumab (9/20/2023), 480 mg of nivolumab (10/18/2023), 480 mg of nivolumab (11/15/2023), 480 mg of nivolumab (12/13/2023), 480 mg of nivolumab (1/10/2024), 480 mg of nivolumab (2/7/2024), 480 mg of nivolumab (3/6/2024), 480 mg of nivolumab (4/3/2024), 480 mg of nivolumab (5/1/2024), 480 mg of nivolumab (5/29/2024), 480 mg of nivolumab (6/26/2024), 480 mg of nivolumab (7/24/2024), 480 mg of nivolumab (8/21/2024), 480 mg of nivolumab (9/18/2024), 480 mg of nivolumab (10/16/2024), 480 mg of nivolumab (11/13/2024), 480 mg of nivolumab (12/11/2024)     1/5/2023 Genomic Testing    Foundation One liquid testing  TMB 1 Mut/Mb  MSI-high not detected               Review of Systems   Constitutional:  Negative for activity change, chills, diaphoresis, fatigue, fever and unexpected weight change.   HENT:  Negative for congestion, hearing loss, trouble swallowing and voice change.    Respiratory:  Negative for cough, shortness of breath and wheezing.    Cardiovascular:  Negative for chest pain, palpitations and leg swelling.   Gastrointestinal:  Negative for abdominal distention, abdominal pain, constipation, diarrhea, nausea and vomiting.   Musculoskeletal:  Positive for myalgias (lump in right hand and some pain). Negative for arthralgias.   Skin:  Negative for color change and rash.   Neurological:  Negative for dizziness, seizures, speech difficulty, weakness, light-headedness and headaches.   Hematological:  Negative for  "adenopathy.     Current Outpatient Medications on File Prior to Visit   Medication Sig Dispense Refill   • apixaban (Eliquis) 5 mg Take 1 tablet (5 mg total) by mouth every 12 (twelve) hours 60 tablet 5   • hydrocortisone (CORTEF) 10 mg tablet take 1 tablet by mouth twice a day 60 tablet 11   • losartan (Cozaar) 100 MG tablet Take 1 tablet (100 mg total) by mouth daily 30 tablet 5   • metoprolol succinate (TOPROL-XL) 25 mg 24 hr tablet Take 1 tablet (25 mg total) by mouth 2 (two) times a day 60 tablet 5   • Multiple Minerals-Vitamins (Citracal Plus) TABS Take by mouth     • Multiple Vitamins-Minerals (MULTIVITAMIN WITH MINERALS) tablet Take 1 tablet by mouth daily     • sodium chloride (LAURI 128) 5 % hypertonic ophthalmic solution Administer 1 drop to both eyes as needed (Corneal dryness)     • Wheat Dextrin (BENEFIBER DRINK MIX PO) Take by mouth       No current facility-administered medications on file prior to visit.          Objective  /72 (BP Location: Left arm, Patient Position: Sitting, Cuff Size: Adult)   Pulse 82   Temp 98 °F (36.7 °C) (Temporal)   Resp 17   Ht 5' 9\" (1.753 m)   Wt 89.8 kg (198 lb)   SpO2 97%   BMI 29.24 kg/m²     Pain Screening:  Pain Score: 0-No pain  ECOG ECOG Performance Status: 0 - Fully active, able to carry on all pre-disease performance without restriction     Physical Exam  Constitutional:       General: She is not in acute distress.     Appearance: Normal appearance. She is not ill-appearing.   HENT:      Head: Normocephalic and atraumatic.      Right Ear: External ear normal.      Left Ear: External ear normal.      Nose: Nose normal. No congestion.      Mouth/Throat:      Mouth: Mucous membranes are moist.      Pharynx: Oropharynx is clear. No oropharyngeal exudate.   Eyes:      General: No scleral icterus.        Right eye: No discharge.         Left eye: No discharge.      Conjunctiva/sclera: Conjunctivae normal.   Cardiovascular:      Rate and Rhythm: Normal rate " and regular rhythm.      Pulses: Normal pulses.      Heart sounds: No murmur heard.     No friction rub. No gallop.   Pulmonary:      Effort: Pulmonary effort is normal. No respiratory distress.      Breath sounds: Normal breath sounds. No wheezing or rales.   Abdominal:      General: Bowel sounds are normal. There is no distension.      Palpations: There is no mass.      Tenderness: There is no abdominal tenderness. There is no rebound.   Musculoskeletal:         General: No swelling or tenderness. Normal range of motion.      Cervical back: Normal range of motion. No rigidity.      Right lower leg: No edema.      Left lower leg: No edema.   Lymphadenopathy:      Head:      Right side of head: No submental, submandibular, preauricular, posterior auricular or occipital adenopathy.      Left side of head: No submental, submandibular, preauricular, posterior auricular or occipital adenopathy.      Cervical: No cervical adenopathy.      Right cervical: No superficial or posterior cervical adenopathy.     Left cervical: No superficial or posterior cervical adenopathy.      Upper Body:      Right upper body: No supraclavicular or axillary adenopathy.      Left upper body: No supraclavicular or axillary adenopathy.   Skin:     General: Skin is warm.      Coloration: Skin is not jaundiced.      Findings: No lesion or rash.      Comments: No evidence of recurrence at primary site.  Resolved melanoma spots/skin pigmentation.  No concerning skin lesions   Neurological:      General: No focal deficit present.      Mental Status: She is alert and oriented to person, place, and time.      Cranial Nerves: No cranial nerve deficit.      Motor: No weakness.      Gait: Gait normal.   Psychiatric:         Mood and Affect: Mood normal.         Behavior: Behavior normal.         Thought Content: Thought content normal.         Judgment: Judgment normal.       Physical Exam  Respiratory: Lungs auscultated, no abnormalities  noted.  Gastrointestinal: Abdomen examined, no abnormalities noted.  Musculoskeletal: Palpable lump on the right hand, no pain on palpation. Right leg shows mild circumferential swelling, clinically looks improved compared to previous.    Results  Labs   - Blood work: Normal findings    Imaging   - PET scan: 04/21/2025, No evidence of metastatic disease. Persistence of a focus of increased activity in the mid sigmoid colon with increased FDG avidity since the previous exam. Bilateral lower extremity subcutaneous activity most likely associated with edema diffuse soft tissue subcutaneous increased density with mild circumferential, decreased FDG activity in the right leg.  Labs: I have reviewed the following labs:  Lab Results   Component Value Date/Time    WBC 4.90 04/12/2025 07:28 AM    RBC 4.38 04/12/2025 07:28 AM    Hemoglobin 12.3 04/12/2025 07:28 AM    Hematocrit 40.8 04/12/2025 07:28 AM    MCV 93 04/12/2025 07:28 AM    MCH 28.1 04/12/2025 07:28 AM    RDW 13.3 04/12/2025 07:28 AM    Platelets 179 04/12/2025 07:28 AM    Segmented % 35 (L) 04/12/2025 07:28 AM    Lymphocytes % 51 (H) 04/12/2025 07:28 AM    Monocytes % 9 04/12/2025 07:28 AM    Eosinophils Relative 4 04/12/2025 07:28 AM    Basophils Relative 1 04/12/2025 07:28 AM    Immature Grans % 0 04/12/2025 07:28 AM    Absolute Neutrophils 1.73 (L) 04/12/2025 07:28 AM     Lab Results   Component Value Date/Time    Potassium 4.3 04/12/2025 07:28 AM    Chloride 106 04/12/2025 07:28 AM    CO2 30 04/12/2025 07:28 AM    BUN 22 04/12/2025 07:28 AM    Creatinine 0.81 04/12/2025 07:28 AM    Glucose, Fasting 82 04/12/2025 07:28 AM    Calcium 8.9 04/12/2025 07:28 AM    AST 17 04/12/2025 07:28 AM    ALT 12 04/12/2025 07:28 AM    Alkaline Phosphatase 48 04/12/2025 07:28 AM    Total Protein 6.6 04/12/2025 07:28 AM    Albumin 3.8 04/12/2025 07:28 AM    Total Bilirubin 0.55 04/12/2025 07:28 AM    eGFR 71 04/12/2025 07:28 AM     Lab Results   Component Value Date/Time    TSH  3RD [facility] 2.339 04/12/2025 07:28 AM    Free T4 0.93 04/12/2025 07:28 AM     04/12/2025 07:28 AM        Radiology Results Review: I have reviewed radiology reports from 4/21/2025 including: PET scan.        Rafaela Reyes MD, PhD

## 2025-05-05 NOTE — PROGRESS NOTES
Name: Debbie Damon      : 1950      MRN: [id number]  Encounter Provider: Rafaela Reyes MD  Encounter Date: 2025   Encounter department: [facility]  :  Assessment & Plan  Encounter for follow-up surveillance of melanoma  Ms. Damon is a 74-year-old female with metastatic melanoma status post treatment with nivolumab and relatlimab completing treatment in 2024 here for continued monitoring, follow-up and surveillance.    Completed treatment with combination nivolumab plus relatlimab in 2024. A PET scan on 2025 shows no evidence of metastatic disease but indicates increased FDG avidity - persistent focus, in the mid sigmoid colon and bilateral lower extremity subcutaneous activity likely due to edema. The increased activity in the right leg is circumferential and could be due to swelling. Monitoring for any new black/pigmentation and melanoma spots is advised. Blood work will be ordered for the next visit, and imaging will be scheduled subsequently at her next visit.    The treatment plan includes continued surveillance with periodic PET scans and blood work to monitor for any recurrence or new metastatic disease. The goal of the treatment is to maintain remission and promptly identify any signs of recurrence. Potential side effects of the previous treatment with nivolumab and relatlimab include fatigue, rash, and immune-related adverse events, which should be monitored. Supportive care measures include managing any symptoms related to edema and ensuring regular follow-up visits.       Metastatic melanoma (HCC)  Completed treatment with combination nivolumab plus relatlimab in 2024. A PET scan on 2025 shows no evidence of metastatic disease but indicates increased FDG avidity - persistent focus, in the mid sigmoid colon and bilateral lower extremity subcutaneous activity likely due to edema. The increased activity in the  Occupational Therapy   Occupational Therapy Initial Assessment  Date: 3/28/2022   Patient Name: Jorge Alegria  MRN: [id number]     : 1947    Date of Service: 3/28/2022    Discharge Recommendations:  Continue to assess pending progress       Assessment   Performance deficits / Impairments: Decreased safe awareness;Decreased cognition;Decreased endurance;Decreased functional mobility ; Decreased high-level IADLs;Decreased balance  Assessment: Pt agreeable to OT services. Pt presents pleasantly confused in bed with alarm on. Pt oriented to person and is a poor historian. Social functional information gathered from previous admission and son who was present in room. Pt come to sit at EOB with MOD I. Pt donned socks and shoes seated at EOB with out difficulty. Upon coming to stand with SBA pt had LOB with CGA to self correct. Pt ambulated in hallway with HHA ~100 feet no AD. Pt returned to room with verbal cuing to identify her room and ambulated to bathroom. Pt toileted with SBA. Pt required verbal cuing to wash hands with SBA. Pt ambulated to bed with SBA and transferred to supine with MOD I and alarm was turned on. Pt will benefit from skilled OT services while IP to improve endurance, balance, and safety with ADL independence. Prognosis: Good  Decision Making: Low Complexity  REQUIRES OT FOLLOW UP: Yes  Activity Tolerance  Activity Tolerance: Patient Tolerated treatment well           Patient Diagnosis(es): The primary encounter diagnosis was Chest pain, unspecified type. Diagnoses of Bradycardia by electrocardiogram and Uncontrolled hypertension were also pertinent to this visit. has a past medical history of Bladder disorder, CAD (coronary artery disease), Chronic back pain, Hyperlipidemia, Hypertension, MI (myocardial infarction) ([facility] [address] Pneumonia, and Unspecified cerebral artery occlusion with cerebral infarction. has a past surgical history that includes Hysterectomy;  Cholecystectomy; bladder repair; Abdomen surgery; and Cardiac catheterization. Restrictions  Restrictions/Precautions  Restrictions/Precautions: General Precautions,Fall Risk,Contact Precautions  Required Braces or Orthoses?: No    Subjective   General  Chart Reviewed: Yes  Patient assessed for rehabilitation services?: Yes  Family / Caregiver Present: Yes  Referring Practitioner: Subha Spence PA-C  Diagnosis: Hypertensive Urgency  Subjective  Subjective: Pt agreeable to OT services.   Patient Currently in Pain: Yes (back pain)  Vital Signs  Patient Currently in Pain: Yes (back pain)  Social/Functional History  Social/Functional History  Lives With: Daughter  Type of Home: House  Home Layout: One level  Home Access: Stairs to enter with rails  Entrance Stairs - Number of Steps: 2 TUNDE  Bathroom Shower/Tub: Tub/Shower unit  Bathroom Toilet: Standard  Receives Help From: Other (comment) ([employer] 5x/week)  ADL Assistance: Independent  Homemaking Assistance: Independent  Homemaking Responsibilities: Yes  Ambulation Assistance: Independent  Transfer Assistance: Independent  Active : No       Objective        Orientation  Overall Orientation Status: Impaired  Orientation Level: Oriented to person;Disoriented to situation;Disoriented to time;Disoriented to place  Observation/Palpation  Observation: Pt lying in bed, NAD, pleasant and cooperative, room air  Balance  Sitting Balance: Independent  Standing Balance: Stand by assistance (<>CGA)  Functional Mobility  Functional - Mobility Device: No device  Assist Level: Contact guard assistance  Functional Mobility Comments: 100 feet with No AD  ADL  Grooming: Stand by assistance  LE Dressing: Independent  Toileting: Stand by assistance        Bed mobility  Rolling to Right: Independent  Supine to Sit: Stand by assistance  Scooting: Modified independent        Cognition  Cognition Comment: decreased safety awareness, decreased STM recall, decreased executive functioning skills                 SEGUN right leg is circumferential and could be due to swelling. Monitoring for any new black/pigmentation and melanoma spots is advised. Blood work will be ordered for the next visit, and imaging will be scheduled subsequently at her next visit.    The treatment plan includes continued surveillance with periodic PET scans and blood work to monitor for any recurrence or new metastatic disease. The goal of the treatment is to maintain remission and promptly identify any signs of recurrence. Potential side effects of the previous treatment with nivolumab and relatlimab include fatigue, rash, and immune-related adverse events, which should be monitored. Supportive care measures include managing any symptoms related to edema and ensuring regular follow-up visits.  Orders:    CBC and differential; Future    Comprehensive metabolic panel; Future    LD,Blood; Future    TSH, 3rd generation; Future    T3, free; Future    T4, free; Future    High risk medication use  Was on treatment with immunotherapy with nivolumab plus relatlimab.  Continue to monitor for any delayed onset immune-mediated side effects.  She knows to call with issues or concerns.  We continue to monitor thyroid functions as well.  Orders:    TSH, 3rd generation; Future    T3, free; Future    T4, free; Future    Abnormal finding on imaging  2. Increased FDG avidity in the mid sigmoid colon.  The PET scan shows persistent increased FDG avidity in the mid sigmoid colon. Previous PET scans have shown this finding persistently, but it appears to have increased.  Negatvie colonoscopy at time of PET scan with increased activity.   I will message her gastroenterologist to discuss potential need for further evaluation to rule out any significant pathology and determine if a repeat colonoscopy is necessary.       Lump of skin of right upper extremity  Lump in right hand.  Reports a lump in the right hand that does not hurt upon touch and is not visible on imaging.  AROM (degrees)  LUE AROM : WFL  RUE AROM (degrees)  RUE AROM : WFL  LUE Strength  Gross LUE Strength: WFL  RUE Strength  Gross RUE Strength: WFL                   Plan   Plan  Times per week: 3-5  Times per day: Daily  Plan weeks: 1  Current Treatment Recommendations: Strengthening,Endurance Training,Balance Training,Functional Mobility Training,Safety Education & Training,Self-Care / ADL      Goals  Short term goals  Time Frame for Short term goals: 1 week  Short term goal 1: Pt to complete functional reaching with no LOB. Short term goal 2: Pt to demo standing tolerance x5 minutes with no LOB with no AD. Short term goal 3: Pt to complete bathing with MOD I. Short term goal 4: Pt to use visual aid to improve orientation x3. Short term goal 5: Pt to tolerate x15 minutes of activity to increase functional activity toleance with no rest breaks or s/s of fatigue. Therapy Time   Individual Concurrent Group Co-treatment   Time In 0220         Time Out 9152         Minutes 34              This note serves as a DC summary in the event of pt discharge.      Bridget Balderrama OTR/L Differential diagnosis includes a ganglion cyst. No immediate intervention is planned, but monitoring for any changes in size or symptoms is advised.           Follow-up  Follow-up appointment is scheduled in 3 months. Blood work will be conducted at the next visit, and imaging will be scheduled subsequently.        History of Present Illness   Chief Complaint   Patient presents with    Follow-up     History of Present Illness  The patient is a 74-year-old female with metastatic melanoma who completed treatment with combination nivolumab plus relatlimab in December 2024. She is here for follow-up, including surveillance of melanoma, metastatic melanoma, and high-risk medication use.    Prior to her visit, she underwent blood work, which demonstrates normal findings. All lab work within normal limits are not clinically significant. A PET scan performed on 04/21/2025 demonstrates no evidence of metastatic disease. However, there is persistence of a focus of increased activity in the mid sigmoid colon with increased FDG avidity since the previous exam. She has had colonoscopies in the past and I have been in touch with her GI physician in the past regarding this persistent finding, which appears to have increased. Bilateral lower extremity subcutaneous activity is most likely associated with edema, with diffuse soft tissue subcutaneous increased density and mild circumferential decreased FDG activity in the right leg, the site of her previous melanoma.    She reports a general sense of well-being and has not experienced any pain in her right leg, which was previously affected by melanoma. At the beginning of the month, she consulted Dr. Sotomayor. Her last colonoscopy was performed two years ago, and she was advised to repeat the procedure after two years; however, she has not yet scheduled this follow-up colonoscopy.    Intermittent sharp pain in her hand has been reported, although this symptom has not been present  recently. A small lump on her right hand has been noticed, which is non-tender upon palpation.  Oncology History   Cancer Staging   Metastatic melanoma (HCC)  Staging form: Melanoma of the Skin, AJCC 8th Edition  - Clinical stage from 10/13/2022: Stage IV (cTX, cNX, cM1a) - Signed by Rafaela Reyes MD on 11/22/2022  Oncology History   Metastatic melanoma (HCC)   10/13/2022 -  Cancer Staged    Staging form: Melanoma of the Skin, AJCC 8th Edition  - Clinical stage from 10/13/2022: Stage IV (cTX, cNX, cM1a) - Signed by Rafaela Reyes MD on 11/22/2022       10/13/2022 Biopsy    A. Skin, right lateral medial leg, punch biopsy:  Portion of severely atypical melanocytic dermal proliferation with prominent melanosis consistent with melanoma. See note.        B. Skin, right medial leg. punch biopsy:  Portion of severely atypical melanocytic dermal proliferation with prominent melanosis consistent with melanoma. See note.        C. Skin, right medial leg, punch biopsy:   Portion of severely atypical melanocytic dermal proliferation with prominent melanosis consistent with melanoma. See note.     11/22/2022 Initial Diagnosis    Metastatic melanoma (HCC)     12/14/2022 -  Chemotherapy    alteplase (CATHFLO), 2 mg, Intracatheter, Every 2 hour PRN, 27 of 27 cycles  NIVOLUMAB-RELATLIMAB-RMBW (OPDUALAG) IVPB, 480 mg of nivolumab, Intravenous, Once, 27 of 27 cycles  Administration: 480 mg of nivolumab (12/14/2022), 480 mg of nivolumab (1/11/2023), 480 mg of nivolumab (2/8/2023), 480 mg of nivolumab (3/8/2023), 480 mg of nivolumab (4/5/2023), 480 mg of nivolumab (5/3/2023), 480 mg of nivolumab (5/31/2023), 480 mg of nivolumab (6/28/2023), 480 mg of nivolumab (7/26/2023), 480 mg of nivolumab (8/23/2023), 480 mg of nivolumab (9/20/2023), 480 mg of nivolumab (10/18/2023), 480 mg of nivolumab (11/15/2023), 480 mg of nivolumab (12/13/2023), 480 mg of nivolumab (1/10/2024), 480 mg of nivolumab (2/7/2024), 480 mg of nivolumab  (3/6/2024), 480 mg of nivolumab (4/3/2024), 480 mg of nivolumab (5/1/2024), 480 mg of nivolumab (5/29/2024), 480 mg of nivolumab (6/26/2024), 480 mg of nivolumab (7/24/2024), 480 mg of nivolumab (8/21/2024), 480 mg of nivolumab (9/18/2024), 480 mg of nivolumab (10/16/2024), 480 mg of nivolumab (11/13/2024), 480 mg of nivolumab (12/11/2024)     1/5/2023 Genomic Testing    Foundation One liquid testing  TMB 1 Mut/Mb  MSI-high not detected               Review of Systems   Constitutional:  Negative for activity change, chills, diaphoresis, fatigue, fever and unexpected weight change.   HENT:  Negative for congestion, hearing loss, trouble swallowing and voice change.    Respiratory:  Negative for cough, shortness of breath and wheezing.    Cardiovascular:  Negative for chest pain, palpitations and leg swelling.   Gastrointestinal:  Negative for abdominal distention, abdominal pain, constipation, diarrhea, nausea and vomiting.   Musculoskeletal:  Positive for myalgias (lump in right hand and some pain). Negative for arthralgias.   Skin:  Negative for color change and rash.   Neurological:  Negative for dizziness, seizures, speech difficulty, weakness, light-headedness and headaches.   Hematological:  Negative for adenopathy.     Current Outpatient Medications on File Prior to Visit   Medication Sig Dispense Refill    apixaban (Eliquis) 5 mg Take 1 tablet (5 mg total) by mouth every 12 (twelve) hours 60 tablet 5    hydrocortisone (CORTEF) 10 mg tablet take 1 tablet by mouth twice a day 60 tablet 11    losartan (Cozaar) 100 MG tablet Take 1 tablet (100 mg total) by mouth daily 30 tablet 5    metoprolol succinate (TOPROL-XL) 25 mg 24 hr tablet Take 1 tablet (25 mg total) by mouth 2 (two) times a day 60 tablet 5    Multiple Minerals-Vitamins (Citracal Plus) TABS Take by mouth      Multiple Vitamins-Minerals (MULTIVITAMIN WITH MINERALS) tablet Take 1 tablet by mouth daily      sodium chloride (LAURI 128) 5 % hypertonic  "ophthalmic solution Administer 1 drop to both eyes as needed (Corneal dryness)      Wheat Dextrin (BENEFIBER DRINK MIX PO) Take by mouth       No current facility-administered medications on file prior to visit.          Objective   /72 (BP Location: Left arm, Patient Position: Sitting, Cuff Size: Adult)   Pulse 82   Temp 98 °F (36.7 °C) (Temporal)   Resp 17   Ht 5' 9\" (1.753 m)   Wt 89.8 kg (198 lb)   SpO2 97%   BMI 29.24 kg/m²     Pain Screening:  Pain Score: 0-No pain  ECOG ECOG Performance Status: 0 - Fully active, able to carry on all pre-disease performance without restriction     Physical Exam  Constitutional:       General: She is not in acute distress.     Appearance: Normal appearance. She is not ill-appearing.   HENT:      Head: Normocephalic and atraumatic.      Right Ear: External ear normal.      Left Ear: External ear normal.      Nose: Nose normal. No congestion.      Mouth/Throat:      Mouth: Mucous membranes are moist.      Pharynx: Oropharynx is clear. No oropharyngeal exudate.   Eyes:      General: No scleral icterus.        Right eye: No discharge.         Left eye: No discharge.      Conjunctiva/sclera: Conjunctivae normal.   Cardiovascular:      Rate and Rhythm: Normal rate and regular rhythm.      Pulses: Normal pulses.      Heart sounds: No murmur heard.     No friction rub. No gallop.   Pulmonary:      Effort: Pulmonary effort is normal. No respiratory distress.      Breath sounds: Normal breath sounds. No wheezing or rales.   Abdominal:      General: Bowel sounds are normal. There is no distension.      Palpations: There is no mass.      Tenderness: There is no abdominal tenderness. There is no rebound.   Musculoskeletal:         General: No swelling or tenderness. Normal range of motion.      Cervical back: Normal range of motion. No rigidity.      Right lower leg: No edema.      Left lower leg: No edema.   Lymphadenopathy:      Head:      Right side of head: No submental, " submandibular, preauricular, posterior auricular or occipital adenopathy.      Left side of head: No submental, submandibular, preauricular, posterior auricular or occipital adenopathy.      Cervical: No cervical adenopathy.      Right cervical: No superficial or posterior cervical adenopathy.     Left cervical: No superficial or posterior cervical adenopathy.      Upper Body:      Right upper body: No supraclavicular or axillary adenopathy.      Left upper body: No supraclavicular or axillary adenopathy.   Skin:     General: Skin is warm.      Coloration: Skin is not jaundiced.      Findings: No lesion or rash.      Comments: No evidence of recurrence at primary site.  Resolved melanoma spots/skin pigmentation.  No concerning skin lesions   Neurological:      General: No focal deficit present.      Mental Status: She is alert and oriented to person, place, and time.      Cranial Nerves: No cranial nerve deficit.      Motor: No weakness.      Gait: Gait normal.   Psychiatric:         Mood and Affect: Mood normal.         Behavior: Behavior normal.         Thought Content: Thought content normal.         Judgment: Judgment normal.       Physical Exam  Respiratory: Lungs auscultated, no abnormalities noted.  Gastrointestinal: Abdomen examined, no abnormalities noted.  Musculoskeletal: Palpable lump on the right hand, no pain on palpation. Right leg shows mild circumferential swelling, clinically looks improved compared to previous.    Results  Labs   - Blood work: Normal findings    Imaging   - PET scan: 04/21/2025, No evidence of metastatic disease. Persistence of a focus of increased activity in the mid sigmoid colon with increased FDG avidity since the previous exam. Bilateral lower extremity subcutaneous activity most likely associated with edema diffuse soft tissue subcutaneous increased density with mild circumferential, decreased FDG activity in the right leg.  Labs: I have reviewed the following labs:  Lab  Results   Component Value Date/Time    WBC 4.90 04/12/2025 07:28 AM    RBC 4.38 04/12/2025 07:28 AM    Hemoglobin 12.3 04/12/2025 07:28 AM    Hematocrit 40.8 04/12/2025 07:28 AM    MCV 93 04/12/2025 07:28 AM    MCH 28.1 04/12/2025 07:28 AM    RDW 13.3 04/12/2025 07:28 AM    Platelets 179 04/12/2025 07:28 AM    Segmented % 35 (L) 04/12/2025 07:28 AM    Lymphocytes % 51 (H) 04/12/2025 07:28 AM    Monocytes % 9 04/12/2025 07:28 AM    Eosinophils Relative 4 04/12/2025 07:28 AM    Basophils Relative 1 04/12/2025 07:28 AM    Immature Grans % 0 04/12/2025 07:28 AM    Absolute Neutrophils 1.73 (L) 04/12/2025 07:28 AM     Lab Results   Component Value Date/Time    Potassium 4.3 04/12/2025 07:28 AM    Chloride 106 04/12/2025 07:28 AM    CO2 30 04/12/2025 07:28 AM    BUN 22 04/12/2025 07:28 AM    Creatinine 0.81 04/12/2025 07:28 AM    Glucose, Fasting 82 04/12/2025 07:28 AM    Calcium 8.9 04/12/2025 07:28 AM    AST 17 04/12/2025 07:28 AM    ALT 12 04/12/2025 07:28 AM    Alkaline Phosphatase 48 04/12/2025 07:28 AM    Total Protein 6.6 04/12/2025 07:28 AM    Albumin 3.8 04/12/2025 07:28 AM    Total Bilirubin 0.55 04/12/2025 07:28 AM    eGFR 71 04/12/2025 07:28 AM     Lab Results   Component Value Date/Time    TSH 3RD GENERATON 2.339 04/12/2025 07:28 AM    Free T4 0.93 04/12/2025 07:28 AM     04/12/2025 07:28 AM        Radiology Results Review: I have reviewed radiology reports from 4/21/2025 including: PET scan.        Rafaela Reyes MD, PhD

## 2025-05-05 NOTE — ASSESSMENT & PLAN NOTE
-Clinically euthyroid.  -TFT's most recent in range.  Continue with T4 88 mcg QD.  Reminded of proper administration including taking 7 pills per week on an empty stomach with no missed doses, waiting 30-60 minutes prior to other medications or food.  -Follow-up in 3 months.   Completed treatment with combination nivolumab plus relatlimab in December 2024. A PET scan on 04/21/2025 shows no evidence of metastatic disease but indicates increased FDG avidity - persistent focus, in the mid sigmoid colon and bilateral lower extremity subcutaneous activity likely due to edema. The increased activity in the right leg is circumferential and could be due to swelling. Monitoring for any new black/pigmentation and melanoma spots is advised. Blood work will be ordered for the next visit, and imaging will be scheduled subsequently at her next visit.    The treatment plan includes continued surveillance with periodic PET scans and blood work to monitor for any recurrence or new metastatic disease. The goal of the treatment is to maintain remission and promptly identify any signs of recurrence. Potential side effects of the previous treatment with nivolumab and relatlimab include fatigue, rash, and immune-related adverse events, which should be monitored. Supportive care measures include managing any symptoms related to edema and ensuring regular follow-up visits.  Orders:    CBC and differential; Future    Comprehensive metabolic panel; Future    LD,Blood; Future    TSH, 3rd generation; Future    T3, free; Future    T4, free; Future

## 2025-05-05 NOTE — ASSESSMENT & PLAN NOTE
Was on treatment with immunotherapy with nivolumab plus relatlimab.  Continue to monitor for any delayed onset immune-mediated side effects.  She knows to call with issues or concerns.  We continue to monitor thyroid functions as well.  Orders:    TSH, 3rd generation; Future    T3, free; Future    T4, free; Future

## 2025-05-07 ENCOUNTER — TELEPHONE (OUTPATIENT)
Dept: GASTROENTEROLOGY | Facility: CLINIC | Age: 75
End: 2025-05-07

## 2025-05-07 ENCOUNTER — TELEPHONE (OUTPATIENT)
Dept: HEMATOLOGY ONCOLOGY | Facility: CLINIC | Age: 75
End: 2025-05-07

## 2025-05-07 NOTE — TELEPHONE ENCOUNTER
I reviewed the case with Dr. Brian Mi of nuclear medicine.  He reviewed PET scans dating back to 2022.  He also took a look at the CAT scan and the wall of the colon in the region of the abnormality which looked very clean and not very thickened.  Based upon these findings he feels the abnormality on the PET scan is likely related to the patient's underlying diverticulosis and narrowing in this region.  It would be reasonable to wait until she is due for her surveillance colonoscopy in April 2026 unless there is some bleeding or other change in bowel pattern that would prompt sooner evaluation.  I have communicated this with Dr. Reyes as well.      ----- Message from Jan Reyes MD sent at 5/5/2025  5:14 PM EDT -----  Hello there    I know that you have performed a colonoscopy previously on Ms. Damon.  Her melanoma has responsed to immunotherapy and she is off treatment with no evidence of disease.  Remains in close monitoring and surveillance with me.    New PET scan demonstrates FDG activity in the area that has been persistent all along.  I know that you had performed a colonoscopy approx 2 years ago.  Wanted to call this increased activity to your attention    Thanks  jan

## 2025-05-07 NOTE — TELEPHONE ENCOUNTER
Called and spoke with Debbie regarding discussion I had with Dr. Bah regarding FDG activity in the colon.      He had reviewed with nuclear medicine with imaging starting in 2022 and they feel that malignancy would be highly unlikely given the minimal change since 2022 and it is probably related to her underlying diverticulosis and luminal narrowing in this region.  With that being said, would be reasonable to wait until she is due for her surveillance colonoscopy in 2026.       I agree with his recommendation and had discussed this with Debbie.    She understands and is in agreement of the plan and was appreciative of the call.      Rafaela Reyes MD, PhD

## 2025-06-05 DIAGNOSIS — E27.3 ADRENAL INSUFFICIENCY DUE TO CANCER THERAPY (HCC): ICD-10-CM

## 2025-06-05 DIAGNOSIS — I10 ESSENTIAL HYPERTENSION: ICD-10-CM

## 2025-06-05 DIAGNOSIS — Z79.899 HIGH RISK MEDICATION USE: ICD-10-CM

## 2025-06-05 DIAGNOSIS — R53.83 OTHER FATIGUE: ICD-10-CM

## 2025-06-05 DIAGNOSIS — I26.99 OTHER PULMONARY EMBOLISM WITHOUT ACUTE COR PULMONALE, UNSPECIFIED CHRONICITY (HCC): ICD-10-CM

## 2025-06-05 DIAGNOSIS — C43.9 METASTATIC MELANOMA (HCC): ICD-10-CM

## 2025-06-05 RX ORDER — HYDROCORTISONE 10 MG/1
10 TABLET ORAL 2 TIMES DAILY
Qty: 60 TABLET | Refills: 0 | Status: SHIPPED | OUTPATIENT
Start: 2025-06-05

## 2025-06-05 NOTE — TELEPHONE ENCOUNTER
Reason for call:   [x] Refill   [] Prior Auth  [] Other:     Office:   [] PCP/Provider -   [x] Specialty/Provider -     Medication: hydrocortisone 10 mg, take 1 tablet by mouth twice a day       Pharmacy: [facility]   Does the patient have enough for 3 days?   [x] Yes   [] No - Send as HP to POD

## 2025-06-05 NOTE — TELEPHONE ENCOUNTER
CHANGE OF PHARMACY    Reason for call:   [x] Refill   [] Prior Auth  [] Other:     Office:   [x] PCP/Provider -   [] Specialty/Provider -     Medication:     apixaban (Eliquis) 5 mg    losartan (Cozaar) 100 MG tablet    metoprolol succinate (TOPROL-XL) 25 mg 24 hr tablet      Pharmacy: [facility] - [place], [place]      Local Pharmacy   Does the patient have enough for 3 days?   [x] Yes   [] No - Send as HP to POD    Mail Away Pharmacy   Does the patient have enough for 10 days?   [] Yes   [] No - Send as HP to POD

## 2025-06-06 RX ORDER — LOSARTAN POTASSIUM 100 MG/1
100 TABLET ORAL DAILY
Qty: 30 TABLET | Refills: 5 | Status: SHIPPED | OUTPATIENT
Start: 2025-06-06

## 2025-06-06 RX ORDER — METOPROLOL SUCCINATE 25 MG/1
25 TABLET, EXTENDED RELEASE ORAL 2 TIMES DAILY
Qty: 60 TABLET | Refills: 5 | Status: SHIPPED | OUTPATIENT
Start: 2025-06-06

## 2025-07-07 ENCOUNTER — TELEPHONE (OUTPATIENT)
Dept: HEMATOLOGY ONCOLOGY | Facility: CLINIC | Age: 75
End: 2025-07-07

## 2025-07-07 ENCOUNTER — TELEPHONE (OUTPATIENT)
Age: 75
End: 2025-07-07

## 2025-07-07 DIAGNOSIS — Z79.899 HIGH RISK MEDICATION USE: ICD-10-CM

## 2025-07-07 DIAGNOSIS — E27.3 ADRENAL INSUFFICIENCY DUE TO CANCER THERAPY (HCC): Primary | ICD-10-CM

## 2025-07-07 DIAGNOSIS — C43.9 METASTATIC MELANOMA (HCC): ICD-10-CM

## 2025-07-07 DIAGNOSIS — Z85.820 ENCOUNTER FOR FOLLOW-UP SURVEILLANCE OF MELANOMA: Primary | ICD-10-CM

## 2025-07-07 DIAGNOSIS — R53.83 OTHER FATIGUE: ICD-10-CM

## 2025-07-07 DIAGNOSIS — Z08 ENCOUNTER FOR FOLLOW-UP SURVEILLANCE OF MELANOMA: Primary | ICD-10-CM

## 2025-07-07 NOTE — TELEPHONE ENCOUNTER
Spoke with patient about blood work required prior to appointment. Indicated non-fasting status, and added CMP as one script was missed with earlier labs' dates. Understanding verbalized.

## 2025-07-12 ENCOUNTER — APPOINTMENT (OUTPATIENT)
Dept: LAB | Facility: CLINIC | Age: 75
End: 2025-07-12
Payer: COMMERCIAL

## 2025-07-12 DIAGNOSIS — Z08 ENCOUNTER FOR FOLLOW-UP SURVEILLANCE OF MELANOMA: ICD-10-CM

## 2025-07-12 DIAGNOSIS — Z85.820 ENCOUNTER FOR FOLLOW-UP SURVEILLANCE OF MELANOMA: ICD-10-CM

## 2025-07-12 DIAGNOSIS — C43.9 METASTATIC MELANOMA (HCC): ICD-10-CM

## 2025-07-12 DIAGNOSIS — Z79.899 HIGH RISK MEDICATION USE: ICD-10-CM

## 2025-07-12 DIAGNOSIS — E27.3 ADRENAL INSUFFICIENCY DUE TO CANCER THERAPY (HCC): ICD-10-CM

## 2025-07-12 LAB
ALBUMIN SERPL BCG-MCNC: 3.9 G/DL (ref 3.5–5)
ALP SERPL-CCNC: 43 U/L (ref 34–104)
ALT SERPL W P-5'-P-CCNC: 13 U/L (ref 7–52)
ANION GAP SERPL CALCULATED.3IONS-SCNC: 7 MMOL/L (ref 4–13)
AST SERPL W P-5'-P-CCNC: 15 U/L (ref 13–39)
BASOPHILS # BLD AUTO: 0.05 THOUSANDS/ÂΜL (ref 0–0.1)
BASOPHILS NFR BLD AUTO: 1 % (ref 0–1)
BILIRUB SERPL-MCNC: 0.6 MG/DL (ref 0.2–1)
BUN SERPL-MCNC: 23 MG/DL (ref 5–25)
CALCIUM SERPL-MCNC: 8.9 MG/DL (ref 8.4–10.2)
CHLORIDE SERPL-SCNC: 106 MMOL/L (ref 96–108)
CO2 SERPL-SCNC: 28 MMOL/L (ref 21–32)
CREAT SERPL-MCNC: 0.81 MG/DL (ref 0.6–1.3)
EOSINOPHIL # BLD AUTO: 0.23 THOUSAND/ÂΜL (ref 0–0.61)
EOSINOPHIL NFR BLD AUTO: 5 % (ref 0–6)
ERYTHROCYTE [DISTWIDTH] IN BLOOD BY AUTOMATED COUNT: 13.3 % (ref 11.6–15.1)
GFR SERPL CREATININE-BSD FRML MDRD: 71 ML/MIN/1.73SQ M
GLUCOSE P FAST SERPL-MCNC: 85 MG/DL (ref 65–99)
HCT VFR BLD AUTO: 41.5 % (ref 34.8–46.1)
HGB BLD-MCNC: 13.2 G/DL (ref 11.5–15.4)
IMM GRANULOCYTES # BLD AUTO: 0.01 THOUSAND/UL (ref 0–0.2)
IMM GRANULOCYTES NFR BLD AUTO: 0 % (ref 0–2)
LDH SERPL-CCNC: 129 U/L (ref 140–271)
LYMPHOCYTES # BLD AUTO: 2.44 THOUSANDS/ÂΜL (ref 0.6–4.47)
LYMPHOCYTES NFR BLD AUTO: 48 % (ref 14–44)
MCH RBC QN AUTO: 28.9 PG (ref 26.8–34.3)
MCHC RBC AUTO-ENTMCNC: 31.8 G/DL (ref 31.4–37.4)
MCV RBC AUTO: 91 FL (ref 82–98)
MONOCYTES # BLD AUTO: 0.38 THOUSAND/ÂΜL (ref 0.17–1.22)
MONOCYTES NFR BLD AUTO: 7 % (ref 4–12)
NEUTROPHILS # BLD AUTO: 2.01 THOUSANDS/ÂΜL (ref 1.85–7.62)
NEUTS SEG NFR BLD AUTO: 39 % (ref 43–75)
NRBC BLD AUTO-RTO: 0 /100 WBCS
PLATELET # BLD AUTO: 185 THOUSANDS/UL (ref 149–390)
PMV BLD AUTO: 11.3 FL (ref 8.9–12.7)
POTASSIUM SERPL-SCNC: 4.1 MMOL/L (ref 3.5–5.3)
PROT SERPL-MCNC: 6.6 G/DL (ref 6.4–8.4)
RBC # BLD AUTO: 4.57 MILLION/UL (ref 3.81–5.12)
SODIUM SERPL-SCNC: 141 MMOL/L (ref 135–147)
T3FREE SERPL-MCNC: 2.91 PG/ML (ref 2.5–3.9)
T4 FREE SERPL-MCNC: 0.98 NG/DL (ref 0.61–1.12)
TSH SERPL DL<=0.05 MIU/L-ACNC: 3.68 UIU/ML (ref 0.45–4.5)
WBC # BLD AUTO: 5.12 THOUSAND/UL (ref 4.31–10.16)

## 2025-07-12 PROCEDURE — 80053 COMPREHEN METABOLIC PANEL: CPT

## 2025-07-12 PROCEDURE — 84439 ASSAY OF FREE THYROXINE: CPT

## 2025-07-12 PROCEDURE — 85025 COMPLETE CBC W/AUTO DIFF WBC: CPT

## 2025-07-12 PROCEDURE — 84443 ASSAY THYROID STIM HORMONE: CPT

## 2025-07-12 PROCEDURE — 84481 FREE ASSAY (FT-3): CPT

## 2025-07-12 PROCEDURE — 83615 LACTATE (LD) (LDH) ENZYME: CPT

## 2025-07-12 PROCEDURE — 36415 COLL VENOUS BLD VENIPUNCTURE: CPT

## 2025-07-14 ENCOUNTER — OFFICE VISIT (OUTPATIENT)
Dept: HEMATOLOGY ONCOLOGY | Facility: CLINIC | Age: 75
End: 2025-07-14
Payer: COMMERCIAL

## 2025-07-14 VITALS
RESPIRATION RATE: 17 BRPM | WEIGHT: 203 LBS | TEMPERATURE: 98 F | HEIGHT: 69 IN | DIASTOLIC BLOOD PRESSURE: 78 MMHG | SYSTOLIC BLOOD PRESSURE: 126 MMHG | HEART RATE: 88 BPM | BODY MASS INDEX: 30.07 KG/M2 | OXYGEN SATURATION: 97 %

## 2025-07-14 DIAGNOSIS — C43.9 METASTATIC MELANOMA (HCC): ICD-10-CM

## 2025-07-14 DIAGNOSIS — Z08 ENCOUNTER FOR FOLLOW-UP SURVEILLANCE OF MELANOMA: Primary | ICD-10-CM

## 2025-07-14 DIAGNOSIS — E27.3 ADRENAL INSUFFICIENCY DUE TO CANCER THERAPY (HCC): ICD-10-CM

## 2025-07-14 DIAGNOSIS — R53.83 OTHER FATIGUE: ICD-10-CM

## 2025-07-14 DIAGNOSIS — Z79.899 HIGH RISK MEDICATION USE: ICD-10-CM

## 2025-07-14 DIAGNOSIS — Z85.820 ENCOUNTER FOR FOLLOW-UP SURVEILLANCE OF MELANOMA: Primary | ICD-10-CM

## 2025-07-14 PROCEDURE — 99214 OFFICE O/P EST MOD 30 MIN: CPT | Performed by: INTERNAL MEDICINE

## 2025-07-14 RX ORDER — HYDROCORTISONE 10 MG/1
10 TABLET ORAL 2 TIMES DAILY
Qty: 180 TABLET | Refills: 3 | Status: SHIPPED | OUTPATIENT
Start: 2025-07-14

## 2025-07-14 NOTE — ASSESSMENT & PLAN NOTE
Ms. Damon is a 74-year-old female with metastatic melanoma status post 2 years of treatment with nivolumab plus relatlimab here for continued monitoring, follow-up and surveillance.  She is doing well with no clinical evidence of disease recurrence.  Labs reviewed and okay.  The last PET scan in 04/2025 demonstrated no evidence of disease recurrence or metastatic disease. Imaging and blood work are scheduled for 10/2025.  Orders placed.  She knows to call with any issues or concerns prior to her next visit.  Orders:    CBC and differential; Future    Comprehensive metabolic panel; Future    LD,Blood; Future    TSH, 3rd generation; Future    T3, free; Future    T4, free; Future    NM pet ct tumor imaging whole body; Future    hydrocortisone (CORTEF) 10 mg tablet; Take 1 tablet (10 mg total) by mouth 2 (two) times a day

## 2025-07-14 NOTE — ASSESSMENT & PLAN NOTE
Previously on immunotherapy with nivolumab plus relatlimab.  Continues to monitor for any delayed onset immune-mediated side effects.  Currently on stable dose of hydrocortisone and feeling well.  Does need a new prescription sent to a new pharmacy.  We do continue to monitor for thyroid functions given her use of immunotherapy.  We will check TSH, T3-T4.  Orders:    TSH, 3rd generation; Future    T3, free; Future    T4, free; Future    hydrocortisone (CORTEF) 10 mg tablet; Take 1 tablet (10 mg total) by mouth 2 (two) times a day

## 2025-07-14 NOTE — LETTER
2025     Anh Bailey MD  [address] [facility] [place]  [address]  [facility] [address]    Patient: Debbie Damon   YOB: 1950   Date of Visit: 2025       Dear MD Juan Carlos Weber DO:    Thank you for referring Debbie Damon to me for evaluation. Below are my notes for this consultation.    If you have questions, please do not hesitate to call me. I look forward to following your patient along with you.         Sincerely,        Rafaela Reyes MD        CC: DO Rafaela Llamas MD  2025 10:36 AM  Sign when Signing Visit  Name: Debbie Damon      : 1950      MRN: [id number]  Encounter Provider: Rafaela Ryees MD  Encounter Date: 2025   Encounter department: [facility]  :  Assessment & Plan  Encounter for follow-up surveillance of melanoma  Ms. Damon is a 74-year-old female with metastatic melanoma status post 2 years of treatment with nivolumab plus relatlimab here for continued monitoring, follow-up and surveillance.  She is doing well with no clinical evidence of disease recurrence.  Labs reviewed and okay.  The last PET scan in 2025 demonstrated no evidence of disease recurrence or metastatic disease. Imaging and blood work are scheduled for 10/2025.  Orders placed.  She knows to call with any issues or concerns prior to her next visit.       Metastatic melanoma (HCC)  Ms. Damon is a 74-year-old female with metastatic melanoma status post 2 years of treatment with nivolumab plus relatlimab here for continued monitoring, follow-up and surveillance.  She is doing well with no clinical evidence of disease recurrence.  Labs reviewed and okay.  The last PET scan in 2025 demonstrated no evidence of disease recurrence or metastatic disease. Imaging and blood work are scheduled for 10/2025.  Orders placed.  She knows to call with any issues or concerns prior to her next visit.  Orders:  •  CBC  and differential; Future  •  Comprehensive metabolic panel; Future  •  LD,Blood; Future  •  TSH, 3rd generation; Future  •  T3, free; Future  •  T4, free; Future  •  NM pet ct tumor imaging whole body; Future  •  hydrocortisone (CORTEF) 10 mg tablet; Take 1 tablet (10 mg total) by mouth 2 (two) times a day    High risk medication use  Previously on immunotherapy with nivolumab plus relatlimab.  Continues to monitor for any delayed onset immune-mediated side effects.  Currently on stable dose of hydrocortisone and feeling well.  Does need a new prescription sent to a new pharmacy.  We do continue to monitor for thyroid functions given her use of immunotherapy.  We will check TSH, T3-T4.  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  Continues on hydrocortisone 10 mg twice a day.  Needed new prescription sent to new pharmacy.  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  Continues on hydrocortisone 10 mg twice a day.  Needed new prescription sent to new pharmacy.  Orders:  •  hydrocortisone (CORTEF) 10 mg tablet; Take 1 tablet (10 mg total) by mouth 2 (two) times a day        Return in about 3 months (around 10/14/2025) for Office Visit, labs, scans.    History of Present Illness  Chief Complaint   Patient presents with   • Follow-up     History of Present Illness  The patient is a 74-year-old female with metastatic melanoma, previously treated with nivolumab and relatlimab, here for continued monitoring, follow-up, and surveillance.    She was diagnosed in 10/2022 and received treatment with nivolumab and relatlimab from 12/14/2022  to 12/11/2024, achieving complete response. She has been on surveillance since then. Her last PET scan in 04/2025 showed no evidence of disease recurrence or metastatic disease.  Blood work prior to today's visit was reviewed and is within normal limits. She reports no new lumps, bumps, or spots of concern. She is due for imaging in 10/2025. She is currently on hydrocortisone and requires a refill. She has been using sunscreen regularly.    She is unable to lift her right ring finger, a condition that began after experiencing pain in her hand. She can bend the finger but cannot lift it. There are no issues with strength or picking up objects with her right hand, and the pain in her hand has resolved.    PAST SURGICAL HISTORY:  Bunion surgery 12 years ago.  Oncology History  Cancer Staging   Metastatic melanoma (HCC)  Staging form: Melanoma of the Skin, AJCC 8th Edition  - Clinical stage from 10/13/2022: Stage IV (cTX, cNX, cM1a) - Signed by Rafaela Reyes MD on 11/22/2022  Oncology History   Metastatic melanoma (HCC)   10/13/2022 -  Cancer Staged    Staging form: Melanoma of the Skin, AJCC 8th Edition  - Clinical stage from 10/13/2022: Stage IV (cTX, cNX, cM1a) - Signed by Rafaela Reyes MD on 11/22/2022       10/13/2022 Biopsy    A. Skin, right lateral medial leg, punch biopsy:  Portion of severely atypical melanocytic dermal proliferation with prominent melanosis consistent with melanoma. See note.        B. Skin, right medial leg. punch biopsy:  Portion of severely atypical melanocytic dermal proliferation with prominent melanosis consistent with melanoma. See note.        C. Skin, right medial leg, punch biopsy:   Portion of severely atypical melanocytic dermal proliferation with prominent melanosis consistent with melanoma. See note.     11/22/2022 Initial Diagnosis    Metastatic melanoma (HCC)     12/14/2022 -  Chemotherapy    alteplase (CATHFLO), 2 mg, Intracatheter, Every 2 hour PRN, 27 of 27  cycles  NIVOLUMAB-RELATLIMAB-RMBW (OPDUALAG) IVPB, 480 mg of nivolumab, Intravenous, Once, 27 of 27 cycles  Administration: 480 mg of nivolumab (12/14/2022), 480 mg of nivolumab (1/11/2023), 480 mg of nivolumab (2/8/2023), 480 mg of nivolumab (3/8/2023), 480 mg of nivolumab (4/5/2023), 480 mg of nivolumab (5/3/2023), 480 mg of nivolumab (5/31/2023), 480 mg of nivolumab (6/28/2023), 480 mg of nivolumab (7/26/2023), 480 mg of nivolumab (8/23/2023), 480 mg of nivolumab (9/20/2023), 480 mg of nivolumab (10/18/2023), 480 mg of nivolumab (11/15/2023), 480 mg of nivolumab (12/13/2023), 480 mg of nivolumab (1/10/2024), 480 mg of nivolumab (2/7/2024), 480 mg of nivolumab (3/6/2024), 480 mg of nivolumab (4/3/2024), 480 mg of nivolumab (5/1/2024), 480 mg of nivolumab (5/29/2024), 480 mg of nivolumab (6/26/2024), 480 mg of nivolumab (7/24/2024), 480 mg of nivolumab (8/21/2024), 480 mg of nivolumab (9/18/2024), 480 mg of nivolumab (10/16/2024), 480 mg of nivolumab (11/13/2024), 480 mg of nivolumab (12/11/2024)     1/5/2023 Genomic Testing    [employer] One liquid testing  TMB 1 Mut/Mb  MSI-high not detected            Review of Systems   Constitutional:  Negative for activity change, chills, diaphoresis, fatigue, fever and unexpected weight change.   HENT:  Negative for congestion, hearing loss, trouble swallowing and voice change.    Respiratory:  Negative for cough, shortness of breath and wheezing.    Cardiovascular:  Negative for chest pain, palpitations and leg swelling.   Gastrointestinal:  Negative for abdominal distention, abdominal pain, constipation, diarrhea, nausea and vomiting.   Musculoskeletal:  Negative for arthralgias and myalgias.        Right ring finger - can't lift it   Skin:  Negative for color change and rash.   Neurological:  Negative for dizziness, seizures, speech difficulty, weakness, light-headedness and headaches.        Right ring finger - can't lift it   Hematological:  Negative for adenopathy.  "    Medications Ordered Prior to Encounter[1]       Objective  /78 (BP Location: Left arm, Patient Position: Sitting, Cuff Size: Adult)   Pulse 88   Temp 98 °F (36.7 °C) (Temporal)   Resp 17   Ht 5' 9\" (1.753 m)   Wt 92.1 kg (203 lb)   SpO2 97%   BMI 29.98 kg/m²     Pain Screening:  Pain Score: 0-No pain  ECOG ECOG Performance Status: 0 - Fully active, able to carry on all pre-disease performance without restriction     Physical Exam  Constitutional:       General: She is not in acute distress.     Appearance: Normal appearance. She is not ill-appearing.   HENT:      Head: Normocephalic and atraumatic.      Right Ear: External ear normal.      Left Ear: External ear normal.      Nose: Nose normal. No congestion.      Mouth/Throat:      Mouth: Mucous membranes are moist.      Pharynx: Oropharynx is clear. No oropharyngeal exudate.     Eyes:      General: No scleral icterus.        Right eye: No discharge.         Left eye: No discharge.      Conjunctiva/sclera: Conjunctivae normal.       Cardiovascular:      Rate and Rhythm: Normal rate and regular rhythm.      Pulses: Normal pulses.      Heart sounds: No murmur heard.     No friction rub. No gallop.   Pulmonary:      Effort: Pulmonary effort is normal. No respiratory distress.      Breath sounds: Normal breath sounds. No wheezing or rales.   Abdominal:      General: Bowel sounds are normal. There is no distension.      Palpations: There is no mass.      Tenderness: There is no abdominal tenderness. There is no rebound.     Musculoskeletal:         General: No swelling or tenderness. Normal range of motion.      Cervical back: Normal range of motion. No rigidity.      Right lower leg: No edema.      Left lower leg: No edema.   Lymphadenopathy:      Head:      Right side of head: No submental, submandibular, preauricular, posterior auricular or occipital adenopathy.      Left side of head: No submental, submandibular, preauricular, posterior auricular or " occipital adenopathy.      Cervical: No cervical adenopathy.      Right cervical: No superficial or posterior cervical adenopathy.     Left cervical: No superficial or posterior cervical adenopathy.      Upper Body:      Right upper body: No supraclavicular or axillary adenopathy.      Left upper body: No supraclavicular or axillary adenopathy.     Skin:     General: Skin is warm.      Coloration: Skin is not jaundiced.      Findings: No lesion or rash.      Comments: Resolved hyperpigmentation on right lower leg.  No concerning skin lesions     Neurological:      General: No focal deficit present.      Mental Status: She is alert and oriented to person, place, and time.      Cranial Nerves: No cranial nerve deficit.      Motor: No weakness.      Gait: Gait normal.     Psychiatric:         Mood and Affect: Mood normal.         Behavior: Behavior normal.         Thought Content: Thought content normal.         Judgment: Judgment normal.       Physical Exam  Cardiovascular: Heart sounds are normal.  Respiratory: Lungs are clear.    Results  Labs   - Blood work: Within normal limits except for low LDH    Imaging   - PET scan: 04/2025, No evidence of disease recurrence or metastatic disease  Labs: I have reviewed the following labs:  Lab Results   Component Value Date/Time    WBC 5.12 07/12/2025 07:27 AM    RBC 4.57 07/12/2025 07:27 AM    Hemoglobin 13.2 07/12/2025 07:27 AM    Hematocrit 41.5 07/12/2025 07:27 AM    MCV 91 07/12/2025 07:27 AM    MCH 28.9 07/12/2025 07:27 AM    RDW 13.3 07/12/2025 07:27 AM    Platelets 185 07/12/2025 07:27 AM    Segmented % 39 (L) 07/12/2025 07:27 AM    Lymphocytes % 48 (H) 07/12/2025 07:27 AM    Monocytes % 7 07/12/2025 07:27 AM    Eosinophils Relative 5 07/12/2025 07:27 AM    Basophils Relative 1 07/12/2025 07:27 AM    Immature Grans % 0 07/12/2025 07:27 AM    Absolute Neutrophils 2.01 07/12/2025 07:27 AM     Lab Results   Component Value Date/Time    Potassium 4.1 07/12/2025 07:27 AM     Chloride 106 07/12/2025 07:27 AM    CO2 28 07/12/2025 07:27 AM    BUN 23 07/12/2025 07:27 AM    Creatinine 0.81 07/12/2025 07:27 AM    Glucose, Fasting 85 07/12/2025 07:27 AM    Calcium 8.9 07/12/2025 07:27 AM    AST 15 07/12/2025 07:27 AM    ALT 13 07/12/2025 07:27 AM    Alkaline Phosphatase 43 07/12/2025 07:27 AM    Total Protein 6.6 07/12/2025 07:27 AM    Albumin 3.9 07/12/2025 07:27 AM    Total Bilirubin 0.60 07/12/2025 07:27 AM    eGFR 71 07/12/2025 07:27 AM     Lab Results   Component Value Date/Time    TSH 3RD GENERATION 3.683 07/12/2025 07:27 AM    Free T4 0.98 07/12/2025 07:27 AM     (L) 07/12/2025 07:27 AM        Radiology Results Review: I have reviewed radiology reports from 4/21/2025 including: PET scan.        Rafaela Reyes MD, PhD       [1]   Current Outpatient Medications on File Prior to Visit   Medication Sig Dispense Refill   • apixaban (Eliquis) 5 mg Take 1 tablet (5 mg total) by mouth every 12 (twelve) hours 60 tablet 5   • losartan (Cozaar) 100 MG tablet Take 1 tablet (100 mg total) by mouth daily 30 tablet 5   • metoprolol succinate (TOPROL-XL) 25 mg 24 hr tablet Take 1 tablet (25 mg total) by mouth 2 (two) times a day 60 tablet 5   • Multiple Minerals-Vitamins (Citracal Plus) TABS Take by mouth     • Multiple Vitamins-Minerals (MULTIVITAMIN WITH MINERALS) tablet Take 1 tablet by mouth in the morning.     • sodium chloride (LAURI 128) 5 % hypertonic ophthalmic solution Administer 1 drop to both eyes as needed (Corneal dryness)     • Wheat Dextrin (BENEFIBER DRINK MIX PO) Take by mouth     • [DISCONTINUED] hydrocortisone (CORTEF) 10 mg tablet Take 1 tablet (10 mg total) by mouth 2 (two) times a day 60 tablet 0     No current facility-administered medications on file prior to visit.

## 2025-07-14 NOTE — PROGRESS NOTES
Name: Debbie Damon      : 1950      MRN: [id number]  Encounter Provider: Rafaela Reyes MD  Encounter Date: 2025   Encounter department: [facility]  :  Assessment & Plan  Encounter for follow-up surveillance of melanoma  Ms. Damon is a 74-year-old female with metastatic melanoma status post 2 years of treatment with nivolumab plus relatlimab here for continued monitoring, follow-up and surveillance.  She is doing well with no clinical evidence of disease recurrence.  Labs reviewed and okay.  The last PET scan in 2025 demonstrated no evidence of disease recurrence or metastatic disease. Imaging and blood work are scheduled for 10/2025.  Orders placed.  She knows to call with any issues or concerns prior to her next visit.       Metastatic melanoma (HCC)  Ms. Damon is a 74-year-old female with metastatic melanoma status post 2 years of treatment with nivolumab plus relatlimab here for continued monitoring, follow-up and surveillance.  She is doing well with no clinical evidence of disease recurrence.  Labs reviewed and okay.  The last PET scan in 2025 demonstrated no evidence of disease recurrence or metastatic disease. Imaging and blood work are scheduled for 10/2025.  Orders placed.  She knows to call with any issues or concerns prior to her next visit.  Orders:    CBC and differential; Future    Comprehensive metabolic panel; Future    LD,Blood; Future    TSH, 3rd generation; Future    T3, free; Future    T4, free; Future    NM pet ct tumor imaging whole body; Future    hydrocortisone (CORTEF) 10 mg tablet; Take 1 tablet (10 mg total) by mouth 2 (two) times a day    High risk medication use  Previously on immunotherapy with nivolumab plus relatlimab.  Continues to monitor for any delayed onset immune-mediated side effects.  Currently on stable dose of hydrocortisone and feeling well.  Does need a new prescription sent to a new pharmacy.  We do continue  to monitor for thyroid functions given her use of immunotherapy.  We will check TSH, T3-T4.  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  Continues on hydrocortisone 10 mg twice a day.  Needed new prescription sent to new pharmacy.  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  Continues on hydrocortisone 10 mg twice a day.  Needed new prescription sent to new pharmacy.  Orders:    hydrocortisone (CORTEF) 10 mg tablet; Take 1 tablet (10 mg total) by mouth 2 (two) times a day        Return in about 3 months (around 10/14/2025) for Office Visit, labs, scans.    History of Present Illness   Chief Complaint   Patient presents with    Follow-up     History of Present Illness  The patient is a 74-year-old female with metastatic melanoma, previously treated with nivolumab and relatlimab, here for continued monitoring, follow-up, and surveillance.    She was diagnosed in 10/2022 and received treatment with nivolumab and relatlimab from 12/14/2022 to 12/11/2024, achieving complete response. She has been on surveillance since then. Her last PET scan in 04/2025 showed no evidence of disease recurrence or metastatic disease.  Blood work prior to today's visit was reviewed and is within normal limits. She reports no new lumps, bumps, or spots of concern. She is due for imaging in 10/2025. She is currently on hydrocortisone and requires a refill. She has been using sunscreen regularly.    She is unable to lift her right ring finger, a condition that began after experiencing pain in her hand. She can bend the finger but cannot lift it. There are no  issues with strength or picking up objects with her right hand, and the pain in her hand has resolved.    PAST SURGICAL HISTORY:  Bunion surgery 12 years ago.  Oncology History   Cancer Staging   Metastatic melanoma (HCC)  Staging form: Melanoma of the Skin, AJCC 8th Edition  - Clinical stage from 10/13/2022: Stage IV (cTX, cNX, cM1a) - Signed by Rafaela Reyes MD on 11/22/2022  Oncology History   Metastatic melanoma (HCC)   10/13/2022 -  Cancer Staged    Staging form: Melanoma of the Skin, AJCC 8th Edition  - Clinical stage from 10/13/2022: Stage IV (cTX, cNX, cM1a) - Signed by Rafaela Reyes MD on 11/22/2022       10/13/2022 Biopsy    A. Skin, right lateral medial leg, punch biopsy:  Portion of severely atypical melanocytic dermal proliferation with prominent melanosis consistent with melanoma. See note.        B. Skin, right medial leg. punch biopsy:  Portion of severely atypical melanocytic dermal proliferation with prominent melanosis consistent with melanoma. See note.        C. Skin, right medial leg, punch biopsy:   Portion of severely atypical melanocytic dermal proliferation with prominent melanosis consistent with melanoma. See note.     11/22/2022 Initial Diagnosis    Metastatic melanoma (HCC)     12/14/2022 -  Chemotherapy    alteplase (CATHFLO), 2 mg, Intracatheter, Every 2 hour PRN, 27 of 27 cycles  NIVOLUMAB-RELATLIMAB-RMBW (OPDUALAG) IVPB, 480 mg of nivolumab, Intravenous, Once, 27 of 27 cycles  Administration: 480 mg of nivolumab (12/14/2022), 480 mg of nivolumab (1/11/2023), 480 mg of nivolumab (2/8/2023), 480 mg of nivolumab (3/8/2023), 480 mg of nivolumab (4/5/2023), 480 mg of nivolumab (5/3/2023), 480 mg of nivolumab (5/31/2023), 480 mg of nivolumab (6/28/2023), 480 mg of nivolumab (7/26/2023), 480 mg of nivolumab (8/23/2023), 480 mg of nivolumab (9/20/2023), 480 mg of nivolumab (10/18/2023), 480 mg of nivolumab (11/15/2023), 480 mg of nivolumab (12/13/2023), 480 mg of nivolumab  "(1/10/2024), 480 mg of nivolumab (2/7/2024), 480 mg of nivolumab (3/6/2024), 480 mg of nivolumab (4/3/2024), 480 mg of nivolumab (5/1/2024), 480 mg of nivolumab (5/29/2024), 480 mg of nivolumab (6/26/2024), 480 mg of nivolumab (7/24/2024), 480 mg of nivolumab (8/21/2024), 480 mg of nivolumab (9/18/2024), 480 mg of nivolumab (10/16/2024), 480 mg of nivolumab (11/13/2024), 480 mg of nivolumab (12/11/2024)     1/5/2023 Genomic Testing    [employer] liquid testing  TMB 1 Mut/Mb  MSI-high not detected            Review of Systems   Constitutional:  Negative for activity change, chills, diaphoresis, fatigue, fever and unexpected weight change.   HENT:  Negative for congestion, hearing loss, trouble swallowing and voice change.    Respiratory:  Negative for cough, shortness of breath and wheezing.    Cardiovascular:  Negative for chest pain, palpitations and leg swelling.   Gastrointestinal:  Negative for abdominal distention, abdominal pain, constipation, diarrhea, nausea and vomiting.   Musculoskeletal:  Negative for arthralgias and myalgias.        Right ring finger - can't lift it   Skin:  Negative for color change and rash.   Neurological:  Negative for dizziness, seizures, speech difficulty, weakness, light-headedness and headaches.        Right ring finger - can't lift it   Hematological:  Negative for adenopathy.     Medications Ordered Prior to Encounter[1]       Objective   /78 (BP Location: Left arm, Patient Position: Sitting, Cuff Size: Adult)   Pulse 88   Temp 98 °F (36.7 °C) (Temporal)   Resp 17   Ht 5' 9\" (1.753 m)   Wt 92.1 kg (203 lb)   SpO2 97%   BMI 29.98 kg/m²     Pain Screening:  Pain Score: 0-No pain  ECOG ECOG Performance Status: 0 - Fully active, able to carry on all pre-disease performance without restriction     Physical Exam  Constitutional:       General: She is not in acute distress.     Appearance: Normal appearance. She is not ill-appearing.   HENT:      Head: Normocephalic " and atraumatic.      Right Ear: External ear normal.      Left Ear: External ear normal.      Nose: Nose normal. No congestion.      Mouth/Throat:      Mouth: Mucous membranes are moist.      Pharynx: Oropharynx is clear. No oropharyngeal exudate.     Eyes:      General: No scleral icterus.        Right eye: No discharge.         Left eye: No discharge.      Conjunctiva/sclera: Conjunctivae normal.       Cardiovascular:      Rate and Rhythm: Normal rate and regular rhythm.      Pulses: Normal pulses.      Heart sounds: No murmur heard.     No friction rub. No gallop.   Pulmonary:      Effort: Pulmonary effort is normal. No respiratory distress.      Breath sounds: Normal breath sounds. No wheezing or rales.   Abdominal:      General: Bowel sounds are normal. There is no distension.      Palpations: There is no mass.      Tenderness: There is no abdominal tenderness. There is no rebound.     Musculoskeletal:         General: No swelling or tenderness. Normal range of motion.      Cervical back: Normal range of motion. No rigidity.      Right lower leg: No edema.      Left lower leg: No edema.   Lymphadenopathy:      Head:      Right side of head: No submental, submandibular, preauricular, posterior auricular or occipital adenopathy.      Left side of head: No submental, submandibular, preauricular, posterior auricular or occipital adenopathy.      Cervical: No cervical adenopathy.      Right cervical: No superficial or posterior cervical adenopathy.     Left cervical: No superficial or posterior cervical adenopathy.      Upper Body:      Right upper body: No supraclavicular or axillary adenopathy.      Left upper body: No supraclavicular or axillary adenopathy.     Skin:     General: Skin is warm.      Coloration: Skin is not jaundiced.      Findings: No lesion or rash.      Comments: Resolved hyperpigmentation on right lower leg.  No concerning skin lesions     Neurological:      General: No focal deficit present.       Mental Status: She is alert and oriented to person, place, and time.      Cranial Nerves: No cranial nerve deficit.      Motor: No weakness.      Gait: Gait normal.     Psychiatric:         Mood and Affect: Mood normal.         Behavior: Behavior normal.         Thought Content: Thought content normal.         Judgment: Judgment normal.       Physical Exam  Cardiovascular: Heart sounds are normal.  Respiratory: Lungs are clear.    Results  Labs   - Blood work: Within normal limits except for low LDH    Imaging   - PET scan: 04/2025, No evidence of disease recurrence or metastatic disease  Labs: I have reviewed the following labs:  Lab Results   Component Value Date/Time    WBC 5.12 07/12/2025 07:27 AM    RBC 4.57 07/12/2025 07:27 AM    Hemoglobin 13.2 07/12/2025 07:27 AM    Hematocrit 41.5 07/12/2025 07:27 AM    MCV 91 07/12/2025 07:27 AM    MCH 28.9 07/12/2025 07:27 AM    RDW 13.3 07/12/2025 07:27 AM    Platelets 185 07/12/2025 07:27 AM    Segmented % 39 (L) 07/12/2025 07:27 AM    Lymphocytes % 48 (H) 07/12/2025 07:27 AM    Monocytes % 7 07/12/2025 07:27 AM    Eosinophils Relative 5 07/12/2025 07:27 AM    Basophils Relative 1 07/12/2025 07:27 AM    Immature Grans % 0 07/12/2025 07:27 AM    Absolute Neutrophils 2.01 07/12/2025 07:27 AM     Lab Results   Component Value Date/Time    Potassium 4.1 07/12/2025 07:27 AM    Chloride 106 07/12/2025 07:27 AM    CO2 28 07/12/2025 07:27 AM    BUN 23 07/12/2025 07:27 AM    Creatinine 0.81 07/12/2025 07:27 AM    Glucose, Fasting 85 07/12/2025 07:27 AM    Calcium 8.9 07/12/2025 07:27 AM    AST 15 07/12/2025 07:27 AM    ALT 13 07/12/2025 07:27 AM    Alkaline Phosphatase 43 07/12/2025 07:27 AM    Total Protein 6.6 07/12/2025 07:27 AM    Albumin 3.9 07/12/2025 07:27 AM    Total Bilirubin 0.60 07/12/2025 07:27 AM    eGFR 71 07/12/2025 07:27 AM     Lab Results   Component Value Date/Time    TSH 3RD GENERATION 3.683 07/12/2025 07:27 AM    Free T4 0.98 07/12/2025 07:27 AM    LD  129 (L) 07/12/2025 07:27 AM        Radiology Results Review: I have reviewed radiology reports from 4/21/2025 including: PET scan.        Rafaela Reyes MD, PhD       [1]   Current Outpatient Medications on File Prior to Visit   Medication Sig Dispense Refill    apixaban (Eliquis) 5 mg Take 1 tablet (5 mg total) by mouth every 12 (twelve) hours 60 tablet 5    losartan (Cozaar) 100 MG tablet Take 1 tablet (100 mg total) by mouth daily 30 tablet 5    metoprolol succinate (TOPROL-XL) 25 mg 24 hr tablet Take 1 tablet (25 mg total) by mouth 2 (two) times a day 60 tablet 5    Multiple Minerals-Vitamins (Citracal Plus) TABS Take by mouth      Multiple Vitamins-Minerals (MULTIVITAMIN WITH MINERALS) tablet Take 1 tablet by mouth in the morning.      sodium chloride (LAURI 128) 5 % hypertonic ophthalmic solution Administer 1 drop to both eyes as needed (Corneal dryness)      Wheat Dextrin (BENEFIBER DRINK MIX PO) Take by mouth      [DISCONTINUED] hydrocortisone (CORTEF) 10 mg tablet Take 1 tablet (10 mg total) by mouth 2 (two) times a day 60 tablet 0     No current facility-administered medications on file prior to visit.

## 2025-08-01 ENCOUNTER — TELEPHONE (OUTPATIENT)
Age: 75
End: 2025-08-01

## 2025-08-07 ENCOUNTER — VBI (OUTPATIENT)
Dept: ADMINISTRATIVE | Facility: OTHER | Age: 75
End: 2025-08-07